# Patient Record
Sex: FEMALE | Race: WHITE | NOT HISPANIC OR LATINO | Employment: OTHER | URBAN - METROPOLITAN AREA
[De-identification: names, ages, dates, MRNs, and addresses within clinical notes are randomized per-mention and may not be internally consistent; named-entity substitution may affect disease eponyms.]

---

## 2017-02-06 ENCOUNTER — OFFICE VISIT (OUTPATIENT)
Dept: OTHER | Facility: OUTPATIENT CENTER | Age: 67
End: 2017-02-06

## 2017-02-06 VITALS
SYSTOLIC BLOOD PRESSURE: 132 MMHG | BODY MASS INDEX: 37.29 KG/M2 | DIASTOLIC BLOOD PRESSURE: 83 MMHG | WEIGHT: 275 LBS | HEART RATE: 62 BPM

## 2017-02-06 DIAGNOSIS — F64.0 GENDER DYSPHORIA IN ADOLESCENT AND ADULT: Primary | ICD-10-CM

## 2017-02-06 RX ORDER — MULTIVITAMIN WITH FOLIC ACID 400 MCG
TABLET ORAL
Refills: 11 | COMMUNITY
Start: 2016-12-22 | End: 2017-03-21

## 2017-02-06 RX ORDER — DIFLUNISAL 500 MG/1
TABLET, FILM COATED ORAL
Refills: 3 | COMMUNITY
Start: 2016-12-22 | End: 2017-04-26

## 2017-02-06 ASSESSMENT — ENCOUNTER SYMPTOMS
POLYDIPSIA: 0
ABDOMINAL PAIN: 0
PALPITATIONS: 0
FREQUENCY: 0
DYSPHORIC MOOD: 0
FEVER: 0
VOMITING: 0
CHEST TIGHTNESS: 0
NUMBNESS: 0
UNEXPECTED WEIGHT CHANGE: 0
LIGHT-HEADEDNESS: 0
NERVOUS/ANXIOUS: 0
CHILLS: 0
SHORTNESS OF BREATH: 0
WEAKNESS: 0
HEADACHES: 0

## 2017-02-06 NOTE — PROGRESS NOTES
BOO Ulloa is a 66 year old individual that uses pronouns She/Her/Hers/Herself that presents today for follow up of:  feminizing hormone therapy. Gender identity: female. Started Hormone  therapy 7/252016  Continues on  Spironlactone  100 mg daily      Finasteride 5 mg daily.   Any special concerns today?  She did not come with a  today as will be moving to a community living arrangement soon, but plans to bring someone with her next visit in April.  She continues to pursue gender confirmation surgery.Reviewed her understanding of surgery: does not know about where surgeons are located, nature of surgery, [facility]. She has reading disability. Reviewed information with her.     On hormones?  YES   Gender related body changes since last visit: She has noticed lighter body hair, breast growth and tenderness. Feeling more sociable.     Activity: busy at work (furniture repair), walking at work (on feet 5 hours/day)  New health concerns since last visit: No new health concerns  ---    Past Surgical History   Procedure Laterality Date     Joint replacemtn, knee rt/lt  2006     left     Colonoscopy  2007       Patient Active Problem List   Diagnosis     large compression fracture     Gouty arthropathy     Mental or behavioral problem     Generalized osteoarthrosis, unspecified site     Disorder of bone and cartilage     Mixed hyperlipidemia     OA (osteoarthritis) of knee     HYPERLIPIDEMIA LDL GOAL <100     Gout, chronic     DDD (degenerative disc disease), lumbar     Alcoholism (H)     Advanced directives, counseling/discussion     Varicose veins of legs     Psoriasis     HTN, goal below 140/90     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     Heart murmur     Family history of diabetes mellitus in first degree relative     Mitral valve disorder     Aortic stenosis     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       Current Outpatient Prescriptions   Medication Sig Dispense Refill      meloxicam (MOBIC) 15 MG tablet Take 1 tablet (15 mg) by mouth daily 30 tablet 5     finasteride (PROSCAR) 5 MG tablet Take 1 tablet (5 mg) by mouth daily 90 tablet 1     spironolactone (ALDACTONE) 100 MG tablet Take 1 tablet (100 mg) by mouth daily 90 tablet 1     multivitamin, therapeutic with minerals (MULTI-VITAMIN) TABS ONE TAB DAILY AT 8  tablet 3     atenolol (TENORMIN) 50 MG tablet Take 1 tablet (50 mg) by mouth daily Profile Rx: patient will contact pharmacy when needed 90 tablet 1     FLUoxetine (PROZAC) 40 MG capsule Take 1 capsule (40 mg) by mouth daily Take One Capsule By Mouth Every Day 90 capsule 1     lovastatin (MEVACOR) 20 MG tablet Take 1 tablet (20 mg) by mouth At Bedtime 90 tablet 1     calcium-vitamin D (CALCIUM 600 + D) 600-400 MG-UNIT per tablet Take 1 tablet by mouth 2 times daily 180 tablet 1     timolol (TIMOPTIC) 0.5 % ophthalmic solution 1 drop 2 times daily       latanoprost (XALATAN) 0.005 % ophthalmic solution 1 drop At Bedtime.       ORDER FOR DME Equipment being ordered:   BACK BRACE    MedSpec Back-n-Shape II Back Brace  Size XXXL with thermoplastic insert        1 each 0     ORDER FOR DME Equipment being ordered: Sigvaris 232 Cotton Thigh High for Men 20-30 mmHg- TWO PAIR TAN COLOR     2 each 5       History   Smoking status     Never Smoker    Smokeless tobacco     Never Used          Allergies   Allergen Reactions     Penicillin [Esters] Itching     he is not sure what kind of reaction he had     Heparin      he lives in a assisted living program and is not sure what type of allergies he really has. He quit drinking     Zosyn [Penicillins]      unsure of reaction, allergy is listed on his med sheet       Health Maintenance Due   Topic Date Due     AORTIC ANEURYSM SCREENING (SYSTEM ASSIGNED)  03/09/2015     FIT Q1 YR (NO INBASKET)  06/11/2016     INFLUENZA VACCINE (SYSTEM ASSIGNED)  09/01/2016         Problem, Medication and Allergy Lists were reviewed and are current..          Review of Systems:   Review of Systems   Constitutional: Negative for fever, chills and unexpected weight change.        Night sweats   Eyes: Negative for visual disturbance.   Respiratory: Negative for chest tightness and shortness of breath.    Cardiovascular: Negative for chest pain, palpitations and leg swelling.   Gastrointestinal: Negative for vomiting and abdominal pain.   Endocrine: Positive for polyuria. Negative for polydipsia.   Genitourinary: Negative for frequency.   Neurological: Negative for weakness, light-headedness, numbness and headaches.   Psychiatric/Behavioral: Negative for suicidal ideas and dysphoric mood. The patient is not nervous/anxious.             Physical Exam:   Blood pressure 132/83, pulse 62, weight 124.739 kg (275 lb).  Body mass index is 37.29 kg/(m^2).    BMI= There is no weight on file to calculate BMI.   Wt Readings from Last 10 Encounters:   02/06/17 124.739 kg (275 lb)   11/17/16 127.914 kg (282 lb)   09/27/16 127.461 kg (281 lb)   08/29/16 127.914 kg (282 lb)   07/25/16 131.543 kg (290 lb)   04/26/16 128.822 kg (284 lb)   04/25/16 130.636 kg (288 lb)   11/20/15 135.172 kg (298 lb)   06/09/15 149.233 kg (329 lb)   02/04/15 152.862 kg (337 lb)     Appearance: Female appearance and dress    GENERAL:: healthy, alert and no distress  RESP: lungs clear to auscultation - no rales, no rhonchi, no wheezes  CV: regular rates and rhythm, normal S1 S2, no S3 or S4 and no murmur, no click or rub -  Affect: Appropriate/mood-congruent           Labs:   Results from last visit:  Abstract on 10/17/2016   Component Date Value Ref Range Status     Sodium 09/27/2016 136   Final     Potassium 09/27/2016 5.2  5.3 mmol/L Final     Chloride 09/27/2016 103   Final     CO2, TOTAL 09/27/2016 30   Final     Anion Gap 09/27/2016 3   Final     Glucose 09/27/2016 93  70 - 99 mg/dL Final     Urea Nitrogen 09/27/2016 24   Final     Creatinine 09/27/2016 1.27* 1.25 mg/dL Final     Calcium 09/27/2016 9.6    Final     WBC 09/27/2016 6.0   Final     RBC Count 09/27/2016 5.06   Final     Hemoglobin 09/27/2016 15.9  13.3 - 17.7 g/dL Final     Hematocrit 09/27/2016 46.0   Final     MCV 09/27/2016 91   Final     MCH 09/27/2016 31.4   Final     MCHC 09/27/2016 34.6   Final     RDW 09/27/2016 13.7   Final     Platelet Count 09/27/2016 134* 150 - 450 10^9/L Final     % Neutrophils 09/27/2016 63.8   Final     % Lymphocytes 09/27/2016 23   Final     % Monocytes 09/27/2016 7.8   Final     % Eosinophils 09/27/2016 5.2   Final     % Basophils 09/27/2016 0.2   Final       Assessment and Plan   1. Gender dysphoria  Pt. Stable on current dose of androgen blocking medications. She continues to have little insight into nature and process of gender confirmation surgery, and limited capacity for written education materials.     Unclear if she fully understands the risks and effects of estrogen therapy, or has the capacity to correctly apply transdermal estrogen without assistance, at least initially. Will await next visit with support person to further assess, and see if estrogen can be started with supports.     Contraception:   not needed  .     Counselled patient about controlled substances: N/A      Follow up:  Follow up in 3 months.  [facility]  Questions were elicited and answered.     Jluis Mane MD

## 2017-02-08 DIAGNOSIS — E78.5 HYPERLIPIDEMIA LDL GOAL <100: Primary | ICD-10-CM

## 2017-02-08 DIAGNOSIS — M10.9 GOUTY ARTHROPATHY: ICD-10-CM

## 2017-02-08 NOTE — PROGRESS NOTES
[facility] -  Case Progress Note    Client Name: Laila Perez  YOB: 1950  MRN:  [id number]  Treating Provider: Estelle Gomez LP  Type of Session: Individual  Present in Session: client  Number of Minutes:  50    Date of Service: 2/06/17    Treatment plan completed: 9/6/16    Current Symptoms/Status:  Distress about gender, secondary sex characteristics, mild depression, distress with current living situation.    Progress Toward Treatment Goals:   Client is making progress with taking medications, feeling more feminine.    Intervention: Modality and Description:  Provided support and feedback. Used CBT to process distress about hormones and gender. Client shared she continues to take her medication as prescribed. She is noticing physical changes that are congruent with her gender identity. She is moving next week to a netter living situation. She will living in an apartment rather than group home, but in the former supportive setting. She shared she is looking forward to doing this. She shared about wanting to get gender confirming genital surgery. Asked her about what she knew about surgery, what surgeries are available, want she wants and surgeons. She shared she does not know any of this information. Discussed how she could get some of this information on-line, but she stated she does not have Internet access. Discussed the goal is for her to become educated about this process and then we can write letters to assist, but we do not want to make the decision for her. Told her I would talk to the team about alternate sources of this information.    Response to Intervention:  Client reported gaining insight and validation.    Assignment:  none    Diagnosis:  Encounter Diagnosis   Name Primary?     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 Yes         Plan / Need for Future Services:  Return for therapy in 4 weeks.      Estelle Gomez PsyD, LP

## 2017-02-08 NOTE — TELEPHONE ENCOUNTER
Sergio Vit D 600-400   Last Written Prescription Date: 09.09.2016  Last Fill Quantity: 180,  # refills: 1   [place] [facility] [place]  [facility] prescribing provider: 09.27.2016                                                Lovastatin 20mg  Last Written Prescription Date: 09.09.2016  Last Fill Quantity: 90, # refills: 1  [place] [facility] or  Health prescribing provider: 09.27.2016       CHOL      133   4/26/2016  HDL       57   4/26/2016  LDL       54   4/26/2016  LDL       52   6/9/2015  TRIG      110   4/26/2016  CHOLHDLRATIO      3.0   4/25/2014    Dolobid 500mg      Last Written Prescription Date: patient reported   Last Fill Quantity: ,  # refills:    Last Office Visit [place] [facility] [place]  [facility] prescribing provider: 09.27.2016                                             Marisol Landa CSS

## 2017-02-09 RX ORDER — LOVASTATIN 20 MG
20 TABLET ORAL AT BEDTIME
Qty: 90 TABLET | Refills: 1 | Status: SHIPPED | OUTPATIENT
Start: 2017-02-09 | End: 2017-05-31

## 2017-02-09 RX ORDER — DIFLUNISAL 500 MG/1
TABLET, FILM COATED ORAL
Qty: 60 TABLET | Refills: 3 | Status: CANCELLED | OUTPATIENT
Start: 2017-02-09

## 2017-02-09 RX ORDER — DIFLUNISAL 500 MG/1
500 TABLET, FILM COATED ORAL 2 TIMES DAILY
Qty: 60 TABLET | Refills: 2 | Status: SHIPPED | OUTPATIENT
Start: 2017-02-09 | End: 2017-03-28

## 2017-02-21 ENCOUNTER — TELEPHONE (OUTPATIENT)
Dept: FAMILY MEDICINE | Facility: CLINIC | Age: 67
End: 2017-02-21

## 2017-02-21 DIAGNOSIS — M19.91 PRIMARY OSTEOARTHRITIS, UNSPECIFIED SITE: Primary | ICD-10-CM

## 2017-02-21 NOTE — TELEPHONE ENCOUNTER
Diflunisal is not covered (high risk for pt over 64).  Preferred are IBU, Naproxen and Meloxicam.  Please fax new rx if OK to change.

## 2017-02-22 NOTE — TELEPHONE ENCOUNTER
Wen Vazquez RN        12/29/16 3:29 PM   Note      Message was left with updated information about medication change.   Encounter closed.   Wen GROVES RN                   12/29/16 2:18 PM   You routed this conversation to  [facility]    Me        12/29/16 2:18 PM   Note      Addended by: MALIA SIMONS on: 12/29/2016 02:18 PM       Modules accepted: Orders, Medications             Me        12/29/16 2:18 PM   Note      RX changed to meloxicam  Thanks Malia Simons [facility]                 Looks like this was already changed in December to meloxicam  Looks like it was changed to meloxicam then.. Looks like this was discontinued by her MD Dr Jluis Mane and Diflusinal was placed back on her Med List.    Please call and discuss with patient and find out what she would like to do. Let her know the diflusinal is not covered by her insurance and see if she would like this switched.   Otherwise she may have to pay cash for this.   All NSAIDS listed below have this potential. This one is not different than the others from a safety standpoint.   They are effective for his joint pain and improve his quality of life. He could try ES tylenol up to 3500 mg daily as a substitute if interested.  Thanks Malia Simons [facility]

## 2017-02-24 DIAGNOSIS — I10 ESSENTIAL HYPERTENSION WITH GOAL BLOOD PRESSURE LESS THAN 140/90: ICD-10-CM

## 2017-02-24 DIAGNOSIS — F64.0 GENDER DYSPHORIA IN ADOLESCENT AND ADULT: ICD-10-CM

## 2017-02-24 NOTE — TELEPHONE ENCOUNTER
Atenolol  Last Written Prescription Date: 9/27/16  Last Fill Quantity: 90, # refills: 1    Last Office Visit with G, P or [facility] prescribing provider:  9/27/16 Delmis GIBSON  [facility]     Future Office Visit:        BP Readings from Last 3 Encounters:   09/27/16 122/62   08/29/16 119/69   08/29/16 119/69     Finasteride       Last Written Prescription Date: 11/17/16  Last Fill Quantity: 90,  # refills: 1     [employer] [facility]

## 2017-02-27 RX ORDER — ATENOLOL 50 MG/1
50 TABLET ORAL DAILY
Qty: 90 TABLET | Refills: 0 | Status: SHIPPED | OUTPATIENT
Start: 2017-02-27 | End: 2017-04-27

## 2017-02-27 RX ORDER — FINASTERIDE 5 MG/1
5 TABLET, FILM COATED ORAL DAILY
Qty: 90 TABLET | Refills: 0 | Status: SHIPPED | OUTPATIENT
Start: 2017-02-27 | End: 2017-04-26

## 2017-03-28 RX ORDER — MELOXICAM 15 MG/1
15 TABLET ORAL DAILY
Qty: 90 TABLET | Refills: 1 | Status: SHIPPED | OUTPATIENT
Start: 2017-03-28 | End: 2017-09-06

## 2017-03-28 NOTE — TELEPHONE ENCOUNTER
[facility] is calling regarding Pt's Diflunisal to see if a Prior Auth was ever done.  Please Advise looks like it was changed to Meloxicam  [facility]

## 2017-03-28 NOTE — TELEPHONE ENCOUNTER
Stick with meloxicam then.   Let pharmacy know to discontinue the diflusinal from her medication list.   Thanks Delmis Simons P-BC

## 2017-03-28 NOTE — TELEPHONE ENCOUNTER
Spoke to [facility], pt has been taking Meloxicam 15 mg daily, diflunisal has not been given to pt, they need clarification as they have been giving her meloxiam, please advise, I'm not quite sure what happened with this.  Virgen Call RN    Looks like this was already changed in December to meloxicam  Looks like it was changed to meloxicam then.. Looks like this was discontinued by her MD Dr Jluis Mane and Diffusional was placed back on her Med List.   Please call and discuss with patient and find out what she would like to do. Let her know the diffusional is not covered by her insurance and see if she would like this switched.   Otherwise she may have to pay cash for this.   All NSAIDS listed below have this potential. This one is not different than the others from a safety standpoint.   They are effective for his joint pain and improve his quality of life. He could try ES tylenol up to 3500 mg daily as a substitute if interested.  Thanks Delmis Simons [facility]

## 2017-04-26 ENCOUNTER — OFFICE VISIT (OUTPATIENT)
Dept: OTHER | Facility: OUTPATIENT CENTER | Age: 67
End: 2017-04-26

## 2017-04-26 VITALS
BODY MASS INDEX: 40.46 KG/M2 | SYSTOLIC BLOOD PRESSURE: 118 MMHG | HEART RATE: 58 BPM | DIASTOLIC BLOOD PRESSURE: 70 MMHG | WEIGHT: 289 LBS | HEIGHT: 71 IN

## 2017-04-26 DIAGNOSIS — F64.0 GENDER DYSPHORIA IN ADOLESCENT AND ADULT: Primary | ICD-10-CM

## 2017-04-26 DIAGNOSIS — I10 ESSENTIAL HYPERTENSION WITH GOAL BLOOD PRESSURE LESS THAN 140/90: ICD-10-CM

## 2017-04-26 RX ORDER — FINASTERIDE 5 MG/1
5 TABLET, FILM COATED ORAL DAILY
Qty: 90 TABLET | Refills: 1 | Status: SHIPPED | OUTPATIENT
Start: 2017-04-26 | End: 2017-08-28

## 2017-04-26 RX ORDER — SPIRONOLACTONE 100 MG/1
100 TABLET, FILM COATED ORAL DAILY
Qty: 90 TABLET | Refills: 1 | Status: SHIPPED | OUTPATIENT
Start: 2017-04-26 | End: 2017-08-28

## 2017-04-26 ASSESSMENT — ENCOUNTER SYMPTOMS
NUMBNESS: 0
LIGHT-HEADEDNESS: 0
UNEXPECTED WEIGHT CHANGE: 0
DYSPHORIC MOOD: 0
VOMITING: 0
CHILLS: 0
POLYDIPSIA: 0
FREQUENCY: 0
ABDOMINAL PAIN: 0
WEAKNESS: 0
FEVER: 0
SHORTNESS OF BREATH: 0
PALPITATIONS: 0
CHEST TIGHTNESS: 0
NERVOUS/ANXIOUS: 0
HEADACHES: 0

## 2017-04-26 NOTE — PROGRESS NOTES
BOO Ulloa is a 67 year old individual that uses pronouns She/Her/Hers/Herself that presents today for follow up of:  feminizing hormone therapy.   Gender identity: female.   Started Hormone  therapy  7/2016  Continues on .  Spironlactone 100. mg daily      Other finasteride 5 mg daily  Any special concerns today?  She is having CPE in May with PCP, and echocardiagram in June for routine follow up per patient.   She is now living by herself and gets 16 hours of staff assist, (today with staff member). She sets up her own medication, staff reviews. Staff assists with transportation. Always able to call on staff. Due to see Dr. Gomez next month.      On hormones?  YES +++ Shot day of the week? Not applicable-taking pills/patch/gel      Due for labs?  No      +++ Refills of meds needed?  Yes  Gender related body changes since last visit: body hair is less, breasts are bigger. Feels better all over.     Activity:Still walking at work, doing furniture repair/wood work  New health concerns since last visit:No new health concerns.  ---    Past Surgical History:   Procedure Laterality Date     COLONOSCOPY  2007     JOINT REPLACEMTN, KNEE RT/LT  2006    left       Patient Active Problem List   Diagnosis     large compression fracture     Gouty arthropathy     Mental or behavioral problem     Generalized osteoarthrosis, unspecified site     Disorder of bone and cartilage     Mixed hyperlipidemia     OA (osteoarthritis) of knee     HYPERLIPIDEMIA LDL GOAL <100     Gout, chronic     DDD (degenerative disc disease), lumbar     Alcoholism (H)     Advanced directives, counseling/discussion     Varicose veins of legs     Psoriasis     HTN, goal below 140/90     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     Heart murmur     Family history of diabetes mellitus in first degree relative     Mitral valve disorder     Aortic stenosis     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       Current Outpatient Prescriptions   Medication Sig  Dispense Refill     meloxicam (MOBIC) 15 MG tablet Take 1 tablet (15 mg) by mouth daily 90 tablet 1     Multiple Vitamin (DAILY-EVANGELISTA) TABS Take 1 tablet by mouth daily 90 tablet 3     atenolol (TENORMIN) 50 MG tablet Take 1 tablet (50 mg) by mouth daily 90 tablet 0     finasteride (PROSCAR) 5 MG tablet Take 1 tablet (5 mg) by mouth daily 90 tablet 0     calcium-vitamin D (CALCIUM 600 + D) 600-400 MG-UNIT per tablet Take 1 tablet by mouth 2 times daily 180 tablet 1     lovastatin (MEVACOR) 20 MG tablet Take 1 tablet (20 mg) by mouth At Bedtime 90 tablet 1     spironolactone (ALDACTONE) 100 MG tablet Take 1 tablet (100 mg) by mouth daily 90 tablet 1     multivitamin, therapeutic with minerals (MULTI-VITAMIN) TABS ONE TAB DAILY AT 8  tablet 3     FLUoxetine (PROZAC) 40 MG capsule Take 1 capsule (40 mg) by mouth daily Take One Capsule By Mouth Every Day 90 capsule 1     timolol (TIMOPTIC) 0.5 % ophthalmic solution 1 drop 2 times daily       latanoprost (XALATAN) 0.005 % ophthalmic solution 1 drop At Bedtime.       ORDER FOR DME Equipment being ordered:   BACK BRACE    MedSpec Back-n-Shape II Back Brace  Size XXXL with thermoplastic insert        1 each 0     ORDER FOR DME Equipment being ordered: Sigvaris 232 Cotton Thigh High for Men 20-30 mmHg- TWO PAIR TAN COLOR     2 each 5       History   Smoking Status     Never Smoker   Smokeless Tobacco     Never Used          Allergies   Allergen Reactions     Penicillin [Esters] Itching     he is not sure what kind of reaction he had     Heparin      he lives in a assisted living program and is not sure what type of allergies he really has. He quit drinking     Zosyn [Penicillins]      unsure of reaction, allergy is listed on his med sheet       Health Maintenance Due   Topic Date Due     AORTIC ANEURYSM SCREENING (SYSTEM ASSIGNED)  03/09/2015     FIT Q1 YR (NO INBASKET)  06/11/2016     PNEUMOCOCCAL (2 of 2 - PPSV23) 04/26/2017         Problem, Medication and Allergy  "Lists were reviewed and are current..         Review of Systems:   Review of Systems   Constitutional: Negative for chills, fever and unexpected weight change.   Eyes: Negative for visual disturbance.   Respiratory: Negative for chest tightness and shortness of breath.    Cardiovascular: Negative for chest pain, palpitations and leg swelling.   Gastrointestinal: Negative for abdominal pain and vomiting.   Endocrine: Negative for polydipsia and polyuria.   Genitourinary: Negative for frequency.   Neurological: Negative for weakness, light-headedness, numbness and headaches.   Psychiatric/Behavioral: Negative for dysphoric mood and suicidal ideas. The patient is not nervous/anxious.             Physical Exam:     Vitals:    04/26/17 1305   BP: 118/70   Pulse: 58   Weight: 131.1 kg (289 lb)   Height: 1.803 m (5' 11\")     BMI= Body mass index is 40.31 kg/(m^2).   Wt Readings from Last 10 Encounters:   04/26/17 131.1 kg (289 lb)   02/06/17 124.7 kg (275 lb)   11/17/16 127.9 kg (282 lb)   09/27/16 127.5 kg (281 lb)   08/29/16 127.9 kg (282 lb)   07/25/16 131.5 kg (290 lb)   04/26/16 128.8 kg (284 lb)   04/25/16 130.6 kg (288 lb)   11/20/15 135.2 kg (298 lb)   06/09/15 (!) 149.2 kg (329 lb)     Appearance: Female appearance and dress    GENERAL:: healthy, alert and no distress  RESP: lungs clear to auscultation - no rales, no rhonchi, no wheezes  CV: regular rates and rhythm,2/4 systolic murmur at  L sternal border  Breast, hips per flowsheet    Affect: Appropriate/mood-congruent           Labs:   Results from last visit:  Abstract on 10/17/2016   Component Date Value Ref Range Status     Sodium 09/27/2016 136  mmol/L Final     Potassium 09/27/2016 5.2  5.3 mmol/L Final     Chloride 09/27/2016 103  mmol/L Final     CO2, TOTAL 09/27/2016 30  mmol/L Final     Anion Gap 09/27/2016 3  mmol/L Final     Glucose 09/27/2016 93  70 - 99 mg/dL Final     Urea Nitrogen 09/27/2016 24  mg/dL Final     Creatinine 09/27/2016 1.27* 1.25 " mg/dL Final     Calcium 09/27/2016 9.6  mg/dL Final     WBC 09/27/2016 6.0  10^9/L Final     RBC Count 09/27/2016 5.06  10^12/L Final     Hemoglobin 09/27/2016 15.9  13.3 - 17.7 g/dL Final     Hematocrit 09/27/2016 46.0  % Final     MCV 09/27/2016 91  fl Final     MCH 09/27/2016 31.4  pg Final     MCHC 09/27/2016 34.6  g/dL Final     RDW 09/27/2016 13.7  % Final     Platelet Count 09/27/2016 134* 150 - 450 10^9/L Final     % Neutrophils 09/27/2016 63.8  % Final     % Lymphocytes 09/27/2016 23  % Final     % Monocytes 09/27/2016 7.8  % Final     % Eosinophils 09/27/2016 5.2  % Final     % Basophils 09/27/2016 0.2  % Final       Assessment and Plan   1. Gender dysphoria  2. Aortic stensois  3. Weight gain    Feminizing on current hormone regimen, and she is experiencing relief from dysphoria.  She is interested in orchiectomy and will continue to discuss with her therapist.  No change in medications, check BMP and testosterone level  Discussed need for health diet and activity to reduce weight, follow with cardiology        F/u 2-4 monhts  There are no diagnoses linked to this encounter.    Follow up:  Follow up in 2- 4 months  Results by tracet  Questions were elicited and answered.     Jluis Mane MD

## 2017-04-26 NOTE — LETTER
2017      To Whom It May Concern:    My patient,  Laila Perez  : 1950 , is currently being treated by me for Gender Identity Disorder. I expect Laila's treatment to be lifelong and result in continuing irreversible feminizing physical changes.    If you have any questions regarding Laila s diagnosis or treatment, please contact me at the [facility].      Sincerely,      Jluis Mane MD, PhD

## 2017-04-26 NOTE — NURSING NOTE
"Chief Complaint   Patient presents with     RECHECK       Vitals:    04/26/17 1305   BP: 118/70   Pulse: 58   Weight: 131.1 kg (289 lb)   Height: 1.803 m (5' 11\")       Body mass index is 40.31 kg/(m^2).      Alexandra Long CMA                        "

## 2017-04-26 NOTE — MR AVS SNAPSHOT
After Visit Summary   4/26/2017    Laila Perez    MRN: [id number]           Patient Information     Date Of Birth          1950        Visit Information        Provider Department      4/26/2017 1:00 PM Jluis Mane MD [facility]        Today's Diagnoses     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    -  1    Essential hypertension with goal blood pressure less than 140/90           Follow-ups after your visit        Follow-up notes from your care team     Return in about 3 months (around 7/26/2017).      Your next 10 appointments already scheduled     May 02, 2017 10:40 AM CDT   PHYSICAL with JERRICA Salcedo [facility] ([facility])    [address]  [facility] [id number] [phone]            May 23, 2017  3:00 PM CDT   INDIVIDUAL THERAPY with Estelle Gomez LP   [facility] ([facility])    [address]  Mail Code 7521  [facility] [address]   [phone]            Jun 27, 2017 10:00 AM CDT   OFFICE VISIT with Jluis Mane MD   [facility] ([facility])    [address]  Mail [address] 75[address]  [facility] [address]   [phone]              Who to contact     Please call your clinic at [phone] to:    Ask questions about your health    Make or cancel appointments    Discuss your medicines    Learn about your test results    Speak to your doctor   If you have compliments or concerns about an experience at your clinic, or if you wish to file a complaint, please contact [facility] Patient Relations at [phone] or email us at [email or web address]         Additional Information About Your Visit        MyChart Information     [employer] is an electronic gateway that provides easy, online access to your medical records. With [employer], you can request a clinic appointment, read your test results, renew a prescription  "or communicate with your care team.     To sign up for [employer] visit the website at [email or web address]   You will be asked to enter the access code listed below, as well as some personal information. Please follow the directions to create your username and password.     Your access code is: [email or web address]  Expires: 2017  5:28 PM     Your access code will  in 90 days. If you need help or a new code, please contact your [facility] or call [phone] for assistance.        Care EveryWhere ID     This is your Care EveryWhere ID. This could be used by other organizations to access your [facility] medical records  [id number]        Your Vitals Were     Pulse Height BMI (Body Mass Index)             58 1.803 m (5' 11\") 40.31 kg/m2          Blood Pressure from Last 3 Encounters:   17 118/70   17 132/83   16 130/82    Weight from Last 3 Encounters:   17 131.1 kg (289 lb)   17 124.7 kg (275 lb)   16 127.9 kg (282 lb)              We Performed the Following     SCANNED MISC. LAB RESULTS          Where to get your medicines      These medications were sent to [facility]  [address]  [address] [facility] [address]    Hours:  test rx sent successfully  05   Phone:  [phone]     finasteride 5 MG tablet    spironolactone 100 MG tablet          Primary Care Provider Office Phone # Fax #    JERRICA Salcedo [facility] [phone]       [facility] [address] [facility] [address]        Thank you!     Thank you for choosing [facility]  for your care. Our goal is always to provide you with excellent care. Hearing back from our patients is one way we can continue to improve our services. Please take a few minutes to complete the written survey that you may receive in the mail after your visit with us. Thank you!             Your Updated " Medication List - Protect others around you: Learn how to safely use, store and throw away your medicines at www.disposemymeds.org.          This list is accurate as of: 4/26/17 11:59 PM.  Always use your most recent med list.                   Brand Name Dispense Instructions for use    calcium-vitamin D 600-400 MG-UNIT per tablet    calcium 600 + D    180 tablet    Take 1 tablet by mouth 2 times daily       DAILY-EVANGELISTA Tabs     90 tablet    Take 1 tablet by mouth daily       finasteride 5 MG tablet    PROSCAR    90 tablet    Take 1 tablet (5 mg) by mouth daily       FLUoxetine 40 MG capsule    PROzac    90 capsule    Take 1 capsule (40 mg) by mouth daily Take One Capsule By Mouth Every Day       latanoprost 0.005 % ophthalmic solution    XALATAN     1 drop At Bedtime.       lovastatin 20 MG tablet    MEVACOR    90 tablet    Take 1 tablet (20 mg) by mouth At Bedtime       meloxicam 15 MG tablet    MOBIC    90 tablet    Take 1 tablet (15 mg) by mouth daily       multivitamin, therapeutic with minerals Tabs tablet     100 tablet    ONE TAB DAILY AT 8 AM       * order for DME     2 each    Equipment being ordered: [employer] 232 Cotton Thigh High for Men 20-30 mmHg- TWO PAIR TAN COLOR       * order for DME     1 each    Equipment being ordered:  BACK BRACE  MedSpec Back-n-Shape II Back Brace  Size XXXL with thermoplastic insert       spironolactone 100 MG tablet    ALDACTONE    90 tablet    Take 1 tablet (100 mg) by mouth daily       timolol 0.5 % ophthalmic solution    TIMOPTIC     1 drop 2 times daily       * Notice:  This list has 2 medication(s) that are the same as other medications prescribed for you. Read the directions carefully, and ask your doctor or other care provider to review them with you.

## 2017-05-01 NOTE — PROGRESS NOTES
[facility] -  Case Progress Note  Client Name: Laila Perez  YOB: 1950  MRN:  [id number]  Treating Provider: Estelle Gomez LP  Type of Session: Individual  Present in Session: client and support person  Number of Minutes:  55    Date of Service: Apr 26, 2017    Current Symptoms/Status:  Distress about gender and secondary sex characteristics, wants genitals removed, bothers her daily of moderate to severe intensity.    Progress Toward Treatment Goals:   Client is making progress with living lower level of assistance.    Intervention: Modality and Description:  Provided support and feedback. Used CBT to process gender dysphoria concerns. Client shared bring her support person because the physician requested she do this because of concern about her understanding medication/medical directions. She shared she lives alone and gets some services from this person each week. She shared wanting to get on estrogen. Client also expressed interest in having genital surgery. When asked about what type of surgery--she stated she wants everything removed. She does not have understanding of surgery choices. Also does not understand that medicare or medical assistance may not pay for it. She has not consulted a surgeon. When explaining surgery choices--she kept stating she wants everything removed. The support person seemed to understand the differences and the need to find out about how it will get covered. Also there will be a need for recovery services. Support person will use the Internet at home to show client about the different surgery options. Client also shared needing a letter from me to change the M to a F on legal documents. Discussed thought she already went to court and has these documents. Client did not understand --support person stated she has the court documents and has sent them to the appropriate agencies.    Response to Intervention:  Client reported  validation.    Assignment:  Look at information about surgery on the internet.    Interactive Complexity:  There are four specific communication difficulties that complicate the work of the primary psychiatric procedure.  Interactive complexity (+10086) may be reported when at least one of these difficulties is present.    Communication difficulties present during current the psychiatric procedure include:  1. Use of play equipment, physical devices,  or  to overcome significant communication barriers.    Diagnosis:  Encounter Diagnosis   Name Primary?     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 Yes           Plan / Need for Future Services:  Return for therapy in 4 weeks.      Estelle Gomez PsyD, LP

## 2017-05-02 ENCOUNTER — OFFICE VISIT (OUTPATIENT)
Dept: FAMILY MEDICINE | Facility: CLINIC | Age: 67
End: 2017-05-02
Payer: MEDICARE

## 2017-05-02 VITALS
WEIGHT: 289 LBS | OXYGEN SATURATION: 99 % | DIASTOLIC BLOOD PRESSURE: 62 MMHG | SYSTOLIC BLOOD PRESSURE: 102 MMHG | HEART RATE: 60 BPM | HEIGHT: 71 IN | TEMPERATURE: 97.3 F | BODY MASS INDEX: 40.46 KG/M2

## 2017-05-02 DIAGNOSIS — I10 HTN, GOAL BELOW 140/90: ICD-10-CM

## 2017-05-02 DIAGNOSIS — I83.93 VARICOSE VEINS OF LEGS: ICD-10-CM

## 2017-05-02 DIAGNOSIS — Z78.9 MALE-TO-FEMALE TRANSGENDER PERSON: ICD-10-CM

## 2017-05-02 DIAGNOSIS — Z00.00 MEDICARE ANNUAL WELLNESS VISIT, SUBSEQUENT: Primary | ICD-10-CM

## 2017-05-02 DIAGNOSIS — M17.0 PRIMARY OSTEOARTHRITIS OF BOTH KNEES: ICD-10-CM

## 2017-05-02 DIAGNOSIS — E78.5 HYPERLIPIDEMIA LDL GOAL <100: ICD-10-CM

## 2017-05-02 DIAGNOSIS — M1A.00X0 IDIOPATHIC CHRONIC GOUT WITHOUT TOPHUS, UNSPECIFIED SITE: ICD-10-CM

## 2017-05-02 DIAGNOSIS — F64.0 GENDER DYSPHORIA IN ADOLESCENT AND ADULT: ICD-10-CM

## 2017-05-02 DIAGNOSIS — I35.0 NONRHEUMATIC AORTIC VALVE STENOSIS: ICD-10-CM

## 2017-05-02 DIAGNOSIS — I05.9 MITRAL VALVE DISORDER: ICD-10-CM

## 2017-05-02 DIAGNOSIS — Z12.11 COLON CANCER SCREENING: ICD-10-CM

## 2017-05-02 LAB
ALBUMIN SERPL-MCNC: 4 G/DL (ref 3.4–5)
ALP SERPL-CCNC: 68 U/L (ref 40–150)
ALT SERPL W P-5'-P-CCNC: 22 U/L (ref 0–70)
ANION GAP SERPL CALCULATED.3IONS-SCNC: 8 MMOL/L (ref 3–14)
AST SERPL W P-5'-P-CCNC: 12 U/L (ref 0–45)
BILIRUB SERPL-MCNC: 0.5 MG/DL (ref 0.2–1.3)
BUN SERPL-MCNC: 28 MG/DL (ref 7–30)
CALCIUM SERPL-MCNC: 9.2 MG/DL (ref 8.5–10.1)
CHLORIDE SERPL-SCNC: 105 MMOL/L (ref 94–109)
CHOLEST SERPL-MCNC: 176 MG/DL
CO2 SERPL-SCNC: 27 MMOL/L (ref 20–32)
CREAT SERPL-MCNC: 1.49 MG/DL (ref 0.66–1.25)
ERYTHROCYTE [DISTWIDTH] IN BLOOD BY AUTOMATED COUNT: 12.9 % (ref 10–15)
GFR SERPL CREATININE-BSD FRML MDRD: 47 ML/MIN/1.7M2
GLUCOSE SERPL-MCNC: 85 MG/DL (ref 70–99)
HCT VFR BLD AUTO: 42.6 % (ref 40–53)
HDLC SERPL-MCNC: 57 MG/DL
HGB BLD-MCNC: 14.6 G/DL (ref 13.3–17.7)
LDLC SERPL CALC-MCNC: 90 MG/DL
MCH RBC QN AUTO: 30.9 PG (ref 26.5–33)
MCHC RBC AUTO-ENTMCNC: 34.3 G/DL (ref 31.5–36.5)
MCV RBC AUTO: 90 FL (ref 78–100)
NONHDLC SERPL-MCNC: 119 MG/DL
PLATELET # BLD AUTO: 133 10E9/L (ref 150–450)
POTASSIUM SERPL-SCNC: 5.1 MMOL/L (ref 3.4–5.3)
PROT SERPL-MCNC: 7.8 G/DL (ref 6.8–8.8)
RBC # BLD AUTO: 4.73 10E12/L (ref 4.4–5.9)
SODIUM SERPL-SCNC: 140 MMOL/L (ref 133–144)
TRIGL SERPL-MCNC: 147 MG/DL
TSH SERPL DL<=0.005 MIU/L-ACNC: 1.44 MU/L (ref 0.4–4)
URATE SERPL-MCNC: 7 MG/DL (ref 3.5–7.2)
WBC # BLD AUTO: 7.1 10E9/L (ref 4–11)

## 2017-05-02 PROCEDURE — 84443 ASSAY THYROID STIM HORMONE: CPT | Performed by: NURSE PRACTITIONER

## 2017-05-02 PROCEDURE — 85027 COMPLETE CBC AUTOMATED: CPT | Performed by: NURSE PRACTITIONER

## 2017-05-02 PROCEDURE — 80053 COMPREHEN METABOLIC PANEL: CPT | Performed by: NURSE PRACTITIONER

## 2017-05-02 PROCEDURE — 84403 ASSAY OF TOTAL TESTOSTERONE: CPT | Performed by: NURSE PRACTITIONER

## 2017-05-02 PROCEDURE — 99397 PER PM REEVAL EST PAT 65+ YR: CPT | Performed by: NURSE PRACTITIONER

## 2017-05-02 PROCEDURE — 36415 COLL VENOUS BLD VENIPUNCTURE: CPT | Performed by: NURSE PRACTITIONER

## 2017-05-02 PROCEDURE — 84550 ASSAY OF BLOOD/URIC ACID: CPT | Performed by: NURSE PRACTITIONER

## 2017-05-02 PROCEDURE — 80061 LIPID PANEL: CPT | Performed by: NURSE PRACTITIONER

## 2017-05-02 PROCEDURE — 99213 OFFICE O/P EST LOW 20 MIN: CPT | Mod: 25 | Performed by: NURSE PRACTITIONER

## 2017-05-02 NOTE — MR AVS SNAPSHOT
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   o   Discuss preventive care.    o   Review your medicines if your doctor has prescribed any.    Talk with your health care provider about whether you should have a test to screen for prostate cancer (PSA).    Every 3 years, have a diabetes test (fasting glucose). If you are at risk for diabetes, you should have this test more often.    Every 5 years, have a cholesterol test. Have this test more often if you are at risk for high cholesterol or heart disease.     Every 10 years, have a colonoscopy. Or, have a yearly FIT test (stool test). These exams will check for colon cancer.    Talk to with your health care provider about screening for Abdominal Aortic Aneurysm if you have a family history of AAA or have a history of smoking.    Shots:     Get a flu shot each year.     Get a tetanus shot every 10 years.     Talk to your doctor about your pneumonia vaccines. There are now two you should receive - Pneumovax (PPSV 23) and Prevnar (PCV 13).     Talk to your doctor about a  shingles vaccine.     Talk to your doctor about the hepatitis B vaccine.  Nutrition:     Eat at least 5 servings of fruits and vegetables each day.     Eat whole-grain bread, whole-wheat pasta and brown rice instead of white grains and rice.     Talk to your provider about Calcium and Vitamin D.   Lifestyle    Exercise for at least 150 minutes a week (30 minutes a day, 5 days a week). This will help you control your weight and prevent disease.     Limit alcohol to one drink per day.     No smoking.     Wear sunscreen to prevent skin cancer.     See your dentist every six months for an exam and cleaning.     See your eye doctor every 1 to 2 years to screen for conditions such as glaucoma, macular degeneration, cataracts, etc         What is Arthritis?  Arthritis is a disease that affects the joints (the parts where bones meet and move). It can affect any joint in your body. There are many types of arthritis, including osteoarthritis and rheumatoid arthrtitis. If your symptoms are mild, medications may be enough to reduce pain and swelling. For more severe arthritis, surgery may be needed to improve the condition of the joint or replace the joint entirely.                  What causes arthritis?  Cartilage is a smooth substance that protects the ends of your bones and provides cushioning. When you have arthritis, this cartilage breaks down and can no longer protect your bones. The bones rub against each other, causing pain and swelling. Over time, bone spurs (small pieces of rough or splintered bone) may develop, and the joint's range of motion can become limited.  Symptoms  Some of the more common symptoms of arthritis include:    Joint pain and stiffness. Pain and stiffness get worse with long periods of rest or using a joint too long or too hard.    Joints that have lost normal shape and motion.    Tender, inflamed joints. They may look red and feel warm.    Grinding or popping noise with joint  movement.     Feeling tired all the time.  Reducing symptoms  Following a healthy lifestyle by losing weight and exercising can help reduce symptoms of osteoarthritis. Medicines can be very helpful for arthritis.       4312-6625 [employer]. [address] [place] [address]. All rights reserved. This information is not intended as a substitute for professional medical care. Always follow your healthcare professional's instructions.        Taking Your Blood Pressure  Blood pressure is the force of blood as it moves from the heart through the blood vessels. You can take your own blood pressure reading using a digital monitor. Take readings as often as your doctor instructs. Take each reading at the same time of day.  Step 1. Relax      Wait at least a half-hour after smoking, eating, or exercising.    Sit comfortably at a table. Place the monitor near you.    Rest for a few minutes before you begin.  Step 2. Wrap the Cuff      Place your arm on the table, palm up. Your arm should be at the level of your heart. Wrap the cuff around your upper arm, just above your elbow. It s best done on bare skin, not over clothing.    Make sure your cuff fits. If it doesn t wrap around your upper arm, order a larger cuff.  Step 3. Inflate the Cuff      Pump the cuff until the scale reads 160. If you have a self-inflating cuff, push the button that starts the pump.    The cuff will tighten, then loosen.    The numbers will change. When they stop changing, your blood pressure reading will appear.    If you get a reading that is too high or too low for you, relax for a few minutes. Then do the test again.  Step 4. Write Down the Results      Write down your blood pressure numbers. Note the date and time. Keep your results in one place, such as a notebook.    Remove the cuff from your arm. Turn off the machine.    4388-7584 [employer]. [address] [place] [address]. All rights reserved.  This information is not intended as a substitute for professional medical care. Always follow your healthcare professional's instructions.        Lifestyle Changes to Control Cholesterol  Diet, exercise, weight management, quitting smoking, stress management, and taking your medications right can help you control your cholesterol.    Diet  Your health care provider will give you information on changes to your diet you may need to make, based on your situation. Your provider may recommend that you see a registered dietitian for help with diet changes. Changes may include:    Reducing the amount of fat and cholesterol in your meals    Reducing the amount of sodium (salt) in your food, especially if you have high blood pressure    Eating more fresh vegetables and fruits    Eating lean proteins, such as fish, poultry, and legumes (beans and peas), and eating less red meat and processed meats    Using low-fat dairy products    Using vegetable and nut oils in limited amounts    Limiting how many sweets and processed foods like chips, cookies, and baked goods that you eat   Exercise  Regular exercise is a good way to help your body control cholesterol. Regular exercise has many benefits. It can:    Raise your good cholesterol.    Help lower your bad cholesterol.    Let blood flow better through your body.    Give more oxygen to your muscles and tissues.    Help you manage your weight.  Your health care provider may recommend that you get more physical activity if you haven't been active. Depending on your situation, your provider may recommend that you get moderate to vigorous physical activity for at least 40 minutes each day and for at least 3 to 4 days each week. A few examples of moderate to vigorous activity include:    Walking at a brisk pace, about 3 to 4 miles per hour    Jogging or running    Swimming or water aerobics    Hiking    Dancing    Martial arts    Tennis    Riding a bicycle or stationary  bike    Dancing  Weight management  If you are overweight or obese, your health care provider will work with you to help you lose weight and lower your BMI (body mass index) to a normal or near-normal level. Making diet changes and getting more physical activity can help.    Quitting smoking  Smoking and other tobacco use can raise cholesterol and make it harder to control. Quitting is tough. But millions of people have given up tobacco for good. You can quit, too! Think about some of the reasons below to quit smoking. Do any of them make you think twice about your smoking habit?  Stop smoking because it:    Keeps your cholesterol high, even if you make all the other changes you re supposed to.    Damages your body, especially your heart, lungs, and blood vessels.    Makes you more likely to have a heart attack (also known as acute myocardial infarction, or AMI), stroke, or cancer.    Stains your teeth and makes your skin, clothes, and breath smell bad.    Costs a lot of money.  Stress   Learn stress-management techniques to help you deal with stress in your home and work life.   Making the most of medications  Healthy eating and exercise are a good start to keeping your cholesterol down. But you may need some extra help from medication. If your doctor prescribes medication, be sure to take it exactly as directed. Remember:    Tell your doctor about all other medications you take, including vitamins and herbs.    Tell your doctor if you have any side effects after starting to take a medication. Examples of side effects to watch for include: muscle aches, weakness, blurred vision, rust-colored urine, yellowing of eyes or skin (jaundice), or headache.    Don t skip a dose or stop taking your medication because you feel better or because your cholesterol numbers go down. Never stop taking your medication unless your doctor has told you it s OK.    7486-3555 [employer]. [address] [facility], [place]  [address]. All rights reserved. This information is not intended as a substitute for professional medical care. Always follow your healthcare professional's instructions.        Colorectal Cancer Screening  Colorectal cancer (cancer in the colon or rectum) is a leading cause of cancer deaths in the [place]. But it doesn t have to be. When this cancer is found and removed early, the chances of a full recovery are very good. Because colorectal cancer rarely causes symptoms in its early stages, screening for the disease is important. It s even more crucial if you have risk factors for the disease. Learn more about colorectal cancer and its risk factors. Then talk to your healthcare provider about being screened. You could be saving your own life.  Risk factors for colorectal cancer  Your risk of having colorectal cancer increases if you:    Are 50 years of age or older    Have a family history or personal history of colorectal cancer or polyps    Have a personal history of type 2 diabetes, Crohn s disease, or ulcerative colitis    Have an inherited genetic syndrome like Gamble syndrome (also known as HNPCC) or familial adenomatous polyposis (FAP)    Are very overweight    Are not physically active    Smoke    Drink a lot of alcohol    Eat a lot of red or processed meat  The colon and rectum        A ABHI can detect a growth in the rectum or anus.      Waste from food you eat enters the colon from the small intestine. As it travels through the colon, the waste (stool) loses water and becomes more solid. Intestinal muscles push it toward the sigmoid--the last section of the colon. Stool then moves into the rectum, where it s stored until it s ready to leave the body during a bowel movement.  How cancer develops  Polyps are growths that form on the inner lining of the colon or rectum. Most are benign, which means they aren t cancerous. But over time, some polyps can become malignant (cancerous). This happens when cells in  these polyps begin growing abnormally. In time, malignant cells invade more and more of the colon and rectum. The cancer may also spread to nearby organs or lymph nodes or to other parts of the body. Finding and removing polyps can help prevent cancer from ever forming.  Your screening  Screening means looking for a medical problem before you have symptoms. During screening for colorectal cancer, your healthcare provider will ask about your medical history, examine you, and do one or more tests.  History and exam  The history and exam involve the following:    Medical history. Your healthcare provider will ask about your medical history. Mention if a family member has had colon cancer or polyps. Also mention any health problems you have had in the past.    Digital rectal exam (ABHI). During a ABHI, the healthcare provider inserts a lubricated gloved finger into the rectum. The test is painless and takes less than a minute. Healthcare providers agree that this test alone is not enough to screen for colorectal cancer.  Screening test choices  Fecal occult blood test (FOBT) or fecal immunochemical test (FIT)  These tests check for occult blood in stool (blood you can t see). Hidden blood may be a sign of colon polyps or cancer. A small sample of stool is tested for blood in a laboratory. Most often, you collect this sample at home using a kit your healthcare provider gives you. Follow the instructions carefully for using this kit. You might need to avoid certain foods and medicines before the test, as directed.  Barium enema with contrast (double-contrast barium enema)  This test uses X-rays to provide images of the entire colon and rectum. The day before this test, you will need to do a bowel prep to clean out the colon and rectum. A bowel prep is a liquid diet plus strong laxatives or enemas. You will be awake for the test, but you may be given medicine to help you relax. At the start of the test, a radiologist (rusty  healthcare provider who specializes in imaging tests) places a soft tube into the rectum. The tube is used to fill the colon with a contrast liquid (barium) and air. This can be uncomfortable for some people. The liquid helps the colon show up clearly on the X-rays. Because the test uses X-rays, it exposes you to a small amount of radiation.  Virtual colonoscopy  This exam is also called a CT colonography. It uses a series of X-ray photographs to create a 3-D view of the colon and rectum. The day before the test, you will need to do a bowel prep to clean out your colon. Your healthcare provider will give you instructions on how to do this. During the procedure, you will lie on a table that is part of a special X-ray machine called a CT scanner. A small tube will be placed into your rectum to fill the colon and rectum with air. This can be uncomfortable for some people. Then, the table will move into the machine and pictures will be taken of your colon and rectum. A computer will combine these photos to create a 3-D picture. Because the test uses X-rays, it exposes you to a small amount of radiation.        Scope exams  Here are two types of scope exams:    Colonoscopy. This test can be used to find and remove polyps anywhere in the colon or rectum. The day before the test, you will do a bowel prep. This is a liquid diet plus a strong laxative solution or an enema. The bowel prep will cleanse your colon. You will be given instructions for this. Just before the test, you are given a medicine to make you sleepy. Then, a long, flexible, lighted tube called a colonoscope is gently inserted into the rectum and guided through the entire colon. Images of the colon are viewed on a video screen. Any polyps that are found are removed and sent to a lab for testing. If a polyp can t be removed, a sample of tissue is taken and the polyp might be removed later during surgery. You will need to bring someone with you to drive you  home after this test.     Sigmoidoscopy. This test is similar to colonoscopy, but focuses only on the sigmoid colon and rectum. As with colonoscopy, bowel prep must be done the day before this test. It might not need to be as complete as the bowel prep for a colonoscopy. You are awake during the procedure, but you may be given medicine to help you relax. During the test, the healthcare provider guides a thin, flexible, lighted tube called a sigmoidoscope through your rectum and lower colon. The images are displayed on a video screen. Polyps are removed, if possible, and sent to a lab for testing.  Colonoscopy is the only screening test that lets your healthcare provider see the entire colon and rectum. This test also lets your healthcare provider remove any pieces of tissue that need to be looked at by a lab. If something suspicious is found using any other tests, you will likely need a colonoscopy.  When to call your healthcare provider after a test  Call your healthcare provider if you have any of the following after any screening test:    Bleeding    Fever over 101 F (38.8 C)    Abdominal pain    Vomiting     7722-3417 [employer]. [address] [place] [address]. All rights reserved. This information is not intended as a substitute for professional medical care. Always follow your healthcare professional's instructions.        Eating to Prevent Gout  Gout is a painful form of arthritis caused by an excess of uric acid. This is a waste product made by the body. It builds up in the body and forms crystals that collect in the joints, bringing on a gout attack. Alcohol and certain foods can trigger a gout attack. Below are some guidelines for changing your diet to help you manage gout. Your healthcare provider can work with you to determine the best eating plan for you. Know that diet is only one part of managing gout. Take your medicines as prescribed and follow the other guidelines your  healthcare provider has given you.  Foods to limit  Eating too many foods containing purines may increase the levels of uric acid in your body and increase your risk for a gout attack. It may be best to limit these high-purine foods:    Alcohol (beer, red wine). You may be told to avoid alcohol completely.    Certain fish (anchovies, sardines, fish roes, herring, tuna, mussels, codfish, scallops, trout, and mary)    Certain meats (red meat, processed meat, reed, turkey, wild game, and goose)    Sauces and gravies made with meat    Organ meats (such as liver, kidneys, sweetbreads, and tripe)    Legumes (such as dried beans, peas)    Mushrooms, spinach, asparagus, and cauliflower    Yeast and yeast extract supplements  Foods to try  Some foods may be helpful for people with gout. You may want to try adding some of the following foods to your diet:    Dark berries: These include blueberries, blackberries, and cherries. These berries contain chemicals that may lower uric acid.    Tofu: Tofu, which is made from soy, is a good source of protein. Studies have shown that it may be a better choice than meat for people with gout.    Omega fatty acids: These acids are found in fatty fish (such as salmon), certain oils (such as flax, olive, or nut oils), or nuts. They may help prevent inflammation due to gout.  The following guidelines are recommended by the American Medical Association for people with gout. Your diet should be:    High in fiber, whole grains, fruits, and vegetables.    Low in protein (15% of calories should come from protein. Choose lean sources such as soy, lean meats, and poultry).    Low in fat (no more than 30% of calories should come from fat, with only 10% coming from animal fat).     2640-9100 [employer]. [address] [place] [address], [place] [address]. All rights reserved. This information is not intended as a substitute for professional medical care. Always follow your healthcare  professional's instructions.        Prevention Guidelines, Men Ages 65 and Older  Screening tests and vaccines are an important part of managing your health. Health counseling is essential, too. Below are guidelines for these, for men ages 65 and older. Talk with your healthcare provider to make sure you re up-to-date on what you need.  Screening Who needs it How often   Abdominal aortic aneurysm Men ages 65 to 75 who have ever smoked 1 ultrasound   Alcohol misuse All men in this age group At routine exams   Blood pressure All men in this age group Every 2 years if your blood pressure is less than 120/80 mm Hg; yearly if your systolic blood pressure is 120 to 139 mm Hg, or your diastolic blood pressure reading is 80 to 89 mm Hg   Colorectal cancer All men in this age group Flexible sigmoidoscopy every 5 years, or colonoscopy every 10 years, or double-contrast barium enema every 5 years; yearly fecal occult blood test or fecal immunochemical test; or a stool DNA test as often as your healthcare provider advises; talk with your healthcare provider about which tests are best for you   Depression All men in this age group At routine exams   Type 2 diabetes or prediabetes All adults beginning at age 45 and adults without symptoms at any age who are overweight or obese and have 1 or more other risk factors for diabetes At least every 3 years   Hepatitis C Men at increased risk for infection - talk with your healthcare provider At routine exams   High cholesterol or triglycerides All men in this age group At least every 5 years   HIV Men at increased risk for infection - talk with your healthcare provider At routine exams   Lung cancer Adults ages 55 to 80 who have smoked Yearly screening in smokers with 30 pack-year history of smoking or who quit within 15 years   Obesity All men in this age group At routine exams   Prostate cancer All men in this age group, talk to healthcare provider about risks and benefits of digital  rectal exam (ABHI) and prostate-specific antigen (PSA) screening1 At routine exams   Syphilis Men at increased risk for infection - talk with your healthcare provider At routine exams   Tuberculosis Men at increased risk for infection - talk with your healthcare provider Ask your healthcare provider   Vision All men in this age group Every 1 to 2 years; if you have a chronic health condition, ask your healthcare provider if you needs exams more often   Vaccine Who needs it How often   Chickenpox (varicella) All men in this age group who have no record of this infection or vaccine 2 doses; second dose should be given at least 4 weeks after the first dose   Hepatitis A Men at increased risk for infection - talk with your healthcare provider 2 doses given at least 6 months apart   Hepatitis B Men at increased risk for infection - talk with your healthcare provider 3 doses over 6 months; second dose should be given 1 month after the first dose; the third dose should be given at least 2 months after the second dose and at least 4 months after the first dose   Haemophilus influenzae Type B (HIB) Men at increased risk for infection - talk with your healthcare provider 1 to 3 doses   Influenza (flu) All men in this age group  Once a year   Meningococcal Men at increased risk for infection - talk with your healthcare provider 1 or more doses   Pneumococcal conjugate vaccine (PCV13) and pneumococcal polysaccharide vaccine (PPSV23) All men in this age group 1 dose of each vaccine   Tetanus/diphtheria/  pertussis (Td/Tdap) booster All men in this age group Td every 10 years, or Tdap if you will have contact with a child younger than 12 months old   Zoster All men in this age group 1 dose   Counseling Who needs it How often   Diet and exercise Men who are overweight or obese When diagnosed, and then at routine exams   Fall prevention (exercise, vitamin D supplements) All men in this age group At routine exams   Sexually  transmitted infection Men at increased risk for infection - talk with your healthcare provider At routine exams   Use of daily aspirin Men ages 45 to 79 at risk for cardiovascular health problems At routine exams   Use of tobacco and the health affects it can cause All men in this age group Every visit   [address] [place] [address] [facility]     9544-6804 [employer]. [address] [place] [address]. All rights reserved. This information is not intended as a substitute for professional medical care. Always follow your healthcare professional's instructions.              Follow-ups after your visit        Your next 10 appointments already scheduled     May 23, 2017  3:00 PM CDT   INDIVIDUAL THERAPY with Estelle Gomez LP   [facility] ([facility])    [address]  Mail Code 7521  [facility] [address]   [phone]            Jun 27, 2017 10:00 AM CDT   OFFICE VISIT with Jluis Mane MD   [facility] ([facility])    [address]  Mail Code 7521  [facility] [address]   [phone]            Jul 11, 2017  3:00 PM CDT   INDIVIDUAL THERAPY with Estelle Gomez LP   [facility] ([facility])    [address]  Mail Code 7521  [facility] [address]   [phone]              Who to contact     If you have questions or need follow up information about today's clinic visit or your schedule please contact [facility] directly at [phone].  Normal or non-critical lab and imaging results will be communicated to you by MyChart, letter or phone within 4 business days after the clinic has received the results. If you do not hear from us within 7 days, please contact the clinic through MyChart or phone. If you have a critical or abnormal lab result, we will notify you by phone as soon as possible.  Submit refill requests through [employer] or call your pharmacy and they will forward  "the refill request to us. Please allow 3 business days for your refill to be completed.          Additional Information About Your Visit        [employer] Messenger Information     [employer] lets you send messages to your doctor, view your test results, renew your prescriptions, schedule appointments and more. To sign up, go to [email or web address] . Click on \"Log in\" on the left side of the screen, which will take you to the Welcome page. Then click on \"Sign up Now\" on the right side of the page.     You will be asked to enter the access code listed below, as well as some personal information. Please follow the directions to create your username and password.     Your access code is: [email or web address]  Expires: 2017  5:28 PM     Your access code will  in 90 days. If you need help or a new code, please call your [facility] clinic or [phone].        Care EveryWhere ID     This is your Care EveryWhere ID. This could be used by other organizations to access your [facility] medical records  [id number]        Your Vitals Were     Pulse Temperature Height Pulse Oximetry BMI (Body Mass Index)       60 97.3  F (36.3  C) (Tympanic) 5' 11\" (1.803 m) 99% 40.31 kg/m2        Blood Pressure from Last 3 Encounters:   17 102/62   17 118/70   17 132/83    Weight from Last 3 Encounters:   17 289 lb (131.1 kg)   17 289 lb (131.1 kg)   17 275 lb (124.7 kg)              We Performed the Following     CBC with platelets     Comprehensive metabolic panel     Lipid Profile with reflex to direct LDL     OFFICE/OUTPT VISIT,EST,LEVL III     Testosterone, total     TSH with free T4 reflex     Uric acid          Where to get your medicines      Some of these will need a paper prescription and others can be bought over the counter.  Ask your nurse if you have questions.     Bring a paper prescription for each of these medications     order for DME          Primary Care Provider Office Phone # Fax #    [facility] " JERRICA Mcelroy [phone]       [facility] [address] [facility] [address]        Thank you!     Thank you for choosing [facility] [place]  for your care. Our goal is always to provide you with excellent care. Hearing back from our patients is one way we can continue to improve our services. Please take a few minutes to complete the written survey that you may receive in the mail after your visit with us. Thank you!             Your Updated Medication List - Protect others around you: Learn how to safely use, store and throw away your medicines at www.disposemymeds.org.          This list is accurate as of: 5/2/17 11:59 PM.  Always use your most recent med list.                   Brand Name Dispense Instructions for use    atenolol 50 MG tablet    TENORMIN    30 tablet    Take 1 tablet (50 mg) by mouth daily       calcium-vitamin D 600-400 MG-UNIT per tablet    calcium 600 + D    180 tablet    Take 1 tablet by mouth 2 times daily       DAILY-EVANGELISTA Tabs     90 tablet    Take 1 tablet by mouth daily       finasteride 5 MG tablet    PROSCAR    90 tablet    Take 1 tablet (5 mg) by mouth daily       FLUoxetine 40 MG capsule    PROzac    90 capsule    Take 1 capsule (40 mg) by mouth daily Take One Capsule By Mouth Every Day       latanoprost 0.005 % ophthalmic solution    XALATAN     1 drop At Bedtime.       lovastatin 20 MG tablet    MEVACOR    90 tablet    Take 1 tablet (20 mg) by mouth At Bedtime       meloxicam 15 MG tablet    MOBIC    90 tablet    Take 1 tablet (15 mg) by mouth daily       multivitamin, therapeutic with minerals Tabs tablet     100 tablet    ONE TAB DAILY AT 8 AM       order for DME     1 each    Equipment being ordered:  BACK BRACE  MedSpec Back-n-Shape II Back Brace  Size XXXL with thermoplastic insert       order for DME     2 each    Equipment being ordered: [employer] 232 Cotton Thigh High for Men 20-30 mmHg- TWO PAIR TAN COLOR       spironolactone 100  MG tablet    ALDACTONE    90 tablet    Take 1 tablet (100 mg) by mouth daily       timolol 0.5 % ophthalmic solution    TIMOPTIC     1 drop 2 times daily

## 2017-05-02 NOTE — PATIENT INSTRUCTIONS
Preventive Health Recommendations:   Male Ages 65 and over    Yearly exam:             See your health care provider every year in order to  o   Review health changes.   o   Discuss preventive care.    o   Review your medicines if your doctor has prescribed any.    Talk with your health care provider about whether you should have a test to screen for prostate cancer (PSA).    Every 3 years, have a diabetes test (fasting glucose). If you are at risk for diabetes, you should have this test more often.    Every 5 years, have a cholesterol test. Have this test more often if you are at risk for high cholesterol or heart disease.     Every 10 years, have a colonoscopy. Or, have a yearly FIT test (stool test). These exams will check for colon cancer.    Talk to with your health care provider about screening for Abdominal Aortic Aneurysm if you have a family history of AAA or have a history of smoking.    Shots:     Get a flu shot each year.     Get a tetanus shot every 10 years.     Talk to your doctor about your pneumonia vaccines. There are now two you should receive - Pneumovax (PPSV 23) and Prevnar (PCV 13).     Talk to your doctor about a shingles vaccine.     Talk to your doctor about the hepatitis B vaccine.  Nutrition:     Eat at least 5 servings of fruits and vegetables each day.     Eat whole-grain bread, whole-wheat pasta and brown rice instead of white grains and rice.     Talk to your provider about Calcium and Vitamin D.   Lifestyle    Exercise for at least 150 minutes a week (30 minutes a day, 5 days a week). This will help you control your weight and prevent disease.     Limit alcohol to one drink per day.     No smoking.     Wear sunscreen to prevent skin cancer.     See your dentist every six months for an exam and cleaning.     See your eye doctor every 1 to 2 years to screen for conditions such as glaucoma, macular degeneration, cataracts, etc         What is Arthritis?  Arthritis is a disease that  affects the joints (the parts where bones meet and move). It can affect any joint in your body. There are many types of arthritis, including osteoarthritis and rheumatoid arthrtitis. If your symptoms are mild, medications may be enough to reduce pain and swelling. For more severe arthritis, surgery may be needed to improve the condition of the joint or replace the joint entirely.                  What causes arthritis?  Cartilage is a smooth substance that protects the ends of your bones and provides cushioning. When you have arthritis, this cartilage breaks down and can no longer protect your bones. The bones rub against each other, causing pain and swelling. Over time, bone spurs (small pieces of rough or splintered bone) may develop, and the joint's range of motion can become limited.  Symptoms  Some of the more common symptoms of arthritis include:    Joint pain and stiffness. Pain and stiffness get worse with long periods of rest or using a joint too long or too hard.    Joints that have lost normal shape and motion.    Tender, inflamed joints. They may look red and feel warm.    Grinding or popping noise with joint movement.     Feeling tired all the time.  Reducing symptoms  Following a healthy lifestyle by losing weight and exercising can help reduce symptoms of osteoarthritis. Medicines can be very helpful for arthritis.       8048-9548 [employer]. [address] [place] [address], [place] [id number]. All rights reserved. This information is not intended as a substitute for professional medical care. Always follow your healthcare professional's instructions.        Taking Your Blood Pressure  Blood pressure is the force of blood as it moves from the heart through the blood vessels. You can take your own blood pressure reading using a digital monitor. Take readings as often as your doctor instructs. Take each reading at the same time of day.  Step 1. Relax      Wait at least a half-hour after smoking,  eating, or exercising.    Sit comfortably at a table. Place the monitor near you.    Rest for a few minutes before you begin.  Step 2. Wrap the Cuff      Place your arm on the table, palm up. Your arm should be at the level of your heart. Wrap the cuff around your upper arm, just above your elbow. It s best done on bare skin, not over clothing.    Make sure your cuff fits. If it doesn t wrap around your upper arm, order a larger cuff.  Step 3. Inflate the Cuff      Pump the cuff until the scale reads 160. If you have a self-inflating cuff, push the button that starts the pump.    The cuff will tighten, then loosen.    The numbers will change. When they stop changing, your blood pressure reading will appear.    If you get a reading that is too high or too low for you, relax for a few minutes. Then do the test again.  Step 4. Write Down the Results      Write down your blood pressure numbers. Note the date and time. Keep your results in one place, such as a notebook.    Remove the cuff from your arm. Turn off the machine.    8220-9030 [employer]. [address] [place], [place] [id number]. All rights reserved. This information is not intended as a substitute for professional medical care. Always follow your healthcare professional's instructions.        Lifestyle Changes to Control Cholesterol  Diet, exercise, weight management, quitting smoking, stress management, and taking your medications right can help you control your cholesterol.    Diet  Your health care provider will give you information on changes to your diet you may need to make, based on your situation. Your provider may recommend that you see a registered dietitian for help with diet changes. Changes may include:    Reducing the amount of fat and cholesterol in your meals    Reducing the amount of sodium (salt) in your food, especially if you have high blood pressure    Eating more fresh vegetables and fruits    Eating lean proteins, such  as fish, poultry, and legumes (beans and peas), and eating less red meat and processed meats    Using low-fat dairy products    Using vegetable and nut oils in limited amounts    Limiting how many sweets and processed foods like chips, cookies, and baked goods that you eat   Exercise  Regular exercise is a good way to help your body control cholesterol. Regular exercise has many benefits. It can:    Raise your good cholesterol.    Help lower your bad cholesterol.    Let blood flow better through your body.    Give more oxygen to your muscles and tissues.    Help you manage your weight.  Your health care provider may recommend that you get more physical activity if you haven't been active. Depending on your situation, your provider may recommend that you get moderate to vigorous physical activity for at least 40 minutes each day and for at least 3 to 4 days each week. A few examples of moderate to vigorous activity include:    Walking at a brisk pace, about 3 to 4 miles per hour    Jogging or running    Swimming or water aerobics    Hiking    Dancing    Martial arts    Tennis    Riding a bicycle or stationary bike    Dancing  Weight management  If you are overweight or obese, your health care provider will work with you to help you lose weight and lower your BMI (body mass index) to a normal or near-normal level. Making diet changes and getting more physical activity can help.    Quitting smoking  Smoking and other tobacco use can raise cholesterol and make it harder to control. Quitting is tough. But millions of people have given up tobacco for good. You can quit, too! Think about some of the reasons below to quit smoking. Do any of them make you think twice about your smoking habit?  Stop smoking because it:    Keeps your cholesterol high, even if you make all the other changes you re supposed to.    Damages your body, especially your heart, lungs, and blood vessels.    Makes you more likely to have a heart attack  (also known as acute myocardial infarction, or AMI), stroke, or cancer.    Stains your teeth and makes your skin, clothes, and breath smell bad.    Costs a lot of money.  Stress   Learn stress-management techniques to help you deal with stress in your home and work life.   Making the most of medications  Healthy eating and exercise are a good start to keeping your cholesterol down. But you may need some extra help from medication. If your doctor prescribes medication, be sure to take it exactly as directed. Remember:    Tell your doctor about all other medications you take, including vitamins and herbs.    Tell your doctor if you have any side effects after starting to take a medication. Examples of side effects to watch for include: muscle aches, weakness, blurred vision, rust-colored urine, yellowing of eyes or skin (jaundice), or headache.    Don t skip a dose or stop taking your medication because you feel better or because your cholesterol numbers go down. Never stop taking your medication unless your doctor has told you it s OK.    5963-0941 [employer]. [address] [place] [address]. All rights reserved. This information is not intended as a substitute for professional medical care. Always follow your healthcare professional's instructions.        Colorectal Cancer Screening  Colorectal cancer (cancer in the colon or rectum) is a leading cause of cancer deaths in the [place]. But it doesn t have to be. When this cancer is found and removed early, the chances of a full recovery are very good. Because colorectal cancer rarely causes symptoms in its early stages, screening for the disease is important. It s even more crucial if you have risk factors for the disease. Learn more about colorectal cancer and its risk factors. Then talk to your healthcare provider about being screened. You could be saving your own life.  Risk factors for colorectal cancer  Your risk of having  colorectal cancer increases if you:    Are 50 years of age or older    Have a family history or personal history of colorectal cancer or polyps    Have a personal history of type 2 diabetes, Crohn s disease, or ulcerative colitis    Have an inherited genetic syndrome like Gamble syndrome (also known as HNPCC) or familial adenomatous polyposis (FAP)    Are very overweight    Are not physically active    Smoke    Drink a lot of alcohol    Eat a lot of red or processed meat  The colon and rectum        A ABHI can detect a growth in the rectum or anus.      Waste from food you eat enters the colon from the small intestine. As it travels through the colon, the waste (stool) loses water and becomes more solid. Intestinal muscles push it toward the sigmoid the last section of the colon. Stool then moves into the rectum, where it s stored until it s ready to leave the body during a bowel movement.  How cancer develops  Polyps are growths that form on the inner lining of the colon or rectum. Most are benign, which means they aren t cancerous. But over time, some polyps can become malignant (cancerous). This happens when cells in these polyps begin growing abnormally. In time, malignant cells invade more and more of the colon and rectum. The cancer may also spread to nearby organs or lymph nodes or to other parts of the body. Finding and removing polyps can help prevent cancer from ever forming.  Your screening  Screening means looking for a medical problem before you have symptoms. During screening for colorectal cancer, your healthcare provider will ask about your medical history, examine you, and do one or more tests.  History and exam  The history and exam involve the following:    Medical history. Your healthcare provider will ask about your medical history. Mention if a family member has had colon cancer or polyps. Also mention any health problems you have had in the past.    Digital rectal exam (ABHI). During a ABHI, the  healthcare provider inserts a lubricated gloved finger into the rectum. The test is painless and takes less than a minute. Healthcare providers agree that this test alone is not enough to screen for colorectal cancer.  Screening test choices  Fecal occult blood test (FOBT) or fecal immunochemical test (FIT)  These tests check for occult blood in stool (blood you can t see). Hidden blood may be a sign of colon polyps or cancer. A small sample of stool is tested for blood in a laboratory. Most often, you collect this sample at home using a kit your healthcare provider gives you. Follow the instructions carefully for using this kit. You might need to avoid certain foods and medicines before the test, as directed.  Barium enema with contrast (double-contrast barium enema)  This test uses X-rays to provide images of the entire colon and rectum. The day before this test, you will need to do a bowel prep to clean out the colon and rectum. A bowel prep is a liquid diet plus strong laxatives or enemas. You will be awake for the test, but you may be given medicine to help you relax. At the start of the test, a radiologist (a healthcare provider who specializes in imaging tests) places a soft tube into the rectum. The tube is used to fill the colon with a contrast liquid (barium) and air. This can be uncomfortable for some people. The liquid helps the colon show up clearly on the X-rays. Because the test uses X-rays, it exposes you to a small amount of radiation.  Virtual colonoscopy  This exam is also called a CT colonography. It uses a series of X-ray photographs to create a 3-D view of the colon and rectum. The day before the test, you will need to do a bowel prep to clean out your colon. Your healthcare provider will give you instructions on how to do this. During the procedure, you will lie on a table that is part of a special X-ray machine called a CT scanner. A small tube will be placed into your rectum to fill the  colon and rectum with air. This can be uncomfortable for some people. Then, the table will move into the machine and pictures will be taken of your colon and rectum. A computer will combine these photos to create a 3-D picture. Because the test uses X-rays, it exposes you to a small amount of radiation.        Scope exams  Here are two types of scope exams:    Colonoscopy. This test can be used to find and remove polyps anywhere in the colon or rectum. The day before the test, you will do a bowel prep. This is a liquid diet plus a strong laxative solution or an enema. The bowel prep will cleanse your colon. You will be given instructions for this. Just before the test, you are given a medicine to make you sleepy. Then, a long, flexible, lighted tube called a colonoscope is gently inserted into the rectum and guided through the entire colon. Images of the colon are viewed on a video screen. Any polyps that are found are removed and sent to a lab for testing. If a polyp can t be removed, a sample of tissue is taken and the polyp might be removed later during surgery. You will need to bring someone with you to drive you home after this test.     Sigmoidoscopy. This test is similar to colonoscopy, but focuses only on the sigmoid colon and rectum. As with colonoscopy, bowel prep must be done the day before this test. It might not need to be as complete as the bowel prep for a colonoscopy. You are awake during the procedure, but you may be given medicine to help you relax. During the test, the healthcare provider guides a thin, flexible, lighted tube called a sigmoidoscope through your rectum and lower colon. The images are displayed on a video screen. Polyps are removed, if possible, and sent to a lab for testing.  Colonoscopy is the only screening test that lets your healthcare provider see the entire colon and rectum. This test also lets your healthcare provider remove any pieces of tissue that need to be looked at by  a lab. If something suspicious is found using any other tests, you will likely need a colonoscopy.  When to call your healthcare provider after a test  Call your healthcare provider if you have any of the following after any screening test:    Bleeding    Fever over 101 F (38.8 C)    Abdominal pain    Vomiting     [id number] [employer]. [address] [place] [id number]. All rights reserved. This information is not intended as a substitute for professional medical care. Always follow your healthcare professional's instructions.        Eating to Prevent Gout  Gout is a painful form of arthritis caused by an excess of uric acid. This is a waste product made by the body. It builds up in the body and forms crystals that collect in the joints, bringing on a gout attack. Alcohol and certain foods can trigger a gout attack. Below are some guidelines for changing your diet to help you manage gout. Your healthcare provider can work with you to determine the best eating plan for you. Know that diet is only one part of managing gout. Take your medicines as prescribed and follow the other guidelines your healthcare provider has given you.  Foods to limit  Eating too many foods containing purines may increase the levels of uric acid in your body and increase your risk for a gout attack. It may be best to limit these high-purine foods:    Alcohol (beer, red wine). You may be told to avoid alcohol completely.    Certain fish (anchovies, sardines, fish roes, herring, tuna, mussels, codfish, scallops, trout, and mary)    Certain meats (red meat, processed meat, reed, turkey, wild game, and goose)    Sauces and gravies made with meat    Organ meats (such as liver, kidneys, sweetbreads, and tripe)    Legumes (such as dried beans, peas)    Mushrooms, spinach, asparagus, and cauliflower    Yeast and yeast extract supplements  Foods to try  Some foods may be helpful for people with gout. You may want to try  adding some of the following foods to your diet:    Dark berries: These include blueberries, blackberries, and cherries. These berries contain chemicals that may lower uric acid.    Tofu: Tofu, which is made from soy, is a good source of protein. Studies have shown that it may be a better choice than meat for people with gout.    Omega fatty acids: These acids are found in fatty fish (such as salmon), certain oils (such as flax, olive, or nut oils), or nuts. They may help prevent inflammation due to gout.  The following guidelines are recommended by the American Medical Association for people with gout. Your diet should be:    High in fiber, whole grains, fruits, and vegetables.    Low in protein (15% of calories should come from protein. Choose lean sources such as soy, lean meats, and poultry).    Low in fat (no more than 30% of calories should come from fat, with only 10% coming from animal fat).     2580-6268 [employer]. [address] [place] [address], [place] [address]. All rights reserved. This information is not intended as a substitute for professional medical care. Always follow your healthcare professional's instructions.        Prevention Guidelines, Men Ages 65 and Older  Screening tests and vaccines are an important part of managing your health. Health counseling is essential, too. Below are guidelines for these, for men ages 65 and older. Talk with your healthcare provider to make sure you re up-to-date on what you need.  Screening Who needs it How often   Abdominal aortic aneurysm Men ages 65 to 75 who have ever smoked 1 ultrasound   Alcohol misuse All men in this age group At routine exams   Blood pressure All men in this age group Every 2 years if your blood pressure is less than 120/80 mm Hg; yearly if your systolic blood pressure is 120 to 139 mm Hg, or your diastolic blood pressure reading is 80 to 89 mm Hg   Colorectal cancer All men in this age group Flexible sigmoidoscopy every 5 years,  or colonoscopy every 10 years, or double-contrast barium enema every 5 years; yearly fecal occult blood test or fecal immunochemical test; or a stool DNA test as often as your healthcare provider advises; talk with your healthcare provider about which tests are best for you   Depression All men in this age group At routine exams   Type 2 diabetes or prediabetes All adults beginning at age 45 and adults without symptoms at any age who are overweight or obese and have 1 or more other risk factors for diabetes At least every 3 years   Hepatitis C Men at increased risk for infection   talk with your healthcare provider At routine exams   High cholesterol or triglycerides All men in this age group At least every 5 years   HIV Men at increased risk for infection   talk with your healthcare provider At routine exams   Lung cancer Adults ages 55 to 80 who have smoked Yearly screening in smokers with 30 pack-year history of smoking or who quit within 15 years   Obesity All men in this age group At routine exams   Prostate cancer All men in this age group, talk to healthcare provider about risks and benefits of digital rectal exam (ABHI) and prostate-specific antigen (PSA) screening1 At routine exams   Syphilis Men at increased risk for infection   talk with your healthcare provider At routine exams   Tuberculosis Men at increased risk for infection   talk with your healthcare provider Ask your healthcare provider   Vision All men in this age group Every 1 to 2 years; if you have a chronic health condition, ask your healthcare provider if you needs exams more often   Vaccine Who needs it How often   Chickenpox (varicella) All men in this age group who have no record of this infection or vaccine 2 doses; second dose should be given at least 4 weeks after the first dose   Hepatitis A Men at increased risk for infection   talk with your healthcare provider 2 doses given at least 6 months apart   Hepatitis B Men at increased risk  for infection   talk with your healthcare provider 3 doses over 6 months; second dose should be given 1 month after the first dose; the third dose should be given at least 2 months after the second dose and at least 4 months after the first dose   Haemophilus influenzae Type B (HIB) Men at increased risk for infection   talk with your healthcare provider 1 to 3 doses   Influenza (flu) All men in this age group  Once a year   Meningococcal Men at increased risk for infection   talk with your healthcare provider 1 or more doses   Pneumococcal conjugate vaccine (PCV13) and pneumococcal polysaccharide vaccine (PPSV23) All men in this age group 1 dose of each vaccine   Tetanus/diphtheria/  pertussis (Td/Tdap) booster All men in this age group Td every 10 years, or Tdap if you will have contact with a child younger than 12 months old   Zoster All men in this age group 1 dose   Counseling Who needs it How often   Diet and exercise Men who are overweight or obese When diagnosed, and then at routine exams   Fall prevention (exercise, vitamin D supplements) All men in this age group At routine exams   Sexually transmitted infection Men at increased risk for infection   talk with your healthcare provider At routine exams   Use of daily aspirin Men ages 45 to 79 at risk for cardiovascular health problems At routine exams   Use of tobacco and the health affects it can cause All men in this age group Every visit   [address] [place] [address] [facility]     5890-2462 [employer]. [address] [place] [address], [place] [address]. All rights reserved. This information is not intended as a substitute for professional medical care. Always follow your healthcare professional's instructions.

## 2017-05-02 NOTE — PROGRESS NOTES
SUBJECTIVE:                                                            Laila Perez is a 67 year old male who presents for Preventive Visit.    Are you in the first 12 months of your Medicare Part B coverage?  No    Healthy Habits:    Do you get at least three servings of calcium containing foods daily (dairy, green leafy vegetables, etc.)? yes    Amount of exercise or daily activities, outside of work: active     Problems taking medications regularly No    Medication side effects: No    Have you had an eye exam in the past two years? yes    Do you see a dentist twice per year? no    Do you have sleep apnea, excessive snoring or daytime drowsiness?no    COGNITIVE SCREEN  1) Repeat 3 items (Banana, Sunrise, Chair)    2) Clock draw: NORMAL  3) 3 item recall: Recalls 3 objects  Results: 3 items recalled: COGNITIVE IMPAIRMENT LESS LIKELY    Mini-CogTM Copyright [place]. Licensed by the author for use in [facility]; reprinted with permission (alexey@[facility]). All rights reserved.        Housing moved back to [address].   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 Take appts and shopping. [employer] provides transportation as well.   No driving. Working at [employer] still there.   Gardening now. Keeps me going  Really happy with her life changes. Bigger breasts. Less face and lack of chest hair.   Very happy about this.   Eye exam done today.       -------------------------------------    Reviewed and updated as needed this visit by clinical staff  Tobacco  Allergies         Reviewed and updated as needed this visit by Provider        Social History   Substance Use Topics     Smoking status: Never Smoker     Smokeless tobacco: Never Used     Alcohol use No      Comment: Quit 3/24/05       The patient does not drink >3 drinks per day nor >7 drinks per week.    Today's PHQ-2 Score:   PHQ-2 ( 1999 [employer]) 5/2/2017 4/26/2016   Q1: Little interest or pleasure in doing things 0 0   Q2: Feeling down,  "depressed or hopeless 0 0   PHQ-2 Score 0 0       Do you feel safe in your environment - No    Do you have a Health Care Directive?: No: Advance care planning was reviewed with patient; patient declined at this time.    Current providers sharing in care for this patient include:   Patient Care Team:  Delmis Simons APRN CNP as PCP - General (Family Practice)      Hearing impairment: No    Ability to successfully perform activities of daily living: Yes, no assistance needed     Fall risk:  Fall Risk Assessment not completed.    Home safety:  none identified  click delete button to remove this line now    The following health maintenance items are reviewed in Epic and correct as of today:  Health Maintenance   Topic Date Due     AORTIC ANEURYSM SCREENING (SYSTEM ASSIGNED)  03/09/2015     FIT Q1 YR (NO INBASKET)  06/11/2016     PNEUMOCOCCAL (2 of 2 - PPSV23) 04/26/2017     ADVANCE DIRECTIVE PLANNING Q5 YRS (NO INBASKET)  07/25/2017     Mental Health Tx Plan Q11 MOS  08/06/2017     INFLUENZA VACCINE (SYSTEM ASSIGNED)  09/01/2017     FALL RISK ASSESSMENT  09/27/2017     TETANUS IMMUNIZATION (SYSTEM ASSIGNED)  10/30/2018     LIPID SCREEN Q5 YR MALE (SYSTEM ASSIGNED)  04/26/2021     HEPATITIS C SCREENING  Completed           ROS:   ROS: 10 point ROS neg other than the symptoms noted above in the HPI.      Problem list, Medication list, Allergies, and Medical/Social/Surgical histories reviewed in [facility] and updated as appropriate.  OBJECTIVE:                                                            /62  Pulse 60  Temp 97.3  F (36.3  C) (Tympanic)  Ht 5' 11\" (1.803 m)  Wt 289 lb (131.1 kg)  SpO2 99%  BMI 40.31 kg/m2 Estimated body mass index is 40.31 kg/(m^2) as calculated from the following:    Height as of this encounter: 5' 11\" (1.803 m).    Weight as of this encounter: 289 lb (131.1 kg).  EXAM:   GENERAL: healthy, alert and no distress  EYES: Eyes grossly normal to inspection, PERRL and conjunctivae " and sclerae normal  HENT: ear canals and TM's normal, nose and mouth without ulcers or lesions  NECK: no adenopathy, no asymmetry, masses, or scars and thyroid normal to palpation  RESP: lungs clear to auscultation - no rales, rhonchi or wheezes  CV: regular rate and rhythm, MALLIKA II/VI murmur, click or rub, no peripheral edema and peripheral pulses strong  ABDOMEN: soft, nontender, no hepatosplenomegaly, no masses and bowel sounds normal  MS: no gross musculoskeletal defects noted, no edema  SKIN: no suspicious lesions or rashes- NO HAIR ON FACE OR CHEST NOW   NEURO: Normal strength and tone, mentation intact and speech normal  PSYCH: mentation appears normal, affect normal/bright  LYMPH: no cervical, supraclavicular, axillary, or inguinal adenopathy    ASSESSMENT / PLAN:                                                            1. Primary osteoarthritis of both knees  Ongoing and chronic  Continue NSAID as tolerated.   - OFFICE/OUTPT VISIT,EST,LEVL III    2. Hyperlipidemia LDL goal <100  LDL Cholesterol Calculated   Date Value Ref Range Status   05/02/2017 90 <100 mg/dL Final     Comment:     Desirable:       <100 mg/dl   meeting goal. Continue current POC   - Lipid Profile with reflex to direct LDL  - CBC with platelets  - TSH with free T4 reflex  - Comprehensive metabolic panel  - OFFICE/OUTPT VISIT,EST,LEVL III    3. Idiopathic chronic gout without tophus, unspecified site  Labs today to monitor.   - Uric acid  - OFFICE/OUTPT VISIT,EST,LEVL III    4. HTN, goal below 140/90  BP Readings from Last 3 Encounters:   05/02/17 102/62   04/26/17 118/70   02/06/17 132/83     Marked improved.   Continue to check back for BP repeat in 2-4 weeks.   - CBC with platelets  - TSH with free T4 reflex  - Comprehensive metabolic panel  - OFFICE/OUTPT VISIT,EST,LEVL III    5. 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 Mane as planned.   Continue psychotherapy.   - OFFICE/OUTPT VISIT,EST,LEVL III    6. Mitral valve  "disorder  - OFFICE/OUTPT VISIT,EST,LEVL III    7. Nonrheumatic aortic valve stenosis  - OFFICE/OUTPT VISIT,EST,LEVL III    8. 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  - Testosterone, total    9. Colon cancer screening  FIT test recommended.       10. Varicose veins of legs  Compression stockings encouraged.   - order for DME; Equipment being ordered: Sigvaris 232 Cotton Thigh High for Men 20-30 mmHg- TWO PAIR TAN COLOR  Dispense: 2 each; Refill: 5    11. Medicare annual wellness visit, subsequent  Overall very supportive       End of Life Planning:  Patient currently has an advanced directive: No.  I have verified the patient's ablity to prepare an advanced directive/make health care decisions.  Literature was provided to assist patient in preparing an advanced directive.    COUNSELING:  Reviewed preventive health counseling, as reflected in patient instructions            Estimated body mass index is 40.31 kg/(m^2) as calculated from the following:    Height as of this encounter: 5' 11\" (1.803 m).    Weight as of this encounter: 289 lb (131.1 kg).  Weight management plan: Discussed healthy diet and exercise guidelines and patient will follow up in 3 months in clinic to re-evaluate.   reports that he has never smoked. He has never used smokeless tobacco.  Tobacco Cessation Action Plan: Medication Therapy Management  (Referral to MTM)    Appropriate preventive services were discussed with this patient, including applicable screening as appropriate for cardiovascular disease, diabetes, osteopenia/osteoporosis, and glaucoma.  As appropriate for age/gender, discussed screening for colorectal cancer, prostate cancer, breast cancer, and cervical cancer. Checklist reviewing preventive services available has been given to the patient.    Reviewed patients plan of care and provided an AVS. The Intermediate Care Plan ( asthma action plan, low back pain action plan, and migraine action plan) for Laila meets the Care Plan " requirement. This Care Plan has been established and reviewed with the Patient.    Counseling Resources:  ATP IV Guidelines  Pooled Cohorts Equation Calculator  Breast Cancer Risk Calculator  FRAX Risk Assessment  ICSI Preventive Guidelines  Dietary Guidelines for Americans, 2010  USDA's MyPlate  ASA Prophylaxis  Lung CA Screening    JERRICA Salcedo CNP  [facility]

## 2017-05-02 NOTE — LETTER
[facility]  [address] [facility] [address]  Phone: [phone]    May 3, 2017    Laila Morris [place]  [address] [place] [address]              Dear Mr. Perez,        The results of your recent lab tests have been reviewed.     Normal uric acid   Normal thyroid blood work   Normal electrolytes. Slight decline in kidney function. We'll continue to monitor   Normal liver function tests   Cholesterol meeting goal   Normal red my blood cell count   Testosterone pending.       Please call with any questions or concerns   Thank you for allowing us to participate healthcare needs         Sincerely      Delmis Simons FNP-BC /ac    Component      Latest Ref Rng & Units 5/2/2017   Uric Acid      3.5 - 7.2 mg/dL 7.0     Component      Latest Ref Rng & Units 5/2/2017   TSH      0.40 - 4.00 mU/L 1.44                  az  Comprehensive Metabolic Panel       Latest Ref Rng & Units 5/2/2017   Sodium      133 - 144 mmol/L 140   Potassium      3.4 - 5.3 mmol/L 5.1   Chloride      94 - 109 mmol/L 105   Carbon Dioxide      20 - 32 mmol/L 27   Anion Gap      3 - 14 mmol/L 8   Glucose      70 - 99 mg/dL 85   Urea Nitrogen      7 - 30 mg/dL 28   Creatinine      0.66 - 1.25 mg/dL 1.49 (H)   GFR Estimate      >60 mL/min/1.7m2 47 (L)   GFR Estimate If Black      >60 mL/min/1.7m2 57 (L)   Calcium      8.5 - 10.1 mg/dL 9.2   Bilirubin Total      0.2 - 1.3 mg/dL 0.5   Albumin      3.4 - 5.0 g/dL 4.0   Protein Total      6.8 - 8.8 g/dL 7.8   Alkaline Phosphatase      40 - 150 U/L 68   ALT      0 - 70 U/L 22   AST      0 - 45 U/L 12       Lipid Panel       Latest Ref Rng & Units 5/2/2017   Cholesterol      <200 mg/dL 176   Triglycerides      <150 mg/dL 147   HDL Cholesterol      >39 mg/dL 57   LDL Cholesterol Calculated      <100 mg/dL 90   Non HDL Cholesterol      <130 mg/dL 119

## 2017-05-02 NOTE — NURSING NOTE
"Chief Complaint   Patient presents with     Physical     Forms       Initial /62  Pulse 60  Temp 97.3  F (36.3  C) (Tympanic)  Ht 5' 11\" (1.803 m)  Wt 289 lb (131.1 kg)  SpO2 99%  BMI 40.31 kg/m2 Estimated body mass index is 40.31 kg/(m^2) as calculated from the following:    Height as of this encounter: 5' 11\" (1.803 m).    Weight as of this encounter: 289 lb (131.1 kg).  Medication Reconciliation: complete    Health Maintenance that is potentially due pending provider review:  NONE    n/a    "

## 2017-05-04 LAB — TESTOST SERPL-MCNC: 489 NG/DL (ref 240–950)

## 2017-05-08 PROCEDURE — 82274 ASSAY TEST FOR BLOOD FECAL: CPT | Performed by: NURSE PRACTITIONER

## 2017-05-09 DIAGNOSIS — Z12.12 SCREENING FOR MALIGNANT NEOPLASM OF THE RECTUM: ICD-10-CM

## 2017-05-09 LAB — HEMOCCULT STL QL IA: NEGATIVE

## 2017-05-23 ENCOUNTER — OFFICE VISIT (OUTPATIENT)
Dept: OTHER | Facility: OUTPATIENT CENTER | Age: 67
End: 2017-05-23

## 2017-05-23 DIAGNOSIS — F64.0 GENDER DYSPHORIA IN ADOLESCENT AND ADULT: Primary | ICD-10-CM

## 2017-05-23 NOTE — MR AVS SNAPSHOT
After Visit Summary   5/23/2017    Laila Perez    MRN: [id number]           Patient Information     Date Of Birth          1950        Visit Information        Provider Department      5/23/2017 3:00 PM Estelle Gomez LP [facility]        Today's Diagnoses     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    -  1       Follow-ups after your visit        Your next 10 appointments already scheduled     Jun 27, 2017 10:00 AM CDT   OFFICE VISIT with Jluis Mane MD   [facility] ([facility])    [address]  Mail [address] 75[address]  [facility] [address]   [phone]            Jul 11, 2017  3:00 PM CDT   INDIVIDUAL THERAPY with Estelle Gomez LP   [facility] ([facility])    [address]  Mail [address]  [facility] [address]   [phone]            Aug 21, 2017 10:45 AM CDT   Ech Complete with [address] [facility] ([facility])    [address]  [facility] [address]   [phone]           1.  Please bring or wear a comfortable two-piece outfit. 2.  You may eat, drink and take your normal medicines. 3.  For any questions that cannot be answered, please contact the ordering physician            Aug 28, 2017  9:00 AM CDT   INDIVIDUAL THERAPY with Estelle Gomez LP   [facility] ([facility])    [address]  [address]  [facility] [address]   [phone]            Aug 28, 2017 10:00 AM CDT   OFFICE VISIT with Jluis Mane MD   [facility] ([facility])    [address]  [address]  [facility] [address]   [phone]              Who to contact     Please call your clinic at [phone] to:    Ask questions about your health    Make or cancel appointments    Discuss your medicines    Learn about your test results    Speak to your doctor   If you have compliments or concerns about an  experience at your clinic, or if you wish to file a complaint, please contact [facility] Patient Relations at [phone] or email us at [email or web address]         Additional Information About Your Visit        Jike Xueyuanhart Information     Runivermag is an electronic gateway that provides easy, online access to your medical records. With Runivermag, you can request a clinic appointment, read your test results, renew a prescription or communicate with your care team.     To sign up for Runivermag visit the website at [email or web address]   You will be asked to enter the access code listed below, as well as some personal information. Please follow the directions to create your username and password.     Your access code is: [email or web address]  Expires: 2017  5:28 PM     Your access code will  in 90 days. If you need help or a new code, please contact your [facility] or call [phone] for assistance.        Care EveryWhere ID     This is your Care EveryWhere ID. This could be used by other organizations to access your [facility] medical records  [id number]         Blood Pressure from Last 3 Encounters:   17 102/62   17 118/70   17 132/83    Weight from Last 3 Encounters:   17 131.1 kg (289 lb)   17 131.1 kg (289 lb)   17 124.7 kg (275 lb)              We Performed the Following     Individual Psychotherapy (38-52 min) [01565]        Primary Care Provider Office Phone # Fax #    JERRICA Salcedo [facility] [phone]       [facility] [address] [facility] [address]        Thank you!     Thank you for choosing [facility]  for your care. Our goal is always to provide you with excellent care. Hearing back from our patients is one way we can continue to improve our services. Please take a few minutes to complete the written survey that you may receive in the mail after  your visit with us. Thank you!             Your Updated Medication List - Protect others around you: Learn how to safely use, store and throw away your medicines at www.disposemymeds.org.          This list is accurate as of: 5/23/17 11:59 PM.  Always use your most recent med list.                   Brand Name Dispense Instructions for use    atenolol 50 MG tablet    TENORMIN    30 tablet    Take 1 tablet (50 mg) by mouth daily       calcium-vitamin D 600-400 MG-UNIT per tablet    calcium 600 + D    180 tablet    Take 1 tablet by mouth 2 times daily       DAILY-EVANGELISTA Tabs     90 tablet    Take 1 tablet by mouth daily       finasteride 5 MG tablet    PROSCAR    90 tablet    Take 1 tablet (5 mg) by mouth daily       FLUoxetine 40 MG capsule    PROzac    90 capsule    Take 1 capsule (40 mg) by mouth daily Take One Capsule By Mouth Every Day       latanoprost 0.005 % ophthalmic solution    XALATAN     1 drop At Bedtime.       meloxicam 15 MG tablet    MOBIC    90 tablet    Take 1 tablet (15 mg) by mouth daily       multivitamin, therapeutic with minerals Tabs tablet     100 tablet    ONE TAB DAILY AT 8 AM       order for DME     1 each    Equipment being ordered:  BACK BRACE  MedSpec Back-n-Shape II Back Brace  Size XXXL with thermoplastic insert       order for DME     2 each    Equipment being ordered: Sigvaris 232 Cotton Thigh High for Men 20-30 mmHg- TWO PAIR TAN COLOR       spironolactone 100 MG tablet    ALDACTONE    90 tablet    Take 1 tablet (100 mg) by mouth daily       timolol 0.5 % ophthalmic solution    TIMOPTIC     1 drop 2 times daily

## 2017-05-24 ENCOUNTER — CARE COORDINATION (OUTPATIENT)
Dept: OTHER | Facility: OUTPATIENT CENTER | Age: 67
End: 2017-05-24

## 2017-05-31 DIAGNOSIS — E78.5 HYPERLIPIDEMIA LDL GOAL <100: ICD-10-CM

## 2017-05-31 RX ORDER — LOVASTATIN 20 MG
TABLET ORAL
Qty: 90 TABLET | Refills: 3 | Status: SHIPPED | OUTPATIENT
Start: 2017-05-31 | End: 2018-05-09

## 2017-05-31 NOTE — TELEPHONE ENCOUNTER
lovastatin (MEVACOR) 20 MG tablet     Last Written Prescription Date: 02/09/2017  Last Fill Quantity: 90 tablet, # refills: 1  Last Office Visit with [facility], [facility] or [facility] prescribing provider: 05/02/2017       Lab Results   Component Value Date    CHOL 176 05/02/2017     Lab Results   Component Value Date    HDL 57 05/02/2017     Lab Results   Component Value Date    LDL 90 05/02/2017     Lab Results   Component Value Date    TRIG 147 05/02/2017     Lab Results   Component Value Date    CHOLHDLRATIO 3.0 04/25/2014

## 2017-06-02 NOTE — PROGRESS NOTES
[facility] -  Case Progress Note  Client Name: Laila Perez  YOB: 1950  MRN:  [id number]  Treating Provider: Estelle Gomez LP  Type of Session: Individual  Present in Session: client and support person  Number of Minutes:  50    Date of Service: Apr 26, 2017    Current Symptoms/Status:  Distress about gender and secondary sex characteristics, wants genitals removed, bothers her daily of moderate to severe intensity.    Progress Toward Treatment Goals:   Client is making progress with looking up information on gender confirming surgery.    Intervention: Modality and Description:  Provided support and feedback. Used CBT to process gender dysphoria concerns. Client shared not getting gender changed on legal documents. The support person shared the letter from the  denied ordering this change until client has done more physical transition. Client shared she found some information regarding surgery with the help of support person. Discussed more about orchiectomy--what it involves. Client shared she understood this would only be removing testicles. Client does not know if her insurance will pay for it or if there are any medical issues that would prohibit the surgery. Discussed next steps is to work on letter of support to send to surgeon--will find out if 2nd letter is needed.    Response to Intervention:  Client reported validation.    Assignment:  Look at information about surgery on the internet.    Interactive Complexity:  There are four specific communication difficulties that complicate the work of the primary psychiatric procedure.  Interactive complexity (+[id number]) may be reported when at least one of these difficulties is present.    Communication difficulties present during current the psychiatric procedure include:  1. Use of play equipment, physical devices,  or  to overcome significant communication barriers.    Diagnosis:  Encounter Diagnosis    Name Primary?     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 Yes           Plan / Need for Future Services:  Return for therapy in 4 weeks.      Estelle Gomez PsyD, LP

## 2017-06-27 ENCOUNTER — OFFICE VISIT (OUTPATIENT)
Dept: OTHER | Facility: OUTPATIENT CENTER | Age: 67
End: 2017-06-27

## 2017-06-27 VITALS
WEIGHT: 292 LBS | DIASTOLIC BLOOD PRESSURE: 72 MMHG | HEIGHT: 71 IN | BODY MASS INDEX: 40.88 KG/M2 | SYSTOLIC BLOOD PRESSURE: 123 MMHG | HEART RATE: 49 BPM

## 2017-06-27 DIAGNOSIS — F64.0 GENDER DYSPHORIA IN ADOLESCENT AND ADULT: Primary | ICD-10-CM

## 2017-06-27 DIAGNOSIS — I35.0 NONRHEUMATIC AORTIC VALVE STENOSIS: ICD-10-CM

## 2017-06-27 ASSESSMENT — ENCOUNTER SYMPTOMS
CHEST TIGHTNESS: 0
RHINORRHEA: 1
UNEXPECTED WEIGHT CHANGE: 0
SHORTNESS OF BREATH: 0
PALPITATIONS: 0
DYSPHORIC MOOD: 0
VOMITING: 0
HEADACHES: 0
FREQUENCY: 0
NUMBNESS: 0
FEVER: 0
LIGHT-HEADEDNESS: 1
POLYDIPSIA: 0
WEAKNESS: 0
NERVOUS/ANXIOUS: 0
CHILLS: 0
ABDOMINAL PAIN: 0

## 2017-06-27 NOTE — MR AVS SNAPSHOT
After Visit Summary   6/27/2017    Laila Perez    MRN: [id number]           Patient Information     Date Of Birth          1950        Visit Information        Provider Department      6/27/2017 10:00 AM Jluis Mane MD [facility]        Today's Diagnoses     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    -  1    Nonrheumatic aortic valve stenosis           Follow-ups after your visit        Follow-up notes from your care team     Return in about 4 months (around 10/27/2017).      Your next 10 appointments already scheduled     Jul 11, 2017  3:00 PM CDT   INDIVIDUAL THERAPY with Estelle Gomez LP   [facility] ([facility])    [address]  Mail Code 7521  [facility] [address]   [phone]            Aug 21, 2017 10:45 AM CDT   Ech Complete with SINGH[address] Echocardiography ([facility])    [address] [facility] [address] [phone]           1.  Please bring or wear a comfortable two-piece outfit. 2.  You may eat, drink and take your normal medicines. 3.  For any questions that cannot be answered, please contact the ordering physician            Aug 28, 2017  9:00 AM CDT   INDIVIDUAL THERAPY with Estelle Gomez LP   [facility] ([facility])    [address]  Mail Code 7521  [facility] [address]   [phone]              Who to contact     Please call your clinic at [phone] to:    Ask questions about your health    Make or cancel appointments    Discuss your medicines    Learn about your test results    Speak to your doctor   If you have compliments or concerns about an experience at your clinic, or if you wish to file a complaint, please contact [facility] Patient Relations at [phone] or email us at [email or web address]         Additional Information About Your Visit        MyChart Information     MyChart  "is an electronic gateway that provides easy, online access to your medical records. With [employer], you can request a clinic appointment, read your test results, renew a prescription or communicate with your care team.     To sign up for [employer] Route visit the website at [email or web address]   You will be asked to enter the access code listed below, as well as some personal information. Please follow the directions to create your username and password.     Your access code is: [email or web address]  Expires: 2017  5:28 PM     Your access code will  in 90 days. If you need help or a new code, please contact your [facility] or call [phone] for assistance.        Care EveryWhere ID     This is your Care EveryWhere ID. This could be used by other organizations to access your [place] medical records  [id number]        Your Vitals Were     Pulse Height BMI (Body Mass Index)             49 1.803 m (5' 11\") 40.73 kg/m2          Blood Pressure from Last 3 Encounters:   17 123/72   17 102/62   17 118/70    Weight from Last 3 Encounters:   17 132.5 kg (292 lb)   17 131.1 kg (289 lb)   17 131.1 kg (289 lb)              Today, you had the following     No orders found for display       Primary Care Provider Office Phone # Fax #    Delmis JERRICA Mcelroy [facility] [phone]       [facility] [address] [place] [address]        Equal Access to Services     SUSU BLACKWELL : Hadii david ku hadasho Soomaali, waaxda luqadaha, qaybta kaalmada adeegyada, zulma simpson. So [facility] [phone].    ATENCIÓN: Si habla español, tiene a hernandez disposición servicios gratuitos de asistencia lingüística. Llame al [phone].    We comply with applicable federal civil rights laws and [place] laws. We do not discriminate on the basis of race, color, national origin, age, disability sex, sexual orientation or gender " identity.            Thank you!     Thank you for choosing [facility]  for your care. Our goal is always to provide you with excellent care. Hearing back from our patients is one way we can continue to improve our services. Please take a few minutes to complete the written survey that you may receive in the mail after your visit with us. Thank you!             Your Updated Medication List - Protect others around you: Learn how to safely use, store and throw away your medicines at www.disposemymeds.org.          This list is accurate as of: 6/27/17 11:59 PM.  Always use your most recent med list.                   Brand Name Dispense Instructions for use Diagnosis    DAILY-EVANGELISTA Tabs     90 tablet    Take 1 tablet by mouth daily    Nutritional deficiency       finasteride 5 MG tablet    PROSCAR    90 tablet    Take 1 tablet (5 mg) by mouth daily    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       FLUoxetine 40 MG capsule    PROzac    90 capsule    Take 1 capsule (40 mg) by mouth daily Take One Capsule By Mouth Every Day    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        lovastatin 20 MG tablet    MEVACOR    90 tablet    Take 1 tablet (20 mg) by mouth At Bedtime    Hyperlipidemia LDL goal <100       meloxicam 15 MG tablet    MOBIC    90 tablet    Take 1 tablet (15 mg) by mouth daily    Primary osteoarthritis, unspecified site       multivitamin, therapeutic with minerals Tabs tablet     100 tablet    ONE TAB DAILY AT 8 AM    Disorder of bone and cartilage       order for DME     1 each    Equipment being ordered:  BACK BRACE  MedSpec Back-n-Shape II Back Brace  Size XXXL with thermoplastic insert    DDD (degenerative disc disease), lumbar, Pathologic fracture of vertebrae       order for DME     2 each    Equipment being ordered: Sigvaris 232 Cotton Thigh High for Men 20-30 mmHg- TWO PAIR TAN COLOR    Varicose veins of legs        spironolactone 100 MG tablet    ALDACTONE    90 tablet    Take 1 tablet (100 mg) by mouth daily    Essential hypertension with goal blood pressure less than 140/90       timolol 0.5 % ophthalmic solution    TIMOPTIC     1 drop 2 times daily

## 2017-06-27 NOTE — PROGRESS NOTES
BOO Ulloa is a 67 year old individual that uses pronouns She/Her/Hers/Herself that presents today for follow up of:  feminizing hormone therapy.   Gender identity: female.   Started Hormone  therapy  7/2016  Continues on       Spironlactone 100* mg daily      Other finasteride 5 mg  Any special concerns today?  Echocardiogram  Set for Aug 21st.   On hormones?  YES +++ Shot day of the week? Not applicable-taking pills/patch/gel      Due for labs?  No      +++ Refills of meds needed?  Yes  Gender related body changes since last visit: Continued breast development,  No hot flashes No further body changes since last visit, continues to feel happiier    Activity: furniture making  New health concerns since last visit: none  ---    Past Surgical History:   Procedure Laterality Date     COLONOSCOPY  2007     JOINT REPLACEMTN, KNEE RT/LT  2006    left       Patient Active Problem List   Diagnosis     large compression fracture     Gouty arthropathy     Mental or behavioral problem     Generalized osteoarthrosis, unspecified site     Disorder of bone and cartilage     Mixed hyperlipidemia     OA (osteoarthritis) of knee     HYPERLIPIDEMIA LDL GOAL <100     Gout, chronic     DDD (degenerative disc disease), lumbar     Alcoholism (H)     Advanced directives, counseling/discussion     Varicose veins of legs     Psoriasis     HTN, goal below 140/90     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     Family history of diabetes mellitus in first degree relative     Mitral valve disorder     Nonrheumatic aortic valve stenosis     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       Current Outpatient Prescriptions   Medication Sig Dispense Refill     lovastatin (MEVACOR) 20 MG tablet Take 1 tablet (20 mg) by mouth At Bedtime 90 tablet 3     order for DME Equipment being ordered: Sigvaris 232 Cotton Thigh High for Men 20-30 mmHg- TWO PAIR TAN COLOR 2 each 5     atenolol (TENORMIN) 50 MG tablet Take 1 tablet (50 mg) by mouth daily 30  tablet 1     finasteride (PROSCAR) 5 MG tablet Take 1 tablet (5 mg) by mouth daily 90 tablet 1     spironolactone (ALDACTONE) 100 MG tablet Take 1 tablet (100 mg) by mouth daily 90 tablet 1     meloxicam (MOBIC) 15 MG tablet Take 1 tablet (15 mg) by mouth daily 90 tablet 1     Multiple Vitamin (DAILY-EVANGELISTA) TABS Take 1 tablet by mouth daily 90 tablet 3     calcium-vitamin D (CALCIUM 600 + D) 600-400 MG-UNIT per tablet Take 1 tablet by mouth 2 times daily 180 tablet 1     multivitamin, therapeutic with minerals (MULTI-VITAMIN) TABS ONE TAB DAILY AT 8  tablet 3     FLUoxetine (PROZAC) 40 MG capsule Take 1 capsule (40 mg) by mouth daily Take One Capsule By Mouth Every Day 90 capsule 1     timolol (TIMOPTIC) 0.5 % ophthalmic solution 1 drop 2 times daily       latanoprost (XALATAN) 0.005 % ophthalmic solution 1 drop At Bedtime.       ORDER FOR DME Equipment being ordered:   BACK BRACE    [employer] Back-n-Shape II Back Brace  Size XXXL with thermoplastic insert        1 each 0       History   Smoking Status     Never Smoker   Smokeless Tobacco     Never Used          Allergies   Allergen Reactions     Penicillin [Esters] Itching     he is not sure what kind of reaction he had     Heparin      he lives in a assisted living program and is not sure what type of allergies he really has. He quit drinking     Zosyn [Penicillins]      unsure of reaction, allergy is listed on his med sheet       Health Maintenance Due   Topic Date Due     AORTIC ANEURYSM SCREENING (SYSTEM ASSIGNED)  03/09/2015     PNEUMOCOCCAL (2 of 2 - PPSV23) 04/26/2017     ADVANCE DIRECTIVE PLANNING Q5 YRS  07/25/2017         Problem, Medication and Allergy Lists were reviewed and are current..         Review of Systems:   Review of Systems   Constitutional: Negative for chills, fever and unexpected weight change.   HENT: Positive for rhinorrhea.    Eyes: Negative for visual disturbance.   Respiratory: Negative for chest tightness and shortness of breath.   "  Cardiovascular: Negative for chest pain, palpitations and leg swelling.   Gastrointestinal: Negative for abdominal pain and vomiting.   Endocrine: Negative for polydipsia and polyuria.   Genitourinary: Negative for frequency.   Neurological: Positive for light-headedness. Negative for weakness, numbness and headaches.        When bend over   Psychiatric/Behavioral: Negative for dysphoric mood and suicidal ideas. The patient is not nervous/anxious.             Physical Exam:     Vitals:    06/27/17 1011   BP: 123/72   Pulse: (!) 49   Weight: 132.5 kg (292 lb)   Height: 1.803 m (5' 11\")     BMI= Body mass index is 40.73 kg/(m^2).   Wt Readings from Last 10 Encounters:   06/27/17 132.5 kg (292 lb)   05/02/17 131.1 kg (289 lb)   04/26/17 131.1 kg (289 lb)   02/06/17 124.7 kg (275 lb)   11/17/16 127.9 kg (282 lb)   09/27/16 127.5 kg (281 lb)   08/29/16 127.9 kg (282 lb)   07/25/16 131.5 kg (290 lb)   04/26/16 128.8 kg (284 lb)   04/25/16 130.6 kg (288 lb)     Appearance: Female appearance and dress    GENERAL:: healthy, alert and no distress  RESP: lungs clear to auscultation - no rales, no rhonchi, no wheezes  CV: regular rates and rhythm, normal S1 S2, no S3 or S4 and 2/4 murmur, no click or rub -  Trace edema      Affect: Appropriate/mood-congruent           Labs:   Results from last visit:  Orders Only on 05/09/2017   Component Date Value Ref Range Status     Occult Blood Scn FIT 05/08/2017 Negative  NEG Final       Assessment and Plan   1. Gender dysphoria  2. Mitral valve disorder  3. Aortic stenosis  Laila is happy with feminization, although testosterone is not suppressed. Cannot increase spironolactone due to borderline high K+. Await results of echocardiogram, and consider low dose estradiol next visit.   Not currently sexually active    Await echocardiogram--can't increase priti  Consider low dose estrogen?    Follow up:  Follow up in 4 months  Results by mychart  Questions were elicited and answered. "     Jluis Mane MD

## 2017-06-27 NOTE — NURSING NOTE
"Chief Complaint   Patient presents with     RECHECK     TG       Vitals:    06/27/17 1011   BP: 123/72   Pulse: (!) 49   Weight: 132.5 kg (292 lb)   Height: 1.803 m (5' 11\")       Body mass index is 40.73 kg/(m^2).      Alexandra Long CMA                        "

## 2017-06-29 DIAGNOSIS — E78.5 HYPERLIPIDEMIA LDL GOAL <100: ICD-10-CM

## 2017-06-29 DIAGNOSIS — I10 ESSENTIAL HYPERTENSION WITH GOAL BLOOD PRESSURE LESS THAN 140/90: ICD-10-CM

## 2017-06-30 RX ORDER — ATENOLOL 50 MG/1
TABLET ORAL
Qty: 60 TABLET | Refills: 3 | Status: SHIPPED | OUTPATIENT
Start: 2017-06-30 | End: 2018-02-13

## 2017-06-30 NOTE — TELEPHONE ENCOUNTER
calcium-vitamin D (CALCIUM 600 + D) 600-400 MG-UNIT per tablet      Last Written Prescription Date: 2/9/2017  Last Fill Quantity: 180,  # refills: 1   Last Office Visit with [facility], [facility] or [facility] prescribing provider: 5/2/2017                                             atenolol      Last Written Prescription Date: 4/27/2017  Last Fill Quantity: 30, # refills: 1    Last Office Visit with [facility], [facility] or [facility] prescribing provider:  5/2/2017   Future Office Visit:        BP Readings from Last 3 Encounters:   05/02/17 102/62   09/27/16 122/62   08/29/16 119/69

## 2017-06-30 NOTE — TELEPHONE ENCOUNTER
BP Readings from Last 5 Encounters:   05/02/17 102/62   09/27/16 122/62   08/29/16 119/69   08/29/16 119/69   04/26/16 155/89     Virgen Call RN

## 2017-07-11 ENCOUNTER — OFFICE VISIT (OUTPATIENT)
Dept: OTHER | Facility: OUTPATIENT CENTER | Age: 67
End: 2017-07-11

## 2017-07-11 DIAGNOSIS — F64.0 GENDER DYSPHORIA IN ADOLESCENT AND ADULT: Primary | ICD-10-CM

## 2017-07-11 NOTE — LETTER
17      RECIPIENT ADDRESS    Re: Laila Perez  : 1950  Primary letter of support for gender confirmation surgery/orchiectomy      Dr. Abhilash Thomas:    Ms. Laila Perez has received services through the [facility] since 16.  Laila is a 67 year   male old who identifies as a transgender female. Ms. Perez initially sought services to explore her gender identity and making decisions about medical interventions to address her gender dysphoria. Her most recent appointment for individual psychotherapy occurred on 17. She has been seen for psychological services at this clinic from 16 to 17.      Ms Perez completed a diagnostic session, as well as 9  individual psychotherapy appointments. Additionally she completed a thorough psychological evaluation consisting of completion of a set of evaluative measures of gender dysphoria, psychosocial adjustment, sexual health, and overall psychological functioning.     NAME has been diagnosed with gender dysphoria. He/she/they does/does not meet criteria for any additional diagnoses at this time. He/she/they has exhibited a persistent and long standing gender and anatomic dysphoria related to his/her/their birth assigned sex. He/she/they has experienced some anxiety and depression for periods of time in adjustment to his/her process of disclosing his identity to his family and friends, but does not meet criteria for any anxiety or mood disorders at this time. He/she/they is well adjusted and thoughtful, and has approached his/her decision making for surgery with intention and care. NAME s mental health concerns are stable and well controlled, and do not pose any barrier to his/her/their ability to consent and undergo gender affirming surgery at this time.    NAME currently meets the World Professional Association for Transgender Health (WPATH) Standards of Care for surgical gender reassignment of  gender dysphoric persons, as well as internal prerequisites for gender reassignment surgery. Surgery is considered a medically necessary treatment by the [facility] Standards of Care for transgender persons. On behalf of our staff, I support his/her decision to pursue medically necessary surgery at this time.     NAME has been on feminizing/ hormones since 7/25/16. He/she/they have undergone significant and permanent changes such as legal name change and socially trqansitioning to live in fulltime in their affirmed gender since 7/2014. Gender affirmation surgery will decrease his/her/their gender dysphoria and psychological distress and improve his/her/their overall psychological functioning.  In some cases, denial of access to this surgery can lead to increased isolation, decreased work performance, and decreased sexual and interpersonal functioning. Surgery is expected to increase comfort with self, and improve interpersonal, sexual, and vocational functioning.    NAME has educated him/herself/themself about the surgery and is aware of its risks and benefits. Given his/her/their concerns and the availability of this type of surgery, I support his/her/their choice of surgeon.    The staff s support is based on the psychological indication for surgery and did not include a physical examination to assess medical contra-indications for the surgical procedure. This letter of support is valid for one year from the date of approval.      If you have any questions, or are in need of additional information, please do not hesitate to contact me.      Sincerely,    NAME  CREDENTIALS    SUPERVISOR NAME  CREDENTIALS

## 2017-07-11 NOTE — MR AVS SNAPSHOT
After Visit Summary   7/11/2017    Laila Perez    MRN: [id number]           Patient Information     Date Of Birth          1950        Visit Information        Provider Department      7/11/2017 3:00 PM Estelle Gomez LP [facility]        Today's Diagnoses     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    -  1       Follow-ups after your visit        Your next 10 appointments already scheduled     Aug 21, 2017 10:45 AM CDT   Ech Complete with THERESA   [facility] ([facility])    [address]  [facility] [id number] [phone]           1.  Please bring or wear a comfortable two-piece outfit. 2.  You may eat, drink and take your normal medicines. 3.  For any questions that cannot be answered, please contact the ordering physician            Aug 28, 2017  9:00 AM CDT   INDIVIDUAL THERAPY with Estelle Gomez LP   [facility] ([facility])    [address]  Mail Code 7521  [facility] [address]   [phone]              Who to contact     Please call your clinic at [phone] to:    Ask questions about your health    Make or cancel appointments    Discuss your medicines    Learn about your test results    Speak to your doctor   If you have compliments or concerns about an experience at your clinic, or if you wish to file a complaint, please contact [facility] Patient Relations at [phone] or email us at [email or web address]         Additional Information About Your Visit        MyChart Information     [employer] is an electronic gateway that provides easy, online access to your medical records. With [employer], you can request a clinic appointment, read your test results, renew a prescription or communicate with your care team.     To sign up for [employer] visit the website at [email or web address]   You will be asked to enter the access code listed  below, as well as some personal information. Please follow the directions to create your username and password.     Your access code is: [email or web address]  Expires: 2017  5:28 PM     Your access code will  in 90 days. If you need help or a new code, please contact your [facility] or call [phone] for assistance.        Care EveryWhere ID     This is your Care EveryWhere ID. This could be used by other organizations to access your [place] medical records  [id number]         Blood Pressure from Last 3 Encounters:   17 123/72   17 102/62   17 118/70    Weight from Last 3 Encounters:   17 132.5 kg (292 lb)   17 131.1 kg (289 lb)   17 131.1 kg (289 lb)              We Performed the Following     Individual Psychotherapy (53+ min) [12121]        Primary Care Provider Office Phone # Fax #    JERRICA Salcedo [facility] [phone]       [facility] [address] [place] [address]        Equal Access to Services     SUSU BLACKWELL : Hadii aad ku hadasho Soomaali, waaxda luqadaha, qaybta kaalmada adeegyada, zulma ugarte haytim castillo . So [facility] [phone].    ATENCIÓN: Si habla español, tiene a hernandez disposición servicios gratuitos de asistencia lingüística. [facility] [phone].    We comply with applicable federal civil rights laws and [place] laws. We do not discriminate on the basis of race, color, national origin, age, disability sex, sexual orientation or gender identity.            Thank you!     Thank you for choosing [place] [facility]  for your care. Our goal is always to provide you with excellent care. Hearing back from our patients is one way we can continue to improve our services. Please take a few minutes to complete the written survey that you may receive in the mail after your visit with us. Thank you!             Your Updated Medication List - Protect others around you: Learn  how to safely use, store and throw away your medicines at www.disposemymeds.org.          This list is accurate as of: 7/11/17 11:59 PM.  Always use your most recent med list.                   Brand Name Dispense Instructions for use Diagnosis    atenolol 50 MG tablet    TENORMIN    60 tablet    TAKE ONE TABLET BY MOUTH EVERY DAY    Essential hypertension with goal blood pressure less than 140/90       DAILY-EVANGELISTA Tabs     90 tablet    Take 1 tablet by mouth daily    Nutritional deficiency       finasteride 5 MG tablet    PROSCAR    90 tablet    Take 1 tablet (5 mg) by mouth daily    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       FLUoxetine 40 MG capsule    PROzac    90 capsule    Take 1 capsule (40 mg) by mouth daily Take One Capsule By Mouth Every Day    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        lovastatin 20 MG tablet    MEVACOR    90 tablet    Take 1 tablet (20 mg) by mouth At Bedtime    Hyperlipidemia LDL goal <100       meloxicam 15 MG tablet    MOBIC    90 tablet    Take 1 tablet (15 mg) by mouth daily    Primary osteoarthritis, unspecified site       multivitamin, therapeutic with minerals Tabs tablet     100 tablet    ONE TAB DAILY AT 8 AM    Disorder of bone and cartilage       order for DME     1 each    Equipment being ordered:  BACK BRACE  MedSpec Back-n-Shape II Back Brace  Size XXXL with thermoplastic insert    DDD (degenerative disc disease), lumbar, Pathologic fracture of vertebrae       order for DME     2 each    Equipment being ordered: Sigvaris 232 Cotton Thigh High for Men 20-30 mmHg- TWO PAIR TAN COLOR    Varicose veins of legs       SM CALCIUM 600/VITAMIN D 600-400 MG-UNIT per tablet   Generic drug:  calcium-vitamin D     60 tablet    Take 1 tablet by mouth 2 times daily    Hyperlipidemia LDL goal <100       spironolactone 100 MG tablet    ALDACTONE    90 tablet    Take 1 tablet (100 mg) by mouth daily    Essential  hypertension with goal blood pressure less than 140/90       timolol 0.5 % ophthalmic solution    TIMOPTIC     1 drop 2 times daily

## 2017-07-17 NOTE — PROGRESS NOTES
[facility] -  Case Progress Note  Client Name: Laila Perez  YOB: 1950  MRN:  [id number]  Treating Provider: Estelle Gomez LP  Type of Session: Individual  Present in Session: client and support person  Number of Minutes:  55    Date of Service: 7/11/17    Current Symptoms/Status:  Distress about gender and secondary sex characteristics, wants genitals removed, bothers her daily of moderate to severe intensity, distress with work situation.    Progress Toward Treatment Goals:   Client is making progress with getting information regarding surgery.    Intervention: Modality and Description:  Provided support and feedback. Used CBT to process gender dysphoria concerns. Client shared having some issues with her work place. She has supported work services and has felt like they are not paying the right amount. She has decided to cut back on hours there and start doing some work independently. During the session we started working on her primary letter for gender affirmation surgery. Discussed next steps.    Response to Intervention:  Client reported validation.    Assignment:  Look at information about surgery on the internet.    Interactive Complexity:  There are four specific communication difficulties that complicate the work of the primary psychiatric procedure.  Interactive complexity (+[id number]) may be reported when at least one of these difficulties is present.    Communication difficulties present during current the psychiatric procedure include:  1. Use of play equipment, physical devices,  or  to overcome significant communication barriers.    Diagnosis:  Encounter Diagnosis   Name Primary?     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 Yes           Plan / Need for Future Services:  Return for therapy in 4 weeks.      Estelle Gomez PsyD, LP

## 2017-08-09 ENCOUNTER — TELEPHONE (OUTPATIENT)
Dept: OTHER | Facility: OUTPATIENT CENTER | Age: 67
End: 2017-08-09

## 2017-08-21 ENCOUNTER — HOSPITAL ENCOUNTER (OUTPATIENT)
Dept: CARDIOLOGY | Facility: CLINIC | Age: 67
Discharge: HOME | End: 2017-08-21
Attending: INTERNAL MEDICINE | Admitting: INTERNAL MEDICINE
Payer: MEDICARE

## 2017-08-21 DIAGNOSIS — I35.0 AORTIC VALVE STENOSIS, UNSPECIFIED ETIOLOGY: ICD-10-CM

## 2017-08-21 PROCEDURE — 93306 TTE W/DOPPLER COMPLETE: CPT | Mod: 26 | Performed by: INTERNAL MEDICINE

## 2017-08-21 PROCEDURE — 93306 TTE W/DOPPLER COMPLETE: CPT

## 2017-08-28 DIAGNOSIS — F64.0 GENDER DYSPHORIA IN ADOLESCENT AND ADULT: ICD-10-CM

## 2017-08-28 DIAGNOSIS — I10 ESSENTIAL HYPERTENSION WITH GOAL BLOOD PRESSURE LESS THAN 140/90: ICD-10-CM

## 2017-08-28 RX ORDER — FINASTERIDE 5 MG/1
5 TABLET, FILM COATED ORAL DAILY
Qty: 90 TABLET | Refills: 1 | Status: SHIPPED | OUTPATIENT
Start: 2017-08-28 | End: 2018-03-19

## 2017-08-28 RX ORDER — SPIRONOLACTONE 100 MG/1
100 TABLET, FILM COATED ORAL DAILY
Qty: 90 TABLET | Refills: 1 | Status: SHIPPED | OUTPATIENT
Start: 2017-08-28 | End: 2018-03-19

## 2017-08-31 ENCOUNTER — TELEPHONE (OUTPATIENT)
Dept: OTHER | Facility: OUTPATIENT CENTER | Age: 67
End: 2017-08-31

## 2017-09-06 DIAGNOSIS — M19.91 PRIMARY OSTEOARTHRITIS, UNSPECIFIED SITE: ICD-10-CM

## 2017-09-06 RX ORDER — MELOXICAM 15 MG/1
TABLET ORAL
Qty: 90 TABLET | Refills: 3 | Status: SHIPPED | OUTPATIENT
Start: 2017-09-06 | End: 2018-05-09

## 2017-09-06 NOTE — TELEPHONE ENCOUNTER
meloxicam (MOBIC) 15 MG tablet      Last Written Prescription Date: 3/28/17  Last Quantity: 90, # refills: 1  Last Office Visit with [facility] or [facility] prescribing provider: 7/11/17  Next 5 appointments (look out 90 days)     Sep 13, 2017 11:00 AM CDT   Return Visit with JERRICA Gallo CNP   [facility] PHYSICIAN HEART AT [facility] ([facility])    [address] [place] [id number] [phone]                   Creatinine   Date Value Ref Range Status   05/02/2017 1.49 (H) 0.66 - 1.25 mg/dL Final     Lab Results   Component Value Date    AST 12 05/02/2017     Lab Results   Component Value Date    ALT 22 05/02/2017     BP Readings from Last 3 Encounters:   05/02/17 102/62   09/27/16 122/62   08/29/16 119/69

## 2017-09-11 ENCOUNTER — OFFICE VISIT (OUTPATIENT)
Dept: OTHER | Facility: OUTPATIENT CENTER | Age: 67
End: 2017-09-11

## 2017-09-11 VITALS
HEIGHT: 71 IN | HEART RATE: 77 BPM | WEIGHT: 293 LBS | BODY MASS INDEX: 41.02 KG/M2 | DIASTOLIC BLOOD PRESSURE: 88 MMHG | SYSTOLIC BLOOD PRESSURE: 133 MMHG

## 2017-09-11 DIAGNOSIS — F64.0 GENDER DYSPHORIA IN ADOLESCENT AND ADULT: Primary | ICD-10-CM

## 2017-09-11 RX ORDER — ESTRADIOL 0.1 MG/D
1 FILM, EXTENDED RELEASE TRANSDERMAL
Qty: 8 PATCH | Refills: 2 | Status: SHIPPED | OUTPATIENT
Start: 2017-09-11 | End: 2017-11-16

## 2017-09-11 ASSESSMENT — ENCOUNTER SYMPTOMS
VOMITING: 0
CHILLS: 0
SHORTNESS OF BREATH: 0
DYSPHORIC MOOD: 0
FEVER: 0
PALPITATIONS: 0
NERVOUS/ANXIOUS: 0
LIGHT-HEADEDNESS: 0
ABDOMINAL PAIN: 0
NUMBNESS: 0
CHEST TIGHTNESS: 0
HEADACHES: 0
POLYDIPSIA: 0
UNEXPECTED WEIGHT CHANGE: 0
FREQUENCY: 0
WEAKNESS: 0

## 2017-09-11 NOTE — PROGRESS NOTES
BOO Ulloa is a 67 year old individual that uses pronouns She/Her/Hers/Herself that presents today for follow up of:  feminizing hormone therapy.   Gender identity: female.   Started Hormone  therapy  7/2016  Continues on.    Spironlactone 100* mg daily      Other finasteride 5 mg  Any special concerns today?  Did echocardiogram, waiting for results--has appointment with cardiology Weds. Echo results appear to show stable aortic root dilation and mildly increased aortic stenosis.   On hormones?  YES +++ Shot day of the week? Not applicable-taking pills/patch/gel      Due for labs?  Yes      +++ Refills of meds needed?  Yes  Gender related body changes since last visit: none    Activity: Always busy   New health concerns since last visit:none; discussed weight gain, 1-3/day energy drinks--recommend, water with García   ---    Past Surgical History:   Procedure Laterality Date     COLONOSCOPY  2007     JOINT REPLACEMTN, KNEE RT/LT  2006    left       Patient Active Problem List   Diagnosis     large compression fracture     Gouty arthropathy     Mental or behavioral problem     Generalized osteoarthrosis, unspecified site     Disorder of bone and cartilage     Mixed hyperlipidemia     OA (osteoarthritis) of knee     HYPERLIPIDEMIA LDL GOAL <100     Gout, chronic     DDD (degenerative disc disease), lumbar     Alcoholism (H)     Advanced directives, counseling/discussion     Varicose veins of legs     Psoriasis     HTN, goal below 140/90     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     Family history of diabetes mellitus in first degree relative     Mitral valve disorder     Nonrheumatic aortic valve stenosis     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       Current Outpatient Prescriptions   Medication Sig Dispense Refill     meloxicam (MOBIC) 15 MG tablet TAKE ONE TABLET BY MOUTH EVERY DAY 90 tablet 3     spironolactone (ALDACTONE) 100 MG tablet Take 1 tablet (100 mg) by mouth daily 90 tablet 1     finasteride  (PROSCAR) 5 MG tablet Take 1 tablet (5 mg) by mouth daily 90 tablet 1     atenolol (TENORMIN) 50 MG tablet TAKE ONE TABLET BY MOUTH EVERY DAY 60 tablet 3     SM CALCIUM 600/VITAMIN D 600-400 MG-UNIT per tablet Take 1 tablet by mouth 2 times daily 60 tablet 3     lovastatin (MEVACOR) 20 MG tablet Take 1 tablet (20 mg) by mouth At Bedtime 90 tablet 3     Multiple Vitamin (DAILY-EVANGELISTA) TABS Take 1 tablet by mouth daily 90 tablet 3     FLUoxetine (PROZAC) 40 MG capsule Take 1 capsule (40 mg) by mouth daily Take One Capsule By Mouth Every Day 90 capsule 1     timolol (TIMOPTIC) 0.5 % ophthalmic solution 1 drop 2 times daily       latanoprost (XALATAN) 0.005 % ophthalmic solution 1 drop At Bedtime.       [DISCONTINUED] SM CALCIUM 600/VITAMIN D 600-400 MG-UNIT per tablet Take 1 tablet by mouth 2 times daily 180 tablet 1     order for DME Equipment being ordered: Sigvaris 232 Cotton Thigh High for Men 20-30 mmHg- TWO PAIR TAN COLOR 2 each 5     ORDER FOR DME Equipment being ordered:   BACK BRACE    MedSpec Back-n-Shape II Back Brace  Size XXXL with thermoplastic insert        1 each 0       History   Smoking Status     Never Smoker   Smokeless Tobacco     Never Used          Allergies   Allergen Reactions     Penicillin [Esters] Itching     he is not sure what kind of reaction he had     Heparin      he lives in a assisted living program and is not sure what type of allergies he really has. He quit drinking     Zosyn [Penicillins]      unsure of reaction, allergy is listed on his med sheet       Health Maintenance Due   Topic Date Due     AORTIC ANEURYSM SCREENING (SYSTEM ASSIGNED)  03/09/2015     PNEUMOCOCCAL (2 of 2 - PPSV23) 04/26/2017     ADVANCE DIRECTIVE PLANNING Q5 YRS  07/25/2017     Mental Health Tx Plan Q11 MOS  08/06/2017     INFLUENZA VACCINE (SYSTEM ASSIGNED)  09/01/2017     FALL RISK ASSESSMENT  09/27/2017         Problem, Medication and Allergy Lists were reviewed and are current..         Review of Systems:  "  Review of Systems   Constitutional: Negative for chills, fever and unexpected weight change.   Eyes: Negative for visual disturbance.   Respiratory: Negative for chest tightness and shortness of breath.    Cardiovascular: Negative for chest pain, palpitations and leg swelling.   Gastrointestinal: Negative for abdominal pain and vomiting.   Endocrine: Negative for polydipsia and polyuria.   Genitourinary: Negative for frequency.   Neurological: Negative for weakness, light-headedness, numbness and headaches.   Psychiatric/Behavioral: Negative for dysphoric mood and suicidal ideas. The patient is not nervous/anxious.             Physical Exam:     Vitals:    09/11/17 0951   BP: 133/88   Pulse: 77   Weight: 134.4 kg (296 lb 6.4 oz)   Height: 1.803 m (5' 11\")     BMI= Body mass index is 41.34 kg/(m^2).   Wt Readings from Last 10 Encounters:   09/11/17 134.4 kg (296 lb 6.4 oz)   06/27/17 132.5 kg (292 lb)   05/02/17 131.1 kg (289 lb)   04/26/17 131.1 kg (289 lb)   02/06/17 124.7 kg (275 lb)   11/17/16 127.9 kg (282 lb)   09/27/16 127.5 kg (281 lb)   08/29/16 127.9 kg (282 lb)   07/25/16 131.5 kg (290 lb)   04/26/16 128.8 kg (284 lb)     Appearance: Female appearance and dress    GENERAL:: healthy, alert and no distress  RESP: lungs clear to auscultation - no rales, no rhonchi, no wheezes  CV: regular rates and rhythm, normal S1 S2, no S3 or S4 and no murmur, no click or rub -      Affect: Appropriate/mood-congruent           Labs:   Results from last visit:  Orders Only on 05/09/2017   Component Date Value Ref Range Status     Occult Blood Scn FIT 05/08/2017 Negative  NEG Final       Assessment and Plan   1. Gender dysphoria   2. Aortic stenosis  Overall doing well with supervised hormone therapy, in supported setting.   Had reviewed patient's cardiac status with cardiologist and no contraindication to transdermal estrogen at this time.   Will need to continue to monitor regularly   Start Vivelle dot 0.1 mg 2x/wk, " instructed in use, signs and symptoms VTE  Check BMP and testosterone in 1 month      Follow up:  Follow up in 3 months.  [facility]  Questions were elicited and answered.     Jluis Mane MD

## 2017-09-11 NOTE — PATIENT INSTRUCTIONS
1. To put on estrogen patch 2x/wk on lower abdomen  2. Continue spironolactone  3. To do labs in 1 month  4. To see Dr. Mane in 3 months

## 2017-09-11 NOTE — NURSING NOTE
"Chief Complaint   Patient presents with     RECHECK     TG       Vitals:    09/11/17 0951   BP: 133/88   Pulse: 77   Weight: 134.4 kg (296 lb 6.4 oz)   Height: 1.803 m (5' 11\")       Body mass index is 41.34 kg/(m^2).      Alexandra Long CMA    "

## 2017-09-11 NOTE — MR AVS SNAPSHOT
After Visit Summary   9/11/2017    Laila Perez    MRN: [id number]           Patient Information     Date Of Birth          1950        Visit Information        Provider Department      9/11/2017 10:00 AM Jluis Mane MD [facility]        Today's Diagnoses     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    -  1      Care Instructions    1. To put on estrogen patch 2x/wk on lower abdomen  2. Continue spironolactone  3. To do labs in 1 month  4. To see Dr. Mane in 3 months          Follow-ups after your visit        Follow-up notes from your care team     Return in about 3 months (around 12/11/2017).      Your next 10 appointments already scheduled     Sep 21, 2017  1:00 PM CDT   Return Visit with Tia Ibrahim MD   [facility] PHYSICIAN HEART AT [facility] ([facility])    [address] [place] [id number]   [phone]            Oct 30, 2017 10:00 AM CDT   INDIVIDUAL THERAPY with Estelle Gomez LP   [facility] ([facility] access [facility])    [address]  [address]  [facility] [address]   [phone]            Nov 15, 2017 12:00 PM CST   INDIVIDUAL THERAPY with Estelle Gomez LP   [facility] (Critical access hospital)    [address]  [address]  [facility] [address]   [phone]            Nov 29, 2017  1:00 PM CST   INDIVIDUAL THERAPY with Estelle Gomez LP   [facility] (Critical access hospital)    [address]  [address]  [facility] [address]   [phone]              Who to contact     Please call your clinic at [phone] to:    Ask questions about your health    Make or cancel appointments    Discuss your medicines    Learn about your test results    Speak to your doctor   If you have compliments or concerns about an experience at your clinic, or if you wish to file a complaint, please contact [facility] Patient  "Relations at [phone] or email us at [email or web address]         Additional Information About Your Visit        Orthocone Information     Orthocone is an electronic gateway that provides easy, online access to your medical records. With Orthocone, you can request a clinic appointment, read your test results, renew a prescription or communicate with your care team.     To sign up for Orthocone visit the website at [email or web address]   You will be asked to enter the access code listed below, as well as some personal information. Please follow the directions to create your username and password.     Your access code is: [email or web address]  Expires: 2017  3:08 PM     Your access code will  in 90 days. If you need help or a new code, please contact your [facility] or call [phone] for assistance.        Care EveryWhere ID     This is your Care EveryWhere ID. This could be used by other organizations to access your [facility] medical records  [id number]        Your Vitals Were     Pulse Height BMI (Body Mass Index)             77 1.803 m (5' 11\") 41.34 kg/m2          Blood Pressure from Last 3 Encounters:   17 133/88   17 123/72   17 102/62    Weight from Last 3 Encounters:   17 134.4 kg (296 lb 6.4 oz)   17 132.5 kg (292 lb)   17 131.1 kg (289 lb)              We Performed the Following     SCANNED MISC. LAB RESULTS          Today's Medication Changes          These changes are accurate as of: 17 11:59 PM.  If you have any questions, ask your nurse or doctor.               Start taking these medicines.        Dose/Directions    estradiol 0.1 MG/24HR BIW patch   Commonly known as:  VIVELLE-DOT   Used for:  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   Started by:  Jluis Mane MD        Dose:  1 patch   Place 1 patch onto the skin twice a week   Quantity:  8 patch   Refills:  2         These medicines have changed or " have updated prescriptions.        Dose/Directions    SM CALCIUM 600/VITAMIN D 600-400 MG-UNIT per tablet   This may have changed:  Another medication with the same name was removed. Continue taking this medication, and follow the directions you see here.   Used for:  Hyperlipidemia LDL goal <100   Generic drug:  calcium-vitamin D   Changed by:  Delmis Simons APRN CNP        Take 1 tablet by mouth 2 times daily   Quantity:  60 tablet   Refills:  3         Stop taking these medicines if you haven't already. Please contact your care team if you have questions.     multivitamin, therapeutic with minerals Tabs tablet   Stopped by:  Jluis Mane MD                Where to get your medicines      These medications were sent to [facility] [address]  [address] [facility] [address]    Hours:  test rx sent successfully  2/8/05  kr Phone:  [phone]     estradiol 0.1 MG/24HR BIW patch                Primary Care Provider Office Phone # Fax #    JERRICA Salcedo [facility] [phone] [address] [place] [address]        Equal Access to Services     [facility]: Hadii aad ku hadasho Soomaali, waaxda luqadaha, qaybta kaalmada adeegyada, waxay shanel castillo . So [facility] [phone].    ATENCIÓN: Si habla español, tiene a hernandez disposición servicios gratuitos de asistencia lingüística. [facility] [phone].    We comply with applicable federal civil rights laws and [place] laws. We do not discriminate on the basis of race, color, national origin, age, disability sex, sexual orientation or gender identity.            Thank you!     Thank you for choosing [facility]  for your care. Our goal is always to provide you with excellent care. Hearing back from our patients is one way we can continue to improve our services. Please take a few minutes to complete the written survey that you may receive  in the mail after your visit with us. Thank you!             Your Updated Medication List - Protect others around you: Learn how to safely use, store and throw away your medicines at www.disposemymeds.org.          This list is accurate as of: 9/11/17 11:59 PM.  Always use your most recent med list.                   Brand Name Dispense Instructions for use Diagnosis    atenolol 50 MG tablet    TENORMIN    60 tablet    TAKE ONE TABLET BY MOUTH EVERY DAY    Essential hypertension with goal blood pressure less than 140/90       DAILY-EVANGELISTA Tabs     90 tablet    Take 1 tablet by mouth daily    Nutritional deficiency       estradiol 0.1 MG/24HR BIW patch    VIVELLE-DOT    8 patch    Place 1 patch onto the skin twice a week    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       finasteride 5 MG tablet    PROSCAR    90 tablet    Take 1 tablet (5 mg) by mouth daily    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       FLUoxetine 40 MG capsule    PROzac    90 capsule    Take 1 capsule (40 mg) by mouth daily Take One Capsule By Mouth Every Day    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        lovastatin 20 MG tablet    MEVACOR    90 tablet    Take 1 tablet (20 mg) by mouth At Bedtime    Hyperlipidemia LDL goal <100       meloxicam 15 MG tablet    MOBIC    90 tablet    TAKE ONE TABLET BY MOUTH EVERY DAY    Primary osteoarthritis, unspecified site       order for DME     1 each    Equipment being ordered:  BACK BRACE  MedSpec Back-n-Shape II Back Brace  Size XXXL with thermoplastic insert    DDD (degenerative disc disease), lumbar, Pathologic fracture of vertebrae       order for DME     2 each    Equipment being ordered: Sigvaris 232 Cotton Thigh High for Men 20-30 mmHg- TWO PAIR TAN COLOR    Varicose veins of legs       SM CALCIUM 600/VITAMIN D 600-400 MG-UNIT per tablet   Generic drug:  calcium-vitamin D     60 tablet    Take 1 tablet by mouth 2 times daily     Hyperlipidemia LDL goal <100       spironolactone 100 MG tablet    ALDACTONE    90 tablet    Take 1 tablet (100 mg) by mouth daily    Essential hypertension with goal blood pressure less than 140/90       timolol 0.5 % ophthalmic solution    TIMOPTIC     1 drop 2 times daily

## 2017-10-02 ENCOUNTER — TRANSFERRED RECORDS (OUTPATIENT)
Dept: HEALTH INFORMATION MANAGEMENT | Facility: CLINIC | Age: 67
End: 2017-10-02

## 2017-10-30 ENCOUNTER — OFFICE VISIT (OUTPATIENT)
Dept: OTHER | Facility: OUTPATIENT CENTER | Age: 67
End: 2017-10-30

## 2017-10-30 DIAGNOSIS — F33.41 RECURRENT MAJOR DEPRESSIVE DISORDER, IN PARTIAL REMISSION (H): ICD-10-CM

## 2017-10-30 DIAGNOSIS — F64.0 GENDER DYSPHORIA IN ADOLESCENT AND ADULT: Primary | ICD-10-CM

## 2017-10-30 ASSESSMENT — ANXIETY QUESTIONNAIRES
3. WORRYING TOO MUCH ABOUT DIFFERENT THINGS: NOT AT ALL
6. BECOMING EASILY ANNOYED OR IRRITABLE: SEVERAL DAYS
1. FEELING NERVOUS, ANXIOUS, OR ON EDGE: SEVERAL DAYS
5. BEING SO RESTLESS THAT IT IS HARD TO SIT STILL: SEVERAL DAYS
7. FEELING AFRAID AS IF SOMETHING AWFUL MIGHT HAPPEN: NOT AT ALL
2. NOT BEING ABLE TO STOP OR CONTROL WORRYING: NOT AT ALL
GAD7 TOTAL SCORE: 4

## 2017-10-30 ASSESSMENT — PATIENT HEALTH QUESTIONNAIRE - PHQ9: 5. POOR APPETITE OR OVEREATING: SEVERAL DAYS

## 2017-10-30 NOTE — PROGRESS NOTES
[facility]  [address] [place] [address] [address]  [place] [address]                                                                                Phone: [phone]                                                                                  Fax: [phone]                                                                    [email or web address]    ANNUAL UPDATE: Diagnostic Assessment Interview     Client Name: Laila Perez  YOB: 1950  67 year old  MRN:  [id number]  Gender/Gender Identity: male/female  Treating Provider: Estelle Gomez PsyD, LP  Program:  TG  Type of Session: Assessment  Present in Session: client and ILS staff  Number of Minutes:  60    Date of Service: 10/30/17     Updated Presenting Problem and Goals:  Client shared she continues to experience distress about gender and secondary sex characteristics. She has started estrogen and is noticing a positive difference. She is still distressed about having male genitalia.     Updated Mental Health History:   Client endorsed minimal symptoms of depression and anxiety. She is prescribed 40 mg of fluoxetine daily which is managed by her primary physician. She has been on medication for a long time. She has not had any significant symptoms of depression with in the past year. She had some suicide attempts in the past--she states this was about her distress with living as a male. These symptoms have reduced as she has been able to dress and live as female. Her self-report questionnaires are consistent with her experiencing minimal symptoms of depression and anxiety.    Updated Substance Use:   Client had a lengthy history of alcoholism. She has been sober for over 7 years. She does not experience any significant preoccupation or urges to drink alcohol. She did not abuse any other drugs. She did complete treatment more than 1 time.    Updated Medical History:   She has numerous ongoing  medical issues.  These include high blood pressure, arthritis  There are not any new medical issues. She has a primary care physician who manages her health. She is compliant with medications. She is not sexual and denies any sexual concerns.  She is prescribed medications to assist with changing secondary sex characteristics. Client is interested in surgery to remove male genitalia. We are in the process of getting her a consult for an orchiectomy.    Updated Family History:   Her family situation is she has 2 adult children. Her older son does not interact with her because of her gender change. She has regular contact by phone and in-person with her younger son. She has interaction with his children as well. He is accepting of her gender identity. She has no other family.    Updated Current Significant Relationship/Partner:  Client has been  for many years. She is not interested in dating or having a sexual relationship. She shared she is content in her life living as a women.     Updated Educational History:  There are no updates to her educational history.    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    Updated Legal Issues:  Client's probation ended during the past year. This was related to a sex offense that occurred many years ago.  ________________________________________________________________  CONCLUSIONS    Updated Strengths and Liabilities:   Her strengths are positive, open attitude. Her limitations are her low intellectual functioning.    Updated Symptoms:    See updated PHQ-9, DESTINY-7, CAGE, and Safety Screening    Updated Mental Status:      Mental Status:   Appearance:  Casually dressed and Well groomed  Behavior/relationship to examiner/demeanor:  Cooperative and Engaged  Orientation: Oriented to person, place, time and situation  Speech Rate:  Normal  Speech Spontaneity:  Normal  Mood:  pleasant and  unremarkable  Affect:  Appropriate/mood-congruent  Thought Process (Associations):  Logical  Thought Content:  no evidence of suicidal or homicidal ideation  Abnormal Perception:  None  Attention/Concentration:  Fair  Language:  Intact  Insight:  Adequate  Judgment:  Fair    Motor activity/EPS:  Normal      Updated DSM-5 Diagnoses:  1. 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    2. Recurrent major depressive disorder, in partial remission (H)        Conclusions/Recommendations/Initial Treatment Goals:   The client is a 67 year old transgender female assigned male at birth who identifies as female. Client has a lengthy history of gender dysphoria that was not treated for many years. She also has a history of alcoholism and depression. These were at least partially linked to her distress about gender. She lived in a structured setting for people with lower intellectual functioning and mental health issues. She lived in an all male home and was not allowed to outwardly express her female gender identity. This changed with in the past couple of years and now she lives in her own unit with support. She has made progress with meeting her goals of being on female hormones. She is working toward the goal of having gender confirming surgery. It is recommended that she continue with individual therapy to address gender dysphoria. She will also continue with working with medical doctor to prescribe and monitor her hormone treatment. It would benefit client to participate in a transgender group, but she is currently not able to travel the distance to get to a group.    Estelle Gomez PsyD, LP

## 2017-10-30 NOTE — MR AVS SNAPSHOT
After Visit Summary   10/30/2017    Laila Perez    MRN: [id number]           Patient Information     Date Of Birth          1950        Visit Information        Provider Department      10/30/2017 10:00 AM Estelle Gomez LP [facility]        Today's Diagnoses     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    -  1    Recurrent major depressive disorder, in partial remission (H)           Follow-ups after your visit        Your next 10 appointments already scheduled     Nov 15, 2017 12:00 PM CST   INDIVIDUAL THERAPY with Estelle Gomez LP   [facility] ([facility])    [address]  Mail [address] 7521  [facility] [address]   [phone]            Nov 29, 2017  1:00 PM CST   INDIVIDUAL THERAPY with Estelle Gomez LP   [facility] ([facility])    [address]  [address] 75[address]  [facility] [address]   [phone]            Dec 11, 2017  3:30 PM CST   INDIVIDUAL THERAPY with Estelle Gomez LP   [facility] ([facility])    [address]  Mail [address] 7521  [facility] [address]   [phone]            Dec 18, 2017 10:20 AM CST   OFFICE VISIT with Jluis Mane MD   [facility] ([facility])    [address]  Mail [address] 7521  [facility] [address]   [phone]              Who to contact     Please call your clinic at [phone] to:    Ask questions about your health    Make or cancel appointments    Discuss your medicines    Learn about your test results    Speak to your doctor   If you have compliments or concerns about an experience at your clinic, or if you wish to file a complaint, please contact [facility] Patient Relations at [phone] or email us at [email or web address]         Additional Information About Your Visit        MyChart Information     Yeapoo is an electronic gateway that  provides easy, online access to your medical records. With Evolita, you can request a clinic appointment, read your test results, renew a prescription or communicate with your care team.     To sign up for Evolita visit the website at [email or web address]   You will be asked to enter the access code listed below, as well as some personal information. Please follow the directions to create your username and password.     Your access code is: [email or web address]  Expires: 2017  3:08 PM     Your access code will  in 90 days. If you need help or a new code, please contact your [facility] or call [phone] for assistance.        Care EveryWhere ID     This is your Care EveryWhere ID. This could be used by other organizations to access your [facility] medical records  [id number]         Blood Pressure from Last 3 Encounters:   17 133/88   17 123/72   17 102/62    Weight from Last 3 Encounters:   17 134.4 kg (296 lb 6.4 oz)   17 132.5 kg (292 lb)   17 131.1 kg (289 lb)              We Performed the Following     Diagnostic Assessment (complete) [10735]     Mental Health Tx Plan Scan (HIM Scan)        Primary Care Provider Office Phone # Fax #    Delmis García JERRICA [facility] [phone]       [address] [place] [address]        Equal Access to Services     GIGI BLACKWELL : Hadii aad ku hadasho Soomaali, waaxda luqadaha, qaybta kaalmada adeegyada, zulma castillo . So [facility] [phone].    ATENCIÓN: Si habla español, tiene a hernandez disposición servicios gratuitos de asistencia lingüística. Llame al [phone].    We comply with applicable federal civil rights laws and [place] laws. We do not discriminate on the basis of race, color, national origin, age, disability, sex, sexual orientation, or gender identity.            Thank you!     Thank you for choosing [facility]  for your care.  Our goal is always to provide you with excellent care. Hearing back from our patients is one way we can continue to improve our services. Please take a few minutes to complete the written survey that you may receive in the mail after your visit with us. Thank you!             Your Updated Medication List - Protect others around you: Learn how to safely use, store and throw away your medicines at www.disposemymeds.org.          This list is accurate as of: 10/30/17 11:45 AM.  Always use your most recent med list.                   Brand Name Dispense Instructions for use Diagnosis    atenolol 50 MG tablet    TENORMIN    60 tablet    TAKE ONE TABLET BY MOUTH EVERY DAY    Essential hypertension with goal blood pressure less than 140/90       DAILY-EVANGELISTA Tabs     90 tablet    Take 1 tablet by mouth daily    Nutritional deficiency       estradiol 0.1 MG/24HR BIW patch    VIVELLE-DOT    8 patch    Place 1 patch onto the skin twice a week    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       finasteride 5 MG tablet    PROSCAR    90 tablet    Take 1 tablet (5 mg) by mouth daily    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       FLUoxetine 40 MG capsule    PROzac    90 capsule    Take 1 capsule (40 mg) by mouth daily Take One Capsule By Mouth Every Day    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        lovastatin 20 MG tablet    MEVACOR    90 tablet    Take 1 tablet (20 mg) by mouth At Bedtime    Hyperlipidemia LDL goal <100       meloxicam 15 MG tablet    MOBIC    90 tablet    TAKE ONE TABLET BY MOUTH EVERY DAY    Primary osteoarthritis, unspecified site       order for DME     1 each    Equipment being ordered:  BACK BRACE  MedSpec Back-n-Shape II Back Brace  Size XXXL with thermoplastic insert    DDD (degenerative disc disease), lumbar, Pathologic fracture of vertebrae       order for DME     2 each    Equipment being ordered: Sigvaris 232 Cotton Thigh  High for Men 20-30 mmHg- TWO PAIR TAN COLOR    Varicose veins of legs       SM CALCIUM 600/VITAMIN D 600-400 MG-UNIT per tablet   Generic drug:  calcium-vitamin D     60 tablet    Take 1 tablet by mouth 2 times daily    Hyperlipidemia LDL goal <100       spironolactone 100 MG tablet    ALDACTONE    90 tablet    Take 1 tablet (100 mg) by mouth daily    Essential hypertension with goal blood pressure less than 140/90       timolol 0.5 % ophthalmic solution    TIMOPTIC     1 drop 2 times daily

## 2017-11-01 LAB
ANION GAP SERPL CALCULATED.3IONS-SCNC: 4 MMOL/L (ref 3–14)
BUN SERPL-MCNC: 18 MG/DL (ref 7–30)
CALCIUM SERPL-MCNC: 9.5 MG/DL (ref 8.5–10.1)
CHLORIDE SERPL-SCNC: 101 MMOL/L (ref 94–109)
CO2 SERPL-SCNC: 28 MMOL/L (ref 20–32)
CREAT SERPL-MCNC: 1.18 MG/DL (ref 0.66–1.25)
GFR SERPL CREATININE-BSD FRML MDRD: 61 ML/MIN/1.7M2
GLUCOSE SERPL-MCNC: 92 MG/DL (ref 70–99)
POTASSIUM SERPL-SCNC: 5.2 MMOL/L (ref 3.4–5.3)
SODIUM SERPL-SCNC: 133 MMOL/L (ref 133–144)

## 2017-11-01 PROCEDURE — 80048 BASIC METABOLIC PNL TOTAL CA: CPT | Performed by: FAMILY MEDICINE

## 2017-11-01 PROCEDURE — 84270 ASSAY OF SEX HORMONE GLOBUL: CPT | Performed by: FAMILY MEDICINE

## 2017-11-01 PROCEDURE — 36415 COLL VENOUS BLD VENIPUNCTURE: CPT | Performed by: FAMILY MEDICINE

## 2017-11-01 PROCEDURE — 84403 ASSAY OF TOTAL TESTOSTERONE: CPT | Performed by: FAMILY MEDICINE

## 2017-11-01 ASSESSMENT — PATIENT HEALTH QUESTIONNAIRE - PHQ9: SUM OF ALL RESPONSES TO PHQ QUESTIONS 1-9: 1

## 2017-11-02 ASSESSMENT — ANXIETY QUESTIONNAIRES: GAD7 TOTAL SCORE: 4

## 2017-11-03 LAB
SHBG SERPL-SCNC: 55 NMOL/L (ref 11–80)
TESTOST FREE SERPL-MCNC: 1.14 NG/DL (ref 4.7–24.4)
TESTOST SERPL-MCNC: 88 NG/DL (ref 240–950)

## 2017-11-15 ENCOUNTER — OFFICE VISIT (OUTPATIENT)
Dept: OTHER | Facility: OUTPATIENT CENTER | Age: 67
End: 2017-11-15

## 2017-11-15 DIAGNOSIS — F64.0 GENDER DYSPHORIA IN ADOLESCENT AND ADULT: Primary | ICD-10-CM

## 2017-11-15 DIAGNOSIS — F33.42 MAJOR DEPRESSIVE DISORDER, RECURRENT EPISODE, IN FULL REMISSION (H): ICD-10-CM

## 2017-11-15 NOTE — MR AVS SNAPSHOT
After Visit Summary   11/15/2017    Laila Perez    MRN: [id number]           Patient Information     Date Of Birth          1950        Visit Information        Provider Department      11/15/2017 12:00 PM [facility]        Today's Diagnoses     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    -  1    Major depressive disorder, recurrent episode, in full remission (H)           Follow-ups after your visit        Your next 10 appointments already scheduled     Nov 29, 2017  1:00 PM CST   INDIVIDUAL THERAPY with Estelle Gomez LP   [facility] ([facility])    [address]  Mail Code 7521  [facility] [address]   [phone]            Dec 04, 2017 10:00 AM CST   INDIVIDUAL THERAPY with Alia Rosa PsyD   [facility] ([facility])    [address]  Mail Code 7521  [facility] [address]   [phone]            Dec 11, 2017  3:30 PM CST   INDIVIDUAL THERAPY with Estelle Gomez LP   [facility] ([facility])    [address]  Mail [address] 7521  [facility] [address]   [phone]            Dec 18, 2017 10:20 AM CST   OFFICE VISIT with Jluis Mane MD   [facility] ([facility])    [address]  [address] 7521  [facility] [address]   [phone]              Who to contact     Please call your clinic at [phone] to:    Ask questions about your health    Make or cancel appointments    Discuss your medicines    Learn about your test results    Speak to your doctor   If you have compliments or concerns about an experience at your clinic, or if you wish to file a complaint, please contact [facility] Patient Relations at [phone] or email us at [email or web address]         Additional Information About Your Visit        MyChart Information     MyChart is an electronic  gateway that provides easy, online access to your medical records. With Examify, you can request a clinic appointment, read your test results, renew a prescription or communicate with your care team.     To sign up for Examify visit the website at [email or web address]   You will be asked to enter the access code listed below, as well as some personal information. Please follow the directions to create your username and password.     Your access code is: [email or web address]  Expires: 2017  2:08 PM     Your access code will  in 90 days. If you need help or a new code, please contact your [facility] or call [phone] for assistance.        Care EveryWhere ID     This is your Care EveryWhere ID. This could be used by other organizations to access your [facility] medical records  [id number]         Blood Pressure from Last 3 Encounters:   17 133/88   17 123/72   17 102/62    Weight from Last 3 Encounters:   17 134.4 kg (296 lb 6.4 oz)   17 132.5 kg (292 lb)   17 131.1 kg (289 lb)              We Performed the Following     Individual Psychotherapy (53+ min) [91419]        Primary Care Provider Office Phone # Fax #    JERRICA Salcedo [facility] [phone]       [address] [place] [address]        Equal Access to Services     GIGI BLACKWELL : Hadii aad ku hadasho Soomaali, waaxda luqadaha, qaybta kaalmada adeegyada, zulma ugarte haytim castillo . So [facility] [phone].    ATENCIÓN: Si habla español, tiene a hernandez disposición servicios gratuitos de asistencia lingüística. Llame al [phone].    We comply with applicable federal civil rights laws and [place] laws. We do not discriminate on the basis of race, color, national origin, age, disability, sex, sexual orientation, or gender identity.            Thank you!     Thank you for choosing [facility]  for your care. Our goal is always to provide  you with excellent care. Hearing back from our patients is one way we can continue to improve our services. Please take a few minutes to complete the written survey that you may receive in the mail after your visit with us. Thank you!             Your Updated Medication List - Protect others around you: Learn how to safely use, store and throw away your medicines at www.disposemymeds.org.          This list is accurate as of: 11/15/17 11:59 PM.  Always use your most recent med list.                   Brand Name Dispense Instructions for use Diagnosis    atenolol 50 MG tablet    TENORMIN    60 tablet    TAKE ONE TABLET BY MOUTH EVERY DAY    Essential hypertension with goal blood pressure less than 140/90       DAILY-EVANGELISTA Tabs     90 tablet    Take 1 tablet by mouth daily    Nutritional deficiency       estradiol 0.1 MG/24HR BIW patch    VIVELLE-DOT    8 patch    Place 1 patch onto the skin twice a week    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       finasteride 5 MG tablet    PROSCAR    90 tablet    Take 1 tablet (5 mg) by mouth daily    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       FLUoxetine 40 MG capsule    PROzac    90 capsule    Take 1 capsule (40 mg) by mouth daily Take One Capsule By Mouth Every Day    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        lovastatin 20 MG tablet    MEVACOR    90 tablet    Take 1 tablet (20 mg) by mouth At Bedtime    Hyperlipidemia LDL goal <100       meloxicam 15 MG tablet    MOBIC    90 tablet    TAKE ONE TABLET BY MOUTH EVERY DAY    Primary osteoarthritis, unspecified site       order for DME     1 each    Equipment being ordered:  BACK BRACE  MedSpec Back-n-Shape II Back Brace  Size XXXL with thermoplastic insert    DDD (degenerative disc disease), lumbar, Pathologic fracture of vertebrae       order for DME     2 each    Equipment being ordered: Sigvaris 232 Cotton Thigh High for Men 20-30 mmHg- TWO PAIR  BRENNER COLOR    Varicose veins of legs       SM CALCIUM 600/VITAMIN D 600-400 MG-UNIT per tablet   Generic drug:  calcium-vitamin D     60 tablet    Take 1 tablet by mouth 2 times daily    Hyperlipidemia LDL goal <100       spironolactone 100 MG tablet    ALDACTONE    90 tablet    Take 1 tablet (100 mg) by mouth daily    Essential hypertension with goal blood pressure less than 140/90       timolol 0.5 % ophthalmic solution    TIMOPTIC     1 drop 2 times daily

## 2017-11-15 NOTE — LETTER
November 10, 2017    Dr. Edgardo Weston  Dept. of Urology  [address], [place] [address]    Phone: [phone]  Fax: [phone]    Re: Laila Perez    : 1950    Primary letter of support for gender affirmation surgery    Dr. Weston    Laila Perez has received services through the [facility] since 16. Laila Perez is a 67 year old transgender person who initially sought services to explore her gender identity and making decisions about medical interventions to address her gender dysphoria. Her most recent appointment for individual psychotherapy occurred on 17.    Laila Perez completed a diagnostic session, as well as about 25  psychotherapy appointments. Additionally, she completed a thorough psychological evaluation consisting of  a set of evaluative measures of gender dysphoria, psychosocial adjustment, sexual health, and overall psychological functioning.     Laila Perez has been diagnosed with gender dysphoria. She does meet criteria for Major depression, in remission and Alcohol dependence in full remission. She consistently takes prescribed medication for depression, Fluoxetine 40 mg daily. Her depression is in remission with the medication.  She has a history of alcohol dependence with maintaining consistent abstinence from alcohol for about 7 years. 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  She is well adjusted and thoughtful, and has approached her decision making for surgery with intention and care. Laila Perez s mental health concerns are stable and well controlled, and do not pose any barrier to her ability to consent and undergo gender-affirming surgery at this time.    Laila Perez currently meets the [employer] Association for Transgender Health (WPATH) Standards of Care  for surgical gender reassignment of gender dysphoric persons, as well as internal prerequisites for gender reassignment surgery. Surgery is considered a medically necessary treatment by the [facility] Standards of Care for transgender persons. On behalf of our staff, I support her decision to pursue medically necessary surgery at this time.     Laila Perez has been on feminizing hormones since 4/25/16. She has undergone significant and permanent changes such as legal name change and socially transitioning to live full-time in her affirmed gender since January 2014. Gender affirmation surgery will decrease her gender dysphoria and psychological distress and improve her overall psychological functioning.  In some cases, denial of access to this surgery can lead to increased isolation, decreased work performance, and decreased sexual and interpersonal functioning. Surgery is expected to increase comfort with self, and improve interpersonal, and sexual functioning.    Laila Perez has educated herself about the surgery and is aware of its risks and benefits. Given her concerns and the availability of this type of surgery, I support her choice of surgeon.    The [facility] staff support is based on the psychological indication for surgery and did not include a physical examination to assess medical contra-indications for the surgical procedure. This letter of support is valid for one year from the date of approval.      If you have any questions, or are in need of additional information, please do not hesitate to contact me.      Sincerely,        Estelle Gomez PsyD  Licensed Psychologist    [facility]   Department of Family Medicine and Community Health  [employer]

## 2017-11-16 DIAGNOSIS — F64.0 GENDER DYSPHORIA IN ADOLESCENT AND ADULT: ICD-10-CM

## 2017-11-16 RX ORDER — ESTRADIOL 0.1 MG/D
1 FILM, EXTENDED RELEASE TRANSDERMAL
Qty: 8 PATCH | Refills: 2 | Status: SHIPPED | OUTPATIENT
Start: 2017-11-16 | End: 2018-02-13

## 2017-11-16 NOTE — PROGRESS NOTES
[facility] -  Case Progress Note  Client Name: Laila Perez  YOB: 1950  MRN:  [id number]  Treating Provider: Estelle Gomez LP  Type of Session: Individual  Present in Session: client and support person  Number of Minutes:  55  Health Maintenance Summary - Mental Health Treatment Plan       Status Date      Mental Health Tx Plan Q11 MOS Next Due 9/30/2018      Done 10/30/2017      Done 9/6/2016             Date of Service: 11/15/17    Current Symptoms/Status:  Distress about gender and secondary sex characteristics, wants genitals removed, bothers her daily of moderate to severe intensity, distress with work situation.    Progress Toward Treatment Goals:   Client is making progress with preparing for surgery consult.    Intervention: Modality and Description:  Provided support and feedback. Used CBT to process gender dysphoria concerns. Worked on letter to surgeon during the session. Reviewed the letter and the meaning and intent of each part. Discussed the process of needing a secondary letter and to call for an appointment. The initial appt. Os a consultation regarding doing the surgery. Then the doctor will need to request authorization from insurance. Do not know how long this process will take. Client will have support person attend appointment with her. Client shared wanting all of her genitals removed reviewed again what an orchiectomy is.     Response to Intervention:  Client reported validation.    Assignment:  Look at information about surgery on the internet.      Diagnosis:  Encounter Diagnoses   Name Primary?     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 Yes     Major depressive disorder, recurrent episode, in full remission (H)            Plan / Need for Future Services:  Return for therapy in 4 weeks.      Estelle Gomez PsyD, LP

## 2017-11-29 ENCOUNTER — OFFICE VISIT (OUTPATIENT)
Dept: OTHER | Facility: OUTPATIENT CENTER | Age: 67
End: 2017-11-29
Payer: MEDICARE

## 2017-11-29 DIAGNOSIS — F32.5 MAJOR DEPRESSION IN COMPLETE REMISSION (H): ICD-10-CM

## 2017-11-29 DIAGNOSIS — F64.0 GENDER DYSPHORIA IN ADOLESCENT AND ADULT: Primary | ICD-10-CM

## 2017-11-29 NOTE — MR AVS SNAPSHOT
After Visit Summary   11/29/2017    Laila Perez    MRN: [id number]           Patient Information     Date Of Birth          1950        Visit Information        Provider Department      11/29/2017 1:00 PM Estelle Gomez LP [facility]        Today's Diagnoses     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    -  1    Major depression in complete remission (H)           Follow-ups after your visit        Your next 10 appointments already scheduled     Dec 04, 2017 10:00 AM CST   INDIVIDUAL THERAPY with Alia Rosa PsyD   [facility] ([facility])    [address]  Mail [address] 75[address]  [facility] [address]   [phone]            Dec 11, 2017  3:30 PM CST   INDIVIDUAL THERAPY with Estelle Gomez LP   [place] [facility] ([facility])    [address]  [address] 7521  [facility] [address]   [phone]            Dec 18, 2017 10:20 AM CST   OFFICE VISIT with Jluis Mane MD   [place] [facility] ([facility])    [address]  Mail [address] 7521  [facility] [address]   [phone]            Jan 24, 2018  9:00 AM CST   INDIVIDUAL THERAPY with Estelle Gomez LP   [place] [facility] ([facility])    [address]  Mail [address] 7521  [facility] [address]   [phone]              Who to contact     Please call your clinic at [phone] to:    Ask questions about your health    Make or cancel appointments    Discuss your medicines    Learn about your test results    Speak to your doctor   If you have compliments or concerns about an experience at your clinic, or if you wish to file a complaint, please contact [facility] Patient Relations at [phone] or email us at [email or web address]         Additional Information About Your Visit        [employer] Information     [employer] is an electronic gateway that provides easy,  online access to your medical records. With [employer], you can request a clinic appointment, read your test results, renew a prescription or communicate with your care team.     To sign up for [employer] visit the website at [email or web address]   You will be asked to enter the access code listed below, as well as some personal information. Please follow the directions to create your username and password.     Your access code is: [email or web address]  Expires: 2017  2:08 PM     Your access code will  in 90 days. If you need help or a new code, please contact your [facility] or call [phone] for assistance.        Care EveryWhere ID     This is your Care EveryWhere ID. This could be used by other organizations to access your [facility] medical records  [id number]         Blood Pressure from Last 3 Encounters:   17 133/88   17 123/72   17 102/62    Weight from Last 3 Encounters:   17 134.4 kg (296 lb 6.4 oz)   17 132.5 kg (292 lb)   17 131.1 kg (289 lb)              We Performed the Following     Individual Psychotherapy (38-52 min) [68544]        Primary Care Provider Office Phone # Fax #    JERRICA Salcedo [facility] [phone]       [address] [place] [address]        Equal Access to Services     [facility] : Hadii aad ku hadasho Soomaali, waaxda luqadaha, qaybta kaalmada adeegyada, zulma castillo . So [facility] [phone].    ATENCIÓN: Si habla español, tiene a hernandez disposición servicios gratuitos de asistencia lingüística. Llame al [phone].    We comply with applicable federal civil rights laws and [place] laws. We do not discriminate on the basis of race, color, national origin, age, disability, sex, sexual orientation, or gender identity.            Thank you!     Thank you for choosing [facility]  for your care. Our goal is always to provide you with excellent care.  Hearing back from our patients is one way we can continue to improve our services. Please take a few minutes to complete the written survey that you may receive in the mail after your visit with us. Thank you!             Your Updated Medication List - Protect others around you: Learn how to safely use, store and throw away your medicines at www.disposemymeds.org.          This list is accurate as of: 11/29/17 11:59 PM.  Always use your most recent med list.                   Brand Name Dispense Instructions for use Diagnosis    atenolol 50 MG tablet    TENORMIN    60 tablet    TAKE ONE TABLET BY MOUTH EVERY DAY    Essential hypertension with goal blood pressure less than 140/90       DAILY-EVANGELISTA Tabs     90 tablet    Take 1 tablet by mouth daily    Nutritional deficiency       estradiol 0.1 MG/24HR BIW patch    VIVELLE-DOT    8 patch    Place 1 patch onto the skin twice a week    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       finasteride 5 MG tablet    PROSCAR    90 tablet    Take 1 tablet (5 mg) by mouth daily    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       FLUoxetine 40 MG capsule    PROzac    90 capsule    Take 1 capsule (40 mg) by mouth daily Take One Capsule By Mouth Every Day    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        lovastatin 20 MG tablet    MEVACOR    90 tablet    Take 1 tablet (20 mg) by mouth At Bedtime    Hyperlipidemia LDL goal <100       meloxicam 15 MG tablet    MOBIC    90 tablet    TAKE ONE TABLET BY MOUTH EVERY DAY    Primary osteoarthritis, unspecified site       order for DME     1 each    Equipment being ordered:  BACK BRACE  MedSpec Back-n-Shape II Back Brace  Size XXXL with thermoplastic insert    DDD (degenerative disc disease), lumbar, Pathologic fracture of vertebrae       order for DME     2 each    Equipment being ordered: Sigvaris 232 Cotton Thigh High for Men 20-30 mmHg- TWO PAIR BRENNER COLOR    Varicose  veins of legs       SM CALCIUM 600/VITAMIN D 600-400 MG-UNIT per tablet   Generic drug:  calcium-vitamin D     60 tablet    Take 1 tablet by mouth 2 times daily    Hyperlipidemia LDL goal <100       spironolactone 100 MG tablet    ALDACTONE    90 tablet    Take 1 tablet (100 mg) by mouth daily    Essential hypertension with goal blood pressure less than 140/90       timolol 0.5 % ophthalmic solution    TIMOPTIC     1 drop 2 times daily

## 2017-12-04 ENCOUNTER — OFFICE VISIT (OUTPATIENT)
Dept: OTHER | Facility: OUTPATIENT CENTER | Age: 67
End: 2017-12-04
Payer: MEDICARE

## 2017-12-04 DIAGNOSIS — F64.0 GENDER DYSPHORIA IN ADOLESCENT AND ADULT: Primary | ICD-10-CM

## 2017-12-04 NOTE — MR AVS SNAPSHOT
After Visit Summary   12/4/2017    Laila Perez    MRN: [id number]           Patient Information     Date Of Birth          1950        Visit Information        Provider Department      12/4/2017 10:00 AM Alia Rosa PsyD [facility]        Today's Diagnoses     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    -  1       Follow-ups after your visit        Your next 10 appointments already scheduled     Dec 11, 2017  3:30 PM CST   INDIVIDUAL THERAPY with Estelle Gomez LP   [place] [facility] ([facility])    [address]  Mail [address] 7521  [facility] [address]   [phone]            Dec 18, 2017 10:20 AM CST   OFFICE VISIT with Jluis Mane MD   [place] [facility] ([facility])    [address]  Mail [address] 7521  [facility] [address]   [phone]            Jan 24, 2018  9:00 AM CST   INDIVIDUAL THERAPY with Estelle Gomez LP   [facility] ([facility])    [address]  Mail Code 7521  [facility] [address]   [phone]              Who to contact     Please call your clinic at [phone] to:    Ask questions about your health    Make or cancel appointments    Discuss your medicines    Learn about your test results    Speak to your doctor   If you have compliments or concerns about an experience at your clinic, or if you wish to file a complaint, please contact [facility] Patient Relations at [phone] or email us at [email or web address]         Additional Information About Your Visit        MyChart Information     [employer] is an electronic gateway that provides easy, online access to your medical records. With [employer] you can request a clinic appointment, read your test results, renew a prescription or communicate with your care team.     To sign up for [employer] visit the website at [email or web address]   You will be asked  to enter the access code listed below, as well as some personal information. Please follow the directions to create your username and password.     Your access code is: [email or web address]  Expires: 2017  2:08 PM     Your access code will  in 90 days. If you need help or a new code, please contact your [facility] or call [phone] for assistance.        Care EveryWhere ID     This is your Care EveryWhere ID. This could be used by other organizations to access your [place] medical records  [id number]         Blood Pressure from Last 3 Encounters:   17 133/88   17 123/72   17 102/62    Weight from Last 3 Encounters:   17 134.4 kg (296 lb 6.4 oz)   17 132.5 kg (292 lb)   17 131.1 kg (289 lb)              We Performed the Following     Individual Psychotherapy (53+ min) [[address]]        Primary Care Provider Office Phone # Fax #    JERRICA Salcedo [facility] [phone]       [address] [place] [address]        Equal Access to Services     [facility]: Hadii aad ku hadasho Solibradoali, waaxda luqadaha, qaybta kaalmada adeegyada, zulma dotyn arielle castillo . So [facility] [phone].    ATENCIÓN: Si habla español, tiene a hernandez disposición servicios gratuitos de asistencia lingüística. Llame al [phone].    We comply with applicable federal civil rights laws and [place] laws. We do not discriminate on the basis of race, color, national origin, age, disability, sex, sexual orientation, or gender identity.            Thank you!     Thank you for choosing [facility]  for your care. Our goal is always to provide you with excellent care. Hearing back from our patients is one way we can continue to improve our services. Please take a few minutes to complete the written survey that you may receive in the mail after your visit with us. Thank you!             Your Updated Medication List - Protect others  around you: Learn how to safely use, store and throw away your medicines at www.disposemymeds.org.          This list is accurate as of: 12/4/17 11:10 AM.  Always use your most recent med list.                   Brand Name Dispense Instructions for use Diagnosis    atenolol 50 MG tablet    TENORMIN    60 tablet    TAKE ONE TABLET BY MOUTH EVERY DAY    Essential hypertension with goal blood pressure less than 140/90       DAILY-EVANGELISTA Tabs     90 tablet    Take 1 tablet by mouth daily    Nutritional deficiency       estradiol 0.1 MG/24HR BIW patch    VIVELLE-DOT    8 patch    Place 1 patch onto the skin twice a week    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       finasteride 5 MG tablet    PROSCAR    90 tablet    Take 1 tablet (5 mg) by mouth daily    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       FLUoxetine 40 MG capsule    PROzac    90 capsule    Take 1 capsule (40 mg) by mouth daily Take One Capsule By Mouth Every Day    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        lovastatin 20 MG tablet    MEVACOR    90 tablet    Take 1 tablet (20 mg) by mouth At Bedtime    Hyperlipidemia LDL goal <100       meloxicam 15 MG tablet    MOBIC    90 tablet    TAKE ONE TABLET BY MOUTH EVERY DAY    Primary osteoarthritis, unspecified site       order for DME     1 each    Equipment being ordered:  BACK BRACE  MedSpec Back-n-Shape II Back Brace  Size XXXL with thermoplastic insert    DDD (degenerative disc disease), lumbar, Pathologic fracture of vertebrae       order for DME     2 each    Equipment being ordered: Sigvaris 232 Cotton Thigh High for Men 20-30 mmHg- TWO PAIR TAN COLOR    Varicose veins of legs       SM CALCIUM 600/VITAMIN D 600-400 MG-UNIT per tablet   Generic drug:  calcium-vitamin D     60 tablet    Take 1 tablet by mouth 2 times daily    Hyperlipidemia LDL goal <100       spironolactone 100 MG tablet    ALDACTONE    90 tablet    Take 1 tablet  (100 mg) by mouth daily    Essential hypertension with goal blood pressure less than 140/90       timolol 0.5 % ophthalmic solution    TIMOPTIC     1 drop 2 times daily

## 2017-12-04 NOTE — PROGRESS NOTES
[facility] -  Case Progress Note  Client Name: Laila Perez  YOB: 1950  MRN:  [id number]  Treating Provider: Estelle Gomez LP  Type of Session: Individual  Present in Session: client and support person  Number of Minutes:  50    Health Maintenance Summary - Mental Health Treatment Plan       Status Date      Mental Health Tx Plan Q11 MOS Next Due 9/30/2018      Done 10/30/2017      Done 9/6/2016             Date of Service: 11/29/17    Current Symptoms/Status:  Distress about gender and secondary sex characteristics, wants genitals removed, bothers her daily of moderate to severe intensity, distress with work situation.    Progress Toward Treatment Goals:   Client is making progress gaining knowledge about surgery.    Intervention: Modality and Description:  Provided support and feedback. Used CBT to process gender dysphoria concerns. She shared she has not made an appointment yet. She is going to wait for the secondary letter. Told her is to was ok to call and schedule. Also assisted client in starting a list of questions for the surgeon. She asked if she would look like other females and reviewed what an orchiectomy is. Client shared her mood has been stable. No depression--she feels good and is engaged with her life.    Response to Intervention:  Client reported validation.    Assignment:  Schedule appointment with surgeon.      Diagnosis:  Encounter Diagnoses   Name Primary?     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 Yes     Major depression in complete remission (H)            Plan / Need for Future Services:  Return for therapy in 4 weeks.      Estelle Gomez PsyD, LP

## 2017-12-04 NOTE — PROGRESS NOTES
[facility] -  Case Progress Note  Client Name: Laila Perez  YOB: 1950  MRN:  [id number]  Treating Provider: Alia Rosa PsyD, LP  Type of Session: Individual  Present in Session: client; support person  Number of Minutes:  55    Health Maintenance Summary - Mental Health Treatment Plan       Status Date      Mental Health Tx Plan Q11 MOS Next Due 9/30/2018      Done 10/30/2017      Done 9/6/2016             Date of Service: 12/04/17    Current Symptoms/Status:  Distress about gender and secondary sex characteristics, wants genitals removed, bothers her daily of moderate to severe intensity, distress with work situation.    Progress Toward Treatment Goals:   Client is working towards goals of [facility].    Intervention: Modality and Description:  Supportive and exploratory approach to assessing client's current mental health stability and degree to which client meets [facility] requirements for gender confirmation surgery. Client reported that her goal for seeing this provider was to obtain a secondary letter of support for gender confirmation surgery. Client reported that as a part of the [facility] Standards of Care she had already obtained a primary letter of support from her primary therapist (Mayte Gomez PsyD, LP). As a part of this assessment, took time to gather information regarding the client's gender identity (i.e., history of gender dysphoria, coming out process, social and medical transition, and services received around gender and transition), social functioning and support network, mental health history and current symptoms, medical history, and financial stability. Client appeared open and honest about her history as noted in her willingness to share struggles as well as triumphs in her life. She reported an active social life; noting participation in various social groups, close and supportive friendships, and affirming and supportive family relationships. Client shared  that she has received a primarily positive response from friends and some family around her transition. She shared that she is financially stable; noting working 2 days per week and getting SSI benefits. Client shared history of social isolation and depressed feelings, and alocholism prior to socially transitioning. Client is  taking psychiatric medication to treat depression (Prozac) . Client reported that's he has been receiving medical care related to HRT through Jluis Mane.    Took time to collaboratively write secondary letter of support for gender confirmation surgery (see scanned and attached letter). Client was actively involved in the process.      Response to Intervention:  Client reported validation.    Assignment:  Schedule appointment with surgeon.      Diagnosis:  No diagnosis found.        Plan / Need for Future Services:  Return for therapy in 4 weeks with primary therapist.      Alia Rosa PsyD, LP

## 2017-12-11 ENCOUNTER — OFFICE VISIT (OUTPATIENT)
Dept: OTHER | Facility: OUTPATIENT CENTER | Age: 67
End: 2017-12-11
Payer: MEDICARE

## 2017-12-11 DIAGNOSIS — F32.5 MAJOR DEPRESSION IN COMPLETE REMISSION (H): ICD-10-CM

## 2017-12-11 DIAGNOSIS — F64.0 GENDER DYSPHORIA IN ADOLESCENT AND ADULT: Primary | ICD-10-CM

## 2017-12-11 NOTE — PROGRESS NOTES
[facility] -  Case Progress Note  Client Name: Laila Perez  YOB: 1950  MRN:  [id number]  Treating Provider: Estelle Gomez LP  Type of Session: Individual  Present in Session: client and support person  Number of Minutes:  50    Health Maintenance Summary - Mental Health Treatment Plan       Status Date      Mental Health Tx Plan Q11 MOS Next Due 9/30/2018      Done 10/30/2017      Done 9/6/2016             Date of Service: 12/11/17    Current Symptoms/Status:  Distress about gender and secondary sex characteristics, wants genitals removed, bothers her daily of moderate to severe intensity, distress with work situation.    Progress Toward Treatment Goals:   Client is making progress with getting surgery appt., preparing questions for the surgeon    Intervention: Modality and Description:  Provided support and feedback. Used CBT to process gender dysphoria concerns. She shared she met with other therapist so she has her secondary letter. She made the appointment with the surgeon for January 10. We discussed the surgery in general and review her list of questions. Client shared how good she is feeling with living as a female and getting hormones. She will see me again after her appointment with the surgeon.    Response to Intervention:  Client reported validation.    Assignment:  Schedule appointment with surgeon.      Diagnosis:  Encounter Diagnoses   Name Primary?     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 Yes     Major depression in complete remission (H)            Plan / Need for Future Services:  Return for therapy in 4 weeks.      Estelle Gomez PsyD, LP

## 2017-12-11 NOTE — MR AVS SNAPSHOT
After Visit Summary   12/11/2017    Laila Perez    MRN: [id number]           Patient Information     Date Of Birth          1950        Visit Information        Provider Department      12/11/2017 3:30 PM Est[facility] LP [facility]        Today's Diagnoses     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    -  1    Major depression in complete remission (H)           Follow-ups after your visit        Your next 10 appointments already scheduled     Dec 18, 2017 10:20 AM CST   OFFICE VISIT with Jluis Mane MD   [facility] ([facility])    [address]  Mail Code 7521  [facility] [address]   [phone]            Enmanuel 10, 2018 11:30 AM CST   New Visit with Abhilash Weston MD   [facility] ([facility]    [address] [place] [address] [id number] [phone]            Jan 24, 2018  9:00 AM CST   INDIVIDUAL THERAPY with Estelle Gomez LP   [facility] ([facility])    [address]  Mail Code 7521  [facility] [address]   [phone]              Who to contact     Please call your clinic at [phone] to:    Ask questions about your health    Make or cancel appointments    Discuss your medicines    Learn about your test results    Speak to your doctor   If you have compliments or concerns about an experience at your clinic, or if you wish to file a complaint, please contact [facility] Patient Relations at [phone] or email us at [email or web address]         Additional Information About Your Visit        MyChart Information     [employer] is an electronic gateway that provides easy, online access to your medical records. With [employer], you can request a clinic appointment, read your test results, renew a prescription or communicate with your care team.     To sign up for [employer] visit the website at  [email or web address]   You will be asked to enter the access code listed below, as well as some personal information. Please follow the directions to create your username and password.     Your access code is: [email or web address]  Expires: 2017  2:08 PM     Your access code will  in 90 days. If you need help or a new code, please contact your [facility] or call [phone] for assistance.        Care EveryWhere ID     This is your Care EveryWhere ID. This could be used by other organizations to access your [place] medical records  [id number]         Blood Pressure from Last 3 Encounters:   17 133/88   17 123/72   17 102/62    Weight from Last 3 Encounters:   17 134.4 kg (296 lb 6.4 oz)   17 132.5 kg (292 lb)   17 131.1 kg (289 lb)              We Performed the Following     Individual Psychotherapy (38-52 min) [[id number]]        Primary Care Provider Office Phone # Fax #    Delmis RaquelJERRICA Gray [facility] [phone]       [address] [place] [address]        Equal Access to Services     SUSU BLACKWELL : Hadii david ku hadasho Soomaali, waaxda luqadaha, qaybta kaalmada adeegyada, zulma castillo . So [facility] [phone].    ATENCIÓN: Si habla español, tiene a hernandez disposición servicios gratuitos de asistencia lingüística. Llame al [phone].    We comply with applicable federal civil rights laws and [place] laws. We do not discriminate on the basis of race, color, national origin, age, disability, sex, sexual orientation, or gender identity.            Thank you!     Thank you for choosing [facility]  for your care. Our goal is always to provide you with excellent care. Hearing back from our patients is one way we can continue to improve our services. Please take a few minutes to complete the written survey that you may receive in the mail after your visit with us. Thank you!              Your Updated Medication List - Protect others around you: Learn how to safely use, store and throw away your medicines at www.disposemymeds.org.          This list is accurate as of: 12/11/17  4:19 PM.  Always use your most recent med list.                   Brand Name Dispense Instructions for use Diagnosis    atenolol 50 MG tablet    TENORMIN    60 tablet    TAKE ONE TABLET BY MOUTH EVERY DAY    Essential hypertension with goal blood pressure less than 140/90       DAILY-EVANGELISTA Tabs     90 tablet    Take 1 tablet by mouth daily    Nutritional deficiency       estradiol 0.1 MG/24HR BIW patch    VIVELLE-DOT    8 patch    Place 1 patch onto the skin twice a week    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       finasteride 5 MG tablet    PROSCAR    90 tablet    Take 1 tablet (5 mg) by mouth daily    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       FLUoxetine 40 MG capsule    PROzac    90 capsule    Take 1 capsule (40 mg) by mouth daily Take One Capsule By Mouth Every Day    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        lovastatin 20 MG tablet    MEVACOR    90 tablet    Take 1 tablet (20 mg) by mouth At Bedtime    Hyperlipidemia LDL goal <100       meloxicam 15 MG tablet    MOBIC    90 tablet    TAKE ONE TABLET BY MOUTH EVERY DAY    Primary osteoarthritis, unspecified site       * order for DME     1 each    Equipment being ordered:  BACK BRACE  MedSpec Back-n-Shape II Back Brace  Size XXXL with thermoplastic insert    DDD (degenerative disc disease), lumbar, Pathologic fracture of vertebrae       * order for DME     2 each    Equipment being ordered: Sigvaris 232 Cotton Thigh High for Men 20-30 mmHg- TWO PAIR TAN COLOR    Varicose veins of legs       SM CALCIUM 600/VITAMIN D 600-400 MG-UNIT per tablet   Generic drug:  calcium-vitamin D     60 tablet    Take 1 tablet by mouth 2 times daily    Hyperlipidemia LDL goal <100       spironolactone 100  MG tablet    ALDACTONE    90 tablet    Take 1 tablet (100 mg) by mouth daily    Essential hypertension with goal blood pressure less than 140/90       timolol 0.5 % ophthalmic solution    TIMOPTIC     1 drop 2 times daily        * Notice:  This list has 2 medication(s) that are the same as other medications prescribed for you. Read the directions carefully, and ask your doctor or other care provider to review them with you.

## 2017-12-13 DIAGNOSIS — F64.0 GENDER DYSPHORIA IN ADULT: Primary | ICD-10-CM

## 2017-12-18 ENCOUNTER — OFFICE VISIT (OUTPATIENT)
Dept: OTHER | Facility: OUTPATIENT CENTER | Age: 67
End: 2017-12-18
Payer: MEDICARE

## 2017-12-18 VITALS
SYSTOLIC BLOOD PRESSURE: 141 MMHG | WEIGHT: 293 LBS | HEIGHT: 71 IN | DIASTOLIC BLOOD PRESSURE: 85 MMHG | HEART RATE: 69 BPM | BODY MASS INDEX: 41.02 KG/M2

## 2017-12-18 DIAGNOSIS — F64.0 GENDER DYSPHORIA IN ADOLESCENT AND ADULT: Primary | ICD-10-CM

## 2017-12-18 DIAGNOSIS — R63.5 WEIGHT GAIN: ICD-10-CM

## 2017-12-18 ASSESSMENT — ENCOUNTER SYMPTOMS
CHILLS: 0
ABDOMINAL PAIN: 0
FEVER: 0
NAUSEA: 0
POLYDIPSIA: 0
PALPITATIONS: 0
NUMBNESS: 0
SHORTNESS OF BREATH: 0
HEADACHES: 0
UNEXPECTED WEIGHT CHANGE: 1
DYSPHORIC MOOD: 0
CHEST TIGHTNESS: 0
VOMITING: 0
NERVOUS/ANXIOUS: 0
LIGHT-HEADEDNESS: 1
WEAKNESS: 0
FREQUENCY: 0

## 2017-12-18 NOTE — PROGRESS NOTES
BOO Ulloa is a 67 year old individual that uses pronouns She/Her/Hers/Herself that presents today for follow up of:  feminizing hormone therapy.   Gender identity: female.   Started Hormone  therapy  *7/2016  Continues on . Vivelle dot 0.1 mg patch 2x/wk    Spironlactone 100* mg daily      Other finasteride 5 mg  Any special concerns today?  She has appointment for orchiectomy consultation with Dr. Weston 1/10/2018. No problems using the patch estrogen.   On hormones?  YES +++ Shot day of the week? Not applicable-taking pills/patch/gel      Due for labs?  Yes      +++ Refills of meds needed?  Yes  Gender related body changes since last visit: . Breasts are tender    Activity:unchanged from last visit  New health concerns since last visit:  none  ---    Past Surgical History:   Procedure Laterality Date     COLONOSCOPY  2007     JOINT REPLACEMTN, KNEE RT/LT  2006    left       Patient Active Problem List   Diagnosis     large compression fracture     Gouty arthropathy     Mental or behavioral problem     Generalized osteoarthrosis, unspecified site     Disorder of bone and cartilage     Mixed hyperlipidemia     OA (osteoarthritis) of knee     HYPERLIPIDEMIA LDL GOAL <100     Gout, chronic     DDD (degenerative disc disease), lumbar     Alcoholism (H)     Advanced directives, counseling/discussion     Varicose veins of legs     Psoriasis     HTN, goal below 140/90     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     Family history of diabetes mellitus in first degree relative     Mitral valve disorder     Nonrheumatic aortic valve stenosis     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       Current Outpatient Prescriptions   Medication Sig Dispense Refill     estradiol (VIVELLE-DOT) 0.1 MG/24HR BIW patch Place 1 patch onto the skin twice a week 8 patch 2     meloxicam (MOBIC) 15 MG tablet TAKE ONE TABLET BY MOUTH EVERY DAY 90 tablet 3     spironolactone (ALDACTONE) 100 MG tablet Take 1 tablet (100 mg) by mouth daily  90 tablet 1     finasteride (PROSCAR) 5 MG tablet Take 1 tablet (5 mg) by mouth daily 90 tablet 1     atenolol (TENORMIN) 50 MG tablet TAKE ONE TABLET BY MOUTH EVERY DAY 60 tablet 3     SM CALCIUM 600/VITAMIN D 600-400 MG-UNIT per tablet Take 1 tablet by mouth 2 times daily 60 tablet 3     lovastatin (MEVACOR) 20 MG tablet Take 1 tablet (20 mg) by mouth At Bedtime 90 tablet 3     order for DME Equipment being ordered: Sigvaris 232 Cotton Thigh High for Men 20-30 mmHg- TWO PAIR TAN COLOR 2 each 5     Multiple Vitamin (DAILY-EVANGELISTA) TABS Take 1 tablet by mouth daily 90 tablet 3     FLUoxetine (PROZAC) 40 MG capsule Take 1 capsule (40 mg) by mouth daily Take One Capsule By Mouth Every Day 90 capsule 1     timolol (TIMOPTIC) 0.5 % ophthalmic solution 1 drop 2 times daily       latanoprost (XALATAN) 0.005 % ophthalmic solution 1 drop At Bedtime.       ORDER FOR DME Equipment being ordered:   BACK BRACE    MedSpec Back-n-Shape II Back Brace  Size XXXL with thermoplastic insert        1 each 0       History   Smoking Status     Never Smoker   Smokeless Tobacco     Never Used          Allergies   Allergen Reactions     Penicillin [Esters] Itching     he is not sure what kind of reaction he had     Heparin      he lives in a assisted living program and is not sure what type of allergies he really has. He quit drinking     Zosyn [Penicillins]      unsure of reaction, allergy is listed on his med sheet       Health Maintenance Due   Topic Date Due     DEPRESSION ACTION PLAN Q1 YR  03/09/1968     AORTIC ANEURYSM SCREENING (SYSTEM ASSIGNED)  03/09/2015     PNEUMOCOCCAL (2 of 2 - PPSV23) 04/26/2017     ADVANCE DIRECTIVE PLANNING Q5 YRS  07/25/2017     INFLUENZA VACCINE (SYSTEM ASSIGNED)  09/01/2017     FALL RISK ASSESSMENT  09/27/2017         Problem, Medication and Allergy Lists were reviewed and are current..         Review of Systems:   Review of Systems   Constitutional: Positive for unexpected weight change. Negative for  "chills and fever.   Eyes: Negative for visual disturbance.   Respiratory: Negative for chest tightness and shortness of breath.    Cardiovascular: Negative for chest pain, palpitations and leg swelling.   Gastrointestinal: Negative for abdominal pain, nausea and vomiting.   Endocrine: Negative for polydipsia and polyuria.   Genitourinary: Negative for frequency.   Neurological: Positive for light-headedness. Negative for weakness, numbness and headaches.        Felt lightheaded around lots of people shopping at [employer], holidays   Psychiatric/Behavioral: Negative for dysphoric mood and suicidal ideas. The patient is not nervous/anxious.             Physical Exam:     Vitals:    12/18/17 1032   BP: 141/85   Pulse: 69   Weight: (!) 142.9 kg (315 lb)   Height: 1.803 m (5' 11\")     BMI= Body mass index is 43.93 kg/(m^2).   Wt Readings from Last 10 Encounters:   12/18/17 (!) 142.9 kg (315 lb)   09/11/17 134.4 kg (296 lb 6.4 oz)   06/27/17 132.5 kg (292 lb)   05/02/17 131.1 kg (289 lb)   04/26/17 131.1 kg (289 lb)   02/06/17 124.7 kg (275 lb)   11/17/16 127.9 kg (282 lb)   09/27/16 127.5 kg (281 lb)   08/29/16 127.9 kg (282 lb)   07/25/16 131.5 kg (290 lb)     Appearance: Female appearance and dress    GENERAL:: healthy, alert and no distress  RESP: lungs clear to auscultation - no rales, no rhonchi, no wheezes  CV: regular rates and rhythm, normal S1 S2, no S3 or S4 and 2/6 systolic murmur, no click or rub -  Ext 1+ pitting edema bilaterally    Affect: Appropriate/mood-congruent           Labs:   Results from last visit:  Orders Only on 11/01/2017   Component Date Value Ref Range Status     Sodium 11/01/2017 133  133 - 144 mmol/L Final     Potassium 11/01/2017 5.2  3.4 - 5.3 mmol/L Final     Chloride 11/01/2017 101  94 - 109 mmol/L Final     Carbon Dioxide 11/01/2017 28  20 - 32 mmol/L Final     Anion Gap 11/01/2017 4  3 - 14 mmol/L Final     Glucose 11/01/2017 92  70 - 99 mg/dL Final     Urea Nitrogen 11/01/2017 18  7 " - 30 mg/dL Final     Creatinine 11/01/2017 1.18  0.66 - 1.25 mg/dL Final     GFR Estimate 11/01/2017 61  >60 mL/min/1.7m2 Final    Non  GFR Calc     GFR Estimate If Black 11/01/2017 74  >60 mL/min/1.7m2 Final    African American GFR Calc     Calcium 11/01/2017 9.5  8.5 - 10.1 mg/dL Final     Testosterone Total 11/01/2017 88* 240 - 950 ng/dL Final    Comment: This test was developed and its performance characteristics determined by the   [facility],  Special Chemistry Laboratory. It has   not been cleared or approved by the FDA. The laboratory is regulated under   CLIA as qualified to perform high-complexity testing. This test is used for   clinical purposes. It should not be regarded as investigational or for   research.       Sex Hormone Binding Globulin 11/01/2017 55  11 - 80 nmol/L Final     Free Testosterone Calculated 11/01/2017 1.14* 4.7 - 24.4 ng/dL Final       Assessment and Plan   1. Gender dysphoria  Doing well on current dose of hormone therapy except for weight gain with reduction in testosterone level. Discussed with patient that orchiectomy will affect her hormone therapy medications, including potentially dose of estrogen.     2. Weight gain  Has cut down energy drinks, but more candy recently. Has been baking recently--eating more. Counseled patient on the risks of further weight gain, and options for reducing calories  Plan is for:  --no energy drinks  --Decrease candy, and snacks, elizabeth. After holidays, cut down on baking.       Follow up:  Follow up in 3-4 months or 2 weeks  prior to orchiectomy  Results by mycparkt  Questions were elicited and answered.     Jluis Mane MD

## 2017-12-18 NOTE — NURSING NOTE
"Chief Complaint   Patient presents with     RECHECK     TG       Vitals:    12/18/17 1032   BP: 141/85   Pulse: 69   Weight: (!) 142.9 kg (315 lb)   Height: 1.803 m (5' 11\")       Body mass index is 43.93 kg/(m^2).      Alexandra Long CMA    "

## 2017-12-18 NOTE — MR AVS SNAPSHOT
After Visit Summary   12/18/2017    Laila Perez    MRN: [id number]           Patient Information     Date Of Birth          1950        Visit Information        Provider Department      12/18/2017 10:20 AM Jluis Mane MD [facility]        Today's Diagnoses     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    -  1    Weight gain          Care Instructions    See me 2 weeks or so before surgery with Dr. Weston  No energy drinks  Less candy, less breads, less cookies/pies, etc           Follow-ups after your visit        Follow-up notes from your care team     Return in about 3 months (around 3/18/2018).      Your next 10 appointments already scheduled     Enmanuel 10, 2018 11:30 AM CST   New Visit with Abhilash Weston MD   [facility]    [address] [place] [id number] [phone]            Jan 24, 2018  9:00 AM CST   INDIVIDUAL THERAPY with Estelle Gomez LP   [facility] ([facility])    [address] [facility] [address]  Mail Code 7521  [facility] [address]   [phone]              Who to contact     Please call your clinic at [phone] to:    Ask questions about your health    Make or cancel appointments    Discuss your medicines    Learn about your test results    Speak to your doctor   If you have compliments or concerns about an experience at your clinic, or if you wish to file a complaint, please contact [facility] Patient Relations at [phone] or email us at [email or web address]         Additional Information About Your Visit        MyChart Information     "[employer] is an electronic gateway that provides easy, online access to your medical records. With "[employer] you can request a clinic appointment, read your test results, renew a prescription or communicate with your care team.     To sign up for "[employer] visit the website at  "[email or web address]   You will be asked to enter the access code listed below, as well as some personal information. Please follow the directions to create your username and password.     Your access code is: [email or web address]  Expires: 2018  4:01 PM     Your access code will  in 90 days. If you need help or a new code, please contact your [facility] or call [phone] for assistance.        Care EveryWhere ID     This is your Care EveryWhere ID. This could be used by other organizations to access your [place] medical records  [id number]        Your Vitals Were     Pulse Height BMI (Body Mass Index)             69 1.803 m (5' 11\") 43.93 kg/m2          Blood Pressure from Last 3 Encounters:   17 141/85   17 133/88   17 123/72    Weight from Last 3 Encounters:   17 (!) 142.9 kg (315 lb)   17 134.4 kg (296 lb 6.4 oz)   17 132.5 kg (292 lb)              Today, you had the following     No orders found for display       Primary Care Provider Office Phone # Fax #    JERRICA [facility] [phone]       [address] [place] [address]        Equal Access to Services     SUSU BLACKWELL : Hadii aad ku hadasho Soomaali, waaxda luqadaha, qaybta kaalmada adeegyada, zulma simpson. So [facility] [phone].    ATENCIÓN: Si habla español, tiene a hernandez disposición servicios gratuitos de asistencia lingüística. Llame al [phone].    We comply with applicable federal civil rights laws and [place] laws. We do not discriminate on the basis of race, color, national origin, age, disability, sex, sexual orientation, or gender identity.            Thank you!     Thank you for choosing [facility]  for your care. Our goal is always to provide you with excellent care. Hearing back from our patients is one way we can continue to improve our services. Please take a few minutes to complete the " written survey that you may receive in the mail after your visit with us. Thank you!             Your Updated Medication List - Protect others around you: Learn how to safely use, store and throw away your medicines at www.disposemymeds.org.          This list is accurate as of: 12/18/17 11:59 PM.  Always use your most recent med list.                   Brand Name Dispense Instructions for use Diagnosis    atenolol 50 MG tablet    TENORMIN    60 tablet    TAKE ONE TABLET BY MOUTH EVERY DAY    Essential hypertension with goal blood pressure less than 140/90       DAILY-EVANGELISTA Tabs     90 tablet    Take 1 tablet by mouth daily    Nutritional deficiency       estradiol 0.1 MG/24HR BIW patch    VIVELLE-DOT    8 patch    Place 1 patch onto the skin twice a week    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       finasteride 5 MG tablet    PROSCAR    90 tablet    Take 1 tablet (5 mg) by mouth daily    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       FLUoxetine 40 MG capsule    PROzac    90 capsule    Take 1 capsule (40 mg) by mouth daily Take One Capsule By Mouth Every Day    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        lovastatin 20 MG tablet    MEVACOR    90 tablet    Take 1 tablet (20 mg) by mouth At Bedtime    Hyperlipidemia LDL goal <100       meloxicam 15 MG tablet    MOBIC    90 tablet    TAKE ONE TABLET BY MOUTH EVERY DAY    Primary osteoarthritis, unspecified site       * order for DME     1 each    Equipment being ordered:  BACK BRACE  MedSpec Back-n-Shape II Back Brace  Size XXXL with thermoplastic insert    DDD (degenerative disc disease), lumbar, Pathologic fracture of vertebrae       * order for DME     2 each    Equipment being ordered: Sigvaris 232 Cotton Thigh High for Men 20-30 mmHg- TWO PAIR TAN COLOR    Varicose veins of legs       SM CALCIUM 600/VITAMIN D 600-400 MG-UNIT per tablet   Generic drug:  calcium-vitamin D     60 tablet     Take 1 tablet by mouth 2 times daily    Hyperlipidemia LDL goal <100       spironolactone 100 MG tablet    ALDACTONE    90 tablet    Take 1 tablet (100 mg) by mouth daily    Essential hypertension with goal blood pressure less than 140/90       timolol 0.5 % ophthalmic solution    TIMOPTIC     1 drop 2 times daily        * Notice:  This list has 2 medication(s) that are the same as other medications prescribed for you. Read the directions carefully, and ask your doctor or other care provider to review them with you.

## 2017-12-18 NOTE — PATIENT INSTRUCTIONS
See me 2 weeks or so before surgery with Dr. Weston  No energy drinks  Less candy, less breads, less cookies/pies, etc

## 2017-12-22 ENCOUNTER — PRE VISIT (OUTPATIENT)
Dept: UROLOGY | Facility: CLINIC | Age: 67
End: 2017-12-22

## 2018-01-24 ENCOUNTER — OFFICE VISIT (OUTPATIENT)
Dept: OTHER | Facility: OUTPATIENT CENTER | Age: 68
End: 2018-01-24
Payer: MEDICARE

## 2018-01-24 DIAGNOSIS — F33.0 MAJOR DEPRESSIVE DISORDER, RECURRENT EPISODE, MILD (H): ICD-10-CM

## 2018-01-24 DIAGNOSIS — F64.0 GENDER DYSPHORIA IN ADOLESCENT AND ADULT: Primary | ICD-10-CM

## 2018-01-24 NOTE — MR AVS SNAPSHOT
After Visit Summary   1/24/2018    Laila Perez    MRN: [id number]           Patient Information     Date Of Birth          1950        Visit Information        Provider Department      1/24/2018 9:00 AM [facility]        Today's Diagnoses     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    -  1    Major depressive disorder, recurrent episode, mild (H)           Follow-ups after your visit        Your next 10 appointments already scheduled     Feb 07, 2018 11:00 AM CST   New Visit with Abhilash Weston MD   [facility]    [address] [place] [id number] [phone]            Feb 07, 2018 11:50 AM CST   New Visit with Yousif Huntley MD PhD   [facility])    [address] [place] [id number] [phone]              Who to contact     Please call your clinic at [phone] to:    Ask questions about your health    Make or cancel appointments    Discuss your medicines    Learn about your test results    Speak to your doctor   If you have compliments or concerns about an experience at your clinic, or if you wish to file a complaint, please contact [facility] Patient Relations at [phone] or email us at [email or web address]         Additional Information About Your Visit        MyChart Information     hiogit is an electronic gateway that provides easy, online access to your medical records. With [employer], you can request a clinic appointment, read your test results, renew a prescription or communicate with your care team.     To sign up for hiogit visit the website at [email or web address]   You will be asked to enter the access code listed below, as well as some personal information. Please follow the directions to create your username and password.     Your access code is:  [email or web address]  Expires: 2018  4:01 PM     Your access code will  in 90 days. If you need help or a new code, please contact your [facility] or call [phone] for assistance.        Care EveryWhere ID     This is your Care EveryWhere ID. This could be used by other organizations to access your [facility] medical records  [id number]         Blood Pressure from Last 3 Encounters:   17 141/85   17 133/88   17 123/72    Weight from Last 3 Encounters:   17 (!) 142.9 kg (315 lb)   17 134.4 kg (296 lb 6.4 oz)   17 132.5 kg (292 lb)              We Performed the Following     Individual Psychotherapy (38-52 min) [23718]        Primary Care Provider Office Phone # Fax #    Delmis García Ronak, [facility] [phone]       [address] [place] [id number]        Equal Access to Services     GIGI BLACKWELL : Hadii aad ku hadasho Soomaali, waaxda luqadaha, qaybta kaalmada adeegyada, waxay idiin haymansoorn arielle kharadejuan castillo . So [facility] [phone].    ATENCIÓN: Si habla español, tiene a hernandez disposición servicios gratuitos de asistencia lingüística. Llame al [phone].    We comply with applicable federal civil rights laws and [place] laws. We do not discriminate on the basis of race, color, national origin, age, disability, sex, sexual orientation, or gender identity.            Thank you!     Thank you for choosing [facility]  for your care. Our goal is always to provide you with excellent care. Hearing back from our patients is one way we can continue to improve our services. Please take a few minutes to complete the written survey that you may receive in the mail after your visit with us. Thank you!             Your Updated Medication List - Protect others around you: Learn how to safely use, store and throw away your medicines at www.disposemymeds.org.          This list is accurate as of 18 11:59 PM.  Always use  your most recent med list.                   Brand Name Dispense Instructions for use Diagnosis    atenolol 50 MG tablet    TENORMIN    60 tablet    TAKE ONE TABLET BY MOUTH EVERY DAY    Essential hypertension with goal blood pressure less than 140/90       DAILY-EVANGELISTA Tabs     90 tablet    Take 1 tablet by mouth daily    Nutritional deficiency       estradiol 0.1 MG/24HR BIW patch    VIVELLE-DOT    8 patch    Place 1 patch onto the skin twice a week    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       finasteride 5 MG tablet    PROSCAR    90 tablet    Take 1 tablet (5 mg) by mouth daily    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       FLUoxetine 40 MG capsule    PROzac    90 capsule    Take 1 capsule (40 mg) by mouth daily Take One Capsule By Mouth Every Day    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        lovastatin 20 MG tablet    MEVACOR    90 tablet    Take 1 tablet (20 mg) by mouth At Bedtime    Hyperlipidemia LDL goal <100       meloxicam 15 MG tablet    MOBIC    90 tablet    TAKE ONE TABLET BY MOUTH EVERY DAY    Primary osteoarthritis, unspecified site       * order for DME     1 each    Equipment being ordered:  BACK BRACE  [employer] Back-n-Shape II Back Brace  Size XXXL with thermoplastic insert    DDD (degenerative disc disease), lumbar, Pathologic fracture of vertebrae       * order for DME     2 each    Equipment being ordered: Sigvaris 232 Cotton Thigh High for Men 20-30 mmHg- TWO PAIR TAN COLOR    Varicose veins of legs       SM CALCIUM 600/VITAMIN D 600-400 MG-UNIT per tablet   Generic drug:  calcium-vitamin D     60 tablet    Take 1 tablet by mouth 2 times daily    Hyperlipidemia LDL goal <100       spironolactone 100 MG tablet    ALDACTONE    90 tablet    Take 1 tablet (100 mg) by mouth daily    Essential hypertension with goal blood pressure less than 140/90       timolol 0.5 % ophthalmic solution    TIMOPTIC     1 drop 2 times  daily        * Notice:  This list has 2 medication(s) that are the same as other medications prescribed for you. Read the directions carefully, and ask your doctor or other care provider to review them with you.
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
## 2018-02-07 ENCOUNTER — OFFICE VISIT (OUTPATIENT)
Dept: UROLOGY | Facility: CLINIC | Age: 68
End: 2018-02-07
Payer: MEDICARE

## 2018-02-07 ENCOUNTER — OFFICE VISIT (OUTPATIENT)
Dept: PEDIATRICS | Facility: CLINIC | Age: 68
End: 2018-02-07
Payer: MEDICARE

## 2018-02-07 VITALS
OXYGEN SATURATION: 98 % | DIASTOLIC BLOOD PRESSURE: 88 MMHG | BODY MASS INDEX: 41.02 KG/M2 | SYSTOLIC BLOOD PRESSURE: 137 MMHG | HEIGHT: 71 IN | WEIGHT: 293 LBS | HEART RATE: 64 BPM

## 2018-02-07 VITALS
OXYGEN SATURATION: 98 % | HEIGHT: 71 IN | WEIGHT: 293 LBS | DIASTOLIC BLOOD PRESSURE: 88 MMHG | TEMPERATURE: 97.1 F | HEART RATE: 64 BPM | BODY MASS INDEX: 41.02 KG/M2 | SYSTOLIC BLOOD PRESSURE: 137 MMHG

## 2018-02-07 DIAGNOSIS — I77.810 AORTIC ROOT DILATATION (H): ICD-10-CM

## 2018-02-07 DIAGNOSIS — E78.5 HYPERLIPIDEMIA LDL GOAL <100: ICD-10-CM

## 2018-02-07 DIAGNOSIS — Z78.9 MALE-TO-FEMALE TRANSGENDER PERSON: Primary | ICD-10-CM

## 2018-02-07 DIAGNOSIS — I35.0 NONRHEUMATIC AORTIC VALVE STENOSIS: ICD-10-CM

## 2018-02-07 DIAGNOSIS — F64.0 GENDER DYSPHORIA IN ADULT: Primary | ICD-10-CM

## 2018-02-07 PROCEDURE — 99214 OFFICE O/P EST MOD 30 MIN: CPT | Performed by: INTERNAL MEDICINE

## 2018-02-07 PROCEDURE — 99203 OFFICE O/P NEW LOW 30 MIN: CPT | Performed by: UROLOGY

## 2018-02-07 ASSESSMENT — PAIN SCALES - GENERAL: PAINLEVEL: NO PAIN (0)

## 2018-02-07 NOTE — NURSING NOTE
"Chief Complaint   Patient presents with     Establish Care       Initial /88  Pulse 64  Temp 97.1  F (36.2  C) (Temporal)  Ht 5' 11\" (1.803 m)  Wt (!) 307 lb (139.3 kg)  SpO2 98%  BMI 42.82 kg/m2 Estimated body mass index is 42.82 kg/(m^2) as calculated from the following:    Height as of this encounter: 5' 11\" (1.803 m).    Weight as of this encounter: 307 lb (139.3 kg).  Medication Reconciliation: complete    "

## 2018-02-07 NOTE — LETTER
2/7/2018      RE: Laila Perez  [id number] [address] [place] [phone]       I am seeing Laila Perez in consultation from Dr. Mane for evaluation of transgender orchiectomy.    HPI:  Laila Perez is a 67 year old transfemale here for transgender orchiectomy consult.  She has been compliant with medications and dress for about 8 years but has felt female essentially her whole life.  No history of genital surgery.  No history of inguinal hernia repair.  Has not yet see plastic surgery to discuss gender confirmation surgery.  Has 2 letters of medical necessity in the chart already from Dr. Mane and Mayte Gomez.    REVIEW OF SYSTEMS:  General: negative  Skin: negative  Eyes: negative  Ears/Nose/Throat: negative  Respiratory: negative  Cardiovascular: negative  Gastrointestinal: negative  Genitourinary: see HPI  Musculoskeletal: negative  Neurologic: negative  Psychiatric: negative  Hematologic/Lymphatic/Immunologic: negative  Endocrine: negative    PAST MEDICAL HX:  Past Medical History:   Diagnosis Date     Unspecified internal derangement of knee     CHRONIC KNEE PAIN,LEG,NECK AND HEAD INJURIES   no heart or lungs issues.  No bleeding disorders    PAST SURG HX:  Past Surgical History:   Procedure Laterality Date     COLONOSCOPY  2007     JOINT REPLACEMTN, KNEE RT/LT  2006    left    no history of hernia repair.    FAMILY HX:  Family History   Problem Relation Age of Onset     CANCER Father      lung cancer heavy smoker       SOCIAL HX:  Social History   Substance Use Topics     Smoking status: Never Smoker     Smokeless tobacco: Never Used     Alcohol use No      Comment: Quit 3/24/05       MEDICATIONS:  Current Outpatient Prescriptions   Medication Sig     estradiol (VIVELLE-DOT) 0.1 MG/24HR BIW patch Place 1 patch onto the skin twice a week     meloxicam (MOBIC) 15 MG tablet TAKE ONE TABLET BY MOUTH EVERY DAY     spironolactone (ALDACTONE) 100 MG tablet Take 1 tablet (100 mg) by mouth daily  "    finasteride (PROSCAR) 5 MG tablet Take 1 tablet (5 mg) by mouth daily     atenolol (TENORMIN) 50 MG tablet TAKE ONE TABLET BY MOUTH EVERY DAY     SM CALCIUM 600/VITAMIN D 600-400 MG-UNIT per tablet Take 1 tablet by mouth 2 times daily     lovastatin (MEVACOR) 20 MG tablet Take 1 tablet (20 mg) by mouth At Bedtime     order for DME Equipment being ordered: Sigvaris 232 Cotton Thigh High for Men 20-30 mmHg- TWO PAIR TAN COLOR     Multiple Vitamin (DAILY-EVANGELISTA) TABS Take 1 tablet by mouth daily     FLUoxetine (PROZAC) 40 MG capsule Take 1 capsule (40 mg) by mouth daily Take One Capsule By Mouth Every Day     timolol (TIMOPTIC) 0.5 % ophthalmic solution 1 drop 2 times daily     latanoprost (XALATAN) 0.005 % ophthalmic solution 1 drop At Bedtime.     ORDER FOR DME Equipment being ordered:   BACK BRACE    [employer] Back-n-Shape II Back Brace  Size XXXL with thermoplastic insert            No current facility-administered medications for this visit.        ALLERGIES:  Penicillin [esters]; Heparin; and Zosyn [penicillins]      GENERAL PHYSICAL EXAM:     /88 (BP Location: Left arm, Patient Position: Sitting, Cuff Size: Adult Large)  Pulse 64  Ht 1.803 m (5' 11\")  Wt (!) 139.3 kg (307 lb 3.2 oz)  SpO2 98%  BMI 42.85 kg/m2   Constitutional: No acute distress. Well nourished.   PSYCH: normal mood and affect.  NEURO: normal gait, no focal deficits.   EYES: anicteric, EOMI, PERR.  CARDIOPULMONARY: breathing non-labored, pulse regular rate/rhythm, no peripheral edema.  GI: Abdomen obese.  MUSCULOSKELETAL: normal limb proportions, no muscle wasting, no contractures.  SKIN: Normal virilized hair distribution, no lesions, warts or rashes over genitalia, abdomen extremities or face.  HEME/LYMPH: no ecchymosis, no lymphadenopathy in groin or neck, no lymphedema.     EXAM:  Phallus  circumcised, meatus adequate, no plaques palpated.   Left testis descended , size is 12 , consistency is soft. No intra-testicular masses.   " "Right testis descended , size is 12 , consistency is soft. No intra-testicular masses.   Cord structures not remarkable.     Prostate exam: deferred     Imaging/labs:  Lab Results   Component Value Date    CR 1.18 11/01/2017    CR 1.49 05/02/2017    CR 1.27 09/27/2016     ASSESSMENT:     Consult for gender confirmation surgery.      Discussed that I do just the orchiectomy, and discussed what this would entail.      PLAN:    Consult to Dr. Holm to arrange \"bottom\" surgery.  I would be willing to do the orchiectomy at the same setting, if needed.    Copied cc to Consulting provider Antonietta Holm       Thank-you for the kind consultation.  Abhilash Weston MD     Urological Surgeon    Abhilash Weston MD      "

## 2018-02-07 NOTE — PROGRESS NOTES
SUBJECTIVE:   Laila Perez is a 67 year old male who presents to clinic today for the following health issues:      New Patient/Transfer of Care, [facility].   Patient lives in [place], normally goes to [facility] clinic.  She gets specialty care at the [facility] for sex change.  She has been dressed and on hormone replacement therapy for the last few years as a female.  She is planning to get bilateral orchiectomy procedure done to complete her gender transformation.  She met with the urologist this morning.  She has been referred to see plastic surgery to consult on reconstructive part of the surgery the plan would be for both of them to operate at the same time.  Procedure to be done at the same time.  Patient has been referred to me to get a preop evaluation done.    Patient does not plan to relocate to the [place] area.  She sees a cardiologist for moderate aortic stenosis and moderate dilated aortic root.  Her last visit with the cardiologist was in August 2017.  Had echocardiogram done at that time aortic valve area is 1.5 cm.  Aortic root is 4.5 cm.  The plan is for her to follow-up in 1 year.  Patient currently is on a statin, but no other cardioprotective medications.  Patient denies any cardiac symptoms in terms of chest pain palpitations shortness of breath on exertion.        ROS:  Constitutional, HEENT, cardiovascular, pulmonary, gi and gu systems are negative, except as otherwise noted.     Current Outpatient Prescriptions   Medication Sig     estradiol (VIVELLE-DOT) 0.1 MG/24HR BIW patch Place 1 patch onto the skin twice a week     meloxicam (MOBIC) 15 MG tablet TAKE ONE TABLET BY MOUTH EVERY DAY     spironolactone (ALDACTONE) 100 MG tablet Take 1 tablet (100 mg) by mouth daily     finasteride (PROSCAR) 5 MG tablet Take 1 tablet (5 mg) by mouth daily     atenolol (TENORMIN) 50 MG tablet TAKE ONE TABLET BY MOUTH EVERY DAY     SM CALCIUM 600/VITAMIN D 600-400  MG-UNIT per tablet Take 1 tablet by mouth 2 times daily     lovastatin (MEVACOR) 20 MG tablet Take 1 tablet (20 mg) by mouth At Bedtime     Multiple Vitamin (DAILY-EVANGELISTA) TABS Take 1 tablet by mouth daily     timolol (TIMOPTIC) 0.5 % ophthalmic solution 1 drop 2 times daily     latanoprost (XALATAN) 0.005 % ophthalmic solution 1 drop At Bedtime.     order for DME Equipment being ordered: Sig[employer] 232 Cotton Thigh High for Men 20-30 mmHg- TWO PAIR TAN COLOR     FLUoxetine (PROZAC) 40 MG capsule Take 1 capsule (40 mg) by mouth daily Take One Capsule By Mouth Every Day (Patient not taking: Reported on 2/7/2018)     ORDER FOR DME Equipment being ordered:   BACK BRACE    [email or web address] Back-n-Shape II Back Brace  Size XXXL with thermoplastic insert            No current facility-administered medications for this visit.          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     Family history of diabetes mellitus in first degree relative     Mitral valve disorder     Nonrheumatic aortic valve stenosis     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     Past Surgical History:   Procedure Laterality Date     COLONOSCOPY  2007     JOINT REPLACEMTN, KNEE RT/LT  2006    left       Social History   Substance Use Topics     Smoking status: Never Smoker     Smokeless tobacco: Never Used     Alcohol use No      Comment: Quit 3/24/05     Family History   Problem Relation Age of Onset     CANCER Father      lung cancer heavy smoker             Problem list, Medication list,  "Allergies, and Medical/Social/Surgical histories reviewed in [facility] and updated as appropriate.    OBJECTIVE:                                                    /88  Pulse 64  Temp 97.1  F (36.2  C) (Temporal)  Ht 5' 11\" (1.803 m)  Wt (!) 307 lb (139.3 kg)  SpO2 98%  BMI 42.82 kg/m2    GENERAL: healthy, alert and no distress  Recent Results (from the past 8760 hour(s))   Echocardiogram    Narrative    [id number]  [id number]  [id number]  [id number]^CHUCK^JUAN^MARGARITA                                                                          Version 2        [facility]  Echocardiography Laboratory  [address] [place] [address]        Name: DANYELLE AMEZQUITA  MRN: [id number]  : 1950  Study Date: 2017 10:37 AM  Age: 67 yrs  Gender: Male  Patient Location: [facility]  Reason For Study: Nonrheumatic aortic (valve) stenosis  Ordering Physician: JUAN WOODS  Referring Physician: Delmis Simons  Performed By: Natalia Ray RDCS     BSA: 2.5 m2  Height: 71 in  Weight: 292 lb  HR: 56  BP: 108/72 mmHg  _____________________________________________________________________________  __        Procedure  Complete Echo Adult.  _____________________________________________________________________________  __        Interpretation Summary     The aortic Sinus(es) of Valsalva are moderately dilated at 4.4 cm and the  ascending aorta is moderately dilated at 4.5 cm (The upper limit of normal is  3.7cm for aortic root diameter.)  The left ventricle is normal in size wi mild concentric left ventricular  hypertrophy.  The visual ejection fraction is estimated at 60-65% with grade I or early  diastolic dysfunction.  With the above, there is normal left atrial size by volume at 17 ml/m2.  Right atrial size is normal and the right ventricle is normal in structure,  function and size. Right ventricular systolic pressure could not be  approximated due to inadequate tricuspid regurgitation but the inferior " "vena  cava is not dilated.  Mild to moderate valvular aortic stenosis is again noted with trace to mild  aortic regurgitation. The current AoV gradients noted just below are slightly  higher. I would anticipate this valve progressively becoming more stenotic  over time.  Compared to the echo dated 5-, the degree of AS is slightly greater but  the moderately dilated Ao root is \"stable\".     The mean AoV pressure gradient is 26.4 mmHg. The peak AoV pressure gradient is  44.0 mmHg. The calculated aortic valve are is 1.5 cm^2.  _____________________________________________________________________________  __        Left Ventricle  The left ventricle is normal in size. There is mild concentric left  ventricular hypertrophy. The left ventricular ejection fraction is normal. The  visual ejection fraction is estimated at 60-65%. Grade I or early diastolic  dysfunction. E by E prime ratio is between 8 and 15, which is indeterminate  for assessment of left ventricular filling pressures. No regional wall motion  abnormalities noted. There is no thrombus seen in the left ventricle.     Right Ventricle  The right ventricle is normal in structure, function and size. The right  ventricular systolic function is normal.     Atria  Normal left atrial size. by volume at 17 ml/m2. Right atrial size is normal.  There is no atrial shunt seen.     Mitral Valve  There is moderate mitral annular calcification. There is no evidence of mitral  valve prolapse. There is no mitral valve stenosis.        Tricuspid Valve  The tricuspid valve is normal in structure and function. There is physiologic  tricuspid regurgitation. Right ventricular systolic pressure could not be  approximated due to inadequate tricuspid regurgitation.     Aortic Valve  There is severe trileaflet aortic sclerosis. There is trace to mild aortic  regurgitation. The mean AoV pressure gradient is 26.4 mmHg. The peak AoV  pressure gradient is 44.0 mmHg. The calculated " aortic valve are is 1.5 cm^2.  Mild to moderate valvular aortic stenosis.     Pulmonic Valve  The pulmonic valve is not well seen, but is grossly normal. There is trace  pulmonic valvular regurgitation.     Vessels  The aortic Sinus(es) of Valsalva are moderately dilated. at 4.4 cm. (The upper  limit of normal is 3.7cm for aortic root diameter.). The ascending aorta is  Moderately dilated. at 4.5 cm. Inferior vena cava not well visualized for  estimation of right atrial pressure. The inferior vena cava is not dilated.     Pericardium  There is no pericardial effusion. There is no pleural effusion.     _____________________________________________________________________________  __  MMode/2D Measurements & Calculations  IVSd: 1.3 cm  LVIDd: 5.1 cm  LVIDs: 3.1 cm  LVPWd: 1.1 cm  FS: 39.6 %  EDV(Teich): 124.3 ml  ESV(Teich): 37.5 ml  LV mass(C)d: 249.3 grams  LV mass(C)dI: 100.7 grams/m2     Ao root diam: 4.4 cm  LA dimension: 4.9 cm  asc Aorta Diam: 4.5 cm  LA/Ao: 1.1  LVOT diam: 2.6 cm  LVOT area: 5.2 cm2  LA Volume (BP): 43.0 ml  LA Volume Index (BP): 17.3 ml/m2        Doppler Measurements & Calculations  MV E max guillermina: 49.4 cm/sec  MV A max guillermina: 86.2 cm/sec  MV E/A: 0.57  MV dec time: 0.31 sec  Ao V2 max: 329.5 cm/sec  Ao max P.0 mmHg  Ao V2 mean: 243.9 cm/sec  Ao mean P.4 mmHg  Ao V2 VTI: 71.3 cm  ARISTEO(I,D): 1.5 cm2  ARISTEO(V,D): 1.5 cm2     LV V1 max PG: 3.8 mmHg  LV V1 max: 97.2 cm/sec  LV V1 VTI: 21.1 cm  SV(LVOT): 110.2 ml  SI(LVOT): 44.5 ml/m2  ARISTEO Index (cm2/m2): 0.62  Lateral E/e': 14.5  Medial E/e': 12.9           _____________________________________________________________________________  __           Report approved by: Abiel Mazariegos 2017 12:59 PM                  ASSESSMENT/PLAN:                                                      67 year old male with the following diagnoses and treatment plan:      ICD-10-CM    1. 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 Hyperlipidemia LDL  goal <100 E78.5    3. Nonrheumatic aortic valve stenosis I35.0    4. Aortic root dilatation (H) I77.810        --Patient with history of aortic valve stenosis and aortic root dilatation.  She is scheduled for sex change procedure and reconstructive surgery.  She will get a consultation with Dr. Holm for plastic surgery.  Obtain a surgery date.  After that we will set up preop exam.  I will contact her cardiologist the regards to perioperative management of her aortic valve stenosis and aortic root dilatation.    Will call or return to clinic if worsening or symptoms not improving as discussed.  See Patient Instructions.    A total of 25 minutes was spent face to face with this patient. More than 50% of the time was spent on education for the above problems and management plans.    Yousif Huntley MD-PhD  [facility]    (Note: Chart documentation was done in part with Dragon Voice Recognition software. Although reviewed after completion, some word and grammatical errors may remain.)

## 2018-02-07 NOTE — MR AVS SNAPSHOT
After Visit Summary   2018    Laila Perez    MRN: [id number]           Patient Information     Date Of Birth          1950        Visit Information        Provider Department      2018 11:50 AM Yousif Huntley MD PhD [facility]        Care Instructions    Make appointment(s) for:   -- preop appointment when you have a surgery date.             Follow-ups after your visit        Who to contact     If you have questions or need follow up information about today's clinic visit or your schedule please contact [facility] directly at [phone].  Normal or non-critical lab and imaging results will be communicated to you by ubitushart, letter or phone within 4 business days after the clinic has received the results. If you do not hear from us within 7 days, please contact the clinic through ubitushart or phone. If you have a critical or abnormal lab result, we will notify you by phone as soon as possible.  Submit refill requests through [employer] or call your pharmacy and they will forward the refill request to us. Please allow 3 business days for your refill to be completed.          Additional Information About Your Visit        MyChart Information     [employer] is an electronic gateway that provides easy, online access to your medical records. With C[employer]na, you can request a clinic appointment, read your test results, renew a prescription or communicate with your care team.     To sign up for [employer] visit the website at [email or web address]   You will be asked to enter the access code listed below, as well as some personal information. Please follow the directions to create your username and password.     Your access code is: [email or web address]  Expires: 2018  4:01 PM     Your access code will  in 90 days. If you need help or a new code, please contact your [facility] or call [phone] for assistance.        Care  "EveryWhere ID     This is your Care EveryWhere ID. This could be used by other organizations to access your [facility] medical records  [id number]        Your Vitals Were     Pulse Temperature Height Pulse Oximetry BMI (Body Mass Index)       64 97.1  F (36.2  C) (Temporal) 5' 11\" (1.803 m) 98% 42.82 kg/m2        Blood Pressure from Last 3 Encounters:   02/07/18 137/88   02/07/18 137/88   05/02/17 102/62    Weight from Last 3 Encounters:   02/07/18 (!) 307 lb (139.3 kg)   02/07/18 (!) 307 lb 3.2 oz (139.3 kg)   05/02/17 289 lb (131.1 kg)              Today, you had the following     No orders found for display       Primary Care Provider Office Phone # Fax #    Delmis García JERRICA Simons [facility] [phone]       [address] [facility] [address]        Equal Access to Services     SUSU BLACKWELL : Hadii aad ku hadasho Soomaali, waaxda luqadaha, qaybta kaalmada adeegyada, waxay murphyin haymansoorn arielle castillo . So [facility] [phone].    ATENCIÓN: Si habla español, tiene a hernandez disposición servicios gratuitos de asistencia lingüística. Llame al [phone].    We comply with applicable federal civil rights laws and [place] laws. We do not discriminate on the basis of race, color, national origin, age, disability, sex, sexual orientation, or gender identity.            Thank you!     Thank you for choosing [facility]  for your care. Our goal is always to provide you with excellent care. Hearing back from our patients is one way we can continue to improve our services. Please take a few minutes to complete the written survey that you may receive in the mail after your visit with us. Thank you!             Your Updated Medication List - Protect others around you: Learn how to safely use, store and throw away your medicines at www.disposemymeds.org.          This list is accurate as of 2/7/18 12:12 PM.  Always use your most recent med list.                   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        lovastatin 20 MG tablet    MEVACOR    90 tablet    Take 1 tablet (20 mg) by mouth At Bedtime    Hyperlipidemia LDL goal <100       meloxicam 15 MG tablet    MOBIC    90 tablet    TAKE ONE TABLET BY MOUTH EVERY DAY    Primary osteoarthritis, unspecified site       * order for DME     1 each    Equipment being ordered:  BACK BRACE  [employer] Back-n-Shape II Back Brace  Size XXXL with thermoplastic insert    DDD (degenerative disc disease), lumbar, Pathologic fracture of vertebrae       * order for DME     2 each    Equipment being ordered: Sigvaris 232 Cotton Thigh High for Men 20-30 mmHg- TWO PAIR TAN COLOR    Varicose veins of legs       SM CALCIUM 600/VITAMIN D 600-400 MG-UNIT per tablet   Generic drug:  calcium-vitamin D     60 tablet    Take 1 tablet by mouth 2 times daily    Hyperlipidemia LDL goal <100       spironolactone 100 MG tablet    ALDACTONE    90 tablet    Take 1 tablet (100 mg) by mouth daily    Essential hypertension with goal blood pressure less than 140/90       timolol 0.5 % ophthalmic solution    TIMOPTIC     1 drop 2 times daily        * Notice:  This list has 2 medication(s) that are the same as  other medications prescribed for you. Read the directions carefully, and ask your doctor or other care provider to review them with you.

## 2018-02-07 NOTE — PROGRESS NOTES
I am seeing Laila Perez in consultation from Dr. Mane for evaluation of transgender orchiectomy.    HPI:  Laila Perez is a 67 year old transfemale here for transgender orchiectomy consult.  She has been compliant with medications and dress for about 8 years but has felt female essentially her whole life.  No history of genital surgery.  No history of inguinal hernia repair.  Has not yet see plastic surgery to discuss gender confirmation surgery.  Has 2 letters of medical necessity in the chart already from Dr. Mane and Mayte Gomez.    REVIEW OF SYSTEMS:  General: negative  Skin: negative  Eyes: negative  Ears/Nose/Throat: negative  Respiratory: negative  Cardiovascular: negative  Gastrointestinal: negative  Genitourinary: see HPI  Musculoskeletal: negative  Neurologic: negative  Psychiatric: negative  Hematologic/Lymphatic/Immunologic: negative  Endocrine: negative    PAST MEDICAL HX:  Past Medical History:   Diagnosis Date     Unspecified internal derangement of knee     CHRONIC KNEE PAIN,LEG,NECK AND HEAD INJURIES   no heart or lungs issues.  No bleeding disorders    PAST SURG HX:  Past Surgical History:   Procedure Laterality Date     COLONOSCOPY  2007     JOINT REPLACEMTN, KNEE RT/LT  2006    left    no history of hernia repair.    FAMILY HX:  Family History   Problem Relation Age of Onset     CANCER Father      lung cancer heavy smoker       SOCIAL HX:  Social History   Substance Use Topics     Smoking status: Never Smoker     Smokeless tobacco: Never Used     Alcohol use No      Comment: Quit 3/24/05       MEDICATIONS:  Current Outpatient Prescriptions   Medication Sig     estradiol (VIVELLE-DOT) 0.1 MG/24HR BIW patch Place 1 patch onto the skin twice a week     meloxicam (MOBIC) 15 MG tablet TAKE ONE TABLET BY MOUTH EVERY DAY     spironolactone (ALDACTONE) 100 MG tablet Take 1 tablet (100 mg) by mouth daily     finasteride (PROSCAR) 5 MG tablet Take 1 tablet (5 mg) by mouth daily      "atenolol (TENORMIN) 50 MG tablet TAKE ONE TABLET BY MOUTH EVERY DAY     SM CALCIUM 600/VITAMIN D 600-400 MG-UNIT per tablet Take 1 tablet by mouth 2 times daily     lovastatin (MEVACOR) 20 MG tablet Take 1 tablet (20 mg) by mouth At Bedtime     order for DME Equipment being ordered: Sigvaris 232 Cotton Thigh High for Men 20-30 mmHg- TWO PAIR TAN COLOR     Multiple Vitamin (DAILY-EVANGELISTA) TABS Take 1 tablet by mouth daily     FLUoxetine (PROZAC) 40 MG capsule Take 1 capsule (40 mg) by mouth daily Take One Capsule By Mouth Every Day     timolol (TIMOPTIC) 0.5 % ophthalmic solution 1 drop 2 times daily     latanoprost (XALATAN) 0.005 % ophthalmic solution 1 drop At Bedtime.     ORDER FOR DME Equipment being ordered:   BACK BRACE    [employer] Back-n-Shape II Back Brace  Size XXXL with thermoplastic insert            No current facility-administered medications for this visit.        ALLERGIES:  Penicillin [esters]; Heparin; and Zosyn [penicillins]      GENERAL PHYSICAL EXAM:     /88 (BP Location: Left arm, Patient Position: Sitting, Cuff Size: Adult Large)  Pulse 64  Ht 1.803 m (5' 11\")  Wt (!) 139.3 kg (307 lb 3.2 oz)  SpO2 98%  BMI 42.85 kg/m2   Constitutional: No acute distress. Well nourished.   PSYCH: normal mood and affect.  NEURO: normal gait, no focal deficits.   EYES: anicteric, EOMI, PERR.  CARDIOPULMONARY: breathing non-labored, pulse regular rate/rhythm, no peripheral edema.  GI: Abdomen obese.  MUSCULOSKELETAL: normal limb proportions, no muscle wasting, no contractures.  SKIN: Normal virilized hair distribution, no lesions, warts or rashes over genitalia, abdomen extremities or face.  HEME/LYMPH: no ecchymosis, no lymphadenopathy in groin or neck, no lymphedema.     EXAM:  Phallus  circumcised, meatus adequate, no plaques palpated.   Left testis descended , size is 12 , consistency is soft. No intra-testicular masses.   Right testis descended , size is 12 , consistency is soft. No intra-testicular " "masses.   Cord structures not remarkable.     Prostate exam: deferred     Imaging/labs:  Lab Results   Component Value Date    CR 1.18 11/01/2017    CR 1.49 05/02/2017    CR 1.27 09/27/2016     ASSESSMENT:     Consult for gender confirmation surgery.      Discussed that I do just the orchiectomy, and discussed what this would entail.      PLAN:    Consult to Dr. Holm to arrange \"bottom\" surgery.  I would be willing to do the orchiectomy at the same setting, if needed.    Copied cc to Consulting provider Antonietta Holm       Thank-you for the kind consultation.  Abhilash Weston MD     Urological Surgeon  "

## 2018-02-07 NOTE — NURSING NOTE
"Laila Perez's goals for this visit include:   Chief Complaint   Patient presents with     Consult     Orchiectomy consult       He requests these members of his care team be copied on today's visit information: Yes    PCP: Delmis Simons    Referring Provider:  Referred Self, MD  No address on file    Chief Complaint   Patient presents with     Consult     Orchiectomy consult       Initial /88 (BP Location: Left arm, Patient Position: Sitting, Cuff Size: Adult Large)  Pulse 64  Ht 1.803 m (5' 11\")  Wt (!) 139.3 kg (307 lb 3.2 oz)  SpO2 98%  BMI 42.85 kg/m2 Estimated body mass index is 42.85 kg/(m^2) as calculated from the following:    Height as of this encounter: 1.803 m (5' 11\").    Weight as of this encounter: 139.3 kg (307 lb 3.2 oz).  Medication Reconciliation: complete    Do you need any medication refills at today's visit? No    "

## 2018-02-07 NOTE — MR AVS SNAPSHOT
After Visit Summary   2/7/2018    Laila Perez    MRN: [id number]           Patient Information     Date Of Birth          1950        Visit Information        Provider Department      2/7/2018 11:00 AM Abhilash Weston MD [facility]        Today's Diagnoses     Gender dysphoria in adult    -  1       Follow-ups after your visit        Additional Services     PLASTIC SURGERY REFERRAL       Your provider has referred you to: [facility]: Plastic and Reconstructive Surgery [facility] [phone]   [email or web address]  Specifically Dr. Holm for transgender genital surgery.    Please be aware that coverage of these services is subject to the terms and limitations of your health insurance plan.  Call member services at your health plan with any benefit or coverage questions.      Please bring the following with you to your appointment:    (1) Any X-Rays, CTs or MRIs which have been performed.  Contact the facility where they were done to arrange for  prior to your scheduled appointment.    (2) List of current medications  (3) This referral request   (4) Any documents/labs given to you for this referral                  Your next 10 appointments already scheduled     Feb 09, 2018 10:00 AM CST   New Visit with Uvaldo Holm MD   [facility] ([facility])    [address] [place] [id number] [phone]              Who to contact     If you have questions or need follow up information about today's clinic visit or your schedule please contact [facility] directly at [phone].  Normal or non-critical lab and imaging results will be communicated to you by MyChart, letter or phone within 4 business days after the clinic has received the results. If you do not hear from us within 7 days, please contact the clinic through [email or web address] or  "phone. If you have a critical or abnormal lab result, we will notify you by phone as soon as possible.  Submit refill requests through ITema or call your pharmacy and they will forward the refill request to us. Please allow 3 business days for your refill to be completed.          Additional Information About Your Visit        mEgoharGamar Information     ITema is an electronic gateway that provides easy, online access to your medical records. With ITema, you can request a clinic appointment, read your test results, renew a prescription or communicate with your care team.     To sign up for ITema visit the website at [email or web address]   You will be asked to enter the access code listed below, as well as some personal information. Please follow the directions to create your username and password.     Your access code is: [email or web address]  Expires: 2018  4:01 PM     Your access code will  in 90 days. If you need help or a new code, please contact your [facility] or call [phone] for assistance.        Care EveryWhere ID     This is your Care EveryWhere ID. This could be used by other organizations to access your [facility] medical records  [id number]        Your Vitals Were     Pulse Height Pulse Oximetry BMI (Body Mass Index)          64 1.803 m (5' 11\") 98% 42.85 kg/m2         Blood Pressure from Last 3 Encounters:   18 137/88   18 137/88   17 141/85    Weight from Last 3 Encounters:   18 (!) 139.3 kg (307 lb)   18 (!) 139.3 kg (307 lb 3.2 oz)   17 (!) 142.9 kg (315 lb)              We Performed the Following     PLASTIC SURGERY REFERRAL        Primary Care Provider Office Phone # Fax #    JERRICA Salcedo [phone] [address] [place] [id number]        Equal Access to Services     SUSU BLACKWELL AH: Hadii david booker hadasho Soomaali, waaxda luqadaha, qaybta kaalmada adeegyada, zulma ortiz " arilele thompsonmegan la'aamariia ah. So [facility] [phone].    ATENCIÓN: Si meghann mendoza, tiene a hernandez disposición servicios gratuitos de asistencia lingüística. Soco butts [phone].    We comply with applicable federal civil rights laws and [place] laws. We do not discriminate on the basis of race, color, national origin, age, disability, sex, sexual orientation, or gender identity.            Thank you!     Thank you for choosing [facility]  for your care. Our goal is always to provide you with excellent care. Hearing back from our patients is one way we can continue to improve our services. Please take a few minutes to complete the written survey that you may receive in the mail after your visit with us. Thank you!             Your Updated Medication List - Protect others around you: Learn how to safely use, store and throw away your medicines at www.disposemymeds.org.          This list is accurate as of 2/7/18  1:08 PM.  Always use your most recent med list.                   Brand Name Dispense Instructions for use Diagnosis    atenolol 50 MG tablet    TENORMIN    60 tablet    TAKE ONE TABLET BY MOUTH EVERY DAY    Essential hypertension with goal blood pressure less than 140/90       DAILY-EVANGELISTA Tabs     90 tablet    Take 1 tablet by mouth daily    Nutritional deficiency       estradiol 0.1 MG/24HR BIW patch    VIVELLE-DOT    8 patch    Place 1 patch onto the skin twice a week    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       finasteride 5 MG tablet    PROSCAR    90 tablet    Take 1 tablet (5 mg) by mouth daily    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       FLUoxetine 40 MG capsule    PROzac    90 capsule    Take 1 capsule (40 mg) by mouth daily Take One Capsule By Mouth Every Day    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        lovastatin 20 MG tablet    MEVACOR    90 tablet    Take 1 tablet (20 mg) by mouth At Bedtime     Hyperlipidemia LDL goal <100       meloxicam 15 MG tablet    MOBIC    90 tablet    TAKE ONE TABLET BY MOUTH EVERY DAY    Primary osteoarthritis, unspecified site       * order for DME     1 each    Equipment being ordered:  BACK BRACE  MedSpec Back-n-Shape II Back Brace  Size XXXL with thermoplastic insert    DDD (degenerative disc disease), lumbar, Pathologic fracture of vertebrae       * order for DME     2 each    Equipment being ordered: Sigvaris 232 Cotton Thigh High for Men 20-30 mmHg- TWO PAIR TAN COLOR    Varicose veins of legs       SM CALCIUM 600/VITAMIN D 600-400 MG-UNIT per tablet   Generic drug:  calcium-vitamin D     60 tablet    Take 1 tablet by mouth 2 times daily    Hyperlipidemia LDL goal <100       spironolactone 100 MG tablet    ALDACTONE    90 tablet    Take 1 tablet (100 mg) by mouth daily    Essential hypertension with goal blood pressure less than 140/90       timolol 0.5 % ophthalmic solution    TIMOPTIC     1 drop 2 times daily        * Notice:  This list has 2 medication(s) that are the same as other medications prescribed for you. Read the directions carefully, and ask your doctor or other care provider to review them with you.

## 2018-02-08 ENCOUNTER — TELEPHONE (OUTPATIENT)
Dept: SURGERY | Facility: CLINIC | Age: 68
End: 2018-02-08

## 2018-02-08 NOTE — TELEPHONE ENCOUNTER
PREVISIT INFORMATION                                                    Laila Perez scheduled for future visit at [facility] specialty clinics.    Patient is scheduled to see Dr. Holm  on 2/9/18 at 10am  Reason for visit: pt states that he would like to a consult regarding Bottom surgery   Referring provider Dr. Weston  Has patient seen previous specialist? No  Medical Records:  Available in chart.  Patient was previously seen at a [place] or [facility] facility.    REVIEW                                                      New patient packet mailed to patient: No  Medication reconciliation complete: No      Current Outpatient Prescriptions   Medication Sig Dispense Refill     estradiol (VIVELLE-DOT) 0.1 MG/24HR BIW patch Place 1 patch onto the skin twice a week 8 patch 2     meloxicam (MOBIC) 15 MG tablet TAKE ONE TABLET BY MOUTH EVERY DAY 90 tablet 3     spironolactone (ALDACTONE) 100 MG tablet Take 1 tablet (100 mg) by mouth daily 90 tablet 1     finasteride (PROSCAR) 5 MG tablet Take 1 tablet (5 mg) by mouth daily 90 tablet 1     atenolol (TENORMIN) 50 MG tablet TAKE ONE TABLET BY MOUTH EVERY DAY 60 tablet 3     SM CALCIUM 600/VITAMIN D 600-400 MG-UNIT per tablet Take 1 tablet by mouth 2 times daily 60 tablet 3     lovastatin (MEVACOR) 20 MG tablet Take 1 tablet (20 mg) by mouth At Bedtime 90 tablet 3     order for DME Equipment being ordered: Sigvaris 232 Cotton Thigh High for Men 20-30 mmHg- TWO PAIR TAN COLOR 2 each 5     Multiple Vitamin (DAILY-EVANGELISTA) TABS Take 1 tablet by mouth daily 90 tablet 3     FLUoxetine (PROZAC) 40 MG capsule Take 1 capsule (40 mg) by mouth daily Take One Capsule By Mouth Every Day (Patient not taking: Reported on 2/7/2018) 90 capsule 1     timolol (TIMOPTIC) 0.5 % ophthalmic solution 1 drop 2 times daily       latanoprost (XALATAN) 0.005 % ophthalmic solution 1 drop At Bedtime.       ORDER FOR DME Equipment being ordered:   BACK  BRACE    ascentify Back-n-Shape II Back Brace  Size XXXL with thermoplastic insert        1 each 0       Allergies: Penicillin [esters]; Heparin; and Zosyn [penicillins]        PLAN/FOLLOW-UP NEEDED                                                      Previsit review complete.  Patient will see provider at future scheduled appointment.     Patient Reminders Given:  Please, make sure you bring an updated list of your medications.   If you are having a procedure, please, present 15 minutes early.  If you need to cancel or reschedule,please call [phone].    Makenzie Pierre RN

## 2018-02-09 ENCOUNTER — OFFICE VISIT (OUTPATIENT)
Dept: SURGERY | Facility: CLINIC | Age: 68
End: 2018-02-09
Attending: UROLOGY
Payer: MEDICARE

## 2018-02-09 ENCOUNTER — TELEPHONE (OUTPATIENT)
Dept: SURGERY | Facility: CLINIC | Age: 68
End: 2018-02-09

## 2018-02-09 DIAGNOSIS — F64.0 GENDER DYSPHORIA IN ADULT: Primary | ICD-10-CM

## 2018-02-09 PROCEDURE — 99204 OFFICE O/P NEW MOD 45 MIN: CPT | Performed by: PLASTIC SURGERY

## 2018-02-09 NOTE — LETTER
2/9/2018         RE: Laila Perez  [address] [address] [place] [id number]        Dear Colleague,    Thank you for referring your patient, Laila Perez, to the [facility]. Please see a copy of my visit note below.    REFERRING PROVIDER: Abhilash Weston    REASON FOR CONSULTATION: Gender dysphoria, requesting or surgery.    HPI: Patient is a 67-year-old trans-woman who was referred to us by Dr. Abhilash Weston for possible vaginoplasty. Patient has been considering undergoing vaginoplasty for the past 30 years. She reports me that surgery will help her become a better person. In addition, she will not have to worry about issues relating to swimming and being out in public. She will also be more comfortable with her own physical body and this will relieve her gender dysphoria. Of note, she has tried to try to freeze her genitalia off in the past. She was admitted to the hospital and had a resulting circumcision. Patient publicly transitioned to her chosen gender role about 5 years ago. Her therapist is Estelle Gomez. She has been undergoing hormone therapy for the past 3 years with Dr. Mane. With hormones, her breasts grew to a size to a point where she does not wish to undergo augmentation.    She denies any medical history with her testicles. Denies any history of urinary issues. Denies history of urinary tract infections and sexually transmitted infections. She is able to achieve orgasm with a vibrator to the tip of her penis. She does not achieve erections and is not able to ejaculate. She is not interested in the ability to have penetrative sexual intercourse. She reports to me that she will not have sexual partners in the future. On a side note, patient is actively losing weight right now with diet and exercise.    MEDS:   Prior to Admission medications    Medication Sig Start Date End Date Taking? Authorizing Provider   estradiol (VIVELLE-DOT) 0.1 MG/24HR BIW patch Place 1  patch onto the skin twice a week 11/16/17  Yes Jluis Mane MD   meloxicam (MOBIC) 15 MG tablet TAKE ONE TABLET BY MOUTH EVERY DAY 9/6/17  Yes Delmis Simons APRN CNP   spironolactone (ALDACTONE) 100 MG tablet Take 1 tablet (100 mg) by mouth daily 8/28/17  Yes Jluis Mane MD   finasteride (PROSCAR) 5 MG tablet Take 1 tablet (5 mg) by mouth daily 8/28/17  Yes Jluis Mane MD   atenolol (TENORMIN) 50 MG tablet TAKE ONE TABLET BY MOUTH EVERY DAY 6/30/17  Yes Delmis Simons APRN CNP   SM CALCIUM 600/VITAMIN D 600-400 MG-UNIT per tablet Take 1 tablet by mouth 2 times daily 6/30/17  Yes Delmis Simons APRN CNP   lovastatin (MEVACOR) 20 MG tablet Take 1 tablet (20 mg) by mouth At Bedtime 5/31/17  Yes Delmis Simons APRN CNP   order for DME Equipment being ordered: Sigvaris 232 Cotton Thigh High for Men 20-30 mmHg- TWO PAIR BRENNER COLOR 5/2/17  Yes Delmis Simons APRN CNP   Multiple Vitamin (DAILY-EVANGELISTA) TABS Take 1 tablet by mouth daily 3/21/17  Yes Delmis Simons APRN CNP   latanoprost (XALATAN) 0.005 % ophthalmic solution 1 drop At Bedtime.   Yes Reported, Patient   ORDER FOR DME Equipment being ordered:   BACK BRACE    MedSpec Back-n-Shape II Back Brace  Size XXXL with thermoplastic insert        10/10/12  Yes Delmis Simons APRN CNP       ALLERGIES:   Allergies   Allergen Reactions     Penicillin [Esters] Itching     he is not sure what kind of reaction he had     Heparin      he lives in a assisted living program and is not sure what type of allergies he really has. He quit drinking     Zosyn [Penicillins]      unsure of reaction, allergy is listed on his med sheet       PMH:   Past Medical History:   Diagnosis Date     Unspecified internal derangement of knee     CHRONIC KNEE PAIN,LEG,NECK AND HEAD INJURIES   History of alcoholism.    PSH:   Past Surgical History:   Procedure Laterality Date     COLONOSCOPY  2007     JOINT REPLACEMTN, KNEE  RT/LT  2006    left   Hydrocele repair on R.    SH:   Social History   Substance Use Topics     Smoking status: Never Smoker     Smokeless tobacco: Never Used     Alcohol use No      Comment: Quit 3/24/05   Works at a furniture store, wood working. She is an [employer] .    FH:   Family History   Problem Relation Age of Onset     CANCER Father      lung cancer heavy smoker       ROS: Denies chest pain, shortness of breath, MI, CVA, DVT, PE, and bleeding disorders.    PHYSICAL EXAMINATION:   Gen.: In no acute distress. Patient appears very feminine.  On exam of the abdomen and mons, patient has an abdominal pannus and [place] about 7 cm over the full. Mons skin pinches about 3 cm.  On exam the genitalia, penile shaft length was 5 cm from the base to the junction of the mucosal foreskin. Glans is pink, well perfused, and sensate. There is no lesion or irregularity. Bilateral testes are palpable with no irregularity. No palpable inguinal hernia. No scars found.    ASSESSMENT: Gender dysphoria, requesting vaginoplasty.    PLAN: I request the patient obtain two letter of support from mental health professionals, supporting vaginoplasty. I will review these letters and if found to be adequate, she is a candidate for vaginoplasty, or zero depth vaginoplasty. We discussed the risks involved with vaginoplasty, including bleeding, infection, rectal injury, rectovaginal fistula, tissue necrosis, vaginal stenosis, vaginal shrinkage, vaginal prolapse, urinary stream abnormalities, asymmetry, wound healing difficulties, and need for revision surgery. I explained to the patient that given that she is not interested in having penetrative sexual intercourse in the future, we can forego the risks of rectal injury and rectovaginal fistula with a zero depth vaginoplasty. Along with her vaginoplasty, I may require skin graft from her abdomen which will be taken with an infraumbilical transverse incision. Dr. Weston will perform the  orchiectomy simultaneously prior to me starting the vaginoplasty. Patient would like to consider her options and will give me an answer at her next visit. In the meantime, she is to continue losing weight. We will set an operative date when her weight loss has stabilized. All questions were answered today.    Total time spent with patient was 45 min of which greater than 50% was in counseling.    Again, thank you for allowing me to participate in the care of your patient.        Sincerely,        Uvaldo Holm MD

## 2018-02-09 NOTE — PROGRESS NOTES
REFERRING PROVIDER: Abhilash Weston    REASON FOR CONSULTATION: Gender dysphoria, requesting or surgery.    HPI: Patient is a 67-year-old trans-woman who was referred to us by Dr. Abhilash Weston for possible vaginoplasty. Patient has been considering undergoing vaginoplasty for the past 30 years. She reports me that surgery will help her become a better person. In addition, she will not have to worry about issues relating to swimming and being out in public. She will also be more comfortable with her own physical body and this will relieve her gender dysphoria. Of note, she has tried to try to freeze her genitalia off in the past. She was admitted to the hospital and had a resulting circumcision. Patient publicly transitioned to her chosen gender role about 5 years ago. Her therapist is Estelle Gomez. She has been undergoing hormone therapy for the past 3 years with Dr. Mane. With hormones, her breasts grew to a size to a point where she does not wish to undergo augmentation.    She denies any medical history with her testicles. Denies any history of urinary issues. Denies history of urinary tract infections and sexually transmitted infections. She is able to achieve orgasm with a vibrator to the tip of her penis. She does not achieve erections and is not able to ejaculate. She is not interested in the ability to have penetrative sexual intercourse. She reports to me that she will not have sexual partners in the future. On a side note, patient is actively losing weight right now with diet and exercise.    MEDS:   Prior to Admission medications    Medication Sig Start Date End Date Taking? Authorizing Provider   estradiol (VIVELLE-DOT) 0.1 MG/24HR BIW patch Place 1 patch onto the skin twice a week 11/16/17  Yes Jluis Mane MD   meloxicam (MOBIC) 15 MG tablet TAKE ONE TABLET BY MOUTH EVERY DAY 9/6/17  Yes Delmis Simons APRN CNP   spironolactone (ALDACTONE) 100 MG tablet Take 1 tablet (100 mg) by  mouth daily 8/28/17  Yes Jluis Mane MD   finasteride (PROSCAR) 5 MG tablet Take 1 tablet (5 mg) by mouth daily 8/28/17  Yes Jluis Mane MD   atenolol (TENORMIN) 50 MG tablet TAKE ONE TABLET BY MOUTH EVERY DAY 6/30/17  Yes Delmis Simons APRN CNP   SM CALCIUM 600/VITAMIN D 600-400 MG-UNIT per tablet Take 1 tablet by mouth 2 times daily 6/30/17  Yes Delmis Simons APRN CNP   lovastatin (MEVACOR) 20 MG tablet Take 1 tablet (20 mg) by mouth At Bedtime 5/31/17  Yes Delmis Simons APRN CNP   order for DME Equipment being ordered: Sigvaris 232 Cotton Thigh High for Men 20-30 mmHg- TWO PAIR BRENNER COLOR 5/2/17  Yes Delmis Simons APRN CNP   Multiple Vitamin (DAILY-EVANGELISTA) TABS Take 1 tablet by mouth daily 3/21/17  Yes Delmis Simons APRN CNP   latanoprost (XALATAN) 0.005 % ophthalmic solution 1 drop At Bedtime.   Yes Reported, Patient   ORDER FOR DME Equipment being ordered:   BACK BRACE    MedSpec Back-n-Shape II Back Brace  Size XXXL with thermoplastic insert        10/10/12  Yes Delmis Simons APRN CNP       ALLERGIES:   Allergies   Allergen Reactions     Penicillin [Esters] Itching     he is not sure what kind of reaction he had     Heparin      he lives in a assisted living program and is not sure what type of allergies he really has. He quit drinking     Zosyn [Penicillins]      unsure of reaction, allergy is listed on his med sheet       PMH:   Past Medical History:   Diagnosis Date     Unspecified internal derangement of knee     CHRONIC KNEE PAIN,LEG,NECK AND HEAD INJURIES   History of alcoholism.    PSH:   Past Surgical History:   Procedure Laterality Date     COLONOSCOPY  2007     JOINT REPLACEMTN, KNEE RT/LT  2006    left   Hydrocele repair on R.    SH:   Social History   Substance Use Topics     Smoking status: Never Smoker     Smokeless tobacco: Never Used     Alcohol use No      Comment: Quit 3/24/05   Works at a furniture store, wood working. She  is an [employer] .    FH:   Family History   Problem Relation Age of Onset     CANCER Father      lung cancer heavy smoker       ROS: Denies chest pain, shortness of breath, MI, CVA, DVT, PE, and bleeding disorders.    PHYSICAL EXAMINATION:   Gen.: In no acute distress. Patient appears very feminine.  On exam of the abdomen and mons, patient has an abdominal pannus and [place] about 7 cm over the full. Mons skin pinches about 3 cm.  On exam the genitalia, penile shaft length was 5 cm from the base to the junction of the mucosal foreskin. Glans is pink, well perfused, and sensate. There is no lesion or irregularity. Bilateral testes are palpable with no irregularity. No palpable inguinal hernia. No scars found.    ASSESSMENT: Gender dysphoria, requesting vaginoplasty.    PLAN: I request the patient obtain two letter of support from mental health professionals, supporting vaginoplasty. I will review these letters and if found to be adequate, she is a candidate for vaginoplasty, or zero depth vaginoplasty. We discussed the risks involved with vaginoplasty, including bleeding, infection, rectal injury, rectovaginal fistula, tissue necrosis, vaginal stenosis, vaginal shrinkage, vaginal prolapse, urinary stream abnormalities, asymmetry, wound healing difficulties, and need for revision surgery. I explained to the patient that given that she is not interested in having penetrative sexual intercourse in the future, we can forego the risks of rectal injury and rectovaginal fistula with a zero depth vaginoplasty. Along with her vaginoplasty, I may require skin graft from her abdomen which will be taken with an infraumbilical transverse incision. Dr. Weston will perform the orchiectomy simultaneously prior to me starting the vaginoplasty. Patient would like to consider her options and will give me an answer at her next visit. In the meantime, she is to continue losing weight. We will set an operative date when her weight  loss has stabilized. All questions were answered today.    Total time spent with patient was 45 min of which greater than 50% was in counseling.

## 2018-02-09 NOTE — MR AVS SNAPSHOT
After Visit Summary   2/9/2018    Laila Perez    MRN: [id number]           Patient Information     Date Of Birth          1950        Visit Information        Provider Department      2/9/2018 10:00 AM Uvaldo Holm MD [facility]        Today's Diagnoses     Gender dysphoria in adult    -  1       Follow-ups after your visit        Your next 10 appointments already scheduled     Apr 12, 2018 10:30 AM CDT   Return Visit with Tia Ibrahim MD   [facility] ([facility])    [address] [place] [address] [phone]              Who to contact     If you have questions or need follow up information about today's clinic visit or your schedule please contact [facility] directly at [phone].  Normal or non-critical lab and imaging results will be communicated to you by MyChart, letter or phone within 4 business days after the clinic has received the results. If you do not hear from us within 7 days, please contact the clinic through MyChart or phone. If you have a critical or abnormal lab result, we will notify you by phone as soon as possible.  Submit refill requests through The Shared Web or call your pharmacy and they will forward the refill request to us. Please allow 3 business days for your refill to be completed.          Additional Information About Your Visit        [employer] Information     The Shared Web is an electronic gateway that provides easy, online access to your medical records. With The Shared Web, you can request a clinic appointment, read your test results, renew a prescription or communicate with your care team.     To sign up for The Shared Web visit the website at [email or web address]   You will be asked to enter the access code listed below, as well as some personal information. Please follow the directions to create your username and password.     Your access code is:  [email or web address]  Expires: 2018  4:01 PM     Your access code will  in 90 days. If you need help or a new code, please contact your [facility] or call [phone] for assistance.        Care EveryWhere ID     This is your Care EveryWhere ID. This could be used by other organizations to access your [place] medical records  [id number]         Blood Pressure from Last 3 Encounters:   18 137/88   18 137/88   17 102/62    Weight from Last 3 Encounters:   18 (!) 307 lb   18 (!) 307 lb 3.2 oz   17 289 lb              Today, you had the following     No orders found for display       Primary Care Provider Office Phone # Fax #    Delmis García JERRICA Simons [facility] [phone]       [address] [facility] [address]        Equal Access to Services     SUSU BLACKWELL : Hadii aad ku hadasho Sogin, waaxda luqadaha, qaybta kaalmada adeegyada, waxraissa ugarte haytim castillo . So [facility] [phone].    ATENCIÓN: Si habla español, tiene a hernandez disposición servicios gratuitos de asistencia lingüística. Llame al [phone].    We comply with applicable federal civil rights laws and [place] laws. We do not discriminate on the basis of race, color, national origin, age, disability, sex, sexual orientation, or gender identity.            Thank you!     Thank you for choosing [facility]  for your care. Our goal is always to provide you with excellent care. Hearing back from our patients is one way we can continue to improve our services. Please take a few minutes to complete the written survey that you may receive in the mail after your visit with us. Thank you!             Your Updated Medication List - Protect others around you: Learn how to safely use, store and throw away your medicines at www.disposemymeds.org.          This list is accurate as of 18 11:00 AM.  Always use your most recent med list.                    Brand Name Dispense Instructions for use Diagnosis    atenolol 50 MG tablet    TENORMIN    60 tablet    TAKE ONE TABLET BY MOUTH EVERY DAY    Essential hypertension with goal blood pressure less than 140/90       DAILY-EVANGELISTA Tabs     90 tablet    Take 1 tablet by mouth daily    Nutritional deficiency       estradiol 0.1 MG/24HR BIW patch    VIVELLE-DOT    8 patch    Place 1 patch onto the skin twice a week    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       finasteride 5 MG tablet    PROSCAR    90 tablet    Take 1 tablet (5 mg) by mouth daily    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       latanoprost 0.005 % ophthalmic solution    XALATAN     1 drop At Bedtime.        lovastatin 20 MG tablet    MEVACOR    90 tablet    Take 1 tablet (20 mg) by mouth At Bedtime    Hyperlipidemia LDL goal <100       meloxicam 15 MG tablet    MOBIC    90 tablet    TAKE ONE TABLET BY MOUTH EVERY DAY    Primary osteoarthritis, unspecified site       * order for DME     1 each    Equipment being ordered:  BACK BRACE  Truviso Back-n-Shape II Back Brace  Size XXXL with thermoplastic insert    DDD (degenerative disc disease), lumbar, Pathologic fracture of vertebrae       * order for DME     2 each    Equipment being ordered: Sigvaris 232 Cotton Thigh High for Men 20-30 mmHg- TWO PAIR TAN COLOR    Varicose veins of legs       SM CALCIUM 600/VITAMIN D 600-400 MG-UNIT per tablet   Generic drug:  calcium-vitamin D     60 tablet    Take 1 tablet by mouth 2 times daily    Hyperlipidemia LDL goal <100       spironolactone 100 MG tablet    ALDACTONE    90 tablet    Take 1 tablet (100 mg) by mouth daily    Essential hypertension with goal blood pressure less than 140/90       * Notice:  This list has 2 medication(s) that are the same as other medications prescribed for you. Read the directions carefully, and ask your doctor or other care provider to review them with you.

## 2018-02-09 NOTE — TELEPHONE ENCOUNTER
Patient in clinic today and brought 2 letters of Support for General Surgery. Copies made and sent to [facility] for scanning.    Fartun Smith LPN

## 2018-02-09 NOTE — NURSING NOTE
"Laila Perez's goals for this visit include: transgender m to f  He requests these members of his care team be copied on today's visit information: no    PCP: Delmis Simons    Referring Provider:  Abhilash Weston MD  [address] [facility] [address]  [place] [address]    Chief Complaint   Patient presents with     Consult     transgenger m to f       Initial There were no vitals taken for this visit. Estimated body mass index is 42.82 kg/(m^2) as calculated from the following:    Height as of 2/7/18: 1.803 m (5' 11\").    Weight as of 2/7/18: 139.3 kg (307 lb).  Medication Reconciliation: complete    Do you need any medication refills at today's visit? No    Fartun Smith LPN      "

## 2018-02-13 DIAGNOSIS — F64.0 GENDER DYSPHORIA IN ADOLESCENT AND ADULT: ICD-10-CM

## 2018-02-13 DIAGNOSIS — I10 ESSENTIAL HYPERTENSION WITH GOAL BLOOD PRESSURE LESS THAN 140/90: ICD-10-CM

## 2018-02-13 RX ORDER — ESTRADIOL 0.1 MG/D
1 FILM, EXTENDED RELEASE TRANSDERMAL
Qty: 8 PATCH | Refills: 2 | Status: SHIPPED | OUTPATIENT
Start: 2018-02-15 | End: 2018-03-19

## 2018-02-13 RX ORDER — ATENOLOL 50 MG/1
TABLET ORAL
Qty: 90 TABLET | Refills: 1 | Status: SHIPPED | OUTPATIENT
Start: 2018-02-13 | End: 2018-03-19

## 2018-02-13 NOTE — TELEPHONE ENCOUNTER
"Requested Prescriptions   Pending Prescriptions Disp Refills     atenolol (TENORMIN) 50 MG tablet [Pharmacy Med Name: ATENOLOL 50 MG TABLET] 30 tablet      Sig: TAKE ONE TABLET BY MOUTH EVERY DAY    Beta-Blockers Protocol Passed    2/13/2018 11:02 AM       Passed - Blood pressure under 140/90    BP Readings from Last 3 Encounters:   02/07/18 137/88   02/07/18 137/88   05/02/17 102/62                Passed - Patient is age 6 or older       Passed - Recent or future visit with authorizing provider's specialty    Patient had office visit in the last year or has a visit in the next 30 days with authorizing provider.  See \"Patient Info\" tab in inbasket, or \"Choose Columns\" in Meds & Orders section of the refill encounter.             Last Written Prescription Date:  6/30/2017  Last Fill Quantity: 60,  # refills: 3   Last office visit: 5/2/2017 with prescribing provider:  5/2/2017   Future Office Visit:   Next 5 appointments (look out 90 days)     Apr 12, 2018 10:30 AM CDT   Return Visit with Tia Ibrahim MD   [facility] ([facility])    [address] [place] [id number] [phone]                   "

## 2018-02-13 NOTE — TELEPHONE ENCOUNTER
Routine Refill Request    Estradiol 0.1 patch  Place 1 patch twice a week    Last seen 12/18/2017 - 3/4 mo follow up  No current appt made      Alexandra Long CMA

## 2018-03-19 ENCOUNTER — OFFICE VISIT (OUTPATIENT)
Dept: OTHER | Facility: OUTPATIENT CENTER | Age: 68
End: 2018-03-19
Payer: MEDICARE

## 2018-03-19 VITALS
WEIGHT: 293 LBS | DIASTOLIC BLOOD PRESSURE: 88 MMHG | HEIGHT: 71 IN | SYSTOLIC BLOOD PRESSURE: 144 MMHG | HEART RATE: 73 BPM | BODY MASS INDEX: 41.02 KG/M2

## 2018-03-19 DIAGNOSIS — F64.0 GENDER DYSPHORIA IN ADOLESCENT AND ADULT: Primary | ICD-10-CM

## 2018-03-19 DIAGNOSIS — I10 ESSENTIAL HYPERTENSION WITH GOAL BLOOD PRESSURE LESS THAN 140/90: ICD-10-CM

## 2018-03-19 RX ORDER — SPIRONOLACTONE 100 MG/1
100 TABLET, FILM COATED ORAL DAILY
Qty: 90 TABLET | Refills: 1 | Status: SHIPPED | OUTPATIENT
Start: 2018-03-19 | End: 2018-05-16

## 2018-03-19 RX ORDER — FLUOXETINE 40 MG/1
40 CAPSULE ORAL DAILY
Refills: 3 | COMMUNITY
Start: 2017-11-16 | End: 2018-03-19

## 2018-03-19 RX ORDER — ESTRADIOL 0.1 MG/D
1 FILM, EXTENDED RELEASE TRANSDERMAL
Qty: 8 PATCH | Refills: 3 | Status: SHIPPED | OUTPATIENT
Start: 2018-03-19 | End: 2018-07-09

## 2018-03-19 RX ORDER — DORZOLAMIDE HYDROCHLORIDE AND TIMOLOL MALEATE 20; 5 MG/ML; MG/ML
SOLUTION/ DROPS OPHTHALMIC
Refills: 4 | COMMUNITY
Start: 2018-02-13 | End: 2019-06-13

## 2018-03-19 RX ORDER — FINASTERIDE 5 MG/1
5 TABLET, FILM COATED ORAL DAILY
Qty: 90 TABLET | Refills: 1 | Status: SHIPPED | OUTPATIENT
Start: 2018-03-19 | End: 2018-09-11

## 2018-03-19 ASSESSMENT — ENCOUNTER SYMPTOMS
CHILLS: 0
UNEXPECTED WEIGHT CHANGE: 0
FREQUENCY: 0
FEVER: 0
PALPITATIONS: 0
NERVOUS/ANXIOUS: 0
VOMITING: 0
WEAKNESS: 0
CHEST TIGHTNESS: 0
NUMBNESS: 0
LIGHT-HEADEDNESS: 0
HEADACHES: 0
ABDOMINAL PAIN: 0
POLYDIPSIA: 0
SHORTNESS OF BREATH: 0
DYSPHORIC MOOD: 0

## 2018-03-19 NOTE — NURSING NOTE
"Chief Complaint   Patient presents with     RECHECK     TG       Vitals:    03/19/18 1051   BP: 144/88   Pulse: 73   Weight: (!) 139.3 kg (307 lb)   Height: 1.803 m (5' 11\")       Body mass index is 42.82 kg/(m^2).      Alexandra Long CMA    "

## 2018-03-19 NOTE — MR AVS SNAPSHOT
After Visit Summary   3/19/2018    Laila Perez    MRN: [id number]           Patient Information     Date Of Birth          1950        Visit Information        Provider Department      3/19/2018 10:40 AM Jluis Mane MD [facility]        Today's Diagnoses     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    -  1    Essential hypertension with goal blood pressure less than 140/90           Follow-ups after your visit        Follow-up notes from your care team     Return in about 4 months (around 7/19/2018).      Your next 10 appointments already scheduled     Apr 04, 2018  1:00 PM CDT   Individual Therapy 53+ minutes with Estelle Gomez LP   [facility] ([facility])    [address]  [address]  [facility] [address]   [phone]            Apr 30, 2018 11:00 AM CDT   Individual Therapy 53+ minutes with Estelle Gomez LP   [facility] ([facility])    [address]  [address]  [facility] [address]   [phone]            Apr 30, 2018  2:30 PM CDT   Return Visit with Tai Ibrahim MD   [facility] ([facility])    [address] [place] [address]   [phone]            May 14, 2018 10:00 AM CDT   Individual Therapy 53+ minutes with Estelle Gomez LP   [facility] ([facility])    [address]  [address]  [facility] [address]   [phone]            May 30, 2018  1:00 PM CDT   Individual Therapy 53+ minutes with Estelle Gomez LP   [facility] ([facility])    [address]  [address]  [facility] [address]   [phone]              Who to contact     Please call your clinic at [phone] to:    Ask questions about your health    Make or cancel appointments    Discuss your medicines    Learn about your test results    Speak to your doctor             "Additional Information About Your Visit        [employer] Information     [employer] is an electronic gateway that provides easy, online access to your medical records. With [employer], you can request a clinic appointment, read your test results, renew a prescription or communicate with your care team.     To sign up for [employer] visit the website at [email or web address] (Innovative [employer] Technology)   You will be asked to enter the access code listed below, as well as some personal information. Please follow the directions to create your username and password.     Your access code is: [email or web address]  Expires: 2018  5:01 PM     Your access code will  in 90 days. If you need help or a new code, please contact your [facility] or call [phone] for assistance.        Care EveryWhere ID     This is your Care EveryWhere ID. This could be used by other organizations to access your [place] medical records  [id number]        Your Vitals Were     Pulse Height BMI (Body Mass Index)             73 1.803 m (5' 11\") 42.82 kg/m2          Blood Pressure from Last 3 Encounters:   18 144/88   18 137/88   18 137/88    Weight from Last 3 Encounters:   18 (!) 139.3 kg (307 lb)   18 (!) 139.3 kg (307 lb)   18 (!) 139.3 kg (307 lb 3.2 oz)              We Performed the Following     SCANNED MISC. LAB RESULTS          Where to get your medicines      These medications were sent to [place], [place] - [address]  [address] [facility] [address]    Hours:  test rx sent successfully  05  kr Phone:  [phone]     estradiol 0.1 MG/24HR BIW patch    finasteride 5 MG tablet    spironolactone 100 MG tablet          Primary Care Provider Office Phone # Fax #    JERRICA Salcedo [phone] [address] [place] [address]        Equal Access to Services     SUSU BLACKWELL AH: padma Chin " yelitza ruben christinezulma cintron ah. So [facility] [phone].    ATENCIÓN: Si meghann mendoza, tiene a hernandez disposición servicios gratuitos de asistencia lingüística. Soco al [phone].    We comply with applicable federal civil rights laws and [place] laws. We do not discriminate on the basis of race, color, national origin, age, disability, sex, sexual orientation, or gender identity.            Thank you!     Thank you for choosing [facility]  for your care. Our goal is always to provide you with excellent care. Hearing back from our patients is one way we can continue to improve our services. Please take a few minutes to complete the written survey that you may receive in the mail after your visit with us. Thank you!             Your Updated Medication List - Protect others around you: Learn how to safely use, store and throw away your medicines at www.disposemymeds.org.          This list is accurate as of 3/19/18 11:59 PM.  Always use your most recent med list.                   Brand Name Dispense Instructions for use Diagnosis    DAILY-EVANGELISTA Tabs     90 tablet    Take 1 tablet by mouth daily    Nutritional deficiency       dorzolamide-timolol 2-0.5 % ophthalmic solution    COSOPT     INSTILL 1 DROP INTO BOTH EYES 2 TIMES DAILY.        estradiol 0.1 MG/24HR BIW patch    VIVELLE-DOT    8 patch    Place 1 patch onto the skin twice a week    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       finasteride 5 MG tablet    PROSCAR    90 tablet    Take 1 tablet (5 mg) by mouth daily    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       latanoprost 0.005 % ophthalmic solution    XALATAN     1 drop At Bedtime.        lovastatin 20 MG tablet    MEVACOR    90 tablet    Take 1 tablet (20 mg) by mouth At Bedtime    Hyperlipidemia LDL goal <100       meloxicam 15 MG tablet    MOBIC    90 tablet    TAKE ONE TABLET BY MOUTH EVERY DAY    Primary osteoarthritis, unspecified site        * order for DME     1 each    Equipment being ordered:  BACK BRACE  MedSpec Back-n-Shape II Back Brace  Size XXXL with thermoplastic insert    DDD (degenerative disc disease), lumbar, Pathologic fracture of vertebrae       * order for DME     2 each    Equipment being ordered: Sigvaris 232 Cotton Thigh High for Men 20-30 mmHg- TWO PAIR TAN COLOR    Varicose veins of legs       SM CALCIUM 600/VITAMIN D 600-400 MG-UNIT per tablet   Generic drug:  calcium-vitamin D     60 tablet    Take 1 tablet by mouth 2 times daily    Hyperlipidemia LDL goal <100       spironolactone 100 MG tablet    ALDACTONE    90 tablet    Take 1 tablet (100 mg) by mouth daily    Essential hypertension with goal blood pressure less than 140/90       * Notice:  This list has 2 medication(s) that are the same as other medications prescribed for you. Read the directions carefully, and ask your doctor or other care provider to review them with you.

## 2018-03-19 NOTE — PROGRESS NOTES
BOO Ulloa is a 68 year old individual that uses pronouns She/Her/Hers/Herself that presents today for follow up of:  feminizing hormone therapy.   Gender identity: female.    Started Hormone  therapy  7/2016  Continues on . Vivelle dot 0.1  mg patch 2x/wk    Spironlactone 100* mg daily      Other *finasteride 5 mg  Any special concerns today?    She has seen Dr. Weston and Dr. Homl to plan for genital confirmation surgery, including orchiectomy and zero depth vaginoplasty. Dr. Holm had recommend that she lose weight before surgery.    On hormones?  YES +++ Shot day of the week? Not applicable-taking pills/patch/gel      Due for labs?  Yes      +++ Refills of meds needed?  Yes  Gender related body changes since last visit:   Buttocks bigger, breasts doing well.     Breakthrough bleeding? Does Not Apply  Activity: Working, walking around the mall  New health concerns since last visit:  Pt. Has NOT been on atenolol for several years, bp has been rising over last 1 year about. Will talk to PCP at upcoming annual exam about this  ---    Past Surgical History:   Procedure Laterality Date     COLONOSCOPY  2007     JOINT REPLACEMTN, KNEE RT/LT  2006    left       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     Family history of diabetes mellitus in first degree relative     Mitral valve disorder     Nonrheumatic aortic valve stenosis     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       Current Outpatient Prescriptions   Medication Sig Dispense Refill      dorzolamide-timolol (COSOPT) 2-0.5 % ophthalmic solution INSTILL 1 DROP INTO BOTH EYES 2 TIMES DAILY.  4     estradiol (VIVELLE-DOT) 0.1 MG/24HR BIW patch Place 1 patch onto the skin twice a week 8 patch 2     meloxicam (MOBIC) 15 MG tablet TAKE ONE TABLET BY MOUTH EVERY DAY 90 tablet 3     spironolactone (ALDACTONE) 100 MG tablet Take 1 tablet (100 mg) by mouth daily 90 tablet 1     SM CALCIUM 600/VITAMIN D 600-400 MG-UNIT per tablet Take 1 tablet by mouth 2 times daily 60 tablet 3     lovastatin (MEVACOR) 20 MG tablet Take 1 tablet (20 mg) by mouth At Bedtime 90 tablet 3     order for DME Equipment being ordered: Sigvaris 232 Cotton Thigh High for Men 20-30 mmHg- TWO PAIR TAN COLOR 2 each 5     Multiple Vitamin (DAILY-EVANGELISTA) TABS Take 1 tablet by mouth daily 90 tablet 3     latanoprost (XALATAN) 0.005 % ophthalmic solution 1 drop At Bedtime.       ORDER FOR DME Equipment being ordered:   BACK BRACE    [employer] Back-n-Shape II Back Brace  Size XXXL with thermoplastic insert        1 each 0     atenolol (TENORMIN) 50 MG tablet TAKE ONE TABLET BY MOUTH EVERY DAY (Patient not taking: Reported on 3/19/2018) 90 tablet 1     finasteride (PROSCAR) 5 MG tablet Take 1 tablet (5 mg) by mouth daily (Patient not taking: Reported on 3/19/2018) 90 tablet 1       History   Smoking Status     Never Smoker   Smokeless Tobacco     Never Used          Allergies   Allergen Reactions     Penicillin [Esters] Itching     he is not sure what kind of reaction he had     Heparin      he lives in a assisted living program and is not sure what type of allergies he really has. He quit drinking     Zosyn [Penicillins]      unsure of reaction, allergy is listed on his med sheet       Health Maintenance Due   Topic Date Due     DEPRESSION ACTION PLAN Q1 YR  03/09/1968     AORTIC ANEURYSM SCREENING (SYSTEM ASSIGNED)  03/09/2015     PNEUMOCOCCAL (2 of 2 - PPSV23) 04/26/2017     ADVANCE DIRECTIVE PLANNING Q5 YRS  07/25/2017         Problem,  "Medication and Allergy Lists were reviewed and are current..         Review of Systems:   Review of Systems   Constitutional: Negative for chills, fever and unexpected weight change.   Eyes: Negative for visual disturbance.   Respiratory: Negative for chest tightness and shortness of breath.    Cardiovascular: Positive for leg swelling. Negative for chest pain and palpitations.        Chronic mild ankle swelling   Gastrointestinal: Negative for abdominal pain and vomiting.   Endocrine: Negative for polydipsia and polyuria.   Genitourinary: Negative for frequency.   Neurological: Negative for weakness, light-headedness, numbness and headaches.   Psychiatric/Behavioral: Negative for dysphoric mood and suicidal ideas. The patient is not nervous/anxious.             Physical Exam:     Vitals:    03/19/18 1051   BP: 144/88   Pulse: 73   Weight: (!) 139.3 kg (307 lb)   Height: 1.803 m (5' 11\")     BMI= Body mass index is 42.82 kg/(m^2).   Wt Readings from Last 10 Encounters:   03/19/18 (!) 139.3 kg (307 lb)   02/07/18 (!) 139.3 kg (307 lb)   02/07/18 (!) 139.3 kg (307 lb 3.2 oz)   12/18/17 (!) 142.9 kg (315 lb)   09/11/17 134.4 kg (296 lb 6.4 oz)   06/27/17 132.5 kg (292 lb)   05/02/17 131.1 kg (289 lb)   04/26/17 131.1 kg (289 lb)   02/06/17 124.7 kg (275 lb)   11/17/16 127.9 kg (282 lb)     Appearance: Female appearance and dress    GENERAL:: healthy, alert and no distress  RESP: lungs clear to auscultation - no rales, no rhonchi, no wheezes  CV: regular rates and rhythm, normal S1 S2, no S3 or S4 and no murmur, no click or rub -  EXT 1+ edema on L ankle, none R      Affect: Appropriate/mood-congruent           Labs:   Results from last visit:  Orders Only on 11/01/2017   Component Date Value Ref Range Status     Sodium 11/01/2017 133  133 - 144 mmol/L Final     Potassium 11/01/2017 5.2  3.4 - 5.3 mmol/L Final     Chloride 11/01/2017 101  94 - 109 mmol/L Final     Carbon Dioxide 11/01/2017 28  20 - 32 mmol/L Final     " Anion Gap 11/01/2017 4  3 - 14 mmol/L Final     Glucose 11/01/2017 92  70 - 99 mg/dL Final     Urea Nitrogen 11/01/2017 18  7 - 30 mg/dL Final     Creatinine 11/01/2017 1.18  0.66 - 1.25 mg/dL Final     GFR Estimate 11/01/2017 61  >60 mL/min/1.7m2 Final    Non  GFR Calc     GFR Estimate If Black 11/01/2017 74  >60 mL/min/1.7m2 Final    African American GFR Calc     Calcium 11/01/2017 9.5  8.5 - 10.1 mg/dL Final     Testosterone Total 11/01/2017 88* 240 - 950 ng/dL Final    Comment: This test was developed and its performance characteristics determined by the   [facility],  Special Chemistry Laboratory. It has   not been cleared or approved by the FDA. The laboratory is regulated under   CLIA as qualified to perform high-complexity testing. This test is used for   clinical purposes. It should not be regarded as investigational or for   research.       Sex Hormone Binding Globulin 11/01/2017 55  11 - 80 nmol/L Final     Free Testosterone Calculated 11/01/2017 1.14* 4.7 - 24.4 ng/dL Final       Assessment and Plan   1. Gender dysphoria  2. Obesity  3. HTN  Continue on current hormone regimen, and counseled patient on weight loss, recommend to work with primary care on BP as well as weight loss.  Due for fasting lipids and LFT      Follow up:  Follow up in 4 months  Results by mychart  Questions were elicited and answered.     Jluis Mane MD

## 2018-04-04 ENCOUNTER — OFFICE VISIT (OUTPATIENT)
Dept: OTHER | Facility: OUTPATIENT CENTER | Age: 68
End: 2018-04-04
Payer: MEDICARE

## 2018-04-04 DIAGNOSIS — F64.0 GENDER DYSPHORIA IN ADOLESCENT AND ADULT: Primary | ICD-10-CM

## 2018-04-04 NOTE — MR AVS SNAPSHOT
After Visit Summary   4/4/2018    Laila Perez    MRN: [id number]           Patient Information     Date Of Birth          1950        Visit Information        Provider Department      4/4/2018 1:00 PM Estelle Gomez LP [facility]        Today's Diagnoses     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    -  1       Follow-ups after your visit        Your next 10 appointments already scheduled     Apr 30, 2018 11:00 AM CDT   Individual Therapy 53+ minutes with Estelle Gomez LP   [facility] ([facility])    [address]  Mail Code 7521  [facility] [address]   [phone]            Apr 30, 2018  2:30 PM CDT   Return Visit with Tia Ibrahim MD   [facility] ([facility])    [address] [place] [address]   [phone]            May 14, 2018 10:00 AM CDT   Individual Therapy 53+ minutes with Estelle Gomez LP   [facility] ([facility])    [address]  Mail Code 7521  [facility] [address]   [phone]            May 30, 2018  1:00 PM CDT   Individual Therapy 53+ minutes with Estelle Gomez LP   [facility] ([facility])    [address]  Mail Code 7521  [facility] [address]   [phone]            Jun 11, 2018 12:00 PM CDT   Individual Therapy 53+ minutes with Estelle Gomez LP   [facility] ([facility])    [address]  Mail Code 7521  [facility] [address]   [phone]            Jun 25, 2018 12:00 PM CDT   Individual Therapy 53+ minutes with Estelle Gomez LP   [facility] ([facility])    [address]  Mail Code 7521  [facility] [address]   [phone]              Who to contact     Please call your clinic at [phone] to:    Ask questions about your health    Make or cancel appointments    Discuss your  medicines    Learn about your test results    Speak to your doctor            Additional Information About Your Visit        MyChart Information     [employer] is an electronic gateway that provides easy, online access to your medical records. With [employer], you can request a clinic appointment, read your test results, renew a prescription or communicate with your care team.     To sign up for [employer] visit the website at [email or web address]   You will be asked to enter the access code listed below, as well as some personal information. Please follow the directions to create your username and password.     Your access code is: [email or web address]  Expires: 2018 11:38 AM     Your access code will  in 90 days. If you need help or a new code, please contact your [facility] or call [phone] for assistance.        Care EveryWhere ID     This is your Care EveryWhere ID. This could be used by other organizations to access your [facility] medical records  [id number]         Blood Pressure from Last 3 Encounters:   18 144/88   18 137/88   18 137/88    Weight from Last 3 Encounters:   18 (!) 139.3 kg (307 lb)   18 (!) 139.3 kg (307 lb)   18 (!) 139.3 kg (307 lb 3.2 oz)              We Performed the Following     Individual Psychotherapy (53+ min) [14363]        Primary Care Provider Office Phone # Fax #    JERRICA Salcedo [employer] [phone]       [address] [place] [address]        Equal Access to Services     SUSU BLACKWELL : Hadii aad ku hadasho Soomaali, waaxda luqadaha, qaybta kaalmada adeegyada, zulma simpson. So [facility] [phone].    ATENCIÓN: Si habla español, tiene a hernandez disposición servicios gratuitos de asistencia lingüística. Llame al [phone].    We comply with applicable federal civil rights laws and [place] laws. We do not discriminate on the basis of race, color, national  origin, age, disability, sex, sexual orientation, or gender identity.            Thank you!     Thank you for choosing [facility]  for your care. Our goal is always to provide you with excellent care. Hearing back from our patients is one way we can continue to improve our services. Please take a few minutes to complete the written survey that you may receive in the mail after your visit with us. Thank you!             Your Updated Medication List - Protect others around you: Learn how to safely use, store and throw away your medicines at www.disposemymeds.org.          This list is accurate as of 4/4/18 11:59 PM.  Always use your most recent med list.                   Brand Name Dispense Instructions for use Diagnosis    DAILY-EVANGELISTA Tabs     90 tablet    Take 1 tablet by mouth daily    Nutritional deficiency       dorzolamide-timolol 2-0.5 % ophthalmic solution    COSOPT     INSTILL 1 DROP INTO BOTH EYES 2 TIMES DAILY.        estradiol 0.1 MG/24HR BIW patch    VIVELLE-DOT    8 patch    Place 1 patch onto the skin twice a week    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       finasteride 5 MG tablet    PROSCAR    90 tablet    Take 1 tablet (5 mg) by mouth daily    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       latanoprost 0.005 % ophthalmic solution    XALATAN     1 drop At Bedtime.        lovastatin 20 MG tablet    MEVACOR    90 tablet    Take 1 tablet (20 mg) by mouth At Bedtime    Hyperlipidemia LDL goal <100       meloxicam 15 MG tablet    MOBIC    90 tablet    TAKE ONE TABLET BY MOUTH EVERY DAY    Primary osteoarthritis, unspecified site       * order for DME     1 each    Equipment being ordered:  BACK BRACE  MedSpec Back-n-Shape II Back Brace  Size XXXL with thermoplastic insert    DDD (degenerative disc disease), lumbar, Pathologic fracture of vertebrae       * order for DME     2 each    Equipment being ordered: Sigvaris 232 Cotton Thigh High for Men 20-30 mmHg- TWO PAIR BRENNER COLOR    Varicose  veins of legs       SM CALCIUM 600/VITAMIN D 600-400 MG-UNIT per tablet   Generic drug:  calcium-vitamin D     60 tablet    Take 1 tablet by mouth 2 times daily    Hyperlipidemia LDL goal <100       spironolactone 100 MG tablet    ALDACTONE    90 tablet    Take 1 tablet (100 mg) by mouth daily    Essential hypertension with goal blood pressure less than 140/90       * Notice:  This list has 2 medication(s) that are the same as other medications prescribed for you. Read the directions carefully, and ask your doctor or other care provider to review them with you.

## 2018-04-05 NOTE — PROGRESS NOTES
[facility] -  Case Progress Note  Client Name: Laila Perez  YOB: 1950  MRN:  [id number]  Treating Provider: Estelle Gomez LP  Type of Session: Individual  Present in Session: client and support person  Number of Minutes:  55    Health Maintenance Summary - Mental Health Treatment Plan       Status Date      Mental Health Tx Plan Q11 MOS Next Due 9/30/2018      Done 10/30/2017      Done 9/6/2016             Date of Service: 4/04/18    Current Symptoms/Status:  Distress about gender and secondary sex characteristics, wants genitals removed, bothers her daily of moderate to severe intensity, distress with work situation.    Progress Toward Treatment Goals:   Client reported progress with meeting with 2 surgeons.    Intervention: Modality and Description:  Provided support and feedback. Used CBT to process gender dysphoria concerns. She shared meeting with urologist regarding orchiectomy. There was a conversation that she wanted to have her penis removed as well. Surgeon then referred her to Dr. Holm for a consult regarding vaginoplasty. Client shares she now wants a letter from me to support having this surgery. Explored what she understood about the surgery and the needed aftercare. She seemed to have some general understanding, but not specific to the care she will need. Discussed that in order for me to write a letter I need to know she understands and has a reasonable plan. Also discussed what she has found out from insurance. She shared they said they would send the information to her insurance company for authorization. She does not know any of the specifics. Discussed she needs to know this information. Her belief is they will pay for all of it. Her support person stated she had no more information than the client. Told client I would need to consult with the team and we can talk more about it at next session.    Response to Intervention:  Client reported  validation.    Assignment:  Schedule appointment with surgeon.      Diagnosis:  Encounter Diagnosis   Name Primary?     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 Yes           Plan / Need for Future Services:  Return for therapy in 4 weeks.      Estelle Gomez PsyD, LP

## 2018-04-16 DIAGNOSIS — E63.9 NUTRITIONAL DEFICIENCY: ICD-10-CM

## 2018-04-16 RX ORDER — MULTIVITAMIN WITH FOLIC ACID 400 MCG
TABLET ORAL
Qty: 100 TABLET | Refills: 0 | Status: SHIPPED | OUTPATIENT
Start: 2018-04-16 | End: 2018-05-09

## 2018-04-16 NOTE — TELEPHONE ENCOUNTER
"Requested Prescriptions   Pending Prescriptions Disp Refills     Multiple Vitamin (DAILY-EVANGELISTA) TABS [Pharmacy Med Name: DAILY EVANGELISTA  TABLET] 30 tablet      Sig: TAKE ONE TABLET BY MOUTH EVERY DAY    Vitamin Supplements (Adult) Protocol Passed    4/16/2018 11:29 AM       Passed - High dose Vitamin D not ordered       Passed - Recent (12 mo) or future (30 days) visit within the authorizing provider's specialty    Patient had office visit in the last 12 months or has a visit in the next 30 days with authorizing provider or within the authorizing provider's specialty.  See \"Patient Info\" tab in inbasket, or \"Choose Columns\" in Meds & Orders section of the refill encounter.            Multiple Vitamin (DAILY-EVANGELISTA) TABS  Last Written Prescription Date:  03/21/2017  Last Fill Quantity: 90 tablet,  # refills: 3   Last office visit: 5/2/2017 with prescribing provider:  DG Simons   Future Office Visit:   Next 5 appointments (look out 90 days)     Apr 30, 2018  2:30 PM CDT   Return Visit with Tia Ibrahim MD   [facility] ([facility])    [address] [place] [id number] [phone]                 Daina MORGAN (R) (M)    "

## 2018-04-30 ENCOUNTER — OFFICE VISIT (OUTPATIENT)
Dept: OTHER | Facility: OUTPATIENT CENTER | Age: 68
End: 2018-04-30
Payer: MEDICARE

## 2018-04-30 ENCOUNTER — OFFICE VISIT (OUTPATIENT)
Dept: CARDIOLOGY | Facility: CLINIC | Age: 68
End: 2018-04-30
Attending: INTERNAL MEDICINE
Payer: MEDICARE

## 2018-04-30 VITALS
DIASTOLIC BLOOD PRESSURE: 77 MMHG | BODY MASS INDEX: 42.54 KG/M2 | WEIGHT: 293 LBS | SYSTOLIC BLOOD PRESSURE: 118 MMHG | HEART RATE: 71 BPM | OXYGEN SATURATION: 95 %

## 2018-04-30 DIAGNOSIS — F64.0 GENDER DYSPHORIA IN ADOLESCENT AND ADULT: Primary | ICD-10-CM

## 2018-04-30 DIAGNOSIS — I35.0 AORTIC VALVE STENOSIS, UNSPECIFIED ETIOLOGY: ICD-10-CM

## 2018-04-30 PROCEDURE — 99214 OFFICE O/P EST MOD 30 MIN: CPT | Performed by: INTERNAL MEDICINE

## 2018-04-30 NOTE — MR AVS SNAPSHOT
After Visit Summary   4/30/2018    Laila Perez    MRN: [id number]           Patient Information     Date Of Birth          1950        Visit Information        Provider Department      4/30/2018 11:00 AM [facility] LP [facility]        Today's Diagnoses     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    -  1       Follow-ups after your visit        Your next 10 appointments already scheduled     May 09, 2018 10:20 AM CDT   Office Visit with JERRICA Salcedo CNP   [facility] ([facility])    [address]  [facility] [id number] [phone]           Bring a current list of meds and any records pertaining to this visit. For Physicals, please bring immunization records and any forms needing to be filled out. Please arrive 10 minutes early to complete paperwork.            May 14, 2018 10:00 AM CDT   Individual Therapy 53+ minutes with Estelle Gomez LP   [facility] ([facility])    [address] [facility]  Mail Code 7521  [facility] [address]   [phone]            May 29, 2018  9:45 AM CDT   Ech Stress Test with MALLIKA MEYERS ECHO STRESS 2   [facility] ([facility])    [address] [facility] [id number] [phone]           1. Please bring or wear a comfortable two-piece outfit and walking shoes. 2. Stop eating 3 hours before the test. You may drink water or juice. 3. Stop all caffeine 12 hours before the test. This includes coffee, tea, soda pop, chocolate and certain medicines (such as Anacin and Excederin). Also avoid decaf coffee and tea, as these contain small amounts of caffeine. 4. No alcohol, smoking or use of other tobacco products for 12 hours before the test. 5. Refer to your provider instructions to see if you need to stop any medications (such as beta-blockers or nitrates) for this test. 6. For patients with diabetes: - If you take  insulin, call your diabetes care team. Ask if you should take a   dose the morning of your test. - If you take diabetes medicine by mouth, dont take it on the morning of your test. Bring it with you to take after the test. (If you have questions, call your diabetes care team) 7. When you arrive, please tell us if: - You have diabetes. - You have taken Viagra, Cialis or Levitra in the past 48 hours. 8. For any questions that cannot be answered, please contact the ordering physician            May 30, 2018  1:00 PM CDT   Individual Therapy 53+ minutes with Estelle Gomez LP   [facility] ([facility])    [address]  Mail Code 7521  [facility] [address]   [phone]            Jun 11, 2018 12:00 PM CDT   Individual Therapy 53+ minutes with Estelle Gomez LP   [facility] ([facility])    [address]  Mail Code 7521  [facility] [address]   [phone]            Jun 25, 2018 12:00 PM CDT   Individual Therapy 53+ minutes with Estelle Gomez LP   [facility] ([facility])    [address]  Mail [address] 7521  [facility] [address]   [phone]            Jul 09, 2018 10:40 AM CDT   Return Visit with Jluis Mane MD   [facility] ([facility])    [address]  Mail [address] 7521  [facility] [address]   [phone]            Jul 16, 2018 11:00 AM CDT   Individual Therapy 53+ minutes with Estelle Gomez LP   [facility] ([facility])    [address]  Mail Code 7521  [facility] [address]   [phone]              Who to contact     Please call your clinic at [phone] to:    Ask questions about your health    Make or cancel appointments    Discuss your medicines    Learn about your test results    Speak to your doctor            Additional Information About Your Visit        MyChart Information     [employer] is an electronic gateway that  provides easy, online access to your medical records. With [employer], you can request a clinic appointment, read your test results, renew a prescription or communicate with your care team.     To sign up for [employer] visit the website at [email or web address]   You will be asked to enter the access code listed below, as well as some personal information. Please follow the directions to create your username and password.     Your access code is: [email or web address]  Expires: 2018 11:38 AM     Your access code will  in 90 days. If you need help or a new code, please contact your [facility] or call [phone] for assistance.        Care EveryWhere ID     This is your Care EveryWhere ID. This could be used by other organizations to access your [facility] medical records  [id number]         Blood Pressure from Last 3 Encounters:   18 118/77   18 144/88   18 137/88    Weight from Last 3 Encounters:   18 138.3 kg (305 lb)   18 (!) 139.3 kg (307 lb)   18 (!) 139.3 kg (307 lb)              We Performed the Following     Individual Psychotherapy (53+ min) [18378]        Primary Care Provider Office Phone # Fax #    JERRICA Salcedo [facility] [phone]       [address] [place] [address]        Equal Access to Services     SUSU BLACKWELL : Hadii aad ku hadasho Soomaali, waaxda luqadaha, qaybta kaalmada adeegyada, zulma castillo . So [facility] [phone].    ATENCIÓN: Si habla español, tiene a hernandez disposición servicios gratuitos de asistencia lingüística. Llame al [phone].    We comply with applicable federal civil rights laws and [place] laws. We do not discriminate on the basis of race, color, national origin, age, disability, sex, sexual orientation, or gender identity.            Thank you!     Thank you for choosing [facility]  for your care. Our goal is always to provide you with  excellent care. Hearing back from our patients is one way we can continue to improve our services. Please take a few minutes to complete the written survey that you may receive in the mail after your visit with us. Thank you!             Your Updated Medication List - Protect others around you: Learn how to safely use, store and throw away your medicines at www.disposemymeds.org.          This list is accurate as of 4/30/18 11:59 PM.  Always use your most recent med list.                   Brand Name Dispense Instructions for use Diagnosis    DAILY-EVANGELISTA Tabs     100 tablet    TAKE ONE TABLET BY MOUTH EVERY DAY    Nutritional deficiency       dorzolamide-timolol 2-0.5 % ophthalmic solution    COSOPT     INSTILL 1 DROP INTO BOTH EYES 2 TIMES DAILY.        estradiol 0.1 MG/24HR BIW patch    VIVELLE-DOT    8 patch    Place 1 patch onto the skin twice a week    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       finasteride 5 MG tablet    PROSCAR    90 tablet    Take 1 tablet (5 mg) by mouth daily    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       latanoprost 0.005 % ophthalmic solution    XALATAN     1 drop At Bedtime.        lovastatin 20 MG tablet    MEVACOR    90 tablet    Take 1 tablet (20 mg) by mouth At Bedtime    Hyperlipidemia LDL goal <100       meloxicam 15 MG tablet    MOBIC    90 tablet    TAKE ONE TABLET BY MOUTH EVERY DAY    Primary osteoarthritis, unspecified site       * order for DME     1 each    Equipment being ordered:  BACK BRACE  MedSpec Back-n-Shape II Back Brace  Size XXXL with thermoplastic insert    DDD (degenerative disc disease), lumbar, Pathologic fracture of vertebrae       * order for DME     2 each    Equipment being ordered: Sigvaris 232 Cotton Thigh High for Men 20-30 mmHg- TWO PAIR TAN COLOR    Varicose veins of legs       SM CALCIUM 600/VITAMIN D 600-400 MG-UNIT per tablet   Generic drug:  calcium-vitamin D     60 tablet    Take 1 tablet by mouth 2 times daily    Hyperlipidemia LDL goal  <100       spironolactone 100 MG tablet    ALDACTONE    90 tablet    Take 1 tablet (100 mg) by mouth daily    Essential hypertension with goal blood pressure less than 140/90       * Notice:  This list has 2 medication(s) that are the same as other medications prescribed for you. Read the directions carefully, and ask your doctor or other care provider to review them with you.

## 2018-04-30 NOTE — LETTER
"4/30/2018    Delmis Simons, APRN CNP  [address] [facility] [address]    RE: Laila Perez       Dear Colleague,    I had the pleasure of seeing Laila Perez in the [facility].    HPI and Plan:     Ms. Laila Perez is 66 years old and is seen in follow-up for aortic stenosis.     She is planning gender reassignment surgery in the next months.     Previously, a murmur was identified.  An echocardiogram was performed which indicated the presence of aortic stenosis with a mean gradient of 20 mmHg and a calculated aortic valve area of approximately 1.5-1.7 cm2.  I reviewed the echocardiogram and the valve is most likely trileaflet, though there is also dilation of the aorta above the sinotubular ridge to 4.2 cm.  There is only trace to mild aortic insufficiency.  Left ventricular systolic performance is normal.      Ms. Perez denies any chest discomfort, exertional intolerance or shortness of breath.  She notes that she likes woodworking.  She had a garden last year which was predominantly vegetable based on her description.  She has remained active without any exertional limitation, chest discomfort or dyspnea.  She notes that she feels \"good.\"      She denies any family history of heart disease or CAD.  She notes that there is no family history of bypass surgery or coronary artery disease.  She previously described a history of alcohol dependence until 6 years ago.  She has been sober since that time and considers her participation in sobriety support (AA?) to be a primary purpose in her life.      Her lipids indicate an LDL of 90, HDL 57 and triglycerides 147 mg/dL on low-dose lovastatin (20 mg).  Her blood pressure is well-controlled.  She is on spironolactone at 100 mg p.o. Daily for her gender reassignment though it may be beneficial with regard to her blood pressure.      PHYSICAL EXAMINATION:   GENERAL:  This is an individual in no apparent distress.  She was " alert and oriented to person, place and time.   VITAL SIGNS: Blood pressure 118/77, pulse 71, weight 138.3 kg (305 lb), SpO2 95 %.  CHEST:  Clear to auscultation.   CARDIAC:  On cardiac auscultation, there was an S1 and S2 without extra sounds.  There was a grade 1-2/6 systolic ejection murmur noted at the upper left sternal border which radiated to the left carotid artery.  It did not extend to S2, but was much more audible at the left carotid artery.  Presumably, this is a transmitted murmur.  There was no bruit over the left subclavian artery.      ASSESSMENT/PLAN:    Ms. Perez has aortic stenosis with a relatively high gradient for the calculated valve area.  That would suggest a relatively increased cardiac output and indeed, Ms. Perez is tall, as well as having a weight of 305 pounds.      I noted that she was completely asymptomatic and discussed the physiology and the eventual treatment for aortic stenosis.  We discussed the past need for surgical treatment and hopefully the potential for transcatheter aortic valve replacement in the future if the low risk trial proceeds, demonstrating outcomes similar to surgical aortic valve replacement.  I explained that there is no medical therapy for aortic stenosis.  Previously, I have recommended a yearly visit and echocardiogram until the valve area is less than 1 cm2, at which time I would recommend followup every 6 months.     With regard to her gender reassignment surgery, I recommended a stress echo prior to the surgery.    I recommended a return at 1 year with a followup echocardiogram unless the stress study is abnormal.     Orders Placed This Encounter   Procedures     Follow-Up with Cardiologist     Exercise Stress Echocardiogram       No orders of the defined types were placed in this encounter.      There are no discontinued medications.      Encounter Diagnosis   Name Primary?     Aortic valve stenosis, unspecified etiology        CURRENT  MEDICATIONS:  Current Outpatient Prescriptions   Medication Sig Dispense Refill     dorzolamide-timolol (COSOPT) 2-0.5 % ophthalmic solution INSTILL 1 DROP INTO BOTH EYES 2 TIMES DAILY.  4     estradiol (VIVELLE-DOT) 0.1 MG/24HR BIW patch Place 1 patch onto the skin twice a week 8 patch 3     finasteride (PROSCAR) 5 MG tablet Take 1 tablet (5 mg) by mouth daily 90 tablet 1     latanoprost (XALATAN) 0.005 % ophthalmic solution 1 drop At Bedtime.       lovastatin (MEVACOR) 20 MG tablet Take 1 tablet (20 mg) by mouth At Bedtime 90 tablet 3     meloxicam (MOBIC) 15 MG tablet TAKE ONE TABLET BY MOUTH EVERY DAY 90 tablet 3     Multiple Vitamin (DAILY-EVANGELISTA) TABS TAKE ONE TABLET BY MOUTH EVERY  tablet 0     ORDER FOR DME Equipment being ordered:   BACK BRACE    [employer] Back-n-Shape II Back Brace  Size XXXL with thermoplastic insert        1 each 0     order for DME Equipment being ordered: Sigvaris 232 Cotton Thigh High for Men 20-30 mmHg- TWO PAIR TAN COLOR 2 each 5     SM CALCIUM 600/VITAMIN D 600-400 MG-UNIT per tablet Take 1 tablet by mouth 2 times daily 60 tablet 3     spironolactone (ALDACTONE) 100 MG tablet Take 1 tablet (100 mg) by mouth daily 90 tablet 1       ALLERGIES     Allergies   Allergen Reactions     Penicillin [Esters] Itching     he is not sure what kind of reaction he had     Heparin      he lives in a assisted living program and is not sure what type of allergies he really has. He quit drinking     Zosyn [Penicillins]      unsure of reaction, allergy is listed on his med sheet       PAST MEDICAL HISTORY:  Past Medical History:   Diagnosis Date     Unspecified internal derangement of knee     CHRONIC KNEE PAIN,LEG,NECK AND HEAD INJURIES       PAST SURGICAL HISTORY:  Past Surgical History:   Procedure Laterality Date     COLONOSCOPY  2007     JOINT REPLACEMTN, KNEE RT/LT  2006    left       FAMILY HISTORY:  Family History   Problem Relation Age of Onset     CANCER Father      lung cancer heavy  smoker       SOCIAL HISTORY:  Social History     Social History     Marital status:      Spouse name: N/A     Number of children: N/A     Years of education: N/A     Social History Main Topics     Smoking status: Never Smoker     Smokeless tobacco: Never Used     Alcohol use No      Comment: Quit 3/24/05     Drug use: No     Sexual activity: Not Currently     Other Topics Concern     None     Social History Narrative       Review of Systems:  Skin:  Negative       Eyes:  Positive for glasses    ENT:  Negative      Respiratory:  Negative for shortness of breath;cough     Cardiovascular:  Negative for;chest pain;palpitations;syncope or near-syncope;lightheadedness;dizziness;fatigue Positive for;edema    Gastroenterology: Negative      Genitourinary:  Negative      Musculoskeletal:  not assessed      Neurologic:  Negative      Psychiatric:  Negative      Heme/Lymph/Imm:  Negative      Endocrine:  Negative        Physical Exam:  Vitals: /77  Pulse 71  Wt 138.3 kg (305 lb)  SpO2 95%  BMI 42.54 kg/m2    Constitutional:  cooperative, alert and oriented, well developed, well nourished, in no acute distress        Skin:  warm and dry to the touch          Head:  normocephalic        Eyes:  sclera white        Lymph:      ENT:           Neck:           Respiratory:  normal breath sounds, clear to auscultation, normal A-P diameter, normal symmetry, normal respiratory excursion, no use of accessory muscles         Cardiac: regular rhythm;normal S1 and S2;no S3 or S4         systolic ejection murmur;grade 1;grade 2                                               GI:           Extremities and Muscular Skeletal:  no deformities, clubbing, cyanosis, erythema observed              Neurological:  no gross motor deficits;affect appropriate        Psych:  Alert and Oriented x 3;affect appropriate, oriented to time, person and place        Thank you for allowing me to participate in the care of your  patient.    Sincerely,     Tia Ibrahim MD     [facility]

## 2018-04-30 NOTE — MR AVS SNAPSHOT
After Visit Summary   4/30/2018    Laila Perez    MRN: [id number]           Patient Information     Date Of Birth          1950        Visit Information        Provider Department      4/30/2018 2:30 PM Tia Ibrahim MD [facility]        Today's Diagnoses     Aortic valve stenosis, unspecified etiology           Follow-ups after your visit        Additional Services     Follow-Up with Cardiologist                 Your next 10 appointments already scheduled     May 09, 2018 10:20 AM CDT   Office Visit with JERRICA Salcedo [facility] ([facility])    [address]  [facility] [address]   [phone]           Bring a current list of meds and any records pertaining to this visit. For Physicals, please bring immunization records and any forms needing to be filled out. Please arrive 10 minutes early to complete paperwork.            May 14, 2018 10:00 AM CDT   Individual Therapy 53+ minutes with Estelle Gomez LP   [facility] ([facility])    [address]  [address]  [facility] [address]   [phone]            May 30, 2018  1:00 PM CDT   Individual Therapy 53+ minutes with Estelle Gomez LP   [facility] ([facility])    [address]  [address]  [facility] [address]   [phone]            Jun 11, 2018 12:00 PM CDT   Individual Therapy 53+ minutes with Estelle Gomez LP   [facility] ([facility])    [address]  [address]  [facility] [address]   [phone]            Jun 25, 2018 12:00 PM CDT   Individual Therapy 53+ minutes with Estelle Gomez LP   [facility] ([facility])    [address]  [address]  [facility] [address]   [phone]              Future tests that were ordered for you today     Open Future Orders         "Priority Expected Expires Ordered    Follow-Up with Cardiologist Routine 2019    Exercise Stress Echocardiogram Routine 2018            Who to contact     If you have questions or need follow up information about today's clinic visit or your schedule please contact [facility] directly at [phone].  Normal or non-critical lab and imaging results will be communicated to you by [employer], letter or phone within 4 business days after the clinic has received the results. If you do not hear from us within 7 days, please contact the clinic through [employer] or phone. If you have a critical or abnormal lab result, we will notify you by phone as soon as possible.  Submit refill requests through [employer] or call your pharmacy and they will forward the refill request to us. Please allow 3 business days for your refill to be completed.          Additional Information About Your Visit        [employer] Information     [employer] lets you send messages to your doctor, view your test results, renew your prescriptions, schedule appointments and more. To sign up, go to [email or web address] . Click on \"Log in\" on the left side of the screen, which will take you to the Welcome page. Then click on \"Sign up Now\" on the right side of the page.     You will be asked to enter the access code listed below, as well as some personal information. Please follow the directions to create your username and password.     Your access code is: [email or web address]  Expires: 2018 11:38 AM     Your access code will  in 90 days. If you need help or a new code, please call your [place] clinic or [phone].        Care EveryWhere ID     This is your Care EveryWhere ID. This could be used by other organizations to access your [place] medical records  [id number]        Your Vitals Were     Pulse Pulse Oximetry BMI (Body Mass Index)             71 95% 42.54 kg/m2  "         Blood Pressure from Last 3 Encounters:   04/30/18 118/77   02/07/18 137/88   02/07/18 137/88    Weight from Last 3 Encounters:   04/30/18 138.3 kg (305 lb)   02/07/18 (!) 139.3 kg (307 lb)   02/07/18 (!) 139.3 kg (307 lb 3.2 oz)              We Performed the Following     Follow-Up with Cardiologist        Primary Care Provider Office Phone # Fax #    Delmis García Ronak, APRN [facility] [phone]       [address] [place] [address]        Equal Access to Services     [facility] : Hadii aad ku hadasho Sogin, waaxda luqadaha, qaybta kaalmada adesamuelyada, zulma castillo . So [facility] [phone].    ATENCIÓN: Si habla español, tiene a hernandez disposición servicios gratuitos de asistencia lingüística. [facility] [phone].    We comply with applicable federal civil rights laws and [place] laws. We do not discriminate on the basis of race, color, national origin, age, disability, sex, sexual orientation, or gender identity.            Thank you!     Thank you for choosing [facility]  for your care. Our goal is always to provide you with excellent care. Hearing back from our patients is one way we can continue to improve our services. Please take a few minutes to complete the written survey that you may receive in the mail after your visit with us. Thank you!             Your Updated Medication List - Protect others around you: Learn how to safely use, store and throw away your medicines at www.disposemymeds.org.          This list is accurate as of 4/30/18  3:30 PM.  Always use your most recent med list.                   Brand Name Dispense Instructions for use Diagnosis    DAILY-EVANGELISTA Tabs     100 tablet    TAKE ONE TABLET BY MOUTH EVERY DAY    Nutritional deficiency       dorzolamide-timolol 2-0.5 % ophthalmic solution    COSOPT     INSTILL 1 DROP INTO BOTH EYES 2 TIMES DAILY.        estradiol 0.1 MG/24HR BIW patch    VIVELLE-DOT     8 patch    Place 1 patch onto the skin twice a week    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       finasteride 5 MG tablet    PROSCAR    90 tablet    Take 1 tablet (5 mg) by mouth daily    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       latanoprost 0.005 % ophthalmic solution    XALATAN     1 drop At Bedtime.        lovastatin 20 MG tablet    MEVACOR    90 tablet    Take 1 tablet (20 mg) by mouth At Bedtime    Hyperlipidemia LDL goal <100       meloxicam 15 MG tablet    MOBIC    90 tablet    TAKE ONE TABLET BY MOUTH EVERY DAY    Primary osteoarthritis, unspecified site       * order for DME     1 each    Equipment being ordered:  BACK BRACE  [employer]ix Back-n-Shape II Back Brace  Size XXXL with thermoplastic insert    DDD (degenerative disc disease), lumbar, Pathologic fracture of vertebrae       * order for DME     2 each    Equipment being ordered: Sigvaris 232 Cotton Thigh High for Men 20-30 mmHg- TWO PAIR TAN COLOR    Varicose veins of legs       SM CALCIUM 600/VITAMIN D 600-400 MG-UNIT per tablet   Generic drug:  calcium-vitamin D     60 tablet    Take 1 tablet by mouth 2 times daily    Hyperlipidemia LDL goal <100       spironolactone 100 MG tablet    ALDACTONE    90 tablet    Take 1 tablet (100 mg) by mouth daily    Essential hypertension with goal blood pressure less than 140/90       * Notice:  This list has 2 medication(s) that are the same as other medications prescribed for you. Read the directions carefully, and ask your doctor or other care provider to review them with you.

## 2018-05-01 NOTE — PROGRESS NOTES
"HPI and Plan:     Ms. Laila Perez is 66 years old and is seen in follow-up for aortic stenosis.     She is planning gender reassignment surgery in the next months.     Previously, a murmur was identified.  An echocardiogram was performed which indicated the presence of aortic stenosis with a mean gradient of 20 mmHg and a calculated aortic valve area of approximately 1.5-1.7 cm2.  I reviewed the echocardiogram and the valve is most likely trileaflet, though there is also dilation of the aorta above the sinotubular ridge to 4.2 cm.  There is only trace to mild aortic insufficiency.  Left ventricular systolic performance is normal.      Ms. Perez denies any chest discomfort, exertional intolerance or shortness of breath.  She notes that she likes woodworking.  She had a garden last year which was predominantly vegetable based on her description.  She has remained active without any exertional limitation, chest discomfort or dyspnea.  She notes that she feels \"good.\"      She denies any family history of heart disease or CAD.  She notes that there is no family history of bypass surgery or coronary artery disease.  She previously described a history of alcohol dependence until 6 years ago.  She has been sober since that time and considers her participation in sobriety support (AA?) to be a primary purpose in her life.      Her lipids indicate an LDL of 90, HDL 57 and triglycerides 147 mg/dL on low-dose lovastatin (20 mg).  Her blood pressure is well-controlled.  She is on spironolactone at 100 mg p.o. Daily for her gender reassignment though it may be beneficial with regard to her blood pressure.      PHYSICAL EXAMINATION:   GENERAL:  This is an individual in no apparent distress.  She was alert and oriented to person, place and time.   VITAL SIGNS: Blood pressure 118/77, pulse 71, weight 138.3 kg (305 lb), SpO2 95 %.  CHEST:  Clear to auscultation.   CARDIAC:  On cardiac auscultation, there was an S1 and S2 without " extra sounds.  There was a grade 1-2/6 systolic ejection murmur noted at the upper left sternal border which radiated to the left carotid artery.  It did not extend to S2, but was much more audible at the left carotid artery.  Presumably, this is a transmitted murmur.  There was no bruit over the left subclavian artery.      ASSESSMENT/PLAN:    Ms. Perez has aortic stenosis with a relatively high gradient for the calculated valve area.  That would suggest a relatively increased cardiac output and indeed, Ms. Perez is tall, as well as having a weight of 305 pounds.      I noted that she was completely asymptomatic and discussed the physiology and the eventual treatment for aortic stenosis.  We discussed the past need for surgical treatment and hopefully the potential for transcatheter aortic valve replacement in the future if the low risk trial proceeds, demonstrating outcomes similar to surgical aortic valve replacement.  I explained that there is no medical therapy for aortic stenosis.  Previously, I have recommended a yearly visit and echocardiogram until the valve area is less than 1 cm2, at which time I would recommend followup every 6 months.     With regard to her gender reassignment surgery, I recommended a stress echo prior to the surgery.    I recommended a return at 1 year with a followup echocardiogram unless the stress study is abnormal.     Orders Placed This Encounter   Procedures     Follow-Up with Cardiologist     Exercise Stress Echocardiogram       No orders of the defined types were placed in this encounter.      There are no discontinued medications.      Encounter Diagnosis   Name Primary?     Aortic valve stenosis, unspecified etiology        CURRENT MEDICATIONS:  Current Outpatient Prescriptions   Medication Sig Dispense Refill     dorzolamide-timolol (COSOPT) 2-0.5 % ophthalmic solution INSTILL 1 DROP INTO BOTH EYES 2 TIMES DAILY.  4     estradiol (VIVELLE-DOT) 0.1 MG/24HR BIW patch Place 1  patch onto the skin twice a week 8 patch 3     finasteride (PROSCAR) 5 MG tablet Take 1 tablet (5 mg) by mouth daily 90 tablet 1     latanoprost (XALATAN) 0.005 % ophthalmic solution 1 drop At Bedtime.       lovastatin (MEVACOR) 20 MG tablet Take 1 tablet (20 mg) by mouth At Bedtime 90 tablet 3     meloxicam (MOBIC) 15 MG tablet TAKE ONE TABLET BY MOUTH EVERY DAY 90 tablet 3     Multiple Vitamin (DAILY-EVANGELISTA) TABS TAKE ONE TABLET BY MOUTH EVERY  tablet 0     ORDER FOR DME Equipment being ordered:   BACK BRACE    [employer] Back-n-Shape II Back Brace  Size XXXL with thermoplastic insert        1 each 0     order for DME Equipment being ordered: Sigvaris 232 Cotton Thigh High for Men 20-30 mmHg- TWO PAIR TAN COLOR 2 each 5     SM CALCIUM 600/VITAMIN D 600-400 MG-UNIT per tablet Take 1 tablet by mouth 2 times daily 60 tablet 3     spironolactone (ALDACTONE) 100 MG tablet Take 1 tablet (100 mg) by mouth daily 90 tablet 1       ALLERGIES     Allergies   Allergen Reactions     Penicillin [Esters] Itching     he is not sure what kind of reaction he had     Heparin      he lives in a assisted living program and is not sure what type of allergies he really has. He quit drinking     Zosyn [Penicillins]      unsure of reaction, allergy is listed on his med sheet       PAST MEDICAL HISTORY:  Past Medical History:   Diagnosis Date     Unspecified internal derangement of knee     CHRONIC KNEE PAIN,LEG,NECK AND HEAD INJURIES       PAST SURGICAL HISTORY:  Past Surgical History:   Procedure Laterality Date     COLONOSCOPY  2007     JOINT REPLACEMTN, KNEE RT/LT  2006    left       FAMILY HISTORY:  Family History   Problem Relation Age of Onset     CANCER Father      lung cancer heavy smoker       SOCIAL HISTORY:  Social History     Social History     Marital status:      Spouse name: N/A     Number of children: N/A     Years of education: N/A     Social History Main Topics     Smoking status: Never Smoker     Smokeless  tobacco: Never Used     Alcohol use No      Comment: Quit 3/24/05     Drug use: No     Sexual activity: Not Currently     Other Topics Concern     None     Social History Narrative       Review of Systems:  Skin:  Negative       Eyes:  Positive for glasses    ENT:  Negative      Respiratory:  Negative for shortness of breath;cough     Cardiovascular:  Negative for;chest pain;palpitations;syncope or near-syncope;lightheadedness;dizziness;fatigue Positive for;edema    Gastroenterology: Negative      Genitourinary:  Negative      Musculoskeletal:  not assessed      Neurologic:  Negative      Psychiatric:  Negative      Heme/Lymph/Imm:  Negative      Endocrine:  Negative        Physical Exam:  Vitals: /77  Pulse 71  Wt 138.3 kg (305 lb)  SpO2 95%  BMI 42.54 kg/m2    Constitutional:  cooperative, alert and oriented, well developed, well nourished, in no acute distress        Skin:  warm and dry to the touch          Head:  normocephalic        Eyes:  sclera white        Lymph:      ENT:           Neck:           Respiratory:  normal breath sounds, clear to auscultation, normal A-P diameter, normal symmetry, normal respiratory excursion, no use of accessory muscles         Cardiac: regular rhythm;normal S1 and S2;no S3 or S4         systolic ejection murmur;grade 1;grade 2                                               GI:           Extremities and Muscular Skeletal:  no deformities, clubbing, cyanosis, erythema observed              Neurological:  no gross motor deficits;affect appropriate        Psych:  Alert and Oriented x 3;affect appropriate, oriented to time, person and place          CC  Tia Ibrahim MD  [address]  [place] [address]

## 2018-05-02 ENCOUNTER — TELEPHONE (OUTPATIENT)
Dept: UROLOGY | Facility: CLINIC | Age: 68
End: 2018-05-02

## 2018-05-02 ENCOUNTER — MEDICAL CORRESPONDENCE (OUTPATIENT)
Dept: HEALTH INFORMATION MANAGEMENT | Facility: CLINIC | Age: 68
End: 2018-05-02

## 2018-05-02 NOTE — TELEPHONE ENCOUNTER
"Patient returned call to clinic. Patient stated, \"I know what to expect for Dr. Weston's surgery but I don't know about what to expect after Dr. Holm's part of the surgery.\" Informed patient that Dr. Holm's nurse is out of the office today, but that a message will be sent with request for her to follow up with patient tomorrow. Patient verbalized understanding and would like a return call to [phone] after 4:00pm tomorrow.    Chanel Alexis RN, BSN    "

## 2018-05-02 NOTE — TELEPHONE ENCOUNTER
[facility] Call Center    Phone Message: Laila  Phone: [phone]  Home: [phone]    May a detailed message be left on voicemail: yes    Reason for Call: Laila has after care questions for a procedure he is going to schedule. Please advise.  Thank you.     Action Taken: Message routed to:  Adult Clinics: Urology p [id number]

## 2018-05-02 NOTE — TELEPHONE ENCOUNTER
Attempted to reach patient by phone, but no answer. Left generic message with request for patient to return call to clinic.    Chanel Alexis RN, BSN

## 2018-05-03 ENCOUNTER — TRANSFERRED RECORDS (OUTPATIENT)
Dept: HEALTH INFORMATION MANAGEMENT | Facility: CLINIC | Age: 68
End: 2018-05-03

## 2018-05-03 NOTE — PROGRESS NOTES
[facility] -  Case Progress Note  Client Name: Laila Perez  YOB: 1950  MRN:  [id number]  Treating Provider: Estelle Gomez LP  Type of Session: Individual  Present in Session: client and support person  Number of Minutes:  55    Health Maintenance Summary - Mental Health Treatment Plan       Status Date      Mental Health Tx Plan Q11 MOS Next Due 9/30/2018      Done 10/30/2017      Done 9/6/2016             Date of Service: 4/30/18    Current Symptoms/Status:  Distress about gender and secondary sex characteristics, wants genitals removed, bothers her daily of moderate to severe intensity, distress with work situation.    Progress Toward Treatment Goals:   Client reported progress with having an aftercare plan for surgery.    Intervention: Modality and Description:  Provided support and feedback. Used CBT to process gender dysphoria concerns. She shared having an aftercare plan for surgery as requested. When she started talking about this plan she just said the place where she lives and her  will take care of things. She said there is a nursing agency that can be hired, but she thinks she needs the surgery date in order to make this plan. Reviewed with her that she needs to understand the type of care she will need when she returns home and needs to know what this before I can support the letter. Client became angry and frustrated. Validated her frustration and told her I support her need for this surgery to address gender dysphoria. It is just I dont think she really understands her needs, thus has not communicated clearly to the house supervisor or her  in order to know if they can/will create a plan for her aftercare. She tried to call her  during the session, left a message. She called her  and discussed this with her. The director stated they could provide extra staffing for the client. I told her it is possible client  will have a cathter, need bandage changes. The director then said they could not provide that level of care, did not have an understanding of the potential needs. With this it seemed maybe the client and her support person had a better understanding of what I was asking for. Client needs to understand more about what her care needs will be so she can work with  to see if this can be provided.    Response to Intervention:  Client reported validation.    Assignment:  Schedule appointment with surgeon.      Diagnosis:  Encounter Diagnosis   Name Primary?     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 Yes           Plan / Need for Future Services:  Return for therapy in 4 weeks.      Estelle Gomez PsyD, LP

## 2018-05-03 NOTE — TELEPHONE ENCOUNTER
Pt called and states that she is wondering what the aftercare will be like after having a vaginoplasty. RN reviewed the information below with pt. Pt is requesting a copy of this to submit to her Estelle Kim. Copy of Vaginoplasty post care instructions sent to pt via fax. Pt advised to call back with any further questions or concerns....Makenzie Pierre RN      Pre-op Instructions for bowel prep  FIVE DAYS BEFORE SURGERY      Stop taking any of the following over-the-counter medications: Ibuprofen, Asprin, Iron supplements.     THREE DAYS BEFORE SURGERY    Begin restricted low-fiber diet.  Allowed foods include: white bread or rolls, plain crackers, white rice, skinless potatoes, low fiber cereals, chicken, turkey, fish or seafood, and eggs.  You may also have applesauce, pear sauce, soft honeydew, cantaloupe, ripe banana, canned fruit without seeds or skins.     Do not eat: Corn (any form), raw vegetables, foods with seeds, whole wheat or grain breads and cereals, brown or wild rice, granola, raisins and dried fruit, berries, popcorn, all varieties of nuts, peanuts, and seeds.     Please go to your local pharmacy or store and purchase:     -8.3 oz bottle of Miralax (powder)   -64 oz of Gatorade   -bottle of Dulcolax tablets (laxative)    THE DAY BEFORE SURGERY      - Begin clear liquid diet at breakfast time.   - Mix entire Miralax bottle with 64 oz. of Gatorade in a pitcher.  Stir to dissolve and place in the fridge.       ALLOWED CLEAR LIQUIDS   -Water   -Regular or decaf coffee (no cream)   -Jell-O or popsicles (no red)   -Plain hard candy (no red)   -Clear juices or Gatorade   -Chicken, beff, broth (No vegetables or noodles)    DO NOT DRINK   -Milk or mild products such as ice cream, malts, or shakes   -Red juices of any kind   -Carlos-aid, cranberry, or grape juice   -Juice with pulp (orange, grapefruit, pineapple,, or tomato)   -Cream soups of any kind   -Alcoholic beverages             THE DAY BEFORE SURGERY  (continued)    At 4:00 PM - Take 3  Dulcolax tablets with a glass of water.    Between 5:00-7:00 PM -  Drink one eight (8) ounce glass of this mixture every 30 minutes until the mixture is gone.  If you start to feel nauseous, you may space out your drinking intervals to every 45 minutes.  You will need to stay close to bathroom until your bowel is cleaned out.  Use Vaseline to ease rectal discomfort as you will be wiping frequently,    Once your stool is light yellow or clear, your bowel has been cleaned out sufficiently.   There should not be any brown in your stool.      DAY OF SURGERY    Report to the hospital at designated time in your scheduling instructions.                                                                                                                                                               Activity Avoid strenuous activity for 6 weeks. Avoid swimming or bike riding for 3 months.   Sitting For the first month post-op, sitting may be uncomfortable, but not unsafe. Recommendation to use donut ring to relieve pressure at surgical site.   Bathing Resume showering following first postoperative visit, patting incisional areas dry. Do not take baths or submerge in water for 8 weeks post-op.   Swelling Labial swelling is normal and will gradually resolve 6-8 weeks postoperatively. Swelling may be aggravated with long-term sitting or standing. For the first week post-op, applying ice on the perineum for 20 minutes every hour can assist in relieving some swelling.   Sexual intercourse You may resume sexual intercourse 3 months after surgery, unless you have been instructed otherwise.   Hygiene Wash hands before and after any contact with the genital area. Shower or wash daily. When washing, wipe from front to back to avoid contamination by bacteria from the anal region. Avoid tight clothing; friction may facilitate bacteria transfer.   Vaginal discharge Vaginal discharge that is brownish yellow  should be expected in the first 4-6 weeks postoperatively. This should be expected in the first 8 weeks postoperatively. Vaginal rinses should help reduce this.    Nicotine/smoking Avoid nicotine use or smoking 1 month postoperatively, as this can interfere with the healing process by constricting blood flow to the surgical site.   Diet/nausea/constipation Increase your usual diet as tolerated. Anti-nausea medication may be prescribed. Narcotic pain medication may cause constipation.  If you experience constipation, you may try Miralax (over the counter) once to twice a daily   Pain medication Postoperative pain is normal, and pain medication will be prescribed. Pain medication is to be taken as prescribed, and can be switched for Extra Strength Tylenol at any time.   Dilation   Dilation is an important part of recovery.      You should use the next size for three months.    Dilation frequency:     0-3 months after surgery 3 times/day for 10 minutes each time    3-6 months after surgery 1/day for 10 minutes each time    More than 6 months after surgery 2-3/week for 10 minutes each time,    More than 9 months 1-2x/week.    If the vagina begins to feel tight, increase the frequency of the dilation schedule.   Drains and Dressings   Will be removed while you are in the hospital.         VAGINAL DILATION AND VAGINAL RINSES WILL START AFTER YOU SEE DR. LAMBERT AT YOUR FIRST POST-OP APPOINTMENT.  YOU WILL BE INSTRUCTED AT THAT TIME.  VAGINAL DILATION  At rest, the vaginal walls of all women both cisgender and transgender, lie exposed to one another.  Dilation is a necessary process for transwomen who lack the constant sloughing and lubrication from a  vagina and keeps it flexible.  There is also a tendency for the new vaginal graft to contract.  Dilation allows the depth and diameter of the new vagina to remain adequate.  Vaginal dilation is a life-long process for transwomen.      Prior to insertion into the vagina,  ensure the dilator is clean.    Clean the dilator with warm water and antibacterial soap. Rinse well and dry with a clean paper towel or cloth.    Apply lubrication to the dilator prior to insertion. Avoid silicone-based lubricants.    Have patient lie on back.  Head may be elevated no more than 15 degrees.  This allows the rectum to fall away from the new vagina.    Gently insert dilator into the vagina at an angle of 45 degrees until under the pubic bone, and then continue inserting straight inward.    Expect to feel a small amount of resistance and tenderness. Stop immediately if there is too much resistance or severe pain.    Insert the dilator into the full depth of the vagina (until you feel moderate pressure or resistance) and leave in place for 10 minutes. You should be inserting until only one or two white dots remain outside of the vagina.      VAGINAL RINSES  During your post-operative period (until the sutures in your new vagina have healed), the vaginal irrigations will need to be performed with a water and vinegar solution.    Mix equal parts of vinegar and water and place in applicator.  Sit on toilet and gently insert into the vagina at a 45 degree angle.  Let mixture run out into the toilet.             When to call:      If you have chills or a fever over 101.     If you are constipated despite using Miralax up to twice per day for two days.    If you have an increase in bloody draining or drainage that looks like pus.    If you are taking your pain medication as directed and you continue to have severe pain.    If you have a question or concern.  During office hours from 8:00-5:00pm, please call the PALMER Banegas, or KATERYNA Willoughby at [phone]  You may leave a message and your call will be returned on the same day.    If your need is urgent, please contact  [phone].  If you are calling after hours, on a weekend, or holiday, call [phone] and ask to speak to the on call Plastic Surgery  fellow.

## 2018-05-09 ENCOUNTER — OFFICE VISIT (OUTPATIENT)
Dept: FAMILY MEDICINE | Facility: CLINIC | Age: 68
End: 2018-05-09
Payer: MEDICARE

## 2018-05-09 ENCOUNTER — MEDICAL CORRESPONDENCE (OUTPATIENT)
Dept: HEALTH INFORMATION MANAGEMENT | Facility: CLINIC | Age: 68
End: 2018-05-09

## 2018-05-09 VITALS
HEART RATE: 57 BPM | BODY MASS INDEX: 39.68 KG/M2 | SYSTOLIC BLOOD PRESSURE: 123 MMHG | TEMPERATURE: 97.3 F | RESPIRATION RATE: 14 BRPM | OXYGEN SATURATION: 99 % | HEIGHT: 72 IN | DIASTOLIC BLOOD PRESSURE: 71 MMHG | WEIGHT: 293 LBS

## 2018-05-09 DIAGNOSIS — Z13.6 ENCOUNTER FOR ABDOMINAL AORTIC ANEURYSM (AAA) SCREENING: ICD-10-CM

## 2018-05-09 DIAGNOSIS — Z12.11 SPECIAL SCREENING FOR MALIGNANT NEOPLASMS, COLON: ICD-10-CM

## 2018-05-09 DIAGNOSIS — Z00.01 ENCOUNTER FOR ROUTINE ADULT PHYSICAL EXAM WITH ABNORMAL FINDINGS: Primary | ICD-10-CM

## 2018-05-09 DIAGNOSIS — L40.9 PSORIASIS: ICD-10-CM

## 2018-05-09 DIAGNOSIS — M19.91 PRIMARY OSTEOARTHRITIS, UNSPECIFIED SITE: ICD-10-CM

## 2018-05-09 DIAGNOSIS — E78.5 HYPERLIPIDEMIA LDL GOAL <100: ICD-10-CM

## 2018-05-09 DIAGNOSIS — F64.0 GENDER DYSPHORIA IN ADOLESCENT AND ADULT: ICD-10-CM

## 2018-05-09 DIAGNOSIS — I10 HTN, GOAL BELOW 140/90: ICD-10-CM

## 2018-05-09 DIAGNOSIS — E78.5 HYPERLIPIDEMIA LDL GOAL <100: Primary | ICD-10-CM

## 2018-05-09 DIAGNOSIS — I86.8 VARICOSE VEINS OF OTHER SPECIFIED SITES (CODE): ICD-10-CM

## 2018-05-09 LAB
ALBUMIN SERPL-MCNC: 3.9 G/DL (ref 3.4–5)
ALP SERPL-CCNC: 73 U/L (ref 40–150)
ALT SERPL W P-5'-P-CCNC: 25 U/L (ref 0–70)
AST SERPL W P-5'-P-CCNC: 18 U/L (ref 0–45)
BASOPHILS # BLD AUTO: 0 10E9/L (ref 0–0.2)
BASOPHILS NFR BLD AUTO: 0.1 %
BILIRUB DIRECT SERPL-MCNC: 0.2 MG/DL (ref 0–0.2)
BILIRUB SERPL-MCNC: 0.8 MG/DL (ref 0.2–1.3)
CHOLEST SERPL-MCNC: 115 MG/DL
DIFFERENTIAL METHOD BLD: ABNORMAL
EOSINOPHIL # BLD AUTO: 0.3 10E9/L (ref 0–0.7)
EOSINOPHIL NFR BLD AUTO: 4.8 %
ERYTHROCYTE [DISTWIDTH] IN BLOOD BY AUTOMATED COUNT: 13.3 % (ref 10–15)
HCT VFR BLD AUTO: 44.8 % (ref 40–53)
HDLC SERPL-MCNC: 54 MG/DL
HGB BLD-MCNC: 15 G/DL (ref 13.3–17.7)
LDLC SERPL CALC-MCNC: 40 MG/DL
LYMPHOCYTES # BLD AUTO: 1.3 10E9/L (ref 0.8–5.3)
LYMPHOCYTES NFR BLD AUTO: 18.4 %
MCH RBC QN AUTO: 30.8 PG (ref 26.5–33)
MCHC RBC AUTO-ENTMCNC: 33.5 G/DL (ref 31.5–36.5)
MCV RBC AUTO: 92 FL (ref 78–100)
MONOCYTES # BLD AUTO: 0.6 10E9/L (ref 0–1.3)
MONOCYTES NFR BLD AUTO: 8.1 %
NEUTROPHILS # BLD AUTO: 4.7 10E9/L (ref 1.6–8.3)
NEUTROPHILS NFR BLD AUTO: 68.6 %
NONHDLC SERPL-MCNC: 61 MG/DL
PLATELET # BLD AUTO: 136 10E9/L (ref 150–450)
PROT SERPL-MCNC: 7.6 G/DL (ref 6.8–8.8)
RBC # BLD AUTO: 4.87 10E12/L (ref 4.4–5.9)
TRIGL SERPL-MCNC: 103 MG/DL
WBC # BLD AUTO: 6.9 10E9/L (ref 4–11)

## 2018-05-09 PROCEDURE — 85025 COMPLETE CBC W/AUTO DIFF WBC: CPT | Performed by: NURSE PRACTITIONER

## 2018-05-09 PROCEDURE — 99214 OFFICE O/P EST MOD 30 MIN: CPT | Mod: 25 | Performed by: NURSE PRACTITIONER

## 2018-05-09 PROCEDURE — G0439 PPPS, SUBSEQ VISIT: HCPCS | Performed by: NURSE PRACTITIONER

## 2018-05-09 PROCEDURE — 36415 COLL VENOUS BLD VENIPUNCTURE: CPT | Performed by: NURSE PRACTITIONER

## 2018-05-09 PROCEDURE — 80061 LIPID PANEL: CPT | Performed by: FAMILY MEDICINE

## 2018-05-09 PROCEDURE — 80076 HEPATIC FUNCTION PANEL: CPT | Performed by: FAMILY MEDICINE

## 2018-05-09 RX ORDER — MELOXICAM 15 MG/1
15 TABLET ORAL DAILY
Qty: 90 TABLET | Refills: 3 | Status: SHIPPED | OUTPATIENT
Start: 2018-05-09 | End: 2019-04-15

## 2018-05-09 RX ORDER — LOVASTATIN 20 MG
TABLET ORAL
Qty: 90 TABLET | Refills: 3 | Status: SHIPPED | OUTPATIENT
Start: 2018-05-09 | End: 2019-04-15

## 2018-05-09 ASSESSMENT — PATIENT HEALTH QUESTIONNAIRE - PHQ9: 5. POOR APPETITE OR OVEREATING: NOT AT ALL

## 2018-05-09 ASSESSMENT — ANXIETY QUESTIONNAIRES
IF YOU CHECKED OFF ANY PROBLEMS ON THIS QUESTIONNAIRE, HOW DIFFICULT HAVE THESE PROBLEMS MADE IT FOR YOU TO DO YOUR WORK, TAKE CARE OF THINGS AT HOME, OR GET ALONG WITH OTHER PEOPLE: NOT DIFFICULT AT ALL
3. WORRYING TOO MUCH ABOUT DIFFERENT THINGS: SEVERAL DAYS
7. FEELING AFRAID AS IF SOMETHING AWFUL MIGHT HAPPEN: NOT AT ALL
GAD7 TOTAL SCORE: 2
6. BECOMING EASILY ANNOYED OR IRRITABLE: NOT AT ALL
2. NOT BEING ABLE TO STOP OR CONTROL WORRYING: NOT AT ALL
5. BEING SO RESTLESS THAT IT IS HARD TO SIT STILL: NOT AT ALL
1. FEELING NERVOUS, ANXIOUS, OR ON EDGE: SEVERAL DAYS

## 2018-05-09 ASSESSMENT — PAIN SCALES - GENERAL: PAINLEVEL: NO PAIN (0)

## 2018-05-09 NOTE — PROGRESS NOTES
The 10-year ASCVD risk score (Red Oakjoanne RUBI Jr, et al., 2013) is: 12.9%    Values used to calculate the score:      Age: 68 years      Sex: Male      Is Non- : No      Diabetic: No      Tobacco smoker: No      Systolic Blood Pressure: 123 mmHg      Is BP treated: No      HDL Cholesterol: 57 mg/dL      Total Cholesterol: 176 mg/dL  Patient is eligible for use of low-dose aspirin for primary prevention of heart attack and stroke.  Provider has discussed aspirin with patient and our decision was:     Intentionally Not Prescribe:  Daily low-dose aspirin recommended for primary prevention, patient declines plan.      SUBJECTIVE:   Laila Perez is a 68 year old male who presents for Preventive Visit.  Are you in the first 12 months of your Medicare Part B coverage?  No    Healthy Habits:    Do you get at least three servings of calcium containing foods daily (dairy, green leafy vegetables, etc.)? yes    Amount of exercise or daily activities, outside of work: walking     Problems taking medications regularly No    Medication side effects: No    Have you had an eye exam in the past two years? yes    Do you see a dentist twice per year? no    Do you have sleep apnea, excessive snoring or daytime drowsiness?no      Ability to successfully perform activities of daily living: Yes, no assistance needed    Home safety:  none identified     Hearing impairment: No    Fall risk:       COGNITIVE SCREEN  1) Repeat 3 items (Banana, Sunrise, Chair)    2) Clock draw: NORMAL  3) 3 item recall: Recalls 3 objects  Results: NORMAL clock, 1-2 items recalled: COGNITIVE IMPAIRMENT LESS LIKELY    Mini-CogTM Copyright PRATEEK Barraza. Licensed by the author for use in [facility]; reprinted with permission (alexey@.[facility]). All rights reserved.      No skin changes  htn blood pressure good control  BP Readings from Last 3 Encounters:   05/09/18 123/71   04/30/18 118/77   03/19/18 144/88     Back and knees have been  good.   Gout has been good. Stopped the allopurinol.  Aortic valve stenosis  Upcoming stress and ECHO in August.   Dr Ibrahim.  LDL Cholesterol Calculated   Date Value Ref Range Status   05/02/2017 90 <100 mg/dL Final     Comment:     Desirable:       <100 mg/dl     Lab Results   Component Value Date    A1C 4.8 04/26/2016    A1C 5.2 04/26/2013    A1C 5.2 04/07/2011     Physical activity working 2 days a week at [employer].  Gardening this year plants ready to go.   Planning for gender reassignment surgery in the next several months.  She is working part-time woodworking she is excited about having a garden again this year.  She denies any chest pain or shortness of breath.  She is concerned about having bigger breast development    Patient has not taken any recent atenolol.  He has been working harder at physical activity and low-sodium diet in order to avoid this  Wt Readings from Last 5 Encounters:   05/09/18 299 lb (135.6 kg)   04/30/18 305 lb (138.3 kg)   03/19/18 (!) 307 lb (139.3 kg)   02/07/18 (!) 307 lb (139.3 kg)   02/07/18 (!) 307 lb 3.2 oz (139.3 kg)       Current Outpatient Prescriptions   Medication Sig Dispense Refill     dorzolamide-timolol (COSOPT) 2-0.5 % ophthalmic solution INSTILL 1 DROP INTO BOTH EYES 2 TIMES DAILY.  4     estradiol (VIVELLE-DOT) 0.1 MG/24HR BIW patch Place 1 patch onto the skin twice a week 8 patch 3     finasteride (PROSCAR) 5 MG tablet Take 1 tablet (5 mg) by mouth daily 90 tablet 1     latanoprost (XALATAN) 0.005 % ophthalmic solution 1 drop At Bedtime.       lovastatin (MEVACOR) 20 MG tablet Take 1 tablet (20 mg) by mouth At Bedtime 90 tablet 3     meloxicam (MOBIC) 15 MG tablet TAKE ONE TABLET BY MOUTH EVERY DAY 90 tablet 3     ORDER FOR DME Equipment being ordered:   BACK BRACE    MedSpec Back-n-Shape II Back Brace  Size XXXL with thermoplastic insert        1 each 0     order for DME Equipment being ordered: Sigvaris 232 Cotton Thigh High for Men 20-30 mmHg- TWO PAIR TAN COLOR 2  each 5     SM CALCIUM 600/VITAMIN D 600-400 MG-UNIT per tablet Take 1 tablet by mouth 2 times daily 60 tablet 3     spironolactone (ALDACTONE) 100 MG tablet Take 1 tablet (100 mg) by mouth daily 90 tablet 1       Reviewed and updated as needed this visit by clinical staff  Tobacco  Allergies         Reviewed and updated as needed this visit by Provider        Social History   Substance Use Topics     Smoking status: Never Smoker     Smokeless tobacco: Never Used     Alcohol use No      Comment: Quit 3/24/05       If you drink alcohol do you typically have >3 drinks per day or >7 drinks per week? No                        Today's PHQ-2 Score:   PHQ-2 ( 1999 [employer]) 5/2/2017 4/26/2016   Q1: Little interest or pleasure in doing things 0 0   Q2: Feeling down, depressed or hopeless 0 0   PHQ-2 Score 0 0       Do you feel safe in your environment - Yes    Do you have a Health Care Directive?: No: Advance care planning reviewed with patient; information given to patient to review.    Current providers sharing in care for this patient include:   Patient Care Team:  Delmis Simons APRN CNP as PCP - General (Family Practice)  Delmis Simons APRN CNP (Family Practice)    The following health maintenance items are reviewed in Epic and correct as of today:  Health Maintenance   Topic Date Due     DEPRESSION ACTION PLAN Q1 YR  03/09/1968     AORTIC ANEURYSM SCREENING (SYSTEM ASSIGNED)  03/09/2015     PNEUMOCOCCAL (2 of 2 - PPSV23) 04/26/2017     ADVANCE DIRECTIVE PLANNING Q5 YRS  07/25/2017     PHQ-9 Q6 MONTHS  04/30/2018     FIT Q1 YR  05/08/2018     INFLUENZA VACCINE (Season Ended) 09/01/2018     Mental Health Tx Plan Q11 MOS  09/30/2018     TETANUS IMMUNIZATION (SYSTEM ASSIGNED)  10/30/2018     DESTINY QUESTIONNAIRE 1 YEAR  10/30/2018     FALL RISK ASSESSMENT  02/09/2019     LIPID SCREEN Q5 YR MALE (SYSTEM ASSIGNED)  05/02/2022     HEPATITIS C SCREENING  Completed     BP Readings from Last 3 Encounters:    05/09/18 123/71   04/30/18 118/77   02/07/18 137/88    Wt Readings from Last 3 Encounters:   05/09/18 299 lb (135.6 kg)   04/30/18 305 lb (138.3 kg)   02/07/18 (!) 307 lb (139.3 kg)                    Pneumonia Vaccine:Adults age 65+ who received their first dose of Pneumovax (PPSV23) prior to age 65 years: Should be given PCV 13 > 1 year after their most recent PPSV23 AND should be given a another dose of PPSV23 > 5 years after their most recent dose of PPSV23    ROS:   ROS: 10 point ROS neg other than the symptoms noted above in the HPI.      OBJECTIVE:   /71  Pulse 57  Temp 97.3  F (36.3  C) (Tympanic)  Resp 14  Ht 6' (1.829 m)  Wt 299 lb (135.6 kg)  SpO2 99%  BMI 40.55 kg/m2 Estimated body mass index is 40.55 kg/(m^2) as calculated from the following:    Height as of this encounter: 6' (1.829 m).    Weight as of this encounter: 299 lb (135.6 kg).  EXAM:   GENERAL: healthy, alert and no distress  EYES: Eyes grossly normal to inspection, PERRL and conjunctivae and sclerae normal  HENT: ear canals and TM's normal, nose and mouth without ulcers or lesions  NECK: no adenopathy, no asymmetry, masses, or scars and thyroid normal to palpation  RESP: lungs clear to auscultation - no rales, rhonchi or wheezes  BREAST: normal without masses, tenderness or nipple discharge and no palpable axillary masses or adenopathy  CV: regular rate and rhythm, normal S1 S2, no S3 or S4, no murmur, click or rub, no peripheral edema and peripheral pulses strong  ABDOMEN: soft, nontender, no hepatosplenomegaly, no masses and bowel sounds normal  RECTAL: normal sphincter tone, no rectal masses  MS: no gross musculoskeletal defects noted, no edema varicose veins lower extremities bilaterally  SKIN: no suspicious lesions or rashes  NEURO: Normal strength and tone, mentation intact and speech normal  PSYCH: mentation appears normal, affect normal/bright  LYMPH: no cervical, supraclavicular, axillary, or inguinal  adenopathy    ASSESSMENT / PLAN:   Laila was seen today for physical.    Diagnoses and all orders for this visit:    Encounter for routine adult physical exam with abnormal findings    Special screening for malignant neoplasms, colon  -     Fecal colorectal cancer screen FIT; Future    HTN, goal below 140/90  -     CBC with platelets differential    Psoriasis    Encounter for abdominal aortic aneurysm (AAA) screening  -     Cancel: US ABDOMEN COMPLETE; Future  -     US abdominal aorta limited; Future    Hyperlipidemia LDL goal <100  -     lovastatin (MEVACOR) 20 MG tablet; Take 1 tablet (20 mg) by mouth At Bedtime    Primary osteoarthritis, unspecified site  -     meloxicam (MOBIC) 15 MG tablet; Take 1 tablet (15 mg) by mouth daily    Varicose veins of other specified sites (CODE)   -     US abdominal aorta limited; Future    Other orders  -     DEPRESSION ACTION PLAN (DAP)      Colonoscopy declined.  Patient is to return FIT cards  Continue physical activity low-sodium diet.  Continue to monitor blood pressure.  Meeting goal today.  No flare of recent psoriasis lesions.  Continue hormone replacement therapy  Abdominal ultrasound AAA screening recommended  Labs today for liver function and lipids.  Continue lovastatin.  Osteoarthritis is stable using current meloxicam.  Continue daily physical activity    End of Life Planning:  Patient currently has an advanced directive: No.  I have verified the patient's ablity to prepare an advanced directive/make health care decisions.  Literature was provided to assist patient in preparing an advanced directive.    COUNSELING:  Reviewed preventive health counseling, as reflected in patient instructions        Estimated body mass index is 40.55 kg/(m^2) as calculated from the following:    Height as of this encounter: 6' (1.829 m).    Weight as of this encounter: 299 lb (135.6 kg).  Weight management plan: Discussed healthy diet and exercise guidelines and patient will follow  up in 12 months in clinic to re-evaluate.     reports that he has never smoked. He has never used smokeless tobacco.      Appropriate preventive services were discussed with this patient, including applicable screening as appropriate for cardiovascular disease, diabetes, osteopenia/osteoporosis, and glaucoma.  As appropriate for age/gender, discussed screening for colorectal cancer, prostate cancer, breast cancer, and cervical cancer. Checklist reviewing preventive services available has been given to the patient.    Reviewed patients plan of care and provided an AVS. The Intermediate Care Plan ( asthma action plan, low back pain action plan, and migraine action plan) for Laila meets the Care Plan requirement. This Care Plan has been established and reviewed with the Patient.    Counseling Resources:  ATP IV Guidelines  Pooled Cohorts Equation Calculator  Breast Cancer Risk Calculator  FRAX Risk Assessment  ICSI Preventive Guidelines  Dietary Guidelines for Americans, 2010  USDA's MyPlate  ASA Prophylaxis  Lung CA Screening    JERRICA Salcedo CNP  [facility]

## 2018-05-09 NOTE — MR AVS SNAPSHOT
After Visit Summary   5/9/2018    Laila Perez    MRN: [id number]           Patient Information     Date Of Birth          1950        Visit Information        Provider Department      5/9/2018 10:20 AM Delmis Simons APRN [facility]        Today's Diagnoses     Special screening for malignant neoplasms, colon    -  1    HTN, goal below 140/90        Psoriasis        Encounter for abdominal aortic aneurysm (AAA) screening        Hyperlipidemia LDL goal <100        Primary osteoarthritis, unspecified site        Varicose veins of other specified sites (CODE)           Care Instructions      Preventive Health Recommendations:    Ages 65 and over    Yearly exam:             See your health care provider every year in order to  o   Review health changes.   o   Discuss preventive care.    o   Review your medicines if your doctor has prescribed any.    Talk with your health care provider about whether you should have a test to screen for prostate cancer (PSA).    Every 3 years, have a diabetes test (fasting glucose). If you are at risk for diabetes, you should have this test more often.    Every 5 years, have a cholesterol test. Have this test more often if you are at risk for high cholesterol or heart disease.     Every 10 years, have a colonoscopy. Or, have a yearly FIT test (stool test). These exams will check for colon cancer.    Talk to with your health care provider about screening for Abdominal Aortic Aneurysm if you have a family history of AAA or have a history of smoking.    Shots:     Get a flu shot each year.     Get a tetanus shot every 10 years.     Talk to your doctor about your pneumonia vaccines. There are now two you should receive - Pneumovax (PPSV 23) and Prevnar (PCV 13).     Talk to your doctor about a shingles vaccine.     Talk to your doctor about the hepatitis B vaccine.  Nutrition:     Eat at least 5 servings of fruits and vegetables each day.      Eat whole-grain bread, whole-wheat pasta and brown rice instead of white grains and rice.     Talk to your provider about Calcium and Vitamin D.   Lifestyle    Exercise for at least 150 minutes a week (30 minutes a day, 5 days a week). This will help you control your weight and prevent disease.     Limit alcohol to one drink per day.     No smoking.     Wear sunscreen to prevent skin cancer.     See your dentist every six months for an exam and cleaning.     See your eye doctor every 1 to 2 years to screen for conditions such as glaucoma, macular degeneration, cataracts, etc           Follow-ups after your visit        Your next 10 appointments already scheduled     May 14, 2018 10:00 AM CDT   Individual Therapy 53+ minutes with Estelle Gomez LP   [facility] ([facility])    [address]  Mail [address] 7521  [facility] [address]   [phone]            May 29, 2018  9:45 AM CDT   Ech Stress Test with [place], [place] ECHO STRESS 2   [facility] ([facility])    [address] [facility] [place] [address] [phone]           1. Please bring or wear a comfortable two-piece outfit and walking shoes. 2. Stop eating 3 hours before the test. You may drink water or juice. 3. Stop all caffeine 12 hours before the test. This includes coffee, tea, soda pop, chocolate and certain medicines (such as Anacin and Excederin). Also avoid decaf coffee and tea, as these contain small amounts of caffeine. 4. No alcohol, smoking or use of other tobacco products for 12 hours before the test. 5. Refer to your provider instructions to see if you need to stop any medications (such as beta-blockers or nitrates) for this test. 6. For patients with diabetes: - If you take insulin, call your diabetes care team. Ask if you should take a   dose the morning of your test. - If you take diabetes medicine by mouth, dont take it on the morning of your test. Bring it with you  to take after the test. (If you have questions, call your diabetes care team) 7. When you arrive, please tell us if: - You have diabetes. - You have taken Viagra, Cialis or Levitra in the past 48 hours. 8. For any questions that cannot be answered, please contact the ordering physician            May 30, 2018  1:00 PM CDT   Individual Therapy 53+ minutes with Estelle Gomez LP   [facility] ([facility])    [address]  Mail [address] 7521  [facility] [address]   [phone]            Jun 11, 2018 12:00 PM CDT   Individual Therapy 53+ minutes with Estelle Gomez LP   [facility] ([facility])    [address]  Mail [address] 75[address]  [facility] [address]   [phone]            Jun 25, 2018 12:00 PM CDT   Individual Therapy 53+ minutes with Estelle Gomez LP   [facility] ([facility])    [address]  [address] 75[address]  [facility] [address]   [phone]            Jul 09, 2018 10:40 AM CDT   Return Visit with Jluis Mane MD   [facility] ([facility])    [address]  [address]  [facility] [address]   [phone]            Jul 16, 2018 11:00 AM CDT   Individual Therapy 53+ minutes with Estelle Gomez LP   [facility] ([facility])    [address]  [address]  [facility] [address]   [phone]              Future tests that were ordered for you today     Open Future Orders        Priority Expected Expires Ordered    US abdominal aorta limited Routine  5/9/2019 5/9/2018    Fecal colorectal cancer screen FIT Routine 5/30/2018 8/1/2018 5/9/2018            Who to contact     If you have questions or need follow up information about today's clinic visit or your schedule please contact [facility] directly at [phone].  Normal or non-critical lab and imaging results will be communicated to you by MyChart, letter or  phone within 4 business days after the clinic has received the results. If you do not hear from us within 7 days, please contact the clinic through PulseOnt or phone. If you have a critical or abnormal lab result, we will notify you by phone as soon as possible.  Submit refill requests through [email or web address] or call your pharmacy and they will forward the refill request to us. Please allow 3 business days for your refill to be completed.          Additional Information About Your Visit        Care EveryWhere ID     This is your Care EveryWhere ID. This could be used by other organizations to access your [facility] medical records  [id number]        Your Vitals Were     Pulse Temperature Respirations Height Pulse Oximetry BMI (Body Mass Index)    57 97.3  F (36.3  C) (Tympanic) 14 6' (1.829 m) 99% 40.55 kg/m2       Blood Pressure from Last 3 Encounters:   05/09/18 123/71   04/30/18 118/77   03/19/18 144/88    Weight from Last 3 Encounters:   05/09/18 299 lb (135.6 kg)   04/30/18 305 lb (138.3 kg)   03/19/18 (!) 307 lb (139.3 kg)              We Performed the Following     CBC with platelets differential     DEPRESSION ACTION PLAN (DAP)          Today's Medication Changes          These changes are accurate as of 5/9/18 11:04 AM.  If you have any questions, ask your nurse or doctor.               These medicines have changed or have updated prescriptions.        Dose/Directions    lovastatin 20 MG tablet   Commonly known as:  MEVACOR   This may have changed:  See the new instructions.   Used for:  Hyperlipidemia LDL goal <100   Changed by:  Delmis Simons APRN CNP        Take 1 tablet (20 mg) by mouth At Bedtime   Quantity:  90 tablet   Refills:  3       meloxicam 15 MG tablet   Commonly known as:  MOBIC   This may have changed:  See the new instructions.   Used for:  Primary osteoarthritis, unspecified site   Changed by:  Delmis Simons APRN CNP        Dose:  15 mg   Take 1 tablet (15 mg) by mouth daily    Quantity:  90 tablet   Refills:  3         Stop taking these medicines if you haven't already. Please contact your care team if you have questions.     DAILY-EVANGELISTA Tabs   Stopped by:  Delmis Simons APRN CNP                Where to get your medicines      These medications were sent to [facility] - [place], [place] - [address]  [address] [facility] [address]    Hours:  test rx sent successfully  2/8/05  kr Phone:  [phone]     lovastatin 20 MG tablet    meloxicam 15 MG tablet                Primary Care Provider Office Phone # Fax #    JERRICA Salcedo [phone]       [address] [place] [address]        Equal Access to Services     SUSU BLACKWELL : Hadii aad ku hadasho Soomaali, waaxda luqadaha, qaybta kaalmada adeegyada, waxay murphyin diana castillo . So [facility] [phone].    ATENCIÓN: Si habla español, tiene a hernandez disposición servicios gratuitos de asistencia lingüística. [facility] [phone].    We comply with applicable federal civil rights laws and [place] laws. We do not discriminate on the basis of race, color, national origin, age, disability, sex, sexual orientation, or gender identity.            Thank you!     Thank you for choosing [facility]  for your care. Our goal is always to provide you with excellent care. Hearing back from our patients is one way we can continue to improve our services. Please take a few minutes to complete the written survey that you may receive in the mail after your visit with us. Thank you!             Your Updated Medication List - Protect others around you: Learn how to safely use, store and throw away your medicines at www.disposemymeds.org.          This list is accurate as of 5/9/18 11:04 AM.  Always use your most recent med list.                   Brand Name Dispense Instructions for use Diagnosis    dorzolamide-timolol 2-0.5 % ophthalmic solution    COSOPT      INSTILL 1 DROP INTO BOTH EYES 2 TIMES DAILY.        estradiol 0.1 MG/24HR BIW patch    VIVELLE-DOT    8 patch    Place 1 patch onto the skin twice a week    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       finasteride 5 MG tablet    PROSCAR    90 tablet    Take 1 tablet (5 mg) by mouth daily    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       latanoprost 0.005 % ophthalmic solution    XALATAN     1 drop At Bedtime.        lovastatin 20 MG tablet    MEVACOR    90 tablet    Take 1 tablet (20 mg) by mouth At Bedtime    Hyperlipidemia LDL goal <100       meloxicam 15 MG tablet    MOBIC    90 tablet    Take 1 tablet (15 mg) by mouth daily    Primary osteoarthritis, unspecified site       * order for DME     1 each    Equipment being ordered:  BACK BRACE  [employer] Back-n-Shape II Back Brace  Size XXXL with thermoplastic insert    DDD (degenerative disc disease), lumbar, Pathologic fracture of vertebrae       * order for DME     2 each    Equipment being ordered: [employer] 232 Cotton Thigh High for Men 20-30 mmHg- TWO PAIR TAN COLOR    Varicose veins of legs       SM CALCIUM 600/VITAMIN D 600-400 MG-UNIT per tablet   Generic drug:  calcium-vitamin D     60 tablet    Take 1 tablet by mouth 2 times daily    Hyperlipidemia LDL goal <100       spironolactone 100 MG tablet    ALDACTONE    90 tablet    Take 1 tablet (100 mg) by mouth daily    Essential hypertension with goal blood pressure less than 140/90       * Notice:  This list has 2 medication(s) that are the same as other medications prescribed for you. Read the directions carefully, and ask your doctor or other care provider to review them with you.

## 2018-05-09 NOTE — PATIENT INSTRUCTIONS
Preventive Health Recommendations:    Ages 65 and over    Yearly exam:             See your health care provider every year in order to  o   Review health changes.   o   Discuss preventive care.    o   Review your medicines if your doctor has prescribed any.    Talk with your health care provider about whether you should have a test to screen for prostate cancer (PSA).    Every 3 years, have a diabetes test (fasting glucose). If you are at risk for diabetes, you should have this test more often.    Every 5 years, have a cholesterol test. Have this test more often if you are at risk for high cholesterol or heart disease.     Every 10 years, have a colonoscopy. Or, have a yearly FIT test (stool test). These exams will check for colon cancer.    Talk to with your health care provider about screening for Abdominal Aortic Aneurysm if you have a family history of AAA or have a history of smoking.    Shots:     Get a flu shot each year.     Get a tetanus shot every 10 years.     Talk to your doctor about your pneumonia vaccines. There are now two you should receive - Pneumovax (PPSV 23) and Prevnar (PCV 13).     Talk to your doctor about a shingles vaccine.     Talk to your doctor about the hepatitis B vaccine.  Nutrition:     Eat at least 5 servings of fruits and vegetables each day.     Eat whole-grain bread, whole-wheat pasta and brown rice instead of white grains and rice.     Talk to your provider about Calcium and Vitamin D.   Lifestyle    Exercise for at least 150 minutes a week (30 minutes a day, 5 days a week). This will help you control your weight and prevent disease.     Limit alcohol to one drink per day.     No smoking.     Wear sunscreen to prevent skin cancer.     See your dentist every six months for an exam and cleaning.     See your eye doctor every 1 to 2 years to screen for conditions such as glaucoma, macular degeneration, cataracts, etc

## 2018-05-10 ASSESSMENT — PATIENT HEALTH QUESTIONNAIRE - PHQ9: SUM OF ALL RESPONSES TO PHQ QUESTIONS 1-9: 1

## 2018-05-10 ASSESSMENT — ANXIETY QUESTIONNAIRES: GAD7 TOTAL SCORE: 2

## 2018-05-14 ENCOUNTER — OFFICE VISIT (OUTPATIENT)
Dept: OTHER | Facility: OUTPATIENT CENTER | Age: 68
End: 2018-05-14
Payer: MEDICARE

## 2018-05-14 ENCOUNTER — TELEPHONE (OUTPATIENT)
Dept: FAMILY MEDICINE | Facility: CLINIC | Age: 68
End: 2018-05-14

## 2018-05-14 DIAGNOSIS — F64.0 GENDER DYSPHORIA IN ADOLESCENT AND ADULT: Primary | ICD-10-CM

## 2018-05-14 NOTE — Clinical Note
Can you please proofread, add address. This needs to be sent to Dr. Holm, forwarded to Alia, and a copy printed for the client.    Thanks, deborah

## 2018-05-14 NOTE — LETTER
May 14, 2018     Dr. Uvaldo Holm      Re: Laila Perez    : 1950    Primary letter of support for gender affirmation surgery (vaginoplasty)    Dr. Holm,    Laila Perez has received services through the [facility] since 16. Laila Perez is a 67 year old transgender person who initially sought services to explore her gender identity and making decisions about medical interventions to address her gender dysphoria. Her most recent appointment for individual psychotherapy occurred on 18.    Laila Perez completed a diagnostic session, as well as about 30  psychotherapy appointments. Additionally, she completed a thorough psychological evaluation consisting of  a set of evaluative measures of gender dysphoria, psychosocial adjustment, sexual health, and overall psychological functioning.     Laila Perez has been diagnosed with gender dysphoria. She does meet criteria for Major depression, in remission and Alcohol dependence in full remission. She consistently takes prescribed medication for depression, Fluoxetine 40 mg daily. Her depression is in remission.  She has a history of alcohol dependence with maintaining consistent abstinence from alcohol for about 7 years. 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  She is well adjusted and thoughtful, and has approached her decision making for surgery with intention and care. Laila Perez s mental health concerns are stable and well controlled, and do not pose any barrier to her ability to consent and undergo gender-affirming surgery at this time. She has the ability to process general information and understanding of surgery and the need for aftercare.     Laila Perez lives in her own apartment in a supported living environment. Her  has confirmed skilled nursing visits will be provided as  needed for post-operative care when she returns home. This will be provided by Raquel Kerr RN, [facility].    Ms. Perez's detailed plan will need to be discussed and approved by her team at [facility] and her , Kalee Franco, , [facility]. There are letters in her file with the contact information. Ms. Perez is accompanied to her appointments by a staff member from [facility] to assist with communication and planning.    Laila Perez currently meets the [employer] for Transgender Health (WPATH) Standards of Care for surgical gender reassignment of gender dysphoric persons, as well as internal prerequisites for gender reassignment surgery. Surgery is considered a medically necessary treatment by the WPATH Standards of Care for transgender persons. On behalf of our staff, I support her decision to pursue medically necessary surgery at this time.     Laila Perez has been on feminizing hormones since 4/25/16. She has undergone significant and permanent changes such as legal name change and socially transitioning to live full-time in her affirmed gender since January 2014. Gender affirmation surgery will decrease her gender dysphoria and psychological distress and improve her overall psychological functioning.  In some cases, denial of access to this surgery can lead to increased isolation, decreased work performance, and decreased sexual and interpersonal functioning. Surgery is expected to increase comfort with self, and improve interpersonal, and sexual functioning.    Laila Perez has educated herself about the surgery and is aware of its risks and benefits. Given her concerns and the availability of this type of surgery, I support her choice of surgeon.    The [facility] staff support is based on the psychological indication for surgery and did not include a physical  examination to assess medical contra-indications for the surgical procedure. This letter of support is valid for one year from the date of approval.      If you have any questions, or are in need of additional information, please do not hesitate to contact me.      Sincerely,        Estelle Gomez PsyD  Licensed Psychologist    [facility]   Department of Family Medicine and Community Health  [employer]

## 2018-05-14 NOTE — MR AVS SNAPSHOT
After Visit Summary   5/14/2018    Laila Perez    MRN: [id number]           Patient Information     Date Of Birth          1950        Visit Information        Provider Department      5/14/2018 10:00 AM [facility] Gomez LP [facility]        Today's Diagnoses     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    -  1       Follow-ups after your visit        Your next 10 appointments already scheduled     May 29, 2018  8:45 AM CDT   US ABDOMINAL AORTIC IMAGING with WYUS1   [facility] ([facility])    [address] [facility] [address]   [phone]           Please bring a list of your medicines (including vitamins, minerals and over-the-counter drugs). Also, tell your doctor about any allergies you may have. Wear comfortable clothes and leave your valuables at home.  Adults: No eating or drinking for 8 hours before the exam. You may take medicine with a small sip of water.  Children: - Children 6+ years: No food or drink for 6 hours before exam. - Children 1-5 years: No food or drink for 4 hours before exam. - Infants, breast-fed: may have breast milk up to 2 hours before exam. - Infants, formula: may have bottle until 4 hours before exam.  Please call the Imaging Department at your exam site with any questions.            May 29, 2018  9:45 AM CDT   Ech Stress Test with MIRA, [place] ECHO STRESS 2   [facility] ([facility])    [address] [facility] [facility] [address]   [phone]           1. Please bring or wear a comfortable two-piece outfit and walking shoes. 2. Stop eating 3 hours before the test. You may drink water or juice. 3. Stop all caffeine 12 hours before the test. This includes coffee, tea, soda pop, chocolate and certain medicines (such as Anacin and Excederin). Also avoid decaf coffee and tea, as these contain small amounts of caffeine. 4. No alcohol, smoking or use of other tobacco  products for 12 hours before the test. 5. Refer to your provider instructions to see if you need to stop any medications (such as beta-blockers or nitrates) for this test. 6. For patients with diabetes: - If you take insulin, call your diabetes care team. Ask if you should take a   dose the morning of your test. - If you take diabetes medicine by mouth, dont take it on the morning of your test. Bring it with you to take after the test. (If you have questions, call your diabetes care team) 7. When you arrive, please tell us if: - You have diabetes. - You have taken Viagra, Cialis or Levitra in the past 48 hours. 8. For any questions that cannot be answered, please contact the ordering physician            May 30, 2018  1:00 PM CDT   Individual Therapy 53+ minutes with Estelle Gomez LP   [facility] ([facility])    [address]  [address] 75[address]  [facility] [address]   [phone]            Jun 11, 2018 12:00 PM CDT   Individual Therapy 53+ minutes with Estelle Gomez LP   [facility] ([facility])    [address]  [address] 75[address]  [facility] [address]   [phone]            Jun 25, 2018 12:00 PM CDT   Individual Therapy 53+ minutes with Estelle Gomez LP   [facility] ([facility])    [address]  [address]  [facility] [address]   [phone]            Jul 09, 2018 10:40 AM CDT   Return Visit with Jluis Mane MD   [facility] ([facility])    [address]  Mail [address] 75[address]  [facility] [address]   [phone]            Jul 16, 2018 11:00 AM CDT   Individual Therapy 53+ minutes with Estelle Gomez LP   [facility] ([facility])    [address]  [address]  [facility] [address]   [phone]              Who to contact     Please call your clinic at [phone] to:    Ask questions about your health    Make or cancel  appointments    Discuss your medicines    Learn about your test results    Speak to your doctor            Additional Information About Your Visit        Care EveryWhere ID     This is your Care EveryWhere ID. This could be used by other organizations to access your [place] medical records  [id number]         Blood Pressure from Last 3 Encounters:   05/09/18 123/71   04/30/18 118/77   03/19/18 144/88    Weight from Last 3 Encounters:   05/09/18 135.6 kg (299 lb)   04/30/18 138.3 kg (305 lb)   03/19/18 (!) 139.3 kg (307 lb)              We Performed the Following     Individual Psychotherapy (53+ min) [79326]     Psychotherapy Interactive Complexity [00045]        Primary Care Provider Office Phone # Fax #    Delmis García JERRICA Simons [facility] [phone] [address] [place] [address]        Equal Access to Services     SUSU BLACKWELL : Hadii aad jhony hadasho Soomaali, waaxda luqadaha, qaybta kaalmada adeegyada, zulma castillo . So [facility] [phone].    ATENCIÓN: Si habla español, tiene a hernandez disposición servicios gratuitos de asistencia lingüística. Soco al [phone].    We comply with applicable federal civil rights laws and [place] laws. We do not discriminate on the basis of race, color, national origin, age, disability, sex, sexual orientation, or gender identity.            Thank you!     Thank you for choosing [facility]  for your care. Our goal is always to provide you with excellent care. Hearing back from our patients is one way we can continue to improve our services. Please take a few minutes to complete the written survey that you may receive in the mail after your visit with us. Thank you!             Your Updated Medication List - Protect others around you: Learn how to safely use, store and throw away your medicines at www.disposemymeds.org.          This list is accurate as of 5/14/18 11:59 PM.  Always use your most recent med list.                    Brand Name Dispense Instructions for use Diagnosis    dorzolamide-timolol 2-0.5 % ophthalmic solution    COSOPT     INSTILL 1 DROP INTO BOTH EYES 2 TIMES DAILY.        estradiol 0.1 MG/24HR BIW patch    VIVELLE-DOT    8 patch    Place 1 patch onto the skin twice a week    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       finasteride 5 MG tablet    PROSCAR    90 tablet    Take 1 tablet (5 mg) by mouth daily    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       latanoprost 0.005 % ophthalmic solution    XALATAN     1 drop At Bedtime.        lovastatin 20 MG tablet    MEVACOR    90 tablet    Take 1 tablet (20 mg) by mouth At Bedtime    Hyperlipidemia LDL goal <100       meloxicam 15 MG tablet    MOBIC    90 tablet    Take 1 tablet (15 mg) by mouth daily    Primary osteoarthritis, unspecified site       * order for DME     1 each    Equipment being ordered:  BACK BRACE  MedSpec Back-n-Shape II Back Brace  Size XXXL with thermoplastic insert    DDD (degenerative disc disease), lumbar, Pathologic fracture of vertebrae       * order for DME     2 each    Equipment being ordered: Sigvaris 232 Cotton Thigh High for Men 20-30 mmHg- TWO PAIR TAN COLOR    Varicose veins of legs       SM CALCIUM 600/VITAMIN D 600-400 MG-UNIT per tablet   Generic drug:  calcium-vitamin D     60 tablet    Take 1 tablet by mouth 2 times daily    Hyperlipidemia LDL goal <100       * Notice:  This list has 2 medication(s) that are the same as other medications prescribed for you. Read the directions carefully, and ask your doctor or other care provider to review them with you.

## 2018-05-14 NOTE — TELEPHONE ENCOUNTER
Pharmacy called back. Advised I could not find any records of her receiving any injections at this clinic. No regular injections on med list either. Shakila Levi RN

## 2018-05-14 NOTE — TELEPHONE ENCOUNTER
Denzel, a pharmacist from [facility] in [place], [place] is calling to see what injection patient was receiving at the [facility] because patient came in to the pharmacy stating she couldn't get the injection there because her insurance wouldn't cover it any more. Denzel is trying to figure out what the patient was getting to see if there is anything he can help with. Please call him with further information.    Deisy Alford-Station

## 2018-05-16 ENCOUNTER — HOSPITAL ENCOUNTER (OUTPATIENT)
Facility: CLINIC | Age: 68
Setting detail: SPECIMEN
Discharge: HOME | End: 2018-05-16
Admitting: NURSE PRACTITIONER
Payer: MEDICARE

## 2018-05-16 DIAGNOSIS — I10 ESSENTIAL HYPERTENSION WITH GOAL BLOOD PRESSURE LESS THAN 140/90: ICD-10-CM

## 2018-05-16 PROCEDURE — 82274 ASSAY TEST FOR BLOOD FECAL: CPT | Performed by: NURSE PRACTITIONER

## 2018-05-16 RX ORDER — SPIRONOLACTONE 100 MG/1
100 TABLET, FILM COATED ORAL DAILY
Qty: 90 TABLET | Refills: 1 | Status: SHIPPED | OUTPATIENT
Start: 2018-05-16 | End: 2018-10-15

## 2018-05-16 NOTE — TELEPHONE ENCOUNTER
Requested Medication:  Spironolactone  Dose:   100  Quantity:  90  Refills:  0    Take 1 tablet daily    Last seen at [facility]:  3/19  Next Appointment with Provider:  7/9  Labs complete      Alexandra Long CMA

## 2018-05-20 LAB — HEMOCCULT STL QL IA: NEGATIVE

## 2018-05-22 ENCOUNTER — CARE COORDINATION (OUTPATIENT)
Dept: OTHER | Facility: OUTPATIENT CENTER | Age: 68
End: 2018-05-22

## 2018-05-22 NOTE — PROGRESS NOTES
[facility] -  Case Progress Note  Client Name: Laila Perez  YOB: 1950  MRN:  [id number]  Treating Provider: Estelle Gomez LP  Type of Session: Individual  Present in Session: client and support person  Number of Minutes:  55    Health Maintenance Summary - Mental Health Treatment Plan       Status Date      Mental Health Tx Plan Q11 MOS Next Due 9/30/2018      Done 10/30/2017      Done 9/6/2016             Date of Service: 5/14/18    Current Symptoms/Status:  Distress about gender and secondary sex characteristics, wants genitals removed, bothers her daily of moderate to severe intensity, distress with work situation.    Progress Toward Treatment Goals:   Client reported progress with getting nursing care and aftercare plan completed.    Intervention: Modality and Description:  Provided support and feedback. Used CBT to process gender dysphoria concerns. She shared talking with the supervisor at the house and her  about aftercare for surgery. She had a letter from both her  stating they will provide the funding/neeeds for her aftercare plan. Also she has a letter from the nurse stating she will be able to provide the visits for medical aftercare. Client was able to state what she thinks she will need for the surgery. We worked on her letter for surgery during the session. Discussed with client the letter will be sent, I will talk with other provider regarding secondary letter. This information will be sent to the surgeons office and then they will need to get insurance.  Reviewed with client several times about the process of sending the letter to surgeon and then their office will send to insurance for payment authorization.    Interactive Complexity: Client needs information reviewed and explained several times in order to understand.    Response to Intervention:  Client reported validation.    Assignment:  Schedule appointment with  surgeon.      Diagnosis:  Encounter Diagnosis   Name Primary?     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 Yes           Plan / Need for Future Services:  Return for therapy in 4 weeks.      Estelle Gomez PsyD, LP

## 2018-05-29 ENCOUNTER — TELEPHONE (OUTPATIENT)
Dept: CARDIOLOGY | Facility: CLINIC | Age: 68
End: 2018-05-29

## 2018-05-29 ENCOUNTER — HOSPITAL ENCOUNTER (OUTPATIENT)
Dept: ULTRASOUND IMAGING | Facility: CLINIC | Age: 68
End: 2018-05-29
Attending: NURSE PRACTITIONER
Payer: MEDICARE

## 2018-05-29 ENCOUNTER — HOSPITAL ENCOUNTER (OUTPATIENT)
Dept: CARDIOLOGY | Facility: CLINIC | Age: 68
Discharge: HOME | End: 2018-05-29
Attending: INTERNAL MEDICINE | Admitting: INTERNAL MEDICINE
Payer: MEDICARE

## 2018-05-29 DIAGNOSIS — Z13.6 ENCOUNTER FOR ABDOMINAL AORTIC ANEURYSM (AAA) SCREENING: ICD-10-CM

## 2018-05-29 DIAGNOSIS — I86.8 VARICOSE VEINS OF OTHER SPECIFIED SITES (CODE): ICD-10-CM

## 2018-05-29 DIAGNOSIS — I35.0 AORTIC VALVE STENOSIS, UNSPECIFIED ETIOLOGY: ICD-10-CM

## 2018-05-29 DIAGNOSIS — I35.0 NONRHEUMATIC AORTIC VALVE STENOSIS: Primary | ICD-10-CM

## 2018-05-29 PROCEDURE — 93325 DOPPLER ECHO COLOR FLOW MAPG: CPT | Mod: 26 | Performed by: INTERNAL MEDICINE

## 2018-05-29 PROCEDURE — 76775 US EXAM ABDO BACK WALL LIM: CPT

## 2018-05-29 PROCEDURE — 93018 CV STRESS TEST I&R ONLY: CPT | Performed by: INTERNAL MEDICINE

## 2018-05-29 PROCEDURE — 93017 CV STRESS TEST TRACING ONLY: CPT

## 2018-05-29 PROCEDURE — 93321 DOPPLER ECHO F-UP/LMTD STD: CPT | Mod: 26 | Performed by: INTERNAL MEDICINE

## 2018-05-29 PROCEDURE — 93350 STRESS TTE ONLY: CPT | Mod: 26 | Performed by: INTERNAL MEDICINE

## 2018-05-29 PROCEDURE — 93016 CV STRESS TEST SUPVJ ONLY: CPT | Performed by: INTERNAL MEDICINE

## 2018-05-29 PROCEDURE — 25500064 ZZH RX 255 OP 636: Performed by: INTERNAL MEDICINE

## 2018-05-29 RX ADMIN — HUMAN ALBUMIN MICROSPHERES AND PERFLUTREN 2 ML: 10; .22 INJECTION, SOLUTION INTRAVENOUS at 10:00

## 2018-05-29 NOTE — TELEPHONE ENCOUNTER
"Pt had a stress echo today the results of which are as follows: normal with no evidence of stress induced ischemia; moderate AS with mean gradient of 38 mmHg (26mmHg on 8/2017 TTE). Per Dr Ibrahim's note of 4/30/18: \"With regard to her gender reassignment surgery, I recommended a stress echo prior to the surgery\". Will discuss with Dr Ibrahim re: official cardiology clearance for surgery.     "

## 2018-05-30 ENCOUNTER — OFFICE VISIT (OUTPATIENT)
Dept: OTHER | Facility: OUTPATIENT CENTER | Age: 68
End: 2018-05-30
Payer: MEDICARE

## 2018-05-30 DIAGNOSIS — F64.0 GENDER DYSPHORIA IN ADOLESCENT AND ADULT: Primary | ICD-10-CM

## 2018-05-30 NOTE — MR AVS SNAPSHOT
After Visit Summary   5/30/2018    Laila Perez    MRN: [id number]           Patient Information     Date Of Birth          1950        Visit Information        Provider Department      5/30/2018 1:00 PM Estelle Gomez LP [facility]        Today's Diagnoses     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    -  1       Follow-ups after your visit        Your next 10 appointments already scheduled     Jun 11, 2018 12:00 PM CDT   Individual Therapy 53+ minutes with Estelle Gomez LP   [facility] ([facility])    [address]  Mail [address] 7521  [facility] [address]   [phone]            Jun 25, 2018 12:00 PM CDT   Individual Therapy 53+ minutes with Estelle Gomez LP   [facility] ([facility])    [address]  [address] 75[address]  [facility] [address]   [phone]            Jul 09, 2018 10:40 AM CDT   Return Visit with Jluis Mane MD   [facility] ([facility])    [address]  [address]  [facility] [address]   [phone]            Jul 16, 2018 11:00 AM CDT   Individual Therapy 53+ minutes with Estelle Gomez LP   [facility] ([facility])    [address]  [address]  [facility] [address]   [phone]              Who to contact     Please call your clinic at [phone] to:    Ask questions about your health    Make or cancel appointments    Discuss your medicines    Learn about your test results    Speak to your doctor            Additional Information About Your Visit        Care EveryWhere ID     This is your Care EveryWhere ID. This could be used by other organizations to access your [place] medical records  [id number]         Blood Pressure from Last 3 Encounters:   05/09/18 123/71   04/30/18 118/77   03/19/18 144/88    Weight from Last 3 Encounters:   05/09/18 135.6 kg (299 lb)   04/30/18 138.3 kg (305 lb)    03/19/18 (!) 139.3 kg (307 lb)              We Performed the Following     Individual Psychotherapy (53+ min) [15311]     Psychotherapy Interactive Complexity [59380]        Primary Care Provider Office Phone # Fax #    JERRICA Salcedo LEONA [phone] [address] [facility] [address]        Equal Access to Services     [facility] : Hadii aad ku hadasho Soomaali, waaxda luqadaha, qaybta kaalmada adeegyada, waxay idiin hayaan adeeg kharash la'aan . So [facility] [phone].    ATENCIÓN: Si habla español, tiene a hernandez disposición servicios gratuitos de asistencia lingüística. Soco al [phone].    We comply with applicable federal civil rights laws and [place] laws. We do not discriminate on the basis of race, color, national origin, age, disability, sex, sexual orientation, or gender identity.            Thank you!     Thank you for choosing [facility]  for your care. Our goal is always to provide you with excellent care. Hearing back from our patients is one way we can continue to improve our services. Please take a few minutes to complete the written survey that you may receive in the mail after your visit with us. Thank you!             Your Updated Medication List - Protect others around you: Learn how to safely use, store and throw away your medicines at www.disposemymeds.org.          This list is accurate as of 5/30/18 11:59 PM.  Always use your most recent med list.                   Brand Name Dispense Instructions for use Diagnosis    dorzolamide-timolol 2-0.5 % ophthalmic solution    COSOPT     INSTILL 1 DROP INTO BOTH EYES 2 TIMES DAILY.        estradiol 0.1 MG/24HR BIW patch    VIVELLE-DOT    8 patch    Place 1 patch onto the skin twice a week    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       finasteride 5 MG tablet    PROSCAR    90 tablet    Take 1 tablet (5 mg) by mouth daily    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       latanoprost 0.005 %  ophthalmic solution    XALATAN     1 drop At Bedtime.        lovastatin 20 MG tablet    MEVACOR    90 tablet    Take 1 tablet (20 mg) by mouth At Bedtime    Hyperlipidemia LDL goal <100       meloxicam 15 MG tablet    MOBIC    90 tablet    Take 1 tablet (15 mg) by mouth daily    Primary osteoarthritis, unspecified site       * order for DME     1 each    Equipment being ordered:  BACK BRACE  MedSpec Back-n-Shape II Back Brace  Size XXXL with thermoplastic insert    DDD (degenerative disc disease), lumbar, Pathologic fracture of vertebrae       * order for DME     2 each    Equipment being ordered: Sigvaris 232 Cotton Thigh High for Men 20-30 mmHg- TWO PAIR TAN COLOR    Varicose veins of legs       SM CALCIUM 600/VITAMIN D 600-400 MG-UNIT per tablet   Generic drug:  calcium-vitamin D     60 tablet    Take 1 tablet by mouth 2 times daily    Hyperlipidemia LDL goal <100       spironolactone 100 MG tablet    ALDACTONE    90 tablet    Take 1 tablet (100 mg) by mouth daily    Essential hypertension with goal blood pressure less than 140/90       * Notice:  This list has 2 medication(s) that are the same as other medications prescribed for you. Read the directions carefully, and ask your doctor or other care provider to review them with you.

## 2018-05-31 NOTE — TELEPHONE ENCOUNTER
Recommend that she proceed with surgery.  Also recommend limited echo to assess aortic valve area (can do after surgery but would schedule for within next 3 months.   Thank you. CD    ADDENDUM: LM for patient to call back to discuss results, surgical clearance and schedule echo for 3 months. Order placed for echo. Karlene Carpenter RN Cardiology June 1, 2018, 7:59 AM    ADDENDUM: Talked with patient regarding this result and repeat limited echo. She states she has to lose weight prior to her surgery so is working on that now. She would like to have her repeat limited echocardiogram done in August. Yolanda Arora RN Cardiology at [facility] June 5, 2018, 4:19 PM

## 2018-06-01 ENCOUNTER — TELEPHONE (OUTPATIENT)
Dept: PLASTIC SURGERY | Facility: CLINIC | Age: 68
End: 2018-06-01

## 2018-06-01 NOTE — TELEPHONE ENCOUNTER
Discussed pt weight loss as next goal for surgery. After talking with pt today regarding weight loss, writer reached out to pts , Kalee Franco, to help. After discussing BMI and weight loss with  and calculating BMI to be at or under 35 for surgery, it was determined a weight of about 250 would be ideal. It was discussed that pt needs to maintain this weight for 2-3 months at which point we will see patient back and prepare for surgery. Kalee Franco stated she would communicate this plan and ideal weight and work with the patient. Referrals to weight management could be made by Dr. Holm if necessary.  Writer indicated he would communicate this plan to Pts psychologist, Mayte Gomez at [facility].

## 2018-06-01 NOTE — TELEPHONE ENCOUNTER
Discussed Dr. Holm's plan to move forward with surgical goals. Pt was informed we have her letters of support. The next goal is continued weight loss. Pt was informed she needed to reach a BMI of 35; however, pt had a hard time understanding what this means. Writer asked pt if it would be ok to contact her , Kalee (YASIR on file), to help her understand this information and work towards this goal. Pt agreed. Pt was encouraged to work with her primary care physician to help her loose weight.     Writer then called Kalee, pts , and left VM asking for her to call writer back. A brief description of situation was left.

## 2018-06-04 NOTE — PROGRESS NOTES
[facility] -  Case Progress Note  Client Name: Laila Perez  YOB: 1950  MRN:  [id number]  Treating Provider: Estelle Gomez LP  Type of Session: Individual  Present in Session: client and support person  Number of Minutes:  55    Health Maintenance Summary - Mental Health Treatment Plan       Status Date      Mental Health Tx Plan Q11 MOS Next Due 9/30/2018      Done 10/30/2017      Done 9/6/2016             Date of Service: 5/30/18    Current Symptoms/Status:  Distress about gender and secondary sex characteristics, wants genitals removed, bothers her daily of moderate to severe intensity, distress with work situation.    Progress Toward Treatment Goals:   Client reported progress with continuing to lose weight.    Intervention: Modality and Description:  Provided support and feedback. Used CBT to process gender dysphoria concerns. Informed client the letter has been written and sent to the surgeon. The secondary letter is being revised. Discussed they will pursue authorization from the insurance company for surgery. Stated I do not know how long it will take. Contact information for  and nurse is in the file for coordination of aftercare. Client shared she is looking forward to having the surgery. She is tired of having male body parts. She shared about not having income limits because she is off [employer] discussed she is still on medicare and medical assistance, so the income limits still applies to her situation. Encouraged her to get more information. Reviewed who to contact regarding getting the surgery set up and knowing what she needs to do.      Interactive Complexity: Client needs information reviewed and explained several times in order to understand.    Response to Intervention:  Client reported validation.    Assignment:  Schedule appointment with surgeon.      Diagnosis:  Encounter Diagnosis   Name Primary?     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  Yes           Plan / Need for Future Services:  Return for therapy in 4 weeks.      sEtelle Gomez PsyD, LP

## 2018-06-11 ENCOUNTER — OFFICE VISIT (OUTPATIENT)
Dept: OTHER | Facility: OUTPATIENT CENTER | Age: 68
End: 2018-06-11
Payer: MEDICARE

## 2018-06-11 DIAGNOSIS — F64.0 GENDER DYSPHORIA IN ADOLESCENT AND ADULT: Primary | ICD-10-CM

## 2018-06-11 NOTE — MR AVS SNAPSHOT
After Visit Summary   6/11/2018    Laila Perez    MRN: [id number]           Patient Information     Date Of Birth          1950        Visit Information        Provider Department      6/11/2018 12:00 PM Estelle Gomez LP [facility]        Today's Diagnoses     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    -  1       Follow-ups after your visit        Your next 10 appointments already scheduled     Jun 25, 2018 12:00 PM CDT   Individual Therapy 53+ minutes with Estelle Gomez LP   [facility] ([facility])    [address]  Mail Code 7521  [facility] [address]   [phone]            Jul 09, 2018 10:40 AM CDT   Return Visit with Jluis Mane MD   [facility] ([facility])    [address]  Mail Code 7521  [facility] [address]   [phone]            Jul 16, 2018 11:00 AM CDT   Individual Therapy 53+ minutes with Estelle Gomez LP   [facility] ([facility])    [address]  Mail Code 7521  [facility] [address]   [phone]            Aug 13, 2018 10:00 AM CDT   Individual Therapy 53+ minutes with Estelle Gomez LP   [facility] ([facility])    [address]  Mail Code 7521  [facility] [address]   [phone]              Who to contact     Please call your clinic at [phone] to:    Ask questions about your health    Make or cancel appointments    Discuss your medicines    Learn about your test results    Speak to your doctor            Additional Information About Your Visit        Care EveryWhere ID     This is your Care EveryWhere ID. This could be used by other organizations to access your [facility] medical records  [id number]         Blood Pressure from Last 3 Encounters:   05/09/18 123/71   04/30/18 118/77   03/19/18 144/88    Weight from Last 3 Encounters:   05/09/18 135.6 kg (299 lb)   04/30/18 138.3 kg (305 lb)    03/19/18 (!) 139.3 kg (307 lb)              We Performed the Following     Individual Psychotherapy (53+ min) [64789]     Psychotherapy Interactive Complexity [69153]        Primary Care Provider Office Phone # Fax #    JERRICA Salcedo LEONA [phone] [address] [facility] [address]        Equal Access to Services     [facility] : Hadii aad ku hadasho Soomaali, waaxda luqadaha, qaybta kaalmada adeegyada, waxay idiin hayaan adeeg kharash la'aan . So [facility] [phone].    ATENCIÓN: Si habla español, tiene a hernandez disposición servicios gratuitos de asistencia lingüística. Soco al [phone].    We comply with applicable federal civil rights laws and [place] laws. We do not discriminate on the basis of race, color, national origin, age, disability, sex, sexual orientation, or gender identity.            Thank you!     Thank you for choosing [facility]  for your care. Our goal is always to provide you with excellent care. Hearing back from our patients is one way we can continue to improve our services. Please take a few minutes to complete the written survey that you may receive in the mail after your visit with us. Thank you!             Your Updated Medication List - Protect others around you: Learn how to safely use, store and throw away your medicines at www.disposemymeds.org.          This list is accurate as of 6/11/18 11:59 PM.  Always use your most recent med list.                   Brand Name Dispense Instructions for use Diagnosis    dorzolamide-timolol 2-0.5 % ophthalmic solution    COSOPT     INSTILL 1 DROP INTO BOTH EYES 2 TIMES DAILY.        estradiol 0.1 MG/24HR BIW patch    VIVELLE-DOT    8 patch    Place 1 patch onto the skin twice a week    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       finasteride 5 MG tablet    PROSCAR    90 tablet    Take 1 tablet (5 mg) by mouth daily    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       latanoprost 0.005 %  ophthalmic solution    XALATAN     1 drop At Bedtime.        lovastatin 20 MG tablet    MEVACOR    90 tablet    Take 1 tablet (20 mg) by mouth At Bedtime    Hyperlipidemia LDL goal <100       meloxicam 15 MG tablet    MOBIC    90 tablet    Take 1 tablet (15 mg) by mouth daily    Primary osteoarthritis, unspecified site       * order for DME     1 each    Equipment being ordered:  BACK BRACE  MedSpec Back-n-Shape II Back Brace  Size XXXL with thermoplastic insert    DDD (degenerative disc disease), lumbar, Pathologic fracture of vertebrae       * order for DME     2 each    Equipment being ordered: Sigvaris 232 Cotton Thigh High for Men 20-30 mmHg- TWO PAIR TAN COLOR    Varicose veins of legs       SM CALCIUM 600/VITAMIN D 600-400 MG-UNIT per tablet   Generic drug:  calcium-vitamin D     60 tablet    Take 1 tablet by mouth 2 times daily    Hyperlipidemia LDL goal <100       spironolactone 100 MG tablet    ALDACTONE    90 tablet    Take 1 tablet (100 mg) by mouth daily    Essential hypertension with goal blood pressure less than 140/90       * Notice:  This list has 2 medication(s) that are the same as other medications prescribed for you. Read the directions carefully, and ask your doctor or other care provider to review them with you.

## 2018-06-15 NOTE — PROGRESS NOTES
[facility] -  Case Progress Note  Client Name: Laila Perez  YOB: 1950  MRN:  [id number]  Treating Provider: Estelle Gomez LP  Type of Session: Individual  Present in Session: client and support person  Number of Minutes:  55    Health Maintenance Summary - Mental Health Treatment Plan       Status Date      Mental Health Tx Plan Q11 MOS Next Due 9/30/2018      Done 10/30/2017      Done 9/6/2016             Date of Service: 6/11/18    Current Symptoms/Status:  Distress about gender and secondary sex characteristics, wants genitals removed, bothers her daily of moderate to severe intensity, distress with work situation.    Progress Toward Treatment Goals:   Client reported progress with continuing to lose weight.    Intervention: Modality and Description:  Provided support and feedback. Used CBT to process gender dysphoria concerns. Client shared she is very angry and upset with the surgeon and their office. She shared that she received a phone call telling her she needs to lose a large amount of weight before she can get the surgery. She shared she told them she could not talk any more. She expressed her anger and stated they did not tell her she had to lose that much weight. They just told her she needed to lose some weight. I reviewed the note regarding the original consult and it does not indicate a specified amount. She shared on the phone they told her she needs to get below a certain BMI--which means she needs to lose over 50 lbs. And keep it of for 2 months. She talked about cutting off her genitals herself. Validated client expressing her anger, but doing this would be quite harmful and would help her achieve her goal. Did shared with her this is a common requirement for surgery. Client calmed down after awhile and we discussed her plan for weight loss. She did buy a bike and is starting to ride regularly. We discussed starting slow and building up. She also put  herself on a diet of mainly just eating hard boil eggs. Encouraged her to explore this with her doctor to make sure it is the right plan for her.      Interactive Complexity:  There are four specific communication difficulties that complicate the work of the primary psychiatric procedure.  Interactive complexity (+42115) may be reported when at least one of these difficulties is present.    Communication difficulties present during current the psychiatric procedure include:  1. The need to manage maladaptive communication (related to e.g. high anxiety, high reactivity, repeated questions, or disagreement) among participants that complicates delivery of care.        Response to Intervention:  Client reported validation.    Assignment:  Pace herself with diet and exercise, consult physician, ask to meet with a dietician.      Diagnosis:  Encounter Diagnosis   Name Primary?     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 Yes           Plan / Need for Future Services:  Return for therapy in 4 weeks.      Estelle Gomez PsyD, LP

## 2018-07-09 ENCOUNTER — OFFICE VISIT (OUTPATIENT)
Dept: OTHER | Facility: OUTPATIENT CENTER | Age: 68
End: 2018-07-09
Payer: MEDICARE

## 2018-07-09 VITALS
BODY MASS INDEX: 38.74 KG/M2 | WEIGHT: 286 LBS | HEIGHT: 72 IN | DIASTOLIC BLOOD PRESSURE: 88 MMHG | SYSTOLIC BLOOD PRESSURE: 153 MMHG | HEART RATE: 62 BPM

## 2018-07-09 DIAGNOSIS — F64.0 GENDER DYSPHORIA IN ADOLESCENT AND ADULT: ICD-10-CM

## 2018-07-09 RX ORDER — MULTIVITAMIN WITH FOLIC ACID 400 MCG
1 TABLET ORAL DAILY
Refills: 11 | COMMUNITY
Start: 2018-02-19

## 2018-07-09 RX ORDER — ESTRADIOL 0.1 MG/D
2 FILM, EXTENDED RELEASE TRANSDERMAL
Qty: 16 PATCH | Refills: 3 | Status: SHIPPED | OUTPATIENT
Start: 2018-07-09 | End: 2018-10-15

## 2018-07-09 RX ORDER — ATENOLOL 50 MG/1
50 TABLET ORAL DAILY
Refills: 5 | COMMUNITY
Start: 2018-05-16 | End: 2018-07-09

## 2018-07-09 ASSESSMENT — ENCOUNTER SYMPTOMS
FEVER: 0
DYSPHORIC MOOD: 0
ABDOMINAL PAIN: 0
LIGHT-HEADEDNESS: 0
UNEXPECTED WEIGHT CHANGE: 0
HEADACHES: 0
CHEST TIGHTNESS: 0
SHORTNESS OF BREATH: 0
WEAKNESS: 0
VOMITING: 0
NUMBNESS: 0
CHILLS: 0
POLYDIPSIA: 0
PALPITATIONS: 0
NERVOUS/ANXIOUS: 0
FREQUENCY: 0

## 2018-07-09 NOTE — MR AVS SNAPSHOT
After Visit Summary   7/9/2018    Laila Perez    MRN: [id number]           Patient Information     Date Of Birth          1950        Visit Information        Provider Department      7/9/2018 10:40 AM Jluis Mane MD [facility]        Today's Diagnoses     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          Care Instructions      4. See Dr. Mane in 3-4 months          Follow-ups after your visit        Follow-up notes from your care team     Return in about 4 months (around 11/9/2018).      Your next 10 appointments already scheduled     Aug 06, 2018  1:20 PM CDT   SHORT with JERRICA Salcedo CNP   [facility] ([facility])    [address] [place] [id number]   [phone]            Aug 13, 2018 10:00 AM CDT   Individual Therapy 53+ minutes with Estelle Gomez LP   [facility] ([facility])    [address]  Mail Code 7521  [facility] [address]   [phone]            Sep 17, 2018 11:00 AM CDT   Individual Therapy 53+ minutes with Estelle Gomez LP   [facility] ([facility])    [address]  Mail Code 7521  [facility] [address]   [phone]              Who to contact     Please call your clinic at [phone] to:    Ask questions about your health    Make or cancel appointments    Discuss your medicines    Learn about your test results    Speak to your doctor            Additional Information About Your Visit        Care EveryWhere ID     This is your Care EveryWhere ID. This could be used by other organizations to access your [place] medical records  [id number]        Your Vitals Were     Pulse Height BMI (Body Mass Index)             62 1.829 m (6') 38.79 kg/m2          Blood Pressure from Last 3 Encounters:   07/09/18 153/88   05/09/18 123/71   04/30/18 118/77    Weight from Last 3 Encounters:   07/09/18 129.7 kg (286 lb)    05/09/18 135.6 kg (299 lb)   04/30/18 138.3 kg (305 lb)              We Performed the Following     SCANNED MISC. LAB RESULTS          Today's Medication Changes          These changes are accurate as of 7/9/18 11:59 PM.  If you have any questions, ask your nurse or doctor.               These medicines have changed or have updated prescriptions.        Dose/Directions    estradiol 0.1 MG/24HR BIW patch   Commonly known as:  VIVELLE-DOT   This may have changed:  how much to take   Used for:  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        Dose:  2 patch   Place 2 patches onto the skin twice a week   Quantity:  16 patch   Refills:  3            Where to get your medicines      These medications were sent to [facility] [address]  [address] [facility] [address]    Hours:  test rx sent successfully  2/8/05   Phone:  [phone]     estradiol 0.1 MG/24HR BIW patch                Primary Care Provider Office Phone # Fax #    Delmischristel Mccluredeanna Simons, JERRICA [facility] [phone]       [address] [place] [address]        Equal Access to Services     [facility] AH: Hadii david booker hadashlucie Sogin, waaxda luqadaha, qaybta kaalmada dave, zulma castillo . So [facility] [phone].    ATENCIÓN: Si habla español, tiene a hernandez disposición servicios gratuitos de asistencia lingüística. [facility] [phone].    We comply with applicable federal civil rights laws and [place] laws. We do not discriminate on the basis of race, color, national origin, age, disability, sex, sexual orientation, or gender identity.            Thank you!     Thank you for choosing [facility]  for your care. Our goal is always to provide you with excellent care. Hearing back from our patients is one way we can continue to improve our services. Please take a few minutes to complete the written survey that you may receive in the mail after your  visit with us. Thank you!             Your Updated Medication List - Protect others around you: Learn how to safely use, store and throw away your medicines at www.disposemymeds.org.          This list is accurate as of 7/9/18 11:59 PM.  Always use your most recent med list.                   Brand Name Dispense Instructions for use Diagnosis    DAILY-EVANGELISTA Tabs      Take 1 tablet by mouth daily        dorzolamide-timolol 2-0.5 % ophthalmic solution    COSOPT     INSTILL 1 DROP INTO BOTH EYES 2 TIMES DAILY.        estradiol 0.1 MG/24HR BIW patch    VIVELLE-DOT    16 patch    Place 2 patches onto the skin twice a week    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       finasteride 5 MG tablet    PROSCAR    90 tablet    Take 1 tablet (5 mg) by mouth daily    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       latanoprost 0.005 % ophthalmic solution    XALATAN     1 drop At Bedtime.        lovastatin 20 MG tablet    MEVACOR    90 tablet    Take 1 tablet (20 mg) by mouth At Bedtime    Hyperlipidemia LDL goal <100       meloxicam 15 MG tablet    MOBIC    90 tablet    Take 1 tablet (15 mg) by mouth daily    Primary osteoarthritis, unspecified site       * order for DME     1 each    Equipment being ordered:  BACK BRACE  MedSpec Back-n-Shape II Back Brace  Size XXXL with thermoplastic insert    DDD (degenerative disc disease), lumbar, Pathologic fracture of vertebrae       * order for DME     2 each    Equipment being ordered: Sigvaris 232 Cotton Thigh High for Men 20-30 mmHg- TWO PAIR TAN COLOR    Varicose veins of legs       SM CALCIUM 600/VITAMIN D 600-400 MG-UNIT per tablet   Generic drug:  calcium-vitamin D     60 tablet    Take 1 tablet by mouth 2 times daily    Hyperlipidemia LDL goal <100       spironolactone 100 MG tablet    ALDACTONE    90 tablet    Take 1 tablet (100 mg) by mouth daily    Essential hypertension with goal blood pressure less than 140/90       * Notice:  This list has 2 medication(s) that are the same as  other medications prescribed for you. Read the directions carefully, and ask your doctor or other care provider to review them with you.

## 2018-07-09 NOTE — PROGRESS NOTES
HPI     Laila is a 68 year old individual that uses pronouns She/Her/Hers/Herself that presents today for follow up of:  feminizing hormone therapy.   Gender identity: fmeale.   Started Hormone  therapy  7/2016  Continues on .  Vivelle dot 0.1 mg patch 2x/wk    Spironlactone 100* mg daily      Other finasteride  Any special concerns today?    She continues to lose weight, down to 285#, trying to get down to goal of 250#  She would like medication to enlarge breasts and nipples, as noticed that breasts are shrinking as weight goes down.  On hormones?  YES +++ Shot day of the week? Not applicable-taking pills/patch/gel      Due for labs?  Yes      +++ Refills of meds needed?  Yes  Gender related body changes since last visit:   No further gender-related  changes    Breakthrough bleeding? Does Not Apply  Activity: biking 12 miles most days  New health concerns since last visit:  none  ---    Past Surgical History:   Procedure Laterality Date     COLONOSCOPY  2007     JOINT REPLACEMTN, KNEE RT/LT  2006    left       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     Family history of diabetes mellitus in first degree relative     Mitral valve disorder     Nonrheumatic aortic valve stenosis     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       Current Outpatient Prescriptions   Medication Sig Dispense Refill     atenolol (TENORMIN) 50 MG tablet Take 50 mg by mouth daily  5     dorzolamide-timolol (COSOPT) 2-0.5 % ophthalmic solution INSTILL 1 DROP INTO  BOTH EYES 2 TIMES DAILY.  4     estradiol (VIVELLE-DOT) 0.1 MG/24HR BIW patch Place 1 patch onto the skin twice a week 8 patch 3     finasteride (PROSCAR) 5 MG tablet Take 1 tablet (5 mg) by mouth daily 90 tablet 1     latanoprost (XALATAN) 0.005 % ophthalmic solution 1 drop At Bedtime.       lovastatin (MEVACOR) 20 MG tablet Take 1 tablet (20 mg) by mouth At Bedtime 90 tablet 3     meloxicam (MOBIC) 15 MG tablet Take 1 tablet (15 mg) by mouth daily 90 tablet 3     Multiple Vitamin (DAILY-EVANGELISTA) TABS Take 1 tablet by mouth daily  11     order for DME Equipment being ordered: Sigvaris 232 Cotton Thigh High for Men 20-30 mmHg- TWO PAIR TAN COLOR 2 each 5     ORDER FOR DME Equipment being ordered:   BACK BRACE    MedSpec Back-n-Shape II Back Brace  Size XXXL with thermoplastic insert        1 each 0     SM CALCIUM 600/VITAMIN D 600-400 MG-UNIT per tablet Take 1 tablet by mouth 2 times daily 60 tablet 3     spironolactone (ALDACTONE) 100 MG tablet Take 1 tablet (100 mg) by mouth daily 90 tablet 1       History   Smoking Status     Never Smoker   Smokeless Tobacco     Never Used          Allergies   Allergen Reactions     Penicillin [Esters] Itching     he is not sure what kind of reaction he had     Heparin      he lives in a assisted living program and is not sure what type of allergies he really has. He quit drinking     Zosyn [Penicillins]      unsure of reaction, allergy is listed on his med sheet       Health Maintenance Due   Topic Date Due     PNEUMOCOCCAL (2 of 2 - PPSV23) 04/26/2017     ADVANCE DIRECTIVE PLANNING Q5 YRS  07/25/2017         Problem, Medication and Allergy Lists were   reviewed and are current.     Patient Active Problem List    Diagnosis Date Noted     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 12/08/2016     Priority: Medium     Nonrheumatic aortic valve stenosis 11/17/2016     Priority: Medium     Mitral valve disorder 05/26/2016     Priority: Medium     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  04/26/2016     Priority: Medium     Family history of diabetes mellitus in first degree relative 04/26/2016     Priority: Medium     HTN, goal below 140/90 02/07/2015     Priority: Medium     Psoriasis 01/30/2013     Priority: Medium     Left calf       Varicose veins of legs 09/05/2012     Priority: Medium     Advanced directives, counseling/discussion 07/25/2012     Priority: Medium     Discussed advance care planning with patient; information given to patient to review. 7/25/2012          Alcoholism (H) 07/26/2011     Priority: Medium     Sober since 2010       DDD (degenerative disc disease), lumbar 04/08/2011     Priority: Medium     FINDINGS: Since April 13, 2006, severe compression fractures again  identified at the L1 vertebral body. Interval increase surrounding  osteophyte formation and decreased disc space height surrounding the  L1 vertebrae, though compression fracture was identified on previous  exam. Mild diffuse osteophyte formation throughout the visualized  thoracic and lumbar spine. Multilevel degenerative disc disease also  noted, most notable at L5-S1. No acute osseous abnormality.       HYPERLIPIDEMIA LDL GOAL <100 10/31/2010     Priority: Medium     OA (osteoarthritis) of knee 11/13/2008     Priority: Medium     Mixed hyperlipidemia 10/09/2006     Priority: Medium     Gouty arthropathy 10/02/2006     Priority: Medium     Problem list name updated by automated process. Provider to review and confirm  [employer] Update utility       Mental or behavioral problem 10/02/2006     Priority: Medium     Followed by Nacho Prather, psychiatry in past- he has retired. Pt stable on fluoxetine, seroquel. Also sees counselor regular. Has had some issues in past OCD.  Problem list name updated by automated process. Provider to review       Generalized osteoarthrosis, unspecified site 10/02/2006     Priority: Medium     Disorder of bone and cartilage 10/02/2006     Priority: Medium     See DEXA in EPIC  Problem list  name updated by automated process. Provider to review       large compression fracture 03/13/2006     Priority: Medium     Worked up for neoplasm, none found.       .         Review of Systems:   Review of Systems   Constitutional: Negative for chills, fever and unexpected weight change.   Eyes: Negative for visual disturbance.   Respiratory: Negative for chest tightness and shortness of breath.    Cardiovascular: Negative for chest pain, palpitations and leg swelling.   Gastrointestinal: Negative for abdominal pain and vomiting.   Endocrine: Negative for polydipsia and polyuria.   Genitourinary: Negative for frequency.   Neurological: Negative for weakness, light-headedness, numbness and headaches.   Psychiatric/Behavioral: Negative for dysphoric mood and suicidal ideas. The patient is not nervous/anxious.             Physical Exam:     Vitals:    07/09/18 1052   BP: 153/88   Pulse: 62   Weight: 129.7 kg (286 lb)   Height: 1.829 m (6')     BMI= Body mass index is 38.79 kg/(m^2).   Wt Readings from Last 10 Encounters:   07/09/18 129.7 kg (286 lb)   05/09/18 135.6 kg (299 lb)   04/30/18 138.3 kg (305 lb)   03/19/18 (!) 139.3 kg (307 lb)   02/07/18 (!) 139.3 kg (307 lb)   02/07/18 (!) 139.3 kg (307 lb 3.2 oz)   12/18/17 (!) 142.9 kg (315 lb)   09/11/17 134.4 kg (296 lb 6.4 oz)   06/27/17 132.5 kg (292 lb)   05/02/17 131.1 kg (289 lb)     Appearance: Female appearance and dress    GENERAL:: healthy, alert and no distress  RESP: lungs clear to auscultation - no rales, no rhonchi, no wheezes  CV: regular rates and rhythm, normal S1 S2, 2/6 systolic murmur, no click or rub -    Affect: Appropriate/mood-congruent           Labs:   Results from last visit:  Abstract on 05/22/2018   Component Date Value Ref Range Status     Protein Total 05/09/2018 7.6  g/dL Final     Albumin 05/09/2018 3.9  g/dL Final     Bilirubin Total 05/09/2018 0.8  mg/dL Final     Alkaline Phosphatase 05/09/2018 73  U/L Final     AST 05/09/2018 18  U/L  Final     ALT 05/09/2018 25  U/L Final     Bilirubin Direct 05/09/2018 0.2  mg/dL Final     Cholesterol 05/09/2018 115  mg/dL Final     Triglycerides 05/09/2018 103  mg/dL Final     HDL Cholesterol 05/09/2018 54  mg/dL Final     LDL Cholesterol Calculated 05/09/2018 40  mg/dL Final     Non HDL Cholesterol 05/09/2018 61  mg/dl Final     Hemoglobin 05/09/2018 15.0  13.3 - 17.7 g/dL Final     Hematocrit 05/09/2018 44.8  % Final     Platelet Count 05/09/2018 136* 150 - 450 10^9/L Final       Assessment and Plan   1. Gender dysphoria  2. Elevated bp  Pt. Is less satisfied with breast development since weight loss, testosterone above normal female range.  Will recommend following:  --. Use 2 estrogen patches at once, change them at the same time  -- Do blood tests in 1 month (BP, testosteorne, estradiol)    Pt. To check  blood pressure 3-4 times and write the results down.      Follow up:  Follow up in 4 months  Results by mychart  Questions were elicited and answered.     Jluis Mane MD

## 2018-07-09 NOTE — NURSING NOTE
Chief Complaint   Patient presents with     RECHECK     TG       Vitals:    07/09/18 1052   BP: 153/88   Pulse: 62   Weight: 129.7 kg (286 lb)   Height: 1.829 m (6')       Body mass index is 38.79 kg/(m^2).      Alexandra Long, CMA

## 2018-07-12 ENCOUNTER — TELEPHONE (OUTPATIENT)
Dept: OTHER | Facility: OUTPATIENT CENTER | Age: 68
End: 2018-07-12

## 2018-07-12 NOTE — TELEPHONE ENCOUNTER
Prior Authorization Retail Medication Request    Medication/Dose: Estradiol 0.1 MG Patch  ICD code (if different than what is on RX):  F64.0  Previously Tried and Failed:  NA  Rationale:  Has been using requested medication since 11/2017 without complication    Insurance Name:  [employer]  Insurance ID:  [id number]      Pharmacy Information (if different than what is on RX)  Name: [facility]  Phone:  [phone]    Alexandra Long CMA

## 2018-07-12 NOTE — LETTER
2018    To Whom It May Concern:  I am writing in support of appeal for insurance coverage of  intramuscular estradiol valerate or estradiol cypionate (if estradiol valerate is not available, as there has been a manufacturing shortage),  for my patient Laila Perez ( 1950). Ms. Perez has been under my care for Gender Dysphoria (formerly known as Gender Identity Disorder) since 2016, and has completed medical evaluation and been supported for feminizing hormone therapy as part of treatment for Gender Dysphoria. Ms. Perez has been on transdermal estradiol 0.1 mg 2x/wk patches since  for this condition since 2016.  Unfortunately, she has unable to achieve appropriate reduction in testosterone need for for clinical feminization on this dose of estradiol, and her dose was increased to 2 patches at once on 2018.  Gender Dysphoria (GD), more commonly known as transsexualism, is a condition recognized in the Diagnostic and Statistical Manual of Mental Disorders, (DSM-V), published by the American Psychiatric Association. Transsexualism is also recognized in the ICD Classification of Mental and Behavioural Disorders, tenth revision, as endorsed by the Forty-third World Health Assembly in May 1990, and came into use in WHO Member States as of . GD is often characterized by medically significant distress impairment related to gender dysphoria. Estradiol is indicated for treatment of gender dysphoria by recognized pharmacological indices and professional societies, including Results Unitedex.    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  a) Overview  FDA Approval: Adult, no; Pediatric, no  Efficacy: Adult, Evidence favors efficacy  Recommendation: Adult, Class IIb  Strength of Evidence: Adult, Category B  See Drug Consult reference: RECOMMENDATION AND EVIDENCE RATINGS        The  "Endocrine Society s Clinical Practice Guideline,  Endocrine Treatment of Gender-Dsyphoris/Gender-Incronguent Persons  (ARCHIE Rodriguez, et al, The Journal of Clinical Endocrinology & Metabolism 102.11 (2017): 4479-8807), also provides evidence based medically necessary indications for transdermal estradiol use in the treatment gender dysphoria, including the dose used for Ms. Perez.    A common definition of medically of medical necessity is:    \"[H]ealth care services that a Physician, exercising prudent clinical judgment, would provide to a patient for the purpose of preventing, evaluating, diagnosing or treating an illness, injury, disease or its symptoms, and that are: (a) in accordance with generally accepted standards of medical practice; (b) clinically appropriate, in terms of type, frequency, extent, site and duration, and considered effective for the patient's illness, injury, or disease; and (c) not primarily for the convenience of the patient, physician, or other health care provider, and not more costly than an alternative service or sequence of services at least as likely to produce equivalent therapeutic or diagnostic results as to the diagnosis or treatment of that patient's illness, injury or disease. [2]  \"Generally accepted standards of medical practice means standards that are based on credible scientific evidence published in peer-reviewed medical literature generally recognized by the relevant medical community, Physician Specialty Society recommendations and the views of Physicians practicing in relevant clinical areas and any other relevant factors.\"  The World Professional Association for Transgender Health (WPATH) is the oldest and largest professional asociation devoted to the understanding and treatment of individuals with gender identity disorders. WPATH has concluded, through years of clinical experience and research, that sex (gender) reassignment, properly indicated and performed as provided " "by the Standards of Care, has proven to be beneficial and effective in the treatment of individuals with transsexualism, gender identity disorder, and/or gender dysphoria. Sex reassignment plays an undisputed role in contributing toward favorable outcomes, and often includes medically necessary hormone treatment, counseling, psychotherapy, and other medical procedures.   The hormonal interventions attendant to sex reassignment are not \"cosmetic\" or \"elective\" or for the mere convenience of the patient. These are not optional in any meaningful sense, but are understood to be medically necessary for the treatment of the diagnosed condition. [4] These medical procedures and treatment protocols are not experimental: decades of both clinical experience and medical research show they are essential to achieving well-being for the transsexual patient. For example, a recent study of female-to-male transsexuals found significantly improved quality of life following cross-gender hormonal therapy.\" In over 80 qualitatively different case studies and reviews from 12 countries, it has been demonstrated during the last 30 years that the treatment that includes the whole process of gender reassignment is effective.\" [5]  Available routinely in [place] and in many other countries, these treatments are cost effective rather than cost prohibitive. In [place], numerous state health service providers have negotiated contracts with their insurance carriers to enable medically necessary treatment for transsexualism and/or GD to be provided to covered individuals. The  Court has also upheld gender reassignment as a valid health treatment to be provided by  [facility] (L v [facility] [2007] [facility] (case no. 64108/03)). All states in [place] now provide treatment routeways for transsexual people. Increasingly, insurers are being obliged to realize the validity and effectiveness of treatment, and coverage is being offered, increasingly " "at no additional premium cost.    \"Professionals who provide services to patients with gender conditions understand the necessity of SRS, and concur that it is reconstructive, and as such should be reimbursed, as would any other medically necessary treatment.\"  [6]  Feminizing hormone therapy for Ms. Perez is thus a medically necessary intervention for the diagnosed condition.   If you have any questions, please contact me at the [facility].    Sincerely,    Jluis Mane MD, PhD       [1] American Academy of Pediatrics, 1999  [2] Definition taken from 2007 Blue Cross Blue Shield Settlement available at www.[employer].Eat Local  [3] Jaime Paiz, Hector HATFIELD,.(2007) . Surgery: General Principles. In Dietertner R et al (eds) Principles of Transgender Medicine and Surgery. [place]:[employer]:2007.p.90.  [4] Annika Breen vvi[employer]ood Life & Casualty Insurance Co. 101 Misc.2d 1, 420 [place]2d 450 (Sup. Ct., 1979). Judges found that \"...the treatment and surgery...is of a medical nature and is feasible and required for the health and well-being of the patient.\"  [5] Jonah Haji (1998). Sex Reassignment. Thirty Years of International Follow-up Studies After Sex Reassignment Surgery: A Comprehensive Review, 8846-3785 (quotation from the Final Remarks section). (Translated from Sudanese into American English by Jaye Reynoso and Herrera Rapp).  [6] Jaime Paiz, Hector HATFIELD,.(2007) . Surgery: General Principles. In Hector HATFIELD et al (eds) Principles of Transgender Medicine and Surgery. [place]: [employer]:2007.p.94.        "

## 2018-07-16 ENCOUNTER — OFFICE VISIT (OUTPATIENT)
Dept: OTHER | Facility: OUTPATIENT CENTER | Age: 68
End: 2018-07-16
Payer: MEDICARE

## 2018-07-16 DIAGNOSIS — F64.0 GENDER DYSPHORIA IN ADOLESCENT AND ADULT: Primary | ICD-10-CM

## 2018-07-16 NOTE — MR AVS SNAPSHOT
After Visit Summary   7/16/2018    Laila Perez    MRN: [id number]           Patient Information     Date Of Birth          1950        Visit Information        Provider Department      7/16/2018 11:00 AM [facility] LP [facility]        Today's Diagnoses     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    -  1       Follow-ups after your visit        Your next 10 appointments already scheduled     Aug 06, 2018  1:20 PM CDT   [facility] with JERRICA [facility]cedo C[facility]   [facility]    [address] [place] [id number]   [phone]            Aug 13, 2018 10:00 AM CDT   Individual Therapy 53+ minutes with Estelle Gomez LP   [facility] ([facility])    [address]  Mail [address] 7521  [facility] [address]   [phone]            Sep 17, 2018 11:00 AM CDT   Individual Therapy 53+ minutes with Estelle Gomez LP   [facility] ([facility])    [address]  Mail [address] 7521  [facility] [address]   [phone]              Who to contact     Please call your clinic at [phone] to:    Ask questions about your health    Make or cancel appointments    Discuss your medicines    Learn about your test results    Speak to your doctor            Additional Information About Your Visit        Care EveryWhere ID     This is your Care EveryWhere ID. This could be used by other organizations to access your [place] medical records  [id number]         Blood Pressure from Last 3 Encounters:   07/09/18 153/88   05/09/18 123/71   04/30/18 118/77    Weight from Last 3 Encounters:   07/09/18 129.7 kg (286 lb)   05/09/18 135.6 kg (299 lb)   04/30/18 138.3 kg (305 lb)              We Performed the Following     Individual Psychotherapy (53+ min) [41004]     Psychotherapy Interactive Complexity [27097]        Primary Care Provider Office Phone # Fax #     Delmis Raqueldeanna Simons, APRN [phone]       [address] [place] [address]        Equal Access to Services     SUSU BLACKWELL : Hadii aad ku hadjerica Orozco, wajuana augustusherman, ruben kalylada dave, zulma mann laParkertim simpson. So [facility] [phone].    ATENCIÓN: Si habla español, tiene a hernandez disposición servicios gratuitos de asistencia lingüística. Llame al [phone].    We comply with applicable federal civil rights laws and [place] laws. We do not discriminate on the basis of race, color, national origin, age, disability, sex, sexual orientation, or gender identity.            Thank you!     Thank you for choosing [facility]  for your care. Our goal is always to provide you with excellent care. Hearing back from our patients is one way we can continue to improve our services. Please take a few minutes to complete the written survey that you may receive in the mail after your visit with us. Thank you!             Your Updated Medication List - Protect others around you: Learn how to safely use, store and throw away your medicines at www.disposemymeds.org.          This list is accurate as of 7/16/18 11:59 PM.  Always use your most recent med list.                   Brand Name Dispense Instructions for use Diagnosis    DAILY-EVANGELISTA Tabs      Take 1 tablet by mouth daily        dorzolamide-timolol 2-0.5 % ophthalmic solution    COSOPT     INSTILL 1 DROP INTO BOTH EYES 2 TIMES DAILY.        estradiol 0.1 MG/24HR BIW patch    VIVELLE-DOT    16 patch    Place 2 patches onto the skin twice a week    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       finasteride 5 MG tablet    PROSCAR    90 tablet    Take 1 tablet (5 mg) by mouth daily    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       latanoprost 0.005 % ophthalmic solution    XALATAN     1 drop At Bedtime.        lovastatin 20 MG tablet    MEVACOR    90 tablet    Take 1 tablet (20 mg) by mouth At Bedtime     Hyperlipidemia LDL goal <100       meloxicam 15 MG tablet    MOBIC    90 tablet    Take 1 tablet (15 mg) by mouth daily    Primary osteoarthritis, unspecified site       * order for DME     1 each    Equipment being ordered:  BACK BRACE  MedSpec Back-n-Shape II Back Brace  Size XXXL with thermoplastic insert    DDD (degenerative disc disease), lumbar, Pathologic fracture of vertebrae       * order for DME     2 each    Equipment being ordered: Sigvaris 232 Cotton Thigh High for Men 20-30 mmHg- TWO PAIR TAN COLOR    Varicose veins of legs       SM CALCIUM 600/VITAMIN D 600-400 MG-UNIT per tablet   Generic drug:  calcium-vitamin D     60 tablet    Take 1 tablet by mouth 2 times daily    Hyperlipidemia LDL goal <100       spironolactone 100 MG tablet    ALDACTONE    90 tablet    Take 1 tablet (100 mg) by mouth daily    Essential hypertension with goal blood pressure less than 140/90       * Notice:  This list has 2 medication(s) that are the same as other medications prescribed for you. Read the directions carefully, and ask your doctor or other care provider to review them with you.

## 2018-07-18 NOTE — TELEPHONE ENCOUNTER
Central Prior Authorization Team   Phone: [phone]    PA Initiation    Medication: Estradiol 0.1 MG Patch  Insurance Company: [employer] ([facility]) - Phone [phone] Fax [phone]  Pharmacy Filling the Rx: [facility] [place] [facility] [address]  Filling Pharmacy Phone: [phone]  Filling Pharmacy Fax:    Start Date: 7/18/2018

## 2018-07-20 NOTE — TELEPHONE ENCOUNTER
PRIOR AUTHORIZATION DENIED    Medication: Estradiol 0.1 MG Patch - Quantity Limit DENIED    Denial Date: 7/18/2018    Denial Rational: Dosing of 2 patches twice weekly is not recognized by the [employer] and [employer]        Appeal Information:

## 2018-07-22 NOTE — PROGRESS NOTES
[facility] -  Case Progress Note  Client Name: Laila Perez  YOB: 1950  MRN:  [id number]  Treating Provider: Estelle Gomez LP  Type of Session: Individual  Present in Session: client   Number of Minutes:  55    Health Maintenance Summary - Mental Health Treatment Plan       Status Date      Mental Health Tx Plan Q11 MOS Next Due 9/30/2018      Done 10/30/2017      Done 9/6/2016             Date of Service: 7/16/18    Current Symptoms/Status:  Distress about gender and secondary sex characteristics, wants genitals removed, bothers her daily of moderate to severe intensity, distress with work situation.    Progress Toward Treatment Goals:   Client reported progress with continuing to lose weight.    Intervention: Modality and Description:  Provided support and feedback. Used CBT to process gender dysphoria concerns. Client shared she is angry with the personal care attendant. She stated she missed her last sessions because this person decided to take an alternate route and they got lost. Client shared swearing at her because she was so angry. Client does not want this care person anymore and is working on getting someone different. She shared there has been a problem for some time. Client never shared there being any difficulty in the past. Client also shared she is down to 285 lbs. She plans to call surgeon when she is down to 270. Asked her what the goal would be. She shared she wants to show them she can do it. Explored if she thought they would do the surgery sooner and she said no. Also reviewed her eating--wanted to make sure she is not restricting too much.    Interactive Complexity:  There are four specific communication difficulties that complicate the work of the primary psychiatric procedure.  Interactive complexity (+[id number]) may be reported when at least one of these difficulties is present.    Communication difficulties present during current the psychiatric  procedure include:  1. The need to manage maladaptive communication (related to e.g. high anxiety, high reactivity, repeated questions, or disagreement) among participants that complicates delivery of care.        Response to Intervention:  Client reported validation.    Assignment:  Pace herself with diet and exercise, consult physician, ask to meet with a dietician.      Diagnosis:  Encounter Diagnosis   Name Primary?     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 Yes           Plan / Need for Future Services:  Return for therapy in 4 weeks.      Estelle Gomez PsyD, LP

## 2018-07-31 NOTE — TELEPHONE ENCOUNTER
Received a voicemail from Lisbeth Morgan with [facility] Appeals dept, requesting the coming fax (5 questions)to be completed. The form stated the P/A - Appeal is for Estradiol 0.05mg twice weekly patches. I called and left her a voicemail that it should be 0.1mg biweekly. I did complete the form and put a line through the incorrect strength and wrote in the correct strength. Case# [id number]. Phone: [phone]. Fax: [phone].

## 2018-08-01 NOTE — TELEPHONE ENCOUNTER
Received new form with the correct strength 0.1mg Biweekly patches. I have completed again and faxed back .

## 2018-08-06 ENCOUNTER — OFFICE VISIT (OUTPATIENT)
Dept: FAMILY MEDICINE | Facility: CLINIC | Age: 68
End: 2018-08-06
Payer: MEDICARE

## 2018-08-06 VITALS
TEMPERATURE: 98.4 F | RESPIRATION RATE: 20 BRPM | SYSTOLIC BLOOD PRESSURE: 136 MMHG | WEIGHT: 290.4 LBS | BODY MASS INDEX: 39.39 KG/M2 | HEART RATE: 56 BPM | DIASTOLIC BLOOD PRESSURE: 78 MMHG

## 2018-08-06 DIAGNOSIS — I10 HTN, GOAL BELOW 140/90: ICD-10-CM

## 2018-08-06 DIAGNOSIS — E66.01 MORBID OBESITY (H): ICD-10-CM

## 2018-08-06 DIAGNOSIS — Z78.9 MALE-TO-FEMALE TRANSGENDER PERSON: ICD-10-CM

## 2018-08-06 DIAGNOSIS — F64.0 GENDER DYSPHORIA IN ADOLESCENT AND ADULT: ICD-10-CM

## 2018-08-06 DIAGNOSIS — E78.5 HYPERLIPIDEMIA LDL GOAL <100: Primary | ICD-10-CM

## 2018-08-06 LAB
ANION GAP SERPL CALCULATED.3IONS-SCNC: 5 MMOL/L (ref 3–14)
BUN SERPL-MCNC: 18 MG/DL (ref 7–30)
CALCIUM SERPL-MCNC: 9 MG/DL (ref 8.5–10.1)
CHLORIDE SERPL-SCNC: 106 MMOL/L (ref 94–109)
CO2 SERPL-SCNC: 27 MMOL/L (ref 20–32)
CREAT SERPL-MCNC: 1.07 MG/DL (ref 0.66–1.25)
ESTRADIOL SERPL-MCNC: 158 PG/ML (ref 6–50)
GFR SERPL CREATININE-BSD FRML MDRD: 69 ML/MIN/1.7M2
GLUCOSE SERPL-MCNC: 85 MG/DL (ref 70–99)
POTASSIUM SERPL-SCNC: 5 MMOL/L (ref 3.4–5.3)
SODIUM SERPL-SCNC: 138 MMOL/L (ref 133–144)

## 2018-08-06 PROCEDURE — 84270 ASSAY OF SEX HORMONE GLOBUL: CPT | Performed by: NURSE PRACTITIONER

## 2018-08-06 PROCEDURE — 36415 COLL VENOUS BLD VENIPUNCTURE: CPT | Performed by: NURSE PRACTITIONER

## 2018-08-06 PROCEDURE — 80048 BASIC METABOLIC PNL TOTAL CA: CPT | Performed by: NURSE PRACTITIONER

## 2018-08-06 PROCEDURE — 82670 ASSAY OF TOTAL ESTRADIOL: CPT | Performed by: NURSE PRACTITIONER

## 2018-08-06 PROCEDURE — 99214 OFFICE O/P EST MOD 30 MIN: CPT | Performed by: NURSE PRACTITIONER

## 2018-08-06 PROCEDURE — 84403 ASSAY OF TOTAL TESTOSTERONE: CPT | Performed by: NURSE PRACTITIONER

## 2018-08-06 NOTE — NURSING NOTE
Chief Complaint   Patient presents with     Weight Problem       Initial /78 (BP Location: Right arm, Cuff Size: Adult Large)  Pulse 56  Temp 98.4  F (36.9  C) (Tympanic)  Resp 20  Wt 290 lb 6.4 oz (131.7 kg)  BMI 39.39 kg/m2 Estimated body mass index is 39.39 kg/(m^2) as calculated from the following:    Height as of 7/9/18: 6' (1.829 m).    Weight as of this encounter: 290 lb 6.4 oz (131.7 kg).      Health Maintenance that is potentially due pending provider review:  NONE    n/a    Is there anyone who you would like to be able to receive your results? Not Applicable  If yes have patient fill out YASIR Finney M.A.

## 2018-08-06 NOTE — PROGRESS NOTES
SUBJECTIVE:   Laila Perez is a 68 year old male who presents to clinic today for the following health issues:    Concern - Weight problem   Onset: Ongoing     Description:   Patient is trying to have a complete change before the end of the year.  Patient was instructed that needs to be down to 250 lbs before surgery can happen.  Has been going up and down in weight and needs help on what to eat and what else to do in order to get the weight to goal.    Intensity: mild    Progression of Symptoms:  same    Accompanying Signs & Symptoms:  Depression comes and goes when notices weight gained    Previous history of similar problem:   Na     Precipitating factors:   Worsened by: na     Alleviating factors:  Improved by: riding bike outside when weather permits.     Needs follow up labs  Needs BP recheck  BP Readings from Last 3 Encounters:   08/06/18 136/78   07/09/18 153/88   05/09/18 123/71     Recent increase in estradiol to 2 patches 2 times a week  Expensive copay. Needs appeal letter done. Forms completed with her today.   She will finish form and fax   She will call Dr Knight office to see if they can do a PA for the increased dose,    Therapies Tried and outcome: riding bike outside.     -------------------------------------    Problem list and histories reviewed & adjusted, as indicated.  Additional history: as documented    BP Readings from Last 3 Encounters:   08/06/18 136/78   07/09/18 153/88   05/09/18 123/71    Wt Readings from Last 3 Encounters:   08/06/18 290 lb 6.4 oz (131.7 kg)   07/09/18 286 lb (129.7 kg)   05/09/18 299 lb (135.6 kg)                  Labs reviewed in EPIC    Reviewed and updated as needed this visit by clinical staff  Tobacco  Meds  Med Hx  Surg Hx  Fam Hx  Soc Hx      Reviewed and updated as needed this visit by Provider         ROS:  Review Of Systems   ROS: 10 point ROS neg other than the symptoms noted above in the HPI.      OBJECTIVE:                                                     /78 (BP Location: Right arm, Cuff Size: Adult Large)  Pulse 56  Temp 98.4  F (36.9  C) (Tympanic)  Resp 20  Wt 290 lb 6.4 oz (131.7 kg)  BMI 39.39 kg/m2  Body mass index is 39.39 kg/(m^2).   GENERAL: healthy, alert, well nourished, well hydrated, no distress  HENT: ear canals- normal; TMs- normal; Nose- normal; Mouth- no ulcers, no lesions  NECK: no tenderness, no adenopathy, no asymmetry, no masses, no stiffness; thyroid- normal to palpation  RESP: lungs clear to auscultation - no rales, no rhonchi, no wheezes  CV: regular rates and rhythm, normal S1 S2, no S3 or S4 and no murmur, no click or rub -  ABDOMEN: soft, no tenderness, no  hepatosplenomegaly, no masses, normal bowel sounds    Diagnostic test results:  Results for orders placed or performed in visit on 08/06/18   **Testosterone Free and Total FUTURE anytime   Result Value Ref Range    Testosterone Total 42 (L) 240 - 950 ng/dL    Sex Hormone Binding Globulin 48 11 - 80 nmol/L    Free Testosterone Calculated 0.59 (L) 4.7 - 24.4 ng/dL   Estradiol   Result Value Ref Range    Estradiol 158 (H) 6 - 50 pg/mL   Basic metabolic panel  (Ca, Cl, CO2, Creat, Gluc, K, Na, BUN)   Result Value Ref Range    Sodium 138 133 - 144 mmol/L    Potassium 5.0 3.4 - 5.3 mmol/L    Chloride 106 94 - 109 mmol/L    Carbon Dioxide 27 20 - 32 mmol/L    Anion Gap 5 3 - 14 mmol/L    Glucose 85 70 - 99 mg/dL    Urea Nitrogen 18 7 - 30 mg/dL    Creatinine 1.07 0.66 - 1.25 mg/dL    GFR Estimate 69 >60 mL/min/1.7m2    GFR Estimate If Black 83 >60 mL/min/1.7m2    Calcium 9.0 8.5 - 10.1 mg/dL        ASSESSMENT/PLAN:                                                    1. Hyperlipidemia LDL goal <100  LDL Cholesterol Calculated   Date Value Ref Range Status   05/09/2018 40 <100 mg/dL Final     Comment:     Desirable:       <100 mg/dl   Continue lovastatin    - Basic metabolic panel  (Ca, Cl, CO2, Creat, Gluc, K, Na, BUN)    2. Morbid obesity (H)  Increase physical  activity 30-60 minutes daily to break a sweat.  1800-calorie ADA diet recommended.  Consider joining weight watchers.  Consultation with bariatric team recommended  - BARIATRIC ADULT REFERRAL    3. 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  Labs today.  Medication monitoring.  Follow-up [facility] as planned  - **Testosterone Free and Total FUTURE anytime  - Estradiol  - Basic metabolic panel  (Ca, Cl, CO2, Creat, Gluc, K, Na, BUN)    4. 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 HTN, goal below 140/90  Mildly elevated.  Continue Spironolactone  Labs today to monitor  - Basic metabolic panel  (Ca, Cl, CO2, Creat, Gluc, K, Na, BUN)      Follow up with Provider - Call or return to the clinic with any worsening of symptoms or no resolution. Patient/Parent verbalized understanding and is in agreement. Medication side effects reviewed.   Current Outpatient Prescriptions   Medication Sig Dispense Refill     dorzolamide-timolol (COSOPT) 2-0.5 % ophthalmic solution INSTILL 1 DROP INTO BOTH EYES 2 TIMES DAILY.  4     estradiol (VIVELLE-DOT) 0.1 MG/24HR BIW patch Place 2 patches onto the skin twice a week 16 patch 3     finasteride (PROSCAR) 5 MG tablet Take 1 tablet (5 mg) by mouth daily 90 tablet 1     latanoprost (XALATAN) 0.005 % ophthalmic solution 1 drop At Bedtime.       lovastatin (MEVACOR) 20 MG tablet Take 1 tablet (20 mg) by mouth At Bedtime 90 tablet 3     meloxicam (MOBIC) 15 MG tablet Take 1 tablet (15 mg) by mouth daily 90 tablet 3     order for DME Equipment being ordered: Sigvaris 232 Cotton Thigh High for Men 20-30 mmHg- TWO PAIR TAN COLOR 2 each 5     ORDER FOR DME Equipment being ordered:   BACK BRACE    A[employer]ogicSpec Back-n-Shape II Back Brace  Size XXXL with thermoplastic insert        1 each 0     spironolactone (ALDACTONE) 100 MG tablet Take 1 tablet (100 mg) by mouth daily 90 tablet 1      Multiple Vitamin (DAILY-EVANGELISTA) TABS Take 1 tablet by mouth daily  11     SM CALCIUM 600/VITAMIN D 600-400 MG-UNIT per tablet Take 1 tablet by mouth 2 times daily (Patient not taking: Reported on 8/6/2018) 60 tablet 3        See Patient Instructions    JERRICA Salcedo [facility]

## 2018-08-06 NOTE — PATIENT INSTRUCTIONS
[facility] Weight Watcher Dietary plans are best for weight loss.   Go to the library and gets some books for self help with this is not able to afford weight watches.   Activity for 30-60 minutes of activity daily to break a sweat every day.  Write it down to make yourself accountable.   Keep a journal of food intake. 1800 calories a day Max recommended.   Can make an appointment with the nutritionist for help with this.           Exercise for a Healthier Heart  You may wonder how you can improve the health of your heart. If you re thinking about exercise, you re on the right track. You don t need to become an athlete, but you do need a certain amount of brisk exercise to help strengthen your heart. If you have been diagnosed with a heart condition, your doctor may recommend exercise to help stabilize your condition. To help make exercise a habit, choose safe, fun activities.     Exercise with a friend. When activity is fun, you're more likely to stick with it.   Be sure to check with your healthcare provider before starting an exercise program.   Why exercise?  Exercising regularly offers many healthy rewards. It can help you do all of the following:    Improve your blood cholesterol level to help prevent further heart trouble    Lower your blood pressure to help prevent a stroke or heart attack    Control diabetes, or reduce your risk of getting this disease    Improve your heart and lung function    Reach and maintain a healthy weight    Make your muscles stronger and more limber so you can stay active    Prevent falls and fractures by slowing the loss of bone mass (osteoporosis)    Manage stress better    Reduce your blood pressure    Improve your sense of self and your body image  Exercise tips  Ease into your routine. Set small goals. Then build on them.  Exercise on most days. Aim for a total of 150 or more minutes of moderate to  vigorous intensity activity each week. Consider 40 minutes, 3 to 4  times a week. For best results, activity should last for 40 minutes on average. It is OK to work up to the 40 minute period over time. Examples of moderate-intensity activity is walking 1 mile in 15 minutes or 30 to 45 minutes of yard work.  Step up your daily activity level. Along with your exercise program, try being more active throughout the day. Walk instead of drive. Do more household tasks or yard work.  Choose one or more activities you enjoy. Walking is one of the easiest things you can do. You can also try swimming, riding a bike, dancing, or taking an exercise class.  Stop exercising and call your doctor if you:    Have chest pain or feel dizzy or lightheaded    Feel burning, tightness, pressure, or heaviness in your chest, neck, shoulders, back, or arms    Have unusual shortness of breath    Have increased joint or muscle pain    Have palpitations or an irregular heartbeat   Date Last Reviewed: 5/1/2016 2000-2017 [employer]. [address] [place] [address]. All rights reserved. This information is not intended as a substitute for professional medical care. Always follow your healthcare professional's instructions.        Weight Management: Exercise and Activity    Studies show that people who exercise are the most likely to lose weight and keep it off. Exercise burns calories. It helps build muscle to make your body stronger. Make exercise an important part of your weight-management plan.  Make activity part of your day  You may not think you have the time to exercise. But you can work activity into your daily life--you just need to be committed. Take 10 minutes out of your lunch hour to take a walk. Walk to the [employer] to get your paper instead of having it delivered. Make it a habit to take the stairs instead of the elevator. Park in a far away parking spot instead of the closest. You ll be surprised at how fast these little changes can make a difference.  Some people  really cannot walk very far, and tire out quickly with exercise. Instead of becoming discouraged, resolve to do what you can do, and work to make that a regular frequent habit.   The benefits of exercise  Exercise offers many benefits including:     Exercise increases your metabolism (the speed at which your body burns calories).    Regular exercise can increase the amount of muscle in your body. Muscle burns calories faster than fat. The more muscle you have, the more calories you burn.    Exercise gives you energy and curbs your appetite.    Exercise decreases stress and helps you sleep better.  Make exercise fun  Exercise can be fun. Choose an activity you enjoy. You may even get a friend to do it with you:    Take a resistance-training or aerobics class    Join a team sport    Take a dance class    Walk the dog    Ride a bike  If you have health problems, be sure to ask your healthcare provider before you start an exercise program. Have a  help you develop a plan that s safe for you.   Date Last Reviewed: 2/4/2016 2000-2017 [employer]. [address] [place] [address]. All rights reserved. This information is not intended as a substitute for professional medical care. Always follow your healthcare professional's instructions.        Weight Management: Fact and Fiction    Knowing the truth about losing weight can help you separate what works from what doesn t. Don t be taken in by expensive weight-loss fads like pills, herbs, and special foods that promise unbelievable results. There s no magic way to lose weight. If you have questions about weight loss, ask your healthcare provider.  Fiction:  The faster I lose weight, the better.   Fact: Rapid weight loss is usually due to loss of water or muscle mass. What you re trying to get rid of is extra fat. Aim to lose a 1/2 pound to 2 pounds a week. Then you re more likely to lose fat rather than water or muscle.  Fiction:   Skipping meals will help me lose weight.   Fact: When you skip meals, you don t give your body the energy it needs to work. Hunger makes you more likely to overeat later on. It s best to spread your meals throughout the day. Eat at least three meals a day.  Fiction:  I can t start exercising until I lose weight.   Fact: The sooner you start exercising the better. Exercise helps burn more calories, tone your muscles, and keep your appetite in check. People who continue to exercise after they lose weight are more likely to keep the weight off.  Fiction:  The fewer calories I eat, the better.   Fact: This seems like it should be true, but it s not. When you eat too few calories, your body acts as if it s on a desert island. It thinks food is scarce, so it slows down your metabolism (how fast you burn calories) to save energy. By eating too few calories, you make it harder to lose weight.  Fiction:  Once I lose weight, I can go back to living the way I did before.   Fact: Going back to your old eating habits and giving up exercise is a sure way to regain any weight you ve lost. The lifestyle changes that help you lose extra weight can also help keep it off. This is why you need to make realistic changes you can stick with.  Fiction:  Low-fat and fat-free mean low-calorie.   Fact: All foods, even fat-free ones, have calories. Eat too many calories and you ll gain weight. It s OK to treat yourself to a fat-free cookie or two. Just don t eat the whole box! A dietitian will help you figure this out, and will likely recommend that you eat three meals a day, with protein with each meal.   Date Last Reviewed: 2/4/2016 2000-2017 [employer]. [address] [place] [address], [place] [address]. All rights reserved. This information is not intended as a substitute for professional medical care. Always follow your healthcare professional's instructions.        Weight Management: Healthy Eating  Food is your body s fuel.  You can t live without it. The key is to give your body enough nutrients and energy without eating too much. Reading food labels can help you make healthy choices. Also, learn new eating habits to manage your weight.     All the values on the label are based on one serving. The serving size is the average portion. Remember to multiply the values on the label by the number of servings you eat.   Eat less fat  A gram of fat has almost 2.5 times the calories of a gram of protein or carbohydrates. Try to balance your food choices so that only 20% to 35% of your calories comes from total fat. This means an average of 2  to 3  grams of fat for each 100 calories you eat.  Eat more fiber  High-fiber foods are digested more slowly than low-fiber foods, so you feel full longer. Try to get at least 25 grams of fiber each day for a 2000 calorie diet. Foods high in fiber include:    Vegetables and fruits    Whole-grain or bran breads, pastas, and cereals    Legumes (beans) and peas  As you begin to eat more fiber, be sure to drink plenty of water to keep your digestive system working smoothly.  Tips  Do's and don'ts include:     Don t skip meals. This often leads to overeating later on. It s best to spread your eating throughout the day.    Eat a variety of foods, not just a few favorites.    If you find yourself eating when you re not hungry, ask yourself why. Many of us eat when we re bored, stressed, or just to be polite. Listen to your body. If you re not hungry, get busy doing something else instead of eating.    Eat slower, shooting for 20 to 30 minutes for each meal. It takes 20 minutes for your stomach to tell your brain that it s full. Slow eaters tend to eat less and are still satisfied, while fast eaters may tend to be overeaters.     Pay attention to what you eat. Don t read or watch TV during your meal.  Date Last Reviewed: 1/31/2016 2000-2017 [employer]. [address] [place] [address], [place] [address].  All rights reserved. This information is not intended as a substitute for professional medical care. Always follow your healthcare professional's instructions.        Weight Management: Overcoming Your Barriers    You may have many reasons why you re not ready to lose weight. You may not feel you have the time or the skills. You may be afraid of losing weight and gaining it back again. Well, you can lose weight. And you can keep the weight off, if you make changes slowly and stick with them. Remember that you may never find the perfect time to lose weight. Decide that the right time to be healthier is now.  Common barriers  Barrier 1: I don t want to deny myself.  Barrier Buster: You don t have to! Moderation is the key:    Watch portion sizes and know when you're eating more than one serving.    Plan to ask for a doggy bag when you eat out.    Have just one.    Choose lower-fat and lower-calorie versions of your favorites.    Use a small plate instead of a normal-sized plate.   Barrier 2: I lost weight before but I gained it right back.  Barrier Buster: Make this time different:    List what worked and didn t work last time and what you can try this time.    Choose changes that you are willing to stick with.    Work exercise into your weight-loss plan.    Be realistic about what is possible. Your plan has to fit into your life in a balanced way that works for you.   Barrier 3: I don t have the time to be active.  Barrier Buster: It takes just a few minutes a day!    Be active with a pet or the kids.    Block off activity time in your schedule.    Borrow some time that you usually spend watching TV.    You are too important not to take time to exercise--it is your life!   Feel good about yourself  Do you eat more because you feel bad about yourself, then feel even worse as you gain weight? This is a  vicious cycle.  Breaking this cycle is not easy. You may need group support or counseling. Always remember that you  are a valuable person, no matter what size or shape you are.  Do you have a health problem? If so, don t use it as an excuse for not losing weight. Ask your healthcare provider or dietitian about methods to lose weight that are safe for you. For example, even if you have severe arthritis, it may be easier for you to exercise in a pool. Get advice from a .    Date Last Reviewed: 2/2/2016 2000-2017 [employer]. [address] [place] [address], [place] [address]. All rights reserved. This information is not intended as a substitute for professional medical care. Always follow your healthcare professional's instructions.        Weight Management: Take It Off and Keep It Off    It s easy to be motivated when you first start. The key is to stay motivated all along the way and to have realistic and achievable goals. There are things you can do to keep yourself on the path to success.  Don t focus on daily weight gains and losses. Instead, weigh yourself no more than once a week at the same time of day. Weighing yourself each day will probably only frustrate you.    Stay motivated  Here are suggestions to keep you motivated:     Remind yourself of your goals. Post them near the refrigerator or desk where you work.    Make daily entries in your diary or journal about your activity and eating. A visual reminder of success, like a gold star, can help keep you going.    Every week, take time to look back on how much you ve accomplished, and the changes you may have made.    Try taking a nutrition class. It can help you learn new shopping, cooking, and eating skills, and also meet new people. You might try a low-fat cooking or yoga class.    Don t be hard on yourself or give up if you slip. Be patient. Learn from your mistakes and adjust your plan if you need to. Then get right back to it.    Be realistic about your goals. Talk with a dietitian or your healthcare provider about what goals are  reasonable for you.   Believe that you can do it  How you think about yourself is just as important as what you do. If you don t think you can succeed, chances are you won t. Believe that you can stick to your plan and meet your goals:    If you don t meet a goal, don t use it as an excuse to give up on your whole plan. Adjust your goal and try again.    Try to understand your own attitude about food.  Are you subject to emotional eating?    Learn how to accept compliments. Even if you get embarrassed, just say  thank you.     Make a list of the things that others like about you and that you like about yourself. Add something new from time to time. Keep this list to look at when you need a lift.  Resources    The [employer] [place] on Fitness, Sports & Nutritionwww.fitness.gov    Academy of Nutrition and Dieteticswww.eatright.org    Healthfinderwww.healthfinder.gov  Date Last Reviewed: 2/4/2016 2000-2017 [employer]. [address] [place] [address]. All rights reserved. This information is not intended as a substitute for professional medical care. Always follow your healthcare professional's instructions.        Weight Management: Getting Started  Healthy bodies come in all shapes and sizes. Not all bodies are made to be thin. For some people, a healthy weight is higher than the average weight listed on weight charts. Your healthcare provider can help you decide on a healthy weight for you.    Reasons to lose weight  Losing weight can help with some health problems, such as high blood pressure, heart disease, diabetes, sleep apnea, and arthritis. You may also feel more energy.  Set your long-term goal  Your goal doesn't even have to be a specific weight. You may decide on a fitness goal (such as being able to walk 10 miles a week), or a health goal (such as lowering your blood pressure). Choose a goal that is measurable and reasonable, so you know when you've reached it. A goal of  reaching a BMI of less than 25 is not always reasonable (or possible).   Make an action plan  Habits don t change overnight. Setting your goals too high can leave you feeling discouraged if you can t reach them. Be realistic. Choose one or two small changes you can make now. Set an action plan for how you are going to make these changes. When you can stick to this plan, keep making a few more small changes. Taking small steps will help you stay on the path to success.  Track your progress  Write down your goals. Then, keep a daily record of your progress. Write down what you eat and how active you are. This record lets you look back on how much you ve done. It may also help when you re feeling frustrated. Reward yourself for success. Even if you don t reach every goal, give yourself credit for what you do get done.  Get support  Encouragement from others can help make losing weight easier. Ask your family members and friends for support. They may even want to join you. Also look to your healthcare provider, registered dietitian, and  for help. Your local hospital can give you more information about nutrition, exercise, and weight loss.  Date Last Reviewed: 1/31/2016 2000-2017 [employer]. [address] [place] [address], [place] [address]. All rights reserved. This information is not intended as a substitute for professional medical care. Always follow your healthcare professional's instructions.

## 2018-08-06 NOTE — LETTER
August 15, 2018      Laila Perez  [address] [address] [place] [id number]        Dear ,    Will forward lab results to Dr Mane to review and adjust HORMONAL REPLACEMENT THERAPY as needed.   Normal blood sugar, electrolytes and kidney function   Take Care,   Delmis Simons MSN,FNP-BC       Resulted Orders   **Testosterone Free and Total FUTURE anytime   Result Value Ref Range    Testosterone Total 42 (L) 240 - 950 ng/dL      Comment:      This test was developed and its performance characteristics determined by the   [facility],  Special [facility] Laboratory. It has   not been cleared or approved by the FDA. The laboratory is regulated under   CLIA as qualified to perform high-complexity testing. This test is used for   clinical purposes. It should not be regarded as investigational or for   research.      Sex Hormone Binding Globulin 48 11 - 80 nmol/L    Free Testosterone Calculated 0.59 (L) 4.7 - 24.4 ng/dL   Estradiol   Result Value Ref Range    Estradiol 158 (H) 6 - 50 pg/mL   Basic metabolic panel  (Ca, Cl, CO2, Creat, Gluc, K, Na, BUN)   Result Value Ref Range    Sodium 138 133 - 144 mmol/L    Potassium 5.0 3.4 - 5.3 mmol/L    Chloride 106 94 - 109 mmol/L    Carbon Dioxide 27 20 - 32 mmol/L    Anion Gap 5 3 - 14 mmol/L    Glucose 85 70 - 99 mg/dL    Urea Nitrogen 18 7 - 30 mg/dL    Creatinine 1.07 0.66 - 1.25 mg/dL    GFR Estimate 69 >60 mL/min/1.7m2      Comment:      Non  GFR Calc    GFR Estimate If Black 83 >60 mL/min/1.7m2      Comment:       GFR Calc    Calcium 9.0 8.5 - 10.1 mg/dL

## 2018-08-06 NOTE — MR AVS SNAPSHOT
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   Go to the library and gets some books for self help with this is not able to afford weight watches.   Activity for 30-60 minutes of activity daily to break a sweat every day.  Write it down to make yourself accountable.   Keep a journal of food intake. 1800 calories a day Max recommended.   Can make an appointment with the nutritionist for help with this.           Exercise for a Healthier Heart  You may wonder how you can improve the health of your heart. If you re thinking about exercise, you re on the right track. You don t need to become an athlete, but you do need a certain amount of brisk exercise to help strengthen your heart. If you have been diagnosed with a heart condition, your doctor may recommend exercise to help stabilize your condition. To help make exercise a habit, choose safe, fun activities.     Exercise with a friend. When activity is fun, you're more likely to stick with it.   Be sure to check with your healthcare provider before starting an exercise program.   Why exercise?  Exercising regularly offers many healthy rewards. It can help you do all of the following:    Improve your blood cholesterol level to help prevent further heart trouble    Lower your blood pressure to help prevent a stroke or heart attack    Control diabetes, or reduce your risk of getting this disease    Improve your heart and  lung function    Reach and maintain a healthy weight    Make your muscles stronger and more limber so you can stay active    Prevent falls and fractures by slowing the loss of bone mass (osteoporosis)    Manage stress better    Reduce your blood pressure    Improve your sense of self and your body image  Exercise tips  Ease into your routine. Set small goals. Then build on them.  Exercise on most days. Aim for a total of 150 or more minutes of moderate to  vigorous intensity activity each week. Consider 40 minutes, 3 to 4 times a week. For best results, activity should last for 40 minutes on average. It is OK to work up to the 40 minute period over time. Examples of moderate-intensity activity is walking 1 mile in 15 minutes or 30 to 45 minutes of yard work.  Step up your daily activity level. Along with your exercise program, try being more active throughout the day. Walk instead of drive. Do more household tasks or yard work.  Choose one or more activities you enjoy. Walking is one of the easiest things you can do. You can also try swimming, riding a bike, dancing, or taking an exercise class.  Stop exercising and call your doctor if you:    Have chest pain or feel dizzy or lightheaded    Feel burning, tightness, pressure, or heaviness in your chest, neck, shoulders, back, or arms    Have unusual shortness of breath    Have increased joint or muscle pain    Have palpitations or an irregular heartbeat   Date Last Reviewed: 5/1/2016 2000-2017 [employer]. [address] [place] [address]. All rights reserved. This information is not intended as a substitute for professional medical care. Always follow your healthcare professional's instructions.        Weight Management: Exercise and Activity    Studies show that people who exercise are the most likely to lose weight and keep it off. Exercise burns calories. It helps build muscle to make your body stronger. Make exercise an important part  of your weight-management plan.  Make activity part of your day  You may not think you have the time to exercise. But you can work activity into your daily life--you just need to be committed. Take 10 minutes out of your lunch hour to take a walk. Walk to the [employer] to get your paper instead of having it delivered. Make it a habit to take the stairs instead of the elevator. Park in a far away parking spot instead of the closest. You ll be surprised at how fast these little changes can make a difference.  Some people really cannot walk very far, and tire out quickly with exercise. Instead of becoming discouraged, resolve to do what you can do, and work to make that a regular frequent habit.   The benefits of exercise  Exercise offers many benefits including:     Exercise increases your metabolism (the speed at which your body burns calories).    Regular exercise can increase the amount of muscle in your body. Muscle burns calories faster than fat. The more muscle you have, the more calories you burn.    Exercise gives you energy and curbs your appetite.    Exercise decreases stress and helps you sleep better.  Make exercise fun  Exercise can be fun. Choose an activity you enjoy. You may even get a friend to do it with you:    Take a resistance-training or aerobics class    Join a team sport    Take a dance class    Walk the dog    Ride a bike  If you have health problems, be sure to ask your healthcare provider before you start an exercise program. Have a  help you develop a plan that s safe for you.   Date Last Reviewed: 2/4/2016 2000-2017 The [employer]. [address] [place] [address], [place] [address]. All rights reserved. This information is not intended as a substitute for professional medical care. Always follow your healthcare professional's instructions.        Weight Management: Fact and Fiction    Knowing the truth about losing weight can help you separate what works from what  doesn t. Don t be taken in by expensive weight-loss fads like pills, herbs, and special foods that promise unbelievable results. There s no magic way to lose weight. If you have questions about weight loss, ask your healthcare provider.  Fiction:  The faster I lose weight, the better.   Fact: Rapid weight loss is usually due to loss of water or muscle mass. What you re trying to get rid of is extra fat. Aim to lose a 1/2 pound to 2 pounds a week. Then you re more likely to lose fat rather than water or muscle.  Fiction:  Skipping meals will help me lose weight.   Fact: When you skip meals, you don t give your body the energy it needs to work. Hunger makes you more likely to overeat later on. It s best to spread your meals throughout the day. Eat at least three meals a day.  Fiction:  I can t start exercising until I lose weight.   Fact: The sooner you start exercising the better. Exercise helps burn more calories, tone your muscles, and keep your appetite in check. People who continue to exercise after they lose weight are more likely to keep the weight off.  Fiction:  The fewer calories I eat, the better.   Fact: This seems like it should be true, but it s not. When you eat too few calories, your body acts as if it s on a desert island. It thinks food is scarce, so it slows down your metabolism (how fast you burn calories) to save energy. By eating too few calories, you make it harder to lose weight.  Fiction:  Once I lose weight, I can go back to living the way I did before.   Fact: Going back to your old eating habits and giving up exercise is a sure way to regain any weight you ve lost. The lifestyle changes that help you lose extra weight can also help keep it off. This is why you need to make realistic changes you can stick with.  Fiction:  Low-fat and fat-free mean low-calorie.   Fact: All foods, even fat-free ones, have calories. Eat too many calories and you ll gain weight. It s OK to treat yourself to a  fat-free cookie or two. Just don t eat the whole box! A dietitian will help you figure this out, and will likely recommend that you eat three meals a day, with protein with each meal.   Date Last Reviewed: 2/4/2016 2000-2017 The [employer]. [address] [place] [address]. All rights reserved. This information is not intended as a substitute for professional medical care. Always follow your healthcare professional's instructions.        Weight Management: Healthy Eating  Food is your body s fuel. You can t live without it. The key is to give your body enough nutrients and energy without eating too much. Reading food labels can help you make healthy choices. Also, learn new eating habits to manage your weight.     All the values on the label are based on one serving. The serving size is the average portion. Remember to multiply the values on the label by the number of servings you eat.   Eat less fat  A gram of fat has almost 2.5 times the calories of a gram of protein or carbohydrates. Try to balance your food choices so that only 20% to 35% of your calories comes from total fat. This means an average of 2  to 3  grams of fat for each 100 calories you eat.  Eat more fiber  High-fiber foods are digested more slowly than low-fiber foods, so you feel full longer. Try to get at least 25 grams of fiber each day for a 2000 calorie diet. Foods high in fiber include:    Vegetables and fruits    Whole-grain or bran breads, pastas, and cereals    Legumes (beans) and peas  As you begin to eat more fiber, be sure to drink plenty of water to keep your digestive system working smoothly.  Tips  Do's and don'ts include:     Don t skip meals. This often leads to overeating later on. It s best to spread your eating throughout the day.    Eat a variety of foods, not just a few favorites.    If you find yourself eating when you re not hungry, ask yourself why. Many of us eat when we re bored, stressed, or just  to be polite. Listen to your body. If you re not hungry, get busy doing something else instead of eating.    Eat slower, shooting for 20 to 30 minutes for each meal. It takes 20 minutes for your stomach to tell your brain that it s full. Slow eaters tend to eat less and are still satisfied, while fast eaters may tend to be overeaters.     Pay attention to what you eat. Don t read or watch TV during your meal.  Date Last Reviewed: 1/31/2016 2000-2017 [employer]. [address] [place] [address]. All rights reserved. This information is not intended as a substitute for professional medical care. Always follow your healthcare professional's instructions.        Weight Management: Overcoming Your Barriers    You may have many reasons why you re not ready to lose weight. You may not feel you have the time or the skills. You may be afraid of losing weight and gaining it back again. Well, you can lose weight. And you can keep the weight off, if you make changes slowly and stick with them. Remember that you may never find the perfect time to lose weight. Decide that the right time to be healthier is now.  Common barriers  Barrier 1: I don t want to deny myself.  Barrier Buster: You don t have to! Moderation is the key:    Watch portion sizes and know when you're eating more than one serving.    Plan to ask for a doggy bag when you eat out.    Have just one.    Choose lower-fat and lower-calorie versions of your favorites.    Use a small plate instead of a normal-sized plate.   Barrier 2: I lost weight before but I gained it right back.  Barrier Buster: Make this time different:    List what worked and didn t work last time and what you can try this time.    Choose changes that you are willing to stick with.    Work exercise into your weight-loss plan.    Be realistic about what is possible. Your plan has to fit into your life in a balanced way that works for you.   Barrier 3: I don t have the time  to be active.  Barrier Buster: It takes just a few minutes a day!    Be active with a pet or the kids.    Block off activity time in your schedule.    Borrow some time that you usually spend watching TV.    You are too important not to take time to exercise--it is your life!   Feel good about yourself  Do you eat more because you feel bad about yourself, then feel even worse as you gain weight? This is a  vicious cycle.  Breaking this cycle is not easy. You may need group support or counseling. Always remember that you are a valuable person, no matter what size or shape you are.  Do you have a health problem? If so, don t use it as an excuse for not losing weight. Ask your healthcare provider or dietitian about methods to lose weight that are safe for you. For example, even if you have severe arthritis, it may be easier for you to exercise in a pool. Get advice from a .    Date Last Reviewed: 2/2/2016 2000-2017 The [employer]. [address] [place] [address]. All rights reserved. This information is not intended as a substitute for professional medical care. Always follow your healthcare professional's instructions.        Weight Management: Take It Off and Keep It Off    It s easy to be motivated when you first start. The key is to stay motivated all along the way and to have realistic and achievable goals. There are things you can do to keep yourself on the path to success.  Don t focus on daily weight gains and losses. Instead, weigh yourself no more than once a week at the same time of day. Weighing yourself each day will probably only frustrate you.    Stay motivated  Here are suggestions to keep you motivated:     Remind yourself of your goals. Post them near the refrigerator or desk where you work.    Make daily entries in your diary or journal about your activity and eating. A visual reminder of success, like a gold star, can help keep you going.    Every week,  take time to look back on how much you ve accomplished, and the changes you may have made.    Try taking a nutrition class. It can help you learn new shopping, cooking, and eating skills, and also meet new people. You might try a low-fat cooking or yoga class.    Don t be hard on yourself or give up if you slip. Be patient. Learn from your mistakes and adjust your plan if you need to. Then get right back to it.    Be realistic about your goals. Talk with a dietitian or your healthcare provider about what goals are reasonable for you.   Believe that you can do it  How you think about yourself is just as important as what you do. If you don t think you can succeed, chances are you won t. Believe that you can stick to your plan and meet your goals:    If you don t meet a goal, don t use it as an excuse to give up on your whole plan. Adjust your goal and try again.    Try to understand your own attitude about food.  Are you subject to emotional eating?    Learn how to accept compliments. Even if you get embarrassed, just say  thank you.     Make a list of the things that others like about you and that you like about yourself. Add something new from time to time. Keep this list to look at when you need a lift.  Resources    The President s [place] on Fitness, Sports & Nutritionwww.fitness.gov    Academy of Nutrition and Dieteticswww.eatright.org    Healthfinderwww.healthfinder.gov  Date Last Reviewed: 2/4/2016 2000-2017 The [employer]. [address] [place] [address], [place] [address]. All rights reserved. This information is not intended as a substitute for professional medical care. Always follow your healthcare professional's instructions.        Weight Management: Getting Started  Healthy bodies come in all shapes and sizes. Not all bodies are made to be thin. For some people, a healthy weight is higher than the average weight listed on weight charts. Your healthcare provider can help you decide on a healthy  weight for you.    Reasons to lose weight  Losing weight can help with some health problems, such as high blood pressure, heart disease, diabetes, sleep apnea, and arthritis. You may also feel more energy.  Set your long-term goal  Your goal doesn't even have to be a specific weight. You may decide on a fitness goal (such as being able to walk 10 miles a week), or a health goal (such as lowering your blood pressure). Choose a goal that is measurable and reasonable, so you know when you've reached it. A goal of reaching a BMI of less than 25 is not always reasonable (or possible).   Make an action plan  Habits don t change overnight. Setting your goals too high can leave you feeling discouraged if you can t reach them. Be realistic. Choose one or two small changes you can make now. Set an action plan for how you are going to make these changes. When you can stick to this plan, keep making a few more small changes. Taking small steps will help you stay on the path to success.  Track your progress  Write down your goals. Then, keep a daily record of your progress. Write down what you eat and how active you are. This record lets you look back on how much you ve done. It may also help when you re feeling frustrated. Reward yourself for success. Even if you don t reach every goal, give yourself credit for what you do get done.  Get support  Encouragement from others can help make losing weight easier. Ask your family members and friends for support. They may even want to join you. Also look to your healthcare provider, registered dietitian, and  for help. Your local hospital can give you more information about nutrition, exercise, and weight loss.  Date Last Reviewed: 1/31/2016 2000-2017 [employer]. [address] [place] [address], [place] [address]. All rights reserved. This information is not intended as a substitute for professional medical care. Always follow your healthcare professional's  instructions.                Follow-ups after your visit        Additional Services     BARIATRIC ADULT REFERRAL       Your provider has referred you to: [facility]: [facility] [phone]. [email or web address]    Please be aware that coverage of these services is subject to the terms and limitations of your health insurance plan.  Call member services at your health plan with any benefit or coverage questions.      Please bring the following with you to your appointment:      (1) List of current medications   (2) This referral request   (3) Any documents/labs given to you for this referral                  Your next 10 appointments already scheduled     Aug 06, 2018  1:20 PM CDT   SHORT with JERRICA Salcedo [facility] ([facility])    [address] [place] [id number]   [phone]            Aug 13, 2018 10:00 AM CDT   Individual Therapy 53+ minutes with Estelle Gomez LP   [place] [place] [facility] ([facility])    [address]  Mail Code 7521  [facility] [address]   [phone]            Aug 13, 2018  1:00 PM CDT   Ech Limited with [email or web address]   [facility] ([facility])    [address] [facility] [id number] [phone]           1.  Please bring or wear a comfortable two-piece outfit. 2.  You may eat, drink and take your normal medicines. 3.  For any questions that cannot be answered, please contact the ordering physician 4.  Please do not wear perfumes or scented lotions on the day of your exam.            Sep 17, 2018 11:00 AM CDT   Individual Therapy 53+ minutes with Estelle Gomez LP   [facility] ([facility])    [address]  Mail Code 7521  [facility] [address]   [phone]              Who to contact     If you have questions or  need follow up information about today's clinic visit or your schedule please contact [facility] directly at [phone].  Normal or non-critical lab and imaging results will be communicated to you by MyChart, letter or phone within 4 business days after the clinic has received the results. If you do not hear from us within 7 days, please contact the clinic through MyChart or phone. If you have a critical or abnormal lab result, we will notify you by phone as soon as possible.  Submit refill requests through [employer] or call your pharmacy and they will forward the refill request to us. Please allow 3 business days for your refill to be completed.          Additional Information About Your Visit        Care EveryWhere ID     This is your Care EveryWhere ID. This could be used by other organizations to access your [place] medical records  [id number]        Your Vitals Were     Pulse Temperature Respirations BMI (Body Mass Index)          56 98.4  F (36.9  C) (Tympanic) 20 39.39 kg/m2         Blood Pressure from Last 3 Encounters:   08/06/18 136/78   07/09/18 153/88   05/09/18 123/71    Weight from Last 3 Encounters:   08/06/18 290 lb 6.4 oz (131.7 kg)   07/09/18 286 lb (129.7 kg)   05/09/18 299 lb (135.6 kg)              We Performed the Following     **Testosterone Free and Total FUTURE anytime     BARIATRIC ADULT REFERRAL     Basic metabolic panel  (Ca, Cl, CO2, Creat, Gluc, K, Na, BUN)     Estradiol        Primary Care Provider Office Phone # Fax #    JERRICA Salcedo [facility] [phone]       [address] [place] [address]        Equal Access to Services     [facility] ROSALVA : Hadii aad ku hadasho Solibradoali, waaxda luqadaha, qaybta kaalmada dave, zulma simpson. So [facility] [phone].    ATENCIÓN: Si habla español, tiene a hernandez disposición servicios gratuitos de asistencia lingüística. Llame al [phone].    We comply with applicable federal  civil rights laws and [place] laws. We do not discriminate on the basis of race, color, national origin, age, disability, sex, sexual orientation, or gender identity.            Thank you!     Thank you for choosing [facility]  for your care. Our goal is always to provide you with excellent care. Hearing back from our patients is one way we can continue to improve our services. Please take a few minutes to complete the written survey that you may receive in the mail after your visit with us. Thank you!             Your Updated Medication List - Protect others around you: Learn how to safely use, store and throw away your medicines at www.disposemymeds.org.          This list is accurate as of 8/6/18  1:19 PM.  Always use your most recent med list.                   Brand Name Dispense Instructions for use Diagnosis    DAILY-EVANGELISTA Tabs      Take 1 tablet by mouth daily        dorzolamide-timolol 2-0.5 % ophthalmic solution    COSOPT     INSTILL 1 DROP INTO BOTH EYES 2 TIMES DAILY.        estradiol 0.1 MG/24HR BIW patch    VIVELLE-DOT    16 patch    Place 2 patches onto the skin twice a week    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       finasteride 5 MG tablet    PROSCAR    90 tablet    Take 1 tablet (5 mg) by mouth daily    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       latanoprost 0.005 % ophthalmic solution    XALATAN     1 drop At Bedtime.        lovastatin 20 MG tablet    MEVACOR    90 tablet    Take 1 tablet (20 mg) by mouth At Bedtime    Hyperlipidemia LDL goal <100       meloxicam 15 MG tablet    MOBIC    90 tablet    Take 1 tablet (15 mg) by mouth daily    Primary osteoarthritis, unspecified site       * order for DME     1 each    Equipment being ordered:  BACK BRACE  [employer] Back-n-Shape II Back Brace  Size XXXL with thermoplastic insert    DDD (degenerative disc disease), lumbar, Pathologic fracture of vertebrae       * order for DME     2 each    Equipment being ordered: [employer]  232 Cotton Thigh High for Men 20-30 mmHg- TWO PAIR TAN COLOR    Varicose veins of legs       SM CALCIUM 600/VITAMIN D 600-400 MG-UNIT per tablet   Generic drug:  calcium-vitamin D     60 tablet    Take 1 tablet by mouth 2 times daily    Hyperlipidemia LDL goal <100       spironolactone 100 MG tablet    ALDACTONE    90 tablet    Take 1 tablet (100 mg) by mouth daily    Essential hypertension with goal blood pressure less than 140/90       * Notice:  This list has 2 medication(s) that are the same as other medications prescribed for you. Read the directions carefully, and ask your doctor or other care provider to review them with you.

## 2018-08-09 LAB
SHBG SERPL-SCNC: 48 NMOL/L (ref 11–80)
TESTOST FREE SERPL-MCNC: 0.59 NG/DL (ref 4.7–24.4)
TESTOST SERPL-MCNC: 42 NG/DL (ref 240–950)

## 2018-08-13 ENCOUNTER — OFFICE VISIT (OUTPATIENT)
Dept: OTHER | Facility: OUTPATIENT CENTER | Age: 68
End: 2018-08-13
Payer: MEDICARE

## 2018-08-13 ENCOUNTER — HOSPITAL ENCOUNTER (OUTPATIENT)
Dept: CARDIOLOGY | Facility: CLINIC | Age: 68
Discharge: HOME | End: 2018-08-13
Attending: INTERNAL MEDICINE | Admitting: INTERNAL MEDICINE
Payer: MEDICARE

## 2018-08-13 DIAGNOSIS — F64.0 GENDER DYSPHORIA IN ADOLESCENT AND ADULT: Primary | ICD-10-CM

## 2018-08-13 DIAGNOSIS — I35.0 NONRHEUMATIC AORTIC VALVE STENOSIS: ICD-10-CM

## 2018-08-13 PROCEDURE — 93325 DOPPLER ECHO COLOR FLOW MAPG: CPT

## 2018-08-13 PROCEDURE — 93308 TTE F-UP OR LMTD: CPT | Mod: 26 | Performed by: INTERNAL MEDICINE

## 2018-08-13 PROCEDURE — 93321 DOPPLER ECHO F-UP/LMTD STD: CPT | Mod: 26 | Performed by: INTERNAL MEDICINE

## 2018-08-13 PROCEDURE — 93325 DOPPLER ECHO COLOR FLOW MAPG: CPT | Mod: 26 | Performed by: INTERNAL MEDICINE

## 2018-08-13 NOTE — MR AVS SNAPSHOT
After Visit Summary   8/13/2018    Laila Perez    MRN: [id number]           Patient Information     Date Of Birth          1950        Visit Information        Provider Department      8/13/2018 10:00 AM Estelle Gomez LP [facility]        Today's Diagnoses     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    -  1       Follow-ups after your visit        Your next 10 appointments already scheduled     Sep 17, 2018 11:00 AM CDT   Individual Therapy 53+ minutes with Estelle Gomez LP   [facility] ([facility])    [address]  Mail Code 7521  [facility] [address]   [phone]            Oct 15, 2018  9:20 AM CDT   Return Visit with Jluis Mane MD   [facility] ([facility])    [address]  Mail Code 7521  [facility] [address]   [phone]            Oct 15, 2018 10:00 AM CDT   Individual Therapy 53+ minutes with Estelle Gomez LP   [facility] ([facility])    [address]  Mail Code 7521  [facility] [address]   [phone]              Who to contact     Please call your clinic at [phone] to:    Ask questions about your health    Make or cancel appointments    Discuss your medicines    Learn about your test results    Speak to your doctor            Additional Information About Your Visit        MyChart Information     SandForce is an electronic gateway that provides easy, online access to your medical records. With SandForce, you can request a clinic appointment, read your test results, renew a prescription or communicate with your care team.     To sign up for streamOncet visit the website at [email or web address]   You will be asked to enter the access code listed below, as well as some personal information. Please follow the directions to create your username and password.     Your access code is: [email or web address]  Expires: 11/13/2018  7:15 PM     Your access  code will  in 90 days. If you need help or a new code, please contact your [facility] or call [phone] for assistance.        Care EveryWhere ID     This is your Care EveryWhere ID. This could be used by other organizations to access your [place] medical records  [id number]         Blood Pressure from Last 3 Encounters:   18 136/78   18 153/88   18 123/71    Weight from Last 3 Encounters:   18 131.7 kg (290 lb 6.4 oz)   18 129.7 kg (286 lb)   18 135.6 kg (299 lb)              We Performed the Following     Individual Psychotherapy (53+ min) [63753]     Psychotherapy Interactive Complexity [08820]        Primary Care Provider Office Phone # Fax #    Delmis García Ronak JERRICA [facility] [phone]       [address] [place] [address]        Equal Access to Services     SUSU BLACKWELL : Hadii aad ku hadasho Soomaali, waaxda luqadaha, qaybta kaalmada adeegyada, waxay murphyin haymansoorn arielle kharadejuan castillo . So [facility] [phone].    ATENCIÓN: Si habla español, tiene a hernandez disposición servicios gratuitos de asistencia lingüística. Llame al [phone].    We comply with applicable federal civil rights laws and [place] laws. We do not discriminate on the basis of race, color, national origin, age, disability, sex, sexual orientation, or gender identity.            Thank you!     Thank you for choosing [facility]  for your care. Our goal is always to provide you with excellent care. Hearing back from our patients is one way we can continue to improve our services. Please take a few minutes to complete the written survey that you may receive in the mail after your visit with us. Thank you!             Your Updated Medication List - Protect others around you: Learn how to safely use, store and throw away your medicines at www.disposemymeds.org.          This list is accurate as of 18 11:59 PM.  Always use your most  recent med list.                   Brand Name Dispense Instructions for use Diagnosis    DAILY-EVANGELISTA Tabs      Take 1 tablet by mouth daily        dorzolamide-timolol 2-0.5 % ophthalmic solution    COSOPT     INSTILL 1 DROP INTO BOTH EYES 2 TIMES DAILY.        estradiol 0.1 MG/24HR BIW patch    VIVELLE-DOT    16 patch    Place 2 patches onto the skin twice a week    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       finasteride 5 MG tablet    PROSCAR    90 tablet    Take 1 tablet (5 mg) by mouth daily    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       latanoprost 0.005 % ophthalmic solution    XALATAN     1 drop At Bedtime.        lovastatin 20 MG tablet    MEVACOR    90 tablet    Take 1 tablet (20 mg) by mouth At Bedtime    Hyperlipidemia LDL goal <100       meloxicam 15 MG tablet    MOBIC    90 tablet    Take 1 tablet (15 mg) by mouth daily    Primary osteoarthritis, unspecified site       * order for DME     1 each    Equipment being ordered:  BACK BRACE  MedSpec Back-n-Shape II Back Brace  Size XXXL with thermoplastic insert    DDD (degenerative disc disease), lumbar, Pathologic fracture of vertebrae       * order for DME     2 each    Equipment being ordered: Sigvaris 232 Cotton Thigh High for Men 20-30 mmHg- TWO PAIR TAN COLOR    Varicose veins of legs       SM CALCIUM 600/VITAMIN D 600-400 MG-UNIT per tablet   Generic drug:  calcium-vitamin D     60 tablet    Take 1 tablet by mouth 2 times daily    Hyperlipidemia LDL goal <100       spironolactone 100 MG tablet    ALDACTONE    90 tablet    Take 1 tablet (100 mg) by mouth daily    Essential hypertension with goal blood pressure less than 140/90       * Notice:  This list has 2 medication(s) that are the same as other medications prescribed for you. Read the directions carefully, and ask your doctor or other care provider to review them with you.

## 2018-08-16 NOTE — PROGRESS NOTES
[facility] -  Case Progress Note  Client Name: Laila Perez  YOB: 1950  MRN:  [id number]  Treating Provider: Estelle Gomez LP  Type of Session: Individual  Present in Session: client   Number of Minutes:  55    Health Maintenance Summary - Mental Health Treatment Plan       Status Date      Mental Health Tx Plan Q11 MOS Next Due 9/30/2018      Done 10/30/2017      Done 9/6/2016             Date of Service: 8/13/18    Current Symptoms/Status:  Distress about gender and secondary sex characteristics, wants genitals removed, bothers her daily of moderate to severe intensity, distress with work situation.    Progress Toward Treatment Goals:   Client reported gaining weight, feeling hurt and frustrated with .    Intervention: Modality and Description:  Provided support and feedback. Used CBT to process gender dysphoria concerns. Client shared having a meeting with her  and . She told the  at the meeting she no longer wanted to work with the person assigned to her. She shared her negative views about the person. The  told client that people did not want to work with her because she is so controlling. She shared she is not controlling and this other person had the problem. Discussed the definition of controlling and how it may apply to her. She rejected this information. She shared she was weighed and realized she gained weight. She was disappointed, but is back on her diet plan. She had only eaten one egg today and expressed concern that this was not enough food. Client is going to be working with someone on a weight loss plan.    Interactive Complexity:  There are four specific communication difficulties that complicate the work of the primary psychiatric procedure.  Interactive complexity (+79093) may be reported when at least one of these difficulties is present.    Communication difficulties present during current  the psychiatric procedure include:  1. The need to manage maladaptive communication (related to e.g. high anxiety, high reactivity, repeated questions, or disagreement) among participants that complicates delivery of care.        Response to Intervention:  Client reported validation.    Assignment:  Pace herself with diet and exercise, consult physician, ask to meet with a dietician.      Diagnosis:  Encounter Diagnosis   Name Primary?     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 Yes           Plan / Need for Future Services:  Return for therapy in 4 weeks.      Estelle Gomez PsyD, LP

## 2018-08-17 NOTE — TELEPHONE ENCOUNTER
Called [phone] to check status of appeal. I was forwarded to [employer]. I left a message with Case# [id number], asking for status update. Will receive a call back.

## 2018-08-30 NOTE — TELEPHONE ENCOUNTER
Called [phone] to check status of appeal. I was forwarded to [employer]. I left a message with Case# [id number], asking for status update.

## 2018-09-11 DIAGNOSIS — F64.0 GENDER DYSPHORIA IN ADOLESCENT AND ADULT: ICD-10-CM

## 2018-09-11 NOTE — TELEPHONE ENCOUNTER
Requested Medication:  Finasteride  Dose:   5 mg  Quantity:  90  Refills:  0    Last seen at [facility]:  7/9 - 4 mo follow up  Next Appointment with Provider:  Visit date not found    Alexandra Long CMA

## 2018-09-12 RX ORDER — FINASTERIDE 5 MG/1
5 TABLET, FILM COATED ORAL DAILY
Qty: 90 TABLET | Refills: 1 | Status: SHIPPED | OUTPATIENT
Start: 2018-09-12 | End: 2019-01-14

## 2018-09-17 ENCOUNTER — OFFICE VISIT (OUTPATIENT)
Dept: OTHER | Facility: OUTPATIENT CENTER | Age: 68
End: 2018-09-17
Payer: MEDICARE

## 2018-09-17 DIAGNOSIS — F64.0 GENDER DYSPHORIA IN ADOLESCENT AND ADULT: Primary | ICD-10-CM

## 2018-09-17 NOTE — MR AVS SNAPSHOT
After Visit Summary   9/17/2018    Laila Perez    MRN: [id number]           Patient Information     Date Of Birth          1950        Visit Information        Provider Department      9/17/2018 11:00 AM Estelle Gomez LP [facility]        Today's Diagnoses     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    -  1       Follow-ups after your visit        Your next 10 appointments already scheduled     Oct 15, 2018  9:20 AM CDT   Return Visit with Jluis Mane MD   [facility] ([facility])    [address]  Mail Code 7521  [facility] [address]   [phone]            Oct 15, 2018 10:00 AM CDT   Individual Therapy 53+ minutes with Estelle Gomez LP   [facility] ([facility])    [address]  Mail Code 7521  [facility] [address]   [phone]            Nov 19, 2018 11:00 AM CST   Individual Therapy 53+ minutes with Estlele Gomez LP   [facility] ([facility])    [address]  Mail [address] 7521  [facility] [address]   [phone]              Who to contact     Please call your clinic at [phone] to:    Ask questions about your health    Make or cancel appointments    Discuss your medicines    Learn about your test results    Speak to your doctor            Additional Information About Your Visit        MyChart Information     [employer] is an electronic gateway that provides easy, online access to your medical records. With [employer], you can request a clinic appointment, read your test results, renew a prescription or communicate with your care team.     To sign up for ezTaxit visit the website at [email or web address]   You will be asked to enter the access code listed below, as well as some personal information. Please follow the directions to create your username and password.     Your access code is: [email or web address]  Expires: 11/13/2018  7:15 PM     Your access  code will  in 90 days. If you need help or a new code, please contact your [facility] or call [phone] for assistance.        Care EveryWhere ID     This is your Care EveryWhere ID. This could be used by other organizations to access your [place] medical records  [id number]         Blood Pressure from Last 3 Encounters:   18 136/78   18 153/88   18 123/71    Weight from Last 3 Encounters:   18 131.7 kg (290 lb 6.4 oz)   18 129.7 kg (286 lb)   18 135.6 kg (299 lb)              We Performed the Following     Individual Psychotherapy (53+ min) [45542]     Psychotherapy Interactive Complexity [96116]        Primary Care Provider Office Phone # Fax #    Delmis Garcaí Ronak JERRICA [facility] [phone]       [address] [place] [address]        Equal Access to Services     SUSU BLACKWELL : Hadii aad ku hadasho Soomaali, waaxda luqadaha, qaybta kaalmada adeegyada, waxay murphyin haymansoorn arielle kharadejuan castillo . So [facility] [phone].    ATENCIÓN: Si habla español, tiene a hernandez disposición servicios gratuitos de asistencia lingüística. Llame al [phone].    We comply with applicable federal civil rights laws and [place] laws. We do not discriminate on the basis of race, color, national origin, age, disability, sex, sexual orientation, or gender identity.            Thank you!     Thank you for choosing [facility]  for your care. Our goal is always to provide you with excellent care. Hearing back from our patients is one way we can continue to improve our services. Please take a few minutes to complete the written survey that you may receive in the mail after your visit with us. Thank you!             Your Updated Medication List - Protect others around you: Learn how to safely use, store and throw away your medicines at www.disposemymeds.org.          This list is accurate as of 18 11:59 PM.  Always use your most  recent med list.                   Brand Name Dispense Instructions for use Diagnosis    DAILY-EVANGELISTA Tabs      Take 1 tablet by mouth daily        dorzolamide-timolol 2-0.5 % ophthalmic solution    COSOPT     INSTILL 1 DROP INTO BOTH EYES 2 TIMES DAILY.        estradiol 0.1 MG/24HR BIW patch    VIVELLE-DOT    16 patch    Place 2 patches onto the skin twice a week    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       finasteride 5 MG tablet    PROSCAR    90 tablet    Take 1 tablet (5 mg) by mouth daily    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       latanoprost 0.005 % ophthalmic solution    XALATAN     1 drop At Bedtime.        lovastatin 20 MG tablet    MEVACOR    90 tablet    Take 1 tablet (20 mg) by mouth At Bedtime    Hyperlipidemia LDL goal <100       meloxicam 15 MG tablet    MOBIC    90 tablet    Take 1 tablet (15 mg) by mouth daily    Primary osteoarthritis, unspecified site       * order for DME     1 each    Equipment being ordered:  BACK BRACE  MedSpec Back-n-Shape II Back Brace  Size XXXL with thermoplastic insert    DDD (degenerative disc disease), lumbar, Pathologic fracture of vertebrae       * order for DME     2 each    Equipment being ordered: Sigvaris 232 Cotton Thigh High for Men 20-30 mmHg- TWO PAIR TAN COLOR    Varicose veins of legs       SM CALCIUM 600/VITAMIN D 600-400 MG-UNIT per tablet   Generic drug:  calcium carbonate 600 mg-vitamin D 400 units     60 tablet    Take 1 tablet by mouth 2 times daily    Hyperlipidemia LDL goal <100       spironolactone 100 MG tablet    ALDACTONE    90 tablet    Take 1 tablet (100 mg) by mouth daily    Essential hypertension with goal blood pressure less than 140/90       * Notice:  This list has 2 medication(s) that are the same as other medications prescribed for you. Read the directions carefully, and ask your doctor or other care provider to review them with you.

## 2018-09-24 NOTE — PROGRESS NOTES
[facility] -  Case Progress Note  Client Name: Laila Perez  YOB: 1950  MRN:  [id number]  Treating Provider: Estelle Gomez LP  Type of Session: Individual  Present in Session: client   Number of Minutes:  55    Health Maintenance Summary - Mental Health Treatment Plan       Status Date      Mental Health Tx Plan Q11 MOS Next Due 9/30/2018      Done 10/30/2017      Done 9/6/2016             Date of Service: 9/17/18    Current Symptoms/Status:  Distress about gender and secondary sex characteristics, wants genitals removed, bothers her daily of moderate to severe intensity, distress with work situation.    Progress Toward Treatment Goals:   Client reported progress with relationships with staff, progress with exercise and weight loss.    Intervention: Modality and Description:  Provided support and feedback. Used CBT to process gender dysphoria concerns. Client shared she has been steady with riding her bike 1-2 times per week and doing other physical activities. She believes she is losing weight and has been told by others it looks like she is losing weight. She is not weighing herself until she has doctor's appointment because she does not want to be disappointed. Weighing herself more often she just sees the fluctuations. She shared she has been following her doctors guidelines regarding weight loss. She shared having a new person to drive her to appointments and it went well today. She shared her work is going well. She said some of the men are asking her to date and she tells them when they can buy her roses and candy--knowing they will never be able to afford it. 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    Interactive Complexity:  There are four specific communication difficulties that complicate the work of the primary psychiatric procedure.  Interactive complexity (+34276) may be reported when at least one of these difficulties is present.    Communication  difficulties present during current the psychiatric procedure include:  1. The need to manage maladaptive communication (related to e.g. high anxiety, high reactivity, repeated questions, or disagreement) among participants that complicates delivery of care.        Response to Intervention:  Client reported validation.    Assignment:  Pace herself with diet and exercise, consult physician, ask to meet with a dietician.      Diagnosis:  Encounter Diagnosis   Name Primary?     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 Yes           Plan / Need for Future Services:  Return for therapy in 4 weeks.      Estelle Gomez PsyD, LP

## 2018-10-01 NOTE — TELEPHONE ENCOUNTER
Cannot get through to appeals department, so finally called the pharmacy and confirmed patient did  rx Estradiol 0.1mg patch 2 patches twice weekly on 09/10/18, and there was no copay.

## 2018-10-15 ENCOUNTER — OFFICE VISIT (OUTPATIENT)
Dept: OTHER | Facility: OUTPATIENT CENTER | Age: 68
End: 2018-10-15
Payer: MEDICARE

## 2018-10-15 VITALS
HEART RATE: 69 BPM | SYSTOLIC BLOOD PRESSURE: 148 MMHG | DIASTOLIC BLOOD PRESSURE: 85 MMHG | HEIGHT: 72 IN | BODY MASS INDEX: 38.47 KG/M2 | WEIGHT: 284 LBS

## 2018-10-15 DIAGNOSIS — F64.0 GENDER DYSPHORIA IN ADOLESCENT AND ADULT: Primary | ICD-10-CM

## 2018-10-15 DIAGNOSIS — F64.0 GENDER DYSPHORIA IN ADOLESCENT AND ADULT: ICD-10-CM

## 2018-10-15 DIAGNOSIS — I10 ESSENTIAL HYPERTENSION WITH GOAL BLOOD PRESSURE LESS THAN 140/90: ICD-10-CM

## 2018-10-15 RX ORDER — ESTRADIOL 0.1 MG/D
2 FILM, EXTENDED RELEASE TRANSDERMAL
Qty: 16 PATCH | Refills: 3 | Status: SHIPPED | OUTPATIENT
Start: 2018-10-15 | End: 2019-01-14

## 2018-10-15 RX ORDER — SPIRONOLACTONE 100 MG/1
100 TABLET, FILM COATED ORAL DAILY
Qty: 90 TABLET | Refills: 1 | Status: SHIPPED | OUTPATIENT
Start: 2018-10-15 | End: 2019-01-14

## 2018-10-15 ASSESSMENT — ENCOUNTER SYMPTOMS
FEVER: 0
DYSPHORIC MOOD: 0
WEAKNESS: 0
UNEXPECTED WEIGHT CHANGE: 0
CHEST TIGHTNESS: 0
NUMBNESS: 0
ABDOMINAL PAIN: 0
CHILLS: 0
LIGHT-HEADEDNESS: 0
VOMITING: 0
POLYDIPSIA: 0
FREQUENCY: 0
NERVOUS/ANXIOUS: 0
SHORTNESS OF BREATH: 0
PALPITATIONS: 0
HEADACHES: 0

## 2018-10-15 NOTE — MR AVS SNAPSHOT
After Visit Summary   10/15/2018    Laila Perez    MRN: [id number]           Patient Information     Date Of Birth          1950        Visit Information        Provider Department      10/15/2018 10:00 AM Estelle Gomez LP [facility]        Today's Diagnoses     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    -  1       Follow-ups after your visit        Your next 10 appointments already scheduled     2018 11:00 AM CST   Individual Therapy 53+ minutes with Estelle Gomez LP   [facility] ([facility])    [address]  Mail Code 7521  [facility] [address]   [phone]            Dec 17, 2018 10:00 AM CST   Individual Therapy 53+ minutes with Estelle Gomez LP   [facility] ([facility])    [address]  Mail Code 7521  [facility] [address]   [phone]              Who to contact     Please call your clinic at [phone] to:    Ask questions about your health    Make or cancel appointments    Discuss your medicines    Learn about your test results    Speak to your doctor            Additional Information About Your Visit        App AnnieharLessno Information     IDverge is an electronic gateway that provides easy, online access to your medical records. With IDverge, you can request a clinic appointment, read your test results, renew a prescription or communicate with your care team.     To sign up for IDverge visit the website at [email or web address]   You will be asked to enter the access code listed below, as well as some personal information. Please follow the directions to create your username and password.     Your access code is: [email or web address]  Expires: 2018  7:15 PM     Your access code will  in 90 days. If you need help or a new code, please contact your [facility] or call [phone] for assistance.        Care EveryWhere ID     This is your  Care EveryWhere ID. This could be used by other organizations to access your [place] medical records  [id number]         Blood Pressure from Last 3 Encounters:   10/15/18 148/85   08/06/18 136/78   07/09/18 153/88    Weight from Last 3 Encounters:   10/15/18 128.8 kg (284 lb)   08/06/18 131.7 kg (290 lb 6.4 oz)   07/09/18 129.7 kg (286 lb)              We Performed the Following     Individual Psychotherapy (53+ min) [16387]     Psychotherapy Interactive Complexity [45598]          Where to get your medicines      These medications were sent to [facility] [place], [place] - [address]  [address] [facility] [address]    Hours:  test rx sent successfully  2/8/05  kr Phone:  [phone]     estradiol 0.1 MG/24HR BIW patch    spironolactone 100 MG tablet          Primary Care Provider Office Phone # Fax #    Delmis Raquel Simons, APRN [facility] [phone] [address] [place] [address]        Equal Access to Services     [facility]: Hadii aad ku hadasho Soomaali, waaxda luqadaha, qaybta kaalmada aderonda, zulma simpson. So [facility] [phone].    ATENCIÓN: Si habla español, tiene a hernandez disposición servicios gratuitos de asistencia lingüística. Soco al [phone].    We comply with applicable federal civil rights laws and [place] laws. We do not discriminate on the basis of race, color, national origin, age, disability, sex, sexual orientation, or gender identity.            Thank you!     Thank you for choosing [facility]  for your care. Our goal is always to provide you with excellent care. Hearing back from our patients is one way we can continue to improve our services. Please take a few minutes to complete the written survey that you may receive in the mail after your visit with us. Thank you!             Your Updated Medication List - Protect others around you: Learn how to safely use, store and throw away  your medicines at www.disposemymeds.org.          This list is accurate as of 10/15/18  1:42 PM.  Always use your most recent med list.                   Brand Name Dispense Instructions for use Diagnosis    DAILY-EVANGELISTA Tabs      Take 1 tablet by mouth daily        dorzolamide-timolol 2-0.5 % ophthalmic solution    COSOPT     INSTILL 1 DROP INTO BOTH EYES 2 TIMES DAILY.        estradiol 0.1 MG/24HR BIW patch    VIVELLE-DOT    16 patch    Place 2 patches onto the skin twice a week    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       finasteride 5 MG tablet    PROSCAR    90 tablet    Take 1 tablet (5 mg) by mouth daily    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       latanoprost 0.005 % ophthalmic solution    XALATAN     1 drop At Bedtime.        lovastatin 20 MG tablet    MEVACOR    90 tablet    Take 1 tablet (20 mg) by mouth At Bedtime    Hyperlipidemia LDL goal <100       meloxicam 15 MG tablet    MOBIC    90 tablet    Take 1 tablet (15 mg) by mouth daily    Primary osteoarthritis, unspecified site       * order for DME     1 each    Equipment being ordered:  BACK BRACE  MedSpec Back-n-Shape II Back Brace  Size XXXL with thermoplastic insert    DDD (degenerative disc disease), lumbar, Pathologic fracture of vertebrae       * order for DME     2 each    Equipment being ordered: Sigvaris 232 Cotton Thigh High for Men 20-30 mmHg- TWO PAIR TAN COLOR    Varicose veins of legs       SM CALCIUM 600/VITAMIN D 600-400 MG-UNIT per tablet   Generic drug:  calcium carbonate 600 mg-vitamin D 400 units     60 tablet    Take 1 tablet by mouth 2 times daily    Hyperlipidemia LDL goal <100       spironolactone 100 MG tablet    ALDACTONE    90 tablet    Take 1 tablet (100 mg) by mouth daily    Essential hypertension with goal blood pressure less than 140/90       * Notice:  This list has 2 medication(s) that are the same as other medications prescribed for you. Read the directions carefully, and ask your doctor or other care provider  to review them with you.

## 2018-10-15 NOTE — NURSING NOTE
Chief Complaint   Patient presents with     RECHECK     TG       Vitals:    10/15/18 0903 10/15/18 0905   BP:  148/85   Pulse:  69   Weight: 128.8 kg (284 lb)    Height: 1.829 m (6')        Body mass index is 38.52 kg/(m^2).      Alexandra Long, CMA

## 2018-10-15 NOTE — MR AVS SNAPSHOT
After Visit Summary   10/15/2018    Laila Perez    MRN: [id number]           Patient Information     Date Of Birth          1950        Visit Information        Provider Department      10/15/2018 9:20 AM Jluis Mane MD [facility]        Today's Diagnoses     Essential hypertension with goal blood pressure less than 140/90        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           Follow-ups after your visit        Follow-up notes from your care team     Return in about 4 months (around 2/15/2019).      Your next 10 appointments already scheduled     2018 11:00 AM CST   Individual Therapy 53+ minutes with Estelle Gomez LP   [facility] ([facility])    [address]  Mail Code 7521  [facility] [address]   [phone]            Dec 17, 2018 10:00 AM CST   Individual Therapy 53+ minutes with Estelle Gomez LP   [facility] ([facility])    [address]  Mail Code 7521  [facility] [address]   [phone]              Who to contact     Please call your clinic at [phone] to:    Ask questions about your health    Make or cancel appointments    Discuss your medicines    Learn about your test results    Speak to your doctor            Additional Information About Your Visit        MyChart Information     Zoomhart is an electronic gateway that provides easy, online access to your medical records. With [employer], you can request a clinic appointment, read your test results, renew a prescription or communicate with your care team.     To sign up for Hansoftt visit the website at [email or web address]   You will be asked to enter the access code listed below, as well as some personal information. Please follow the directions to create your username and password.     Your access code is: [email or web address]  Expires: 2018  7:15 PM     Your access code will  in 90 days. If you need help or  a new code, please contact your [facility] or call [phone] for assistance.        Care EveryWhere ID     This is your Care EveryWhere ID. This could be used by other organizations to access your [place] medical records  [id number]        Your Vitals Were     Pulse Height BMI (Body Mass Index)             69 1.829 m (6') 38.52 kg/m2          Blood Pressure from Last 3 Encounters:   10/15/18 148/85   08/06/18 136/78   07/09/18 153/88    Weight from Last 3 Encounters:   10/15/18 128.8 kg (284 lb)   08/06/18 131.7 kg (290 lb 6.4 oz)   07/09/18 129.7 kg (286 lb)              Today, you had the following     No orders found for display         Where to get your medicines      These medications were sent to [facility] [address]  [address] [facility] [address]    Hours:  test rx sent successfully  2/8/05  kr Phone:  [phone]     estradiol 0.1 MG/24HR BIW patch    spironolactone 100 MG tablet          Primary Care Provider Office Phone # Fax #    Delmis Mccluredeanna Simons, APRN [facility] [phone]       [address] [place] [address]        Equal Access to Services     SUSU BLACKWELL AH: Hadii david ku hadasho Soomaali, waaxda luqadaha, qaybta kaalmada adeegyada, waxay idiin haytim simpson. So [facility] [phone].    ATENCIÓN: Si habla español, tiene a hernandez disposición servicios gratuitos de asistencia lingüística. ame al [phone].    We comply with applicable federal civil rights laws and [place] laws. We do not discriminate on the basis of race, color, national origin, age, disability, sex, sexual orientation, or gender identity.            Thank you!     Thank you for choosing [facility]  for your care. Our goal is always to provide you with excellent care. Hearing back from our patients is one way we can continue to improve our services. Please take a few minutes to complete the  written survey that you may receive in the mail after your visit with us. Thank you!             Your Updated Medication List - Protect others around you: Learn how to safely use, store and throw away your medicines at www.disposemymeds.org.          This list is accurate as of 10/15/18 11:59 PM.  Always use your most recent med list.                   Brand Name Dispense Instructions for use Diagnosis    DAILY-EVANGELISTA Tabs      Take 1 tablet by mouth daily        dorzolamide-timolol 2-0.5 % ophthalmic solution    COSOPT     INSTILL 1 DROP INTO BOTH EYES 2 TIMES DAILY.        estradiol 0.1 MG/24HR BIW patch    VIVELLE-DOT    16 patch    Place 2 patches onto the skin twice a week    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       finasteride 5 MG tablet    PROSCAR    90 tablet    Take 1 tablet (5 mg) by mouth daily    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       latanoprost 0.005 % ophthalmic solution    XALATAN     1 drop At Bedtime.        lovastatin 20 MG tablet    MEVACOR    90 tablet    Take 1 tablet (20 mg) by mouth At Bedtime    Hyperlipidemia LDL goal <100       meloxicam 15 MG tablet    MOBIC    90 tablet    Take 1 tablet (15 mg) by mouth daily    Primary osteoarthritis, unspecified site       * order for DME     1 each    Equipment being ordered:  BACK BRACE  MedSpec Back-n-Shape II Back Brace  Size XXXL with thermoplastic insert    DDD (degenerative disc disease), lumbar, Pathologic fracture of vertebrae       * order for DME     2 each    Equipment being ordered: Sigvaris 232 Cotton Thigh High for Men 20-30 mmHg- TWO PAIR TAN COLOR    Varicose veins of legs       SM CALCIUM 600/VITAMIN D 600-400 MG-UNIT per tablet   Generic drug:  calcium carbonate 600 mg-vitamin D 400 units     60 tablet    Take 1 tablet by mouth 2 times daily    Hyperlipidemia LDL goal <100       spironolactone 100 MG tablet    ALDACTONE    90 tablet    Take 1 tablet (100 mg) by mouth daily    Essential hypertension with goal blood  pressure less than 140/90       * Notice:  This list has 2 medication(s) that are the same as other medications prescribed for you. Read the directions carefully, and ask your doctor or other care provider to review them with you.

## 2018-10-15 NOTE — PROGRESS NOTES
BOO Ulloa is a 68 year old individual that uses pronouns She/Her/Hers/Herself that presents today for follow up of:  feminizing hormone therapy.   Gender identity: female.   Started Hormone  therapy  7/2016  Continues on.           Vivelle dot 0.1 mg x 2 patch 2x/wk                                              Spironlactone 100* mg daily                                                 Other finasteride  Any special concerns today?    Pt. Very frustrated over weight loss requirements for surgery.   Diet: fruit, eggs, hot dogs.     On hormones?  YES +++ Shot day of the week? Not applicable-taking pills/patch/gel      Due for labs?  Yes      +++ Refills of meds needed?  Yes  Gender related body changes since last visit: none    Breakthrough bleeding? Does Not Apply  Activity: Walk daily, 2 miles  New health concerns since last visit:  OA acting up  ---    Past Surgical History:   Procedure Laterality Date     COLONOSCOPY  2007     JOINT REPLACEMTN, KNEE RT/LT  2006    left       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     Family history of diabetes mellitus in first degree relative     Mitral valve disorder     Nonrheumatic aortic valve stenosis     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     Morbid obesity (H)       Current Outpatient Prescriptions   Medication Sig Dispense Refill     dorzolamide-timolol (COSOPT) 2-0.5 % ophthalmic solution INSTILL 1 DROP INTO BOTH EYES 2 TIMES DAILY.  4     estradiol (VIVELLE-DOT) 0.1 MG/24HR BIW patch  Place 2 patches onto the skin twice a week 16 patch 3     finasteride (PROSCAR) 5 MG tablet Take 1 tablet (5 mg) by mouth daily 90 tablet 1     latanoprost (XALATAN) 0.005 % ophthalmic solution 1 drop At Bedtime.       lovastatin (MEVACOR) 20 MG tablet Take 1 tablet (20 mg) by mouth At Bedtime 90 tablet 3     meloxicam (MOBIC) 15 MG tablet Take 1 tablet (15 mg) by mouth daily 90 tablet 3     Multiple Vitamin (DAILY-EVANGELISTA) TABS Take 1 tablet by mouth daily  11     order for DME Equipment being ordered: Sigvaris 232 Cotton Thigh High for Men 20-30 mmHg- TWO PAIR TAN COLOR 2 each 5     ORDER FOR DME Equipment being ordered:   BACK BRACE    SpaceCurve Back-n-Shape II Back Brace  Size XXXL with thermoplastic insert        1 each 0     SM CALCIUM 600/VITAMIN D 600-400 MG-UNIT per tablet Take 1 tablet by mouth 2 times daily 60 tablet 3     spironolactone (ALDACTONE) 100 MG tablet Take 1 tablet (100 mg) by mouth daily 90 tablet 1       History   Smoking Status     Never Smoker   Smokeless Tobacco     Never Used          Allergies   Allergen Reactions     Penicillin [Esters] Itching     he is not sure what kind of reaction he had     Heparin      he lives in a assisted living program and is not sure what type of allergies he really has. He quit drinking     Zosyn [Penicillins]      unsure of reaction, allergy is listed on his med sheet       Health Maintenance Due   Topic Date Due     Mental Health Tx Plan Q11 MOS  09/30/2018         Problem, Medication and Allergy Lists were reviewed and are current..         Review of Systems:   Review of Systems   Constitutional: Negative for chills, fever and unexpected weight change.   Eyes: Negative for visual disturbance.   Respiratory: Negative for chest tightness and shortness of breath.    Cardiovascular: Negative for chest pain, palpitations and leg swelling.   Gastrointestinal: Negative for abdominal pain and vomiting.   Endocrine: Negative for polydipsia and polyuria.    Genitourinary: Negative for frequency.   Neurological: Negative for weakness, light-headedness, numbness and headaches.   Psychiatric/Behavioral: Negative for dysphoric mood and suicidal ideas. The patient is not nervous/anxious.             Physical Exam:     Vitals:    10/15/18 0903 10/15/18 0905   BP:  148/85   Pulse:  69   Weight: 128.8 kg (284 lb)    Height: 1.829 m (6')      BMI= Body mass index is 38.52 kg/(m^2).   Wt Readings from Last 10 Encounters:   10/15/18 128.8 kg (284 lb)   08/06/18 131.7 kg (290 lb 6.4 oz)   07/09/18 129.7 kg (286 lb)   05/09/18 135.6 kg (299 lb)   04/30/18 138.3 kg (305 lb)   03/19/18 (!) 139.3 kg (307 lb)   02/07/18 (!) 139.3 kg (307 lb)   02/07/18 (!) 139.3 kg (307 lb 3.2 oz)   12/18/17 (!) 142.9 kg (315 lb)   09/11/17 134.4 kg (296 lb 6.4 oz)     Appearance: Female appearance and dress    GENERAL:: healthy, alert and no distress  RESP: lungs clear to auscultation - no rales, no rhonchi, no wheezes  CV: regular rates and rhythm, normal S1 S2, 2/6 murmuir., no click or rub -  Voice, skin/ Breast, hips per flowsheet  Ext 1+ pitting edema L>R  Affect: Appropriate/mood-congruent    Had 3 cups coffee       Labs:   Results from last visit:  Office Visit on 08/06/2018   Component Date Value Ref Range Status     Testosterone Total 08/06/2018 42* 240 - 950 ng/dL Final    Comment: This test was developed and its performance characteristics determined by the   [facility],  Special Chemistry Laboratory. It has   not been cleared or approved by the FDA. The laboratory is regulated under   CLIA as qualified to perform high-complexity testing. This test is used for   clinical purposes. It should not be regarded as investigational or for   research.       Sex Hormone Binding Globulin 08/06/2018 48  11 - 80 nmol/L Final     Free Testosterone Calculated 08/06/2018 0.59* 4.7 - 24.4 ng/dL Final     Estradiol 08/06/2018 158* 6 - 50 pg/mL Final     Sodium 08/06/2018 138   133 - 144 mmol/L Final     Potassium 08/06/2018 5.0  3.4 - 5.3 mmol/L Final     Chloride 08/06/2018 106  94 - 109 mmol/L Final     Carbon Dioxide 08/06/2018 27  20 - 32 mmol/L Final     Anion Gap 08/06/2018 5  3 - 14 mmol/L Final     Glucose 08/06/2018 85  70 - 99 mg/dL Final     Urea Nitrogen 08/06/2018 18  7 - 30 mg/dL Final     Creatinine 08/06/2018 1.07  0.66 - 1.25 mg/dL Final     GFR Estimate 08/06/2018 69  >60 mL/min/1.7m2 Final    Non  GFR Calc     GFR Estimate If Black 08/06/2018 83  >60 mL/min/1.7m2 Final    African American GFR Calc     Calcium 08/06/2018 9.0  8.5 - 10.1 mg/dL Final       Assessment and Plan   1. Gender dysphoria  2. Obesity  Stable feminization, no change to hormone regimen  Counseled patient on reasons for need for body weight requirements for gender surgery--(for surgeons to get a reasonable outcome, to reduce high risk complications such as clot and pneumonia.     Counseled patient that safe weight loss usually 3-4 lb/month at most, and she started at 307, now 284. Goal around 250. Will need to take 1-2 years to do safely in total. Has already lost 23#, and took 14 years to put on 50#. Reinforced that she is making good progress to goal.  To continue diet and exercise    Follow up:  Follow up in 4 months  Results by [facility]  Questions were elicited and answered.     Jluis Mane MD

## 2018-10-15 NOTE — PROGRESS NOTES
[facility] -  Case Progress Note  Client Name: Laila Perez  YOB: 1950  MRN:  [id number]  Treating Provider: Estelle Gomze LP  Type of Session: Individual  Present in Session: client   Number of Minutes:  55    Health Maintenance Summary - Mental Health Treatment Plan       Status Date      Mental Health Tx Plan Q11 MOS Overdue 9/30/2018      Done 10/30/2017      Done 9/6/2016             Date of Service: 10/15/18    Current Symptoms/Status:  Distress about gender and secondary sex characteristics, wants genitals removed, bothers her daily of moderate to severe intensity, distress with work situation.    Progress Toward Treatment Goals:   Client reported progress with weight loss to get surgery.  Intervention: Modality and Description:  Provided support and feedback. Used CBT to process gender dysphoria concerns. Client shared being disappointed when she got here today for her appointment and was weighed. She thought she had lost more weight. She said the physician was really supportive and it helped her. The physician acknowledged her progress and stated it is a process. Validated this as well. Client can see she is making progress. Validated the anatomical distress is really high and it feels urgent to her to have surgery. Discussed tocontinue working on it and get some help as needed. Client shared frustration with transportation getting to her appointment. When she gets angry she just wants change immediately. Worked with client to help her understand it is ok to express her concerns, but balance with being respectful.    Interactive Complexity:  There are four specific communication difficulties that complicate the work of the primary psychiatric procedure.  Interactive complexity (+72289) may be reported when at least one of these difficulties is present.    Communication difficulties present during current the psychiatric procedure include:  1. The need to manage  maladaptive communication (related to e.g. high anxiety, high reactivity, repeated questions, or disagreement) among participants that complicates delivery of care.        Response to Intervention:  Client reported validation.    Assignment:  Pace herself with diet and exercise, consult physician, ask to meet with a dietician.      Diagnosis:  Encounter Diagnosis   Name Primary?     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 Yes           Plan / Need for Future Services:  Return for therapy in 4 weeks.      Estelle Gomez PsyD, LP

## 2018-11-19 ENCOUNTER — OFFICE VISIT (OUTPATIENT)
Dept: OTHER | Facility: OUTPATIENT CENTER | Age: 68
End: 2018-11-19
Payer: MEDICARE

## 2018-11-19 DIAGNOSIS — F64.0 GENDER DYSPHORIA IN ADOLESCENT AND ADULT: Primary | ICD-10-CM

## 2018-11-19 NOTE — MR AVS SNAPSHOT
After Visit Summary   11/19/2018    Laila Perez    MRN: [id number]           Patient Information     Date Of Birth          1950        Visit Information        Provider Department      11/19/2018 11:00 AM Estelle Gomez LP [facility]        Today's Diagnoses     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    -  1       Follow-ups after your visit        Your next 10 appointments already scheduled     Dec 17, 2018 10:00 AM CST   Individual Therapy 53+ minutes with Estelle Gomez LP   [facility] ([facility])    [address]  Mail Code 7521  [facility] [address]   [phone]            Jan 14, 2019 12:00 PM CST   Individual Therapy 53+ minutes with Estelle Gomez LP   [facility] ([facility])    [address]  Mail Code 7521  [facility] [address]   [phone]            Jan 14, 2019  1:20 PM CST   Return Visit with Jluis Mane MD   [facility] ([facility])    [address]  Mail Code 7521  [facility] [address]   [phone]              Who to contact     Please call your clinic at [phone] to:    Ask questions about your health    Make or cancel appointments    Discuss your medicines    Learn about your test results    Speak to your doctor            Additional Information About Your Visit        MyChart Information     [employer] is an electronic gateway that provides easy, online access to your medical records. With [employer], you can request a clinic appointment, read your test results, renew a prescription or communicate with your care team.     To sign up for [employer] visit the website at [email or web address]   You will be asked to enter the access code listed below, as well as some personal information. Please follow the directions to create your username and password.     Your access code is: [email or web address]  Expires: 2/17/2019 12:01 PM     Your  access code will  in 90 days. If you need help or a new code, please contact your [facility] or call [phone] for assistance.        Care EveryWhere ID     This is your Care EveryWhere ID. This could be used by other organizations to access your [place] medical records  [id number]         Blood Pressure from Last 3 Encounters:   10/15/18 148/85   18 136/78   18 153/88    Weight from Last 3 Encounters:   10/15/18 128.8 kg (284 lb)   18 131.7 kg (290 lb 6.4 oz)   18 129.7 kg (286 lb)              We Performed the Following     Individual Psychotherapy (53+ min) [62538]     Psychotherapy Interactive Complexity [74794]        Primary Care Provider Office Phone # Fax #    Delmis García Ronak JERRICA [facility] [phone]       [address] [place] [address]        Equal Access to Services     SUSU BLACKWELL : Hadii aad ku hadasho Soomaali, waaxda luqadaha, qaybta kaalmada adeegyada, waxay idiin haymansoorn arielle kharadejuan castillo . So [facility] [phone].    ATENCIÓN: Si habla español, tiene a hernandez disposición servicios gratuitos de asistencia lingüística. Llame al [phone].    We comply with applicable federal civil rights laws and [place] laws. We do not discriminate on the basis of race, color, national origin, age, disability, sex, sexual orientation, or gender identity.            Thank you!     Thank you for choosing [facility]  for your care. Our goal is always to provide you with excellent care. Hearing back from our patients is one way we can continue to improve our services. Please take a few minutes to complete the written survey that you may receive in the mail after your visit with us. Thank you!             Your Updated Medication List - Protect others around you: Learn how to safely use, store and throw away your medicines at www.disposemymeds.org.          This list is accurate as of 18 12:01 PM.  Always use your  most recent med list.                   Brand Name Dispense Instructions for use Diagnosis    DAILY-EVANGELISTA Tabs      Take 1 tablet by mouth daily        dorzolamide-timolol 2-0.5 % ophthalmic solution    COSOPT     INSTILL 1 DROP INTO BOTH EYES 2 TIMES DAILY.        estradiol 0.1 MG/24HR BIW patch    VIVELLE-DOT    16 patch    Place 2 patches onto the skin twice a week    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       finasteride 5 MG tablet    PROSCAR    90 tablet    Take 1 tablet (5 mg) by mouth daily    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       latanoprost 0.005 % ophthalmic solution    XALATAN     1 drop At Bedtime.        lovastatin 20 MG tablet    MEVACOR    90 tablet    Take 1 tablet (20 mg) by mouth At Bedtime    Hyperlipidemia LDL goal <100       meloxicam 15 MG tablet    MOBIC    90 tablet    Take 1 tablet (15 mg) by mouth daily    Primary osteoarthritis, unspecified site       * order for DME     1 each    Equipment being ordered:  BACK BRACE  MedSpec Back-n-Shape II Back Brace  Size XXXL with thermoplastic insert    DDD (degenerative disc disease), lumbar, Pathologic fracture of vertebrae       * order for DME     2 each    Equipment being ordered: Sigvaris 232 Cotton Thigh High for Men 20-30 mmHg- TWO PAIR TAN COLOR    Varicose veins of legs       SM CALCIUM 600/VITAMIN D 600-400 MG-UNIT per tablet   Generic drug:  calcium carbonate 600 mg-vitamin D 400 units     60 tablet    Take 1 tablet by mouth 2 times daily    Hyperlipidemia LDL goal <100       spironolactone 100 MG tablet    ALDACTONE    90 tablet    Take 1 tablet (100 mg) by mouth daily    Essential hypertension with goal blood pressure less than 140/90       * Notice:  This list has 2 medication(s) that are the same as other medications prescribed for you. Read the directions carefully, and ask your doctor or other care provider to review them with you.

## 2018-11-19 NOTE — PROGRESS NOTES
[facility] -  Case Progress Note  Client Name: Laila Perez  YOB: 1950  MRN:  [id number]  Treating Provider: Estelle Gomez LP  Type of Session: Individual  Present in Session: client   Number of Minutes:  55    Health Maintenance Summary - Mental Health Treatment Plan       Status Date      Mental Health Tx Plan Q11 MOS Overdue 9/30/2018      Done 10/30/2017      Done 9/6/2016             Date of Service: 11/19/18    Current Symptoms/Status:  Distress about gender and secondary sex characteristics, wants genitals removed, bothers her daily of moderate to severe intensity, distress with work situation.    Progress Toward Treatment Goals:   Client reported progress with weight loss for surgery, managing anger expression.    Intervention: Modality and Description:  Provided support and feedback. Used CBT to process gender dysphoria concerns. Client shared feeling good about losing 4 pounds. It helps her to be hopeful about her goal so she can have surgery. She shared she is trying to be patient with the process, but does get frustrated. She shared spending time with a friend. She shared about getting angry because she is not always sure they are going to have a  to get her here. She shared managing her anger better with the . Before she would get inpatient and tell the  what to do in an angry tone. She shared about past difficulties with managing her anger.    Interactive Complexity:  There are four specific communication difficulties that complicate the work of the primary psychiatric procedure.  Interactive complexity (+08708) may be reported when at least one of these difficulties is present.    Communication difficulties present during current the psychiatric procedure include:  1. The need to manage maladaptive communication (related to e.g. high anxiety, high reactivity, repeated questions, or disagreement) among participants that complicates delivery of  care.        Response to Intervention:  Client reported validation.    Assignment:  Pace herself with diet and exercise, consult physician, ask to meet with a dietician.      Diagnosis:  Encounter Diagnosis   Name Primary?     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 Yes           Plan / Need for Future Services:  Return for therapy in 4 weeks.      Estelle Gomez PsyD, LP

## 2018-12-17 ENCOUNTER — OFFICE VISIT (OUTPATIENT)
Dept: OTHER | Facility: OUTPATIENT CENTER | Age: 68
End: 2018-12-17
Payer: MEDICARE

## 2018-12-17 DIAGNOSIS — F64.0 GENDER DYSPHORIA IN ADOLESCENT AND ADULT: Primary | ICD-10-CM

## 2018-12-17 NOTE — PROGRESS NOTES
[facility] -  Case Progress Note  Client Name: Laila Perez  YOB: 1950  MRN:  [id number]  Treating Provider: Estelle Gomez LP  Type of Session: Individual  Present in Session: client   Number of Minutes:  55    Treatment plan: 10/30/17    Health Maintenance Summary - Mental Health Treatment Plan       Status Date      Mental Health Tx Plan Q11 MOS Overdue 9/30/2018      Done 10/30/2017      Done 9/6/2016             Date of Service: 12/17/18    Current Symptoms/Status:  Distress about gender and secondary sex characteristics, wants genitals removed, bothers her daily of moderate to severe intensity, distress with work situation.    Progress Toward Treatment Goals:   Client reported progress with weight loss for surgery, managing anger expression.    Intervention: Modality and Description:  Provided support and feedback. Used CBT to process gender dysphoria concerns. Client shared some of her history with medical student who attended the session. Client was tearful when relaying her childhood history. Also her long struggle with gender dysphoria. She shared about alcoholism getting in the way of her getting therapy. She shared she now has her name changed and is on hormones. Client brought in court order regarding her name change. Court order was dated 3/2016 and ordered a name change, but did not vijay gender marker change. Client now wants gender marker changed. There was no reasoning provided on the legal document and client did not remember the reason either. Discussed with client she can get someone to help her write to the court and ask what documentation is needed for the gender marker change. Client shared she continues to lose weight so she feels like she is making progress toward getting surgery approved.    Interactive Complexity:  There are four specific communication difficulties that complicate the work of the primary psychiatric procedure.  Interactive  complexity (+34543) may be reported when at least one of these difficulties is present.    Communication difficulties present during current the psychiatric procedure include:  1. The need to manage maladaptive communication (related to e.g. high anxiety, high reactivity, repeated questions, or disagreement) among participants that complicates delivery of care.        Response to Intervention:  Client reported validation.    Assignment:  Pace herself with diet and exercise, consult physician, ask to meet with a dietician.      Diagnosis:  Encounter Diagnosis   Name Primary?     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 Yes           Plan / Need for Future Services:  Return for therapy in 4 weeks.      Estelle Gomez PsyD, LP

## 2019-01-14 ENCOUNTER — OFFICE VISIT (OUTPATIENT)
Dept: OTHER | Facility: OUTPATIENT CENTER | Age: 69
End: 2019-01-14
Payer: MEDICARE

## 2019-01-14 VITALS
HEIGHT: 72 IN | SYSTOLIC BLOOD PRESSURE: 140 MMHG | HEART RATE: 84 BPM | BODY MASS INDEX: 38.06 KG/M2 | DIASTOLIC BLOOD PRESSURE: 87 MMHG | WEIGHT: 281 LBS

## 2019-01-14 DIAGNOSIS — F64.0 GENDER DYSPHORIA IN ADOLESCENT AND ADULT: ICD-10-CM

## 2019-01-14 DIAGNOSIS — I10 ESSENTIAL HYPERTENSION WITH GOAL BLOOD PRESSURE LESS THAN 140/90: ICD-10-CM

## 2019-01-14 DIAGNOSIS — F64.0 GENDER DYSPHORIA IN ADOLESCENT AND ADULT: Primary | ICD-10-CM

## 2019-01-14 RX ORDER — ESTRADIOL 0.1 MG/D
2 FILM, EXTENDED RELEASE TRANSDERMAL
Qty: 16 PATCH | Refills: 3 | Status: SHIPPED | OUTPATIENT
Start: 2019-01-14 | End: 2019-06-12

## 2019-01-14 RX ORDER — SPIRONOLACTONE 100 MG/1
100 TABLET, FILM COATED ORAL DAILY
Qty: 90 TABLET | Refills: 1 | Status: SHIPPED | OUTPATIENT
Start: 2019-01-14 | End: 2019-04-22

## 2019-01-14 RX ORDER — FINASTERIDE 5 MG/1
5 TABLET, FILM COATED ORAL DAILY
Qty: 90 TABLET | Refills: 1 | Status: SHIPPED | OUTPATIENT
Start: 2019-01-14 | End: 2019-04-22

## 2019-01-14 ASSESSMENT — MIFFLIN-ST. JEOR: SCORE: 2082.61

## 2019-01-14 ASSESSMENT — ENCOUNTER SYMPTOMS
FEVER: 0
UNEXPECTED WEIGHT CHANGE: 0
WEAKNESS: 0
ABDOMINAL PAIN: 0
SHORTNESS OF BREATH: 0
CHEST TIGHTNESS: 0
FREQUENCY: 0
HEADACHES: 0
NUMBNESS: 0
PALPITATIONS: 0
CHILLS: 0
DYSPHORIC MOOD: 0
LIGHT-HEADEDNESS: 0
VOMITING: 0
NERVOUS/ANXIOUS: 0
POLYDIPSIA: 0

## 2019-01-14 NOTE — PROGRESS NOTES
BOO Ulloa is a 68 year old individual that uses pronouns She/Her/Hers/Herself that presents today for follow up of:  feminizing hormone therapy.   Alone or accompanied by: accompanied today by self  Gender identity: female  Started Hormone  therapy  7/2016  Continues on .Vivelle dot 0.1 mg x 2 patch 2x/wk                                              Spironlactone 100* mg daily                                                 Other finasteride  Any special concerns today?    Wants to lose more weight faster    On hormones?  YES +++ Shot day of the week? Not applicable-taking pills/patch/gel      Due for labs?  Yes      +++ Refills of meds needed?  Yes  Gender related body changes since last visit:   Breasts a little bigger    Breakthrough bleeding? Does Not Apply  Activity: walking, not when slippery  New health concerns since last visit:  none  ---    Past Surgical History:   Procedure Laterality Date     COLONOSCOPY  2007     JOINT REPLACEMTN, KNEE RT/LT  2006    left       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     Family history of diabetes mellitus in first degree relative     Mitral valve disorder     Nonrheumatic aortic valve stenosis     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     Morbid obesity (H)       Current Outpatient Medications   Medication Sig Dispense Refill     dorzolamide-timolol (COSOPT) 2-0.5 % ophthalmic solution INSTILL 1 DROP INTO BOTH EYES 2 TIMES DAILY.  4     estradiol (VIVELLE-DOT) 0.1 MG/24HR BIW patch  Place 2 patches onto the skin twice a week 16 patch 3     finasteride (PROSCAR) 5 MG tablet Take 1 tablet (5 mg) by mouth daily 90 tablet 1     latanoprost (XALATAN) 0.005 % ophthalmic solution 1 drop At Bedtime.       lovastatin (MEVACOR) 20 MG tablet Take 1 tablet (20 mg) by mouth At Bedtime 90 tablet 3     meloxicam (MOBIC) 15 MG tablet Take 1 tablet (15 mg) by mouth daily 90 tablet 3     order for DME Equipment being ordered: Sigvaris 232 Cotton Thigh High for Men 20-30 mmHg- TWO PAIR TAN COLOR 2 each 5     ORDER FOR DME Equipment being ordered:   BACK BRACE    MedSpec Back-n-Shape II Back Brace  Size XXXL with thermoplastic insert        1 each 0     SM CALCIUM 600/VITAMIN D 600-400 MG-UNIT per tablet Take 1 tablet by mouth 2 times daily 60 tablet 3     spironolactone (ALDACTONE) 100 MG tablet Take 1 tablet (100 mg) by mouth daily 90 tablet 1     Multiple Vitamin (DAILY-EVANGELISTA) TABS Take 1 tablet by mouth daily  11       History   Smoking Status     Never Smoker   Smokeless Tobacco     Never Used          Allergies   Allergen Reactions     Penicillin [Esters] Itching     he is not sure what kind of reaction he had     Heparin      he lives in a assisted living program and is not sure what type of allergies he really has. He quit drinking     Zosyn [Penicillins]      unsure of reaction, allergy is listed on his med sheet       Health Maintenance Due   Topic Date Due     Mental Health Tx Plan Q11 MOS  09/30/2018         Problem, Medication and Allergy Lists were reviewed and are current..         Review of Systems:   Review of Systems   Constitutional: Negative for chills, fever and unexpected weight change.   Eyes: Negative for visual disturbance.   Respiratory: Negative for chest tightness and shortness of breath.    Cardiovascular: Negative for chest pain, palpitations and leg swelling.   Gastrointestinal: Negative for abdominal pain and vomiting.   Endocrine: Negative for polydipsia and polyuria.    Genitourinary: Negative for frequency.   Neurological: Negative for weakness, light-headedness, numbness and headaches.   Psychiatric/Behavioral: Negative for dysphoric mood and suicidal ideas. The patient is not nervous/anxious.             Physical Exam:     Vitals:    01/14/19 1308   BP: 140/87   Pulse: 84   Weight: 127.5 kg (281 lb)   Height: 1.829 m (6')     BMI= Body mass index is 38.11 kg/m .   Wt Readings from Last 10 Encounters:   01/14/19 127.5 kg (281 lb)   10/15/18 128.8 kg (284 lb)   08/06/18 131.7 kg (290 lb 6.4 oz)   07/09/18 129.7 kg (286 lb)   05/09/18 135.6 kg (299 lb)   04/30/18 138.3 kg (305 lb)   03/19/18 (!) 139.3 kg (307 lb)   02/07/18 (!) 139.3 kg (307 lb)   02/07/18 (!) 139.3 kg (307 lb 3.2 oz)   12/18/17 (!) 142.9 kg (315 lb)     Appearance: Female appearance and dress    GENERAL:: healthy, alert and no distress  RESP: lungs clear to auscultation - no rales, no rhonchi, no wheezes  CV: regular rates and rhythm, normal S1 S2, no S3 or S4 and no murmur, no click or rub -  Ext--nontender, +1 edema L ankle, trace R ankle    Affect: Appropriate/mood-congruent           Labs:   Results from last visit:  Office Visit on 08/06/2018   Component Date Value Ref Range Status     Testosterone Total 08/06/2018 42* 240 - 950 ng/dL Final    Comment: This test was developed and its performance characteristics determined by the   [facility],  Special [facility]. It has   not been cleared or approved by the FDA. The laboratory is regulated under   CLIA as qualified to perform high-complexity testing. This test is used for   clinical purposes. It should not be regarded as investigational or for   research.       Sex Hormone Binding Globulin 08/06/2018 48  11 - 80 nmol/L Final     Free Testosterone Calculated 08/06/2018 0.59* 4.7 - 24.4 ng/dL Final     Estradiol 08/06/2018 158* 6 - 50 pg/mL Final     Sodium 08/06/2018 138  133 - 144 mmol/L Final     Potassium 08/06/2018  5.0  3.4 - 5.3 mmol/L Final     Chloride 08/06/2018 106  94 - 109 mmol/L Final     Carbon Dioxide 08/06/2018 27  20 - 32 mmol/L Final     Anion Gap 08/06/2018 5  3 - 14 mmol/L Final     Glucose 08/06/2018 85  70 - 99 mg/dL Final     Urea Nitrogen 08/06/2018 18  7 - 30 mg/dL Final     Creatinine 08/06/2018 1.07  0.66 - 1.25 mg/dL Final     GFR Estimate 08/06/2018 69  >60 mL/min/1.7m2 Final    Non  GFR Calc     GFR Estimate If Black 08/06/2018 83  >60 mL/min/1.7m2 Final    African American GFR Calc     Calcium 08/06/2018 9.0  8.5 - 10.1 mg/dL Final       Assessment and Plan   1. Gender dysphoria  2. Obesity  Clinically stable feminization with testosterone and estradiol in normal female range on current hormones  Doing well with weight loss on diet and activity--slow but steady. Pt experiencing significant dysphoria and anxiety in delay to gender surgery.   Pt notes will be meeting with  to go to other specialist to help with weight loss, relief from anxiety over losing weight.  3. HTN--Will continue to monitor bp as losses weight      Follow up:  Follow up in 3-4 months  Results by tracet  Questions were elicited and answered.     Jluis Mane MD

## 2019-01-14 NOTE — NURSING NOTE
Chief Complaint   Patient presents with     RECHECK     TG       Vitals:    01/14/19 1308   BP: 140/87   Pulse: 84   Weight: 127.5 kg (281 lb)   Height: 1.829 m (6')       Body mass index is 38.11 kg/m .      Alexandra Long CMA

## 2019-01-15 NOTE — PROGRESS NOTES
[facility]  [address] [place] [address] [address]  [place] [address]                                                                                Phone: [phone]                                                                                  Fax: [phone]                                                                    [email or web address]    ANNUAL UPDATE: Diagnostic Assessment Interview     Client Name: Laila Perez  YOB: 1950  67 year old  MRN:  [id number]  Gender/Gender Identity: male/female  Treating Provider: Estelle Gomez PsyD, LP  Program:  TG  Type of Session: Assessment  Present in Session: client and ILS staff  Number of Minutes:  60    Date of Service: 1/14/19     Updated Presenting Problem and Goals:  Client shared she continues to experience distress about gender and secondary sex characteristics. She has started estrogen and is noticing a positive difference. She is still distressed about having male genitalia. She is seeking surgery to address continued anatomical dysphoria. She has letters supporting surgery from a therapeutic standpoint. The surgeon has requested she reduce her weight to have surgery. Client is working toward this goal and is frustrated with the slow process of losing weight and then gets angry about it being a requirement.     Updated Mental Health History:   Client endorsed minimal symptoms of depression and anxiety. She reported her fluoxetine was discontinued.  She has not had any significant symptoms of depression with in the past year.  Her self-report questionnaires are consistent with her experiencing minimal symptoms of depression and anxiety.    Updated Substance Use:   Client had a lengthy history of alcoholism. She has been sober for over 8 years. She does not experience any significant preoccupation or urges to drink alcohol. She did not abuse any other drugs. She did complete treatment more  than 1 time.    Updated Medical History:   She has  ongoing medical issues.  These include high blood pressure and arthritis.  There are not any new medical issues. She has a primary care physician who manages her health. She is compliant with medications. She is not sexual and denies any sexual concerns.    Updated Family History:   No updates.    Updated Current Significant Relationship/Partner:  Client has been  for many years. She is not interested in dating or having a sexual relationship. She shared she is content in her life living as a women.     Updated Educational History:  There are no updates to her educational history.    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    Updated Legal Issues:  No current legal issues.      Interactive Complexity:  There are four specific communication difficulties that complicate the work of the primary psychiatric procedure.  Interactive complexity (+37683) may be reported when at least one of these difficulties is present.    Communication difficulties present during current the psychiatric procedure include:  1. The need to manage maladaptive communication (related to e.g. high anxiety, high reactivity, repeated questions, or disagreement) among participants that complicates delivery of care.        Updated Strengths and Liabilities:   Her strengths are positive, open attitude. Her limitations are managing frustration and anger.  Updated Symptoms:    See updated PHQ-9, DESTINY-7, CAGE, and Safety Screening    Updated Mental Status:      Mental Status:   Appearance:  Casually dressed and Well groomed  Behavior/relationship to examiner/demeanor:  Cooperative and Engaged  Orientation: Oriented to person, place, time and situation  Speech Rate:  Normal  Speech Spontaneity:  Normal  Mood:  pleasant and unremarkable  Affect:  Appropriate/mood-congruent  Thought Process (Associations):   Logical  Thought Content:  no evidence of suicidal or homicidal ideation  Abnormal Perception:  None  Attention/Concentration:  Fair  Language:  Intact  Insight:  Adequate  Judgment:  Fair    Motor activity/EPS:  Normal      Updated DSM-5 Diagnoses:  1. 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        Conclusions/Recommendations/Initial Treatment Goals:   The client is a 67 year old transgender female assigned male at birth who identifies as female. Client has a lengthy history of gender dysphoria that was not treated for many years. She also has a history of alcoholism and depression. These were at least partially linked to her distress about gender. She lived in a structured setting for people with lower intellectual functioning and mental health issues. She lived in an all male home and was not allowed to outwardly express her female gender identity. This changed with in the past couple of years and now she lives in her own unit with support. She has made progress with meeting her goals of being on female hormones. She is working toward the goal of having gender confirming surgery. The surgeon is requiring weight loss, client is aware of the goal needed to have surgery. It is recommended that she continue with individual therapy to address gender dysphoria. She will also continue with working with medical doctor to prescribe and monitor her hormone treatment. It would benefit client to participate in a transgender group, but she is currently not able to travel the distance to get to a group.        Estelle Gomez PsyD, LP

## 2019-01-28 ASSESSMENT — ANXIETY QUESTIONNAIRES
GAD7 TOTAL SCORE: 2
1. FEELING NERVOUS, ANXIOUS, OR ON EDGE: MORE THAN HALF THE DAYS
2. NOT BEING ABLE TO STOP OR CONTROL WORRYING: NOT AT ALL
7. FEELING AFRAID AS IF SOMETHING AWFUL MIGHT HAPPEN: NOT AT ALL
6. BECOMING EASILY ANNOYED OR IRRITABLE: NOT AT ALL
3. WORRYING TOO MUCH ABOUT DIFFERENT THINGS: NOT AT ALL
5. BEING SO RESTLESS THAT IT IS HARD TO SIT STILL: NOT AT ALL

## 2019-01-28 ASSESSMENT — PATIENT HEALTH QUESTIONNAIRE - PHQ9
SUM OF ALL RESPONSES TO PHQ QUESTIONS 1-9: 3
5. POOR APPETITE OR OVEREATING: NOT AT ALL

## 2019-01-29 ASSESSMENT — ANXIETY QUESTIONNAIRES: GAD7 TOTAL SCORE: 2

## 2019-03-29 DIAGNOSIS — F64.9 GENDER DYSPHORIA: Primary | ICD-10-CM

## 2019-03-29 RX ORDER — CLINDAMYCIN PHOSPHATE 900 MG/50ML
900 INJECTION, SOLUTION INTRAVENOUS
Status: CANCELLED | OUTPATIENT
Start: 2019-03-29

## 2019-03-29 RX ORDER — CLINDAMYCIN PHOSPHATE 900 MG/50ML
900 INJECTION, SOLUTION INTRAVENOUS SEE ADMIN INSTRUCTIONS
Status: CANCELLED | OUTPATIENT
Start: 2019-03-29

## 2019-04-02 ENCOUNTER — TELEPHONE (OUTPATIENT)
Dept: UROLOGY | Facility: CLINIC | Age: 69
End: 2019-04-02

## 2019-04-02 NOTE — TELEPHONE ENCOUNTER
Patient is scheduled for surgery with Dr. Weston      Spoke or left message with: Laila    Date of Surgery: 5/14/19    Location[facility]    Informed patient they will need an adult  yes    Pre-op with surgeon (if applicable): n/a    H&P: Scheduled with pcp    Additional imaging/appointments: n/a    Surgery packet: mailed 4/3/19     Additional comments: n/a

## 2019-04-15 DIAGNOSIS — E78.5 HYPERLIPIDEMIA LDL GOAL <100: ICD-10-CM

## 2019-04-15 DIAGNOSIS — M19.91 PRIMARY OSTEOARTHRITIS, UNSPECIFIED SITE: ICD-10-CM

## 2019-04-15 RX ORDER — LOVASTATIN 20 MG
TABLET ORAL
Qty: 30 TABLET | Refills: 0 | Status: SHIPPED | OUTPATIENT
Start: 2019-04-15 | End: 2019-06-11

## 2019-04-15 RX ORDER — MELOXICAM 15 MG/1
15 TABLET ORAL DAILY
Qty: 30 TABLET | Refills: 0 | Status: SHIPPED | OUTPATIENT
Start: 2019-04-15 | End: 2019-06-11

## 2019-04-15 NOTE — TELEPHONE ENCOUNTER
"Requested Prescriptions   Pending Prescriptions Disp Refills     meloxicam (MOBIC) 15 MG tablet [Pharmacy Med Name: MELOXICAM 15 MG TABLET] 30 tablet      Sig: Take 1 tablet (15 mg) by mouth daily       NSAID Medications Failed - 4/15/2019 10:04 AM        Failed - Blood pressure under 140/90 in past 12 months     BP Readings from Last 3 Encounters:   08/06/18 136/78   05/09/18 123/71   04/30/18 118/77                 Failed - Patient is age 6-64 years        Failed - Normal CBC on file in past 12 months     Recent Labs   Lab Test 05/09/18  0942   WBC 6.9   RBC 4.87   HGB 15.0   HCT 44.8   *                 Passed - Normal ALT on file in past 12 months     Recent Labs   Lab Test 05/09/18  0942   ALT 25             Passed - Normal AST on file in past 12 months     Recent Labs   Lab Test 05/09/18  0942   AST 18             Passed - Recent (12 mo) or future (30 days) visit within the authorizing provider's specialty     Patient had office visit in the last 12 months or has a visit in the next 30 days with authorizing provider or within the authorizing provider's specialty.  See \"Patient Info\" tab in inbasket, or \"Choose Columns\" in Meds & Orders section of the refill encounter.              Passed - Medication is active on med list        Passed - Normal serum creatinine on file in past 12 months     Recent Labs   Lab Test 08/06/18  1330   CR 1.07             lovastatin (MEVACOR) 20 MG tablet [Pharmacy Med Name: LOVASTATIN 20 MG TABLET] 30 tablet      Sig: Take 1 tablet (20 mg) by mouth At Bedtime       Statins Protocol Passed - 4/15/2019 10:04 AM        Passed - LDL on file in past 12 months     Recent Labs   Lab Test 05/09/18  0942   LDL 40             Passed - No abnormal creatine kinase in past 12 months     No lab results found.             Passed - Recent (12 mo) or future (30 days) visit within the authorizing provider's specialty     Patient had office visit in the last 12 months or has a visit in the " "next 30 days with authorizing provider or within the authorizing provider's specialty.  See \"Patient Info\" tab in inbasket, or \"Choose Columns\" in Meds & Orders section of the refill encounter.              Passed - Medication is active on med list        Passed - Patient is age 18 or older      meloxicam (MOBIC) 15 MG tablet  Last Written Prescription Date:  05/09/2018  Last Fill Quantity: 90 tablet,  # refills: 3   Last office visit: 8/6/2018 with prescribing provider:  DG Simons   Future Office Visit:   Next 5 appointments (look out 90 days)    Apr 17, 2019 10:20 AM CDT  Pre-Op physical with JERRICA Salcedo [facility] ([facility]) [address] [place] [id number]  [phone]   May 03, 2019  3:00 PM CDT  Return Visit with Tia Ibrahim MD  [facility] ([facility]) [address] [place] [id number]  [phone]         lovastatin (MEVACOR) 20 MG tablet  Last Written Prescription Date:  05/09/2018  Last Fill Quantity: 90 tablet,  # refills: 3   Last office visit: 8/6/2018 with prescribing provider:  DG Simons   Future Office Visit:   Next 5 appointments (look out 90 days)    Apr 17, 2019 10:20 AM CDT  Pre-Op physical with JERRICA Salcedo CNP  [facility] [address] [place] [id number]  [phone]   May 03, 2019  3:00 PM CDT  Return Visit with Tia Ibrahim MD  [facility] ([facility]) [address] [facility] [address]  [phone]         Daina MORGAN (R) (M)      "

## 2019-04-17 ENCOUNTER — TELEPHONE (OUTPATIENT)
Dept: OTHER | Facility: OUTPATIENT CENTER | Age: 69
End: 2019-04-17

## 2019-04-17 ENCOUNTER — OFFICE VISIT (OUTPATIENT)
Dept: FAMILY MEDICINE | Facility: CLINIC | Age: 69
End: 2019-04-17
Payer: COMMERCIAL

## 2019-04-17 VITALS
BODY MASS INDEX: 38.92 KG/M2 | HEART RATE: 73 BPM | WEIGHT: 287 LBS | SYSTOLIC BLOOD PRESSURE: 137 MMHG | DIASTOLIC BLOOD PRESSURE: 81 MMHG | RESPIRATION RATE: 14 BRPM | TEMPERATURE: 97.3 F | OXYGEN SATURATION: 99 %

## 2019-04-17 DIAGNOSIS — I10 ESSENTIAL HYPERTENSION: ICD-10-CM

## 2019-04-17 DIAGNOSIS — Z01.818 PREOP GENERAL PHYSICAL EXAM: Primary | ICD-10-CM

## 2019-04-17 DIAGNOSIS — E66.01 MORBID OBESITY (H): ICD-10-CM

## 2019-04-17 DIAGNOSIS — N39.41 URGE INCONTINENCE: ICD-10-CM

## 2019-04-17 DIAGNOSIS — F10.21 ALCOHOL DEPENDENCE IN REMISSION (H): ICD-10-CM

## 2019-04-17 LAB
ALBUMIN SERPL-MCNC: 4 G/DL (ref 3.4–5)
ALP SERPL-CCNC: 67 U/L (ref 40–150)
ALT SERPL W P-5'-P-CCNC: 19 U/L (ref 0–70)
ANION GAP SERPL CALCULATED.3IONS-SCNC: 2 MMOL/L (ref 3–14)
AST SERPL W P-5'-P-CCNC: 15 U/L (ref 0–45)
BASOPHILS # BLD AUTO: 0 10E9/L (ref 0–0.2)
BASOPHILS NFR BLD AUTO: 0.3 %
BILIRUB SERPL-MCNC: 0.6 MG/DL (ref 0.2–1.3)
BUN SERPL-MCNC: 29 MG/DL (ref 7–30)
CALCIUM SERPL-MCNC: 9.4 MG/DL (ref 8.5–10.1)
CHLORIDE SERPL-SCNC: 105 MMOL/L (ref 94–109)
CO2 SERPL-SCNC: 28 MMOL/L (ref 20–32)
CREAT SERPL-MCNC: 1.17 MG/DL (ref 0.66–1.25)
DIFFERENTIAL METHOD BLD: ABNORMAL
EOSINOPHIL # BLD AUTO: 0.2 10E9/L (ref 0–0.7)
EOSINOPHIL NFR BLD AUTO: 3.1 %
ERYTHROCYTE [DISTWIDTH] IN BLOOD BY AUTOMATED COUNT: 12.5 % (ref 10–15)
GFR SERPL CREATININE-BSD FRML MDRD: 63 ML/MIN/{1.73_M2}
GLUCOSE SERPL-MCNC: 94 MG/DL (ref 70–99)
HCT VFR BLD AUTO: 44.5 % (ref 40–53)
HGB BLD-MCNC: 15.1 G/DL (ref 13.3–17.7)
LYMPHOCYTES # BLD AUTO: 1.4 10E9/L (ref 0.8–5.3)
LYMPHOCYTES NFR BLD AUTO: 17.4 %
MCH RBC QN AUTO: 31.4 PG (ref 26.5–33)
MCHC RBC AUTO-ENTMCNC: 33.9 G/DL (ref 31.5–36.5)
MCV RBC AUTO: 93 FL (ref 78–100)
MONOCYTES # BLD AUTO: 0.5 10E9/L (ref 0–1.3)
MONOCYTES NFR BLD AUTO: 6.4 %
NEUTROPHILS # BLD AUTO: 5.7 10E9/L (ref 1.6–8.3)
NEUTROPHILS NFR BLD AUTO: 72.8 %
PLATELET # BLD AUTO: 137 10E9/L (ref 150–450)
POTASSIUM SERPL-SCNC: 5.5 MMOL/L (ref 3.4–5.3)
PROT SERPL-MCNC: 7.7 G/DL (ref 6.8–8.8)
RBC # BLD AUTO: 4.81 10E12/L (ref 4.4–5.9)
SODIUM SERPL-SCNC: 135 MMOL/L (ref 133–144)
WBC # BLD AUTO: 7.9 10E9/L (ref 4–11)

## 2019-04-17 PROCEDURE — 80053 COMPREHEN METABOLIC PANEL: CPT | Performed by: NURSE PRACTITIONER

## 2019-04-17 PROCEDURE — 87086 URINE CULTURE/COLONY COUNT: CPT | Performed by: NURSE PRACTITIONER

## 2019-04-17 PROCEDURE — 99214 OFFICE O/P EST MOD 30 MIN: CPT | Performed by: NURSE PRACTITIONER

## 2019-04-17 PROCEDURE — 36415 COLL VENOUS BLD VENIPUNCTURE: CPT | Performed by: NURSE PRACTITIONER

## 2019-04-17 PROCEDURE — 85025 COMPLETE CBC W/AUTO DIFF WBC: CPT | Performed by: NURSE PRACTITIONER

## 2019-04-17 ASSESSMENT — PAIN SCALES - GENERAL: PAINLEVEL: NO PAIN (0)

## 2019-04-17 NOTE — TELEPHONE ENCOUNTER
Prior Authorization: Estradiol 0.1 mg 24hr Patch    Approved    3/17/2019 - 4/15/2022    Case ID# [id number]    Faxed to Pharmacy      Alexandra Long CMA

## 2019-04-17 NOTE — PATIENT INSTRUCTIONS
Before Your Surgery      Call your surgeon if there is any change in your health. This includes signs of a cold or flu (such as a sore throat, runny nose, cough, rash or fever).    Do not smoke, drink alcohol or take over the counter medicine (unless your surgeon or primary care doctor tells you to) for the 24 hours before and after surgery.    If you take prescribed drugs: Follow your doctor s orders about which medicines to take and which to stop until after surgery.    Eating and drinking prior to surgery: follow the instructions from your surgeon    Take a shower or bath the night before surgery. Use the soap your surgeon gave you to gently clean your skin. If you do not have soap from your surgeon, use your regular soap. Do not shave or scrub the surgery site.  Wear clean pajamas and have clean sheets on your bed.     [facility] ADDRESS  [address] [place] [address]

## 2019-04-17 NOTE — PROGRESS NOTES
[facility]  [address] [place] [id number]  [phone]  Dept: [phone]    PRE-OP EVALUATION:  Today's date: 2019    Laila Perez (: 1950) presents for pre-operative evaluation assessment as requested by Dr. rush.  He requires evaluation and anesthesia risk assessment prior to undergoing surgery/procedure for treatment of testicle removal bilaterally     Fax number for surgical facility: [facility]   Primary Physician: Delmis Simons  Type of Anesthesia Anticipated: General    Patient has a Health Care Directive or Living Will:  NO    Preop Questions 2019   Who is doing your surgery? evie   What are you having done? Bilatera Scrotal Orchiectomy   Date of Surgery/Procedure:    Facility or Hospital where procedure/surgery will be performed:    1.  Do you have a history of Heart attack, stroke, stent, coronary bypass surgery, or other heart surgery? No   2.  Do you ever have any pain or discomfort in your chest? No   3.  Do you have a history of  Heart Failure? No   4.   Are you troubled by shortness of breath when:  walking on a level surface, or up a slight hill, or at night? No   5.  Do you currently have a cold, bronchitis or other respiratory infection? No   6.  Do you have a cough, shortness of breath, or wheezing? No   7.  Do you sometimes get pains in the calves of your legs when you walk? No   8. Do you or anyone in your family have previous history of blood clots? YES - MGM  Blood clots due to diabetes   9.  Do you or does anyone in your family have a serious bleeding problem such as prolonged bleeding following surgeries or cuts? No   10. Have you ever had problems with anemia or been told to take iron pills? No   11. Have you had any abnormal blood loss such as black, tarry or bloody stools? No   12. Have you ever had a blood transfusion? No   13. Have you or any of your relatives ever had problems with anesthesia? No   14. Do you  have sleep apnea, excessive snoring or daytime drowsiness? No   15. Do you have any prosthetic heart valves? No   16. Do you have prosthetic joints? No         HPI:     HPI related to upcoming procedure:   69 year old transfemale having surgical procedure for transgender orchiectomy .  She has been compliant with medications and dress for about 8 years but has felt female essentially her whole life.  No history of genital surgery.  No history of inguinal hernia repair.  Let us of support from Dr. Mane and Mayte Gomez       See problem list for active medical problems.  Problems all longstanding and stable, except as noted/documented.  See ROS for pertinent symptoms related to these conditions.                                                                                                                                                          .    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 7/25/2012          Alcoholism (H) 07/26/2011     Priority: Medium     Sober since 2010       DDD (degenerative disc disease), lumbar 04/08/2011     Priority: Medium     FINDINGS: Since April 13, 2006, severe compression fractures  again  identified at the L1 vertebral body. Interval increase surrounding  osteophyte formation and decreased disc space height surrounding the  L1 vertebrae, though compression fracture was identified on previous  exam. Mild diffuse osteophyte formation throughout the visualized  thoracic and lumbar spine. Multilevel degenerative disc disease also  noted, most notable at L5-S1. No acute osseous abnormality.       HYPERLIPIDEMIA LDL GOAL <100 10/31/2010     Priority: Medium     OA (osteoarthritis) of knee 11/13/2008     Priority: Medium     Mixed hyperlipidemia 10/09/2006     Priority: Medium     Gouty arthropathy 10/02/2006     Priority: Medium     Problem list name updated by automated process. Provider to review and confirm  [employer] Update utility       Mental or behavioral problem 10/02/2006     Priority: Medium     Followed by [facility] in past- he has retired. Pt stable on fluoxetine, seroquel. Also sees counselor regular. Has had some issues in past OCD.  Problem list name updated by automated process. Provider to review       Generalized osteoarthrosis, unspecified site 10/02/2006     Priority: Medium     Disorder of bone and cartilage 10/02/2006     Priority: Medium     See DEXA in EPIC  Problem list name updated by automated process. Provider to review       large compression fracture 03/13/2006     Priority: Medium     Worked up for neoplasm, none found.          Past Medical History:   Diagnosis Date     Unspecified internal derangement of knee     CHRONIC KNEE PAIN,LEG,NECK AND HEAD INJURIES     Past Surgical History:   Procedure Laterality Date     COLONOSCOPY  2007     JOINT REPLACEMTN, KNEE RT/LT  2006    left     Current Outpatient Medications   Medication Sig Dispense Refill     dorzolamide-timolol (COSOPT) 2-0.5 % ophthalmic solution INSTILL 1 DROP INTO BOTH EYES 2 TIMES DAILY.  4     estradiol (VIVELLE-DOT) 0.1 MG/24HR bi-weekly patch Place 2 patches onto the skin twice a week 16  patch 3     finasteride (PROSCAR) 5 MG tablet Take 1 tablet (5 mg) by mouth daily 90 tablet 1     latanoprost (XALATAN) 0.005 % ophthalmic solution 1 drop At Bedtime.       lovastatin (MEVACOR) 20 MG tablet Take 1 tablet (20 mg) by mouth At Bedtime 30 tablet 0     meloxicam (MOBIC) 15 MG tablet Take 1 tablet (15 mg) by mouth daily 30 tablet 0     Multiple Vitamin (DAILY-EVANGELISTA) TABS Take 1 tablet by mouth daily  11     order for DME Equipment being ordered: Sigvaris 232 Cotton Thigh High for Men 20-30 mmHg- TWO PAIR TAN COLOR 2 each 5     ORDER FOR DME Equipment being ordered:   BACK BRACE    [employer] Back-n-Shape II Back Brace  Size XXXL with thermoplastic insert        1 each 0     SM CALCIUM 600/VITAMIN D 600-400 MG-UNIT per tablet Take 1 tablet by mouth 2 times daily 60 tablet 3     spironolactone (ALDACTONE) 100 MG tablet Take 1 tablet (100 mg) by mouth daily 90 tablet 1     OTC products: None, except as noted above    Allergies   Allergen Reactions     Penicillin [Esters] Itching     he is not sure what kind of reaction he had     Heparin      he lives in a assisted living program and is not sure what type of allergies he really has. He quit drinking     Zosyn [Penicillins]      unsure of reaction, allergy is listed on his med sheet      Latex Allergy: NO    Social History     Tobacco Use     Smoking status: Never Smoker     Smokeless tobacco: Never Used   Substance Use Topics     Alcohol use: No     Comment: Quit 3/24/05     History   Drug Use No       REVIEW OF SYSTEMS:   Constitutional, neuro, ENT, endocrine, pulmonary, cardiac, gastrointestinal, genitourinary, musculoskeletal, integument and psychiatric systems are negative, except as otherwise noted.    EXAM:   /81   Pulse 73   Temp 97.3  F (36.3  C) (Tympanic)   Resp 14   Wt 130.2 kg (287 lb)   SpO2 99%   BMI 38.92 kg/m      GENERAL APPEARANCE: healthy, alert and no distress     EYES: EOMI, PERRL     HENT: ear canals and TM's normal and nose and  mouth without ulcers or lesions     NECK: no adenopathy, no asymmetry, masses, or scars and thyroid normal to palpation     RESP: lungs clear to auscultation - no rales, rhonchi or wheezes     CV: regular rates and rhythm, II/VI MALLIKA noted      ABDOMEN:  soft, nontender, no HSM or masses and bowel sounds normal     MS: extremities normal- no gross deformities noted, no evidence of inflammation in joints, FROM in all extremities.     SKIN: no suspicious lesions or rashes     NEURO: Normal strength and tone, sensory exam grossly normal, mentation intact and speech normal     PSYCH: mentation appears normal. and affect normal/bright     LYMPHATICS: No cervical adenopathy    DIAGNOSTICS:     Labs Drawn and in Process:   Unresulted Labs Ordered in the Past 30 Days of this Admission     Date and Time Order Name Status Description    4/17/2019 1011 COMPREHENSIVE METABOLIC PANEL In process           Recent Labs   Lab Test 08/06/18  1330 05/09/18  0942 05/09/18 11/01/17  0950  04/26/16  1146  04/26/13  1408   HGB  --  15.0 15.0  --    < >  --    < >  --    PLT  --  136* 136*  --    < >  --    < >  --      --   --  133   < > 141   < > 144   POTASSIUM 5.0  --   --  5.2   < > 4.1   < > 4.7   CR 1.07  --   --  1.18   < > 0.98   < > 1.37*   A1C  --   --   --   --   --  4.8  --  5.2    < > = values in this interval not displayed.        IMPRESSION:   Reason for surgery/procedure: Transgender orchiectomy  Diagnosis/reason for consult: Preop evaluation        ICD-10-CM    1. Preop general physical exam Z01.818 Urine Culture Aerobic Bacterial   2. Essential hypertension I10 CBC with platelets and differential     Comprehensive metabolic panel     CANCELED: EKG 12-lead complete w/read - Clinics   3. Urge incontinence  N39.41 Urine Culture Aerobic Bacterial   4. Alcohol dependence in remission (H) F10.21    5. Morbid obesity (H) E66.01        RECOMMENDATIONS:         --Patient is to take all scheduled medications on the day of  surgery EXCEPT for modifications listed below.    Anticoagulant or Antiplatelet Medication Use  NSAIDS: Meloxicam (Mobic):      Stop 10 days prior to surgery        APPROVAL GIVEN to proceed with proposed procedure, without further diagnostic evaluation       Signed Electronically by: JERRICA Salcedo CNP    Copy of this evaluation report is provided to requesting physician.    [facility] Preop Guidelines    Revised Cardiac Risk Index

## 2019-04-18 LAB
BACTERIA SPEC CULT: NO GROWTH
Lab: NORMAL
SPECIMEN SOURCE: NORMAL

## 2019-04-22 ENCOUNTER — OFFICE VISIT (OUTPATIENT)
Dept: OTHER | Facility: OUTPATIENT CENTER | Age: 69
End: 2019-04-22
Payer: COMMERCIAL

## 2019-04-22 VITALS
HEART RATE: 66 BPM | DIASTOLIC BLOOD PRESSURE: 78 MMHG | HEIGHT: 72 IN | SYSTOLIC BLOOD PRESSURE: 140 MMHG | WEIGHT: 291 LBS | BODY MASS INDEX: 39.42 KG/M2

## 2019-04-22 DIAGNOSIS — F64.0 GENDER DYSPHORIA IN ADOLESCENT AND ADULT: Primary | ICD-10-CM

## 2019-04-22 ASSESSMENT — ENCOUNTER SYMPTOMS
FEVER: 0
DYSPHORIC MOOD: 0
FREQUENCY: 0
HEADACHES: 0
WEAKNESS: 0
ABDOMINAL PAIN: 0
PALPITATIONS: 0
LIGHT-HEADEDNESS: 0
NUMBNESS: 0
POLYDIPSIA: 0
CHILLS: 0
CHEST TIGHTNESS: 0
VOMITING: 0
NERVOUS/ANXIOUS: 0
UNEXPECTED WEIGHT CHANGE: 0
SHORTNESS OF BREATH: 0

## 2019-04-22 ASSESSMENT — MIFFLIN-ST. JEOR: SCORE: 2122.97

## 2019-04-22 NOTE — NURSING NOTE
Chief Complaint   Patient presents with     RECHECK     TG       Vitals:    04/22/19 1108   BP: 140/78   Pulse: 66   Weight: 132 kg (291 lb)   Height: 1.829 m (6')       Body mass index is 39.47 kg/m .      Alexandra Long CMA

## 2019-04-22 NOTE — PROGRESS NOTES
BOO Ulloa is a 69 year old individual that uses pronouns She/Her/Hers/Herself that presents today for follow up of:  feminizing hormone therapy.   Alone or accompanied by: accompanied today by care worker  Gender identity: female.   Started Hormone  therapy  2016  Continues on.  Vivelle dot 0.1 mg x 2 patch 2x/wk                                              Spironlactone 100* mg daily                                                 Other finasteride    Any special concerns today?    Pt. Has orchiectomy scheduled with Dr. Weston on 5/14/2019, and will go home same day. Goal is to do vaginoplasty in future.   Cancelled spironolactone and prometrium     On hormones?  YES +++ Shot day of the week? Not applicable-taking pills/patch/gel      Due for labs?  No      +++ Refills of meds needed?  No  Gender related body changes since last visit:   No new changes    Breakthrough bleeding? Does Not Apply  Activity: walking, will be going to exercise place 3x/wk after surgery, since [facility] will help  New health concerns since last visit:  ---no new concerns, will have echocardiogram? shortly    Past Surgical History:   Procedure Laterality Date     COLONOSCOPY  2007     JOINT REPLACEMTN, KNEE RT/LT  2006    left       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     Family history of diabetes mellitus in first degree relative     Mitral valve disorder     Nonrheumatic aortic valve stenosis     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     Morbid obesity  (H)       Current Outpatient Medications   Medication Sig Dispense Refill     dorzolamide-timolol (COSOPT) 2-0.5 % ophthalmic solution INSTILL 1 DROP INTO BOTH EYES 2 TIMES DAILY.  4     estradiol (VIVELLE-DOT) 0.1 MG/24HR bi-weekly patch Place 2 patches onto the skin twice a week 16 patch 3     finasteride (PROSCAR) 5 MG tablet Take 1 tablet (5 mg) by mouth daily 90 tablet 1     latanoprost (XALATAN) 0.005 % ophthalmic solution 1 drop At Bedtime.       lovastatin (MEVACOR) 20 MG tablet Take 1 tablet (20 mg) by mouth At Bedtime 30 tablet 0     meloxicam (MOBIC) 15 MG tablet Take 1 tablet (15 mg) by mouth daily 30 tablet 0     Multiple Vitamin (DAILY-EVANGELISTA) TABS Take 1 tablet by mouth daily  11     order for DME Equipment being ordered: Sigvaris 232 Cotton Thigh High for Men 20-30 mmHg- TWO PAIR TAN COLOR 2 each 5     ORDER FOR DME Equipment being ordered:   BACK BRACE    [employer] Back-n-Shape II Back Brace  Size XXXL with thermoplastic insert        1 each 0     SM CALCIUM 600/VITAMIN D 600-400 MG-UNIT per tablet Take 1 tablet by mouth 2 times daily 60 tablet 3     spironolactone (ALDACTONE) 100 MG tablet Take 1 tablet (100 mg) by mouth daily 90 tablet 1       History   Smoking Status     Never Smoker   Smokeless Tobacco     Never Used          Allergies   Allergen Reactions     Penicillin [Esters] Itching     he is not sure what kind of reaction he had     Heparin      he lives in a assisted living program and is not sure what type of allergies he really has. He quit drinking     Zosyn [Penicillins]      unsure of reaction, allergy is listed on his med sheet       Health Maintenance Due   Topic Date Due     Mental Health Tx Plan Q2 MOS  01/15/2019         Problem, Medication and Allergy Lists were reviewed and are current..         Review of Systems:   Review of Systems   Constitutional: Negative for chills, fever and unexpected weight change.   Eyes: Negative for visual disturbance.   Respiratory: Negative for chest  tightness and shortness of breath.    Cardiovascular: Negative for chest pain, palpitations and leg swelling.   Gastrointestinal: Negative for abdominal pain and vomiting.   Endocrine: Negative for polydipsia and polyuria.   Genitourinary: Negative for frequency.   Neurological: Negative for weakness, light-headedness, numbness and headaches.   Psychiatric/Behavioral: Negative for dysphoric mood and suicidal ideas. The patient is not nervous/anxious.             Physical Exam:     Vitals:    04/22/19 1108   BP: 140/78   Pulse: 66   Weight: 132 kg (291 lb)   Height: 1.829 m (6')     BMI= Body mass index is 39.47 kg/m .   Wt Readings from Last 10 Encounters:   04/22/19 132 kg (291 lb)   04/17/19 130.2 kg (287 lb)   01/14/19 127.5 kg (281 lb)   10/15/18 128.8 kg (284 lb)   08/06/18 131.7 kg (290 lb 6.4 oz)   07/09/18 129.7 kg (286 lb)   05/09/18 135.6 kg (299 lb)   04/30/18 138.3 kg (305 lb)   03/19/18 (!) 139.3 kg (307 lb)   02/07/18 (!) 139.3 kg (307 lb)     Appearance: Female appearance and dress    GENERAL:: healthy, alert and no distress  RESP: lungs clear to auscultation - no rales, no rhonchi, no wheezes  CV: regular rates and rhythm, normal S1 S2, no S3 or S4 and 2/6 murmur, no click or rub -  Ext 1-2+ pitting edema on L ankle      Affect: Appropriate/mood-congruent           Labs:   Results from last visit:  Office Visit on 04/17/2019   Component Date Value Ref Range Status     WBC 04/17/2019 7.9  4.0 - 11.0 10e9/L Final     RBC Count 04/17/2019 4.81  4.4 - 5.9 10e12/L Final     Hemoglobin 04/17/2019 15.1  13.3 - 17.7 g/dL Final     Hematocrit 04/17/2019 44.5  40.0 - 53.0 % Final     MCV 04/17/2019 93  78 - 100 fl Final     MCH 04/17/2019 31.4  26.5 - 33.0 pg Final     MCHC 04/17/2019 33.9  31.5 - 36.5 g/dL Final     RDW 04/17/2019 12.5  10.0 - 15.0 % Final     Platelet Count 04/17/2019 137* 150 - 450 10e9/L Final     % Neutrophils 04/17/2019 72.8  % Final     % Lymphocytes 04/17/2019 17.4  % Final     %  Monocytes 04/17/2019 6.4  % Final     % Eosinophils 04/17/2019 3.1  % Final     % Basophils 04/17/2019 0.3  % Final     Absolute Neutrophil 04/17/2019 5.7  1.6 - 8.3 10e9/L Final     Absolute Lymphocytes 04/17/2019 1.4  0.8 - 5.3 10e9/L Final     Absolute Monocytes 04/17/2019 0.5  0.0 - 1.3 10e9/L Final     Absolute Eosinophils 04/17/2019 0.2  0.0 - 0.7 10e9/L Final     Absolute Basophils 04/17/2019 0.0  0.0 - 0.2 10e9/L Final     Diff Method 04/17/2019 Automated Method   Final     Sodium 04/17/2019 135  133 - 144 mmol/L Final     Potassium 04/17/2019 5.5* 3.4 - 5.3 mmol/L Final     Chloride 04/17/2019 105  94 - 109 mmol/L Final     Carbon Dioxide 04/17/2019 28  20 - 32 mmol/L Final     Anion Gap 04/17/2019 2* 3 - 14 mmol/L Final     Glucose 04/17/2019 94  70 - 99 mg/dL Final    Non Fasting     Urea Nitrogen 04/17/2019 29  7 - 30 mg/dL Final     Creatinine 04/17/2019 1.17  0.66 - 1.25 mg/dL Final     GFR Estimate 04/17/2019 63  >60 mL/min/[1.73_m2] Final    Comment: Non  GFR Calc  Starting 12/18/2018, serum creatinine based estimated GFR (eGFR) will be   calculated using the Chronic Kidney Disease Epidemiology Collaboration   (CKD-EPI) equation.       GFR Estimate If Black 04/17/2019 73  >60 mL/min/[1.73_m2] Final    Comment:  GFR Calc  Starting 12/18/2018, serum creatinine based estimated GFR (eGFR) will be   calculated using the Chronic Kidney Disease Epidemiology Collaboration   (CKD-EPI) equation.       Calcium 04/17/2019 9.4  8.5 - 10.1 mg/dL Final     Bilirubin Total 04/17/2019 0.6  0.2 - 1.3 mg/dL Final     Albumin 04/17/2019 4.0  3.4 - 5.0 g/dL Final     Protein Total 04/17/2019 7.7  6.8 - 8.8 g/dL Final     Alkaline Phosphatase 04/17/2019 67  40 - 150 U/L Final     ALT 04/17/2019 19  0 - 70 U/L Final     AST 04/17/2019 15  0 - 45 U/L Final     Specimen Description 04/17/2019 Midstream Urine   Final     Special Requests 04/17/2019 Specimen received in preservative   Final      Culture Micro 04/17/2019 No growth   Final       Assessment and Plan   1. Gender dysphoria  Discontinue finastride and sprionolactone after orchiectomy  In July,will have been on 1 year of high dose estradiol (2 patches), at that time, recommend back down to 1 patch 2xwk    Follow up:  Follow up in 4 months  Results by mychart  Questions were elicited and answered.     Jluis Mane MD

## 2019-04-24 ENCOUNTER — TELEPHONE (OUTPATIENT)
Dept: FAMILY MEDICINE | Facility: CLINIC | Age: 69
End: 2019-04-24

## 2019-04-24 ENCOUNTER — HOSPITAL ENCOUNTER (OUTPATIENT)
Dept: CARDIOLOGY | Facility: CLINIC | Age: 69
Discharge: HOME | End: 2019-04-24
Attending: NURSE PRACTITIONER | Admitting: NURSE PRACTITIONER
Payer: COMMERCIAL

## 2019-04-24 DIAGNOSIS — I35.0 AORTIC VALVE STENOSIS, UNSPECIFIED ETIOLOGY: ICD-10-CM

## 2019-04-24 DIAGNOSIS — I10 ESSENTIAL HYPERTENSION: Primary | ICD-10-CM

## 2019-04-24 DIAGNOSIS — I10 ESSENTIAL HYPERTENSION: ICD-10-CM

## 2019-04-24 LAB — POTASSIUM SERPL-SCNC: 4.6 MMOL/L (ref 3.4–5.3)

## 2019-04-24 PROCEDURE — 84132 ASSAY OF SERUM POTASSIUM: CPT | Performed by: NURSE PRACTITIONER

## 2019-04-24 PROCEDURE — 93306 TTE W/DOPPLER COMPLETE: CPT | Mod: 26 | Performed by: INTERNAL MEDICINE

## 2019-04-24 PROCEDURE — 36415 COLL VENOUS BLD VENIPUNCTURE: CPT | Performed by: NURSE PRACTITIONER

## 2019-04-24 PROCEDURE — 93306 TTE W/DOPPLER COMPLETE: CPT

## 2019-04-24 NOTE — TELEPHONE ENCOUNTER
Pt went to [facility] today for blood draw but there are no orders.    The person in lab says she saw a note in the chart that she was to return for a Potassium in a week.    Please put in the orders.  Thanks      Notes recorded by Delmis Simons APRN CNP on 4/18/2019 at 5:58 PM CDT  Urine culture negative.  Normal kidney function.  Normal liver function.  Potassium slightly elevated.  Avoid extra potassium supplementation.  Repeat potassium again in 1 week to monitor  Normal red and white blood cell count  Call with any questions or concerns.  Okay for surgery.  Take care,  Delmis Simons MSN,[facility]-[address] [address] [phone]

## 2019-04-24 NOTE — RESULT ENCOUNTER NOTE
Results noted: EF 55-60%; no WMAs; mild TR; moderate aortic stenosis with mean gradient of 36 mmHg and ARISTEO 1.3 cm2; mildly dilated aortic root at 4.2 cm and mildly dilated ascending aorta at 4.2 cm. No significant change from previous study. To be discussed at OV with Dr Ibrahim on 5/3/19

## 2019-05-03 ENCOUNTER — OFFICE VISIT (OUTPATIENT)
Dept: CARDIOLOGY | Facility: CLINIC | Age: 69
End: 2019-05-03
Payer: COMMERCIAL

## 2019-05-03 ENCOUNTER — TELEPHONE (OUTPATIENT)
Dept: OTHER | Facility: OUTPATIENT CENTER | Age: 69
End: 2019-05-03

## 2019-05-03 VITALS
HEART RATE: 85 BPM | DIASTOLIC BLOOD PRESSURE: 92 MMHG | BODY MASS INDEX: 38.3 KG/M2 | SYSTOLIC BLOOD PRESSURE: 124 MMHG | OXYGEN SATURATION: 96 % | WEIGHT: 282.4 LBS

## 2019-05-03 DIAGNOSIS — I35.0 AORTIC VALVE STENOSIS, UNSPECIFIED ETIOLOGY: ICD-10-CM

## 2019-05-03 PROCEDURE — 99214 OFFICE O/P EST MOD 30 MIN: CPT | Performed by: INTERNAL MEDICINE

## 2019-05-03 NOTE — PROGRESS NOTES
HPI and Plan:   See dictation    No orders of the defined types were placed in this encounter.      No orders of the defined types were placed in this encounter.      There are no discontinued medications.      Encounter Diagnosis   Name Primary?     Aortic valve stenosis, unspecified etiology        CURRENT MEDICATIONS:  Current Outpatient Medications   Medication Sig Dispense Refill     dorzolamide-timolol (COSOPT) 2-0.5 % ophthalmic solution INSTILL 1 DROP INTO BOTH EYES 2 TIMES DAILY.  4     estradiol (VIVELLE-DOT) 0.1 MG/24HR bi-weekly patch Place 2 patches onto the skin twice a week 16 patch 3     latanoprost (XALATAN) 0.005 % ophthalmic solution 1 drop At Bedtime.       lovastatin (MEVACOR) 20 MG tablet Take 1 tablet (20 mg) by mouth At Bedtime 30 tablet 0     meloxicam (MOBIC) 15 MG tablet Take 1 tablet (15 mg) by mouth daily 30 tablet 0     Multiple Vitamin (DAILY-EVANGELISTA) TABS Take 1 tablet by mouth daily  11     SM CALCIUM 600/VITAMIN D 600-400 MG-UNIT per tablet Take 1 tablet by mouth 2 times daily 60 tablet 3     order for DME Equipment being ordered: Sig[employer] 232 Cotton Thigh High for Men 20-30 mmHg- TWO PAIR BRENNER COLOR (Patient not taking: Reported on 5/3/2019) 2 each 5     ORDER FOR DME Equipment being ordered:   BACK BRACE    MedSpec Back-n-Shape II Back Brace  Size XXXL with thermoplastic insert        (Patient not taking: Reported on 5/3/2019) 1 each 0       ALLERGIES     Allergies   Allergen Reactions     Penicillin [Esters] Itching     he is not sure what kind of reaction he had     Heparin      he lives in a assisted living program and is not sure what type of allergies he really has. He quit drinking     Zosyn [Penicillins]      unsure of reaction, allergy is listed on his med sheet       PAST MEDICAL HISTORY:  Past Medical History:   Diagnosis Date     Unspecified internal derangement of knee     CHRONIC KNEE PAIN,LEG,NECK AND HEAD INJURIES       PAST SURGICAL HISTORY:  Past Surgical History:    Procedure Laterality Date     COLONOSCOPY  2007     JOINT REPLACEMTN, KNEE RT/LT  2006    left       FAMILY HISTORY:  Family History   Problem Relation Age of Onset     Cancer Father         lung cancer heavy smoker       SOCIAL HISTORY:  Social History     Socioeconomic History     Marital status:      Spouse name: None     Number of children: None     Years of education: None     Highest education level: None   Occupational History     None   Social Needs     Financial resource strain: None     Food insecurity:     Worry: None     Inability: None     Transportation needs:     Medical: None     Non-medical: None   Tobacco Use     Smoking status: Never Smoker     Smokeless tobacco: Never Used   Substance and Sexual Activity     Alcohol use: No     Comment: Quit 3/24/05     Drug use: No     Sexual activity: Not Currently   Lifestyle     Physical activity:     Days per week: None     Minutes per session: None     Stress: None   Relationships     Social connections:     Talks on phone: None     Gets together: None     Attends [employer] service: None     Active member of club or organization: None     Attends meetings of clubs or organizations: None     Relationship status: None     Intimate partner violence:     Fear of current or ex partner: None     Emotionally abused: None     Physically abused: None     Forced sexual activity: None   Other Topics Concern     Parent/sibling w/ CABG, MI or angioplasty before 65F 55M? Not Asked   Social History Narrative     None       Review of Systems:  Skin:  Negative       Eyes:  Positive for glasses    ENT:  Negative      Respiratory:  Negative       Cardiovascular:  Negative      Gastroenterology: Negative      Genitourinary:  Negative      Musculoskeletal:  Negative      Neurologic:  Negative      Psychiatric:  Negative      Heme/Lymph/Imm:  Negative      Endocrine:  Negative        Physical Exam:  Vitals: BP (!) 136/96 (BP Location: Right arm, Patient Position:  Sitting, Cuff Size: Adult Regular)   Pulse 85   Wt 128.1 kg (282 lb 6.4 oz)   SpO2 96%   BMI 38.30 kg/m      Constitutional:  cooperative, alert and oriented, well developed, well nourished, in no acute distress        Skin:  warm and dry to the touch          Head:  normocephalic        Eyes:  sclera white        Lymph:      ENT:           Neck:           Respiratory:  normal breath sounds, clear to auscultation, normal A-P diameter, normal symmetry, normal respiratory excursion, no use of accessory muscles         Cardiac: regular rhythm;normal S1 and S2;no S3 or S4         systolic ejection murmur;grade 1;grade 2                                               GI:           Extremities and Muscular Skeletal:  no deformities, clubbing, cyanosis, erythema observed;no edema              Neurological:  no gross motor deficits;affect appropriate        Psych:  Alert and Oriented x 3;affect appropriate, oriented to time, person and place          CC  Tia Ibrahim MD  [address]  [place] [id number]

## 2019-05-03 NOTE — LETTER
5/3/2019    Delmis Simons, JERRICA CNP  [address] [facility] [address]    RE: Laila Alexandra Perez       Dear Colleague,    I had the pleasure of seeing Laila Alexandra Perez in the [facility].    HPI and Plan:   See dictation    No orders of the defined types were placed in this encounter.      No orders of the defined types were placed in this encounter.      There are no discontinued medications.      Encounter Diagnosis   Name Primary?     Aortic valve stenosis, unspecified etiology        CURRENT MEDICATIONS:  Current Outpatient Medications   Medication Sig Dispense Refill     dorzolamide-timolol (COSOPT) 2-0.5 % ophthalmic solution INSTILL 1 DROP INTO BOTH EYES 2 TIMES DAILY.  4     estradiol (VIVELLE-DOT) 0.1 MG/24HR bi-weekly patch Place 2 patches onto the skin twice a week 16 patch 3     latanoprost (XALATAN) 0.005 % ophthalmic solution 1 drop At Bedtime.       lovastatin (MEVACOR) 20 MG tablet Take 1 tablet (20 mg) by mouth At Bedtime 30 tablet 0     meloxicam (MOBIC) 15 MG tablet Take 1 tablet (15 mg) by mouth daily 30 tablet 0     Multiple Vitamin (DAILY-EVANGELISTA) TABS Take 1 tablet by mouth daily  11     SM CALCIUM 600/VITAMIN D 600-400 MG-UNIT per tablet Take 1 tablet by mouth 2 times daily 60 tablet 3     order for DME Equipment being ordered: Sigvar[employer] 232 Cotton Thigh High for Men 20-30 mmHg- TWO PAIR BRENNER COLOR (Patient not taking: Reported on 5/3/2019) 2 each 5     ORDER FOR DME Equipment being ordered:   BACK BRACE    MedSpec Back-n-Shape II Back Brace  Size XXXL with thermoplastic insert        (Patient not taking: Reported on 5/3/2019) 1 each 0       ALLERGIES     Allergies   Allergen Reactions     Penicillin [Esters] Itching     he is not sure what kind of reaction he had     Heparin      he lives in a assisted living program and is not sure what type of allergies he really has. He quit drinking     Zosyn [Penicillins]      unsure of reaction, allergy is listed on  his med sheet       PAST MEDICAL HISTORY:  Past Medical History:   Diagnosis Date     Unspecified internal derangement of knee     CHRONIC KNEE PAIN,LEG,NECK AND HEAD INJURIES       PAST SURGICAL HISTORY:  Past Surgical History:   Procedure Laterality Date     COLONOSCOPY  2007     JOINT REPLACEMTN, KNEE RT/LT  2006    left       FAMILY HISTORY:  Family History   Problem Relation Age of Onset     Cancer Father         lung cancer heavy smoker       SOCIAL HISTORY:  Social History     Socioeconomic History     Marital status:      Spouse name: None     Number of children: None     Years of education: None     Highest education level: None   Occupational History     None   Social Needs     Financial resource strain: None     Food insecurity:     Worry: None     Inability: None     Transportation needs:     Medical: None     Non-medical: None   Tobacco Use     Smoking status: Never Smoker     Smokeless tobacco: Never Used   Substance and Sexual Activity     Alcohol use: No     Comment: Quit 3/24/05     Drug use: No     Sexual activity: Not Currently   Lifestyle     Physical activity:     Days per week: None     Minutes per session: None     Stress: None   Relationships     Social connections:     Talks on phone: None     Gets together: None     Attends Mandaen service: None     Active member of club or organization: None     Attends meetings of clubs or organizations: None     Relationship status: None     Intimate partner violence:     Fear of current or ex partner: None     Emotionally abused: None     Physically abused: None     Forced sexual activity: None   Other Topics Concern     Parent/sibling w/ CABG, MI or angioplasty before 65F 55M? Not Asked   Social History Narrative     None       Review of Systems:  Skin:  Negative       Eyes:  Positive for glasses    ENT:  Negative      Respiratory:  Negative       Cardiovascular:  Negative      Gastroenterology: Negative      Genitourinary:  Negative       Musculoskeletal:  Negative      Neurologic:  Negative      Psychiatric:  Negative      Heme/Lymph/Imm:  Negative      Endocrine:  Negative        Physical Exam:  Vitals: BP (!) 136/96 (BP Location: Right arm, Patient Position: Sitting, Cuff Size: Adult Regular)   Pulse 85   Wt 128.1 kg (282 lb 6.4 oz)   SpO2 96%   BMI 38.30 kg/m       Constitutional:  cooperative, alert and oriented, well developed, well nourished, in no acute distress        Skin:  warm and dry to the touch          Head:  normocephalic        Eyes:  sclera white        Lymph:      ENT:           Neck:           Respiratory:  normal breath sounds, clear to auscultation, normal A-P diameter, normal symmetry, normal respiratory excursion, no use of accessory muscles         Cardiac: regular rhythm;normal S1 and S2;no S3 or S4         systolic ejection murmur;grade 1;grade 2                                               GI:           Extremities and Muscular Skeletal:  no deformities, clubbing, cyanosis, erythema observed;no edema              Neurological:  no gross motor deficits;affect appropriate        Psych:  Alert and Oriented x 3;affect appropriate, oriented to time, person and place          CC  Tia Ibrahim MD  [address] [facility]  [place] [address]                    Thank you for allowing me to participate in the care of your patient.      Sincerely,     Tia Ibrahim MD     [facility]    cc:   Tia Ibrahim MD  [address] [facility]  [place] [address]

## 2019-05-03 NOTE — LETTER
5/3/2019      Delmis Simons, JERRICA CNP  [address] [facility] [address]      RE: Laila Perez       Dear Colleague,    I had the pleasure of seeing Laila Perez in the [facility].    Service Date: 05/03/2019      CARDIOLOGY CONSULTATION      PATIENT HISTORY:  Ms. Laila Perez was seen in followup at [facility] in [place], [place].  She is now 69 years old and follows for a history of aortic stenosis.      She has been planning gender reassignment surgery for some time.  A murmur has previously been identified and aortic stenosis diagnosed by echocardiography.  She underwent repeat echocardiography in preparation for this visit and the echocardiogram suggested no recent change.  The mean gradient has increased to 36 mmHg and the calculated aortic valve area estimated at 1.3-1.4 cm2.  She has normal left ventricular systolic performance.  She has a trileaflet aortic valve, but a mildly dilated aortic root with the sinuses of Valsalva and the ascending aorta being approximately 4.2 cm.  Last year, the mean gradient was 30 mmHg and interestingly the ascending aorta was measured at 4.5 cm, larger than with this echocardiogram.      She denies any chest, neck, arm or back discomfort.  In preparation for planned surgery last year, a stress echocardiogram was done.  There was no evidence of ischemia and she was able to exercise 6 minutes.  The blood pressure response to exercise was normal.      She notes that she has been told she needs to lose weight and so she has been doing that.  She is exercising on a regular basis, trying to climb stairs and use a treadmill.  She is exercising 20-30 minutes.  Her weight 1 year ago was 305 pounds and today was 282 pounds.  She is undergoing partial gender reassignment surgery in 2 weeks and notes that she will have this done as an outpatient procedure.      She is on lipid-lowering therapy and her last LDL  cholesterol a little less than 1 year ago was 40 mg/dL with an HDL of 54 mg/dL.  Her initial blood pressure today was mildly elevated, though her blood pressures have previously been normal.  On reassessment, there was residual mild diastolic hypertension.      PHYSICAL EXAMINATION:   GENERAL:  This is a woman in no apparent distress.   VITAL SIGNS:  The blood pressure was 124/92 mmHg, heart rate 85 beats per minute and regular, and respiratory rate was 14-18 per minute.   CHEST:  Clear to auscultation.   CARDIAC:  On cardiac auscultation, there was an S1 and an S2 with a very soft and grade 1-2/6 systolic murmur which was auscultated before S2.  The rhythm was regular.      ASSESSMENT/RECOMMENDATIONS:  At this point, Ms. Perez is doing very well.  She notes that her gender reassignment surgery will include removal of testes and that is considered to be a relatively low cardiac risk surgery.  She will then be able to reduce her hormone supplementation which from a cardiac standpoint should be beneficial.  She is asymptomatic with regard to her aortic stenosis and has moderate aortic stenosis.  She should be very appropriate to proceed with the planned surgery.      With regard to the aortic stenosis, I have recommended that she return in 1 year with a repeat echocardiogram.      Given that she has very mild aortic root dilation and now development of possible mild diastolic hypertension despite weight loss, she is likely to benefit from a return appointment in about 3 months with the advanced practice provider.  At that point, I would recommend institution of beta blockade, but I would not do so immediately prior to her planned surgery.         JUAN WOODS MD, [facility]             D: 2019   T: 2019   MT: DILLAN      Name:     DANYELLE PEREZ   MRN:      [id number]        Account:      [id number]   :      1950           Service Date: 2019      Document: [id number]         Outpatient Encounter  Medications as of 5/3/2019   Medication Sig Dispense Refill     dorzolamide-timolol (COSOPT) 2-0.5 % ophthalmic solution INSTILL 1 DROP INTO BOTH EYES 2 TIMES DAILY.  4     estradiol (VIVELLE-DOT) 0.1 MG/24HR bi-weekly patch Place 2 patches onto the skin twice a week 16 patch 3     latanoprost (XALATAN) 0.005 % ophthalmic solution 1 drop At Bedtime.       lovastatin (MEVACOR) 20 MG tablet Take 1 tablet (20 mg) by mouth At Bedtime 30 tablet 0     meloxicam (MOBIC) 15 MG tablet Take 1 tablet (15 mg) by mouth daily 30 tablet 0     Multiple Vitamin (DAILY-EVANGELISTA) TABS Take 1 tablet by mouth daily  11     SM CALCIUM 600/VITAMIN D 600-400 MG-UNIT per tablet Take 1 tablet by mouth 2 times daily 60 tablet 3     order for DME Equipment being ordered: Sigvar[employer] 232 Cotton Thigh High for Men 20-30 mmHg- TWO PAIR BRENNER COLOR (Patient not taking: Reported on 5/3/2019) 2 each 5     ORDER FOR DME Equipment being ordered:   BACK BRACE    MedSpec Back-n-Shape II Back Brace  Size XXXL with thermoplastic insert        (Patient not taking: Reported on 5/3/2019) 1 each 0     No facility-administered encounter medications on file as of 5/3/2019.        Again, thank you for allowing me to participate in the care of your patient.      Sincerely,    Tia Ibrahim MD     [facility]

## 2019-05-03 NOTE — TELEPHONE ENCOUNTER
[facility] called and requested pts letters of support be updated for pts upcoming surgery on 5/14/19. Insurance will not accept letters dated over 1 year old.

## 2019-05-06 ENCOUNTER — PATIENT OUTREACH (OUTPATIENT)
Dept: UROLOGY | Facility: CLINIC | Age: 69
End: 2019-05-06

## 2019-05-06 ENCOUNTER — OFFICE VISIT (OUTPATIENT)
Dept: OTHER | Facility: OUTPATIENT CENTER | Age: 69
End: 2019-05-06
Payer: COMMERCIAL

## 2019-05-06 DIAGNOSIS — F64.0 GENDER DYSPHORIA IN ADOLESCENT AND ADULT: Primary | ICD-10-CM

## 2019-05-06 NOTE — PROGRESS NOTES
Service Date: 05/03/2019      CARDIOLOGY CONSULTATION      PATIENT HISTORY:  Ms. Laila Perez was seen in followup at [facility] in [place], [place].  She is now 69 years old and follows for a history of aortic stenosis.      She has been planning gender reassignment surgery for some time.  A murmur has previously been identified and aortic stenosis diagnosed by echocardiography.  She underwent repeat echocardiography in preparation for this visit and the echocardiogram suggested no recent change.  The mean gradient has increased to 36 mmHg and the calculated aortic valve area estimated at 1.3-1.4 cm2.  She has normal left ventricular systolic performance.  She has a trileaflet aortic valve, but a mildly dilated aortic root with the sinuses of Valsalva and the ascending aorta being approximately 4.2 cm.  Last year, the mean gradient was 30 mmHg and interestingly the ascending aorta was measured at 4.5 cm, larger than with this echocardiogram.      She denies any chest, neck, arm or back discomfort.  In preparation for planned surgery last year, a stress echocardiogram was done.  There was no evidence of ischemia and she was able to exercise 6 minutes.  The blood pressure response to exercise was normal.      She notes that she has been told she needs to lose weight and so she has been doing that.  She is exercising on a regular basis, trying to climb stairs and use a treadmill.  She is exercising 20-30 minutes.  Her weight 1 year ago was 305 pounds and today was 282 pounds.  She is undergoing partial gender reassignment surgery in 2 weeks and notes that she will have this done as an outpatient procedure.      She is on lipid-lowering therapy and her last LDL cholesterol a little less than 1 year ago was 40 mg/dL with an HDL of 54 mg/dL.  Her initial blood pressure today was mildly elevated, though her blood pressures have previously been normal.  On reassessment, there was residual mild diastolic  hypertension.      PHYSICAL EXAMINATION:   GENERAL:  This is a woman in no apparent distress.   VITAL SIGNS:  The blood pressure was 124/92 mmHg, heart rate 85 beats per minute and regular, and respiratory rate was 14-18 per minute.   CHEST:  Clear to auscultation.   CARDIAC:  On cardiac auscultation, there was an S1 and an S2 with a very soft and grade 1-2/6 systolic murmur which was auscultated before S2.  The rhythm was regular.      ASSESSMENT/RECOMMENDATIONS:  At this point, Ms. Perez is doing very well.  She notes that her gender reassignment surgery will include removal of testes and that is considered to be a relatively low cardiac risk surgery.  She will then be able to reduce her hormone supplementation which from a cardiac standpoint should be beneficial.  She is asymptomatic with regard to her aortic stenosis and has moderate aortic stenosis.  She should be very appropriate to proceed with the planned surgery.      With regard to the aortic stenosis, I have recommended that she return in 1 year with a repeat echocardiogram.      Given that she has very mild aortic root dilation and now development of possible mild diastolic hypertension despite weight loss, she is likely to benefit from a return appointment in about 3 months with the advanced practice provider.  At that point, I would recommend institution of beta blockade, but I would not do so immediately prior to her planned surgery.         JUAN WOODS MD          D: 2019   T: 2019   MT: DILLAN      Name:     DANYELLE PEREZ   MRN:      [id number]        Account:      [id number]   :      1950           Service Date: 2019      Document: [id number]

## 2019-05-06 NOTE — LETTER
19    RECIPIENT ADDRESS    Re: Laila Perez  : 1950  Primary letter of support for gender confirmation surgery/orchiectomy      Dr. Abhilash Thomas:    Ms. Laila Perez has received services through the [facility] since 16.  Laila is a 69 year old  male  who identifies as a transgender female. Ms. Perez initially sought services to explore her gender identity and make decisions about medical interventions to address her gender dysphoria. Her most recent appointment for individual psychotherapy occurred on 19. She has been seen for psychological services at this clinic from 16 to 19.      Ms Perez completed a diagnostic session, as well as twice monthly individual psychotherapy appointments. Additionally she completed a thorough psychological evaluation consisting of completion of a set of evaluative measures of gender dysphoria, psychosocial adjustment, sexual health, and overall psychological functioning.     Laila has been diagnosed with gender dysphoria. She does not meet criteria for any additional diagnoses at this time. 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 She has experienced some anxiety and depression for periods of time in adjustment to her process of disclosing her identity to her family and friends, but does not meet criteria for any anxiety or mood disorders at this time. She is well adjusted and thoughtful, and has approached her decision making for surgery with intention and care. Laila Perez's mental health concerns are stable and well controlled, and do not pose any barrier to his/her/their ability to consent and undergo gender affirming surgery at this time.    Laila Perez currently meets the [employer] for Transgender Health (WPATH) Standards of Care for surgical gender reassignment of gender dysphoric persons, as  well as internal prerequisites for gender reassignment surgery. Surgery is considered a medically necessary treatment by the [facility] Standards of Care for transgender persons. On behalf of our staff, I support her decision to pursue medically necessary surgery at this time.     Laila Perez has been on feminizing hormones since 7/25/16. She has undergone significant and permanent changes such as legal name change and socially transitioning to live full time in her affirmed gender since 7/2014. Gender affirmation surgery will decrease her gender dysphoria and psychological distress and improve her overall psychological functioning.  In some cases, denial of access to this surgery can lead to increased isolation, decreased work performance, and decreased sexual and interpersonal functioning. Surgery is expected to increase comfort with self, and improve interpersonal, sexual, and vocational functioning.    Laila has educated herself about the surgery and is aware of its risks and benefits. Given her concerns and the availability of this type of surgery, I support her choice of surgeon.    The staff s support is based on the psychological indication for surgery and did not include a physical examination to assess medical contra-indications for the surgical procedure. This letter of support is valid for one year from the date of approval.      If you have any questions, or are in need of additional information, please do not hesitate to contact me.      Sincerely,    Estelle Gomez PsyD, LP    [facility]

## 2019-05-06 NOTE — PROGRESS NOTES
Left message for patient to call to discuss coming in to discuss up coming surgery for bilateral orchiectomy on 5/14/19 with Dr Weston. I would like to know if patient had pre-op done.    Vandana Bragg, PAMLER   Care Coordinator Urology

## 2019-05-06 NOTE — PROGRESS NOTES
[facility] -  Case Progress Note  Client Name: Laila Perez  YOB: 1950  MRN:  [id number]  Treating Provider: Estelle Gomez LP  Type of Session: Individual  Present in Session: client   Number of Minutes:  55    Treatment plan: 1/14/19    Health Maintenance Summary - Mental Health Treatment Plan       Status Date      Mental Health Tx Plan Q2 MOS Overdue 1/15/2019           Date of Service: 5/06/19    Current Symptoms/Status:  Distress about gender and secondary sex characteristics, wants genitals removed, bothers her daily of moderate to severe intensity, distress with work situation.    Progress Toward Treatment Goals:   Client reported progress with making decision to have orchiectomy.    Intervention: Modality and Description:  Provided support and feedback. Used CBT to process gender dysphoria concerns. Client shared she decided to have the orchiectomy within the past couple of months. She shared she has been slow progress with weight loss and decided to take this initial step so she can lower her hormone dosage. The surgery is scheduled for next week and client just found out she needs updated letters. Discussed her decision-making process and validated this being a good decision for her. Completed updating the letter during the appointment. Client shared her mood has been fairly good. She has been active with social interaction.  Discussed plan for surgery and aftercare. She has some anxiety about having the surgery and validated this as understandable.    Interactive Complexity:  There are four specific communication difficulties that complicate the work of the primary psychiatric procedure.  Interactive complexity (+[id number]) may be reported when at least one of these difficulties is present.    Communication difficulties present during current the psychiatric procedure include:  1. The need to manage maladaptive communication (related to e.g. high anxiety, high  reactivity, repeated questions, or disagreement) among participants that complicates delivery of care.        Response to Intervention:  Client reported validation.    Assignment:  Follow surgeon guidelines, maintain balanced approach to weight loss.      Diagnosis:  Encounter Diagnosis   Name Primary?     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 Yes           Plan / Need for Future Services:  Return for therapy in 4 weeks.      Estelle Gomez PsyD, LP

## 2019-05-07 ENCOUNTER — OFFICE VISIT (OUTPATIENT)
Dept: OTHER | Facility: OUTPATIENT CENTER | Age: 69
End: 2019-05-07
Payer: COMMERCIAL

## 2019-05-07 DIAGNOSIS — F64.0 GENDER DYSPHORIA IN ADOLESCENT AND ADULT: Primary | ICD-10-CM

## 2019-05-07 NOTE — PROGRESS NOTES
[facility] -  Case Progress Note  Client Name: Laila Perez  YOB: 1950  MRN:  [id number]  Treating Provider: Alia Rosa PsyD, LP  Type of Session: Individual  Present in Session: client;   Number of Minutes:  55      Date of Service: 19    Current Symptoms/Status:  Distress about gender and secondary sex characteristics, wants genitals removed, bothers her daily of moderate to severe intensity, distress with work situation.    Progress Toward Treatment Goals:   Client is working towards goals of [facility].    Intervention: Modality and Description:  Supportive and exploratory approach to assessing client's current mental health stability and degree to which client meets WPATH requirements for gender confirmation surgery. Client reported that her goal for seeing this provider was to update  secondary letter of support for gender confirmation surgery. Took time to assess any changes in client's life since last seen by this provider. Themes emerged of increased independence in daily life and continuous decrease of depression symptoms to the point of support in discontinuing anti-depressant medication by PCD.    Took time to collaboratively update and write secondary letter of support for gender confirmation surgery (see scanned and attached letter). Client was actively involved in the process.      Response to Intervention:  Client reported validation.    Assignment:  Schedule appointment with surgeon.      Diagnosis:  No diagnosis found.        Plan / Need for Future Services:  Return for therapy in 4 weeks with primary therapist.      Alia Rosa PsyD, LP

## 2019-05-09 RX ORDER — SPIRONOLACTONE 100 MG/1
100 TABLET, FILM COATED ORAL DAILY
Refills: 1 | COMMUNITY
Start: 2019-03-13 | End: 2019-08-06

## 2019-05-09 RX ORDER — FINASTERIDE 5 MG/1
5 TABLET, FILM COATED ORAL DAILY
Refills: 1 | COMMUNITY
Start: 2019-03-13 | End: 2019-08-06

## 2019-05-10 ENCOUNTER — PATIENT OUTREACH (OUTPATIENT)
Dept: GERIATRIC MEDICINE | Facility: CLINIC | Age: 69
End: 2019-05-10

## 2019-05-10 NOTE — PROGRESS NOTES
[facility] Care Coordination Contact    Member became effective with  [facility] on 5/1/19 with [facility].  Previous Health Plan: [facility]  Previous Care System: [facility]  Previous care coordinators name and number: Haritha Rosado  [phone]  Waiver Type: CADI  Last MMIS Entry: Date 4/29/19 and Type Tel screen  MMIS visit date if different from above: 12/3/18  Services Listed in MMIS:   Member currently receiving the following home care services:     Member currently receiving the following community resources:  Waiver trans, MH Services  UTF received: No: Requested on 5/1/19   Margaret Montoya  Case Management Specialist  [facility]  [phone]        5/16/19  cc attempted to contact pervious CM as UTF has not been received.  VM message left.    Alexandra Helton RN-[facility]   [phone]    5/23/2019  cc received transfer forms.  cc will review and contact member.    Alexandra Helton RN-[facility]   [phone]

## 2019-05-10 NOTE — LETTER
May 10, 2019    LAILA AMEZQUITA  [address] [address] [place] [phone]      Dear Laila:    As a member of [facility] ([facility] ([facility]), you are provided a care coordinator. I will be your new care coordinator as of 5/1/19. I will be calling you soon to see how you are doing and determine your needs.    If you have any questions, please feel free to call me at [phone]. If you reach my voice mail, please leave a message and your phone number. If you are hearing impaired, please call the [place] [employer] at 016 or [phone] (oybhyc-gy-[id number] relay service).    I look forward to speaking with you soon.    Sincerely,    Alexandra Helton RN-BC    E-mail: [email or web address]  Phone: [phone]      [facility] is a health plan that contracts with both Medicare and the [place] Medical Assistance (Medicaid) program to provide benefits of both programs to enrollees. Enrollment in [facility] depends on contract renewal.      [facility]  [id number] DHS Approved ([id number])  [id number] (11/18)

## 2019-05-13 ENCOUNTER — ANESTHESIA EVENT (OUTPATIENT)
Dept: SURGERY | Facility: AMBULATORY SURGERY CENTER | Age: 69
End: 2019-05-13

## 2019-05-14 ENCOUNTER — HOSPITAL ENCOUNTER (OUTPATIENT)
Facility: AMBULATORY SURGERY CENTER | Age: 69
End: 2019-05-14
Attending: UROLOGY
Payer: COMMERCIAL

## 2019-05-14 ENCOUNTER — ANESTHESIA (OUTPATIENT)
Dept: SURGERY | Facility: AMBULATORY SURGERY CENTER | Age: 69
End: 2019-05-14

## 2019-05-14 VITALS
BODY MASS INDEX: 39.62 KG/M2 | SYSTOLIC BLOOD PRESSURE: 140 MMHG | RESPIRATION RATE: 16 BRPM | DIASTOLIC BLOOD PRESSURE: 77 MMHG | WEIGHT: 283 LBS | OXYGEN SATURATION: 96 % | HEART RATE: 83 BPM | HEIGHT: 71 IN | TEMPERATURE: 97.8 F

## 2019-05-14 DIAGNOSIS — G89.18 POST-OP PAIN: Primary | ICD-10-CM

## 2019-05-14 RX ORDER — ACETAMINOPHEN 325 MG/1
975 TABLET ORAL ONCE
Status: COMPLETED | OUTPATIENT
Start: 2019-05-14 | End: 2019-05-14

## 2019-05-14 RX ORDER — ONDANSETRON 2 MG/ML
4 INJECTION INTRAMUSCULAR; INTRAVENOUS EVERY 30 MIN PRN
Status: DISCONTINUED | OUTPATIENT
Start: 2019-05-14 | End: 2019-05-15 | Stop reason: HOSPADM

## 2019-05-14 RX ORDER — MEPERIDINE HYDROCHLORIDE 25 MG/ML
12.5 INJECTION INTRAMUSCULAR; INTRAVENOUS; SUBCUTANEOUS
Status: DISCONTINUED | OUTPATIENT
Start: 2019-05-14 | End: 2019-05-15 | Stop reason: HOSPADM

## 2019-05-14 RX ORDER — OXYCODONE HYDROCHLORIDE 5 MG/1
5-10 TABLET ORAL EVERY 4 HOURS PRN
Qty: 18 TABLET | Refills: 0 | Status: SHIPPED | OUTPATIENT
Start: 2019-05-14 | End: 2019-08-06

## 2019-05-14 RX ORDER — LIDOCAINE HYDROCHLORIDE 20 MG/ML
INJECTION, SOLUTION INFILTRATION; PERINEURAL PRN
Status: DISCONTINUED | OUTPATIENT
Start: 2019-05-14 | End: 2019-05-14

## 2019-05-14 RX ORDER — KETOROLAC TROMETHAMINE 30 MG/ML
INJECTION, SOLUTION INTRAMUSCULAR; INTRAVENOUS PRN
Status: DISCONTINUED | OUTPATIENT
Start: 2019-05-14 | End: 2019-05-14

## 2019-05-14 RX ORDER — CLINDAMYCIN PHOSPHATE 900 MG/50ML
900 INJECTION, SOLUTION INTRAVENOUS SEE ADMIN INSTRUCTIONS
Status: DISCONTINUED | OUTPATIENT
Start: 2019-05-14 | End: 2019-05-14 | Stop reason: HOSPADM

## 2019-05-14 RX ORDER — PROPOFOL 10 MG/ML
INJECTION, EMULSION INTRAVENOUS PRN
Status: DISCONTINUED | OUTPATIENT
Start: 2019-05-14 | End: 2019-05-14

## 2019-05-14 RX ORDER — CLINDAMYCIN PHOSPHATE 900 MG/50ML
900 INJECTION, SOLUTION INTRAVENOUS
Status: COMPLETED | OUTPATIENT
Start: 2019-05-14 | End: 2019-05-14

## 2019-05-14 RX ORDER — FENTANYL CITRATE 50 UG/ML
INJECTION, SOLUTION INTRAMUSCULAR; INTRAVENOUS PRN
Status: DISCONTINUED | OUTPATIENT
Start: 2019-05-14 | End: 2019-05-14

## 2019-05-14 RX ORDER — NALOXONE HYDROCHLORIDE 0.4 MG/ML
.1-.4 INJECTION, SOLUTION INTRAMUSCULAR; INTRAVENOUS; SUBCUTANEOUS
Status: DISCONTINUED | OUTPATIENT
Start: 2019-05-14 | End: 2019-05-15 | Stop reason: HOSPADM

## 2019-05-14 RX ORDER — PROPOFOL 10 MG/ML
INJECTION, EMULSION INTRAVENOUS CONTINUOUS PRN
Status: DISCONTINUED | OUTPATIENT
Start: 2019-05-14 | End: 2019-05-14

## 2019-05-14 RX ORDER — ONDANSETRON 4 MG/1
4 TABLET, ORALLY DISINTEGRATING ORAL EVERY 30 MIN PRN
Status: DISCONTINUED | OUTPATIENT
Start: 2019-05-14 | End: 2019-05-15 | Stop reason: HOSPADM

## 2019-05-14 RX ORDER — FENTANYL CITRATE 50 UG/ML
25-50 INJECTION, SOLUTION INTRAMUSCULAR; INTRAVENOUS
Status: DISCONTINUED | OUTPATIENT
Start: 2019-05-14 | End: 2019-05-14 | Stop reason: HOSPADM

## 2019-05-14 RX ORDER — DEXAMETHASONE SODIUM PHOSPHATE 4 MG/ML
INJECTION, SOLUTION INTRA-ARTICULAR; INTRALESIONAL; INTRAMUSCULAR; INTRAVENOUS; SOFT TISSUE PRN
Status: DISCONTINUED | OUTPATIENT
Start: 2019-05-14 | End: 2019-05-14

## 2019-05-14 RX ORDER — EPHEDRINE SULFATE 50 MG/ML
INJECTION, SOLUTION INTRAMUSCULAR; INTRAVENOUS; SUBCUTANEOUS PRN
Status: DISCONTINUED | OUTPATIENT
Start: 2019-05-14 | End: 2019-05-14

## 2019-05-14 RX ORDER — SODIUM CHLORIDE, SODIUM LACTATE, POTASSIUM CHLORIDE, CALCIUM CHLORIDE 600; 310; 30; 20 MG/100ML; MG/100ML; MG/100ML; MG/100ML
INJECTION, SOLUTION INTRAVENOUS CONTINUOUS
Status: DISCONTINUED | OUTPATIENT
Start: 2019-05-14 | End: 2019-05-14 | Stop reason: HOSPADM

## 2019-05-14 RX ORDER — OXYCODONE HYDROCHLORIDE 5 MG/1
5 TABLET ORAL EVERY 4 HOURS PRN
Status: DISCONTINUED | OUTPATIENT
Start: 2019-05-14 | End: 2019-05-15 | Stop reason: HOSPADM

## 2019-05-14 RX ORDER — BUPIVACAINE HYDROCHLORIDE 5 MG/ML
INJECTION, SOLUTION PERINEURAL PRN
Status: DISCONTINUED | OUTPATIENT
Start: 2019-05-14 | End: 2019-05-14 | Stop reason: HOSPADM

## 2019-05-14 RX ORDER — LIDOCAINE 40 MG/G
CREAM TOPICAL
Status: DISCONTINUED | OUTPATIENT
Start: 2019-05-14 | End: 2019-05-14 | Stop reason: HOSPADM

## 2019-05-14 RX ORDER — SODIUM CHLORIDE, SODIUM LACTATE, POTASSIUM CHLORIDE, CALCIUM CHLORIDE 600; 310; 30; 20 MG/100ML; MG/100ML; MG/100ML; MG/100ML
INJECTION, SOLUTION INTRAVENOUS CONTINUOUS
Status: DISCONTINUED | OUTPATIENT
Start: 2019-05-14 | End: 2019-05-15 | Stop reason: HOSPADM

## 2019-05-14 RX ADMIN — CLINDAMYCIN PHOSPHATE 900 MG: 900 INJECTION, SOLUTION INTRAVENOUS at 10:27

## 2019-05-14 RX ADMIN — FENTANYL CITRATE 50 MCG: 50 INJECTION, SOLUTION INTRAMUSCULAR; INTRAVENOUS at 10:27

## 2019-05-14 RX ADMIN — LIDOCAINE HYDROCHLORIDE 100 MG: 20 INJECTION, SOLUTION INFILTRATION; PERINEURAL at 10:21

## 2019-05-14 RX ADMIN — KETOROLAC TROMETHAMINE 30 MG: 30 INJECTION, SOLUTION INTRAMUSCULAR; INTRAVENOUS at 11:22

## 2019-05-14 RX ADMIN — EPHEDRINE SULFATE 5 MG: 50 INJECTION, SOLUTION INTRAMUSCULAR; INTRAVENOUS; SUBCUTANEOUS at 11:15

## 2019-05-14 RX ADMIN — PROPOFOL 100 MCG/KG/MIN: 10 INJECTION, EMULSION INTRAVENOUS at 10:21

## 2019-05-14 RX ADMIN — FENTANYL CITRATE 50 MCG: 50 INJECTION, SOLUTION INTRAMUSCULAR; INTRAVENOUS at 10:37

## 2019-05-14 RX ADMIN — EPHEDRINE SULFATE 5 MG: 50 INJECTION, SOLUTION INTRAMUSCULAR; INTRAVENOUS; SUBCUTANEOUS at 10:51

## 2019-05-14 RX ADMIN — SODIUM CHLORIDE, SODIUM LACTATE, POTASSIUM CHLORIDE, CALCIUM CHLORIDE: 600; 310; 30; 20 INJECTION, SOLUTION INTRAVENOUS at 10:14

## 2019-05-14 RX ADMIN — PROPOFOL 350 MG: 10 INJECTION, EMULSION INTRAVENOUS at 10:21

## 2019-05-14 RX ADMIN — DEXAMETHASONE SODIUM PHOSPHATE 4 MG: 4 INJECTION, SOLUTION INTRA-ARTICULAR; INTRALESIONAL; INTRAMUSCULAR; INTRAVENOUS; SOFT TISSUE at 10:21

## 2019-05-14 RX ADMIN — SODIUM CHLORIDE, SODIUM LACTATE, POTASSIUM CHLORIDE, CALCIUM CHLORIDE: 600; 310; 30; 20 INJECTION, SOLUTION INTRAVENOUS at 09:53

## 2019-05-14 RX ADMIN — ACETAMINOPHEN 975 MG: 325 TABLET ORAL at 09:51

## 2019-05-14 ASSESSMENT — MIFFLIN-ST. JEOR: SCORE: 2070.81

## 2019-05-14 NOTE — LETTER
"    May 22, 2019       TO: Laila Perez  [address] [address] [place] [phone]       Dear ,    We are writing to inform you of your test results.    Your test results fall within the expected range(s) or remain unchanged from previous results.  Please continue with current treatment plan.    Resulted Orders   Surgical pathology exam   Result Value Ref Range    Copath Report       Patient Name: LAILA PEREZ  MR#: [id number]  Specimen #: [id number]  Collected: 5/14/2019  Received: 5/14/2019  Reported: 5/16/2019 16:43  Ordering Phy(s): VIVEK WOODARD    For improved result formatting, select 'View Enhanced Report Format' under   Linked Documents section.    SPECIMEN(S):  A: Right testicle  B: Left testicle    FINAL DIAGNOSIS:  A. Right testicle:  - Benign atrophic testicular parenchyma    B. Left testicle:  - Benign atrophic testicular parenchyma    I have personally reviewed all specimens and/or slides, including the   listed special stains, and used them  with my medical judgement to determine or confirm the final diagnosis.    Electronically signed out by:    Ava Padilla M.D., [facility]    GROSS:  A:  The specimen is received in formalin with proper patient   identification, labeled \"right testicle\".  The  specimen consists of a 39.6 g radical orchiectomy with the testicle   measuring 4.0 x 3.0 x 2.7 cm and the  spermatic cord measuring 6.5 cm in  length and 2.5 cm in diameter.  The   tunica vaginalis is grossly  unremarkable and inked black.  The testicle is bisected to reveal tan,   unremarkable testicular parenchyma.  Sections through the spermatic cord and epididymis are unremarkable.    Representative sections are submitted in  A1- A2.    B:  The specimen is received in formalin with proper patient   identification, labeled \"left testicle\".  The  specimen consists of 32.7 g radical orchiectomy with the testicle   measuring 3.9 x 2.3 x 2.3 cm and the  spermatic cord measuring 7.0 cm " in length and 2.8 cm in diameter.  The   tunica vaginalis is peeled away from  the testicle but is otherwise grossly unremarkable and is inked black.    The testicle is bisected to reveal  tan, unremarkable testicular parenchyma.  Sections through the spermatic   cord and epididymis are unremarkable.   Representative sections are submitted in B1- B2. (Dictated by: Amanda Gallo 5/14/2019 02:26 PM)    MICROSCOPIC:  Microscopic examination was pe rformed.    The technical component of this testing was completed at the [facility], with the professional component performed   at the [facility], [address] [place] [phone] ([phone])    CPT Codes:  A: 34854-EP2  B: 53210-TH1    COLLECTION SITE:  Client: [facility]  [facility]           Abhilash Weston MD

## 2019-05-14 NOTE — ANESTHESIA CARE TRANSFER NOTE
Patient: Laila Perez    Procedure(s):  Bilatera Scrotal Orchiectomy    Diagnosis: Gender Confirmation  Diagnosis Additional Information: No value filed.    Anesthesia Type:   No value filed.     Note:  Airway :Room Air  Patient transferred to:PACU  Handoff Report: Identifed the Patient, Identified the Reponsible Provider, Reviewed the pertinent medical history, Discussed the surgical course, Reviewed Intra-OP anesthesia mangement and issues during anesthesia, Set expectations for post-procedure period and Allowed opportunity for questions and acknowledgement of understanding      Vitals: (Last set prior to Anesthesia Care Transfer)    CRNA VITALS  5/14/2019 1057 - 5/14/2019 1134      5/14/2019             Pulse:  79    SpO2:  97 %    Resp Rate (set):  10                Electronically Signed By: JERRICA Park CRNA  May 14, 2019  11:34 AM

## 2019-05-14 NOTE — ANESTHESIA POSTPROCEDURE EVALUATION
Anesthesia POST Procedure Evaluation    Patient: Laila Perez   MRN:     [id number] Gender:   male   Age:    69 year old :      1950        Preoperative Diagnosis: Gender Confirmation   Procedure(s):  Bilatera Scrotal Orchiectomy   Postop Comments: No value filed.       Anesthesia Type:  General  No value filed.    Reportable Event: NO     PAIN: Uncomplicated   Sign Out status: Comfortable, Well controlled pain     PONV: No PONV   Sign Out status:  No Nausea or Vomiting     Neuro/Psych: Uneventful perioperative course   Sign Out Status: Preoperative baseline; Age appropriate mentation     Airway/Resp.: Uneventful perioperative course   Sign Out Status: Non labored breathing, age appropriate RR; Resp. Status within EXPECTED Parameters     CV: Uneventful perioperative course   Sign Out status: Appropriate BP and perfusion indices; Appropriate HR/Rhythm     Disposition:   Sign Out in:  PACU  Disposition:  Phase II; Home  Recovery Course: Uneventful  Follow-Up: Not required           Last Anesthesia Record Vitals:  CRNA VITALS  2019 1057 - 2019 1157      2019             Pulse:  79    SpO2:  97 %    Resp Rate (set):  10          Last PACU Vitals:  Vitals Value Taken Time   /76 2019 11:45 AM   Temp     Pulse     Resp 16 2019 11:45 AM   SpO2 93 % 2019 11:45 AM   Temp src     NIBP     Pulse     SpO2     Resp     Temp     Ht Rate     Temp 2           Electronically Signed By: Michael Pandya MD, May 14, 2019, 12:49 PM

## 2019-05-14 NOTE — DISCHARGE INSTRUCTIONS
"[facility]  Home Care Following Anesthesia  For 24 hours after surgery:  1. Get plenty of rest.  A responsible adult must stay with you for at least 24 hours after you leave the surgery center.  2. Do not drive or use heavy equipment.  If you have weakness or tingling, don't drive or use heavy equipment until this feeling goes away.   3. Do not drink alcohol.   4. Avoid strenuous or risky activities.  Ask for help when climbing stairs.  5. You may feel lightheaded.  IF so, sit for a few minutes before standing.  Have someone help you get up.   6. If you have nausea (feel sick to your stomach): Drink only clear liquids such as apple juice, ginger ale, broth or 7-Up.  Rest may also help.  Be sure to drink enough fluids.  Move to a regular diet as you feel able.   7. You may have a slight fever.  Call the doctor if your fever is over 100 F (37.7 C) (taken under the tongue) or lasts longer than 24 hours.  8. You may have a dry mouth, a sore throat, muscle aches or trouble sleeping. These should go away after 24 hours.  9. Do not make important or legal decisions.        Today you received a Marcaine or bupivacaine block to numb the nerves near your surgery site.  This is a block using local anesthetic or \"numbing\" medication injected around the nerves to anesthetize or \"numb\" the area supplied by those nerves.  This block is injected into the muscle layer near your surgical site.  The medication may numb the location where you had surgery for 6-18 hours, but may last up to 24 hours.  If your surgical site is an arm or leg you should be careful with your affected limb, since it is possible to injure your limb without being aware of it due to the numbing.  Until full feeling returns, you should guard against bumping or hitting your limb, and avoid extreme hot or cold temperatures on the skin.  As the block wears off, the feeling will return as a tingling or prickly sensation near your " surgical site.  You will experience more discomfort from your incision as the feeling returns.  You may want to take a pain pill (a narcotic or Tylenol if this was prescribed by your surgeon) when you start to experience mild pain before the pain beccomes more severe.  If your pain medications do not control your pain you should notifiy your surgeon.    Tips for taking pain medications  To get the best pain relief possible, remember these points:    Take pain medications as directed, before pain becomes severe.    Pain medication can upset your stomach: taking it with food may help.    Constipation is a common side effect of pain medication. Drink plenty of  fluids.    Eat foods high in fiber. Take a stool softener if recommended by your doctor or pharmacist.    Do not drink alcohol, drive or operate machinery while taking pain medications.    Ask about other ways to control pain, such as with heat, ice or relaxation.    Tylenol/Acetaminophen Consumption  To help encourage the safe use of acetaminophen, the makers of TYLENOL  have lowered the maximum daily dose for single-ingredient Extra Strength TYLENOL  (acetaminophen) products sold in the [place] from 8 pills per day (4,000 mg) to 6 pills per day (3,000 mg). The dosing interval has also changed from 2 pills every 4-6 hours to 2 pills every 6 hours.    If you feel your pain relief is insufficient, you may take Tylenol/Acetaminophen in addition to your narcotic pain medication.     Be careful not to exceed 3,000 mg of Tylenol/Acetaminophen in a 24 hour period from all sources.    If you are taking extra strength Tylenol/acetaminophen (500 mg), the maximum dose is 6 tablets in 24 hours.    If you are taking regular strength acetaminophen (325 mg), the maximum dose is 9 tablets in 24 hours.    Last dose: 975mg at 10:00am. Next available at 4:00pm    Call a doctor for any of the followin. Signs of infection (fever, growing tenderness at the surgery site, a large  amount of drainage or bleeding, severe pain, foul-smelling drainage, redness, swelling).  2. It has been over 8 to 10 hours since surgery and you are still not able to urinate (pass water).  3. Headache for over 24 hours.  4. Numbness, tingling or weakness the day after surgery (if you had spinal anesthesia).  Your doctor is:  Dr. Abhilash Weston, Prostate and Urology: [phone]                    Or dial [phone] and ask for the resident on call for:  Prostate Urology  For emergency care, call the:  [facility]:  [phone] (TTY for hearing impaired: [phone])

## 2019-05-14 NOTE — ANESTHESIA PREPROCEDURE EVALUATION
Anesthesia Pre-Procedure Evaluation    Patient: Laila Perez   MRN:     [id number] Gender:   male   Age:    69 year old :      1950        Preoperative Diagnosis: Gender Confirmation   Procedure(s):  Bilatera Scrotal Orchiectomy     Past Medical History:   Diagnosis Date     Alcoholism (H)      DDD (degenerative disc disease), cervical      HLD (hyperlipidemia)      HTN (hypertension)      OA (osteoarthritis of spine)      Unspecified internal derangement of knee     CHRONIC KNEE PAIN,LEG,NECK AND HEAD INJURIES      Past Surgical History:   Procedure Laterality Date     COLONOSCOPY  2007     JOINT REPLACEMTN, KNEE RT/LT  2006    left          Anesthesia Evaluation     . Pt has had prior anesthetic. Type: General    No history of anesthetic complications          ROS/MED HX    ENT/Pulmonary:     (+)ANGELLA risk factors hypertension, obese, , . .    Neurologic:       Cardiovascular:     (+) Dyslipidemia, hypertension----. : . . . :. .       METS/Exercise Tolerance:  >4 METS   Hematologic:         Musculoskeletal:   (+) arthritis,  -       GI/Hepatic:         Renal/Genitourinary:         Endo:     (+) Obesity, .      Psychiatric:         Infectious Disease:         Malignancy:         Other:                         PHYSICAL EXAM:   Mental Status/Neuro: A/A/O   Airway: Facies: Thick Neck  Mallampati: III  Mouth/Opening: Full  TM distance: > 6 cm  Neck ROM: Full   Respiratory: Auscultation: CTAB     Resp. Rate: Normal     Resp. Effort: Normal      CV: Rhythm: Regular  Rate: Age appropriate  Heart: Normal Sounds   Comments:      Dental:  Dental Comments: edentulous                 Lab Results   Component Value Date    WBC 7.9 2019    HGB 15.1 2019    HCT 44.5 2019     (L) 2019    CRP 18.1 (H) 2014    SED 9 2014     2019    POTASSIUM 4.6 2019    CHLORIDE 105 2019    CO2 28 2019    BUN 29 2019    CR 1.17 2019    GLC 94 2019  "   GOPAL 9.4 04/17/2019    ALBUMIN 4.0 04/17/2019    PROTTOTAL 7.7 04/17/2019    ALT 19 04/17/2019    AST 15 04/17/2019    ALKPHOS 67 04/17/2019    BILITOTAL 0.6 04/17/2019    INR 0.98 10/25/2005    TSH 1.44 05/02/2017       Preop Vitals  BP Readings from Last 3 Encounters:   05/14/19 (!) 152/94   05/03/19 (!) 124/92   04/17/19 137/81    Pulse Readings from Last 3 Encounters:   05/14/19 83   05/03/19 85   04/17/19 73      Resp Readings from Last 3 Encounters:   05/14/19 16   04/17/19 14   08/06/18 20    SpO2 Readings from Last 3 Encounters:   05/14/19 97%   05/03/19 96%   04/17/19 99%      Temp Readings from Last 1 Encounters:   05/14/19 36.8  C (98.2  F) (Oral)    Ht Readings from Last 1 Encounters:   05/14/19 1.803 m (5' 11\")      Wt Readings from Last 1 Encounters:   05/14/19 128.4 kg (283 lb)    Estimated body mass index is 39.47 kg/m  as calculated from the following:    Height as of this encounter: 1.803 m (5' 11\").    Weight as of this encounter: 128.4 kg (283 lb).     LDA:  Airway - Adult/Peds laryngeal mask airway (Active)   Number of days: 0            Assessment:   ASA SCORE: 3    NPO Status: > 2 hours since completed Clear Liquids; > 6 hours since completed Solid Foods   Documentation: H&P complete; Preop Testing complete; Consents complete   Proceeding: Proceed without further delay  Tobacco Use:  NO Active use of Tobacco/UNKNOWN Tobacco use status     Plan:   Anes. Type:  General   Pre-Induction: Acetaminophen PO   Induction:  IV (Standard)   Airway: LMA   Access/Monitoring: PIV   Maintenance: IV; Propofol   Emergence: Procedure Site   Logistics: Same Day Surgery     Postop Pain/Sedation Strategy:  Standard-Options: Opioids PRN     PONV Management:  Adult Risk Factors:, Non-Smoker, Postop Opioids  Prevention: Propofol Infusion; Ondansetron; Dexamethasone     CONSENT: Direct conversation   Plan and risks discussed with: Patient   Blood Products: Consent Deferred (Minimal Blood Loss)                     "       Cruz Sam MD

## 2019-05-14 NOTE — OP NOTE
May 14, 2019    PREOPERATIVE DIAGNOSIS:  Gender dysphoria  POSTOPERATIVE DIAGNOSIS:  Same    PROCEDURES PERFORMED:   1. Bilateral scrotal orchiectomy    STAFF SURGEON:  Abhilash Weston MD  RESIDENT(S):  Karen Ratliff MD  ANESTHESIA:  G[place]eral    ESTIMATED BLOOD LOSS: 5 mL.   IV FLUIDS: see dictated anesthesia record  COMPLICATIONS: None.   SPECIMEN:    bilateral testicle and spermatic cord up to the level of the exteral ring    SIGNIFICANT FINDINGS:   Ligation of the bilateral spermatic cord at the level approximately of the external ring; testicle excised en block.    BRIEF OPERATIVE INDICATIONS:69 year old transgender woman seeks bilateral orchiectomy for gender confirmation. Risks, benefits and alternatives were discussed and she decided to undergo above named procedure.     DESCRIPTION OF PROCEDURE: After full informed voluntary consent was obtained, the patient was transported to the operating room, placed supine on the table. After adequate anesthesia was induced, they were prepped and draped in the usual sterile fashion. A timeout was taken to confirm correct patient, procedure and laterality      We began the procedure by marking a 4 cm scrotal incision beginning at the penoscrotal junction at the midline raphe. Injection of approximately 5cc 0.5% Marcaine was used for local anesthesia along this renetta. Incision was made using a scalpel and electrocautery was used to carry dissection down through the dartos muscle . The right testicle was delivered into the wound. Tunica vaginalis was intact. Blunt and electrocautery dissection was continued proximally to mobilize the cord. The cord was dissected superiorly up to the level of the external ring. It was divided into two segments and separately clamped and divided. Each was then stick tied with 0 silk suture, then a 0 vicryl hand tie was placed below the stick tie. The cord was then divided sharply. The testicle and spermatic cord were removed.    The procedure as  stated above was then repeated on the left side.     Irrigation was performed and a cord block was done with 0.5% Marcaine on remnant of spermatic cord bilaterally. Hemostasis was excellent.      The dartos was closed with a running 3-0 chromic suture followed by the skin with running horizontal mattress also with 3-0 chromic suture. Patient tolerated the procedure well. No apparent complications. She was transported to the postanesthesia care unit in stable condition.         Karen Ratliff MD  PGY 2 Urology    I was present and scrubbed for the entire procedure.  Abhilash Weston MD  Urology Staff

## 2019-05-16 LAB — COPATH REPORT: NORMAL

## 2019-05-23 ENCOUNTER — PRE VISIT (OUTPATIENT)
Dept: UROLOGY | Facility: CLINIC | Age: 69
End: 2019-05-23

## 2019-05-23 NOTE — TELEPHONE ENCOUNTER
Reason for Visit: post operative check up S/P bilateral scrotal orchiectomy    Diagnosis: gender dysphoria    Orders/Procedures/Records: n/a    Contact Patient: n/a    Rooming Requirements: normal      Anamika Berrios LPN  05/23/19  2:38 PM

## 2019-05-24 ENCOUNTER — PATIENT OUTREACH (OUTPATIENT)
Dept: GERIATRIC MEDICINE | Facility: CLINIC | Age: 69
End: 2019-05-24

## 2019-05-28 NOTE — PROGRESS NOTES
[facility] Care Coordination Contact    cc is attempting to find out who members CADI CM is. Upon review of previous CM UTF - No CADI CM was assigned following her county transfer.  Member is unable to give cc the information. Member stated Haritha is her main contact. (Haritha was the previous [facility] cc [place] to [place] on 5/1/2019)      Call placed to [facility] to obtain additional information. Awaiting response.    No MMIS entry has been completed at this time.    [facility]   [phone]    5/17/2019 5/29/2019  cc received a VM message from Meg Estela  [facility] [place]  TY. ([phone])    Meg stated she is now members CADI CM and to call her with questions.      VM left requesting a return call and that she fax all yearly PW to this writer.    MMIS entry completed.    REMY Johnston  [facility]   [phone]      6/14/2019    cc left another VM requesting CADI CM forward paperwork as requested and contact this writer. Awaiting return call.    REMY Johnston  [facility]   [phone]    6/27/2019   2 additional messages left for CADI CM to forward PW. No response. No PW received.    REMY Johnston  [facility]   [phone]

## 2019-05-30 ENCOUNTER — OFFICE VISIT (OUTPATIENT)
Dept: UROLOGY | Facility: CLINIC | Age: 69
End: 2019-05-30
Payer: COMMERCIAL

## 2019-05-30 VITALS
BODY MASS INDEX: 39.62 KG/M2 | HEIGHT: 71 IN | HEART RATE: 73 BPM | SYSTOLIC BLOOD PRESSURE: 175 MMHG | WEIGHT: 283 LBS | DIASTOLIC BLOOD PRESSURE: 81 MMHG

## 2019-05-30 DIAGNOSIS — Z09 POSTOP CHECK: Primary | ICD-10-CM

## 2019-05-30 ASSESSMENT — MIFFLIN-ST. JEOR: SCORE: 2070.81

## 2019-05-30 ASSESSMENT — PAIN SCALES - GENERAL: PAINLEVEL: NO PAIN (0)

## 2019-05-30 NOTE — PROGRESS NOTES
"CC: Laila Perez is post-op from transgender orchiectomy  done 5/14/19.    HPI: Patient is 2  wks post-op.  he has been doing well. No fevers or chills. Pain decreasing.   Bilateral testis pathology was benign.    Exam:     /81   Pulse 73   Ht 1.803 m (5' 11\")   Wt 128.4 kg (283 lb)   BMI 39.47 kg/m      Alert, NAD.     Respirations normal, non-labored.    Incision  healing well. No discharge, erythema or fluctuence suggestive of infection.     PLAN:     F/U PRN.   "

## 2019-05-30 NOTE — NURSING NOTE
Patient left un-open bottle of oxycodone with Dr. Weston. Dr. Weston handed bottle to writer. Writer and Shantel Rogers, KITTY, brought un-open bottle back to patient and advised her to give them to the pharmacy for destruction. Patient stated understanding.    Anamika Berrios LPN  5/30/2019  11:40 AM

## 2019-05-30 NOTE — NURSING NOTE
"Chief Complaint   Patient presents with     Surgical Followup     post operative check up S/P bilateral scrotal orchiectomy       Blood pressure 175/81, pulse 73, height 1.803 m (5' 11\"), weight 128.4 kg (283 lb). Body mass index is 39.47 kg/m .    Patient Active Problem List   Diagnosis     Gouty arthropathy     Mental or behavioral problem     Generalized osteoarthrosis, unspecified site     Disorder of bone and cartilage     Mixed hyperlipidemia     OA (osteoarthritis) of knee     HYPERLIPIDEMIA LDL GOAL <100     DDD (degenerative disc disease), lumbar     Alcoholism (H)     Advanced directives, counseling/discussion     Varicose veins of legs     Psoriasis     HTN, goal below 140/90     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     Family history of diabetes mellitus in first degree relative     Mitral valve disorder     Nonrheumatic aortic valve stenosis     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     Morbid obesity (H)     Health Care Home       Allergies   Allergen Reactions     Penicillin [Esters] Itching     he is not sure what kind of reaction he had     Heparin      he lives in a assisted living program and is not sure what type of allergies he really has. He quit drinking     Zosyn [Penicillins]      unsure of reaction, allergy is listed on his med sheet       Current Outpatient Medications   Medication Sig Dispense Refill     dorzolamide-timolol (COSOPT) 2-0.5 % ophthalmic solution INSTILL 1 DROP INTO BOTH EYES 2 TIMES DAILY.  4     estradiol (VIVELLE-DOT) 0.1 MG/24HR bi-weekly patch Place 2 patches onto the skin twice a week 16 patch 3     finasteride (PROSCAR) 5 MG tablet Take 5 mg by mouth daily  1     latanoprost (XALATAN) 0.005 % ophthalmic solution 1 drop At Bedtime.       lovastatin (MEVACOR) 20 MG tablet Take 1 tablet (20 mg) by mouth At Bedtime 30 tablet 0     meloxicam (MOBIC) 15 MG tablet Take 1 tablet (15 mg) by mouth daily 30 tablet 0     Multiple Vitamin (DAILY-EVANGELISTA) TABS Take 1 tablet by mouth " daily  11     order for DME Equipment being ordered: Sigvaris 232 Cotton Thigh High for Men 20-30 mmHg- TWO PAIR TAN COLOR 2 each 5     ORDER FOR DME Equipment being ordered:   BACK BRACE    MedSpec Back-n-Shape II Back Brace  Size XXXL with thermoplastic insert        1 each 0     oxyCODONE (ROXICODONE) 5 MG tablet Take 1-2 tablets (5-10 mg) by mouth every 4 hours as needed for moderate to severe pain 18 tablet 0     SM CALCIUM 600/VITAMIN D 600-400 MG-UNIT per tablet Take 1 tablet by mouth 2 times daily 60 tablet 3     spironolactone (ALDACTONE) 100 MG tablet Take 100 mg by mouth daily  1       Social History     Tobacco Use     Smoking status: Never Smoker     Smokeless tobacco: Never Used   Substance Use Topics     Alcohol use: No     Comment: Quit 3/24/05     Drug use: No       Anamika Berrios LPN  5/30/2019  10:34 AM

## 2019-05-30 NOTE — LETTER
"5/30/2019       RE: Laila Perez  [id number] [facility] [id number]     Dear Colleague,    Thank you for referring your patient, Laila Perez, to the [facility] UROLOG[facility] PROSTATE [facility] Please see a copy of my visit note below.    Pt left un-opened bottle of oxycodone with Dr. Weston who handed the medication to Anamika Berrios LPN.     I went with Anamika to locate patient. Medication returned to patient with seal unbroken and patient advised to return medication to the pharmacy for disposal.    JACKIE Rausch    CC: Laila Perez is post-op from transgender orchiectomy  done 5/14/19.    HPI: Patient is 2  wks post-op.  he has been doing well. No fevers or chills. Pain decreasing.   Bilateral testis pathology was benign.    Exam:   /81   Pulse 73   Ht 1.803 m (5' 11\")   Wt 128.4 kg (283 lb)   BMI 39.47 kg/m     Alert, NAD.   Respirations normal, non-labored.  Incision  healing well. No discharge, erythema or fluctuence suggestive of infection.     PLAN:   F/U PRN.     Again, thank you for allowing me to participate in the care of your patient.      Sincerely,    Abhilash Weston MD      "

## 2019-05-30 NOTE — PROGRESS NOTES
Pt left un-opened bottle of oxycodone with Dr. Weston who handed the medication to Anamika Berrios LPN.     I went with Anamika to locate patient. Medication returned to patient with seal unbroken and patient advised to return medication to the pharmacy for disposal.    JACKIE Rausch

## 2019-06-10 ENCOUNTER — OFFICE VISIT (OUTPATIENT)
Dept: OTHER | Facility: OUTPATIENT CENTER | Age: 69
End: 2019-06-10
Payer: COMMERCIAL

## 2019-06-10 ENCOUNTER — TELEPHONE (OUTPATIENT)
Dept: UROLOGY | Facility: CLINIC | Age: 69
End: 2019-06-10

## 2019-06-10 DIAGNOSIS — F64.0 GENDER DYSPHORIA IN ADOLESCENT AND ADULT: Primary | ICD-10-CM

## 2019-06-10 NOTE — PROGRESS NOTES
[facility] -  Case Progress Note  Client Name: Laila Perez  YOB: 1950  MRN:  [id number]  Treating Provider: Estelle Gomez LP  Type of Session: Individual  Present in Session: client   Number of Minutes:  55    Treatment plan: 1/14/19    Health Maintenance Summary - Mental Health Treatment Plan       Status Date      MENTAL HEALTH TX PLAN Overdue 12/30/2017      Done 10/30/2017      Done 9/6/2016           Date of Service: 6/10/19    Current Symptoms/Status:  Distress about gender and secondary sex characteristics, wants genitals removed, bothers her daily of moderate to severe intensity, distress with work situation.    Progress Toward Treatment Goals:   Client reported progress with improved mood with getting her surgery done.    Intervention: Modality and Description:  Provided support and feedback. Used CBT to process gender dysphoria concerns. Client shared being able to go off of the hormone medications because of having the surgery completed.  She shared overall the surgery went well and her recovery went well until last Friday.  She was at work and started feeling pain and swelling and by Saturday noticed she had a bad infection in the surgery site.  She allowed it to burst on its own and stated she feels much better.  She did not try to contact the surgeon or any other medical provider.  She states she called the surgeon's office this morning but did not find anyone who knew what to do for her.  She is going to call again this afternoon.  Discussed this is an extremely important because she may need antibiotics to treat the infection.  Also processed her recent interactions with friends and just generally feeling better.      Interactive Complexity:  There are four specific communication difficulties that complicate the work of the primary psychiatric procedure.  Interactive complexity (+01860) may be reported when at least one of these difficulties is  present.    Communication difficulties present during current the psychiatric procedure include:  1. The need to manage maladaptive communication (related to e.g. high anxiety, high reactivity, repeated questions, or disagreement) among participants that complicates delivery of care.        Response to Intervention:  Client reported validation.    Assignment:  Follow surgeon guidelines, maintain balanced approach to weight loss.      Diagnosis:  Encounter Diagnosis   Name Primary?     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 Yes           Plan / Need for Future Services:  Return for therapy in 4 weeks.      Estelle Gomez PsyD, LP

## 2019-06-10 NOTE — TELEPHONE ENCOUNTER
----- Message from Abhilash Weston MD sent at 6/10/2019  4:08 PM CDT -----  Regarding: FW: client reported infection at surgical site.  Pt should see me back or one of the NPs back ASAP this week.  Thank You    ----- Message -----  From: Estelle Gomez LP  Sent: 6/10/2019   2:39 PM  To: Abhilash Weston MD  Subject: client reported infection at surgical site.      Dr. Weston,    I met with this client today. She informed me that last Friday June 7th she started experiencing a lot of pain and swelling at the surgical site. She shared it continued to worse and it finally burst on its own on Saturday. She shared there was a great deal of pus that drained out. I told her she should contact your office and report this incident as she may need to be seen or be prescribed antibiotics.    Mayte Gomez

## 2019-06-10 NOTE — TELEPHONE ENCOUNTER
Called patient and left message to come in on Thursday June 13th at 11:40 am or call [facility] for Wednesday to be seen up there. Carlota Anne LPN Staff Nurse

## 2019-06-11 ENCOUNTER — PRE VISIT (OUTPATIENT)
Dept: UROLOGY | Facility: CLINIC | Age: 69
End: 2019-06-11

## 2019-06-11 DIAGNOSIS — M19.91 PRIMARY OSTEOARTHRITIS, UNSPECIFIED SITE: ICD-10-CM

## 2019-06-11 DIAGNOSIS — E78.5 HYPERLIPIDEMIA LDL GOAL <100: ICD-10-CM

## 2019-06-11 RX ORDER — MELOXICAM 15 MG/1
15 TABLET ORAL DAILY
Qty: 30 TABLET | Refills: 0 | Status: SHIPPED | OUTPATIENT
Start: 2019-06-11 | End: 2019-07-11

## 2019-06-11 RX ORDER — LOVASTATIN 20 MG
TABLET ORAL
Qty: 30 TABLET | Refills: 0 | Status: SHIPPED | OUTPATIENT
Start: 2019-06-11 | End: 2019-07-11

## 2019-06-11 NOTE — TELEPHONE ENCOUNTER
Reason for Visit: Follow up wound check, possible infection?    Diagnosis: gender dyphoria    Orders/Procedures/Records: n/a    Contact Patient: n/a    Rooming Requirements: ely Berrios LPN  06/11/19  6:17 AM

## 2019-06-11 NOTE — TELEPHONE ENCOUNTER
"Requested Prescriptions   Pending Prescriptions Disp Refills     lovastatin (MEVACOR) 20 MG tablet [Pharmacy Med Name: LOVASTATIN 20 MG TABLET] 30 tablet 0     Sig: TAKE ONE TABLET (20mg) BY MOUTH AT BEDTIME       Statins Protocol Failed - 6/11/2019  9:59 AM        Failed - LDL on file in past 12 months     Recent Labs   Lab Test 05/09/18  0942   LDL 40             Passed - No abnormal creatine kinase in past 12 months     No lab results found.             Passed - Recent (12 mo) or future (30 days) visit within the authorizing provider's specialty     Patient had office visit in the last 12 months or has a visit in the next 30 days with authorizing provider or within the authorizing provider's specialty.  See \"Patient Info\" tab in inbasket, or \"Choose Columns\" in Meds & Orders section of the refill encounter.      Last Written Prescription Date:  4/15/19  Last Fill Quantity: 30,  # refills: 0   Last office visit: 4/17/2019 with prescribing provider:     Future Office Visit:                Passed - Medication is active on med list        Passed - Patient is age 18 or older        meloxicam (MOBIC) 15 MG tablet [Pharmacy Med Name: MELOXICAM 15 MG TABLET] 30 tablet 0     Sig: Take 1 tablet (15 mg) by mouth daily       NSAID Medications Failed - 6/11/2019  9:59 AM        Failed - Blood pressure under 140/90 in past 12 months     BP Readings from Last 3 Encounters:   05/30/19 175/81   05/14/19 140/77   05/03/19 (!) 124/92                 Failed - Patient is age 6-64 years        Failed - Normal CBC on file in past 12 months     Recent Labs   Lab Test 04/17/19  1026   WBC 7.9   RBC 4.81   HGB 15.1   HCT 44.5   *                 Passed - Normal ALT on file in past 12 months     Recent Labs   Lab Test 04/17/19  1026   ALT 19             Passed - Normal AST on file in past 12 months     Recent Labs   Lab Test 04/17/19  1026   AST 15             Passed - Recent (12 mo) or future (30 days) visit within the authorizing " "provider's specialty     Patient had office visit in the last 12 months or has a visit in the next 30 days with authorizing provider or within the authorizing provider's specialty.  See \"Patient Info\" tab in inbasket, or \"Choose Columns\" in Meds & Orders section of the refill encounter.      Last Written Prescription Date:  4/15/19  Last Fill Quantity: 30,  # refills: 0   Last office visit: 4/17/2019 with prescribing provider:     Future Office Visit:                Passed - Medication is active on med list        Passed - Normal serum creatinine on file in past 12 months     Recent Labs   Lab Test 04/17/19  1026   CR 1.17               "

## 2019-06-12 DIAGNOSIS — F64.0 GENDER DYSPHORIA IN ADOLESCENT AND ADULT: ICD-10-CM

## 2019-06-12 RX ORDER — ESTRADIOL 0.1 MG/D
2 FILM, EXTENDED RELEASE TRANSDERMAL
Qty: 16 PATCH | Refills: 3 | Status: SHIPPED | OUTPATIENT
Start: 2019-06-13 | End: 2019-08-06

## 2019-06-12 NOTE — TELEPHONE ENCOUNTER
Requested Medication: Estradiol 0.1 mg  Dose: 0.2 mg  Quantity: 48  Refills:     Apply 2 patches twice weekly    Last seen at [facility]: 4/22 -4 mo follow up  Next Appointment with Provider:  Visit date not found    Pharmacy requesting refills early to help maintain synchronize medications. Will be placed on file    Alexandra Long CMA

## 2019-06-13 ENCOUNTER — OFFICE VISIT (OUTPATIENT)
Dept: FAMILY MEDICINE | Facility: CLINIC | Age: 69
End: 2019-06-13
Payer: COMMERCIAL

## 2019-06-13 VITALS
SYSTOLIC BLOOD PRESSURE: 120 MMHG | HEART RATE: 64 BPM | RESPIRATION RATE: 16 BRPM | BODY MASS INDEX: 40.45 KG/M2 | WEIGHT: 290 LBS | TEMPERATURE: 96.8 F | DIASTOLIC BLOOD PRESSURE: 58 MMHG

## 2019-06-13 DIAGNOSIS — N30.00 ACUTE CYSTITIS WITHOUT HEMATURIA: ICD-10-CM

## 2019-06-13 DIAGNOSIS — R82.90 NONSPECIFIC FINDING ON EXAMINATION OF URINE: ICD-10-CM

## 2019-06-13 DIAGNOSIS — Z12.11 SPECIAL SCREENING FOR MALIGNANT NEOPLASMS, COLON: ICD-10-CM

## 2019-06-13 DIAGNOSIS — T81.49XA SURGICAL SITE INFECTION: Primary | ICD-10-CM

## 2019-06-13 LAB
ALBUMIN UR-MCNC: NEGATIVE MG/DL
APPEARANCE UR: CLEAR
BACTERIA #/AREA URNS HPF: ABNORMAL /HPF
BILIRUB UR QL STRIP: NEGATIVE
COLOR UR AUTO: YELLOW
GLUCOSE UR STRIP-MCNC: NEGATIVE MG/DL
HGB UR QL STRIP: ABNORMAL
KETONES UR STRIP-MCNC: NEGATIVE MG/DL
LEUKOCYTE ESTERASE UR QL STRIP: ABNORMAL
NITRATE UR QL: POSITIVE
NON-SQ EPI CELLS #/AREA URNS LPF: ABNORMAL /LPF
PH UR STRIP: 6 PH (ref 5–7)
RBC #/AREA URNS AUTO: ABNORMAL /HPF
SOURCE: ABNORMAL
SP GR UR STRIP: 1.02 (ref 1–1.03)
UROBILINOGEN UR STRIP-ACNC: 0.2 EU/DL (ref 0.2–1)
WBC #/AREA URNS AUTO: ABNORMAL /HPF
WBC CLUMPS #/AREA URNS HPF: PRESENT /HPF

## 2019-06-13 PROCEDURE — 87186 SC STD MICRODIL/AGAR DIL: CPT | Performed by: PHYSICIAN ASSISTANT

## 2019-06-13 PROCEDURE — 87086 URINE CULTURE/COLONY COUNT: CPT | Performed by: PHYSICIAN ASSISTANT

## 2019-06-13 PROCEDURE — 87088 URINE BACTERIA CULTURE: CPT | Performed by: PHYSICIAN ASSISTANT

## 2019-06-13 PROCEDURE — 99214 OFFICE O/P EST MOD 30 MIN: CPT | Performed by: PHYSICIAN ASSISTANT

## 2019-06-13 PROCEDURE — 81001 URINALYSIS AUTO W/SCOPE: CPT | Performed by: PHYSICIAN ASSISTANT

## 2019-06-13 RX ORDER — SULFAMETHOXAZOLE/TRIMETHOPRIM 800-160 MG
1 TABLET ORAL 2 TIMES DAILY
Qty: 20 TABLET | Refills: 0 | Status: SHIPPED | OUTPATIENT
Start: 2019-06-13 | End: 2019-06-13

## 2019-06-13 RX ORDER — SULFAMETHOXAZOLE/TRIMETHOPRIM 800-160 MG
1 TABLET ORAL 2 TIMES DAILY
Qty: 20 TABLET | Refills: 0 | Status: SHIPPED | OUTPATIENT
Start: 2019-06-13 | End: 2019-06-23

## 2019-06-13 ASSESSMENT — ENCOUNTER SYMPTOMS
ABDOMINAL PAIN: 0
EYE DISCHARGE: 0
MYALGIAS: 0
SHORTNESS OF BREATH: 0
SORE THROAT: 0
DIARRHEA: 0
VOMITING: 0
WHEEZING: 0
COUGH: 0
HEADACHES: 0
BLURRED VISION: 0
EYE REDNESS: 0
CHILLS: 0
NAUSEA: 0
FEVER: 0
PALPITATIONS: 0

## 2019-06-13 NOTE — PROGRESS NOTES
Subjective     Laila Perez is a 69 year old male who presents to clinic today for the following health issues:    HPI   Concern - Site infection   Onset: Friday night     Description:   Friday night had a large amount of swelling.  Saturday incision burst and drained all day.  Patient thinks there may be an incision infection.  Surgery was a month ago.  [place]     Intensity: mild    Progression of Symptoms:  same    Accompanying Signs & Symptoms:  Friday night had a fever with sweats.     Previous history of similar problem:   Na    Precipitating factors:   Worsened by: na    Alleviating factors:  Improved by: na     Therapies Tried and outcome: Cleaned surgical site       Patient Active Problem List   Diagnosis     Gouty arthropathy     Mental or behavioral problem     Generalized osteoarthrosis, unspecified site     Disorder of bone and cartilage     Mixed hyperlipidemia     OA (osteoarthritis) of knee     HYPERLIPIDEMIA LDL GOAL <100     DDD (degenerative disc disease), lumbar     Alcoholism (H)     Advanced directives, counseling/discussion     Varicose veins of legs     Psoriasis     HTN, goal below 140/90     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     Family history of diabetes mellitus in first degree relative     Mitral valve disorder     Nonrheumatic aortic valve stenosis     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     Morbid obesity (H)     Health Care Home     Past Surgical History:   Procedure Laterality Date     COLONOSCOPY  2007     JOINT REPLACEMTN, KNEE RT/LT  2006    left     ORCHIECTOMY SCROTAL Bilateral 5/14/2019    Procedure: Bilatera Scrotal Orchiectomy;  Surgeon: Abhilash Weston MD;  Location:  OR       Social History     Tobacco Use     Smoking status: Never Smoker     Smokeless tobacco: Never Used   Substance Use Topics     Alcohol use: No     Comment: Quit 3/24/05     Family History   Problem Relation Age of Onset     Cancer Father         lung  cancer heavy smoker         Current Outpatient Medications   Medication Sig Dispense Refill     estradiol (VIVELLE-DOT) 0.1 MG/24HR bi-weekly patch Place 2 patches onto the skin twice a week 16 patch 3     latanoprost (XALATAN) 0.005 % ophthalmic solution 1 drop At Bedtime.       lovastatin (MEVACOR) 20 MG tablet TAKE ONE TABLET (20mg) BY MOUTH AT BEDTIME 30 tablet 0     meloxicam (MOBIC) 15 MG tablet Take 1 tablet (15 mg) by mouth daily 30 tablet 0     Multiple Vitamin (DAILY-EVANGELISTA) TABS Take 1 tablet by mouth daily  11     order for DME Equipment being ordered: Sigvaris 232 Cotton Thigh High for Men 20-30 mmHg- TWO PAIR TAN COLOR 2 each 5     ORDER FOR DME Equipment being ordered:   BACK BRACE    MedSpec Back-n-Shape II Back Brace  Size XXXL with thermoplastic insert        1 each 0     oxyCODONE (ROXICODONE) 5 MG tablet Take 1-2 tablets (5-10 mg) by mouth every 4 hours as needed for moderate to severe pain 18 tablet 0     SM CALCIUM 600/VITAMIN D 600-400 MG-UNIT per tablet Take 1 tablet by mouth 2 times daily 60 tablet 3     spironolactone (ALDACTONE) 100 MG tablet Take 100 mg by mouth daily  1     sulfamethoxazole-trimethoprim (BACTRIM DS/SEPTRA DS) 800-160 MG tablet Take 1 tablet by mouth 2 times daily for 10 days 20 tablet 0     finasteride (PROSCAR) 5 MG tablet Take 5 mg by mouth daily  1     Allergies   Allergen Reactions     Penicillin [Esters] Itching     he is not sure what kind of reaction he had     Heparin      he lives in a assisted living program and is not sure what type of allergies he really has. He quit drinking     Zosyn [Penicillins]      unsure of reaction, allergy is listed on his med sheet         Reviewed and updated as needed this visit by Provider  Tobacco  Allergies  Meds  Problems  Med Hx  Surg Hx  Fam Hx       Review of Systems   Constitutional: Negative for chills, fever and malaise/fatigue.   HENT: Negative for congestion, ear pain and sore throat.    Eyes: Negative for blurred  vision, discharge and redness.   Respiratory: Negative for cough, shortness of breath and wheezing.    Cardiovascular: Negative for chest pain and palpitations.   Gastrointestinal: Negative for abdominal pain, diarrhea, nausea and vomiting.   Musculoskeletal: Negative for joint pain and myalgias.   Skin: Negative for rash.        Skin infection   Neurological: Negative for headaches.           Objective    /58 (BP Location: Right arm, Patient Position: Chair, Cuff Size: Adult Large)   Pulse 64   Temp 96.8  F (36  C) (Tympanic)   Resp 16   Wt 131.5 kg (290 lb)   BMI 40.45 kg/m    Body mass index is 40.45 kg/m .    Physical Exam   Constitutional: He appears well-developed and well-nourished. No distress.   HENT:   Head: Normocephalic and atraumatic.   Right Ear: Tympanic membrane and ear canal normal.   Left Ear: Tympanic membrane and ear canal normal.   Mouth/Throat: Oropharynx is clear and moist.   Eyes: Pupils are equal, round, and reactive to light. Conjunctivae are normal.   Cardiovascular: Normal rate and regular rhythm.   Pulmonary/Chest: Effort normal and breath sounds normal.   Genitourinary:   Genitourinary Comments: Incision appears to be well healed. Scrotum skin in red, firm, and tender to the the touch. No visible drainage/discharge.    Skin: Skin is warm and dry. No rash noted.   Psychiatric: He has a normal mood and affect. His speech is normal and behavior is normal.         Diagnostic Test Results:  Labs reviewed in Epic  Urinalysis - positive for UTI        Assessment & Plan       1. Surgical site infection, scrotum  Was able to speak with surgical team. Will treat with Bactrim two times daily x 10 days. Keep area clean and dry. Follow up with Dr. Weston if needed. Discussed in detail symptoms that would warrant emergent evaluation in the ED. Patient agrees with plan and will follow up as needed.      - UA reflex to Microscopic and Culture  - sulfamethoxazole-trimethoprim (BACTRIM  DS/SEPTRA DS) 800-160 MG tablet; Take 1 tablet by mouth 2 times daily for 10 days  Dispense: 20 tablet; Refill: 0  - Urine Microscopic    2. Acute cystitis without hematuria  Culture pending. Patient started on Bactrim two times daily x 10 days.   - sulfamethoxazole-trimethoprim (BACTRIM DS/SEPTRA DS) 800-160 MG tablet; Take 1 tablet by mouth 2 times daily for 10 days  Dispense: 20 tablet; Refill: 0    3. Special screening for malignant neoplasms, colon    - Fecal colorectal cancer screen (FIT); Future    4. Nonspecific finding on examination of urine    - Urine Culture Aerobic Bacterial       Maki Stoll PA-C  [facility]

## 2019-06-13 NOTE — NURSING NOTE
"Chief Complaint   Patient presents with     Wound Check       Initial /58 (BP Location: Right arm, Patient Position: Chair, Cuff Size: Adult Large)   Pulse 64   Temp 96.8  F (36  C) (Tympanic)   Resp 16   Wt 131.5 kg (290 lb)   BMI 40.45 kg/m   Estimated body mass index is 40.45 kg/m  as calculated from the following:    Height as of 5/30/19: 1.803 m (5' 11\").    Weight as of this encounter: 131.5 kg (290 lb).    Patient presents to the clinic using No DME    Health Maintenance that is potentially due pending provider review:  FIT    Will give patient FIT.     Is there anyone who you would like to be able to receive your results? No  If yes have patient fill out YASIR    Arun Finney M.A.      "

## 2019-06-14 ASSESSMENT — ENCOUNTER SYMPTOMS: ROS SKIN COMMENTS: SKIN INFECTION

## 2019-06-15 LAB
BACTERIA SPEC CULT: ABNORMAL
Lab: ABNORMAL
SPECIMEN SOURCE: ABNORMAL

## 2019-06-28 ENCOUNTER — TELEPHONE (OUTPATIENT)
Dept: OTHER | Facility: OUTPATIENT CENTER | Age: 69
End: 2019-06-28

## 2019-06-28 NOTE — TELEPHONE ENCOUNTER
Dr. Mane will be out of the office on the morning of 7/23. Need to cancel pts current appt. LVM asking to call back to reschedule.

## 2019-07-09 PROCEDURE — 82274 ASSAY TEST FOR BLOOD FECAL: CPT | Performed by: NURSE PRACTITIONER

## 2019-07-11 DIAGNOSIS — E78.5 HYPERLIPIDEMIA LDL GOAL <100: ICD-10-CM

## 2019-07-11 DIAGNOSIS — M19.91 PRIMARY OSTEOARTHRITIS, UNSPECIFIED SITE: ICD-10-CM

## 2019-07-11 DIAGNOSIS — Z12.11 SPECIAL SCREENING FOR MALIGNANT NEOPLASMS, COLON: ICD-10-CM

## 2019-07-11 LAB — HEMOCCULT STL QL IA: POSITIVE

## 2019-07-11 RX ORDER — LOVASTATIN 20 MG
TABLET ORAL
Qty: 30 TABLET | Refills: 0 | Status: SHIPPED | OUTPATIENT
Start: 2019-07-11 | End: 2019-08-05

## 2019-07-11 RX ORDER — MELOXICAM 15 MG/1
15 TABLET ORAL DAILY
Qty: 30 TABLET | Refills: 0 | Status: SHIPPED | OUTPATIENT
Start: 2019-07-11 | End: 2019-08-05

## 2019-07-17 ENCOUNTER — OFFICE VISIT (OUTPATIENT)
Dept: OTHER | Facility: OUTPATIENT CENTER | Age: 69
End: 2019-07-17
Payer: COMMERCIAL

## 2019-07-17 DIAGNOSIS — F64.0 GENDER DYSPHORIA IN ADOLESCENT AND ADULT: Primary | ICD-10-CM

## 2019-07-18 NOTE — PROGRESS NOTES
[facility] -  Case Progress Note  Client Name: Laila Perez  YOB: 1950  MRN:  [id number]  Treating Provider: Estelle Gomez LP  Type of Session: Individual  Present in Session: client   Number of Minutes:  55    Treatment plan: 1/14/19    Health Maintenance Summary - Mental Health Treatment Plan       Status Date      MENTAL HEALTH TX PLAN Next Due 8/10/2019      Done 6/10/2019      Done 10/30/2017      Done 9/6/2016           Date of Service: 7/17/19    Current Symptoms/Status:  Distress about gender and secondary sex characteristics, wants genitals removed, bothers her daily of moderate to severe intensity, distress with work situation.    Progress Toward Treatment Goals:   Client reported progress with improved mood with getting her surgery done.    Intervention: Modality and Description:  Provided support and feedback. Used CBT to process gender dysphoria concerns. Client shared following up with the surgeon to address the infection and getting it drained and healed.  She shared that she feels much better since she has had surgery and is helping her feel more like a woman.  She still wants the full vaginoplasty in the future.  She shared she continues to work on her weight loss so that she can have the surgery.  She is going to [employer] twice a week and is also doing some bike riding.  She shared getting to see her grandchildren the other day.  Her son was at work lately she got to see them.  She has not seen them for several years and it was a nice visit.  She shared continuing to engage in social activities with her friend and then enjoy each other's company.     Interactive Complexity:  There are four specific communication difficulties that complicate the work of the primary psychiatric procedure.  Interactive complexity (+09476) may be reported when at least one of these difficulties is present.    Communication difficulties present during current the  psychiatric procedure include:  1. The need to manage maladaptive communication (related to e.g. high anxiety, high reactivity, repeated questions, or disagreement) among participants that complicates delivery of care.        Response to Intervention:  Client reported validation.    Assignment:  Follow surgeon guidelines, maintain balanced approach to weight loss.      Diagnosis:  Encounter Diagnosis   Name Primary?     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 Yes           Plan / Need for Future Services:  Return for therapy in 4 weeks.      Estelle Gomez PsyD, LP

## 2019-08-05 DIAGNOSIS — E78.5 HYPERLIPIDEMIA LDL GOAL <100: ICD-10-CM

## 2019-08-05 DIAGNOSIS — M19.91 PRIMARY OSTEOARTHRITIS, UNSPECIFIED SITE: ICD-10-CM

## 2019-08-06 ENCOUNTER — OFFICE VISIT (OUTPATIENT)
Dept: OTHER | Facility: OUTPATIENT CENTER | Age: 69
End: 2019-08-06
Payer: COMMERCIAL

## 2019-08-06 VITALS
WEIGHT: 293 LBS | BODY MASS INDEX: 41.02 KG/M2 | DIASTOLIC BLOOD PRESSURE: 81 MMHG | HEIGHT: 71 IN | SYSTOLIC BLOOD PRESSURE: 145 MMHG | HEART RATE: 60 BPM

## 2019-08-06 DIAGNOSIS — F64.0 GENDER DYSPHORIA IN ADOLESCENT AND ADULT: ICD-10-CM

## 2019-08-06 RX ORDER — ESTRADIOL 0.1 MG/D
1 FILM, EXTENDED RELEASE TRANSDERMAL
Qty: 24 PATCH | Refills: 1 | Status: SHIPPED | OUTPATIENT
Start: 2019-08-08 | End: 2020-01-09

## 2019-08-06 RX ORDER — MELOXICAM 15 MG/1
TABLET ORAL
Qty: 30 TABLET | Refills: 0 | Status: SHIPPED | OUTPATIENT
Start: 2019-08-06 | End: 2019-09-09

## 2019-08-06 RX ORDER — LOVASTATIN 20 MG
TABLET ORAL
Qty: 30 TABLET | Refills: 0 | Status: SHIPPED | OUTPATIENT
Start: 2019-08-06 | End: 2019-09-09

## 2019-08-06 RX ORDER — GEL DRESSING
2 GEL (GRAM) TOPICAL
Qty: 50 EACH | Refills: 3 | Status: SHIPPED | OUTPATIENT
Start: 2019-08-08 | End: 2021-01-12

## 2019-08-06 ASSESSMENT — ENCOUNTER SYMPTOMS
SHORTNESS OF BREATH: 0
NERVOUS/ANXIOUS: 0
HEADACHES: 0
POLYDIPSIA: 0
PALPITATIONS: 0
ABDOMINAL PAIN: 0
FREQUENCY: 0
CHEST TIGHTNESS: 0
LIGHT-HEADEDNESS: 0
FEVER: 0
CHILLS: 0
VOMITING: 0
DYSPHORIC MOOD: 0
NUMBNESS: 0
UNEXPECTED WEIGHT CHANGE: 0
WEAKNESS: 0

## 2019-08-06 ASSESSMENT — MIFFLIN-ST. JEOR: SCORE: 2134.31

## 2019-08-06 NOTE — PROGRESS NOTES
"       HPI     Laila is a 69 year old individual that uses pronouns She/Her/Hers/Herself that presents today for follow up of:  feminizing hormone therapy.   Alone or accompanied by: accompanied today by self  Gender identity: female.   Started Hormone  therapy  *2016  Continues on. Vivelle dot 0.1 mg x 2 2x/wk   Any special concerns today?    Had orchiectomy  With Dr. Weston, with post/op scrotal infection, treated with antibiotics  Has continued on 2 patches of estrogen, but discontinued spironolactone and finasteride as instructed prior to surgery  Was interested in seeing Dr. Holm to do \"genital shaping\" as having trouble losing weight for  Vaginoplasty, eating ice cream to deal with heat; A/C in living space not great    Patches leave residue on skin, and can get sores if try to scrub off, works better if use UNI solve wipes to take off adhesive  On hormones?  YES +++ Shot day of the week? Not applicable-taking pills/patch/gel      Due for labs?  Yes      +++ Refills of meds needed?  Yes  Gender related body changes since last visit:   none    Breakthrough bleeding? Does Not Apply  Activity:   New health concerns since last visit:  None--  Had HTN in past, had been on spironolactone 100 mg, has mild aortic stenosis  Has had 2-3 cups coffee today    ---    Past Surgical History:   Procedure Laterality Date     COLONOSCOPY  2007     JOINT REPLACEMTN, KNEE RT/LT  2006    left     ORCHIECTOMY SCROTAL Bilateral 5/14/2019    Procedure: Bilatera Scrotal Orchiectomy;  Surgeon: Abhilash Weston MD;  Location: [facility] OR       Patient Active Problem List   Diagnosis     Gouty arthropathy     Mental or behavioral problem     Generalized osteoarthrosis, unspecified site     Disorder of bone and cartilage     Mixed hyperlipidemia     OA (osteoarthritis) of knee     HYPERLIPIDEMIA LDL GOAL <100     DDD (degenerative disc disease), lumbar     Alcoholism (H)     Advanced directives, counseling/discussion     Varicose veins of " legs     Psoriasis     HTN, goal below 140/90     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     Family history of diabetes mellitus in first degree relative     Mitral valve disorder     Nonrheumatic aortic valve stenosis     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     Morbid obesity (H)     Health Care Home       Current Outpatient Medications   Medication Sig Dispense Refill     estradiol (VIVELLE-DOT) 0.1 MG/24HR bi-weekly patch Place 2 patches onto the skin twice a week 16 patch 3     finasteride (PROSCAR) 5 MG tablet Take 5 mg by mouth daily  1     latanoprost (XALATAN) 0.005 % ophthalmic solution 1 drop At Bedtime.       lovastatin (MEVACOR) 20 MG tablet TAKE ONE TABLET (20mg) BY MOUTH AT BEDTIME 30 tablet 0     meloxicam (MOBIC) 15 MG tablet TAKE ONE TABLET BY MOUTH EVERY DAY 30 tablet 0     Multiple Vitamin (DAILY-EVANGELISTA) TABS Take 1 tablet by mouth daily  11     order for DME Equipment being ordered: Sigvaris 232 Cotton Thigh High for Men 20-30 mmHg- TWO PAIR TAN COLOR 2 each 5     ORDER FOR DME Equipment being ordered:   BACK BRACE    [employer] Back-n-Shape II Back Brace  Size XXXL with thermoplastic insert        1 each 0     SM CALCIUM 600/VITAMIN D 600-400 MG-UNIT per tablet Take 1 tablet by mouth 2 times daily 60 tablet 3     spironolactone (ALDACTONE) 100 MG tablet Take 100 mg by mouth daily  1       History   Smoking Status     Never Smoker   Smokeless Tobacco     Never Used          Allergies   Allergen Reactions     Penicillin [Esters] Itching     he is not sure what kind of reaction he had     Heparin      he lives in a assisted living program and is not sure what type of allergies he really has. He quit drinking     Zosyn [Penicillins]      unsure of reaction, allergy is listed on his med sheet       Health Maintenance Due   Topic Date Due     MENTAL HEALTH TX PLAN  08/10/2019         Problem, Medication and Allergy Lists were reviewed and are current..         Review of Systems:   Review of Systems  "  Constitutional: Negative for chills, fever and unexpected weight change.   Eyes: Negative for visual disturbance.   Respiratory: Negative for chest tightness and shortness of breath.    Cardiovascular: Negative for chest pain, palpitations and leg swelling.   Gastrointestinal: Negative for abdominal pain and vomiting.   Endocrine: Negative for polydipsia and polyuria.   Genitourinary: Negative for frequency.   Neurological: Negative for weakness, light-headedness, numbness and headaches.   Psychiatric/Behavioral: Negative for dysphoric mood and suicidal ideas. The patient is not nervous/anxious.             Physical Exam:     Vitals:    08/06/19 1035   BP: (!) 161/86   Pulse: 61   Weight: 134.7 kg (297 lb)   Height: 1.803 m (5' 11\")     BMI= Body mass index is 41.42 kg/m .   Wt Readings from Last 10 Encounters:   08/06/19 134.7 kg (297 lb)   06/13/19 131.5 kg (290 lb)   05/30/19 128.4 kg (283 lb)   05/14/19 128.4 kg (283 lb)   05/03/19 128.1 kg (282 lb 6.4 oz)   04/22/19 132 kg (291 lb)   04/17/19 130.2 kg (287 lb)   01/14/19 127.5 kg (281 lb)   10/15/18 128.8 kg (284 lb)   08/06/18 131.7 kg (290 lb 6.4 oz)     Appearance: Female appearance and dress    GENERAL:: healthy, alert and no distress  RESP: lungs clear to auscultation - no rales, no rhonchi, no wheezes  CV: regular rates and rhythm, normal S1 S2, no S3 or S4 and 3/6 murmur, no click or rub -  2+ pitting edema L ankle           Labs:   Results from last visit:  Orders Only on 07/11/2019   Component Date Value Ref Range Status     Occult Blood Scn FIT 07/09/2019 Positive* NEG^Negative Final       Assessment and Plan   1. Gender dysphoria  2. HTN  Counseled patient on maintenance dose for estradiol given co-existing conditions, age post orchiectomy. Decrease to 1 patch 2x/wk.   BP elevated now off spironolactone. Recommend follow-up with  Delmis Simons regarding blood pressure, may benefit from low dose ACE I  Or other antihypertensive. Will send note to " PCP        Follow up:  Follow up in 4 months  Results by mychart  Questions were elicited and answered.     Jluis Mane MD

## 2019-08-06 NOTE — NURSING NOTE
"Chief Complaint   Patient presents with     RECHECK     TG       Vitals:    08/06/19 1035   BP: (!) 161/86   Pulse: 61   Weight: 134.7 kg (297 lb)   Height: 1.803 m (5' 11\")       Body mass index is 41.42 kg/m .      Alexandra Long CMA    "

## 2019-08-06 NOTE — TELEPHONE ENCOUNTER
"Requested Prescriptions   Pending Prescriptions Disp Refills     meloxicam (MOBIC) 15 MG tablet [Pharmacy Med Name: MELOXICAM 15 MG TABLET]  Last Written Prescription Date:  07/11/19  Last Fill Quantity: 30,  # refills: 0   Last office visit: 6/13/2019 with prescribing provider:  BRIANNE gallego   Future Office Visit:     30 tablet 0     Sig: TAKE ONE TABLET BY MOUTH EVERY DAY       NSAID Medications Failed - 8/5/2019  5:53 PM        Failed - Patient is age 6-64 years        Failed - Normal CBC on file in past 12 months     Recent Labs   Lab Test 04/17/19  1026   WBC 7.9   RBC 4.81   HGB 15.1   HCT 44.5   *                 Passed - Blood pressure under 140/90 in past 12 months     BP Readings from Last 3 Encounters:   06/13/19 120/58   05/30/19 175/81   05/14/19 140/77                 Passed - Normal ALT on file in past 12 months     Recent Labs   Lab Test 04/17/19  1026   ALT 19             Passed - Normal AST on file in past 12 months     Recent Labs   Lab Test 04/17/19  1026   AST 15             Passed - Recent (12 mo) or future (30 days) visit within the authorizing provider's specialty     Patient had office visit in the last 12 months or has a visit in the next 30 days with authorizing provider or within the authorizing provider's specialty.  See \"Patient Info\" tab in inbasket, or \"Choose Columns\" in Meds & Orders section of the refill encounter.              Passed - Medication is active on med list        Passed - Normal serum creatinine on file in past 12 months     Recent Labs   Lab Test 04/17/19  1026   CR 1.17             lovastatin (MEVACOR) 20 MG tablet [Pharmacy Med Name: LOVASTATIN 20 MG TABLET]  Last Written Prescription Date:  07/11/19  Last Fill Quantity: 30,  # refills: 0   Last office visit: 6/13/2019 with prescribing provider:  BRIANNE Gallego   Future Office Visit:     30 tablet 0     Sig: TAKE ONE TABLET (20mg) BY MOUTH AT BEDTIME       Statins Protocol Failed - 8/5/2019  5:53 PM        Failed - " "LDL on file in past 12 months     Recent Labs   Lab Test 05/09/18  0942   LDL 40             Passed - No abnormal creatine kinase in past 12 months     No lab results found.             Passed - Recent (12 mo) or future (30 days) visit within the authorizing provider's specialty     Patient had office visit in the last 12 months or has a visit in the next 30 days with authorizing provider or within the authorizing provider's specialty.  See \"Patient Info\" tab in inbasket, or \"Choose Columns\" in Meds & Orders section of the refill encounter.              Passed - Medication is active on med list        Passed - Patient is age 18 or older          "

## 2019-08-12 ENCOUNTER — OFFICE VISIT (OUTPATIENT)
Dept: OTHER | Facility: OUTPATIENT CENTER | Age: 69
End: 2019-08-12
Payer: COMMERCIAL

## 2019-08-12 DIAGNOSIS — F33.0 MAJOR DEPRESSIVE DISORDER, RECURRENT EPISODE, MILD (H): ICD-10-CM

## 2019-08-12 DIAGNOSIS — F64.0 GENDER DYSPHORIA IN ADOLESCENT AND ADULT: Primary | ICD-10-CM

## 2019-08-12 NOTE — PROGRESS NOTES
[facility] -  Case Progress Note  Client Name: Laila Perez  YOB: 1950  MRN:  [id number]  Treating Provider: Estelle Gomez LP  Type of Session: Individual  Present in Session: client   Number of Minutes:  55    Treatment plan: 8/12/19    Health Maintenance Summary - Mental Health Treatment Plan       Status Date      MENTAL HEALTH TX PLAN Overdue 8/10/2019      Done 6/10/2019      Done 10/30/2017      Done 9/6/2016           Date of Service: 8/12/19    Current Symptoms/Status:  Distress about gender and secondary sex characteristics, wants genitals removed, bothers her daily of moderate to severe intensity, distress with work situation, distress with changing at the gym.    Progress Toward Treatment Goals:   Client reported progress with improved mood and gender.    Intervention: Modality and Description:  Provided support and feedback. Used CBT to process gender dysphoria concerns. Client shared she has not fully healed from her surgery.  She shared having appointment with Dr. Holm in November for further surgery.  It was not clear what surgery the client was discussing and so this need to be explored.  Client shared she believes there is another surgery which not creating of vagina but removing penis.  She shared understanding that with this surgery she does not need to lose the same amount of weight.  She thinks this surgery will benefit her as far as not have to see her penis and not having to deal with this issue when she changes at the gym.  She also processed some recent contact with family members discussed some of family history.  Client shared she continues to socialize with her friend.    Interactive Complexity:  There are four specific communication difficulties that complicate the work of the primary psychiatric procedure.  Interactive complexity (+[id number]) may be reported when at least one of these difficulties is present.    Communication difficulties present  during current the psychiatric procedure include:  1. The need to manage maladaptive communication (related to e.g. high anxiety, high reactivity, repeated questions, or disagreement) among participants that complicates delivery of care.        Response to Intervention:  Client reported validation.    Assignment:  Follow surgeon guidelines, maintain balanced approach to weight loss.      Diagnosis:  Encounter Diagnoses   Name Primary?     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 Yes     Major depressive disorder, recurrent episode, mild (H)            Plan / Need for Future Services:  Return for therapy in 4 weeks.      Estelle Gomez PsyD, LP

## 2019-08-16 ENCOUNTER — OFFICE VISIT (OUTPATIENT)
Dept: FAMILY MEDICINE | Facility: CLINIC | Age: 69
End: 2019-08-16
Payer: COMMERCIAL

## 2019-08-16 VITALS
OXYGEN SATURATION: 99 % | WEIGHT: 293 LBS | HEART RATE: 52 BPM | DIASTOLIC BLOOD PRESSURE: 83 MMHG | TEMPERATURE: 96.3 F | RESPIRATION RATE: 14 BRPM | HEIGHT: 71 IN | BODY MASS INDEX: 41.02 KG/M2 | SYSTOLIC BLOOD PRESSURE: 150 MMHG

## 2019-08-16 DIAGNOSIS — R19.5 POSITIVE FIT (FECAL IMMUNOCHEMICAL TEST): Primary | ICD-10-CM

## 2019-08-16 DIAGNOSIS — I10 BENIGN ESSENTIAL HYPERTENSION: ICD-10-CM

## 2019-08-16 DIAGNOSIS — E87.5 HYPERKALEMIA: ICD-10-CM

## 2019-08-16 LAB
ANION GAP SERPL CALCULATED.3IONS-SCNC: 3 MMOL/L (ref 3–14)
BUN SERPL-MCNC: 28 MG/DL (ref 7–30)
CALCIUM SERPL-MCNC: 8.6 MG/DL (ref 8.5–10.1)
CHLORIDE SERPL-SCNC: 108 MMOL/L (ref 94–109)
CO2 SERPL-SCNC: 29 MMOL/L (ref 20–32)
CREAT SERPL-MCNC: 1.08 MG/DL (ref 0.66–1.25)
GFR SERPL CREATININE-BSD FRML MDRD: 69 ML/MIN/{1.73_M2}
GLUCOSE SERPL-MCNC: 89 MG/DL (ref 70–99)
LDLC SERPL DIRECT ASSAY-MCNC: 47 MG/DL
POTASSIUM SERPL-SCNC: 4.7 MMOL/L (ref 3.4–5.3)
SODIUM SERPL-SCNC: 140 MMOL/L (ref 133–144)

## 2019-08-16 PROCEDURE — 99214 OFFICE O/P EST MOD 30 MIN: CPT | Performed by: NURSE PRACTITIONER

## 2019-08-16 PROCEDURE — 80048 BASIC METABOLIC PNL TOTAL CA: CPT | Performed by: NURSE PRACTITIONER

## 2019-08-16 PROCEDURE — 83721 ASSAY OF BLOOD LIPOPROTEIN: CPT | Performed by: NURSE PRACTITIONER

## 2019-08-16 PROCEDURE — 36415 COLL VENOUS BLD VENIPUNCTURE: CPT | Performed by: NURSE PRACTITIONER

## 2019-08-16 RX ORDER — LISINOPRIL 5 MG/1
5 TABLET ORAL DAILY
Qty: 90 TABLET | Refills: 1 | Status: SHIPPED | OUTPATIENT
Start: 2019-08-16 | End: 2020-01-07

## 2019-08-16 ASSESSMENT — PAIN SCALES - GENERAL: PAINLEVEL: NO PAIN (0)

## 2019-08-16 ASSESSMENT — PATIENT HEALTH QUESTIONNAIRE - PHQ9
SUM OF ALL RESPONSES TO PHQ QUESTIONS 1-9: 0
SUM OF ALL RESPONSES TO PHQ QUESTIONS 1-9: 0
10. IF YOU CHECKED OFF ANY PROBLEMS, HOW DIFFICULT HAVE THESE PROBLEMS MADE IT FOR YOU TO DO YOUR WORK, TAKE CARE OF THINGS AT HOME, OR GET ALONG WITH OTHER PEOPLE: NOT DIFFICULT AT ALL

## 2019-08-16 ASSESSMENT — MIFFLIN-ST. JEOR: SCORE: 2149.74

## 2019-08-16 NOTE — LETTER
August 19, 2019      Laila Perez  [address] [address] [place] [id number]        Dear ,    We are writing to inform you of your test results.    Normal LDL cholesterol.   Normal blood sugar.   Normal kidney function.   Normal electrolytes.     Resulted Orders   Basic metabolic panel  (Ca, Cl, CO2, Creat, Gluc, K, Na, BUN)   Result Value Ref Range    Sodium 140 133 - 144 mmol/L    Potassium 4.7 3.4 - 5.3 mmol/L    Chloride 108 94 - 109 mmol/L    Carbon Dioxide 29 20 - 32 mmol/L    Anion Gap 3 3 - 14 mmol/L    Glucose 89 70 - 99 mg/dL    Urea Nitrogen 28 7 - 30 mg/dL    Creatinine 1.08 0.66 - 1.25 mg/dL    GFR Estimate 69 >60 mL/min/[1.73_m2]      Comment:      Non  GFR Calc  Starting 12/18/2018, serum creatinine based estimated GFR (eGFR) will be   calculated using the Chronic Kidney Disease Epidemiology Collaboration   (CKD-EPI) equation.      GFR Estimate If Black 80 >60 mL/min/[1.73_m2]      Comment:       GFR Calc  Starting 12/18/2018, serum creatinine based estimated GFR (eGFR) will be   calculated using the Chronic Kidney Disease Epidemiology Collaboration   (CKD-EPI) equation.      Calcium 8.6 8.5 - 10.1 mg/dL   LDL cholesterol direct   Result Value Ref Range    LDL Cholesterol Direct 47 <100 mg/dL      Comment:      Desirable:       <100 mg/dl       If you have any questions or concerns, please call the clinic at the number listed above.       Sincerely,        Delmis Simons, APRN CNP/kaf

## 2019-08-16 NOTE — PROGRESS NOTES
"Subjective     Laila Perez is a 69 year old male who presents to clinic today for the following health issues:    HPI   Hypertension Follow-up      Do you check your blood pressure regularly outside of the clinic? Yes     Are you following a low salt diet? No    Are your blood pressures ever more than 140 on the top number (systolic) OR more   than 90 on the bottom number (diastolic), for example 140/90? No      How many servings of fruits and vegetables do you eat daily?  2-3    On average, how many sweetened beverages do you drink each day (soda, juice, sweet tea, etc)?   1    How many days per week do you miss taking your medication? 0    Started taking atenolol 50 mg daily after visit with Jluis    BP Readings from Last 3 Encounters:   08/16/19 (!) 150/83   08/06/19 (!) 145/81   06/13/19 120/58                 Reviewed and updated as needed this visit by Provider         Review of Systems         Objective    BP (!) 150/83   Pulse 52   Temp 96.3  F (35.7  C) (Tympanic)   Resp 14   Ht 1.803 m (5' 11\")   Wt 136.3 kg (300 lb 6.4 oz)   SpO2 99%   BMI 41.90 kg/m    Body mass index is 41.9 kg/m .  Physical Exam   GENERAL: healthy, alert and no distress  EYES: Eyes grossly normal to inspection, PERRL and conjunctivae and sclerae normal  HENT: ear canals and TM's normal, nose and mouth without ulcers or lesions  NECK: no adenopathy, no asymmetry, masses, or scars and thyroid normal to palpation  RESP: lungs clear to auscultation - no rales, rhonchi or wheezes  CV: regular rates and rhythm, normal S1 S2, grade 3/6 MALLIKA murmur, peripheral pulses strong and no peripheral edema  ABDOMEN: soft, nontender, no hepatosplenomegaly, no masses and bowel sounds normal  MS: no gross musculoskeletal defects noted, mild LE edema  SKIN: no suspicious lesions or rashes  NEURO: Normal strength and tone, mentation intact and speech normal  PSYCH: mentation appears normal, affect normal/bright    Diagnostic Test Results:  Labs " "reviewed in Epic  Results for orders placed or performed in visit on 07/11/19   Fecal colorectal cancer screen (FIT)   Result Value Ref Range    Occult Blood Scn FIT Positive (A) NEG^Negative           Assessment & Plan     Laila was seen today for hypertension.    Diagnoses and all orders for this visit:    Positive FIT (fecal immunochemical test)  -     Fecal colorectal cancer screen FIT; Future    Hyperkalemia  -     Basic metabolic panel  (Ca, Cl, CO2, Creat, Gluc, K, Na, BUN)    Benign essential hypertension  -     Basic metabolic panel  (Ca, Cl, CO2, Creat, Gluc, K, Na, BUN)  -     LDL cholesterol direct  -     lisinopril (PRINIVIL/ZESTRIL) 5 MG tablet; Take 1 tablet (5 mg) by mouth daily       Atenolol 50 mg daily  Lisinopril 5 mg daily  Decrease the caffeine and sodium as able.   Keep working out  Recheck BP and heart rate in clinic in 2 week.   Complete the FIT cards and return to clinic    BMI:   Estimated body mass index is 41.9 kg/m  as calculated from the following:    Height as of this encounter: 1.803 m (5' 11\").    Weight as of this encounter: 136.3 kg (300 lb 6.4 oz).   Weight management plan: Discussed healthy diet and exercise guidelines        Work on weight loss  Regular exercise  Call or return to the clinic with any worsening of symptoms or no resolution. Patient/Parent verbalized understanding and is in agreement. Medication side effects reviewed.   Current Outpatient Medications   Medication Sig Dispense Refill     Antiseptic Products, Misc. (UNI-SOLVE) PADS Externally apply 2 pads topically twice a week To remove adhesive from patches 50 each 3     estradiol (VIVELLE-DOT) 0.1 MG/24HR bi-weekly patch Place 1 patch onto the skin twice a week 24 patch 1     latanoprost (XALATAN) 0.005 % ophthalmic solution 1 drop At Bedtime.       lisinopril (PRINIVIL/ZESTRIL) 5 MG tablet Take 1 tablet (5 mg) by mouth daily 90 tablet 1     lovastatin (MEVACOR) 20 MG tablet TAKE ONE TABLET (20mg) BY MOUTH AT " BEDTIME 30 tablet 0     meloxicam (MOBIC) 15 MG tablet TAKE ONE TABLET BY MOUTH EVERY DAY 30 tablet 0     Multiple Vitamin (DAILY-EVANGELISTA) TABS Take 1 tablet by mouth daily  11     order for DME Equipment being ordered: Sigvaris 232 Cotton Thigh High for Men 20-30 mmHg- TWO PAIR TAN COLOR 2 each 5     ORDER FOR DME Equipment being ordered:   BACK BRACE    [employer] Back-n-Shape II Back Brace  Size XXXL with thermoplastic insert        1 each 0     SM CALCIUM 600/VITAMIN D 600-400 MG-UNIT per tablet Take 1 tablet by mouth 2 times daily 60 tablet 3       See Patient Instructions    Return in about 2 weeks (around 8/30/2019) for BP/PULSE Recheck.    JERRICA Salcedo [facility]      Answers for HPI/ROS submitted by the patient on 8/16/2019   If you checked off any problems, how difficult have these problems made it for you to do your work, take care of things at home, or get along with other people?: Not difficult at all  PHQ9 TOTAL SCORE: 0

## 2019-08-16 NOTE — PATIENT INSTRUCTIONS
Patient Education     Eating Heart-Healthy Foods  Eating has a big impact on your heart health. In fact, eating healthier can improve several of your heart risks at once. For instance, it helps you manage weight, cholesterol, and blood pressure. Here are ideas to help you make heart-healthy changes without giving up all the foods and flavors you love.  Getting started    Talk with your healthcare provider about eating plans, such as the DASH or Mediterranean diet. You may also be referred to a dietitian.    Change a few things at a time. Give yourself time to get used to a few eating changes before adding more.    Work to create a tasty, healthy eating plan that you can stick to for the rest of your life.    Goals for healthy eating  Below are some tips to improve your eating habits:    Limit saturated fats and trans fats. Saturated fats raise your levels of cholesterol, so keep these fats to a minimum. They are found in foods such as fatty meats, whole milk, cheese, and palm and coconut oils. Avoid trans fats because they lower good cholesterol as well as raise bad cholesterol. Trans fats are most often found in processed foods.    Reduce sodium (salt) intake. Eating too much salt may increase your blood pressure. Limit your sodium intake to 2,300 milligrams (mg) per day (the amount in 1 teaspoon of salt), or less if your healthcare provider recommends it. Dining out less often and eating fewer processed foods are two great ways to decrease the amount of salt you consume.    Managing calories. A calorie is a unit of energy. Your body burns calories for fuel, but if you eat more calories than your body burns, the extras are stored as fat. Your healthcare provider can help you create a diet plan to manage your calories. This will likely include eating healthier foods as well as exercising regularly. To help you track your progress, keep a diary to record what you eat and how often you exercise.  Choose the right  foods  Aim to make these foods staples of your diet. If you have diabetes, you may have different recommendations than what is listed here:    Fruits and vegetables provide plenty of nutrients without a lot of calories. At meals, fill half your plate with these foods. Split the other half of your plate between whole grains and lean protein.    Whole grains are high in fiber and rich in vitamins and nutrients. Good choices include whole-wheat bread, pasta, and brown rice.    Lean proteins give you nutrition with less fat. Good choices include fish, skinless chicken, and beans.    Low-fat or nonfat dairy provides nutrients without a lot of fat. Try low-fat or nonfat milk, cheese, or yogurt.    Healthy fats can be good for you in small amounts. These are unsaturated fats, such as olive oil, nuts, and fish. Try to have at least 2 servings per week of fatty fish, such as salmon, sardines, mackerel, rainbow trout, and albacore tuna. These contain omega-3 fatty acids, which are good for your heart. Flaxseed is another source of a heart-healthy fat.  More on heart-healthy eating  Read food labels  Healthy eating starts at the grocery store. Be sure to pay attention to food labels on packaged foods. Look for products that are high in fiber and protein, and low in saturated fat, cholesterol, and sodium. Avoid products that contain trans fat. And pay close attention to serving size. For instance, if you plan to eat two servings, double all the numbers on the label.  Prepare food right  A key part of healthy cooking is cutting down on added fat and salt. Look on the internet for lower-fat, lower-sodium recipes. Also, try these tips:    Remove fat from meat and skin from poultry before cooking.    Skim fat from the surface of soups and sauces.    Broil, boil, bake, steam, grill, and microwave food without added fats.    Choose ingredients that spice up your food without adding calories, fat, or sodium. Try these items:  horseradish, hot sauce, lemon, mustard, nonfat salad dressings, and vinegar. For salt-free herbs and spices, try basil, cilantro, cinnamon, pepper, and rosemary.  Date Last Reviewed: 10/1/2017    9654-8000 [employer]. [address] [place], [place] [address]. All rights reserved. This information is not intended as a substitute for professional medical care. Always follow your healthcare professional's instructions.           Patient Education     Discharge Instructions for High Blood Pressure (Hypertension)  You have been diagnosed with high blood pressure (also called hypertension). This means the force of blood against your artery walls is too strong. It also means your heart is working hard to move blood. High blood pressure usually has no symptoms, but over time, it can cause serious health problems. High blood pressure raises your risk for heart attack, stroke, heart failure, kidney disease, and blindness. With help from your doctor, you can manage your blood pressure and protect your health.  Blood pressure measurements are given as 2 numbers. Systolic blood pressure is the upper number. This is the pressure when the heart contracts. Diastolic blood pressure is the lower number. This is the pressure when the heart relaxes between beats.  Blood pressure is categorized as normal, elevated, or stage 1 or stage 2 high blood pressure:    Normal blood pressure is systolic of less than 120 and diastolic of less than 80 (120/80)    Elevated blood pressure is systolic of 120 to 129 and diastolic less than 80    Stage 1 high blood pressure is systolic is 130 to 139 or diastolic between 80 to 89    Stage 2 high blood pressure is when systolic is 140 or higher or the diastolic is 90 or higher  Taking medicine    Learn to take your own blood pressure. Keep a record of your results. Ask your doctor which readings mean that you need medical attention.    Take your blood pressure medicine exactly as directed.  "Don t skip doses. Missing doses can cause your blood pressure to get out of control.    If you do miss a dose (or doses) check with your healthcare provider about what to do.    Don't take medicines that contain heart stimulants, including over-the-counter medicines. Check for warnings about high blood pressure on the label. Ask the pharmacist before purchasing something you haven't used before    Check with your doctor or pharmacist before taking a decongestant. Some decongestants can worsen high blood pressure.  Lifestyle changes    Maintain a healthy weight. Get help to lose any extra pounds.    Cut back on salt.  ? Limit canned, dried, packaged, and fast foods.  ? Don t add salt to your food at the table.  ? Season foods with herbs instead of salt when you cook.  ? Request no added salt when you go to a restaurant.  ? The American Heart Association (AHA) says the \"ideal\" amount of sodium is no more than 1,500 mg a day.  But because Americans eat so much salt, you can make a positive change by cutting back to even 2,400 mg of sodium a day.     Follow the DASH (Dietary Approaches to Stop Hypertension) eating plan. This plan recommends vegetables, fruits, whole gains, and other heart healthy foods.    Begin an exercise program. Ask your healthcare provider how to get started. The AHA recommends aerobic exercise 3 to 4 times a week for an average of 40 minutes at a time, with your provider's approval. Simple activities like walking or gardening can help.    Break the smoking habit. Enroll in a stop-smoking program to improve your chances of success. Ask your healthcare provider about programs and medicines to help you stop smoking.    Limit drinks that contain caffeine such as coffee, black or green tea, and cola to 2 per day.    Never take stimulants such as amphetamines or cocaine. These drugs can be deadly for someone with high blood pressure.    Control your stress. Learn ways to manage stress.    Limit alcohol " to no more than 1 drink a day for women and 2 drinks a day for men.  Follow-up care  Make a follow-up appointment as directed.  When to call your healthcare provider  Call your healthcare provider right away if you have any of the following:    Chest pain or shortness of breath (call 911)    Moderate to severe headache    Weakness in the muscles of your face, arms, or legs    Trouble speaking    Extreme drowsiness    Confusion    Fainting or dizziness    Pulsating or rushing sound in your ears    Unexplained nosebleed    Weakness, tingling, or numbness of your face, arms, or legs    Change in vision    Blood pressure measured at home that is greater than 180/110   Date Last Reviewed: 4/27/2016 2000-2018 [employer]. [address] [place] [address], [place] [id number]. All rights reserved. This information is not intended as a substitute for professional medical care. Always follow your healthcare professional's instructions.         Atenolol 50 mg daily  Lisinopril 5 mg daily  Decrease the caffeine and sodium as able.   Keep working out  Recheck BP and heart rate in clinic in 2 week.   Complete the FIT cards and return to clinic

## 2019-08-17 ASSESSMENT — PATIENT HEALTH QUESTIONNAIRE - PHQ9: SUM OF ALL RESPONSES TO PHQ QUESTIONS 1-9: 0

## 2019-09-09 ENCOUNTER — OFFICE VISIT (OUTPATIENT)
Dept: OTHER | Facility: OUTPATIENT CENTER | Age: 69
End: 2019-09-09
Payer: COMMERCIAL

## 2019-09-09 DIAGNOSIS — E78.5 HYPERLIPIDEMIA LDL GOAL <100: ICD-10-CM

## 2019-09-09 DIAGNOSIS — M19.91 PRIMARY OSTEOARTHRITIS, UNSPECIFIED SITE: ICD-10-CM

## 2019-09-09 DIAGNOSIS — F33.41 RECURRENT MAJOR DEPRESSIVE DISORDER, IN PARTIAL REMISSION (H): ICD-10-CM

## 2019-09-09 DIAGNOSIS — F64.0 GENDER DYSPHORIA IN ADOLESCENT AND ADULT: Primary | ICD-10-CM

## 2019-09-09 RX ORDER — MELOXICAM 15 MG/1
TABLET ORAL
Qty: 30 TABLET | Refills: 0 | Status: SHIPPED | OUTPATIENT
Start: 2019-09-09 | End: 2019-10-07

## 2019-09-09 RX ORDER — LOVASTATIN 20 MG
TABLET ORAL
Qty: 30 TABLET | Refills: 0 | Status: SHIPPED | OUTPATIENT
Start: 2019-09-09 | End: 2019-10-07

## 2019-09-09 NOTE — TELEPHONE ENCOUNTER
"Requested Prescriptions   Pending Prescriptions Disp Refills     meloxicam (MOBIC) 15 MG tablet [Pharmacy Med Name: MELOXICAM 15 MG TABLET] 30 tablet 0     Sig: TAKE ONE TABLET BY MOUTH EVERY DAY       NSAID Medications Failed - 9/9/2019  9:54 AM        Failed - Blood pressure under 140/90 in past 12 months     BP Readings from Last 3 Encounters:   08/16/19 (!) 150/83   06/13/19 120/58   05/30/19 175/81                 Failed - Patient is age 6-64 years        Failed - Normal CBC on file in past 12 months     Recent Labs   Lab Test 04/17/19  1026   WBC 7.9   RBC 4.81   HGB 15.1   HCT 44.5   *                 Passed - Normal ALT on file in past 12 months     Recent Labs   Lab Test 04/17/19  1026   ALT 19             Passed - Normal AST on file in past 12 months     Recent Labs   Lab Test 04/17/19  1026   AST 15             Passed - Recent (12 mo) or future (30 days) visit within the authorizing provider's specialty     Patient had office visit in the last 12 months or has a visit in the next 30 days with authorizing provider or within the authorizing provider's specialty.  See \"Patient Info\" tab in inbasket, or \"Choose Columns\" in Meds & Orders section of the refill encounter.      Last Written Prescription Date:  8/6/19  Last Fill Quantity: 30,  # refills: 0   Last office visit: 8/16/2019 with prescribing provider:     Future Office Visit:              Passed - Medication is active on med list        Passed - Normal serum creatinine on file in past 12 months     Recent Labs   Lab Test 08/16/19  0928   CR 1.08             lovastatin (MEVACOR) 20 MG tablet [Pharmacy Med Name: LOVASTATIN 20 MG TABLET] 30 tablet 0     Sig: TAKE ONE TABLET BY MOUTH AT BEDTIME       Statins Protocol Passed - 9/9/2019  9:54 AM        Passed - LDL on file in past 12 months     Recent Labs   Lab Test 08/16/19  0928   LDL 47             Passed - No abnormal creatine kinase in past 12 months     No lab results found.             Passed " "- Recent (12 mo) or future (30 days) visit within the authorizing provider's specialty     Patient had office visit in the last 12 months or has a visit in the next 30 days with authorizing provider or within the authorizing provider's specialty.  See \"Patient Info\" tab in inbasket, or \"Choose Columns\" in Meds & Orders section of the refill encounter.     Last Written Prescription Date:  8/6/19  Last Fill Quantity: 30,  # refills: 0   Last office visit: 8/16/2019 with prescribing provider:     Future Office Visit:                 Passed - Medication is active on med list        Passed - Patient is age 18 or older          "

## 2019-09-09 NOTE — PROGRESS NOTES
[facility] -  Case Progress Note  Client Name: Laila Perez  YOB: 1950  MRN:  [id number]  Treating Provider: Estelle Gomez LP  Type of Session: Individual  Present in Session: client   Number of Minutes:  55    Treatment plan: 8/12/19    Health Maintenance Summary - Mental Health Treatment Plan       Status Date      MENTAL HEALTH TX PLAN Overdue 8/10/2019      Done 6/10/2019      Done 10/30/2017      Done 9/6/2016           Date of Service: 9/09/19    Current Symptoms/Status:  Distress about gender and secondary sex characteristics, wants genitals removed, bothers her daily of moderate to severe intensity, distress with work situation, distress with changing at the gym.    Progress Toward Treatment Goals:   Client reported progress with with feeling good about her gender presentation, other than on dysphoria about genitals.    Intervention: Modality and Description:  Provided support and feedback. Used CBT to process gender dysphoria concerns. Client shared she is fully healed from her previous surgery.  She has a meeting with Dr. Holm in November to review surgical removal of penis without vaginal inversion.  She shared she has been having difficulty maintaining her weight.  She has struggled with weight fluctuations and weight gain.  She stated these are due to the changes in her bodies hormones.  She does not know if weight loss will be a requirement for this surgery.  She does not see purpose in having a vagina for herself right now, but does not like having a penis.  She shared it also creates distress when she goes to the gym.  She shared the rest of her life is going fairly well.  She has some disappointment with the frequency of contact from her children.  She shared her eldest son does not appear to have any interest in maintaining a relationship.  She talked about wanting to share with them to call her once a week.  We discussed the history of her relationship with  them and her sadness about limited frequency of contact.  She shared she continues to engage in some other social activity and feels good about her life in general.    Interactive Complexity:  There are four specific communication difficulties that complicate the work of the primary psychiatric procedure.  Interactive complexity (+64354) may be reported when at least one of these difficulties is present.    Communication difficulties present during current the psychiatric procedure include:  1. The need to manage maladaptive communication (related to e.g. high anxiety, high reactivity, repeated questions, or disagreement) among participants that complicates delivery of care.        Response to Intervention:  Client reported validation.    Assignment:  Follow surgeon guidelines, maintain balanced approach to weight loss.      Diagnosis:  Encounter Diagnoses   Name Primary?     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 Yes     Recurrent major depressive disorder, in partial remission (H)            Plan / Need for Future Services:  Return for therapy in 4 weeks.      Estelle Gomez PsyD, LP

## 2019-09-20 ENCOUNTER — TELEPHONE (OUTPATIENT)
Dept: FAMILY MEDICINE | Facility: CLINIC | Age: 69
End: 2019-09-20

## 2019-09-20 DIAGNOSIS — I10 HTN, GOAL BELOW 140/90: Primary | ICD-10-CM

## 2019-09-20 RX ORDER — ATENOLOL 50 MG/1
50 TABLET ORAL DAILY
Qty: 90 TABLET | Refills: 1 | Status: SHIPPED | OUTPATIENT
Start: 2019-09-20 | End: 2020-02-06

## 2019-09-20 NOTE — TELEPHONE ENCOUNTER
Could we have her come in and get her HR and BP rechecked. Or have her do it at home and call us with results.  Ok to refill the atenolol RX sent to pharmacy.  Thanks Delmis Simons [facility]-BC

## 2019-09-20 NOTE — LETTER
[facility]  [address] [place] [id number]  [phone]       September 26, 2019    Laila Perez  [address] [place] [id number]    Dear Laila,    We care about your health and have reviewed your health plan and are making recommendations based on this review, to optimize your health.    You are in particular need of attention regarding:  -Blood Pressure (your goal is <140/90) BP Readings from Last 2 Encounters:   08/16/19 (!) 150/83   06/13/19 120/58        We are recommending that you:  -schedule a free NURSE-ONLY BLOOD PRESSURE and heart rate check APPOINTMENT within the next 1-2 weeks OR call us with readings taken at home, if you monitor at home with you own blood pressure cuff.    In addition, here is a list of due or overdue Health Maintenance reminders.    Health Maintenance Due   Topic Date Due     Zoster (Shingles) Vaccine (1 of 2) 03/09/2000     Discuss Advance Care Planning  07/25/2017     Annual Wellness Visit  05/09/2019     LOW DOSE ASA FOR PRIMARY PREVENTION  05/09/2019     MENTAL HEALTH TX PLAN  08/10/2019     Flu Vaccine (1) 09/01/2019     Pneumococcal Vaccine (2 of 2 - PPSV23) 05/21/2019     To address the above recommendations, we encourage you to contact us at [phone], via [employer] or by contacting Central Scheduling toll free at [phone] 24 hours a day. They will assist you with finding the most convenient time and location.    Thank you for trusting [facility] and we appreciate the opportunity to serve you.  We look forward to supporting your healthcare needs in the future.    Healthy Regards, Your [facility] Team

## 2019-09-20 NOTE — TELEPHONE ENCOUNTER
Routing refill request to provider for review/approval because:  Drug not active on patient's medication list.  Cued order for PCP to review.  8/16/2019 OV note indicates: Started taking atenolol 50 mg daily after visit with Jluis, Atenolol 50 mg daily.    Pt has not returned to have BP and pulse rechecked.     BP Readings from Last 6 Encounters:   08/16/19 (!) 150/83   06/13/19 120/58   05/30/19 175/81   05/14/19 140/77   05/03/19 (!) 124/92   04/17/19 137/81     Marija RAMIREZ RN

## 2019-09-20 NOTE — TELEPHONE ENCOUNTER
I left a message for the patient to return my call.  CSS please deliver message below.    Marija RAMIREZ RN

## 2019-09-20 NOTE — TELEPHONE ENCOUNTER
Ashlee from [facility] said Pt called in looking for a script-  Atenolol was to be called in.   Last time Pharmacy had one was Atenolol 50 mg one daily.  Please Advise  Maria Teresa [facility] Sec

## 2019-10-07 ENCOUNTER — TELEPHONE (OUTPATIENT)
Dept: FAMILY MEDICINE | Facility: CLINIC | Age: 69
End: 2019-10-07

## 2019-10-07 ENCOUNTER — OFFICE VISIT (OUTPATIENT)
Dept: OTHER | Facility: OUTPATIENT CENTER | Age: 69
End: 2019-10-07
Payer: COMMERCIAL

## 2019-10-07 DIAGNOSIS — E78.5 HYPERLIPIDEMIA LDL GOAL <100: ICD-10-CM

## 2019-10-07 DIAGNOSIS — F64.0 GENDER DYSPHORIA IN ADOLESCENT AND ADULT: Primary | ICD-10-CM

## 2019-10-07 DIAGNOSIS — M19.91 PRIMARY OSTEOARTHRITIS, UNSPECIFIED SITE: ICD-10-CM

## 2019-10-07 NOTE — TELEPHONE ENCOUNTER
"MELOXICAM 15 MG TABLET-----Lovastatin  Last Written Prescription Date:  9/9/19  Last Fill Quantity: 30,  # refills: 0   Last office visit: 8/16/2019 with prescribing provider:  MN   Future Office Visit:          Requested Prescriptions   Pending Prescriptions Disp Refills     lovastatin (MEVACOR) 20 MG tablet [Pharmacy Med Name: LOVASTATIN 20 MG TABLET] 30 tablet 0     Sig: TAKE ONE TABLET BY MOUTH AT BEDTIME       Statins Protocol Passed - 10/7/2019  3:18 PM        Passed - LDL on file in past 12 months     Recent Labs   Lab Test 08/16/19  0928   LDL 47             Passed - No abnormal creatine kinase in past 12 months     No lab results found.             Passed - Recent (12 mo) or future (30 days) visit within the authorizing provider's specialty     Patient has had an office visit with the authorizing provider or a provider within the authorizing providers department within the previous 12 mos or has a future within next 30 days. See \"Patient Info\" tab in inbasket, or \"Choose Columns\" in Meds & Orders section of the refill encounter.              Passed - Medication is active on med list        Passed - Patient is age 18 or older        meloxicam (MOBIC) 15 MG tablet [Pharmacy Med Name: MELOXICAM 15 MG TABLET] 30 tablet 0     Sig: TAKE ONE TABLET BY MOUTH EVERY DAY       NSAID Medications Failed - 10/7/2019  3:18 PM        Failed - Blood pressure under 140/90 in past 12 months     BP Readings from Last 3 Encounters:   08/16/19 (!) 150/83   06/13/19 120/58   05/30/19 175/81                 Failed - Patient is age 6-64 years        Failed - Normal CBC on file in past 12 months     Recent Labs   Lab Test 04/17/19  1026   WBC 7.9   RBC 4.81   HGB 15.1   HCT 44.5   *                 Passed - Normal ALT on file in past 12 months     Recent Labs   Lab Test 04/17/19  1026   ALT 19             Passed - Normal AST on file in past 12 months     Recent Labs   Lab Test 04/17/19  1026   AST 15             Passed - " "Recent (12 mo) or future (30 days) visit within the authorizing provider's specialty     Patient has had an office visit with the authorizing provider or a provider within the authorizing providers department within the previous 12 mos or has a future within next 30 days. See \"Patient Info\" tab in inbasket, or \"Choose Columns\" in Meds & Orders section of the refill encounter.              Passed - Medication is active on med list        Passed - Normal serum creatinine on file in past 12 months     Recent Labs   Lab Test 08/16/19  0928   CR 1.08             Controlled Substance Refill Request for -  Problem List Complete:  Yes   checked in past 3 months?  No, route to RN        "

## 2019-10-07 NOTE — PROGRESS NOTES
[facility] -  Case Progress Note  Client Name: Laila Perez  YOB: 1950  MRN:  [id number]  Treating Provider: Estelle Gomez LP  Type of Session: Individual  Present in Session: client   Number of Minutes:  55    Treatment plan: 8/12/19    Health Maintenance Summary - Mental Health Treatment Plan       Status Date      MENTAL HEALTH TX PLAN Overdue 8/10/2019      Done 6/10/2019      Done 10/30/2017      Done 9/6/2016           Date of Service: 10/07/19    Current Symptoms/Status:  Distress about gender and secondary sex characteristics, wants genitals removed, bothers her daily of moderate to severe intensity, distress with work situation, distress with changing at the gym.    Progress Toward Treatment Goals:   Client reported progress with her son coming to visit her and feeling accepted in her gender.    Intervention: Modality and Description:  Provided support and feedback. Used CBT to process gender dysphoria concerns. Client shared her son actually did come with his wife to visit her.  She shared it was a positive visit and he can now see that she is doing well living in her own apartment.  She is hoping this improves the relationship and may result in more frequent visits from him and his family.  She shared reducing her frequency of going to the gym.  She shared liking to go do the workouts but does have some concern about being in the dressing room given she still has a penis.  She is looking forward to her appointment with Dr. Holm and hopes they can get surgery scheduled yet this year so she can get it removed.  She shared there is not a weight loss issue that she needs to complete in order to get this surgery.  She also processed having struggles with her blood pressure being high.  They are sending a nurse out to meet with her at home to assist with trying to understand what is causing the high blood pressure and what they can do about it.  She shared also there is a  person living in a group home that identifies as female.  She shared not understanding why this person is not taking steps with wearing female clothing.  We talked about the fact that can have a different process than she has had.    Interactive Complexity:  There are four specific communication difficulties that complicate the work of the primary psychiatric procedure.  Interactive complexity (+58799) may be reported when at least one of these difficulties is present.    Communication difficulties present during current the psychiatric procedure include:  1. The need to manage maladaptive communication (related to e.g. high anxiety, high reactivity, repeated questions, or disagreement) among participants that complicates delivery of care.        Response to Intervention:  Client reported validation.    Assignment:  Follow surgeon guidelines, maintain balanced approach to weight loss.      Diagnosis:  Encounter Diagnosis   Name Primary?     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 Yes           Plan / Need for Future Services:  Return for therapy in 4 weeks.      Estelle Gomez PsyD, LP

## 2019-10-08 RX ORDER — LOVASTATIN 20 MG
TABLET ORAL
Qty: 30 TABLET | Refills: 11 | Status: SHIPPED | OUTPATIENT
Start: 2019-10-08 | End: 2020-10-23

## 2019-10-08 RX ORDER — MELOXICAM 15 MG/1
TABLET ORAL
Qty: 30 TABLET | Refills: 11 | Status: SHIPPED | OUTPATIENT
Start: 2019-10-08 | End: 2020-01-20

## 2019-10-09 NOTE — TELEPHONE ENCOUNTER
Ok if they could update us with a BP and Pulse rate that would be great.  BP Readings from Last 3 Encounters:   08/16/19 (!) 150/83   08/06/19 (!) 145/81   06/13/19 120/58   Last ones in the chart were elevated and her atenolol was just refilled.   See TV from 9/20/2019  Thanks Delmis LUCAS-BC

## 2019-10-11 ENCOUNTER — TRANSFERRED RECORDS (OUTPATIENT)
Dept: HEALTH INFORMATION MANAGEMENT | Facility: CLINIC | Age: 69
End: 2019-10-11

## 2019-10-14 ENCOUNTER — ALLIED HEALTH/NURSE VISIT (OUTPATIENT)
Dept: FAMILY MEDICINE | Facility: CLINIC | Age: 69
End: 2019-10-14
Payer: COMMERCIAL

## 2019-10-14 VITALS
RESPIRATION RATE: 16 BRPM | SYSTOLIC BLOOD PRESSURE: 130 MMHG | OXYGEN SATURATION: 98 % | DIASTOLIC BLOOD PRESSURE: 72 MMHG | HEART RATE: 60 BPM

## 2019-10-14 DIAGNOSIS — I10 HTN, GOAL BELOW 140/90: Primary | ICD-10-CM

## 2019-10-14 PROCEDURE — 99207 ZZC NO CHARGE NURSE ONLY: CPT

## 2019-10-27 DIAGNOSIS — R19.5 POSITIVE FIT (FECAL IMMUNOCHEMICAL TEST): ICD-10-CM

## 2019-10-27 LAB — HEMOCCULT STL QL IA: NEGATIVE

## 2019-10-27 PROCEDURE — 82274 ASSAY TEST FOR BLOOD FECAL: CPT | Performed by: NURSE PRACTITIONER

## 2019-10-31 ENCOUNTER — PATIENT OUTREACH (OUTPATIENT)
Dept: GERIATRIC MEDICINE | Facility: CLINIC | Age: 69
End: 2019-10-31

## 2019-10-31 NOTE — PROGRESS NOTES
[facility] Care Coordination Contact    cc received notification that members MA will term on 12/31/2019 d/t not receiving yearly renewal paperwork.    VM message left requesting member submit paperwork and to call cc with any questions.    Alexandra Helton RN-[facility]   [phone]

## 2019-11-04 ENCOUNTER — PATIENT OUTREACH (OUTPATIENT)
Dept: GERIATRIC MEDICINE | Facility: CLINIC | Age: 69
End: 2019-11-04

## 2019-11-04 ENCOUNTER — OFFICE VISIT (OUTPATIENT)
Dept: OTHER | Facility: OUTPATIENT CENTER | Age: 69
End: 2019-11-04
Payer: COMMERCIAL

## 2019-11-04 DIAGNOSIS — F64.0 GENDER DYSPHORIA IN ADOLESCENT AND ADULT: Primary | ICD-10-CM

## 2019-11-05 ENCOUNTER — OFFICE VISIT (OUTPATIENT)
Dept: PLASTIC SURGERY | Facility: CLINIC | Age: 69
End: 2019-11-05
Payer: COMMERCIAL

## 2019-11-05 VITALS
WEIGHT: 293 LBS | DIASTOLIC BLOOD PRESSURE: 78 MMHG | SYSTOLIC BLOOD PRESSURE: 136 MMHG | OXYGEN SATURATION: 97 % | HEIGHT: 71 IN | BODY MASS INDEX: 41.02 KG/M2 | HEART RATE: 56 BPM

## 2019-11-05 DIAGNOSIS — F64.0 GENDER DYSPHORIA IN ADULT: ICD-10-CM

## 2019-11-05 DIAGNOSIS — E66.01 MORBID OBESITY (H): Primary | ICD-10-CM

## 2019-11-05 ASSESSMENT — MIFFLIN-ST. JEOR: SCORE: 2196.91

## 2019-11-05 ASSESSMENT — PAIN SCALES - GENERAL: PAINLEVEL: NO PAIN (0)

## 2019-11-05 NOTE — LETTER
"11/5/2019       RE: Laila Perez  [id number] [address] [address]     Dear Colleague,    Thank you for referring your patient, Laila Perez, to the [facility] at [facility]. Please see a copy of my visit note below.    Patient returns for a follow-up visit regarding vaginoplasty for gender dysphoria.    INTERVAL HISTORY: Patient underwent orchiectomy.  This was complicated by postoperative infection.  She is still healing from that.  Patient also notes that she had a postoperative infection with her knee surgery as well.  She is concerned about her body habitus and potential intraoperative complications that can occur during vaginoplasty.  She is requesting information about minimal depth vaginoplasty.    PHYSICAL EXAMINATION:  /78 (BP Location: Left arm, Patient Position: Chair, Cuff Size: Adult Large)   Pulse 56   Ht 1.803 m (5' 11\")   Wt 141 kg (310 lb 12.8 oz)   SpO2 97%   BMI 43.35 kg/m     Genital examination was performed the presence of a chaperone.  This revealed a double bubble deformity.  The abdominal pannus forms 1 bubble and a very protuberant mons form of the second.  Penile shaft length measures less than 5 cm.  Glans is exposed, viable, and sensate to light touch.  There is at least 4 cm of skin pinch at the level of the inguinal region with a long distance between the pubic structures to the skin edge.    ASSESSMENT: Gender dysphoria, requesting vaginoplasty.    PLAN: We discussed penile inversion vaginoplasty versus minimal depth vaginoplasty.  With minimal depth vaginoplasty, penile tissue would still be inverted into the vaginal cavity, but this cavity would be much shallower.  Dissection will be performed through the central tendon to the junction of the prostate and rectum.  I explained to the patient that with her current body habitus, even minimal depth vaginoplasty would be technically very " difficult with very high risk of postoperative complications such as wound healing difficulties, infection, asymmetry, contour deformity, vaginal prolapse, prolonged dressing changes, and need for revision surgery.  Patient accepts these risks and would like to work towards obtaining minimal depth vaginoplasty.  She will not require pre-vaginoplasty hair removal.  Patient is to return to see me once her weight has reached below 250 pounds for reexamination and evaluation for possible technical feasibility of minimal depth vaginoplasty.  Questions were answered.  She understood the plan.    Total time spent with patient was 15 min of which greater than 50% was in counseling.    Again, thank you for allowing me to participate in the care of your patient.      Sincerely,    Uvaldo Holm MD

## 2019-11-05 NOTE — NURSING NOTE
"Chief Complaint   Patient presents with     RECHECK     new pt here for vaginoplasty consult; has seen urology and s/p orchiectomy 5/14/19       Vitals:    11/05/19 1646   BP: 136/78   BP Location: Left arm   Patient Position: Chair   Cuff Size: Adult Large   Pulse: 56   SpO2: 97%   Weight: 141 kg (310 lb 12.8 oz)   Height: 1.803 m (5' 11\")       Body mass index is 43.35 kg/m .    Ty Lowe, EMT    "

## 2019-11-05 NOTE — PROGRESS NOTES
[facility] -  Case Progress Note  Client Name: Laila Perez  YOB: 1950  MRN:  [id number]  Treating Provider: Estelle Gomez LP  Type of Session: Individual  Present in Session: client   Number of Minutes:  55    Treatment plan: 8/12/19    Health Maintenance Summary - Mental Health Treatment Plan       Status Date      MENTAL HEALTH TX PLAN Overdue 8/10/2019      Done 6/10/2019      Done 10/30/2017      Done 9/6/2016           Date of Service: 11/04/19    Current Symptoms/Status:  Distress about gender and secondary sex characteristics, wants genitals removed, bothers her daily of moderate to severe intensity, distress with work situation, distress with changing at the gym.    Progress Toward Treatment Goals:   Client reported progress with continuing her exercise and preparing for a visit with the surgeon tomorrow.    Intervention: Modality and Description:  Provided support and feedback. Used CBT to process gender dysphoria concerns. Client shared continuing to feel good about her gender presentation.  She shared that she has been able to buy some close and she feels like she presents very consistent with being a female.  She shared that she continues to have concerns at the gym with changing that if her towel falls people may see her penis.  She shared this is 1 of the reasons she is really hopeful about getting the surgery to remove it.  She shared having some anxiety about meeting with the surgeon tomorrow.  We reviewed her questions that she wants to ask of him.  She stated that she does not think her weight should be a concern and I stated I did not know what the requirement would be for this surgery.  Most of the guidelines are statistics related to complications and not just the surgeons personal concern.  She shared also talking with her son about trying to make plans for Thanksgiving.    Interactive Complexity:  There are four specific communication difficulties that  complicate the work of the primary psychiatric procedure.  Interactive complexity (+[id number]) may be reported when at least one of these difficulties is present.    Communication difficulties present during current the psychiatric procedure include:  1. The need to manage maladaptive communication (related to e.g. high anxiety, high reactivity, repeated questions, or disagreement) among participants that complicates delivery of care.        Response to Intervention:  Client reported validation.    Assignment:  Follow surgeon guidelines, maintain balanced approach to weight loss.      Diagnosis:  Encounter Diagnosis   Name Primary?     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 Yes           Plan / Need for Future Services:  Return for therapy in 4 weeks.      Estelle Gomez PsyD, LP

## 2019-11-06 ENCOUNTER — TELEPHONE (OUTPATIENT)
Dept: PLASTIC SURGERY | Facility: CLINIC | Age: 69
End: 2019-11-06

## 2019-11-06 NOTE — PROGRESS NOTES
"Patient returns for a follow-up visit regarding vaginoplasty for gender dysphoria.    INTERVAL HISTORY: Patient underwent orchiectomy.  This was complicated by postoperative infection.  She is still healing from that.  Patient also notes that she had a postoperative infection with her knee surgery as well.  She is concerned about her body habitus and potential intraoperative complications that can occur during vaginoplasty.  She is requesting information about minimal depth vaginoplasty.    PHYSICAL EXAMINATION:  /78 (BP Location: Left arm, Patient Position: Chair, Cuff Size: Adult Large)   Pulse 56   Ht 1.803 m (5' 11\")   Wt 141 kg (310 lb 12.8 oz)   SpO2 97%   BMI 43.35 kg/m    Genital examination was performed the presence of a chaperone.  This revealed a double bubble deformity.  The abdominal pannus forms 1 bubble and a very protuberant mons form of the second.  Penile shaft length measures less than 5 cm.  Glans is exposed, viable, and sensate to light touch.  There is at least 4 cm of skin pinch at the level of the inguinal region with a long distance between the pubic structures to the skin edge.    ASSESSMENT: Gender dysphoria, requesting vaginoplasty.    PLAN: We discussed penile inversion vaginoplasty versus minimal depth vaginoplasty.  With minimal depth vaginoplasty, penile tissue would still be inverted into the vaginal cavity, but this cavity would be much shallower.  Dissection will be performed through the central tendon to the junction of the prostate and rectum.  I explained to the patient that with her current body habitus, even minimal depth vaginoplasty would be technically very difficult with very high risk of postoperative complications such as wound healing difficulties, infection, asymmetry, contour deformity, vaginal prolapse, prolonged dressing changes, and need for revision surgery.  Patient accepts these risks and would like to work towards obtaining minimal depth " vaginoplasty.  She will not require pre-vaginoplasty hair removal.  Patient is to return to see me once her weight has reached below 250 pounds for reexamination and evaluation for possible technical feasibility of minimal depth vaginoplasty.  Questions were answered.  She understood the plan.    Total time spent with patient was 15 min of which greater than 50% was in counseling.

## 2019-11-06 NOTE — TELEPHONE ENCOUNTER
----- Message from Saleem Paredes LPN sent at 11/5/2019  6:44 PM CST -----  Regarding: Please schedule  Hi,    Please call patient to schedule a visit with the dietician. Referral from Dr. Holm. Dx: Obesity. Thanks    -KATERYNA Castellon

## 2019-12-16 ENCOUNTER — ALLIED HEALTH/NURSE VISIT (OUTPATIENT)
Dept: SURGERY | Facility: CLINIC | Age: 69
End: 2019-12-16
Payer: COMMERCIAL

## 2019-12-16 ENCOUNTER — TELEPHONE (OUTPATIENT)
Dept: ENDOCRINOLOGY | Facility: CLINIC | Age: 69
End: 2019-12-16

## 2019-12-16 ENCOUNTER — OFFICE VISIT (OUTPATIENT)
Dept: ENDOCRINOLOGY | Facility: CLINIC | Age: 69
End: 2019-12-16
Payer: COMMERCIAL

## 2019-12-16 VITALS
WEIGHT: 293 LBS | OXYGEN SATURATION: 98 % | DIASTOLIC BLOOD PRESSURE: 77 MMHG | HEIGHT: 71 IN | SYSTOLIC BLOOD PRESSURE: 138 MMHG | HEART RATE: 58 BPM | BODY MASS INDEX: 41.02 KG/M2 | TEMPERATURE: 97.4 F

## 2019-12-16 DIAGNOSIS — E66.01 OBESITY, CLASS III, BMI 40-49.9 (MORBID OBESITY) (H): Primary | ICD-10-CM

## 2019-12-16 RX ORDER — TOPIRAMATE 25 MG/1
TABLET, FILM COATED ORAL
Qty: 90 TABLET | Refills: 1 | Status: SHIPPED | OUTPATIENT
Start: 2019-12-16 | End: 2020-03-02

## 2019-12-16 ASSESSMENT — MIFFLIN-ST. JEOR: SCORE: 2239.55

## 2019-12-16 ASSESSMENT — PAIN SCALES - GENERAL: PAINLEVEL: SEVERE PAIN (6)

## 2019-12-16 NOTE — NURSING NOTE
"(   Chief Complaint   Patient presents with     Consult     New weight management visit.    )    ( Weight: 145.2 kg (320 lb 3.2 oz) )  ( Height: 180.3 cm (5' 11\") )  ( BMI (Calculated): 44.66 )  ( Initial Weight: 145.3 kg (320 lb 3.6 oz) )  ( Cumulative weight loss (lbs): 0.02 )  (   )  (   )  ( Waist Circumference (cm): 136 cm )  (   )    ( BP: 138/77 )  (   )  ( Temp: 97.4  F (36.3  C) )  ( Temp src: Oral )  ( Pulse: 58 )  (   )  ( SpO2: 98 % )    (   Patient Active Problem List   Diagnosis     Gouty arthropathy     Mental or behavioral problem     Generalized osteoarthrosis, unspecified site     Disorder of bone and cartilage     Mixed hyperlipidemia     OA (osteoarthritis) of knee     HYPERLIPIDEMIA LDL GOAL <100     DDD (degenerative disc disease), lumbar     Alcoholism (H)     Advanced directives, counseling/discussion     Varicose veins of legs     Psoriasis     HTN, goal below 140/90     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     Family history of diabetes mellitus in first degree relative     Mitral valve disorder     Nonrheumatic aortic valve stenosis     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     Morbid obesity (H)     Health Care Home    )  (   Current Outpatient Medications   Medication Sig Dispense Refill     Antiseptic Products, Misc. (UNI-SOLVE) PADS Externally apply 2 pads topically twice a week To remove adhesive from patches 50 each 3     atenolol (TENORMIN) 50 MG tablet Take 1 tablet (50 mg) by mouth daily 90 tablet 1     estradiol (VIVELLE-DOT) 0.1 MG/24HR bi-weekly patch Place 1 patch onto the skin twice a week 24 patch 1     latanoprost (XALATAN) 0.005 % ophthalmic solution 1 drop At Bedtime.       lisinopril (PRINIVIL/ZESTRIL) 5 MG tablet Take 1 tablet (5 mg) by mouth daily 90 tablet 1     lovastatin (MEVACOR) 20 MG tablet TAKE ONE TABLET BY MOUTH AT BEDTIME 30 tablet 11     meloxicam (MOBIC) 15 MG tablet TAKE ONE TABLET BY MOUTH EVERY DAY 30 tablet 11     Multiple Vitamin (DAILY-EVANGELISTA) TABS Take " 1 tablet by mouth daily  11     order for DME Equipment being ordered: Sigvaris 232 Cotton Thigh High for Men 20-30 mmHg- TWO PAIR TAN COLOR 2 each 5     ORDER FOR DME Equipment being ordered:   BACK BRACE    MedSpec Back-n-Shape II Back Brace  Size XXXL with thermoplastic insert        1 each 0     SM CALCIUM 600/VITAMIN D 600-400 MG-UNIT per tablet Take 1 tablet by mouth 2 times daily 60 tablet 3    )  ( Diabetes Eval:    )    ( Pain Eval:  Severe Pain (6) )    ( Wound Eval:       )    (   History   Smoking Status     Never Smoker   Smokeless Tobacco     Never Used    )    ( Signed By:  Rosario Benjamin CMA; December 16, 2019; 12:06 PM )
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
## 2019-12-16 NOTE — PATIENT INSTRUCTIONS
"Nutrition Goals  1) Exercise 2 times per week for 30 mins each time.  2) Eat 3 meals per day. Use the 9\" Plate method (1/2 plate non-starchy vegetables/fruit, 1/4 plate lean protein, 1/4 plate whole grain starch - no more than 1/2 cup carb/meal)  - Avoid snacking  3) Eliminate calorie-containing beverages. Limit milk to less than 8 oz per day. Switch to 1% milk.   4) Use a distraction (puzzles, games on phone, calling a friend) from cravings. Practice mindful eating.     Follow-up with RD in one month     Kathy Esteves RD, LD  If you would like to schedule or reschedule an appointment with the RD, please call [phone]  "

## 2019-12-16 NOTE — TELEPHONE ENCOUNTER
Central Prior Authorization Team   Phone: [phone]      PA Initiation    Medication: Topiramate-PA initiated  Insurance Company: [facility] - Phone [phone] Fax [phone]  Pharmacy Filling the Rx: [facility] - [place] - [address]  Filling Pharmacy Phone: [phone]  Filling Pharmacy Fax:    Start Date: 12/16/2019

## 2019-12-16 NOTE — PATIENT INSTRUCTIONS
"Welcome to our weight management program!   We are excited to join you on your weight loss journey    Thank you for allowing us the privilege of caring for you. We hope we provided you with the excellent service you deserve.   Please let us know if there is anything else we can do for you so that we can be sure you are leaving completely satisfied with your care experience.    To ensure the quality of our services you may be receiving a patient satisfaction survey from an independent patient satisfaction monitoring company.    The greatest compliment you can give is a \"Likely to Recommend\"    You saw JERRICA Koehler CNP today.    Instructions per today's visit:   -start topiramate   -labs at follow up   -Try to drink a glass of water and find an activity if you are hungry after a meals   -Follow up in 2 months     Interested in working with a health ?  Health coaches work with you to improve your overall health and wellbeing.  They look at the whole person, and may involve discussion of different areas of life, including, but not limited to the four pillars of health (sleep, exercise, nutrition, and stress management). Discuss with your care team if you would like to start working a health .  Health Coaching-3 Pack:    $99 for three health coaching visits    Visits may be done in person or via phone    Coaching is a partnership between the  and the client; Coaches do not prescribe or diagnose    Coaching helps inspire the client to reach his/her personal goals   10 Tips for Healthy Holidays:   1. Continue to eat 3 meals daily and avoid snacks. Do not skip meals to \"save\" calories for holiday meal, you will likely overeat.   2. Eat slowly and stop as soon as you are satisfied.   3. Stay away from the buffet table or appetizers prior to the meal. This leads to snacking between meals or filling up on snacks prior to the meal, which can lead to overeating.   4. During the meal eat your protein first, " then the vegetables and last the starchy dishes. Again remember to stop as soon as you feel satisfied; it is okay to leave food on the plate.   5. Try water, non-alcoholic wine (does have calories), crystal light, norma and other low caloric beverage, put them in a fancy glass with a slice of lemon or lime to make them look nice.    6. Drink plenty of water.  7. Bring a dish to share, therefore you know that there will be a healthier option to eat.   8. Continue your current exercise routine, it is easier to fall out of your exercise routine than to get back into the habit of exercising after the holidays. Try to do a family activity outside or ask if anyone wants to take a walk pre or post meal.   9. Be kind to yourself, if you over indulged that's okay, all is not lost. Re-commit to healthy eating habits, forgive yourself and move on.   10. Try new traditions like adding in another vegetable dish or trying a healthier version of family favorites.      For any questions/concerns contact Minal Escalante LPN at [phone] or Sachi Wilkerson RN at [phone]    To schedule appointments with our team, please call [phone]     Please call during clinic hours Monday through Friday 8:00a - 4:00p if you have questions or you can contact us via [employer] at anytime. ?    Lab results will be communicated through My Chart or letter (if My Chart not used). Please call the clinic if you have not received communication after 1 week or if you have any questions.?      Fax: [phone]    Thank you,  Medical Weight Management Team       MEDICATION STARTED AT THIS APPOINTMENT  We are starting topiramate at bedtime.  Start one tab, 25 mg, for a week. Go up to 50 mg (2 tabs) for the next week. At the third week, take   3 tabs (75 mg).  Stay at 3 tabs until you are seen again. Call the nurse at [phone] if you have any questions or concerns. (Do not stop taking it if you don't think it's working. For some people it works even  though they do not feel much different.)    Topiramate (Topamax) is a medication that is used most often to treat migraine headaches or for seizures. It has also been found to help with weight loss. Although it's not currently FDA approved for weight loss, it has been used safely for a number of years to help people who are carrying extra weight.     Just how topiramate helps with weight loss has not been exactly determined. However it seems to work on areas of the brain to quiet down signals related to eating.      Topiramate may make you:    >feel less interest in eating in between meals   >think less about food and eating   >find it easier to push the plate away   >find giving up pop easier    >have an easier time eating less    For some of our patients, the pills work right away. They feel and think quite differently about food. Other patients don't feel much of a change but find in fact they have lost weight! Like all weight loss medications, topiramate works best when you help it work.  This means:    1) Have less tempting high calorie (fattening) food around the house or office    2) Have lower calorie food (fruits, vegetables,low fat meats and dairy) for snacks    3) Eat out only one time or less each week.   4) Eat your meals at a table with the TV or computer off.    Side-effects. Topiramate is generally well tolerated. The main side-effects we see are:   Tingling in hands,feet, or face (usually not very troublesome)   Mental confusion and word finding trouble (about 10% of patients have this.)     Feeling sleepy or a bit dopey- this goes away very soon after starting.    One of the dangers of topiramate is the possibility of birth defects--if you get pregnant when you are on it, there is the risk that your baby will be born with a cleft lip or palate.  If you are on topiramate and of child bearing age, you need to be on a reliable form of birth control or refrain from sexual intercourse.     Please refer  to the pharmacy insert for more information on side-effects. Since many pharmacists are not familiar with the use of topiramate in weight loss, calling the clinic will get you the most accurate information on the use of this medication for weight loss.     In order to get refills of this or any medication we prescribe you must be seen in the medical weight mgmt clinic every 2-3 months. Please have your pharmacy fax a refill request to [phone].
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
## 2019-12-16 NOTE — PROGRESS NOTES
"New Weight Management Nutrition Consultation    Laila Perez is a 69 year old adult presents today for new weight management nutrition consultation.  Patient referred by Maki Lund NP on December 16, 2019.    Patient with Co-morbidities of obesity including:  Type II DM no  Renal Failure no  Sleep apnea no  Hypertension yes   Dyslipidemia no  Joint pain no  Back pain no  GERD no     Anthropometrics:  Estimated body mass index is 44.66 kg/m  as calculated from the following:    Height as of an earlier encounter on 12/16/19: 1.803 m (5' 11\").    Weight as of an earlier encounter on 12/16/19: 145.2 kg (320 lb 3.2 oz).    Medications for Weight Loss:  Topamax    NUTRITION HISTORY  See MD note for details.  - Likes to cook. Buys her own groceries.  - Works 3 days out of 2 weeks  - Told to clean plate when young, now working on putting extra food away right away.  - Eats softer foods, missing teeth and doesn't wear dentures.   - Likes peas, carrots, sour kraut. Uses a lot of canned fruit.   - Pt accompanied by residential staff member, Darby, today.     Recent food recall:  Breakfast: 2 slices toast (80 natalya) with butter; cereal (frosted flakes) with whole milk  Lunch: Skip sometimes; sandwich and chips while working   Dinner: Steak; hamburger; pork; chicken rotating meats   Snacks: ice cream, apple sauce, canned fruit in fruit juice    Beverages: Whole milk with cereal, sometimes with meds, water, soda one per week, energy drink once per week, tomato juice   Alcohol: None  Dining out: 2-3 per month ([employer])     Physical Activity:  [employer] pool exercise for 30 mins one time per week. Relies on residential staff to take her to gym. Would like to go 2x/week, and thinks that can be arranged.     MALNUTRITION  % Intake: No decreased intake noted  % Weight Loss: None noted  Subcutaneous Fat Loss: None observed  Muscle Loss: None observed  Fluid Accumulation/Edema: None noted  Malnutrition Diagnosis: Patient does not meet " "two of the established criteria necessary for diagnosing malnutrition    Nutrition Prescription  Recommended energy/nutrient modification.  Volumetrics    Nutrition Diagnosis  Obesity r/t long history of self-monitoring deficit and excessive energy intake aeb BMI >30.    Nutrition Intervention  Materials/education provided on Volumetric eating to help satiety level on fewer calories; portion control and healthy food choices (Plate Method and Volumetrics handouts), meal planning and mindful eating. Discussed importance of meal consistency to avoid over eating in the evening. Discussed exampels of lean protein, starches, and fruits and vegetables with appropriate serving sizes, that pt could use to build her meal. Discussed switching to non-fat/reduced-fat dairy.  Encouraged pt to use mindfulness while snacking, evaluating for hunger vs boredom eating. Pt would like to start tracking her intake, by creating a daily list of foods she has eaten. Plan to discuss/evaluate log at follow-up. Encouraged increased activity as able Provided pt with list of goals and RD contact info.     Patient Understanding: fair  Expected Compliance: fair  Follow-Up Plans: Food log     Nutrition Goals  1) Exercise 2 times per week for 30 mins each time.  2) Eat 3 meals per day. Use the 9\" Plate method (1/2 plate non-starchy vegetables/fruit, 1/4 plate lean protein, 1/4 plate whole grain starch - no more than 1/2 cup carb/meal)  - Avoid snacking  3) Eliminate calorie-containing beverages. Limit milk to less than 8 oz per day. Switch to 1% milk.   4) Use a distraction (puzzles, games on phone, calling a friend) from cravings. Practice mindful eating.     Follow-Up:  PRN    Time spent with patient: 30 minutes.  Kathy Esteves, ZAK, LD        "

## 2019-12-28 ENCOUNTER — ANCILLARY PROCEDURE (OUTPATIENT)
Dept: GENERAL RADIOLOGY | Facility: CLINIC | Age: 69
End: 2019-12-28
Attending: NURSE PRACTITIONER
Payer: COMMERCIAL

## 2019-12-28 ENCOUNTER — OFFICE VISIT (OUTPATIENT)
Dept: URGENT CARE | Facility: URGENT CARE | Age: 69
End: 2019-12-28
Payer: COMMERCIAL

## 2019-12-28 VITALS
WEIGHT: 293 LBS | HEIGHT: 71 IN | DIASTOLIC BLOOD PRESSURE: 70 MMHG | HEART RATE: 52 BPM | TEMPERATURE: 97.1 F | RESPIRATION RATE: 18 BRPM | BODY MASS INDEX: 41.02 KG/M2 | SYSTOLIC BLOOD PRESSURE: 130 MMHG | OXYGEN SATURATION: 99 %

## 2019-12-28 DIAGNOSIS — M25.551 HIP PAIN, RIGHT: Primary | ICD-10-CM

## 2019-12-28 DIAGNOSIS — M25.551 HIP PAIN, RIGHT: ICD-10-CM

## 2019-12-28 PROCEDURE — 73502 X-RAY EXAM HIP UNI 2-3 VIEWS: CPT

## 2019-12-28 PROCEDURE — 99213 OFFICE O/P EST LOW 20 MIN: CPT | Performed by: NURSE PRACTITIONER

## 2019-12-28 ASSESSMENT — MIFFLIN-ST. JEOR: SCORE: 2231.38

## 2019-12-28 NOTE — PATIENT INSTRUCTIONS
Ortho referral placed    Physical therapy for symptoms    Follow up if symptoms do not improve or worsen.    Patient Education     Arthralgia    Arthralgia is the term for pain in or around the joint. It is a symptom, not a disease. This pain may involve one or more joints. In some cases, the pain moves from joint to joint.  There are many causes for joint pain. These include:    Injury    Osteoarthritis (wearing out of the joint surface)    Gout (inflammation of the joint due to crystals in the joint fluid)    Infection inside the joint      Bursitis (inflammation of the fluid-filled sacs around the joint)    Autoimmune disorders such as rheumatoid arthritis or lupus    Tendonitis (inflammation of chords that attach muscle to bone)  Home care    Rest the involved joint(s) until your symptoms improve.     You may be prescribed pain medicine. If none is prescribed, you may use acetaminophen or ibuprofen to control pain and inflammation.  Follow-up care  Follow up with your healthcare provider or as advised.  When to seek medical advice  Contact your healthcare provider right away if any of the following occurs:    Pain, swelling, or redness of joint increases    Pain worsens or recurs after a period of improvement    Pain moves to other joints    You cannot bear weight on the affected joint     You cannot move the affected joint    Joint appears deformed    New rash appears    Fever of 100.4 F (38 C) or higher, or as directed by your healthcare provider  Date Last Reviewed: 3/1/2017    1917-4070 [employer]. [address] [place] [address], [place] [address]. All rights reserved. This information is not intended as a substitute for professional medical care. Always follow your healthcare professional's instructions.

## 2019-12-28 NOTE — PROGRESS NOTES
Subjective     Laila Perez is a 69 year old adult who presents to clinic today for the following health issues:    HPI   Musculoskeletal problem/pain      Duration: few weeks    Description  Location: right hip and knee    Intensity:  moderate    Accompanying signs and symptoms: tingling    History  Previous similar problem: no   Previous evaluation:  none    Precipitating or alleviating factors:  Trauma or overuse: no   Aggravating factors include: walking, exercising, and laying on right side    Therapies tried and outcome: acetaminophen, ice freeze    Started when doing water exercises at the [facility]    Patient Active Problem List   Diagnosis     Gouty arthropathy     Mental or behavioral problem     Generalized osteoarthrosis, unspecified site     Disorder of bone and cartilage     Mixed hyperlipidemia     OA (osteoarthritis) of knee     HYPERLIPIDEMIA LDL GOAL <100     DDD (degenerative disc disease), lumbar     Alcoholism (H)     Advanced directives, counseling/discussion     Varicose veins of legs     Psoriasis     HTN, goal below 140/90     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     Family history of diabetes mellitus in first degree relative     Mitral valve disorder     Nonrheumatic aortic valve stenosis     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     Morbid obesity (H)     Health Care Home     Past Surgical History:   Procedure Laterality Date     COLONOSCOPY  2007     JOINT REPLACEMTN, KNEE RT/LT  2006    left     ORCHIECTOMY SCROTAL Bilateral 5/14/2019    Procedure: Bilatera Scrotal Orchiectomy;  Surgeon: Abhilash Weston MD;  Location:  OR       Social History     Tobacco Use     Smoking status: Never Smoker     Smokeless tobacco: Never Used   Substance Use Topics     Alcohol use: No     Comment: Quit 3/24/05     Family History   Problem Relation Age of Onset     Cancer Father         lung cancer heavy smoker         Current Outpatient Medications   Medication Sig Dispense Refill     Antiseptic  Products, Misc. (UNI-SOLVE) PADS Externally apply 2 pads topically twice a week To remove adhesive from patches 50 each 3     atenolol (TENORMIN) 50 MG tablet Take 1 tablet (50 mg) by mouth daily 90 tablet 1     estradiol (VIVELLE-DOT) 0.1 MG/24HR bi-weekly patch Place 1 patch onto the skin twice a week 24 patch 1     latanoprost (XALATAN) 0.005 % ophthalmic solution 1 drop At Bedtime.       lisinopril (PRINIVIL/ZESTRIL) 5 MG tablet Take 1 tablet (5 mg) by mouth daily 90 tablet 1     lovastatin (MEVACOR) 20 MG tablet TAKE ONE TABLET BY MOUTH AT BEDTIME 30 tablet 11     meloxicam (MOBIC) 15 MG tablet TAKE ONE TABLET BY MOUTH EVERY DAY 30 tablet 11     Multiple Vitamin (DAILY-EVANGELISTA) TABS Take 1 tablet by mouth daily  11     order for DME Equipment being ordered: Sigvaris 232 Cotton Thigh High for Men 20-30 mmHg- TWO PAIR TAN COLOR 2 each 5     SM CALCIUM 600/VITAMIN D 600-400 MG-UNIT per tablet Take 1 tablet by mouth 2 times daily 60 tablet 3     topiramate (TOPAMAX) 25 MG tablet 25mg at bedtime for week 1, 50mg at bedtime for 1 week, and 75mg at bedtime thereafter 90 tablet 1     ORDER FOR DME Equipment being ordered:   BACK BRACE    MedSpec Back-n-Shape II Back Brace  Size XXXL with thermoplastic insert        (Patient not taking: Reported on 12/28/2019) 1 each 0     Allergies   Allergen Reactions     Penicillin [Esters] Itching     he is not sure what kind of reaction he had     Heparin      he lives in a assisted living program and is not sure what type of allergies he really has. He quit drinking     Zosyn [Penicillins]      unsure of reaction, allergy is listed on his med sheet     Reviewed and updated as needed this visit by Provider  Tobacco  Allergies  Meds  Problems  Med Hx  Surg Hx  Fam Hx         Review of Systems   ROS COMP: Constitutional, HEENT, cardiovascular, pulmonary, gi and gu systems are negative, except as otherwise noted.      Objective    /70 (BP Location: Right arm, Patient  "Position: Sitting, Cuff Size: Adult Large)   Pulse 52   Temp 97.1  F (36.2  C) (Tympanic)   Resp 18   Ht 1.803 m (5' 11\")   Wt 144.4 kg (318 lb 6.4 oz)   SpO2 99%   BMI 44.41 kg/m    Body mass index is 44.41 kg/m .  Physical Exam   GENERAL: healthy, alert and no distress  MS: tenderness to palpation right greater trochanter bursa, full internal and external rotation, full flexion, limited extension  NEURO: Normal strength and tone, mentation intact and speech normal  PSYCH: mentation appears normal, affect normal/bright    Diagnostic Test Results:  Xray - XR HIP RIGHT 2-3 VIEWS 12/28/2019 12:05 PM      HISTORY: Hip pain, right                                                                      IMPRESSION: Negative exam.     TOOTIE GASTELUM MD        Assessment & Plan     1. Hip pain, right  X ray unremarkable.  PT and ortho referral for symptoms.  Symptomatic care and follow up discussed.  - XR Hip Right 2-3 Views; Future  - ORTHO  REFERRAL  - PHYSICAL THERAPY REFERRAL; Future    Home care instructions were reviewed with the patient. The risks, benefits and treatment options of prescribed medications or other treatments have been discussed with the patient. The patient verbalized their understanding and should call or follow up if no improvement or if they develop further problems.     Patient Instructions   Ortho referral placed    Physical therapy for symptoms    Follow up if symptoms do not improve or worsen.    Patient Education     Arthralgia    Arthralgia is the term for pain in or around the joint. It is a symptom, not a disease. This pain may involve one or more joints. In some cases, the pain moves from joint to joint.  There are many causes for joint pain. These include:    Injury    Osteoarthritis (wearing out of the joint surface)    Gout (inflammation of the joint due to crystals in the joint fluid)    Infection inside the joint      Bursitis (inflammation of the fluid-filled sacs around " the joint)    Autoimmune disorders such as rheumatoid arthritis or lupus    Tendonitis (inflammation of chords that attach muscle to bone)  Home care    Rest the involved joint(s) until your symptoms improve.     You may be prescribed pain medicine. If none is prescribed, you may use acetaminophen or ibuprofen to control pain and inflammation.  Follow-up care  Follow up with your healthcare provider or as advised.  When to seek medical advice  Contact your healthcare provider right away if any of the following occurs:    Pain, swelling, or redness of joint increases    Pain worsens or recurs after a period of improvement    Pain moves to other joints    You cannot bear weight on the affected joint     You cannot move the affected joint    Joint appears deformed    New rash appears    Fever of 100.4 F (38 C) or higher, or as directed by your healthcare provider  Date Last Reviewed: 3/1/2017    [id number] [employer]. [address] [place] [address], [place] [address]. All rights reserved. This information is not intended as a substitute for professional medical care. Always follow your healthcare professional's instructions.               Return in about 2 weeks (around 1/11/2020), or if symptoms worsen or fail to improve.    JERRICA Sloan [facility]

## 2019-12-31 ENCOUNTER — TELEPHONE (OUTPATIENT)
Dept: FAMILY MEDICINE | Facility: CLINIC | Age: 69
End: 2019-12-31

## 2019-12-31 NOTE — TELEPHONE ENCOUNTER
Reason for call:  Patient reporting a symptom    FYPORFIRIO- Pt is calling today regarding her appt 12/28/19 with Emmy BECERRIL. Pt was given orders for Back Brace and Stockings for her Varicose  Veins. Pt said she came in for Rt Hip Pain. Does not need the back brace or stockings. Pt said she is not going to get them at this time.      Duration (how long have symptoms been present): See 12/28/19 OV    Phone Number patient can be reached at:  Home number on file [phone] (home)    Best Time:  Any Time      Can we leave a detailed message on this number:  YES    Call taken on 12/31/2019 at 8:31 AM by Maria Teresa Arriaga

## 2019-12-31 NOTE — TELEPHONE ENCOUNTER
Looks like these were old orders from Delmis Simons from 2012 and 2017, they were not ordered at that appointment.    Thanks,     JERRICA Sloan CNP

## 2020-01-07 DIAGNOSIS — I10 BENIGN ESSENTIAL HYPERTENSION: ICD-10-CM

## 2020-01-07 RX ORDER — LISINOPRIL 5 MG/1
TABLET ORAL
Qty: 30 TABLET | Refills: 5 | Status: SHIPPED | OUTPATIENT
Start: 2020-01-07 | End: 2020-07-08

## 2020-01-07 NOTE — TELEPHONE ENCOUNTER
"Requested Prescriptions   Pending Prescriptions Disp Refills     lisinopril (PRINIVIL/ZESTRIL) 5 MG tablet [Pharmacy Med Name: LISINOPRIL 5 MG TABLET] 30 tablet      Sig: TAKE ONE TABLET BY MOUTH ONCE DAILY       ACE Inhibitors (Including Combos) Protocol Passed - 1/7/2020  1:52 PM        Passed - Blood pressure under 140/90 in past 12 months     BP Readings from Last 3 Encounters:   12/28/19 130/70   12/16/19 138/77   11/05/19 136/78                 Passed - Recent (12 mo) or future (30 days) visit within the authorizing provider's specialty     Patient has had an office visit with the authorizing provider or a provider within the authorizing providers department within the previous 12 mos or has a future within next 30 days. See \"Patient Info\" tab in inbasket, or \"Choose Columns\" in Meds & Orders section of the refill encounter.              Passed - Medication is active on med list        Passed - Patient is age 18 or older        Passed - Normal serum creatinine on file in past 12 months     Recent Labs   Lab Test 08/16/19  0928   CR 1.08             Passed - Normal serum potassium on file in past 12 months     Recent Labs   Lab Test 08/16/19  0928   POTASSIUM 4.7             Last Written Prescription Date:  8/16/19  Last Fill Quantity: 90,  # refills: 1   Last office visit: 10/14/2019 with prescribing provider:      Future Office Visit:      "

## 2020-01-10 ENCOUNTER — PATIENT OUTREACH (OUTPATIENT)
Dept: GERIATRIC MEDICINE | Facility: CLINIC | Age: 70
End: 2020-01-10

## 2020-01-10 NOTE — PROGRESS NOTES
TRANSITIONS OF CARE (FRED) LOG   FRED tasks should be completed by the CC within one (1) business day of notification of each transition. Follow up contact with member is required after return to their usual care setting.  Note:  If CC finds out about the transitions fifteen (15) days or more after the member has returned to their usual care setting, no FRED log is needed. However, the CC should check in with the member to discuss the transition process, any changes needed to the care plan and document it in a case note.    Member Name:  Laila Perez [facility] Member ID[facility] [id number]   Product: [facility] Care Coordinator Contact:  Alexandra Helton RN-BC Agency/[facility]/Care System: [facility]   Transition Communication Actions from Care Management Contact   Transition #1   Notification Date: 1/10/2020 Transition Date:   1/6/2020 Transition From: Home     Is this the member s usual care setting?               yes Transition To:  Hospital, [facility]        Transition Type:  Unplanned  Reason for Admission/Comments:  Abdominal pain and Coffee Ground Emesis     Dx with 4 Ulcers    Shared CC contact info, care plan/services with receiving setting--Date completed: N/A - Notified after admission    Notified PCP of transition--Date completed:  1/10/2020     via  EMR   Transition #2   Transition #3  (if applicable)   Notification Date: 1/10/2020         Transition To:  Home  Transition Date: 1/9/2020     Transition Type:    Planned  Notified PCP -- Date completed: 1/10/2020              Shared CC contact info, care plan/services with receiving setting or, if applicable, home care agency--Date completed: 1/10/2020  *Complete additional tasks below, if this transition is a return to usual care setting.      Comments:  Contacted member to review hospital admission.           *Complete tasks below when the member is discharging TO their usual care setting within one (1) business day of  notification.  For situations where the Care Coordinator is notified of the discharge prior to the date of discharge, the Care Coordinator must follow up with the member or designated representative to confirm that discharge actually occurred and discuss required FRED tasks as outlined in the FRED Instructions.  (This includes situations where it may be a  new  usual care setting for the member. (i.e., a community member who decides upon permanent nursing home placement following hospitalization and rehab).    Date completed: 1/10/2020 Communicated with member or their designated representative about the following:  care transition process; about changes to the member s health status; plan of care updates; education about transitions and how to prevent unplanned transitions/readmissions  Four Pillars for Optimal Transition:    Check  Yes  - if the member, family member and/or SNF/facility staff manages the following:    If  No  provide explanation in the comments section.          [x]  Yes     []  No     Does the member have a follow-up appointment scheduled with primary care or specialist? (Mental health hospitalizations--the appt. should be w/in 7 days)   [x]  Yes     []  No     Can the member manage their medications or is there a system in place to manage medications (e.g. home care set-up)?         [x]  Yes     []  No     Can the member verbalize warning signs and symptoms to watch for and how to respond?         [x]  Yes     []  No     Does the member use a Personal Health Care Record?  Check  Yes  if visit summary, discharge summary, and/or healthcare summary are being used as a PHR.                                                                                                                                                                                    [] Yes      [x] No      Have you updated the member s care plan?  If  No  provide explanation in comments.   Comments:  cc updated member to contact me  when [facility] cc scheduled her yearly visit.  cc also mailed 2 buisiness cards to member to ensure she has my contact information.

## 2020-01-13 ENCOUNTER — DOCUMENTATION ONLY (OUTPATIENT)
Dept: CARE COORDINATION | Facility: CLINIC | Age: 70
End: 2020-01-13

## 2020-01-20 ENCOUNTER — OFFICE VISIT (OUTPATIENT)
Dept: OTHER | Facility: OUTPATIENT CENTER | Age: 70
End: 2020-01-20
Payer: COMMERCIAL

## 2020-01-20 VITALS
HEART RATE: 54 BPM | WEIGHT: 293 LBS | SYSTOLIC BLOOD PRESSURE: 125 MMHG | DIASTOLIC BLOOD PRESSURE: 75 MMHG | BODY MASS INDEX: 43.54 KG/M2

## 2020-01-20 DIAGNOSIS — F64.0 GENDER DYSPHORIA IN ADOLESCENT AND ADULT: Primary | ICD-10-CM

## 2020-01-20 DIAGNOSIS — K25.9 MULTIPLE GASTRIC ULCERS: ICD-10-CM

## 2020-01-20 DIAGNOSIS — F64.0 GENDER DYSPHORIA IN ADOLESCENT AND ADULT: ICD-10-CM

## 2020-01-20 RX ORDER — ESTRADIOL 0.1 MG/D
1 FILM, EXTENDED RELEASE TRANSDERMAL
Qty: 24 PATCH | Refills: 1
Start: 2020-01-20 | End: 2020-05-11

## 2020-01-20 RX ORDER — ACETAMINOPHEN 500 MG
1000 TABLET ORAL
COMMUNITY
End: 2022-03-18

## 2020-01-20 RX ORDER — PANTOPRAZOLE SODIUM 40 MG/1
40 TABLET, DELAYED RELEASE ORAL
COMMUNITY
Start: 2020-01-08 | End: 2020-03-02

## 2020-01-20 RX ORDER — TIMOLOL MALEATE 5 MG/ML
1 SOLUTION/ DROPS OPHTHALMIC 2 TIMES DAILY
Status: ON HOLD | COMMUNITY
End: 2022-10-04

## 2020-01-20 ASSESSMENT — ENCOUNTER SYMPTOMS
LIGHT-HEADEDNESS: 0
FEVER: 0
DYSPHORIC MOOD: 0
SHORTNESS OF BREATH: 1
NERVOUS/ANXIOUS: 0
FATIGUE: 1
VOMITING: 0
WEAKNESS: 0
UNEXPECTED WEIGHT CHANGE: 0
CHEST TIGHTNESS: 0
HEADACHES: 0
NUMBNESS: 0
CHILLS: 0
POLYDIPSIA: 0
FREQUENCY: 0
PALPITATIONS: 0
ABDOMINAL PAIN: 0

## 2020-01-20 NOTE — PROGRESS NOTES
[facility] -  Case Progress Note  Client Name: Laila Perez  YOB: 1950  MRN:  [id number]  Treating Provider: Estelle Gomez LP  Type of Session: Individual  Present in Session: client   Number of Minutes:  55    Treatment plan: 8/12/19    Health Maintenance Summary - Mental Health Treatment Plan       Status Date      MENTAL HEALTH TX PLAN Overdue 10/28/2019      Done 8/28/2019      Done 6/10/2019      Done 10/30/2017      Done 9/6/2016           Date of Service: 1/20/20    Current Symptoms/Status:  Distress about gender and secondary sex characteristics, wants genitals removed, bothers her daily of moderate to severe intensity, distress with work situation, distress with changing at the gym.    Progress Toward Treatment Goals:   Client reported progress with some socialization and feeling good about being able to live his female.    Intervention: Modality and Description:  Provided support and feedback. Used CBT to process gender dysphoria concerns. Client shared continuing to feel good about her gender presentation.  She shared that she recently was in the hospital for a few days to address having stomach ulcers.  She shared the ulcers she thinks came from taking ibuprofen to address what she thought was hip pain.  She stated that she is continuing to have leg pain.  She did have an x-ray and they told her there was nothing wrong with her hip.  She is now thinking that it might be her knee.  She talked about building up her energy so that [facility].  She did not bring up anything about her appointment with the surgeon.  Her primary focus today was concerns about her health.    Interactive Complexity:  There are four specific communication difficulties that complicate the work of the primary psychiatric procedure.  Interactive complexity (+[id number]) may be reported when at least one of these difficulties is  present.    Communication difficulties present during current the psychiatric procedure include:  1. The need to manage maladaptive communication (related to e.g. high anxiety, high reactivity, repeated questions, or disagreement) among participants that complicates delivery of care.        Response to Intervention:  Client reported validation.    Assignment:  Follow surgeon guidelines, maintain balanced approach to weight loss.      Diagnosis:  Encounter Diagnosis   Name Primary?     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 Yes           Plan / Need for Future Services:  Return for therapy in 4 weeks.      Estelle Gomez PsyD, LP

## 2020-01-20 NOTE — PROGRESS NOTES
"       BOO Ulloa is a 69 year old individual that uses pronouns She/Her/Hers/Herself that presents today for follow up of:  feminizing hormone therapy.   Alone or accompanied by: accompanied today by self  Gender identity: female  Started Hormone  therapy  2016  Continues on Vivelle dot 0.1 mg patch 2x/wk  Any special concerns today?    Had hematemesis and and was in [facility] from 1/6/-1/9/2020 diagnosed with acute GI bleed and gastric ulcers  No other findings--previously noted hepatitis steatosis  No other concerns, feeling better, off NSAIDS  Reviewed that even if miss a dose, do not use 2 patches to \"make up\" for missed dose    On hormones?  YES +++ Shot day of the week? Not applicable-taking pills/patch/gel      Due for labs?  Yes      +++ Refills of meds needed?  Yes  Gender related body changes since last visit: none    Breakthrough bleeding? Does Not Apply  Activity: working on regaining strength  New health concerns since last visit:  See above.   ---    Past Surgical History:   Procedure Laterality Date     COLONOSCOPY  2007     JOINT REPLACEMTN, KNEE RT/LT  2006    left     ORCHIECTOMY SCROTAL Bilateral 5/14/2019    Procedure: Bilatera Scrotal Orchiectomy;  Surgeon: Abhilash Weston MD;  Location: [facility] OR       Patient Active Problem List   Diagnosis     Gouty arthropathy     Mental or behavioral problem     Generalized osteoarthrosis, unspecified site     Disorder of bone and cartilage     Mixed hyperlipidemia     OA (osteoarthritis) of knee     HYPERLIPIDEMIA LDL GOAL <100     DDD (degenerative disc disease), lumbar     Alcoholism (H)     Advanced directives, counseling/discussion     Varicose veins of legs     Psoriasis     HTN, goal below 140/90     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     Family history of diabetes mellitus in first degree relative     Mitral valve disorder     Nonrheumatic aortic valve stenosis     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     Morbid obesity (H)     " Health Care Home       Current Outpatient Medications   Medication Sig Dispense Refill     pantoprazole (PROTONIX) 40 MG EC tablet Take 40 mg by mouth       acetaminophen (TYLENOL) 500 MG tablet Take 1,000 mg by mouth       Antiseptic Products, Misc. (UNI-SOLVE) PADS Externally apply 2 pads topically twice a week To remove adhesive from patches 50 each 3     atenolol (TENORMIN) 50 MG tablet Take 1 tablet (50 mg) by mouth daily 90 tablet 1     estradiol (VIVELLE-DOT) 0.1 MG/24HR bi-weekly patch Place 1 patch onto the skin twice a week 24 patch 0     latanoprost (XALATAN) 0.005 % ophthalmic solution 1 drop At Bedtime.       lisinopril (PRINIVIL/ZESTRIL) 5 MG tablet TAKE ONE TABLET BY MOUTH ONCE DAILY 30 tablet 5     lovastatin (MEVACOR) 20 MG tablet TAKE ONE TABLET BY MOUTH AT BEDTIME 30 tablet 11     Multiple Vitamin (DAILY-EVANGELISTA) TABS Take 1 tablet by mouth daily  11     order for DME Equipment being ordered: Sigvar[employer] 232 Cotton Thigh High for Men 20-30 mmHg- TWO PAIR TAN COLOR 2 each 5     ORDER FOR DME Equipment being ordered:   BACK BRACE    [employer] Back-n-Shape II Back Brace  Size XXXL with thermoplastic insert        (Patient not taking: Reported on 12/28/2019) 1 each 0     SM CALCIUM 600/VITAMIN D 600-400 MG-UNIT per tablet Take 1 tablet by mouth 2 times daily 60 tablet 3     timolol maleate (TIMOPTIC) 0.5 % ophthalmic solution Apply 1 drop to eye       topiramate (TOPAMAX) 25 MG tablet 25mg at bedtime for week 1, 50mg at bedtime for 1 week, and 75mg at bedtime thereafter 90 tablet 1       History   Smoking Status     Never Smoker   Smokeless Tobacco     Never Used          Allergies   Allergen Reactions     Penicillin [Esters] Itching     he is not sure what kind of reaction he had     Heparin      he lives in a assisted living program and is not sure what type of allergies he really has. He quit drinking     Zosyn [Penicillins]      unsure of reaction, allergy is listed on his med sheet       Health  Maintenance Due   Topic Date Due     MENTAL HEALTH TX PLAN  10/28/2019         Problem, Medication and Allergy Lists were reviewed and are current..         Review of Systems:   Review of Systems   Constitutional: Positive for fatigue. Negative for chills, fever and unexpected weight change.   Eyes: Negative for visual disturbance.   Respiratory: Positive for shortness of breath. Negative for chest tightness.         From recent GI bleed   Cardiovascular: Negative for chest pain, palpitations and leg swelling.   Gastrointestinal: Negative for abdominal pain and vomiting.   Endocrine: Negative for polydipsia and polyuria.   Genitourinary: Negative for frequency.   Neurological: Negative for weakness, light-headedness, numbness and headaches.        Unsteady/off balance   Psychiatric/Behavioral: Negative for dysphoric mood and suicidal ideas. The patient is not nervous/anxious.             Physical Exam:   Blood pressure 125/75, pulse 54, weight 141.6 kg (312 lb 3.2 oz).    Body mass index is 43.54 kg/m .      BMI= There is no height or weight on file to calculate BMI.   Wt Readings from Last 10 Encounters:   12/28/19 144.4 kg (318 lb 6.4 oz)   12/16/19 145.2 kg (320 lb 3.2 oz)   11/05/19 141 kg (310 lb 12.8 oz)   08/16/19 136.3 kg (300 lb 6.4 oz)   08/06/19 134.7 kg (297 lb)   06/13/19 131.5 kg (290 lb)   05/30/19 128.4 kg (283 lb)   05/14/19 128.4 kg (283 lb)   05/03/19 128.1 kg (282 lb 6.4 oz)   04/22/19 132 kg (291 lb)     Appearance: Female appearance and dress    GENERAL:: healthy, alert and no distress  RESP: lungs clear to auscultation - no rales, no rhonchi, no wheezes  CV: regular rates and rhythm, normal S1 S2, no S3 or S4 and 2/4 mumur, no click or rub -      Affect: Appropriate/mood-congruent           Labs:   Results from last visit:  Orders Only on 10/27/2019   Component Date Value Ref Range Status     Occult Blood Scn FIT 10/27/2019 Negative  NEG^Negative Final       Assessment and Plan   1. Gender  dysphoria s/p orchiectomy  Stable on current hormone regimen  Continue to work with PCP on overall health,   Continue on weight loss plan with Bariatrics        Follow up:  Follow up in 4-6 months  Results by tracet  Questions were elicited and answered.     Jluis Mane MD

## 2020-01-30 ENCOUNTER — ANCILLARY PROCEDURE (OUTPATIENT)
Dept: GENERAL RADIOLOGY | Facility: CLINIC | Age: 70
End: 2020-01-30
Attending: NURSE PRACTITIONER
Payer: COMMERCIAL

## 2020-01-30 ENCOUNTER — OFFICE VISIT (OUTPATIENT)
Dept: FAMILY MEDICINE | Facility: CLINIC | Age: 70
End: 2020-01-30
Payer: COMMERCIAL

## 2020-01-30 VITALS
DIASTOLIC BLOOD PRESSURE: 65 MMHG | BODY MASS INDEX: 41.02 KG/M2 | TEMPERATURE: 97.6 F | HEART RATE: 60 BPM | SYSTOLIC BLOOD PRESSURE: 114 MMHG | WEIGHT: 293 LBS | HEIGHT: 71 IN

## 2020-01-30 DIAGNOSIS — G89.29 CHRONIC BILATERAL LOW BACK PAIN WITH RIGHT-SIDED SCIATICA: ICD-10-CM

## 2020-01-30 DIAGNOSIS — M25.551 HIP PAIN, RIGHT: Primary | ICD-10-CM

## 2020-01-30 DIAGNOSIS — M54.41 CHRONIC BILATERAL LOW BACK PAIN WITH RIGHT-SIDED SCIATICA: ICD-10-CM

## 2020-01-30 DIAGNOSIS — M25.551 HIP PAIN, RIGHT: ICD-10-CM

## 2020-01-30 DIAGNOSIS — K25.9 GASTRIC ULCER DUE TO CHEMICAL: ICD-10-CM

## 2020-01-30 PROCEDURE — 72170 X-RAY EXAM OF PELVIS: CPT

## 2020-01-30 PROCEDURE — 73560 X-RAY EXAM OF KNEE 1 OR 2: CPT | Mod: RT

## 2020-01-30 PROCEDURE — 99214 OFFICE O/P EST MOD 30 MIN: CPT | Performed by: NURSE PRACTITIONER

## 2020-01-30 PROCEDURE — 72100 X-RAY EXAM L-S SPINE 2/3 VWS: CPT

## 2020-01-30 ASSESSMENT — MIFFLIN-ST. JEOR: SCORE: 2191.47

## 2020-01-30 NOTE — PROGRESS NOTES
Subjective     Laila Perez is a 69 year old adult who presents to clinic today for the following health issues:    HPI         Hospital Follow-up Visit:    Hospital/Nursing Home/IP Rehab Facility: [facility]  Date of Admission: 1/6/2020  Date of Discharge: 1/9/2020  Reason(s) for Admission: Gastric Ulcer    Diverticulitis, upper and lower GI bleed              Problems taking medications regularly:  None       Medication changes since discharge: None       Problems adhering to non-medication therapy:  None    Summary of hospitalization:  CareEverywhere information obtained and reviewed  Diagnostic Tests/Treatments reviewed.  Follow up needed: Repeat EGD  Other Healthcare Providers Involved in Patient s Care:         Homecare  Update since discharge: improved.     Post Discharge Medication Reconciliation: discharge medications reconciled and changed, per note/orders (see AVS).  Plan of care communicated with patient     Coding guidelines for this visit:  Type of Medical   Decision Making Face-to-Face Visit       within 7 Days of discharge Face-to-Face Visit        within 14 days of discharge   Moderate Complexity 93135 [id number]   High Complexity 14994 [id number]   -----------------------------------  ADMISSION DATE: 1/6/2020  DISCHARGE DATE: 1/9/2020   In ED, CT showed diverticulosis with a suggestion of mild diverticulitis. She was given Protonix IV, started on Cipro and Flagyl IV and transferred to [facility] for further GI workup. Of note, due to recent issues with hip pain patient has been using both ibuprofen and Aleve taking 3 at a time every 3-4 hours over the last few weeks although has used less in the last few days PTA. Was also tolerating food and liquid PO PTA.      On admission, GI consulted. She underwent upper GI endoscopy on 1/7, which showed non-bleeding gastric ulcers that were biopsied; otherwise normal. Also recommended outpatient colonoscopy in 6 weeks for screening and f/u of  possible diverticulitis. Her hemoglobin drifted down 8.7 on 1/8/20 with new hyperkalemia (5.5). Repeat labs showed stable Hb and nl K. She was discharged to home on 1/9/20 with PPI BID.            PROBLEMS TO ADD ON...  Increased right knee pain  Right hip pain  Low back pain  Limiting his sleep and range of motion  Back brace has been slightly helpful  Moods are stable      Patient Active Problem List   Diagnosis     Gouty arthropathy     Mental or behavioral problem     Generalized osteoarthrosis, unspecified site     Disorder of bone and cartilage     Mixed hyperlipidemia     OA (osteoarthritis) of knee     HYPERLIPIDEMIA LDL GOAL <100     DDD (degenerative disc disease), lumbar     Alcoholism (H)     Advanced directives, counseling/discussion     Varicose veins of legs     Psoriasis     HTN, goal below 140/90     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     Family history of diabetes mellitus in first degree relative     Mitral valve disorder     Nonrheumatic aortic valve stenosis     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     Morbid obesity (H)     Health Care Home     Multiple gastric ulcers     Past Surgical History:   Procedure Laterality Date     COLONOSCOPY  2007     JOINT REPLACEMTN, KNEE RT/LT  2006    left     ORCHIECTOMY SCROTAL Bilateral 5/14/2019    Procedure: Bilatera Scrotal Orchiectomy;  Surgeon: Abhilash Weston MD;  Location:  OR       Social History     Tobacco Use     Smoking status: Never Smoker     Smokeless tobacco: Never Used   Substance Use Topics     Alcohol use: No     Comment: Quit 3/24/05     Family History   Problem Relation Age of Onset     Cancer Father         lung cancer heavy smoker           -------------------------------------  Reviewed and updated as needed this visit by Provider         Review of Systems   ROS COMP: Constitutional, HEENT, cardiovascular, pulmonary, GI, , musculoskeletal, neuro, skin, endocrine and psych systems are negative, except as otherwise noted.     "  Objective    /65 (BP Location: Right arm, Patient Position: Chair, Cuff Size: Adult Large)   Pulse 60   Temp 97.6  F (36.4  C) (Tympanic)   Ht 1.803 m (5' 11\")   Wt 140.4 kg (309 lb 9.6 oz)   BMI 43.18 kg/m    Body mass index is 43.18 kg/m .  Physical Exam   GENERAL: alert, no distress and pale  EYES: Eyes grossly normal to inspection, PERRL and conjunctivae and sclerae normal  HENT: ear canals and TM's normal, nose and mouth without ulcers or lesions  NECK: no adenopathy, no asymmetry, masses, or scars and thyroid normal to palpation  RESP: lungs clear to auscultation - no rales, rhonchi or wheezes  BREAST: normal without masses, tenderness or nipple discharge and no palpable axillary masses or adenopathy  CV: regular rate and rhythm, normal S1 S2, no S3 or S4, no murmur, click or rub, no peripheral edema and peripheral pulses strong  ABDOMEN: soft, nontender, no hepatosplenomegaly, no masses and bowel sounds normal  MS: no gross musculoskeletal defects noted, no edema  Slight pain with palpation over the patella on the right.  Trochanter bursa tenderness right hip.  Right SI discomfort with flexion and extension of the lumbar spine  SKIN: no suspicious lesions or rashes  NEURO: Normal strength and tone, mentation intact and speech normal  PSYCH: mentation appears normal, affect normal/bright    Diagnostic Test Results:  Labs reviewed in Epic  Results for orders placed or performed in visit on 01/30/20   XR Lumbar Spine 2/3 Views     Status: None    Narrative    LUMBAR SPINE TWO-THREE  VIEWS  1/30/2020 4:49 PM     HISTORY: Chronic bilateral low back pain with right-sided sciatica;  Chronic bilateral low back pain with right-sided sciatica    COMPARISON: Film dated 4/5/2011    FINDINGS: Again noted is a chronic compression fracture of the L1  vertebral body. This was present on the previous film. There is severe  loss of disc space height. There are multilevel degenerative changes  with loss of disc " space height at T11-T12, T12-L1, L1-L2, L2-L3,  L3-L4, L5-S1. Degenerative change in the facet joints..      Impression    IMPRESSION:   1. Chronic compression fracture of L1. This is unchanged.  2. Multilevel degenerative changes. These have increased when compared  to 2011    CHRIS GUERRA MD   Results for orders placed or performed in visit on 01/30/20   XR Pelvis 1/2 Views     Status: None    Narrative    PELVIS ONE TO TWO VIEWS   1/30/2020 4:41 PM     HISTORY: Right hip pain.    COMPARISON: None.      Impression    IMPRESSION: Hip joint spaces appear fairly well-preserved with perhaps  mild narrowing superiorly on the right. No evidence of fracture or  AVN. SI joints are unremarkable.    KENDRA MARTI MD   Results for orders placed or performed in visit on 01/30/20   XR Knee Right 1/2 Views     Status: None    Narrative    KNEE RIGHT ONE TO TWO VIEWS    1/30/2020 4:35 PM     HISTORY: Right knee pain.    COMPARISON: None.      Impression    IMPRESSION: Mild medial compartment joint space narrowing. Mild  patellofemoral degenerative changes. No evidence of fracture or  effusion.    KENDRA MARTI MD           Assessment & Plan     Laila was seen today for [facility] f/u.    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  Continue Protonix 40 mg daily  Repeat EGD in 1 month  Tylenol extra strength up to 2500 mg daily  Avoid NSAIDs  X-rays done today we will contact with results as they become available           Regular exercise  Call or return to the clinic with any worsening of symptoms or no resolution. Patient/Parent verbalized understanding and is in agreement. Medication side effects  reviewed.   Current Outpatient Medications   Medication Sig Dispense Refill     acetaminophen (TYLENOL) 500 MG tablet Take 1,000 mg by mouth       Antiseptic Products, Misc. (UNI-SOLVE) PADS Externally apply 2 pads topically twice a week To remove adhesive from patches 50 each 3     atenolol (TENORMIN) 50 MG tablet Take 1 tablet (50 mg) by mouth daily 90 tablet 1     estradiol (VIVELLE-DOT) 0.1 MG/24HR bi-weekly patch Place 1 patch onto the skin twice a week 24 patch 1     latanoprost (XALATAN) 0.005 % ophthalmic solution 1 drop At Bedtime.       lisinopril (PRINIVIL/ZESTRIL) 5 MG tablet TAKE ONE TABLET BY MOUTH ONCE DAILY 30 tablet 5     lovastatin (MEVACOR) 20 MG tablet TAKE ONE TABLET BY MOUTH AT BEDTIME 30 tablet 11     Multiple Vitamin (DAILY-EVANGELISTA) TABS Take 1 tablet by mouth daily  11     order for DME Equipment being ordered: Sigvaris 232 Cotton Thigh High for Men 20-30 mmHg- TWO PAIR TAN COLOR 2 each 5     ORDER FOR DME Equipment being ordered:   BACK BRACE    [employer] Back-n-Shape II Back Brace  Size XXXL with thermoplastic insert        1 each 0     pantoprazole (PROTONIX) 40 MG EC tablet Take 40 mg by mouth       SM CALCIUM 600/VITAMIN D 600-400 MG-UNIT per tablet Take 1 tablet by mouth 2 times daily 60 tablet 3     timolol maleate (TIMOPTIC) 0.5 % ophthalmic solution Apply 1 drop to eye       topiramate (TOPAMAX) 25 MG tablet 25mg at bedtime for week 1, 50mg at bedtime for 1 week, and 75mg at bedtime thereafter 90 tablet 1     Chart documentation with Dragon Voice recognition Software. Although reviewed after completion, some words and grammatical errors may remain.    See Patient Instructions      JERRICA Salcedo [facility]

## 2020-01-30 NOTE — NURSING NOTE
"Chief Complaint   Patient presents with     Hospital F/U       Initial /65 (BP Location: Right arm, Patient Position: Chair, Cuff Size: Adult Large)   Pulse 60   Temp 97.6  F (36.4  C) (Tympanic)   Ht 1.803 m (5' 11\")   Wt 140.4 kg (309 lb 9.6 oz)   BMI 43.18 kg/m   Estimated body mass index is 43.18 kg/m  as calculated from the following:    Height as of this encounter: 1.803 m (5' 11\").    Weight as of this encounter: 140.4 kg (309 lb 9.6 oz).    Patient presents to the clinic using No DME    Health Maintenance that is potentially due pending provider review:  NONE        Is there anyone who you would like to be able to receive your results? No  If yes have patient fill out YASIR      "

## 2020-01-30 NOTE — PATIENT INSTRUCTIONS
Patient Education     Gastritis or Ulcer, No Antibiotic Treatment    Gastritis is irritation and inflammation of the stomach lining. This means the lining is red and swollen. It can cause shallow sores in the stomach lining called erosions. An ulcer is a deeper open sore in the lining of the stomach. It may also occur in the first part of the small intestine (duodenum). The causes and symptoms of gastritis and ulcers are very similar.  Causes and risk factors for both problems can include:    Long-term use of nonsteroidal anti-inflammatory drugs (NSAIDs), such as aspirin and ibuprofen    H. pylori bacteria infection    Tobacco use    Alcohol use    Certain other conditions such as immune disorders, certain medicines (high-dose iron supplements) and street drugs (such as cocaine)  Symptoms for both problems can include:    Dull or burning pain in the upper part of the belly    Loss of appetite    Heartburn or upset stomach    Frequent burping    Bloated feeling    Nausea with or without vomiting  You likely had an evaluation to help find the exact cause and extent of your problem. This may have included a health history, exam, and certain tests.  Results showed that your problem is not due to H. pylori infection. For this reason, you don't need antibiotics as part of your treatment.  Whether your problem is gastritis or an ulcer, you will still need to take other medicines, however. You will also need to follow instructions to help reduce stomach irritation so your stomach can heal.   Home care    Take any medicines you re prescribed exactly as directed. Common medicines used to treat gastritis include:  ? Antacids. These help neutralize the normal acids in your stomach.  ? Proton pump inhibitors. These block your stomach from making any acid.  ? H2 blockers. These reduce the amount of acid your stomach makes.  ? Bismuth subsalicylate. This helps protect the lining of your stomach from acid.    Don't take any  NSAIDs during your treatment. If you take NSAID to help treat other health problems, tell your healthcare provider. He or she may need to adjust your medicine plan or change the dosage.    Don t use tobacco. Also don t drink alcohol. These products can increase the amount of acid your stomach makes. This can delay healing. It can also worsen symptoms.  Follow-up care  Follow up with your healthcare provider, or as advised. In some cases, more testing may be needed.  When to seek medical advice  Call your healthcare provider right away if any of these occur:    Fever of 100.4 F (38 C) or higher, or as directed by your healthcare provider    Stomach pain that worsens or moves to the lower right part of belly    Extreme fatigue    Weakness or dizziness    Continued weight loss    Frequent vomiting, blood in your vomit, or coffee ground-like substance in your vomit    Black, tarry, or bloody stools  Call 911  Call 911 if any of these occur:    Chest pain appears or worsens, or spreads to the back, neck, shoulder, or arm    Unusually fast heart rate    Trouble breathing or swallowing    Confusion    Extreme drowsiness or trouble waking up    Fainting    Large amounts of blood present in vomit or stool  Date Last Reviewed: 3/1/2018    5621-4490 [employer]. [address] [place] [address], [place] [address]. All rights reserved. This information is not intended as a substitute for professional medical care. Always follow your healthcare professional's instructions.

## 2020-01-31 ENCOUNTER — TELEPHONE (OUTPATIENT)
Dept: ENDOCRINOLOGY | Facility: CLINIC | Age: 70
End: 2020-01-31

## 2020-01-31 NOTE — TELEPHONE ENCOUNTER
Maki,  Pt called to cancel her 2/11 appts as she hasn't been taking meds ( I think the Topomax) due to insurance coverage.  She'd like a call to discuss alternatives that might get covered. She didn't want to come in for that, and would come in after new script to discuss results.    [phone]  **OK to leave detailed VM**

## 2020-02-04 NOTE — PROGRESS NOTES
[facility] Care Coordination Contact      [facility] Six-Month Telephone Assessment    6 month telephone assessment completed on 11/4/2019.    ER visits: NoNo  Hospitalizations: No  TCU stays: No  Significant health status changes:  No  Falls/Injuries: No  ADL/IADL changes: No  Changes in services: No    Caregiver Assessment follow up:  NA    Goals: See POC in chart for goal progress documentation.      Will see member in 6 months for an annual health risk assessment.   Encouraged member to call CC with any questions or concerns in the meantime.     Alexandra Helton RN-[facility]   [phone]

## 2020-02-05 ENCOUNTER — ALLIED HEALTH/NURSE VISIT (OUTPATIENT)
Dept: PHARMACY | Facility: CLINIC | Age: 70
End: 2020-02-05
Payer: COMMERCIAL

## 2020-02-05 DIAGNOSIS — K25.9 MULTIPLE GASTRIC ULCERS: ICD-10-CM

## 2020-02-05 DIAGNOSIS — I10 BENIGN ESSENTIAL HYPERTENSION: ICD-10-CM

## 2020-02-05 DIAGNOSIS — E78.5 HYPERLIPIDEMIA LDL GOAL <100: ICD-10-CM

## 2020-02-05 DIAGNOSIS — F64.0 GENDER DYSPHORIA IN ADOLESCENT AND ADULT: ICD-10-CM

## 2020-02-05 DIAGNOSIS — E66.813 CLASS 3 SEVERE OBESITY DUE TO EXCESS CALORIES WITH BODY MASS INDEX (BMI) OF 40.0 TO 44.9 IN ADULT, UNSPECIFIED WHETHER SERIOUS COMORBIDITY PRESENT (H): Primary | ICD-10-CM

## 2020-02-05 DIAGNOSIS — E66.01 CLASS 3 SEVERE OBESITY DUE TO EXCESS CALORIES WITH BODY MASS INDEX (BMI) OF 40.0 TO 44.9 IN ADULT, UNSPECIFIED WHETHER SERIOUS COMORBIDITY PRESENT (H): Primary | ICD-10-CM

## 2020-02-05 DIAGNOSIS — M85.80 OSTEOPENIA, UNSPECIFIED LOCATION: ICD-10-CM

## 2020-02-05 PROCEDURE — 99607 MTMS BY PHARM ADDL 15 MIN: CPT | Performed by: PHARMACIST

## 2020-02-05 PROCEDURE — 99605 MTMS BY PHARM NP 15 MIN: CPT | Performed by: PHARMACIST

## 2020-02-05 NOTE — PROGRESS NOTES
"SUBJECTIVE/OBJECTIVE:                Laila Perez is a 69 year old adult called for a transitions of care visit.  She was discharged from [facility] on 1/6/2020 for Gastric Ulcer.     Chief Complaint: discussing weight loss medications.    Allergies/ADRs: Reviewed in Epic  Tobacco:  reports that she has never smoked. She has never used smokeless tobacco.  Alcohol: not currently using  Caffeine: 1 cups/day of coffee  PMH: Reviewed in Epic; alcoholism     Medication Adherence/Access:  no issues reported    Gastric Ulcer/GI Bleed:   Pantoprazole 40 mg BID     No longer having black tarry stool. No blood in stool. She has been able to eat better. She states that she had been exercising more \"pretty heavy\" and was taking ibuprofen 200 mg tablet: 11-12 per day and naproxen multiple times per day. She is no longer taking ibuprofen or naproxen. If she does have pain, she will take Tylenol but hasn't needed since discharging.     Obesity:   Topiramate prescribed - not started.     Followed by Maki Lund NP, first seen 12/16/2019 for [facility]. She was started no topiramate, but didn't start due to insurance coverage issues. However, she states that the insurance issues have now been figured out and thinks that she can  medication.   Weight History: Goal weight <250lb for vaginoplasty for gender dysphoria. S/p orchiectomy which was complicated by postoperative infection.   Diet/Eating Habits: Patient reports that she has been watching how much bread she is eating daily, will have 2 slices of bread daily. She doesn't eat pasta, no cookies, no cake. She stays away from chocolate. She does sugar free popsicles. Having 2-3 meals per day, she finds when she skips breakfast that she eats more late in the evening.   Exercise/Activity: Patient reports sedentary.   Weight gaining medications: Atenolol  Initial Consult Weight 12/16/2019: 320 lb 3.2 oz BMI 44.66 kg/m^2  Goal weight: <250 lb " "    Wt Readings from Last 4 Encounters:   01/30/20 309 lb 9.6 oz (140.4 kg)   12/28/19 318 lb 6.4 oz (144.4 kg)   12/16/19 320 lb 3.2 oz (145.2 kg)   11/05/19 310 lb 12.8 oz (141 kg)     Estimated body mass index is 43.18 kg/m  as calculated from the following:    Height as of 1/30/20: 5' 11\" (1.803 m).    Weight as of 1/30/20: 309 lb 9.6 oz (140.4 kg).    Gender Dysphoria:   Vivelle-Dot 0.1 mg twice weekly patch     She is happy with the results that she has gotten with the Vivelle Dot. She does remember to change patch twice weekly. No patch adhesive issues. No medication side effects.     Hypertension:   Atenolol 50 mg daily    Lisinopril 5 mg daily     Patient does not self-monitor BP.  Patient reports no current medication side effects. Sometimes when she bends over and gets up too quickly will get lightheaded. Otherwise no issues. She doesn't know if he is on atenolol for any other reason other than blood pressure.     Hyperlipidemia:   Lovastatin 20mg once daily.      Pt reports no significant myalgias or other side effects. She states that she cut back on beef, eating more chicken and fish.     Care Everywhere: LDL 47 on 8/2019    Osteopenia:    Calcium-vitamin D 600 mg-400 international unit(s) 1 tablet daily   Multivitamin 1 tablet daily     Last DEXA was 5/17/2016 - moderate osteopenia in the left hip, mild osteopenia in right hip. She states she does get 1-2 servings of cheese daily. No milk or yogurt. Will have vegetables 3 times per week, but usually carrots and peas. Thinks she is getting enough vitamin D between calcium/vitamin D and multivitamin.     Today's Vitals: There were no vitals taken for this visit. Telemedicine visit. No vitals.     BP Readings from Last 3 Encounters:   01/30/20 114/65   12/28/19 130/70   12/16/19 138/77     ASSESSMENT:                 Medication Adherence: good, no issues identified    Gastric Ulcer/GI Bleed: improving.     Obesity: Needs improvement. Will forward " patient's request to see if would qualify for bariatric surgery, discussed that there would still be a weight loss requirement prior to bariatric surgery. Discussed dietary modifications, below for plan. Discussed with patient that if she still runs into issues of the medication start then to call.     Gender Dysphoria: Stable.     Hypertension: Stable. Last BP was at goal <140/90 mmHg. Unclear of if there are additional indications of atenolol other than HTN. If not, could consider stopping atenolol and increasing lisinopril to decrease number of medications and delete potential weight gaining medication. Also, beta blockers are not considered first line agents for blood pressure management. Patient wished to hold off for now.     Hyperlipidemia: Stable.     Osteopenia: Stable. If finding difficulty with getting in calcium BID, it does appaer she is getting ~300 mg calcium from dietary which would put her around 6485-1689 mg daily between diet and supplement which is around goal, getting likely around 1000 international unit(s) vitamin D from calcium/vitamin D + multivitamin.     PLAN:                Post Discharge Medication Reconciliation Status: discharge medications reconciled, continue medications without change.    1. As patient interested in bariatric surgery - patient to follow up with Maki Lund CNP to discuss this.     2. Decrease the number of popsicles you are having per day as 1-2 popsicle per day.     3. If issues with getting weight loss medication, call.     Future: could consider alternative option for BP management, such as stopping atentolol and increasing lisinopril to decrease number of medications, delete medication that could be negatively impacting weight gain.     I spent 40 minutes with this patient today. A copy of the visit note was provided to the patient's referring provider.    Will follow up in 1 month either with Maki Lund CNP or [facility] Pharmacist     The patient declined a summary  of these recommendations as an after visit summary.      Lauren Bloch, PharmD  Medication Therapy Management Pharmacist   [facility]   Phone: [phone]

## 2020-02-17 ENCOUNTER — OFFICE VISIT (OUTPATIENT)
Dept: OTHER | Facility: OUTPATIENT CENTER | Age: 70
End: 2020-02-17
Payer: COMMERCIAL

## 2020-02-17 ENCOUNTER — OFFICE VISIT (OUTPATIENT)
Dept: FAMILY MEDICINE | Facility: CLINIC | Age: 70
End: 2020-02-17
Payer: COMMERCIAL

## 2020-02-17 DIAGNOSIS — F64.0 GENDER DYSPHORIA IN ADOLESCENT AND ADULT: Primary | ICD-10-CM

## 2020-02-17 DIAGNOSIS — Z53.9 ERRONEOUS ENCOUNTER--DISREGARD: Primary | ICD-10-CM

## 2020-02-17 NOTE — PROGRESS NOTES
This encounter was opened in error. Please disregard.  Patient was here and left.  Thanks Delmis Simons P-BC

## 2020-02-17 NOTE — PROGRESS NOTES
[facility] -  Case Progress Note  Client Name: Laila Perez  YOB: 1950  MRN:  [id number]  Treating Provider: Estelle Gomez LP  Type of Session: Individual  Present in Session: client   Number of Minutes:  50    Treatment plan: 2/17/20    Health Maintenance Summary - Mental Health Treatment Plan       Status Date      MENTAL HEALTH TX PLAN Overdue 10/28/2019      Done 8/28/2019      Done 6/10/2019      Done 10/30/2017      Done 9/6/2016             Date of Service: 2/17/20    Current Symptoms/Status:  Distress about gender and secondary sex characteristics, wants genitals removed, bothers her daily of moderate to severe intensity, distress with work situation, distress with changing at the gym, distress that the surgeon will not do surgery until she loses a certain amount of weight.    Progress Toward Treatment Goals:   Client reported progress with continuing her socialization and going to the gym despite her distress about secondary sex characteristics.    Intervention: Modality and Description:  Provided support and feedback. Used CBT to process gender dysphoria concerns. Client shared continuing to feel good about her gender presentation.  She shared she has figured out a way to go to the gym and be able to change close.  She shared she keeps covered up with a towel and is able to not have anyone see her genital area.  She shared that she still continues to have distress about having a penis and would like it removed.  She shared when she met with the surgeon she was upset that he still will not do the surgery until she has a significant weight loss.  She shared going to a weight loss clinic but her insurance would not pay for the medication they prescribed so she discontinued.  She shared she will continue to work on it on her own.  She shared she is back to exercising and is addressing her other pain and immobility with her knees.  She plans on discontinuing her work  once she returns 70 and we processed some of her anxiety about having enough money to pay her bills.  She shared that she has talked to her  and they have some options.  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  She continues to reach out to both of her children although her eldest son has not responded since she was in the hospital.    Interactive Complexity:  There are four specific communication difficulties that complicate the work of the primary psychiatric procedure.  Interactive complexity (+[id number]) may be reported when at least one of these difficulties is present.    Communication difficulties present during current the psychiatric procedure include:  1. The need to manage maladaptive communication (related to e.g. high anxiety, high reactivity, repeated questions, or disagreement) among participants that complicates delivery of care.        Response to Intervention:  Client reported validation.    Assignment:  Follow surgeon guidelines, maintain balanced approach to weight loss.      Diagnosis:  Encounter Diagnosis   Name Primary?     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 Yes           Plan / Need for Future Services:  Return for therapy in 4 weeks.      Estelle Gomez PsyD, LP

## 2020-03-02 ENCOUNTER — OFFICE VISIT (OUTPATIENT)
Dept: FAMILY MEDICINE | Facility: CLINIC | Age: 70
End: 2020-03-02
Payer: COMMERCIAL

## 2020-03-02 VITALS
SYSTOLIC BLOOD PRESSURE: 128 MMHG | DIASTOLIC BLOOD PRESSURE: 70 MMHG | RESPIRATION RATE: 16 BRPM | WEIGHT: 293 LBS | BODY MASS INDEX: 43.63 KG/M2 | HEART RATE: 72 BPM

## 2020-03-02 DIAGNOSIS — E87.5 HYPERKALEMIA: Primary | ICD-10-CM

## 2020-03-02 DIAGNOSIS — K25.9 GASTRIC ULCER DUE TO CHEMICAL: ICD-10-CM

## 2020-03-02 DIAGNOSIS — F33.41 RECURRENT MAJOR DEPRESSIVE DISORDER, IN PARTIAL REMISSION (H): ICD-10-CM

## 2020-03-02 DIAGNOSIS — F64.0 GENDER DYSPHORIA IN ADOLESCENT AND ADULT: ICD-10-CM

## 2020-03-02 DIAGNOSIS — F10.21 ALCOHOL DEPENDENCE IN REMISSION (H): ICD-10-CM

## 2020-03-02 DIAGNOSIS — D64.9 ANEMIA, UNSPECIFIED TYPE: ICD-10-CM

## 2020-03-02 DIAGNOSIS — G44.219 EPISODIC TENSION-TYPE HEADACHE, NOT INTRACTABLE: ICD-10-CM

## 2020-03-02 DIAGNOSIS — E66.01 OBESITY, CLASS III, BMI 40-49.9 (MORBID OBESITY) (H): ICD-10-CM

## 2020-03-02 LAB
ANION GAP SERPL CALCULATED.3IONS-SCNC: 1 MMOL/L (ref 3–14)
BUN SERPL-MCNC: 17 MG/DL (ref 7–30)
CALCIUM SERPL-MCNC: 9.2 MG/DL (ref 8.5–10.1)
CHLORIDE SERPL-SCNC: 106 MMOL/L (ref 94–109)
CO2 SERPL-SCNC: 29 MMOL/L (ref 20–32)
CREAT SERPL-MCNC: 1.22 MG/DL (ref 0.66–1.25)
ERYTHROCYTE [DISTWIDTH] IN BLOOD BY AUTOMATED COUNT: 12.5 % (ref 10–15)
GFR SERPL CREATININE-BSD FRML MDRD: 60 ML/MIN/{1.73_M2}
GLUCOSE SERPL-MCNC: 93 MG/DL (ref 70–99)
HCT VFR BLD AUTO: 39.6 % (ref 40–53)
HGB BLD-MCNC: 12.7 G/DL (ref 13.3–17.7)
MCH RBC QN AUTO: 28.7 PG (ref 26.5–33)
MCHC RBC AUTO-ENTMCNC: 32.1 G/DL (ref 31.5–36.5)
MCV RBC AUTO: 90 FL (ref 78–100)
PLATELET # BLD AUTO: 165 10E9/L (ref 150–450)
POTASSIUM SERPL-SCNC: 4.5 MMOL/L (ref 3.4–5.3)
RBC # BLD AUTO: 4.42 10E12/L (ref 4.4–5.9)
SODIUM SERPL-SCNC: 136 MMOL/L (ref 133–144)
WBC # BLD AUTO: 7.4 10E9/L (ref 4–11)

## 2020-03-02 PROCEDURE — 85027 COMPLETE CBC AUTOMATED: CPT | Performed by: NURSE PRACTITIONER

## 2020-03-02 PROCEDURE — 80048 BASIC METABOLIC PNL TOTAL CA: CPT | Performed by: NURSE PRACTITIONER

## 2020-03-02 PROCEDURE — 99214 OFFICE O/P EST MOD 30 MIN: CPT | Performed by: NURSE PRACTITIONER

## 2020-03-02 PROCEDURE — 36415 COLL VENOUS BLD VENIPUNCTURE: CPT | Performed by: NURSE PRACTITIONER

## 2020-03-02 RX ORDER — TOPIRAMATE 25 MG/1
TABLET, FILM COATED ORAL
Qty: 90 TABLET | Refills: 1 | Status: SHIPPED | OUTPATIENT
Start: 2020-03-02 | End: 2020-05-12

## 2020-03-02 NOTE — NURSING NOTE
"Chief Complaint   Patient presents with     Arthritis       Initial /70 (BP Location: Right arm, Patient Position: Chair, Cuff Size: Adult Large)   Pulse 72   Resp 16   Wt 141.9 kg (312 lb 12.8 oz)   BMI 43.63 kg/m   Estimated body mass index is 43.63 kg/m  as calculated from the following:    Height as of 1/30/20: 1.803 m (5' 11\").    Weight as of this encounter: 141.9 kg (312 lb 12.8 oz).    Patient presents to the clinic using No DME    Health Maintenance that is potentially due pending provider review:  NONE    n/a    Is there anyone who you would like to be able to receive your results? No  If yes have patient fill out YASIR  Arun Finney M.A.        "

## 2020-03-02 NOTE — PROGRESS NOTES
Subjective     Laila Perez is a 69 year old adult who presents to clinic today for the following health issues:    HPI   Musculoskeletal problem/pain      Duration: Ongoing     Description  Location: Patient is having some right knee pain and also right hip pain.  Had X-rays in Jan and following up     Intensity:  mild    Accompanying signs and symptoms: none    History  Previous similar problem: YES  Previous evaluation:  x-ray    Precipitating or alleviating factors:  Trauma or overuse: no   Aggravating factors include: walking    Therapies tried and outcome: Has been exercising more lately. Taking Tylenol when goes to work.     More depressed and irritated with the weight she has ongoing.  Has been working out and eating healthy and still having problems taking the weight off. Knee and hip pain are some better now.   Frustrated with court system about not being willing to classify her as female now that she has had surgery.   Occasional headaches. Has to get the weight off in order to have her last plastic surgery.  Was not able to get her Topamax filled.  Would like to see if we can get it filled now.  Taking 1-2 bananas a day.     Here to discuss his recent xrays of his right  Knee, right hip. Better with activity now.     Previous gastric ulcer with gastritis  -------------------------------------    Patient Active Problem List   Diagnosis     Gouty arthropathy     Mental or behavioral problem     Generalized osteoarthrosis, unspecified site     Disorder of bone and cartilage     Mixed hyperlipidemia     OA (osteoarthritis) of knee     HYPERLIPIDEMIA LDL GOAL <100     DDD (degenerative disc disease), lumbar     Alcoholism (H)     Advanced directives, counseling/discussion     Varicose veins of legs     Psoriasis     HTN, goal below 140/90     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     Family history of diabetes mellitus in first degree relative     Mitral valve disorder     Nonrheumatic aortic valve stenosis      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     Morbid obesity (H)     Health Care Home     Multiple gastric ulcers     Recurrent major depressive disorder, in partial remission (H)     Past Surgical History:   Procedure Laterality Date     COLONOSCOPY  2007     JOINT REPLACEMTN, KNEE RT/LT  2006    left     ORCHIECTOMY SCROTAL Bilateral 5/14/2019    Procedure: Bilatera Scrotal Orchiectomy;  Surgeon: Abhilash Weston MD;  Location:  OR       Social History     Tobacco Use     Smoking status: Never Smoker     Smokeless tobacco: Never Used   Substance Use Topics     Alcohol use: No     Comment: Quit 3/24/05     Family History   Problem Relation Age of Onset     Cancer Father         lung cancer heavy smoker           -------------------------------------  Reviewed and updated as needed this visit by Provider         Review of Systems   ROS COMP: Constitutional, HEENT, cardiovascular, pulmonary, GI, , musculoskeletal, neuro, skin, endocrine and psych systems are negative, except as otherwise noted.      Objective    /70 (BP Location: Right arm, Patient Position: Chair, Cuff Size: Adult Large)   Pulse 72   Resp 16   Wt 141.9 kg (312 lb 12.8 oz)   BMI 43.63 kg/m    Body mass index is 43.63 kg/m .  Physical Exam   GENERAL: healthy, alert and no distress  EYES: Eyes grossly normal to inspection, PERRL and conjunctivae and sclerae normal  HENT: ear canals and TM's normal, nose and mouth without ulcers or lesions  NECK: no adenopathy, no asymmetry, masses, or scars and thyroid normal to palpation  RESP: lungs clear to auscultation - no rales, rhonchi or wheezes  CV: regular rate and rhythm, normal S1 S2, no S3 or S4, no murmur, click or rub, no peripheral edema and peripheral pulses strong  ABDOMEN: soft, nontender, no hepatosplenomegaly, no masses and bowel sounds normal  MS: no gross musculoskeletal defects noted, no edema  SKIN: no suspicious lesions or rashes  NEURO: Normal strength and tone, mentation  intact and speech normal  PSYCH: mentation appears normal, affect normal/bright    Diagnostic Test Results:  Labs reviewed in Epic  Results for orders placed or performed in visit on 03/02/20   CBC with platelets     Status: Abnormal   Result Value Ref Range    WBC 7.4 4.0 - 11.0 10e9/L    RBC Count 4.42 4.4 - 5.9 10e12/L    Hemoglobin 12.7 (L) 13.3 - 17.7 g/dL    Hematocrit 39.6 (L) 40.0 - 53.0 %    MCV 90 78 - 100 fl    MCH 28.7 26.5 - 33.0 pg    MCHC 32.1 31.5 - 36.5 g/dL    RDW 12.5 10.0 - 15.0 %    Platelet Count 165 150 - 450 10e9/L   Basic metabolic panel  (Ca, Cl, CO2, Creat, Gluc, K, Na, BUN)     Status: None (In process)   Result Value Ref Range    Sodium 136 133 - 144 mmol/L    Potassium 4.5 3.4 - 5.3 mmol/L    Chloride 106 94 - 109 mmol/L    Carbon Dioxide PENDING 20 - 32 mmol/L    Anion Gap PENDING 3 - 14 mmol/L    Glucose PENDING 70 - 99 mg/dL    Urea Nitrogen PENDING 7 - 30 mg/dL    Creatinine PENDING 0.66 - 1.25 mg/dL    GFR Estimate PENDING >60 mL/min/[1.73_m2]    GFR Estimate If Black PENDING >60 mL/min/[1.73_m2]    Calcium PENDING 8.5 - 10.1 mg/dL           Assessment & Plan     Laila was seen today for arthritis.    Diagnoses and all orders for this visit:    Hyperkalemia  -     Basic metabolic panel  (Ca, Cl, CO2, Creat, Gluc, K, Na, BUN)    Gastric ulcer due to chemical  -     CBC with platelets    Anemia, unspecified type    Obesity, Class III, BMI 40-49.9 (morbid obesity) (H)    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  -     topiramate (TOPAMAX) 25 MG tablet; 25mg at bedtime for week 1, 50mg at bedtime for 1 week, and 75mg at bedtime thereafter    Episodic tension-type headache, not intractable  -     topiramate (TOPAMAX) 25 MG tablet; 25mg at bedtime for week 1, 50mg at bedtime for 1 week, and 75mg at bedtime thereafter    Alcohol dependence in remission (H)    Recurrent major depressive disorder, in partial remission (H)      Ongoing psychotherapy strongly recommended  Continue to  "maintain sobriety  Repeat EGD with ongoing anemia and gastritis  Stable now.  No recent use of protein pump inhibitor.  We will continue to monitor.  Continue the physical activity.  Continue to improve nutrition.  Labs done today we will contact with results as they become available  Avoid aspirin products.  Avoid NSAID products.    BMI:   Estimated body mass index is 43.63 kg/m  as calculated from the following:    Height as of 1/30/20: 1.803 m (5' 11\").    Weight as of this encounter: 141.9 kg (312 lb 12.8 oz).   Weight management plan: Discussed healthy diet and exercise guidelines        Work on weight loss  Regular exercise  Call or return to the clinic with any worsening of symptoms or no resolution. Patient/Parent verbalized understanding and is in agreement. Medication side effects reviewed.   See Patient Instructions    Follow-up labs repeat CBC 3 months    JERRICA Salcedo [facility]        "

## 2020-03-02 NOTE — LETTER
March 3, 2020      Laila Perez  [address] [address] [place] [id number]        Dear ,    We are writing to inform you of your test results.    Anemia improving.   Platelets now normal   Repeat cbc in 3 months to monitor  Normal electrolytes.  Normal kidney function.  Normal glucose    Resulted Orders   CBC with platelets   Result Value Ref Range    WBC 7.4 4.0 - 11.0 10e9/L    RBC Count 4.42 4.4 - 5.9 10e12/L    Hemoglobin 12.7 (L) 13.3 - 17.7 g/dL    Hematocrit 39.6 (L) 40.0 - 53.0 %    MCV 90 78 - 100 fl    MCH 28.7 26.5 - 33.0 pg    MCHC 32.1 31.5 - 36.5 g/dL    RDW 12.5 10.0 - 15.0 %    Platelet Count 165 150 - 450 10e9/L   Basic metabolic panel  (Ca, Cl, CO2, Creat, Gluc, K, Na, BUN)   Result Value Ref Range    Sodium 136 133 - 144 mmol/L    Potassium 4.5 3.4 - 5.3 mmol/L    Chloride 106 94 - 109 mmol/L    Carbon Dioxide 29 20 - 32 mmol/L    Anion Gap 1 (L) 3 - 14 mmol/L    Glucose 93 70 - 99 mg/dL      Comment:      Non Fasting    Urea Nitrogen 17 7 - 30 mg/dL    Creatinine 1.22 0.66 - 1.25 mg/dL    GFR Estimate 60 (L) >60 mL/min/[1.73_m2]      Comment:      Non  GFR Calc  Starting 12/18/2018, serum creatinine based estimated GFR (eGFR) will be   calculated using the Chronic Kidney Disease Epidemiology Collaboration   (CKD-EPI) equation.      GFR Estimate If Black 69 >60 mL/min/[1.73_m2]      Comment:       GFR Calc  Starting 12/18/2018, serum creatinine based estimated GFR (eGFR) will be   calculated using the Chronic Kidney Disease Epidemiology Collaboration   (CKD-EPI) equation.      Calcium 9.2 8.5 - 10.1 mg/dL       If you have any questions or concerns, please call the clinic at the number listed above.       Sincerely,        JERRICA Salcedo CNP

## 2020-03-02 NOTE — LETTER
March 3, 2020      Laila Perez  [address] [address] [place] [phone]        Dear ,    We are writing to inform you of your test results.    Anemia improving.   Platelets now normal   Repeat cbc in 3 months to monitor.    Resulted Orders   CBC with platelets   Result Value Ref Range    WBC 7.4 4.0 - 11.0 10e9/L    RBC Count 4.42 4.4 - 5.9 10e12/L    Hemoglobin 12.7 (L) 13.3 - 17.7 g/dL    Hematocrit 39.6 (L) 40.0 - 53.0 %    MCV 90 78 - 100 fl    MCH 28.7 26.5 - 33.0 pg    MCHC 32.1 31.5 - 36.5 g/dL    RDW 12.5 10.0 - 15.0 %    Platelet Count 165 150 - 450 10e9/L       If you have any questions or concerns, please call the clinic at the number listed above.       Sincerely,        JERRICA Salcedo CNP

## 2020-03-03 NOTE — PATIENT INSTRUCTIONS
Patient Education     Gastritis or Ulcer, No Antibiotic Treatment    Gastritis is irritation and inflammation of the stomach lining. This means the lining is red and swollen. It can cause shallow sores in the stomach lining called erosions. An ulcer is a deeper open sore in the lining of the stomach. It may also occur in the first part of the small intestine (duodenum). The causes and symptoms of gastritis and ulcers are very similar.  Causes and risk factors for both problems can include:    Long-term use of nonsteroidal anti-inflammatory drugs (NSAIDs), such as aspirin and ibuprofen    H. pylori bacteria infection    Tobacco use    Alcohol use    Certain other conditions such as immune disorders, certain medicines (high-dose iron supplements) and street drugs (such as cocaine)  Symptoms for both problems can include:    Dull or burning pain in the upper part of the belly    Loss of appetite    Heartburn or upset stomach    Frequent burping    Bloated feeling    Nausea with or without vomiting  You likely had an evaluation to help find the exact cause and extent of your problem. This may have included a health history, exam, and certain tests.  Results showed that your problem is not due to H. pylori infection. For this reason, you don't need antibiotics as part of your treatment.  Whether your problem is gastritis or an ulcer, you will still need to take other medicines, however. You will also need to follow instructions to help reduce stomach irritation so your stomach can heal.   Home care    Take any medicines you re prescribed exactly as directed. Common medicines used to treat gastritis include:  ? Antacids. These help neutralize the normal acids in your stomach.  ? Proton pump inhibitors. These block your stomach from making any acid.  ? H2 blockers. These reduce the amount of acid your stomach makes.  ? Bismuth subsalicylate. This helps protect the lining of your stomach from acid.    Don't take any  NSAIDs during your treatment. If you take NSAID to help treat other health problems, tell your healthcare provider. He or she may need to adjust your medicine plan or change the dosage.    Don t use tobacco. Also don t drink alcohol. These products can increase the amount of acid your stomach makes. This can delay healing. It can also worsen symptoms.  Follow-up care  Follow up with your healthcare provider, or as advised. In some cases, more testing may be needed.  When to seek medical advice  Call your healthcare provider right away if any of these occur:    Fever of 100.4 F (38 C) or higher, or as directed by your healthcare provider    Stomach pain that worsens or moves to the lower right part of belly    Extreme fatigue    Weakness or dizziness    Continued weight loss    Frequent vomiting, blood in your vomit, or coffee ground-like substance in your vomit    Black, tarry, or bloody stools  Call 911  Call 911 if any of these occur:    Chest pain appears or worsens, or spreads to the back, neck, shoulder, or arm    Unusually fast heart rate    Trouble breathing or swallowing    Confusion    Extreme drowsiness or trouble waking up    Fainting    Large amounts of blood present in vomit or stool  Date Last Reviewed: 3/1/2018    4973-6318 [employer]. [address] [place] [address]. All rights reserved. This information is not intended as a substitute for professional medical care. Always follow your healthcare professional's instructions.           Patient Education     Peptic Ulcer    A peptic ulcer is an open sore in the lining of the stomach. It may also occur in the duodenum (first part of the small intestine).   Causes  The most common causes of ulcers are:    H. pylori bacteria infection    Long-term use of nonsteroidal anti-inflammatory drugs (NSAIDs), such as aspirin or ibuprofen  Other factors that can increase the risk for ulcers include older age and family history of peptic  ulcers. Tobacco and alcohol use are also risk factors. Emotional stress, worry, and spicy foods are not causes of peptic ulcers.  Symptoms  A peptic ulcer may or may not cause symptoms. If symptoms do occur, they can include:    Dull or burning pain in the stomach region (anywhere between your belly button and breastbone)    Loss of appetite    Heartburn or upset stomach    Frequent burping    Bloated feeling    Nausea or vomiting (vomit may be bloody or look like coffee grounds)    Black, tarry, or bloody stools (which means the ulcer is bleeding)  Sometimes the bleeding is not seen. In these cases, it may be discovered by symptoms of low blood count (anemia) such as dizziness, weakness, shortness of breath, pale skin, difficulty with exercise, or a blood test.  If an ulcer is suspected, tests may be done to check for H. pylori infection. These can include blood, stool, or breath tests. Upper endoscopy (also called EGD) is usually done to check for ulcers and allows for samples (biopsies) to be taken and evaluated under the microscope. An X-ray test called an upper GI series can be done in some situations but it could miss small ulcers that would be seen on the upper endoscopy.  Without treatment, a peptic ulcer may worsen. This can lead to serious problems such as bleeding or perforation (a hole) in the stomach or duodenum. Treatment is needed to prevent these problems.  Medicines are the most common treatment for peptic ulcers. In severe cases, surgery may be needed.  Home care  Medicines  If you re prescribed medicines, be sure to take them as directed. Common medicines prescribed include:    Antibiotics. These kill H. pylori bacteria. In many cases, you ll need to take at least two types of antibiotics. The regimens can be complicated and require many medicines either at once, or taken in order. This is because this bacteria is often difficult to treat. It is very important to take the medicines as  prescribed.     Proton pump inhibitors. These block your stomach from making any acid.    H2 blockers. These reduce the amount of acid your stomach makes. These are sometimes used for duodenal ulcers.    Bismuth subsalicylate. This helps protect the lining of your stomach and duodenum from acid.  General care    Don t take any NSAIDS without talking with your healthcare provider first. They may delay healing. Also check with your healthcare provider before taking any antacids.    Don't use alcohol or tobacco. These may delay healing. They may also make symptoms worse.  Follow-up care  Follow up with your healthcare provider as directed. If testing was done, you ll be told the results when they are ready. In some situations of gastric ulcer, a repeat upper endoscopy is needed to check for healing. Your healthcare provider may also do a test of cure after treatment for H. pylori.  When to seek medical advice  Call your healthcare provider right away if any of these occur:    Fever of 100.4 F (38 C) or higher, or as directed by your healthcare provider    Stomach pain that worsens or moves to the lower right part of abdomen    Continued weight loss    Pale skin    Fast heartbeat    Weakness or dizziness    Extreme fatigue    Shortness of breath    Frequent vomiting, blood in your vomit, or coffee ground-like substance in your vomit    Black, tarry, or bloody stools  Call 911  Call 911 right away if any of these occur:    Sudden or severe pain in the stomach region    Stomach becomes rigid    Low body temperature    Unusually fast heart rate    Chest pain appears or worsens, or spreads to the back, neck, shoulder, or arm    Trouble breathing or swallowing    Confusion    Extreme drowsiness or trouble waking up    Fainting    Large amounts of blood present in vomit or stool  Date Last Reviewed: 9/1/2017 2000-2019 [employer]. [address] [place] [address]. All rights reserved. This  information is not intended as a substitute for professional medical care. Always follow your healthcare professional's instructions.

## 2020-03-06 ENCOUNTER — TELEPHONE (OUTPATIENT)
Dept: FAMILY MEDICINE | Facility: CLINIC | Age: 70
End: 2020-03-06

## 2020-03-06 DIAGNOSIS — F64.0 GENDER DYSPHORIA IN ADOLESCENT AND ADULT: Primary | ICD-10-CM

## 2020-03-06 NOTE — TELEPHONE ENCOUNTER
As expected.  Please call and let her know they would not cover.  Thanks Delmis Simons [facility]

## 2020-03-06 NOTE — TELEPHONE ENCOUNTER
Pt said she was to take this for two Issues. 1) Weight Loss  2) Mood Swings.  Pt is wondering if the Insurance won't cover for the Weight loss. What can she take for her Mood Swings.  Pt said she was into see Delmis GIBSON 3/2/20  [facility]

## 2020-03-06 NOTE — TELEPHONE ENCOUNTER
Prior Authorization Retail Medication Request    Medication/Dose: topiramate  ICD code (if different than what is on RX):    Previously Tried and Failed:    Rationale:      Insurance Name:  [facility] DUAL  Insurance ID:  [id number]  [phone]      We tried splitting into 2 rx's for less quantity, didn't work either!      Thank You,  Jaja Garcia, [facility], [place]

## 2020-03-10 RX ORDER — DULOXETIN HYDROCHLORIDE 20 MG/1
20 CAPSULE, DELAYED RELEASE ORAL 2 TIMES DAILY
Qty: 60 CAPSULE | Refills: 2 | Status: SHIPPED | OUTPATIENT
Start: 2020-03-10 | End: 2020-07-08

## 2020-03-10 NOTE — TELEPHONE ENCOUNTER
Could start cymbalta.  RX sent to pharmacy one tablet daily for one week then increase to bid .   Recheck in 1 month.   Thanks Delmis Simons [facility]

## 2020-03-10 NOTE — TELEPHONE ENCOUNTER
Pt said she bought the Topiramate for $16.00. Pt said she is ok with buying it. Pt has been taking it since Sunday.  [facility]

## 2020-03-30 ENCOUNTER — VIRTUAL VISIT (OUTPATIENT)
Dept: OTHER | Facility: OUTPATIENT CENTER | Age: 70
End: 2020-03-30
Payer: COMMERCIAL

## 2020-03-30 DIAGNOSIS — F33.41 RECURRENT MAJOR DEPRESSIVE DISORDER, IN PARTIAL REMISSION (H): ICD-10-CM

## 2020-03-30 DIAGNOSIS — F64.0 GENDER DYSPHORIA IN ADOLESCENT AND ADULT: Primary | ICD-10-CM

## 2020-03-31 NOTE — PROGRESS NOTES
"[facility] -  Case Progress Note  Client Name: Laila Perez  YOB: 1950  MRN:  [id number]  Treating Provider: Estelle Gomez LP  Type of Session: Individual  Present in Session: client   Number of Minutes:  45    Start time: 1:30 pm  End time: 2:15 pm    The following was reviewed with the patient.   \"We have found that certain health care needs can be provided without the need for a face to face visit.  This service lets us provide the care you need with a phone conversation. I will have full access to your [facility] medical record during this entire phone call.   I will be taking notes for your medical record. Since this is like an office visit, we will bill your insurance company for this service. There are potential benefits and risks of telephone visits (e.g. limits to patient confidentiality) that differ from in-person visits.?  Confidentiality still applies for telephone services, and nobody will record the visit.  It is important to be in a quiet, private space that is free of distractions (including cell phone or other devices) during the visit.??If during the course of the call I believe a telephone visit is not appropriate, you will not be charged for this service\"    Consent has been obtained for this service by care team member: Yes      Treatment plan: 2/17/20    Health Maintenance Summary - Mental Health Treatment Plan       Status Date      MENTAL HEALTH TX PLAN Next Due 4/17/2020      Done 2/17/2020 [facility] MENTAL HEALTH TX PLAN SCAN     Done 8/28/2019      Done 6/10/2019      Done 10/30/2017      Done 9/6/2016             Date of Service: 3/30/20    Current Symptoms/Status:  Distress about gender and secondary sex characteristics, wants genitals removed, bothers her daily of moderate to severe intensity, distress with work situation, distress with changing at the gym, distress that the surgeon will not do surgery until she loses a certain amount of " weight.    Progress Toward Treatment Goals:   Client reported progress with working on a new process for losing weight so she can get her surgery.    Intervention: Modality and Description:  Provided support and feedback. Used CBT to process gender dysphoria concerns. Client shared she is currently following all of the social distancing guidelines regarding the coronavirus.  She has not been able to work for a few weeks and is not sure that she will go back to work.  She has been allowed to do some things on the property so she can keep busy as long as she is social distancing.  She shared that she has been unwell and not feeling sick.  She shared that she decided to pay for the medication Topamax and is taking it and feels like it is helping her with losing weight.  She shared she plans on not weighing herself for some time because she wants to see if that will work.  She shared she gets to down on herself when she tries to weigh and finds out she has not lost much weight.  She shared that she has been meaning contact with people via her phone.  She did not have any health concerns at this time.    Interactive Complexity:  There are four specific communication difficulties that complicate the work of the primary psychiatric procedure.  Interactive complexity (+12426) may be reported when at least one of these difficulties is present.    Communication difficulties present during current the psychiatric procedure include:  none      Response to Intervention:  Client reported validation.    Assignment:  Follow surgeon guidelines, maintain balanced approach to weight loss.      Diagnosis:  Encounter Diagnoses   Name Primary?     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 Yes     Recurrent major depressive disorder, in partial remission (H)            Plan / Need for Future Services:  Return for therapy in 4 weeks.      Estelle Gomez PsyD, LP

## 2020-04-08 ENCOUNTER — TELEPHONE (OUTPATIENT)
Dept: SURGERY | Facility: CLINIC | Age: 70
End: 2020-04-08

## 2020-04-10 ENCOUNTER — VIRTUAL VISIT (OUTPATIENT)
Dept: ENDOCRINOLOGY | Facility: CLINIC | Age: 70
End: 2020-04-10
Payer: COMMERCIAL

## 2020-04-10 DIAGNOSIS — E66.01 OBESITY, CLASS III, BMI 40-49.9 (MORBID OBESITY) (H): Primary | ICD-10-CM

## 2020-04-10 DIAGNOSIS — I10 HTN, GOAL BELOW 140/90: ICD-10-CM

## 2020-04-10 RX ORDER — PHENTERMINE HYDROCHLORIDE 15 MG/1
15 CAPSULE ORAL DAILY
Qty: 30 CAPSULE | Refills: 0 | Status: SHIPPED | OUTPATIENT
Start: 2020-04-10 | End: 2020-05-13

## 2020-04-10 NOTE — PATIENT INSTRUCTIONS
Phentermine -- take it in the morning, no later than 12pm  Send me a few blood pressure readings from home after you start it.     MEDICATION STARTED AT THIS APPOINTMENT    We are starting Phentermine. Take one tablet before 12pm daily.     For any questions/concerns contact Minal Escalante LPN at [phone]     (Do not stop taking it if you don't think it's working. For some people it works without them knowing it.)    Phentermine is being prescribed because you identified hunger as one of the main causes for your extra weight.      Our patients on Phentermine find that they:    >feel less hunger    >find it easier to push the plate away   >have an easier time eating less    For some of our patients, these feelings are very real and immediate. For other patients, the feelings are less obvious. They don't feel much of a change but find they've lost weight. Like all weight loss medications, Phentermine  works best when you help it work. This means:  1. Having less tempting high calorie (fattening) food around the house or office. (For people with strong cravings this is very important.)   2. Staying away from situations or people that may trigger your cravings .   3. Eating out only one time or less each week.  4. Eating your meals at a table with the TV or computer off.    Side-effects. Phentermine is generally well tolerated. The main side-effects we see are feelings of racing pulse or rapid heart beat. Some people can get an elevated blood pressure. Because of this we may have you come back within a week or so of starting the medication for a blood pressure check.         In order to get refills of this or any medication we prescribe you must be seen in the medical weight mgmt clinic every 2-3 months. Please have your pharmacy fax a refill request to [phone].

## 2020-04-10 NOTE — PROGRESS NOTES
"Laila Perez is a 70 year old adult who is being evaluated via a billable telephone visit.      The patient has been notified of following:     \"This telephone visit will be conducted via a call between you and your physician/provider. We have found that certain health care needs can be provided without the need for a physical exam.  This service lets us provide the care you need with a short phone conversation.  If a prescription is necessary we can send it directly to your pharmacy.  If lab work is needed we can place an order for that and you can then stop by our lab to have the test done at a later time.    Telephone visits are billed at different rates depending on your insurance coverage. During this emergency period, for some insurers they may be billed the same as an in-person visit.  Please reach out to your insurance provider with any questions.    If during the course of the call the physician/provider feels a telephone visit is not appropriate, you will not be charged for this service.\"    Patient has given verbal consent for Telephone visit?  Yes    How would you like to obtain your AVS? Mail a copy    Phone call duration: 16 minutes    Alexsandra Womack MD    Return Medical Weight Management Note  Laila Perez  MRN:  [id number]  :  1950  FRANCIS:  4/10/2020    Dear Delmis Simons,    I had the pleasure of seeing your patient Laila Perez for follow-up consultation.  She is a 70 year old adult who I am continuing to see for treatment of obesity related to:       2019   I have the following health issues associated with obesity: High Blood Pressure   I have the following symptoms associated with obesity: Knee Pain     INTERVAL HISTORY:  Phone start: 12:10pm, end 12;26pm     Per patient, she is doing well. Has had the topamax for a month, her weight is down to 309lb. Saw Maki Lund in Dec 2019.    Topamax: takes 75mg at night. Takes them at 5pm-6pm, in the middle of " dinner. She takes all of her meds in middle of dinner. Tried once before dinner with no side effects. She was taking phentermine in the evening.     She is not going out to the [facility] and not getting out and doing things. Trying to do walking.     Is drinking orange juice with iron in it (8 oz every-other day).     Problem   Htn, Goal Below 140/90        CURRENT WEIGHT:   309 lbs 0 oz    Wt Readings from Last 4 Encounters:   04/29/20 140.2 kg (309 lb)   03/02/20 141.9 kg (312 lb 12.8 oz)   01/30/20 140.4 kg (309 lb 9.6 oz)   12/28/19 144.4 kg (318 lb 6.4 oz)       Height:  Data Unavailable  Body Mass Index:  Body mass index is 43.1 kg/m .  Vitals:  B/P: Data Unavailable, P: Data Unavailable, R: Data Unavailable     Diet Recall Review with Patient 12/16/2019   Do you typically eat lunch? Yes   If you do eat lunch, what types of food do you typically eat?  sandwich chipss   Do you typically eat supper? Yes   If you do eat supper, what types of food do you typically eat? meal that IMake   Do you typically eat snacks? No   If you do snack, what types of food do you typically eat? cookie   How many glasses of juice do you drink in a typical day? 0   How many of glasses of milk do you drink in a typical day? 2   If you do drink milk, what type? Whole   How many 8oz glasses of sugar containing drinks such as Carlos-Aid/sweet tea do you drink in a day? 0   How many cans/bottles of sugar pop/soda/tea/sports drinks do you drink in a day? 4   How many cans/bottles of diet pop/soda/tea or sports drink do you drink in a day? 0   How often do you have a drink of alcohol? Never       MEDICATIONS:   Current Outpatient Medications   Medication     phentermine (ADIPEX-P) 15 MG capsule     acetaminophen (TYLENOL) 500 MG tablet     Antiseptic Products, Misc. (UNI-SOLVE) PADS     atenolol (TENORMIN) 50 MG tablet     DULoxetine (CYMBALTA) 20 MG capsule     estradiol (VIVELLE-DOT) 0.1 MG/24HR bi-weekly patch     latanoprost (XALATAN) 0.005 %  ophthalmic solution     lisinopril (PRINIVIL/ZESTRIL) 5 MG tablet     lovastatin (MEVACOR) 20 MG tablet     Multiple Vitamin (DAILY-EVANGELISTA) TABS     order for DME     ORDER FOR DME     SM CALCIUM 600/VITAMIN D 600-400 MG-UNIT per tablet     timolol maleate (TIMOPTIC) 0.5 % ophthalmic solution     topiramate (TOPAMAX) 25 MG tablet     No current facility-administered medications for this visit.        No flowsheet data found.    LABS:  Hemoglobin A1C   Date Value Ref Range Status   04/26/2016 4.8 4.3 - 6.0 % Final   04/26/2013 5.2 4.3 - 6.0 % Final     Cholesterol   Date Value Ref Range Status   05/09/2018 115 <200 mg/dL Final     TSH   Date Value Ref Range Status   05/02/2017 1.44 0.40 - 4.00 mU/L Final     Creatinine   Date Value Ref Range Status   03/02/2020 1.22 0.66 - 1.25 mg/dL Final     ALT   Date Value Ref Range Status   04/17/2019 19 0 - 70 U/L Final       ASSESSMENT:  Mr. Perez is a 70 year old adult with history of Class 3 obesity with current body mass index is 43.1 kg/m . and with current health consequences who presents for weight management follow-up consultation. Overall Laila Perez is doing well with some recent weight loss on topiramate.     The following diagnoses are relevant to Laila Perez's history of obesity:     PLAN:    HTN, goal below 140/90  Well controlled last few times in clinic.     1. Obesity, Class III, BMI 40-49.9 (morbid obesity) (H)  Will start phentermine, to be taken in AM.   - phentermine (ADIPEX-P) 15 MG capsule; Take 1 capsule (15 mg) by mouth daily Before 12pm.  Dispense: 30 capsule; Refill: 0    2. HTN, goal below 140/90  Has been well controlled.     With motivational interviewing, Laila took part in making the following plan for their Class 3 Obesity:   Patient Instructions   Phentermine -- take it in the morning, no later than 12pm  Send me a few blood pressure readings from home after you start it.     MEDICATION STARTED AT THIS APPOINTMENT    We are  starting Phentermine. Take one tablet before 12pm daily.     For any questions/concerns contact Minal Escalante LPN at [phone]     (Do not stop taking it if you don't think it's working. For some people it works without them knowing it.)    Phentermine is being prescribed because you identified hunger as one of the main causes for your extra weight.      Our patients on Phentermine find that they:    >feel less hunger    >find it easier to push the plate away   >have an easier time eating less    For some of our patients, these feelings are very real and immediate. For other patients, the feelings are less obvious. They don't feel much of a change but find they've lost weight. Like all weight loss medications, Phentermine  works best when you help it work. This means:  1. Having less tempting high calorie (fattening) food around the house or office. (For people with strong cravings this is very important.)   2. Staying away from situations or people that may trigger your cravings .   3. Eating out only one time or less each week.  4. Eating your meals at a table with the TV or computer off.    Side-effects. Phentermine is generally well tolerated. The main side-effects we see are feelings of racing pulse or rapid heart beat. Some people can get an elevated blood pressure. Because of this we may have you come back within a week or so of starting the medication for a blood pressure check.         In order to get refills of this or any medication we prescribe you must be seen in the medical weight mgmt clinic every 2-3 months. Please have your pharmacy fax a refill request to [phone].        FOLLOW-UP:    8 weeks.    Time: ** min spent on evaluation, management, counseling, education, & motivational interviewing with greater than 50 % of the total time was spent on counseling and coordinating care    Thank you for including me in the care of your patient.  Please do not hesitate to call with questions or  concerns.    Sincerely,    Alexsandra Womack MD MPH  Diplomate, American Board of Obesity Medicine, American Board of Internal Medicine, American Board of Pediatrics    Departments of Internal Medicine and Pediatrics  [facility]        [unfilled]

## 2020-04-18 ENCOUNTER — PATIENT OUTREACH (OUTPATIENT)
Dept: GERIATRIC MEDICINE | Facility: CLINIC | Age: 70
End: 2020-04-18

## 2020-04-22 NOTE — PROGRESS NOTES
[facility] Care Coordination Contact    cc contacted Kimberly CHARLES, krish[facility] cc.  She met with member in Feb. And was not told of my involvement.   Kimberly will forward cc CSP, CSSP, etc and cc will plan to see member the end of May so a face to face assessment can be done.    Tentatively scheduled for 5/21/20 @ 10a.m.    Alexandra Helton RN-[facility]   [phone]

## 2020-04-29 VITALS — BODY MASS INDEX: 43.1 KG/M2 | WEIGHT: 293 LBS

## 2020-04-30 NOTE — PROGRESS NOTES
"TELEPHONE VISIT: COVID19  The patient has been notified of the following:   \"This telephone visit will be conducted via a call between you and your provider. We have found that certain health care needs can be provided without a physical exam. This evaluation will be a billable telephone visit.\"    Weight Management Nutrition Consultation    Laila Perez is a 69 year old adult presents today for return weight management nutrition consultation.  Patient referred by Maki Lund NP on December 16, 2019.    Patient with Co-morbidities of obesity including:  Type II DM no  Renal Failure no  Sleep apnea no  Hypertension yes   Dyslipidemia no  Joint pain no  Back pain no  GERD no     Anthropometrics:  Estimated body mass index is 44.66 kg/m  as calculated from the following:    Height as of an earlier encounter on 12/16/19: 1.803 m (5' 11\").    Weight as of an earlier encounter on 12/16/19: 145.2 kg (320 lb 3.2 oz).    Current weight per pt report: 309 lbs 2-3 weeks ago (-10.8 lbs in the past 5 months)    Pt states her goal is to be <250 lbs.    Medications for Weight Loss:  Topamax and phentermine     NUTRITION HISTORY  See MD note for details.  - Likes to cook. Buys her own groceries.  - Works 3 days out of 2 weeks  - Told to clean plate when young, now working on putting extra food away right away.  - Eats softer foods, missing teeth and doesn't wear dentures.   - Likes peas, carrots, sour kraut. Uses a lot of canned fruit.    - Pt accompanied by residential staff member, Darby, today.     Since last visit pt hasn t been able to work out as much as she would like. Started eating bread recently, going through drive through 1-2 times per week.     Recent Diet Recall:  Breakfast: 2 HB eggs + banana  Lunch: Banana; Mer[employer] s hamburger  Dinner: [employer] s chicken noodle soup + chicken; Canned pea soup; Canned chili    Snacks: Trying not to snack; occ has a handful chips   Beverages: OJ (8 oz every other day), water (16 " oz), vitamin D whole milk (8 oz every other day), coffee (16-32 oz/day)  Dining Out: Renzo Thorpe Hardy s (1-2 times per week)     Physical Activity:  [employer] pool exercise for 30 mins one time per week. Relies on residential staff to take her to gym. Would like to go 2x/week, and thinks that can be arranged. Reduced regular exercise now with gym shut down. Pt plans to start taking more walks, doing squats in home, cleaning to compensate.     MALNUTRITION  % Intake: No decreased intake noted  % Weight Loss: None noted  Subcutaneous Fat Loss: None observed  Muscle Loss: None observed  Fluid Accumulation/Edema: None noted  Malnutrition Diagnosis: Patient does not meet two of the established criteria necessary for diagnosing malnutrition    Nutrition Prescription  Recommended energy/nutrient modification.  Volumetrics/Plate Method    Nutrition Diagnosis  Obesity r/t long history of self-monitoring deficit and excessive energy intake aeb BMI >30. -improving    Nutrition Intervention  Materials/education provided: Reviewed previous goals. Reviewed the Plate Method of structuring meals. Discussed low calorie and low sodium meal planning (sent tips for low sodium diet handout). Pt would like to start cooking her own soups using fresh ingredients and lean protein to control calories and sodium. Discussed using frozen instead of canned veggies for cooking. Encouraged pt to limit dining-out/fast-food to help control caloric intake. Pt finds she increased frequency of fast-food lately d/t boredom with COVID-19 shut down. Discussed taking a walk or doing some physical activity as permitted outside instead. Pt has plans to do so with staff at residence. Encouraged increased daily fluid intake with goal to consume 64 oz/day while limiting caloric beverage intake. Mailed goals and handouts to pt.      Patient Understanding: fair  Expected Compliance: fair  Follow-Up Plans: Food log     Nutrition Goals  1) Eat 3 meals per day.   -  "Use the 9\" Plate method to build meal (1/2 plate non-starchy vegetables/fruit, 1/4 plate lean protein, 1/4 plate whole grain starch - no more than 1/2 cup carb/meal). Meal ideas:   - Breakfast: 2 hard boiled eggs and 1 banana   - Lunch: high-protein soup (homemade chili, extra chicken noodle soup, lentil and lean ham soup)   - Dinner: grilled pork chop with roasted veggies (carrots and peppers); Low calorie chicken pot pie  2) Avoid snacking between meals  3) Limit milk to 8 oz per day. Switch to 1% milk.   4) Limit OJ to 8 oz per day.  5) Consume at least 48 oz (16 oz glasses/bottles x 3) of water per day.  6) Use frozen vegetables instead of canned vegetables  7) Limit fast-food to once per week  8) Take a walk 2-3 times per week for at least 30 mins.     Follow-Up:  Monthly    Phone call contact time:   Call Started at: 12:00  Call Ended at: 12:26    Time spent with patient: 26 minutes.  Kathy Esteves, ZAK, LD      "

## 2020-05-01 ENCOUNTER — VIRTUAL VISIT (OUTPATIENT)
Dept: SURGERY | Facility: CLINIC | Age: 70
End: 2020-05-01
Payer: COMMERCIAL

## 2020-05-01 DIAGNOSIS — Z71.3 NUTRITIONAL COUNSELING: ICD-10-CM

## 2020-05-01 DIAGNOSIS — E66.9 OBESITY: Primary | ICD-10-CM

## 2020-05-01 NOTE — PATIENT INSTRUCTIONS
"Nutrition Goals  1) Eat 3 meals per day.   - Use the 9\" Plate method to build meal (1/2 plate non-starchy vegetables/fruit, 1/4 plate lean protein, 1/4 plate whole grain starch - no more than 1/2 cup carb/meal). Meal ideas:   - Breakfast: 2 hard boiled eggs and 1 banana   - Lunch: high-protein soup (homemade chili, extra chicken noodle soup, lentil and lean ham soup)   - Dinner: grilled pork chop with roasted veggies (carrots and peppers); Low calorie chicken pot pie  2) Avoid snacking between meals  3) Limit milk to 8 oz per day. Switch to 1% milk.   4) Limit OJ to 8 oz per day.  5) Consume at least 48 oz (16 oz glasses/bottles x 3) of water per day.  6) Use frozen vegetables instead of canned vegetables  7) Limit fast-food to once per week  8) Take a walk 2-3 times per week for at least 30 mins.       Follow-up with RD in one month. Call [phone] to schedule.    Kathy Esteves RD, LD  If you would like to schedule or reschedule an appointment with the RD, please call [phone]    Interested in working with a health ?  Health coaches work with you to improve your overall health and wellbeing.  They look at the whole person, and may involve discussion of different areas of life, including, but not limited to the four pillars of health (sleep, exercise, nutrition, and stress management). Discuss with your care team if you would like to start working a health .    Health Coaching-3 Pack:    $99 for three health coaching visits    Visits may be done in person or via phone    Coaching is a partnership between the  and the client; Coaches do not prescribe or diagnose    Coaching helps inspire the client to reach his/her personal goals            "

## 2020-05-05 ENCOUNTER — VIRTUAL VISIT (OUTPATIENT)
Dept: OTHER | Facility: OUTPATIENT CENTER | Age: 70
End: 2020-05-05
Payer: COMMERCIAL

## 2020-05-05 DIAGNOSIS — F64.0 GENDER DYSPHORIA IN ADOLESCENT AND ADULT: Primary | ICD-10-CM

## 2020-05-05 NOTE — PROGRESS NOTES
"[facility] -  Case Progress Note  Client Name: Laila Perez  YOB: 1950  MRN:  [id number]  Treating Provider: Estelle Gomez LP  Type of Session: Individual  Present in Session: client   Number of Minutes:  55    Start time: 2:00 pm  End time: 2:55 pm    Client does not have Internet or technology to do video sessions.    The following was reviewed with the patient.   \"We have found that certain health care needs can be provided without the need for a face to face visit.  This service lets us provide the care you need with a phone conversation. I will have full access to your [facility] medical record during this entire phone call.   I will be taking notes for your medical record. Since this is like an office visit, we will bill your insurance company for this service. There are potential benefits and risks of telephone visits (e.g. limits to patient confidentiality) that differ from in-person visits.?  Confidentiality still applies for telephone services, and nobody will record the visit.  It is important to be in a quiet, private space that is free of distractions (including cell phone or other devices) during the visit.??If during the course of the call I believe a telephone visit is not appropriate, you will not be charged for this service\"    Consent has been obtained for this service by care team member: Yes      Treatment plan: 2/17/20    Health Maintenance Summary - Mental Health Treatment Plan       Status Date      MENTAL HEALTH TX PLAN Overdue 4/17/2020      Done 2/17/2020 [facility] MENTAL HEALTH TX PLAN SCAN     Done 8/28/2019      Done 6/10/2019      Done 10/30/2017      Done 9/6/2016             Date of Service: 5/05/20    Current Symptoms/Status:  Distress about gender and secondary sex characteristics, she shared feeling angry and frustrated about continuing to have a penis, distress and frustration of having to wait for surgery, fear that surgery will not " happen, some distress about increased isolation with the coronavirus.    Progress Toward Treatment Goals:   Client reported progress with working on her weight loss goals so that she can have further genital surgery.    Intervention: Modality and Description:  Provided support and feedback. Used CBT to process gender dysphoria concerns. She is losing weight so that she can meet the surgery requirement.  She believes that she will need it by the end of the summer and strongly believes she can have surgery in on September.  She starts to express anger when she thinks this will be postponed longer.  Informed her I do not know when they are going to start doing nonemergent surgeries again.  They had a number of surgeries they canceled and so goals ones will get scheduled first.  She shared with her that it is highly unlikely that she would be able to get her surgery done in the fall.  Discussed when she reaches her weight loss goal she should contact the surgeon's office and work with them on getting surgery scheduled.  I assured her that surgeries will start happening again it is just a matter of time and the corona virus will not prevent surgeries from happening indefinitely.  Client shared she has talked to some family members and says she is engaging in some social activity.  She is starting to work on her garden and is looking forward to having fresh vegetables this summer.  She shared she is maintaining social distancing and we processed some of her anxiety about the virus.      Interactive Complexity:  There are four specific communication difficulties that complicate the work of the primary psychiatric procedure.  Interactive complexity (+[id number]) may be reported when at least one of these difficulties is present.    Communication difficulties present during current the psychiatric procedure include:  none      Response to Intervention:  Client reported validation.    Assignment:  Follow surgeon guidelines,  maintain balanced approach to weight loss.      Diagnosis:  Encounter Diagnosis   Name Primary?     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 Yes       Plan / Need for Future Services:  Return for therapy in 4 weeks.      Estelle Gomez PsyD, LP

## 2020-05-07 ENCOUNTER — VIRTUAL VISIT (OUTPATIENT)
Dept: OTHER | Facility: OUTPATIENT CENTER | Age: 70
End: 2020-05-07
Payer: COMMERCIAL

## 2020-05-07 DIAGNOSIS — F64.0 GENDER DYSPHORIA IN ADOLESCENT AND ADULT: Primary | ICD-10-CM

## 2020-05-07 ASSESSMENT — ENCOUNTER SYMPTOMS
FEVER: 0
ABDOMINAL PAIN: 0
NUMBNESS: 0
HEADACHES: 0
NERVOUS/ANXIOUS: 0
LIGHT-HEADEDNESS: 0
POLYDIPSIA: 0
VOMITING: 0
PALPITATIONS: 0
CHILLS: 0
WEAKNESS: 0
FREQUENCY: 0
SHORTNESS OF BREATH: 0
CHEST TIGHTNESS: 0
UNEXPECTED WEIGHT CHANGE: 0
DYSPHORIC MOOD: 0

## 2020-05-07 NOTE — Clinical Note
Patient has been waiting for letter from Dr. Weston, who did orchiectomy for her, to write a letter to assist with documentation for gender marker change on birth certificate. Can you look into this?

## 2020-05-07 NOTE — PROGRESS NOTES
"The following was reviewed with the patient.     \"This telephone visit will be conducted via a call between you and your physician/provider. We have found that certain health care needs can be provided without the need for a physical exam.  This service lets us provide the care you need with a short phone conversation.  If a prescription is necessary we can send it directly to your pharmacy.  If lab work is needed we can place an order for that and you can then stop by our lab to have the test done at a later time.    If during the course of the call the physician/provider feels a telephone visit is not appropriate, you will not be charged for this service.\"     Patient has given verbal consent for Telephone visit?  Yes         BOO Ulloa is a 70 year old individual that uses pronouns She/Her/Hers/Herself that presents today for follow up of:  feminizing hormone therapy.   Alone or accompanied by: accompanied today by self  Gender identity: female  Started Hormone  therapy   2016  Continues on Vivelle dot 0.1 mg patch 2x/wk  Any special concerns today?    Doing well on hormone therapy, still occasionally needing to tweeze facial hair  Occasionally diarrhea with Topamax for weight loss, no more ulcers/bleeding  Needs letter from Dr. Weston for gender marker change on birth certificate change       On hormones?  YES +++ Shot day of the week? Not applicable-taking pills/patch/gel      Due for labs?  Yes      +++ Refills of meds needed?  Yes  Gender related body changes since last visit:   Stable, no changes    Breakthrough bleeding? Does Not Apply    New health concerns since last visit:  None  ---    Past Surgical History:   Procedure Laterality Date     COLONOSCOPY  2007     JOINT REPLACEMTN, KNEE RT/LT  2006    left     ORCHIECTOMY SCROTAL Bilateral 5/14/2019    Procedure: Bilatera Scrotal Orchiectomy;  Surgeon: Abhilash Weston MD;  Location: [facility] OR       Patient Active Problem List   Diagnosis     Gouty " arthropathy     Mental or behavioral problem     Generalized osteoarthrosis, unspecified site     Disorder of bone and cartilage     Mixed hyperlipidemia     OA (osteoarthritis) of knee     HYPERLIPIDEMIA LDL GOAL <100     DDD (degenerative disc disease), lumbar     Alcoholism (H)     Advanced directives, counseling/discussion     Varicose veins of legs     Psoriasis     HTN, goal below 140/90     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     Family history of diabetes mellitus in first degree relative     Mitral valve disorder     Nonrheumatic aortic valve stenosis     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     Morbid obesity (H)     Health Care Home     Multiple gastric ulcers     Recurrent major depressive disorder, in partial remission (H)       Current Outpatient Medications   Medication Sig Dispense Refill     acetaminophen (TYLENOL) 500 MG tablet Take 1,000 mg by mouth       Antiseptic Products, Misc. (UNI-SOLVE) PADS Externally apply 2 pads topically twice a week To remove adhesive from patches 50 each 3     atenolol (TENORMIN) 50 MG tablet TAKE ONE TABLET BY MOUTH DAILY 90 tablet 1     DULoxetine (CYMBALTA) 20 MG capsule Take 1 capsule (20 mg) by mouth 2 times daily 60 capsule 2     estradiol (VIVELLE-DOT) 0.1 MG/24HR bi-weekly patch Place 1 patch onto the skin twice a week 24 patch 1     latanoprost (XALATAN) 0.005 % ophthalmic solution 1 drop At Bedtime.       lisinopril (PRINIVIL/ZESTRIL) 5 MG tablet TAKE ONE TABLET BY MOUTH ONCE DAILY 30 tablet 5     lovastatin (MEVACOR) 20 MG tablet TAKE ONE TABLET BY MOUTH AT BEDTIME 30 tablet 11     Multiple Vitamin (DAILY-EVANGELISTA) TABS Take 1 tablet by mouth daily  11     order for DME Equipment being ordered: Sigvaris 232 Cotton Thigh High for Men 20-30 mmHg- TWO PAIR TAN COLOR 2 each 5     ORDER FOR DME Equipment being ordered:   BACK BRACE    3FunnelSpec Back-n-Shape II Back Brace  Size XXXL with thermoplastic insert        1 each 0     phentermine (ADIPEX-P) 15 MG capsule  Take 1 capsule (15 mg) by mouth daily Before 12pm. 30 capsule 0     SM CALCIUM 600/VITAMIN D 600-400 MG-UNIT per tablet Take 1 tablet by mouth 2 times daily 60 tablet 3     timolol maleate (TIMOPTIC) 0.5 % ophthalmic solution Apply 1 drop to eye       topiramate (TOPAMAX) 25 MG tablet 25mg at bedtime for week 1, 50mg at bedtime for 1 week, and 75mg at bedtime thereafter 90 tablet 1       History   Smoking Status     Never Smoker   Smokeless Tobacco     Never Used          Allergies   Allergen Reactions     Penicillin [Esters] Itching     he is not sure what kind of reaction he had     Heparin      he lives in a assisted living program and is not sure what type of allergies he really has. He quit drinking     Zosyn [Penicillins]      unsure of reaction, allergy is listed on his med sheet       Health Maintenance Due   Topic Date Due     MENTAL HEALTH TX PLAN  04/17/2020         Problem, Medication and Allergy Lists were reviewed and are current..         Review of Systems:   Review of Systems   Constitutional: Negative for chills, fever and unexpected weight change.   Eyes: Negative for visual disturbance.   Respiratory: Negative for chest tightness and shortness of breath.    Cardiovascular: Negative for chest pain, palpitations and leg swelling.   Gastrointestinal: Negative for abdominal pain and vomiting.   Endocrine: Negative for polydipsia and polyuria.   Genitourinary: Negative for frequency.   Neurological: Negative for weakness, light-headedness, numbness and headaches.   Psychiatric/Behavioral: Negative for dysphoric mood and suicidal ideas. The patient is not nervous/anxious.                 Labs:   Results from last visit:  Office Visit on 03/02/2020   Component Date Value Ref Range Status     WBC 03/02/2020 7.4  4.0 - 11.0 10e9/L Final     RBC Count 03/02/2020 4.42  4.4 - 5.9 10e12/L Final     Hemoglobin 03/02/2020 12.7* 13.3 - 17.7 g/dL Final     Hematocrit 03/02/2020 39.6* 40.0 - 53.0 % Final     MCV  03/02/2020 90  78 - 100 fl Final     MCH 03/02/2020 28.7  26.5 - 33.0 pg Final     MCHC 03/02/2020 32.1  31.5 - 36.5 g/dL Final     RDW 03/02/2020 12.5  10.0 - 15.0 % Final     Platelet Count 03/02/2020 165  150 - 450 10e9/L Final     Sodium 03/02/2020 136  133 - 144 mmol/L Final     Potassium 03/02/2020 4.5  3.4 - 5.3 mmol/L Final     Chloride 03/02/2020 106  94 - 109 mmol/L Final     Carbon Dioxide 03/02/2020 29  20 - 32 mmol/L Final     Anion Gap 03/02/2020 1* 3 - 14 mmol/L Final     Glucose 03/02/2020 93  70 - 99 mg/dL Final    Non Fasting     Urea Nitrogen 03/02/2020 17  7 - 30 mg/dL Final     Creatinine 03/02/2020 1.22  0.66 - 1.25 mg/dL Final     GFR Estimate 03/02/2020 60* >60 mL/min/[1.73_m2] Final    Comment: Non  GFR Calc  Starting 12/18/2018, serum creatinine based estimated GFR (eGFR) will be   calculated using the Chronic Kidney Disease Epidemiology Collaboration   (CKD-EPI) equation.       GFR Estimate If Black 03/02/2020 69  >60 mL/min/[1.73_m2] Final    Comment:  GFR Calc  Starting 12/18/2018, serum creatinine based estimated GFR (eGFR) will be   calculated using the Chronic Kidney Disease Epidemiology Collaboration   (CKD-EPI) equation.       Calcium 03/02/2020 9.2  8.5 - 10.1 mg/dL Final       Objective:  Alert, sounds in no distress, mood euthymic    Assessment and Plan   1. Gender dysphoria s/p orchiectomy  Doing well on current dose hormone therapy  Reviewed labs with patient  Will [place] [facility] [place] to assist with contacting surgeon letter for gender marker change         Follow up:  Follow up in 4 months    Phone call duration: 10 minutes        Results by tracet  Questions were elicited and answered.     Jluis Mane MD

## 2020-05-08 DIAGNOSIS — E66.01 OBESITY, CLASS III, BMI 40-49.9 (MORBID OBESITY) (H): ICD-10-CM

## 2020-05-11 DIAGNOSIS — F64.0 GENDER DYSPHORIA IN ADOLESCENT AND ADULT: ICD-10-CM

## 2020-05-11 RX ORDER — ESTRADIOL 0.1 MG/D
1 FILM, EXTENDED RELEASE TRANSDERMAL
Qty: 24 PATCH | Refills: 1 | Status: SHIPPED | OUTPATIENT
Start: 2020-05-11 | End: 2020-12-02

## 2020-05-11 NOTE — TELEPHONE ENCOUNTER
Requested Medication: Estradiol   Dose: 0.1mg/24hr  Quantity: 24  Refills: 1    Apply 1 patch twice weekly    Last seen at [facility]: 5/7 - 4 mo follow up  Next Appointment with Provider:  Visit date not found      Alexandra Long CMA

## 2020-05-12 ENCOUNTER — DOCUMENTATION ONLY (OUTPATIENT)
Dept: OTHER | Facility: OUTPATIENT CENTER | Age: 70
End: 2020-05-12

## 2020-05-12 DIAGNOSIS — G44.219 EPISODIC TENSION-TYPE HEADACHE, NOT INTRACTABLE: ICD-10-CM

## 2020-05-12 DIAGNOSIS — F64.0 GENDER DYSPHORIA IN ADOLESCENT AND ADULT: ICD-10-CM

## 2020-05-12 RX ORDER — TOPIRAMATE 25 MG/1
TABLET, FILM COATED ORAL
Qty: 90 TABLET | Refills: 1 | Status: SHIPPED | OUTPATIENT
Start: 2020-05-12 | End: 2020-05-22

## 2020-05-12 NOTE — PROGRESS NOTES
Approved: Estradiol 0.1mg/24hr patch    4/11/2020 - 5/11/2021    C# [id number]    Alexandra Long CMA      Pharmacy notified

## 2020-05-12 NOTE — TELEPHONE ENCOUNTER
"I left a message for the patient to return call to [facility] RN.  What dose is she currently taking?     Requested Prescriptions   Pending Prescriptions Disp Refills     topiramate (TOPAMAX) 25 MG tablet 90 tablet 1     Simg at bedtime for week 1, 50mg at bedtime for 1 week, and 75mg at bedtime thereafter       Anti-Seizure Meds Protocol  Failed - 2020  3:27 PM        Failed - Review Authorizing provider's last note.      Refer to last progress notes: confirm request is for original authorizing provider (cannot be through other providers).          Failed - Normal CBC on file in past 26 months     Recent Labs   Lab Test 20  1233   WBC 7.4   RBC 4.42   HGB 12.7*   HCT 39.6*                    Passed - Recent (12 mo) or future (30 days) visit within the authorizing provider's specialty     Patient has had an office visit with the authorizing provider or a provider within the authorizing providers department within the previous 12 mos or has a future within next 30 days. See \"Patient Info\" tab in inbasket, or \"Choose Columns\" in Meds & Orders section of the refill encounter.              Passed - Normal serum creatinine on file in past 26 months     Recent Labs   Lab Test 20  1233   CR 1.22       Ok to refill medication if creatinine is low          Passed - Normal ALT or AST on file in past 26 months     Recent Labs   Lab Test 19  1026   ALT 19     Recent Labs   Lab Test 19  1026   AST 15             Passed - Normal platelet count on file in past 26 months     Recent Labs   Lab Test 20  1233                  Passed - Medication is active on med list           Marija RAMIREZ RN, BSN      "

## 2020-05-12 NOTE — TELEPHONE ENCOUNTER
Patient returned call, she is taking 25mg - 3 tablets at bedtime.    Patient also states that she needs a refill on phentermine (ADIPEX-P) 15 MG capsule .

## 2020-05-12 NOTE — TELEPHONE ENCOUNTER
Routing refill request to provider for review/approval because:  Drug not on the [facility] refill protocol   Ordered by Alexsandra Womack MD Cindy F. RN, BSN

## 2020-05-12 NOTE — TELEPHONE ENCOUNTER
"Called pt and spoke with her in regards topiramate (TOPAMAX) 25 MG tablet. She is sleeping better and her headaches are starting to go away. She takes this about 6pm every night.    Discussed with pt that the phentermine is not ordered through [facility] but through Dr. W[facility]ack at [facility]. She is now taking this in the AM. This refill request has been sent in a separate encounter.    Wt Readings from Last 2 Encounters:   04/29/20 140.2 kg (309 lb)   03/02/20 141.9 kg (312 lb 12.8 oz)     She needs repeat CBC around 6/2/2020.  She wants to come in for a \"complete Physical,\" she will call back to schedule for that and she needs her ECHO done as well. Cardiology has not called her back to reschedule this as of yet.      She is gonna be \"calling everybody up\" next week to start scheduling her appts in July.    Marija RAMIREZ RN, BSN          "

## 2020-05-13 DIAGNOSIS — F64.0 GENDER DYSPHORIA IN ADOLESCENT AND ADULT: ICD-10-CM

## 2020-05-13 DIAGNOSIS — G44.219 EPISODIC TENSION-TYPE HEADACHE, NOT INTRACTABLE: ICD-10-CM

## 2020-05-13 RX ORDER — TOPIRAMATE 25 MG/1
TABLET, FILM COATED ORAL
Qty: 90 TABLET | Refills: 1 | Status: CANCELLED | OUTPATIENT
Start: 2020-05-13

## 2020-05-13 RX ORDER — PHENTERMINE HYDROCHLORIDE 15 MG/1
CAPSULE ORAL
Qty: 30 CAPSULE | Refills: 0 | Status: SHIPPED | OUTPATIENT
Start: 2020-05-13 | End: 2020-05-22

## 2020-05-22 ENCOUNTER — VIRTUAL VISIT (OUTPATIENT)
Dept: ENDOCRINOLOGY | Facility: CLINIC | Age: 70
End: 2020-05-22
Payer: COMMERCIAL

## 2020-05-22 DIAGNOSIS — E66.01 OBESITY, CLASS III, BMI 40-49.9 (MORBID OBESITY) (H): ICD-10-CM

## 2020-05-22 DIAGNOSIS — G44.219 EPISODIC TENSION-TYPE HEADACHE, NOT INTRACTABLE: ICD-10-CM

## 2020-05-22 DIAGNOSIS — F64.0 GENDER DYSPHORIA IN ADOLESCENT AND ADULT: ICD-10-CM

## 2020-05-22 RX ORDER — PHENTERMINE HYDROCHLORIDE 15 MG/1
CAPSULE ORAL
Qty: 30 CAPSULE | Refills: 1 | Status: SHIPPED | OUTPATIENT
Start: 2020-05-22 | End: 2020-07-08

## 2020-05-22 RX ORDER — TOPIRAMATE 25 MG/1
TABLET, FILM COATED ORAL
Qty: 90 TABLET | Refills: 1 | Status: SHIPPED | OUTPATIENT
Start: 2020-05-22 | End: 2020-07-08

## 2020-05-22 ASSESSMENT — PAIN SCALES - GENERAL: PAINLEVEL: NO PAIN (0)

## 2020-05-22 NOTE — NURSING NOTE
Chief Complaint   Patient presents with     RECHECK     1 month weight management follow up.       There were no vitals filed for this visit.    There is no height or weight on file to calculate BMI.         Rosario Benjamin, CMA

## 2020-05-22 NOTE — LETTER
"2020       RE: Laila Perez  [address] [facility] [address]     Dear Colleague,    Thank you for referring your patient, Laila Perez, to the [facility] MEDICAL WEIGHT MANAGEMENT at [facility]. Please see a copy of my visit note below.    Laila Perez is a 70 year old adult who is being evaluated via a billable telephone visit.      The patient has been notified of following:     \"This telephone visit will be conducted via a call between you and your physician/provider. We have found that certain health care needs can be provided without the need for a physical exam.  This service lets us provide the care you need with a short phone conversation.  If a prescription is necessary we can send it directly to your pharmacy.  If lab work is needed we can place an order for that and you can then stop by our lab to have the test done at a later time.    Telephone visits are billed at different rates depending on your insurance coverage. During this emergency period, for some insurers they may be billed the same as an in-person visit.  Please reach out to your insurance provider with any questions.    If during the course of the call the physician/provider feels a telephone visit is not appropriate, you will not be charged for this service.\"    Patient has given verbal consent for Telephone visit?  Yes    What phone number would you like to be contacted at? [phone]    How would you like to obtain your AVS? Pt does not need one    Phone call duration: 16 minutes      Return Medical Weight Management Note  Laila Perez  MRN:  [id number]  :  1950  FRANCIS:  2020    Dear Delmis Simons,    I had the pleasure of seeing your patient Laila Perez for follow-up consultation.  She is a 70 year old adult who I am continuing to see for treatment of obesity related to:       2019   I have the following health issues associated with " obesity: High Blood Pressure   I have the following symptoms associated with obesity: Knee Pain     INTERVAL HISTORY:  Much more active now since starting phentermine, takes them at 6am-7am in the morning  Feels much better  Feels weight is down, but hasn't weighed herself    Problem   Episodic Tension-Type Headache, Not Intractable   Obesity, Class III, Bmi 40-49.9 (Morbid Obesity) (H)    Will continue phentermine 15mg daily  Will start topiramate, 25mg and increasing to 75mg daily          CURRENT WEIGHT:   0 lbs 0 oz    Wt Readings from Last 4 Encounters:   04/29/20 140.2 kg (309 lb)   03/02/20 141.9 kg (312 lb 12.8 oz)   01/30/20 140.4 kg (309 lb 9.6 oz)   12/28/19 144.4 kg (318 lb 6.4 oz)       Height:  Data Unavailable  Body Mass Index:  There is no height or weight on file to calculate BMI.  Vitals:  B/P: Data Unavailable, P: Data Unavailable, R: Data Unavailable     Starting weight in weight management was:     Diet Recall Review with Patient 12/16/2019   Do you typically eat lunch? Yes   If you do eat lunch, what types of food do you typically eat?  sandwich chipss   Do you typically eat supper? Yes   If you do eat supper, what types of food do you typically eat? meal that IMake   Do you typically eat snacks? No   If you do snack, what types of food do you typically eat? cookie   How many glasses of juice do you drink in a typical day? 0   How many of glasses of milk do you drink in a typical day? 2   If you do drink milk, what type? Whole   How many 8oz glasses of sugar containing drinks such as Carlos-Aid/sweet tea do you drink in a day? 0   How many cans/bottles of sugar pop/soda/tea/sports drinks do you drink in a day? 4   How many cans/bottles of diet pop/soda/tea or sports drink do you drink in a day? 0   How often do you have a drink of alcohol? Never       MEDICATIONS:   Current Outpatient Medications   Medication     acetaminophen (TYLENOL) 500 MG tablet     Antiseptic Products, Misc. (UNI-SOLVE)  PADS     atenolol (TENORMIN) 50 MG tablet     DULoxetine (CYMBALTA) 20 MG capsule     estradiol (VIVELLE-DOT) 0.1 MG/24HR bi-weekly patch     latanoprost (XALATAN) 0.005 % ophthalmic solution     lisinopril (PRINIVIL/ZESTRIL) 5 MG tablet     lovastatin (MEVACOR) 20 MG tablet     Multiple Vitamin (DAILY-EVANGELISTA) TABS     order for DME     ORDER FOR DME     phentermine (ADIPEX-P) 15 MG capsule     SM CALCIUM 600/VITAMIN D 600-400 MG-UNIT per tablet     timolol maleate (TIMOPTIC) 0.5 % ophthalmic solution     topiramate (TOPAMAX) 25 MG tablet     No current facility-administered medications for this visit.        Weight Loss Medication History Reviewed With Patient 5/22/2020   Which weight loss medications are you currently taking on a regular basis?  Phentermine, Topamax (topiramate)   Are you having any side effects from the weight loss medication that we have prescribed you? No       LABS:  Hemoglobin A1C   Date Value Ref Range Status   04/26/2016 4.8 4.3 - 6.0 % Final   04/26/2013 5.2 4.3 - 6.0 % Final     Cholesterol   Date Value Ref Range Status   05/09/2018 115 <200 mg/dL Final     TSH   Date Value Ref Range Status   05/02/2017 1.44 0.40 - 4.00 mU/L Final     Creatinine   Date Value Ref Range Status   03/02/2020 1.22 0.66 - 1.25 mg/dL Final     ALT   Date Value Ref Range Status   04/17/2019 19 0 - 70 U/L Final       ASSESSMENT:  MS. Perez is a 70 year old adult with history of Class 3 obesity with current body mass index is unknown because there is no height or weight on file. and with current health consequences who presents for weight management follow-up consultation. Overall Laila Perez is doing better weight eight loss. .     The following diagnoses are relevant to Laila Perez's history of obesity:     PLAN:    Problem   Episodic Tension-Type Headache, Not Intractable   Obesity, Class III, Bmi 40-49.9 (Morbid Obesity) (H)    Will continue phentermine 15mg daily  Will start topiramate, 25mg and  increasing to 75mg daily        No problem-specific Assessment & Plan notes found for this encounter.      No orders of the defined types were placed in this encounter.       With motivational interviewing, Laila took part in making the following plan:  There are no Patient Instructions on file for this visit.    FOLLOW-UP:    8 weeks.    Time: ** min spent on evaluation, management, counseling, education, & motivational interviewing with greater than 50 % of the total time was spent on counseling and coordinating care    Thank you for including me in the care of your patient.  Please do not hesitate to call with questions or concerns.    Sincerely,    Alexsandra Womack MD MPH  Diplomate, American Board of Obesity Medicine, American Board of Internal Medicine, American Board of Pediatrics    Departments of Internal Medicine and Pediatrics  [facility]        [unfilled]     Again, thank you for allowing me to participate in the care of your patient.      Sincerely,    Alexsandra Womack MD

## 2020-05-22 NOTE — PROGRESS NOTES
"Laila Perez is a 70 year old adult who is being evaluated via a billable telephone visit.      The patient has been notified of following:     \"This telephone visit will be conducted via a call between you and your physician/provider. We have found that certain health care needs can be provided without the need for a physical exam.  This service lets us provide the care you need with a short phone conversation.  If a prescription is necessary we can send it directly to your pharmacy.  If lab work is needed we can place an order for that and you can then stop by our lab to have the test done at a later time.    Telephone visits are billed at different rates depending on your insurance coverage. During this emergency period, for some insurers they may be billed the same as an in-person visit.  Please reach out to your insurance provider with any questions.    If during the course of the call the physician/provider feels a telephone visit is not appropriate, you will not be charged for this service.\"    Patient has given verbal consent for Telephone visit?  Yes    What phone number would you like to be contacted at? [phone]    How would you like to obtain your AVS? Pt does not need one    Phone call duration: 16 minutes    "

## 2020-05-22 NOTE — PROGRESS NOTES
Return Medical Weight Management Note  Laila Perez  MRN:  [id number]  :  1950  FRANCIS:  2020    Dear Ronak, Delmis García,    I had the pleasure of seeing your patient Laila Perez for follow-up consultation.  She is a 70 year old adult who I am continuing to see for treatment of obesity related to:       2019   I have the following health issues associated with obesity: High Blood Pressure   I have the following symptoms associated with obesity: Knee Pain     INTERVAL HISTORY:  Much more active now since starting phentermine, takes them at 6am-7am in the morning  Feels much better  Feels weight is down, but hasn't weighed herself    Problem   Episodic Tension-Type Headache, Not Intractable   Obesity, Class III, Bmi 40-49.9 (Morbid Obesity) (H)    Will continue phentermine 15mg daily  Will start topiramate, 25mg and increasing to 75mg daily          CURRENT WEIGHT:   0 lbs 0 oz    Wt Readings from Last 4 Encounters:   20 140.2 kg (309 lb)   20 141.9 kg (312 lb 12.8 oz)   20 140.4 kg (309 lb 9.6 oz)   19 144.4 kg (318 lb 6.4 oz)       Height:  Data Unavailable  Body Mass Index:  There is no height or weight on file to calculate BMI.  Vitals:  B/P: Data Unavailable, P: Data Unavailable, R: Data Unavailable     Starting weight in weight management was:     Diet Recall Review with Patient 2019   Do you typically eat lunch? Yes   If you do eat lunch, what types of food do you typically eat?  sandwich chipss   Do you typically eat supper? Yes   If you do eat supper, what types of food do you typically eat? meal that IMake   Do you typically eat snacks? No   If you do snack, what types of food do you typically eat? cookie   How many glasses of juice do you drink in a typical day? 0   How many of glasses of milk do you drink in a typical day? 2   If you do drink milk, what type? Whole   How many 8oz glasses of sugar containing drinks such as Carlos-Aid/sweet tea do  you drink in a day? 0   How many cans/bottles of sugar pop/soda/tea/sports drinks do you drink in a day? 4   How many cans/bottles of diet pop/soda/tea or sports drink do you drink in a day? 0   How often do you have a drink of alcohol? Never       MEDICATIONS:   Current Outpatient Medications   Medication     acetaminophen (TYLENOL) 500 MG tablet     Antiseptic Products, Misc. (UNI-SOLVE) PADS     atenolol (TENORMIN) 50 MG tablet     DULoxetine (CYMBALTA) 20 MG capsule     estradiol (VIVELLE-DOT) 0.1 MG/24HR bi-weekly patch     latanoprost (XALATAN) 0.005 % ophthalmic solution     lisinopril (PRINIVIL/ZESTRIL) 5 MG tablet     lovastatin (MEVACOR) 20 MG tablet     Multiple Vitamin (DAILY-EVANGELISTA) TABS     order for DME     ORDER FOR DME     phentermine (ADIPEX-P) 15 MG capsule     SM CALCIUM 600/VITAMIN D 600-400 MG-UNIT per tablet     timolol maleate (TIMOPTIC) 0.5 % ophthalmic solution     topiramate (TOPAMAX) 25 MG tablet     No current facility-administered medications for this visit.        Weight Loss Medication History Reviewed With Patient 5/22/2020   Which weight loss medications are you currently taking on a regular basis?  Phentermine, Topamax (topiramate)   Are you having any side effects from the weight loss medication that we have prescribed you? No       LABS:  Hemoglobin A1C   Date Value Ref Range Status   04/26/2016 4.8 4.3 - 6.0 % Final   04/26/2013 5.2 4.3 - 6.0 % Final     Cholesterol   Date Value Ref Range Status   05/09/2018 115 <200 mg/dL Final     TSH   Date Value Ref Range Status   05/02/2017 1.44 0.40 - 4.00 mU/L Final     Creatinine   Date Value Ref Range Status   03/02/2020 1.22 0.66 - 1.25 mg/dL Final     ALT   Date Value Ref Range Status   04/17/2019 19 0 - 70 U/L Final       ASSESSMENT:  MS. Perez is a 70 year old adult with history of Class 3 obesity with current body mass index is unknown because there is no height or weight on file. and with current health consequences who presents for  weight management follow-up consultation. Overall Laila Perez is doing better weight eight loss. .     The following diagnoses are relevant to Laila Perez's history of obesity:     PLAN:    Problem   Episodic Tension-Type Headache, Not Intractable   Obesity, Class III, Bmi 40-49.9 (Morbid Obesity) (H)    Will continue phentermine 15mg daily  Will start topiramate, 25mg and increasing to 75mg daily        No problem-specific Assessment & Plan notes found for this encounter.      No orders of the defined types were placed in this encounter.       With motivational interviewing, Laila took part in making the following plan:  There are no Patient Instructions on file for this visit.    FOLLOW-UP:    8 weeks.    Time: ** min spent on evaluation, management, counseling, education, & motivational interviewing with greater than 50 % of the total time was spent on counseling and coordinating care    Thank you for including me in the care of your patient.  Please do not hesitate to call with questions or concerns.    Sincerely,    Alexsandra Womack MD MPH  Diplomate, American Board of Obesity Medicine, American Board of Internal Medicine, American Board of Pediatrics    Departments of Internal Medicine and Pediatrics  [facility]        [unfilled]

## 2020-06-03 ENCOUNTER — PATIENT OUTREACH (OUTPATIENT)
Dept: GERIATRIC MEDICINE | Facility: CLINIC | Age: 70
End: 2020-06-03

## 2020-06-08 ENCOUNTER — VIRTUAL VISIT (OUTPATIENT)
Dept: OTHER | Facility: OUTPATIENT CENTER | Age: 70
End: 2020-06-08
Payer: COMMERCIAL

## 2020-06-08 DIAGNOSIS — F64.0 GENDER DYSPHORIA IN ADOLESCENT AND ADULT: Primary | ICD-10-CM

## 2020-06-08 DIAGNOSIS — F33.41 RECURRENT MAJOR DEPRESSIVE DISORDER, IN PARTIAL REMISSION (H): ICD-10-CM

## 2020-06-08 ASSESSMENT — ANXIETY QUESTIONNAIRES
6. BECOMING EASILY ANNOYED OR IRRITABLE: NEARLY EVERY DAY
5. BEING SO RESTLESS THAT IT IS HARD TO SIT STILL: SEVERAL DAYS
1. FEELING NERVOUS, ANXIOUS, OR ON EDGE: SEVERAL DAYS
IF YOU CHECKED OFF ANY PROBLEMS ON THIS QUESTIONNAIRE, HOW DIFFICULT HAVE THESE PROBLEMS MADE IT FOR YOU TO DO YOUR WORK, TAKE CARE OF THINGS AT HOME, OR GET ALONG WITH OTHER PEOPLE: NOT DIFFICULT AT ALL
GAD7 TOTAL SCORE: 8
2. NOT BEING ABLE TO STOP OR CONTROL WORRYING: SEVERAL DAYS
3. WORRYING TOO MUCH ABOUT DIFFERENT THINGS: SEVERAL DAYS
7. FEELING AFRAID AS IF SOMETHING AWFUL MIGHT HAPPEN: NOT AT ALL

## 2020-06-08 ASSESSMENT — PATIENT HEALTH QUESTIONNAIRE - PHQ9
SUM OF ALL RESPONSES TO PHQ QUESTIONS 1-9: 1
5. POOR APPETITE OR OVEREATING: SEVERAL DAYS

## 2020-06-08 NOTE — PROGRESS NOTES
"The following was reviewed with the patient.   \"We have found that certain health care needs can be provided without the need for a face to face visit.  This service lets us provide the care you need with a phone conversation. I will have full access to your [facility] medical record during this entire phone call.   I will be taking notes for your medical record. Since this is like an office visit, we will bill your insurance company for this service. There are potential benefits and risks of telephone visits (e.g. limits to patient confidentiality) that differ from in-person visits.?  Confidentiality still applies for telephone services, and nobody will record the visit.  It is important to be in a quiet, private space that is free of distractions (including cell phone or other devices) during the visit.??If during the course of the call I believe a telephone visit is not appropriate, you will not be charged for this service\"    Consent has been obtained for this service by care team member: Yes                      [facility]  [address] [place] [address] [address]  [place] [address]                                                                                Phone: [phone]                                                                                  Fax: [phone]                                                                    [email or web address]    ANNUAL UPDATE: Diagnostic Assessment Interview     Client Name: Laila Perez  YOB: 1950  70 year old  MRN:  [id number]  Gender/Gender Identity: male/female  Treating Provider: Estelle Gomez PsyD, LP  Program:  [facility]  Type of Session: Assessment  Present in Session: client   Number of Minutes:  50    Date of Service: 6/8/2020     Updated Presenting Problem and Goals:  Client shared she continues to experience distress about gender and secondary sex characteristics.  She is prescribed feminizing " hormones which have helped reduce some of her dysphoria.  She also had an orchiectomy which allowed her to reduce the dose of feminizing hormones and maintain her feminization.  Client continues to have high distress regarding her external genitals not matching her identified gender.  She has been frustrated with the requirement of having to reduce her weight in order to qualify for the surgery.  She has been working with some weight loss professionals to try and reduce her weight.  She is hoping to reach this goal by September 2020.      Updated Mental Health History:   Client endorsed minimal symptoms of depression and anxiety.  She was on fluoxetine in the past to treat mood disorder but has been discontinued for over a year with no return of symptoms.  The mood symptoms she does experience seems to be primarily related to her anatomical dysphoria.     PHQ-9:  PHQ-9 ([employer]) 6/8/2020   No Interest In Doing Things -   Feeling Depressed -   Trouble Sleeping -   Tired / No Energy -   No appetite or Over-Eating -   Feeling Bad about Self -   Trouble Concentrating -   Moving Slow or Restless -   Suicidal Thoughts -   Total Score -   1.  Little interest or pleasure in doing things 0   2.  Feeling down, depressed, or hopeless 0   3.  Trouble falling or staying asleep, or sleeping too much 0   4.  Feeling tired or having little energy 0   5.  Poor appetite or overeating 0   6.  Feeling bad about yourself 0   7.  Trouble concentrating 0   8.  Moving slowly or restless 1   9.  Suicidal or self-harm thoughts 0   PHQ-9 Total Score 1   Difficulty at work, home, or with people Not difficult at all   1.  Little interest or pleasure in doing things -   2.  Feeling down, depressed, or hopeless -   3.  Trouble falling or staying asleep, or sleeping too much -   4.  Feeling tired or having little energy -   5.  Poor appetite or overeating -   6.  Feeling bad about yourself -   7.  Trouble concentrating -   8.  Moving slowly or  restless -   9.  Suicidal or self-harm thoughts -   PHQ-9 via PT PALt TOTAL SCORE-----> -   Difficulty at work, home, or with people -         PHQ-9 SCORING CARE FOR SEVERITY  For health professional use only.     Scoring - add up all selected items on PHQ-9  For every item selected:  Not at all = 0  Several days = 1  More than half the days = 2  Nearly every day = 3      Interpretation of Total Score  Total Score Depression Severity and Recommendations   0-9 No Major Depression   10-14 Moderate.  Initial weekly follow up.  If patient is responding, monthly contacts.  Meds or therapy.   15-19 Moderate severe.  Initial weekly follow up.  If patient is responding, 2-4 week contacts.  Meds and/or therapy.   >20 Severe.  Weekly contact.  Meds and therapy.       DESTINY  1. Feeling nervous, anxious, or on edge: Several days  2. Not being able to stop or control worrying: Several days  3. Worrying too much about different things: Several days  4. Trouble relaxing: Several days  5. Being so restless that it is hard to sit still: Several days  6. Becoming easily annoyed or irritable: Nearly every day  7. Feeling afraid, as if something awful might happen: Not at all    DESTINY-7 Total Score: 8    Interpretation:    DESTINY-7 total score for the 7 items ranges from 0 - 21.    0 - 5     Minimal anxiety  6 - 10   Mild anxiety  11 - 15 Moderate anxiety  16 - 21 Severe anxiety    Updated Substance Use:   Client had a lengthy history of alcoholism. She has been sober for over 8 years. She does not experience any significant preoccupation or urges to drink alcohol. She did not abuse any other drugs. S    CAGE  Have you ever felt you should Cut down on your drinking or drug use?: No  Have people Annoyed you by criticizing your drinking or drug use?: No  Have you ever felt bad or Guilty about your drinking or drug use?: No  Have you ever had a drink or used drugs first thing in the morning to steady your nerves or to get rid of a hangover? (Eye  opener): No  CAGE-AID SCORE: 0       Updated Medical History:   She has  ongoing medical issues.  These include high blood pressure and arthritis.  She was hospitalized in the past year for multiple stomach ulcers.  She had to discontinue her over-the-counter medication for her arthritis.  She did have some increased difficulty with her arthritis but has since subsided some.  She does have a history of heart issues and has had some surgery in the past.  There are no current concerns about her heart functioning.    Updated Family History:   No updates.    Updated Current Significant Relationship/Partner:      Updated Educational History:  There are no updates to her educational history.    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    Updated Legal Issues:  No current legal issues.      Interactive Complexity:  There are four specific communication difficulties that complicate the work of the primary psychiatric procedure.  Interactive complexity (+[id number]) may be reported when at least one of these difficulties is present.    Communication difficulties present during current the psychiatric procedure include:  1.  none        Updated Strengths and Liabilities:   Her strengths are positive, open attitude. Her limitations are managing frustration and anger.  Updated Symptoms:    See updated PHQ-9, DESTINY-7, CAGE, and Safety Screening    Updated Mental Status:      Mental Status:   Appearance:  Casually dressed and Well groomed  Behavior/relationship to examiner/demeanor:  Cooperative and Engaged  Orientation: Oriented to person, place, time and situation  Speech Rate:  Normal  Speech Spontaneity:  Normal  Mood:  pleasant and unremarkable  Affect:  Appropriate/mood-congruent  Thought Process (Associations):  Logical  Thought Content:  no evidence of suicidal or homicidal ideation  Abnormal Perception:  None  Attention/Concentration:  Fair  Language:   Intact  Insight:  Adequate  Judgment:  Fair    Motor activity/EPS:  Normal      Updated DSM-5 Diagnoses:  Encounter Diagnoses   Name Primary?     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 Yes     Recurrent major depressive disorder, in partial remission (H)          Conclusions/Recommendations/Initial Treatment Goals:   Ms. Tasia Perez  is a 70 year old transgender female assigned male at birth who identifies as female. Client has a lengthy history of gender dysphoria that was not treated for many years. She also has a history of alcoholism and depression. These were at least partially linked to her distress about gender.  She lives independently in her own apartment but has support from staff of the agency.  This changed with in the past couple of years and now she lives in her own unit with support. She has made progress with meeting her goals of being on female hormones.  She has had gender affirming surgery of an orchiectomy and continues to have anatomical distress.  She is working on weight loss so that she can undergo surgery to have her male genitalia removed.    Estelle Gomez PsyD, LP      TREATMENT PLAN    Date of Treatment Plan 2020  Name: aLila Perez  : 1950  Medical Record Number: [id number]   Treating Provider: Estelle Gomez PsyD, LP  Type of Session: Individual  Present in Session: client      Current Status:    Depression/Mood:  Aggitation  Irritable    Anxiety/Panic:  Worry  Sweating  GI Distress    Thought:   Reports no problems or symptoms at this time    Sensorium:  Reports no problems or symptoms at this time    Behavior/Health:  Reports no problems or symptoms at this time    Chemical Use:  Client is recovering alcoholic. Abstinent for about 9 years.    Sexual Problems:  Gender Problems    Suicide Risk Assessment:  Assessed Level of Immediate Risk:  YES  Ideation:  NO  Plan:  NO  Means:  NO  Intent:  NO    Homicide Risk Assessment:  Assessed Level of Immediate Risk:   YES  Ideation:  NO  Plan:  NO  Means:  NO  Intent:  NO    Impact of Symptoms on Function:  Decreased Physical/Health Status    DSM-5 Diagnoses:   Diagnoses       Codes Comments    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    -  Primary F64.0     Recurrent major depressive disorder, in partial remission (H)     F33.41           Screening Questionnaires:  Please indicate whether the following screening questionnaires have been completed:  CAGE: Yes  PHQ-9: Yes    DESTINY-7: Yes  Safety Screening: Yes  WHODAS: No  Other:     Problem(s):  1. Dysphoria and distress about anatomical body parts not matching her gender identity  2.   3.    Short-Long Term Goals  Decrease Symptoms  Increase Coping    Interventions  Cognitive-Behavioral Therapy  Behavior Therapy    Expected Outcomes and Prognosis  Relieve acute symptoms    Anticipated Treatment Duration:  Unknown    Frequency of Sessions  Monthly    Progress Update (for Plan Update Only)  Compliant, Progressing and Improving - Needs More Sessions    Other Specific Goal Objectives for Treatment:  none    Current Psychoactive Medications:  Receives medication from outside provider, no mental health medications at this time.    Discharge & Aftercare Goals: To Be Determined.    Care Coordination: Yes    Consent to Treatment:     Patient participated in this treatment planning process and indicated verbal agreement with the above treatment plan.  none  Patient Signature: verbal  Date: 6/08/2020      Provider Signature: Estelle Gomez PsyD, COREY    Date: 6/08/2020

## 2020-06-09 ASSESSMENT — ANXIETY QUESTIONNAIRES: GAD7 TOTAL SCORE: 8

## 2020-06-11 PROBLEM — G44.219 EPISODIC TENSION-TYPE HEADACHE, NOT INTRACTABLE: Status: ACTIVE | Noted: 2020-06-11

## 2020-06-11 PROBLEM — E66.813 OBESITY, CLASS III, BMI 40-49.9 (MORBID OBESITY) (H): Status: ACTIVE | Noted: 2018-08-06

## 2020-06-11 PROBLEM — E66.01 OBESITY, CLASS III, BMI 40-49.9 (MORBID OBESITY) (H): Status: ACTIVE | Noted: 2018-08-06

## 2020-06-17 ASSESSMENT — ACTIVITIES OF DAILY LIVING (ADL): DEPENDENT_IADLS:: INDEPENDENT

## 2020-06-17 NOTE — PROGRESS NOTES
[facility] Care Coordination Contact    [facility] Home Visit Assessment     Home visit for Health Risk Assessment with Laila Perez completed on Jamia 3, 2020    Type of residence:: Private home - no stairs  Current living arrangement:: I live alone     Assessment completed with:: Patient    Current Care Plan  Member currently receiving the following home care services: None    Member currently receiving the following community resources: Unk - She spoke of a visitor coming 1x wkly to assist with Misc. Paperwork.  Attempted contact of DD cc to obtain additional information.      Medication Review  Medication reconciliation completed in Epic: Yes  Medication set-up & administration: Independent and sets up on own weekly.  Self-administers medications.  Medication Risk Assessment Medication (1 or more, place referral to MTM): N/A: No risk factors identified  MTM Referral Placed: No: Client declined     Mental/Behavioral Health   Depression Screening:   PHQ-2 Total Score (Adult) - Positive if 3 or more points; Administer PHQ-9 if positive: 0       Mental health DX:: Yes   Mental health DX how managed:: Medication, Outpatient Counseling    Falls Assessment:   Fallen 2 or more times in the past year?: No   Any fall with injury in the past year?: No    ADL/IADL Dependencies:   Dependent ADLs:: Independent  Dependent IADLs:: Independent    [facility] sponsored benefits: Shared information re: Silver Sneakers/gym memberships, ASA, Calcium +D.    PCA Assessment completed at visit: Not Applicable     Elderly Waiver Eligibility: On DD Waiver    Care Plan & Recommendations:Member requested a bath rail to ensure safety when getting in and out of bath tub.  cc to order through [facility].    cc was asked to call members  @ [phone] as she was having concerns regarding bills member was receiving.  cc contacted  and will review financial concerns with  DD cc.    VM left at [place]  [facility] to obtain the [facility] cc contact info.    See [facility] for detailed assessment information.    Follow-Up Plan: Member informed of future contact, plan to f/u with member with a 6 month telephone assessment.  Contact information shared with member and family, encouraged member to call with any questions or concerns at any time.    [place] care continuum providers: Please refer to Health Care Home on the Epic Problem List to view this patient's [facility] Care Plan Summary.      Alexandra Helton RN-[facility]   [phone]

## 2020-06-18 ENCOUNTER — PATIENT OUTREACH (OUTPATIENT)
Dept: GERIATRIC MEDICINE | Facility: CLINIC | Age: 70
End: 2020-06-18

## 2020-06-30 ENCOUNTER — PATIENT OUTREACH (OUTPATIENT)
Dept: GERIATRIC MEDICINE | Facility: CLINIC | Age: 70
End: 2020-06-30

## 2020-06-30 NOTE — LETTER
June 30, 2020      LAILA AMEZQUITA  [address] [address] [place] [id number]      Dear Laila:    At [facility], we are dedicated to improving your health and well-being. Enclosed is the Comprehensive Care Plan that we developed with you on 6/3/2020. Please review the Care Plan carefully.    As a reminder, some of the things we discussed at your visit include:    Your physical and mental health    Ways to reduce falls    Health care needs you may have    Don t forget to contact your care coordinator if you:    Have been hospitalized or plan to be hospitalized     Have had a fall     Have experienced a change in physical health    Are experiencing emotional problems     If you do not agree with your Care Plan, have questions about it, or have experienced a change in your needs, please call me at [phone]. If you are hearing impaired, please call the [place] [employer] at 987 or [phone] (qdtjrl-xp-nrwvlk relay service).    Sincerely,    REMY Johnston    E-mail: [email or web address]  Phone: [phone]      [facility] ([facility]) is a health plan that contracts with both Medicare and the [place] Medical Assistance (Medicaid) program to provide benefits of both programs to enrollees. Enrollment in [facility] depends on contract renewal.    [id number])     [id number] (11/18)

## 2020-06-30 NOTE — PROGRESS NOTES
[facility] Care Coordination Contact    Received after visit chart from care coordinator.  Completed following tasks: Mailed copy of care plan to client and Updated services in access   and Provider Signature - No POC Shared:  Member indicates that they do not want their POC shared with any [facility] providers.     Mailed POC signature page to member to sign and return. Assessment done over the phone.     Bath rail ordered thru [facility]. Tracked on DME tracking spreadsheet. No auth needed for health plan. KIM Updated.     Yolanda Humphrey  Care Management Specialist   [facility]   [phone]

## 2020-06-30 NOTE — PROGRESS NOTES
[facility] Care Coordination Contact    cc received the following notification:    Previous Cadi cc:  Meg Palmer with  [facility]. ([phone]). Kimberly is new cc. Contacted Meg to obtain contact information.    Alexandra Helton RN-[facility]   [phone]     7/7/2020  cc has contacted Meg Palmer x3 with no response.    Alexandra Helton RN-BC  [facility]   [phone]

## 2020-07-08 ENCOUNTER — OFFICE VISIT (OUTPATIENT)
Dept: FAMILY MEDICINE | Facility: CLINIC | Age: 70
End: 2020-07-08
Payer: COMMERCIAL

## 2020-07-08 VITALS
WEIGHT: 288.6 LBS | TEMPERATURE: 96.3 F | BODY MASS INDEX: 40.25 KG/M2 | SYSTOLIC BLOOD PRESSURE: 118 MMHG | RESPIRATION RATE: 24 BRPM | OXYGEN SATURATION: 98 % | DIASTOLIC BLOOD PRESSURE: 66 MMHG | HEART RATE: 51 BPM

## 2020-07-08 DIAGNOSIS — I10 HTN, GOAL BELOW 140/90: ICD-10-CM

## 2020-07-08 DIAGNOSIS — E78.5 HYPERLIPIDEMIA LDL GOAL <100: ICD-10-CM

## 2020-07-08 DIAGNOSIS — F64.0 GENDER DYSPHORIA IN ADOLESCENT AND ADULT: ICD-10-CM

## 2020-07-08 DIAGNOSIS — E66.01 OBESITY, CLASS III, BMI 40-49.9 (MORBID OBESITY) (H): ICD-10-CM

## 2020-07-08 DIAGNOSIS — G44.219 EPISODIC TENSION-TYPE HEADACHE, NOT INTRACTABLE: ICD-10-CM

## 2020-07-08 DIAGNOSIS — Z00.00 MEDICARE ANNUAL WELLNESS VISIT, SUBSEQUENT: Primary | ICD-10-CM

## 2020-07-08 LAB
CHOLEST SERPL-MCNC: 119 MG/DL
HDLC SERPL-MCNC: 52 MG/DL
LDLC SERPL CALC-MCNC: 38 MG/DL
NONHDLC SERPL-MCNC: 67 MG/DL
TRIGL SERPL-MCNC: 143 MG/DL

## 2020-07-08 PROCEDURE — 99214 OFFICE O/P EST MOD 30 MIN: CPT | Mod: 25 | Performed by: NURSE PRACTITIONER

## 2020-07-08 PROCEDURE — 80061 LIPID PANEL: CPT | Performed by: NURSE PRACTITIONER

## 2020-07-08 PROCEDURE — 36415 COLL VENOUS BLD VENIPUNCTURE: CPT | Performed by: NURSE PRACTITIONER

## 2020-07-08 PROCEDURE — 99397 PER PM REEVAL EST PAT 65+ YR: CPT | Performed by: NURSE PRACTITIONER

## 2020-07-08 RX ORDER — PHENTERMINE HYDROCHLORIDE 15 MG/1
CAPSULE ORAL
Qty: 30 CAPSULE | Refills: 1 | Status: SHIPPED | OUTPATIENT
Start: 2020-07-08 | End: 2020-08-14

## 2020-07-08 RX ORDER — DULOXETIN HYDROCHLORIDE 20 MG/1
20 CAPSULE, DELAYED RELEASE ORAL 2 TIMES DAILY
Qty: 180 CAPSULE | Refills: 1 | Status: SHIPPED | OUTPATIENT
Start: 2020-07-08 | End: 2020-11-02

## 2020-07-08 RX ORDER — ATENOLOL 50 MG/1
50 TABLET ORAL DAILY
Qty: 90 TABLET | Refills: 1 | Status: SHIPPED | OUTPATIENT
Start: 2020-07-08 | End: 2020-12-02

## 2020-07-08 RX ORDER — TOPIRAMATE 25 MG/1
75 TABLET, FILM COATED ORAL AT BEDTIME
Qty: 270 TABLET | Refills: 1 | Status: SHIPPED | OUTPATIENT
Start: 2020-07-08 | End: 2020-08-14

## 2020-07-08 RX ORDER — LISINOPRIL 5 MG/1
5 TABLET ORAL DAILY
Qty: 90 TABLET | Refills: 1 | Status: SHIPPED | OUTPATIENT
Start: 2020-07-08 | End: 2021-05-13

## 2020-07-08 NOTE — PATIENT INSTRUCTIONS
Everything looks good today     No changes in medications     Follow-up with Cardiology and Endocrinology as advised     Follow-up with [facility] in 6 months for recheck

## 2020-07-08 NOTE — PROGRESS NOTES
SUBJECTIVE:   Laila Perez is a 70 year old adult who presents for Preventive Visit.    Are you in the first 12 months of your Medicare coverage?  No    HPI  Do you feel safe in your environment? Yes    Have you ever done Advance Care Planning? (For example, a Health Directive, POLST, or a discussion with a medical provider or your loved ones about your wishes): Yes, patient states has an Advance Care Planning document and will bring a copy to the clinic.      Fall risk  Fallen 2 or more times in the past year?: No  Any fall with injury in the past year?: No    Cognitive Screening   1) Repeat 3 items (Leader, Season, Table)    2) Clock draw: NORMAL  3) 3 item recall: Recalls 2 objects   Results: NORMAL clock, 1-2 items recalled: COGNITIVE IMPAIRMENT LESS LIKELY    Mini-CogTM Copyright [employer]. Licensed by the author for use in [facility]; reprinted with permission (alexey@[facility]). All rights reserved.      Do you have sleep apnea, excessive snoring or daytime drowsiness?: no    Reviewed and updated as needed this visit by clinical staff  Tobacco  Allergies  Meds  Problems  Med Hx  Surg Hx  Fam Hx  Soc Hx          Reviewed and updated as needed this visit by Provider  Tobacco  Allergies  Meds  Problems  Med Hx  Surg Hx  Fam Hx        Social History     Tobacco Use     Smoking status: Never Smoker     Smokeless tobacco: Never Used   Substance Use Topics     Alcohol use: No     Comment: Quit 3/24/05     If you drink alcohol do you typically have >3 drinks per day or >7 drinks per week? No    Alcohol Use 7/8/2020   Prescreen: >3 drinks/day or >7 drinks/week? No         Current providers sharing in care for this patient include:   Patient Care Team:  Delmis Simons APRN CNP as PCP - General (Family Practice)  Delmis Simons APRN CNP as Referring Physician (Family Practice)  Delmis Simons APRN CNP as Assigned PCP  Abhilash Weston MD as MD (Urology)  Mahsa  Vandana, RN as Specialty Care Coordinator (Urology)  Alexandra Helton, PALMER as Lead Care Coordinator (Primary Care - CC)  Tunde Garcia as Specialty Care Coordinator (Plastic Surgery)  Bloch, Lauren Turner, [facility] as Pharmacist (Pharmacist)    The following health maintenance items are reviewed in Epic and correct as of today:  Health Maintenance   Topic Date Due     ZOSTER IMMUNIZATION (1 of 2) 03/09/2000     PNEUMOCOCCAL IMMUNIZATION 65+ LOW/MEDIUM RISK (2 of 2 - PPSV23) 05/21/2019     MENTAL HEALTH TX PLAN  08/08/2020     INFLUENZA VACCINE (1) 09/01/2020     COLORECTAL CANCER SCREENING  10/27/2020     DESTINY ASSESSMENT  06/08/2021     PHQ-9  06/08/2021     MEDICARE ANNUAL WELLNESS VISIT  07/08/2021     FALL RISK ASSESSMENT  07/08/2021     LIPID  07/08/2025     ADVANCE CARE PLANNING  07/08/2025     DTAP/TDAP/TD IMMUNIZATION (3 - Td) 05/21/2028     HEPATITIS C SCREENING  Completed     DEPRESSION ACTION PLAN  Completed     AORTIC ANEURYSM SCREENING (SYSTEM ASSIGNED)  Completed     IPV IMMUNIZATION  Aged Out     MENINGITIS IMMUNIZATION  Aged Out     Labs reviewed in EPIC  BP Readings from Last 3 Encounters:   07/08/20 118/66   03/02/20 128/70   01/30/20 114/65    Wt Readings from Last 3 Encounters:   07/08/20 130.9 kg (288 lb 9.6 oz)   04/29/20 140.2 kg (309 lb)   03/02/20 141.9 kg (312 lb 12.8 oz)                  Patient Active Problem List   Diagnosis     Gouty arthropathy     Mental or behavioral problem     Generalized osteoarthrosis, unspecified site     Disorder of bone and cartilage     Mixed hyperlipidemia     OA (osteoarthritis) of knee     HYPERLIPIDEMIA LDL GOAL <100     DDD (degenerative disc disease), lumbar     Alcoholism (H)     Advanced directives, counseling/discussion     Varicose veins of legs     Psoriasis     HTN, goal below 140/90     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     Family history of diabetes mellitus in first degree relative     Mitral valve disorder     Nonrheumatic aortic valve stenosis     Gender  dysphoria in adolescent and adult     Obesity, Class III, BMI 40-49.9 (morbid obesity) (H)     Health Care Home     Multiple gastric ulcers     Recurrent major depressive disorder, in partial remission (H)     Episodic tension-type headache, not intractable     Past Surgical History:   Procedure Laterality Date     COLONOSCOPY  2007     JOINT REPLACEMTN, KNEE RT/LT  2006    left     ORCHIECTOMY SCROTAL Bilateral 5/14/2019    Procedure: Bilatera Scrotal Orchiectomy;  Surgeon: Abhilash Weston MD;  Location:  OR       Social History     Tobacco Use     Smoking status: Never Smoker     Smokeless tobacco: Never Used   Substance Use Topics     Alcohol use: No     Comment: Quit 3/24/05     Family History   Problem Relation Age of Onset     Cancer Father         lung cancer heavy smoker         Current Outpatient Medications   Medication Sig Dispense Refill     acetaminophen (TYLENOL) 500 MG tablet Take 1,000 mg by mouth       Antiseptic Products, Misc. (UNI-SOLVE) PADS Externally apply 2 pads topically twice a week To remove adhesive from patches 50 each 3     atenolol (TENORMIN) 50 MG tablet Take 1 tablet (50 mg) by mouth daily 90 tablet 1     DULoxetine (CYMBALTA) 20 MG capsule Take 1 capsule (20 mg) by mouth 2 times daily 180 capsule 1     estradiol (VIVELLE-DOT) 0.1 MG/24HR bi-weekly patch Place 1 patch onto the skin twice a week 24 patch 1     latanoprost (XALATAN) 0.005 % ophthalmic solution 1 drop At Bedtime.       lisinopril (ZESTRIL) 5 MG tablet Take 1 tablet (5 mg) by mouth daily 90 tablet 1     lovastatin (MEVACOR) 20 MG tablet TAKE ONE TABLET BY MOUTH AT BEDTIME 30 tablet 11     Multiple Vitamin (DAILY-EVANGELISTA) TABS Take 1 tablet by mouth daily  11     order for DME Equipment being ordered: Sigvaris 232 Cotton Thigh High for Men 20-30 mmHg- TWO PAIR TAN COLOR 2 each 5     ORDER FOR DME Equipment being ordered:   BACK BRACE    Aureliant[employer] Back-n-Shape II Back Brace  Size XXXL with thermoplastic insert        1  "each 0     phentermine (ADIPEX-P) 15 MG capsule TAKE ONE CAPSULE BY MOUTH ONCE PER DAY IN THE MORNING 30 capsule 1     SM CALCIUM 600/VITAMIN D 600-400 MG-UNIT per tablet Take 1 tablet by mouth 2 times daily 60 tablet 3     timolol maleate (TIMOPTIC) 0.5 % ophthalmic solution Apply 1 drop to eye       topiramate (TOPAMAX) 25 MG tablet Take 3 tablets (75 mg) by mouth At Bedtime 270 tablet 1     Allergies   Allergen Reactions     Penicillin [Esters] Itching     he is not sure what kind of reaction he had     Heparin      he lives in a assisted living program and is not sure what type of allergies he really has. He quit drinking     Zosyn [Penicillins]      unsure of reaction, allergy is listed on his med sheet       Review of Systems  Constitutional, HEENT, cardiovascular, pulmonary, GI, , musculoskeletal, neuro, skin, endocrine and psych systems are negative, except as otherwise noted.    OBJECTIVE:   /66   Pulse 51   Temp 96.3  F (35.7  C) (Tympanic)   Resp 24   Wt 130.9 kg (288 lb 9.6 oz)   SpO2 98%   BMI 40.25 kg/m   Estimated body mass index is 40.25 kg/m  as calculated from the following:    Height as of 1/30/20: 1.803 m (5' 11\").    Weight as of this encounter: 130.9 kg (288 lb 9.6 oz).  Physical Exam  GENERAL APPEARANCE: healthy, alert and no distress  EYES: Eyes grossly normal to inspection, PERRL and conjunctivae and sclerae normal  HENT: ear canals and TM's normal, nose and mouth without ulcers or lesions, oropharynx clear and oral mucous membranes moist  NECK: no adenopathy, no asymmetry, masses, or scars and thyroid normal to palpation  RESP: lungs clear to auscultation - no rales, rhonchi or wheezes  BREAST: normal without masses, tenderness or nipple discharge and no palpable axillary masses or adenopathy  CV: regular rate and rhythm, normal S1 S2, no S3 or S4, no murmur, click or rub, no peripheral edema and peripheral pulses strong  ABDOMEN: soft, nontender, no hepatosplenomegaly, no " "masses and bowel sounds normal  MS: no musculoskeletal defects are noted and gait is age appropriate without ataxia  SKIN: no suspicious lesions or rashes  NEURO: Normal strength and tone, sensory exam grossly normal, mentation intact and speech normal  PSYCH: mentation appears normal and affect normal/bright    Diagnostic Test Results:  Labs reviewed in Epic  pending    ASSESSMENT / PLAN:   1. Medicare annual wellness visit, subsequent      2. HTN, goal below 140/90  Chronic, stable. No changes. Labs recently completed.   - atenolol (TENORMIN) 50 MG tablet; Take 1 tablet (50 mg) by mouth daily  Dispense: 90 tablet; Refill: 1  - lisinopril (ZESTRIL) 5 MG tablet; Take 1 tablet (5 mg) by mouth daily  Dispense: 90 tablet; Refill: 1    3. 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  Chronic, no changes.   - DULoxetine (CYMBALTA) 20 MG capsule; Take 1 capsule (20 mg) by mouth 2 times daily  Dispense: 180 capsule; Refill: 1  - topiramate (TOPAMAX) 25 MG tablet; Take 3 tablets (75 mg) by mouth At Bedtime  Dispense: 270 tablet; Refill: 1    5. Obesity, Class III, BMI 40-49.9 (morbid obesity) (H)  Chronic, stable. Continue Phentermine. Follow-up in 2 months with primary care provider.   - phentermine (ADIPEX-P) 15 MG capsule; TAKE ONE CAPSULE BY MOUTH ONCE PER DAY IN THE MORNING  Dispense: 30 capsule; Refill: 1    6. Episodic tension-type headache, not intractable  Chronic, stable. No changes.   - topiramate (TOPAMAX) 25 MG tablet; Take 3 tablets (75 mg) by mouth At Bedtime  Dispense: 270 tablet; Refill: 1    7. Hyperlipidemia LDL goal <100  Chronic, stable. Recheck lipids.   - Lipid panel reflex to direct LDL Fasting    COUNSELING:  Reviewed preventive health counseling, as reflected in patient instructions    Estimated body mass index is 40.25 kg/m  as calculated from the following:    Height as of 1/30/20: 1.803 m (5' 11\").    Weight as of this encounter: 130.9 kg (288 lb 9.6 oz).    Weight management plan: Current weight " managment plan and Phentermine     reports that she has never smoked. She has never used smokeless tobacco.      Appropriate preventive services were discussed with this patient, including applicable screening as appropriate for cardiovascular disease, diabetes, osteopenia/osteoporosis, and glaucoma.  As appropriate for age/gender, discussed screening for colorectal cancer, prostate cancer, breast cancer, and cervical cancer. Checklist reviewing preventive services available has been given to the patient.    Reviewed patients plan of care and provided an AVS. The Basic Care Plan (routine screening as documented in Health Maintenance) for Laila meets the Care Plan requirement. This Care Plan has been established and reviewed with the Patient.    Counseling Resources:  ATP IV Guidelines  Pooled Cohorts Equation Calculator  Breast Cancer Risk Calculator  FRAX Risk Assessment  ICSI Preventive Guidelines  Dietary Guidelines for Americans, 2010  USDA's MyPlate  ASA Prophylaxis  Lung CA Screening    JERRICA [facility]    Identified Health Risks:

## 2020-07-13 ENCOUNTER — HOSPITAL ENCOUNTER (OUTPATIENT)
Dept: CARDIOLOGY | Facility: CLINIC | Age: 70
Discharge: HOME | End: 2020-07-13
Attending: INTERNAL MEDICINE | Admitting: INTERNAL MEDICINE
Payer: COMMERCIAL

## 2020-07-13 DIAGNOSIS — I35.0 AORTIC VALVE STENOSIS, UNSPECIFIED ETIOLOGY: ICD-10-CM

## 2020-07-13 PROCEDURE — 93306 TTE W/DOPPLER COMPLETE: CPT | Mod: 26 | Performed by: INTERNAL MEDICINE

## 2020-07-13 PROCEDURE — 93306 TTE W/DOPPLER COMPLETE: CPT

## 2020-07-14 ENCOUNTER — TELEPHONE (OUTPATIENT)
Dept: FAMILY MEDICINE | Facility: CLINIC | Age: 70
End: 2020-07-14

## 2020-07-14 ENCOUNTER — VIRTUAL VISIT (OUTPATIENT)
Dept: OTHER | Facility: OUTPATIENT CENTER | Age: 70
End: 2020-07-14
Payer: COMMERCIAL

## 2020-07-14 DIAGNOSIS — F64.0 GENDER DYSPHORIA IN ADOLESCENT AND ADULT: Primary | ICD-10-CM

## 2020-07-14 NOTE — RESULT ENCOUNTER NOTE
Results noted: EF 60-65%; borderline LVH; ascending aorta mildly dilated at 4.4 cm; mod severe to severe aortic stenosis with mean gradient of 42.2 mmHg and ARISTEO 1.1 cm2. To be discussed at [facility] with Dr Ibrahim on 7/22/20

## 2020-07-14 NOTE — TELEPHONE ENCOUNTER
Reason for Call:  Other prescription    Detailed comments: Akiko,  at [facility] is calling stating that patient had appt with PAUL Jameson on 07/08 and was given a refill of Cymbalta.  Patient told Akiko that she is not sure why she was prescribed them and that she is not going to take them.  Reviewing her chart, she was prescribed them on 03/08 by ARACELI Simons (patient PCP).  Patient did not start taking them at that time and has not been talking them.  Please review telephone note from 03/06, should patient be taking Cymbalta and Topiramate or just the Topiramate?      Phone Number Patient can be reached at: Akiko- [phone]    Best Time: Any    Can we leave a detailed message on this number? YES    Call taken on 7/14/2020 at 11:36 AM by Blank Goncalves

## 2020-07-14 NOTE — TELEPHONE ENCOUNTER
Will forward to Francia Jameson DNP to address with the patient as she just saw her for her medicare annual wellness visit.  Thanks Delmis Simons [facility]

## 2020-07-15 NOTE — TELEPHONE ENCOUNTER
Please notify group home that Delmis had put her on Cymbalta for the depression, that she was struggling with at the time she saw Delmis. I would advise that she starts this. Also, she is on the Topamax to help weight loss, which she is also struggling with. I would also recommend she starts this.     Please let me know if there are any further questions,  JERRICA Loomis CNP

## 2020-07-19 NOTE — PROGRESS NOTES
"[facility] -  Case Progress Note  Client Name: Laila Perez  YOB: 1950  MRN:  [id number]  Treating Provider: Estelle Gomez LP  Type of Session: Individual  Present in Session: client   Number of Minutes:  53    Start time: 2:00 pm  End time: 2:53 pm    Client does not have Internet or technology to do video sessions.    The following was reviewed with the patient.   \"We have found that certain health care needs can be provided without the need for a face to face visit.  This service lets us provide the care you need with a phone conversation. I will have full access to your [facility] medical record during this entire phone call.   I will be taking notes for your medical record. Since this is like an office visit, we will bill your insurance company for this service. There are potential benefits and risks of telephone visits (e.g. limits to patient confidentiality) that differ from in-person visits.?  Confidentiality still applies for telephone services, and nobody will record the visit.  It is important to be in a quiet, private space that is free of distractions (including cell phone or other devices) during the visit.??If during the course of the call I believe a telephone visit is not appropriate, you will not be charged for this service\"    Consent has been obtained for this service by care team member: Yes      Treatment plan: 6/8/2020    Health Maintenance Summary - Mental Health Treatment Plan       Status Date      MENTAL HEALTH TX PLAN Next Due 8/8/2020      Done 6/8/2020      Done 2/17/2020 [facility] MENTAL HEALTH TX PLAN SCAN     Done 8/28/2019      Done 6/10/2019      Done 10/30/2017      Patient has more history with this topic...            Date of Service: 7/14/20    Current Symptoms/Status:  Distress about gender and secondary sex characteristics, she shared feeling angry and frustrated about continuing to have a penis, distress and frustration of having to " wait for surgery, fear that surgery will not happen, some distress about increased isolation with the coronavirus.    Progress Toward Treatment Goals:   Client reported progress with losing 21 pounds which gets her closer to her goal weight for having surgery..    Intervention: Modality and Description:  Provided support and feedback. Used CBT to process gender dysphoria concerns.  Client shared making progress with losing weight.  She shared she lost 21 pounds and feels very good about making this progress.  She shared that it gets her closer to having surgery to have her penis removed.  She believes that she needs to get down to 250 pounds in order to qualify for having the surgery.  She shared she has about 30 more pounds to go and is feeling good about her progress.  She shared that she will continue working on weight loss.  She is also trying to be active as well.  She processed continued isolation with COVID-19.  She has had contact with her children by phone/text.  She shared she is keeping busy with her garden and engaging in other activities at home.  She continued to talk about her distress regarding genitalia.  Validated how long client has been distressed about her gender and anatomy.  We talked about the progress that she has made with getting hormones and some of the surgeries but how important will be for her to have her penis removed.    Interactive Complexity:  There are four specific communication difficulties that complicate the work of the primary psychiatric procedure.  Interactive complexity (+[id number]) may be reported when at least one of these difficulties is present.    Communication difficulties present during current the psychiatric procedure include:  none      Response to Intervention:  Client reported validation.    Assignment:  Follow surgeon guidelines, maintain balanced approach to weight loss.      Diagnosis:  Encounter Diagnosis   Name Primary?     Gender dysphoria in adolescent and  adult Yes       Plan / Need for Future Services:  Return for therapy in 4 weeks.      Estelle Gomez PsyD, LP

## 2020-07-22 ENCOUNTER — VIRTUAL VISIT (OUTPATIENT)
Dept: CARDIOLOGY | Facility: CLINIC | Age: 70
End: 2020-07-22
Payer: COMMERCIAL

## 2020-07-22 VITALS
DIASTOLIC BLOOD PRESSURE: 89 MMHG | WEIGHT: 284 LBS | SYSTOLIC BLOOD PRESSURE: 140 MMHG | BODY MASS INDEX: 39.61 KG/M2 | HEART RATE: 65 BPM

## 2020-07-22 DIAGNOSIS — I71.20 THORACIC AORTIC ANEURYSM WITHOUT RUPTURE (H): Primary | ICD-10-CM

## 2020-07-22 DIAGNOSIS — I35.0 AORTIC VALVE STENOSIS, UNSPECIFIED ETIOLOGY: ICD-10-CM

## 2020-07-22 PROCEDURE — 99213 OFFICE O/P EST LOW 20 MIN: CPT | Mod: 95 | Performed by: INTERNAL MEDICINE

## 2020-07-22 NOTE — LETTER
7/22/2020    Delmis Simons, APRN CNP  [address] [facility] [address]    RE: Laila Perez       Dear Colleague,    I had the pleasure of seeing Laila Perez in the [facility].    See note.    Laila Perez is a 70 year old adult who is being evaluated via a billable telephone visit.      Ms. Laila Perez is now 70 years old and follows for a history of aortic stenosis.     She has remained healthy during the COVID shut-down.  She is caring for her vegetable garden and is outside on a regular basis.  She gives away the peppers and she is also growing corn in a bucket.  She rides a bicycle as well but has not started riding this year yet due to the insects.  She denies shortness of breath or decreased exertional tolerance.     She has been planning gender reassignment surgery for some time and can complete her surgery when she reaches 250 pounds.  A  murmur has previously been identified and aortic stenosis diagnosed by echocardiography.  She underwent repeat echocardiography in preparation for this visit and the mean gradient has risen to 42 mmHg (peak 70 mmHg) with a calculated valve area of 1.1 cm .  The prior echocardiogram demonstrated a mean gradient has increased to 36 mmHg and the calculated aortic valve area estimated at 1.3-1.4 cm2.  She has normal left ventricular systolic performance.  She has a trileaflet aortic valve, but a mildly dilated aortic root with the sinuses of Valsalva and the ascending aorta being approximately 4.2 cm.       She has not had chest, neck, arm or back discomfort.  In preparation for planned surgery in 2018, a stress echocardiogram was done.  There was no evidence of ischemia and she was able to exercise 6 minutes.  The blood pressure response to exercise was normal.      She notes that she has been told she needs to lose weight and so she has been doing that.  She is exercising on a regular basis, trying to climb  stairs and doing outdoor activities.  Her weight previously was 305 pounds and today to 284 pounds.  She is has undergone partial gender reassignment surgery.      She is on lipid-lowering therapy and her cholesterol today was 38 mg/dL with an HDL of 52 mg/dL.  Her blood pressure was not taken today and the one  reported high at 148/89 mmHg was taken sometime in the past.  That was taken at least 3 months ago. but the blood pressure on the echocardiogram earlier this month was 107/71 mmHg.       Exam:  The blood pressure was 107/71 mmHg.    Assessment/Plan:  At this point, Ms. Perez is doing very well.  She notes that her gender reassignment surgery will include removal of testes and that is considered to be a relatively low cardiac risk surgery.  She will then be able to reduce her hormone supplementation which from a cardiac standpoint should be beneficial.  She is asymptomatic with regard to her aortic stenosis and has moderate aortic stenosis.  She should be very appropriate to proceed with the planned surgery.      With regard to the aortic stenosis, I have recommended that she return in next months with a repeat echocardiogram.      Given that she has very mild aortic root dilation, chest MRA is recommended.    Phone call duration: 21 minutes    Thank you for allowing me to participate in the care of your patient.    Sincerely,     Tia Ibrahim MD     [facility]

## 2020-07-22 NOTE — PROGRESS NOTES
"Laila Perez is a 70 year old adult who is being evaluated via a billable telephone visit.      The patient has been notified of following:     \"This telephone visit will be conducted via a call between you and your physician/provider. We have found that certain health care needs can be provided without the need for a physical exam.  This service lets us provide the care you need with a short phone conversation.  If a prescription is necessary we can send it directly to your pharmacy.  If lab work is needed we can place an order for that and you can then stop by our lab to have the test done at a later time.    Telephone visits are billed at different rates depending on your insurance coverage. During this emergency period, for some insurers they may be billed the same as an in-person visit.  Please reach out to your insurance provider with any questions.    If during the course of the call the physician/provider feels a telephone visit is not appropriate, you will not be charged for this service.\"    Patient has given verbal consent for Telephone visit?  Yes    What phone number would you like to be contacted at? [phone]    How would you like to obtain your AVS? Mail a copy    Ms. Laila Perez is now 70 years old and follows for a history of aortic stenosis.     She has remained healthy during the COVID shut-down.  She is caring for her vegetable garden and is outside on a regular basis.  She gives away the peppers and she is also growing corn in a bucket.  She rides a bicycle as well but has not started riding this year yet due to the insects.  She denies shortness of breath or decreased exertional tolerance.     She has been planning gender reassignment surgery for some time and can complete her surgery when she reaches 250 pounds.  A  murmur has previously been identified and aortic stenosis diagnosed by echocardiography.  She underwent repeat echocardiography in preparation for this visit and the " mean gradient has risen to 42 mmHg (peak 70 mmHg) with a calculated valve area of 1.1 cm .  The prior echocardiogram demonstrated a mean gradient has increased to 36 mmHg and the calculated aortic valve area estimated at 1.3-1.4 cm2.  She has normal left ventricular systolic performance.  She has a trileaflet aortic valve, but a mildly dilated aortic root with the sinuses of Valsalva and the ascending aorta being approximately 4.2 cm.       She has not had chest, neck, arm or back discomfort.  In preparation for planned surgery in 2018, a stress echocardiogram was done.  There was no evidence of ischemia and she was able to exercise 6 minutes.  The blood pressure response to exercise was normal.      She notes that she has been told she needs to lose weight and so she has been doing that.  She is exercising on a regular basis, trying to climb stairs and doing outdoor activities.  Her weight previously was 305 pounds and today to 284 pounds.  She is has undergone partial gender reassignment surgery.      She is on lipid-lowering therapy and her cholesterol today was 38 mg/dL with an HDL of 52 mg/dL.  Her blood pressure was not taken today and the one  reported high at 148/89 mmHg was taken sometime in the past.  That was taken at least 3 months ago. but the blood pressure on the echocardiogram earlier this month was 107/71 mmHg.       Exam:  The blood pressure was 107/71 mmHg.    Assessment/Plan:  At this point, Ms. Perez is doing very well.  She notes that her gender reassignment surgery will include removal of testes and that is considered to be a relatively low cardiac risk surgery.  She will then be able to reduce her hormone supplementation which from a cardiac standpoint should be beneficial.  She is asymptomatic with regard to her aortic stenosis and has moderate aortic stenosis.  She should be very appropriate to proceed with the planned surgery.      With regard to the aortic stenosis, I have recommended that  she return in next months with a repeat echocardiogram.      Given that she has very mild aortic root dilation, chest MRA is recommended.    Phone call duration: 21 minutes    Tia Ibrahim MD

## 2020-08-11 ENCOUNTER — VIRTUAL VISIT (OUTPATIENT)
Dept: OTHER | Facility: OUTPATIENT CENTER | Age: 70
End: 2020-08-11
Payer: COMMERCIAL

## 2020-08-11 DIAGNOSIS — F33.41 RECURRENT MAJOR DEPRESSIVE DISORDER, IN PARTIAL REMISSION (H): ICD-10-CM

## 2020-08-11 DIAGNOSIS — F64.0 GENDER DYSPHORIA IN ADOLESCENT AND ADULT: Primary | ICD-10-CM

## 2020-08-12 NOTE — PROGRESS NOTES
"[facility] -  Case Progress Note  Client Name: Laila Perez  YOB: 1950  MRN:  [id number]  Treating Provider: Estelle Gomez LP  Type of Session: Individual  Present in Session: client   Number of Minutes:  40    Start time: 2:00 pm  End time: 2:40 pm    Client does not have Internet or technology to do video sessions.    The following was reviewed with the patient.   \"We have found that certain health care needs can be provided without the need for a face to face visit.  This service lets us provide the care you need with a phone conversation. I will have full access to your [facility] medical record during this entire phone call.   I will be taking notes for your medical record. Since this is like an office visit, we will bill your insurance company for this service. There are potential benefits and risks of telephone visits (e.g. limits to patient confidentiality) that differ from in-person visits.?  Confidentiality still applies for telephone services, and nobody will record the visit.  It is important to be in a quiet, private space that is free of distractions (including cell phone or other devices) during the visit.??If during the course of the call I believe a telephone visit is not appropriate, you will not be charged for this service\"    Consent has been obtained for this service by care team member: Yes      Treatment plan: 6/8/2020    Health Maintenance Summary - Mental Health Treatment Plan       Status Date      MENTAL HEALTH TX PLAN Overdue 8/8/2020      Done 6/8/2020      Done 2/17/2020 [facility] MENTAL HEALTH TX PLAN SCAN     Done 8/28/2019      Done 6/10/2019      Done 10/30/2017      Patient has more history with this topic...            Date of Service: 8/11/20    Current Symptoms/Status:  Distress about gender and secondary sex characteristics, she shared feeling angry and frustrated about continuing to have a penis, distress and frustration of having to " wait for surgery, fear that surgery will not happen, some distress about increased isolation with the coronavirus.    Progress Toward Treatment Goals:   Client reported progress with continuing to lose weight and get closer to surgery goal.    Intervention: Modality and Description:  Provided support and feedback. Used CBT to process gender dysphoria concerns.  Client shared she is continuing to make progress with her weight loss.  She shared when she gets down to 270 she is probably going to contact the surgeon for an appointment.  She shared that she has been busy with her garden and has done some bike riding.  She shared that she fell off her bike but was not injured seriously.  She continues to spend time with friends somewhat.  She shared her son came to visit with her granddaughters.  She helped her granddaughter by about them feels really good about being able to contribute to this.  She shared her mood has been relatively stable.  We processed the process for contacting the surgeon.  Making progress with losing weight.  She shared she lost 21 pounds and feels very good about making this progress.  She shared that it gets her closer to having surgery to have her penis removed.      Interactive Complexity:  There are four specific communication difficulties that complicate the work of the primary psychiatric procedure.  Interactive complexity (+38099) may be reported when at least one of these difficulties is present.    Communication difficulties present during current the psychiatric procedure include:  none      Response to Intervention:  Client reported validation.    Assignment:  Follow surgeon guidelines, maintain balanced approach to weight loss.      Diagnosis:  Encounter Diagnoses   Name Primary?     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 Yes     Recurrent major depressive disorder, in partial remission (H)        Plan / Need for Future Services:  Return for therapy in 4 weeks.      Estelle Clemente  Tiffanie Gomez, LP

## 2020-08-14 ENCOUNTER — VIRTUAL VISIT (OUTPATIENT)
Dept: ENDOCRINOLOGY | Facility: CLINIC | Age: 70
End: 2020-08-14
Payer: COMMERCIAL

## 2020-08-14 ENCOUNTER — VIRTUAL VISIT (OUTPATIENT)
Dept: SURGERY | Facility: CLINIC | Age: 70
End: 2020-08-14
Payer: COMMERCIAL

## 2020-08-14 VITALS — WEIGHT: 282 LBS | BODY MASS INDEX: 39.48 KG/M2 | HEIGHT: 71 IN

## 2020-08-14 DIAGNOSIS — F64.0 GENDER DYSPHORIA IN ADOLESCENT AND ADULT: ICD-10-CM

## 2020-08-14 DIAGNOSIS — Z71.3 NUTRITIONAL COUNSELING: Primary | ICD-10-CM

## 2020-08-14 DIAGNOSIS — E66.01 OBESITY, CLASS III, BMI 40-49.9 (MORBID OBESITY) (H): Primary | ICD-10-CM

## 2020-08-14 DIAGNOSIS — E66.9 OBESITY: ICD-10-CM

## 2020-08-14 DIAGNOSIS — G44.219 EPISODIC TENSION-TYPE HEADACHE, NOT INTRACTABLE: ICD-10-CM

## 2020-08-14 RX ORDER — TOPIRAMATE 25 MG/1
75 TABLET, FILM COATED ORAL AT BEDTIME
Qty: 270 TABLET | Refills: 1 | Status: SHIPPED | OUTPATIENT
Start: 2020-08-14 | End: 2020-11-30

## 2020-08-14 RX ORDER — PHENTERMINE HYDROCHLORIDE 15 MG/1
CAPSULE ORAL
Qty: 30 CAPSULE | Refills: 1 | Status: SHIPPED | OUTPATIENT
Start: 2020-08-14 | End: 2020-11-30

## 2020-08-14 ASSESSMENT — MIFFLIN-ST. JEOR: SCORE: 2061.27

## 2020-08-14 ASSESSMENT — PAIN SCALES - GENERAL: PAINLEVEL: NO PAIN (0)

## 2020-08-14 NOTE — PATIENT INSTRUCTIONS
Try to not add in high-sugar foods like pop tarts.  Add fruit instead.     You can weigh yourself every day or every week, it usually helps with weight loss.     Send a few home blood pressure numbers    Keep taking the phentermine and topiramate like you are, no changes.

## 2020-08-14 NOTE — LETTER
"2020       RE: Laila Perez  [id number] [address] [address]     Dear Colleague,    Thank you for referring your patient, Laila Perez, to the [facility] MEDICAL WEIGHT MANAGEMENT at [facility]. Please see a copy of my visit note below.    Return Medical Weight Management Note  Laila Perez  MRN:  [id number]  :  1950  FRANCIS:  2020    Dear Delmis Simons,    I had the pleasure of seeing your patient Laila Perez for follow-up consultation.  She is a 70 year old adult who I am continuing to see for treatment of obesity related to:       2019   I have the following health issues associated with obesity: High Blood Pressure   I have the following symptoms associated with obesity: Knee Pain       INTERVAL HISTORY:  Doing very well    Problem   Obesity, Class III, Bmi 40-49.9 (Morbid Obesity) (H)    Will continue phentermine 15mg daily  Will start topiramate, 25mg and increasing to 75mg daily  Aug 2020: has been very physically active, weight down 20 pounds from April, now 282. Says have low energy during the day, so wants to eat more sugar.   Sleep: in bed at 8pm, falls asleep by 8:30pm, wakes at 4am-5pm. Naps from 3pm-3:30pm. Wakes for bathroom 3-4 times, falls asleep right after. Not sure if she snores.  Phentermine: takes 15mg every other day, and topiramate 75mg every evening. GFR was 60 in 3/2020. Has no history of kidney stones.   - will continue phentermine 15mg and topiramate 75mg daily  - home BP measures have been normal per patient  - goal for gender surgery is 250pounds         CURRENT WEIGHT:   282 lbs 0 oz    Wt Readings from Last 4 Encounters:   20 127.9 kg (282 lb)   20 128.8 kg (284 lb)   20 130.9 kg (288 lb 9.6 oz)   20 140.2 kg (309 lb)     Height:  5' 11\"  Body Mass Index:  Body mass index is 39.33 kg/m .  Vitals:  B/P: Data Unavailable, P: Data Unavailable, R: Data Unavailable "     Diet Recall Review with Patient 12/16/2019   Do you typically eat lunch? Yes   If you do eat lunch, what types of food do you typically eat?  sandwich chipss   Do you typically eat supper? Yes   If you do eat supper, what types of food do you typically eat? meal that IMake   Do you typically eat snacks? No   If you do snack, what types of food do you typically eat? cookie   How many glasses of juice do you drink in a typical day? 0   How many of glasses of milk do you drink in a typical day? 2   If you do drink milk, what type? Whole   How many 8oz glasses of sugar containing drinks such as Carlos-Aid/sweet tea do you drink in a day? 0   How many cans/bottles of sugar pop/soda/tea/sports drinks do you drink in a day? 4   How many cans/bottles of diet pop/soda/tea or sports drink do you drink in a day? 0   How often do you have a drink of alcohol? Never       MEDICATIONS:   Current Outpatient Medications   Medication     acetaminophen (TYLENOL) 500 MG tablet     Antiseptic Products, Misc. (UNI-SOLVE) PADS     atenolol (TENORMIN) 50 MG tablet     DULoxetine (CYMBALTA) 20 MG capsule     estradiol (VIVELLE-DOT) 0.1 MG/24HR bi-weekly patch     latanoprost (XALATAN) 0.005 % ophthalmic solution     lisinopril (ZESTRIL) 5 MG tablet     lovastatin (MEVACOR) 20 MG tablet     Multiple Vitamin (DAILY-EVANGELISTA) TABS     order for DME     ORDER FOR DME     phentermine (ADIPEX-P) 15 MG capsule     SM CALCIUM 600/VITAMIN D 600-400 MG-UNIT per tablet     timolol maleate (TIMOPTIC) 0.5 % ophthalmic solution     topiramate (TOPAMAX) 25 MG tablet     No current facility-administered medications for this visit.        Weight Loss Medication History Reviewed With Patient 5/22/2020   Which weight loss medications are you currently taking on a regular basis?  Phentermine, Topamax (topiramate)   Are you having any side effects from the weight loss medication that we have prescribed you? No       LABS:  Hemoglobin A1C   Date Value Ref Range  Status   04/26/2016 4.8 4.3 - 6.0 % Final   04/26/2013 5.2 4.3 - 6.0 % Final     Cholesterol   Date Value Ref Range Status   07/08/2020 119 <200 mg/dL Final     TSH   Date Value Ref Range Status   05/02/2017 1.44 0.40 - 4.00 mU/L Final     Creatinine   Date Value Ref Range Status   03/02/2020 1.22 0.66 - 1.25 mg/dL Final     ALT   Date Value Ref Range Status   04/17/2019 19 0 - 70 U/L Final       ASSESSMENT:  Mr. Perez is a 70 year old adult with history of Class 3 obesity with current body mass index is 39.33 kg/m . and with current health consequences who presents for weight management follow-up consultation. Overall Laila Perez is doing very well .     The following diagnoses are relevant to Laila Perez's history of obesity:     PLAN:    Problem   Obesity, Class III, Bmi 40-49.9 (Morbid Obesity) (H)    Will continue phentermine 15mg daily  Will start topiramate, 25mg and increasing to 75mg daily  Aug 2020: has been very physically active, weight down 20 pounds from April, now 282. Says have low energy during the day, so wants to eat more sugar.   Sleep: in bed at 8pm, falls asleep by 8:30pm, wakes at 4am-5pm. Naps from 3pm-3:30pm. Wakes for bathroom 3-4 times, falls asleep right after. Not sure if she snores.  Phentermine: takes 15mg every other day, and topiramate 75mg every evening. GFR was 60 in 3/2020. Has no history of kidney stones.   - will continue phentermine 15mg and topiramate 75mg daily  - home BP measures have been normal per patient  - goal for gender surgery is 250pounds        No problem-specific Assessment & Plan notes found for this encounter.      No orders of the defined types were placed in this encounter.       With motivational interviewing, Laila took part in making the following plan:  Patient Instructions   Try to not add in high-sugar foods like pop tarts.  Add fruit instead.     You can weigh yourself every day or every week, it usually helps with weight loss.     Send  "a few home blood pressure numbers    Keep taking the phentermine and topiramate like you are, no changes.       FOLLOW-UP:    4 weeks.    Time: ** min spent on evaluation, management, counseling, education, & motivational interviewing with greater than 50 % of the total time was spent on counseling and coordinating care    Thank you for including me in the care of your patient.  Please do not hesitate to call with questions or concerns.    Sincerely,    Alexsandra Womack MD MPH  Diplomate, American Board of Obesity Medicine, American Board of Internal Medicine, American Board of Pediatrics    Departments of Internal Medicine and Pediatrics  [facility]        [unfilled]     Laila Alexandra Perez is a 70 year old adult who is being evaluated via a billable telephone visit.      The patient has been notified of following:     \"This telephone visit will be conducted via a call between you and your physician/provider. We have found that certain health care needs can be provided without the need for a physical exam.  This service lets us provide the care you need with a short phone conversation.  If a prescription is necessary we can send it directly to your pharmacy.  If lab work is needed we can place an order for that and you can then stop by our lab to have the test done at a later time.    Telephone visits are billed at different rates depending on your insurance coverage. During this emergency period, for some insurers they may be billed the same as an in-person visit.  Please reach out to your insurance provider with any questions.    If during the course of the call the physician/provider feels a telephone visit is not appropriate, you will not be charged for this service.\"    Patient has given verbal consent for Telephone visit?  Yes    What phone number would you like to be contacted at? [phone]    How would you like to obtain your AVS? Baitianshihart    Phone call duration: 15 " minutes    Alexsandra Womack MD  During this virtual visit the patient is located in [place], patient verifies this as the location during the entirety of this visit.

## 2020-08-14 NOTE — LETTER
"8/14/2020       RE: Laila Perez  [address] [facility] [address]     Dear Colleague,    Thank you for referring your patient, Laila Perez, to the [facility] SURGICAL WEIGHT MANAGEMENT at [facility]. Please see a copy of my visit note below.    Laila Perez is a 70 year old adult who is being evaluated via a billable telephone visit.        Phone call duration: 20 minutes        Weight Management Nutrition Consultation    Laila Perez is a 69 year old adult presents today for return weight management nutrition consultation.  Patient referred by Maki Lund NP on December 16, 2019, and Dr. Womack.    Patient with Co-morbidities of obesity including:  Type II DM no  Renal Failure no  Sleep apnea no  Hypertension yes   Dyslipidemia no  Joint pain no  Back pain no  GERD no     Anthropometrics:  Estimated body mass index is 44.66 kg/m  as calculated from the following:    Height as of an earlier encounter on 12/16/19: 1.803 m (5' 11\").    Weight as of an earlier encounter on 12/16/19: 145.2 kg (320 lb 3.2 oz).    Current weight per pt report: 282 lbs with BMI of 39.33 kg/m2(-27 lbs in the past 3 months, - 38 lbs (-12%) from initial)  - Checking her weight every 2 weeks      Pt states her goal is to be <250 lbs.    Medications for Weight Loss:  Topamax and phentermine     NUTRITION HISTORY  See MD note for details.    Info from initial consult:  - Likes to cook. Buys her own groceries.  - Works 3 days out of 2 weeks  - Told to clean plate when young, now working on putting extra food away right away.  - Eats softer foods, missing teeth and doesn't wear dentures.   - Likes peas, carrots, sour kraut. Uses a lot of canned fruit.    - Pt accompanied by residential staff member, Darby, today.     Since working with Laila, she has focused on consuming moise protein sources, increased non-starchy vegetable intake and has significantly reduced sugary " "beverage intake. She typically eats 2 meals per day, skipping lunch. She states that this is because she does not feel hungry for lunch. Occasionally she is snacking on sweets at night.     Recent Diet Recall:  Breakfast: Poptart; egg and banana  Lunch: skipping sometime  Dinner: 4 pm - baked chicken/pork chops with mixed vegetables   Snacks: occ cookies (instead of buying a box of cookies, will buy just cookie dough and bake one cookie at a time)  Beverages: Water, grape juice box (8 oz), black coffee (3 cups)   Dining Out: Mashed potato gravy and ham and bread     Progress Towards Previous Goals:  1) Eat 3 meals per day.  - Met at times  - Use the 9\" Plate method to build meal (1/2 plate non-starchy vegetables/fruit, 1/4 plate lean protein, 1/4 plate whole grain starch - no more than 1/2 cup carb/meal). - Improving   Meal ideas:   - Breakfast: 2 hard boiled eggs and 1 banana   - Lunch: high-protein soup (homemade chili, extra chicken noodle soup, lentil and lean ham soup)   - Dinner: grilled pork chop with roasted veggies (carrots and peppers); Low calorie chicken pot pie  2) Avoid snacking between meals - Improving, having snacks occ and some high sugar snacks   3) Limit milk to 8 oz per day. Switch to 1% milk. - Met, continue  4) Limit OJ to 8 oz per day. - Met, continues  5) Consume at least 48 oz (16 oz glasses/bottles x 3) of water per day. - Improving, drinking 2 bottles per day  6) Use frozen vegetables instead of canned vegetables B - met, continues  7) Limit fast-food to once per week - twice per week with staff  8) Take a walk 2-3 times per week for at least 30 mins. - Will walk a couple miles 3 times per week.       Physical Activity:  [facility] pool exercise for 30 mins one time per week. Relies on residential staff to take her to gym. Would like to go 2x/week, and thinks that can be arranged. Reduced regular exercise now with gym shut down. Pt plans to start taking more walks, doing squats in home, " "cleaning to compensate.     MALNUTRITION  % Intake: No decreased intake noted  % Weight Loss: None noted  Subcutaneous Fat Loss: None observed  Muscle Loss: None observed  Fluid Accumulation/Edema: None noted  Malnutrition Diagnosis: Patient does not meet two of the established criteria necessary for diagnosing malnutrition    Nutrition Prescription  Recommended energy/nutrient modification.  Volumetrics/Plate Method    Nutrition Diagnosis  Obesity r/t long history of self-monitoring deficit and excessive energy intake aeb BMI >30. -improving    Nutrition Intervention  Materials/education provided:   - Reviewed previous goals.   - Reviewed the Plate Method of structuring meals.  - Encouraged increased daily fluid intake with goal to consume 64 oz/day while limiting caloric beverage intake.  - Discussed healthy sweet options   - Provided encouragement and support. Praised pt on the positive changes to diet/lifestyle she has made.   - Mailed goals and handouts to pt.      Patient Understanding: good  Expected Compliance: good  Follow-Up Plans: Food log     Nutrition Goals  1) Eat 3 meals per day. Try not to skip meals. This may help with improving your energy.  2) Use the 9\" Plate method to build meal (1/2 plate non-starchy vegetables/fruit, 1/4 plate lean protein, 1/4 plate whole grain starch - no more than 1/2 cup carb/meal).   Meal ideas:   - Breakfast: 2 hard boiled eggs and 1 banana   - Lunch: high-protein soup (homemade chili, extra chicken noodle soup, lentil and lean ham soup); low-fat cottage cheese and fruit;    - Dinner: grilled pork chop with roasted veggies (carrots and peppers); Low calorie chicken pot pie  3) Avoid snacking between meals. Try to drink water first before snacking. If you have a sweet craving try - blended frozen banana, lite greek yogurt, fresh fruit  4) Eliminate juice intake. Drink calorie-free beverages only.  5) Consume at least 48 oz (3 water bottles per day) of water per day.  6) " Use frozen vegetables instead of canned vegetables  7) Take a walk 3 times per week for at least 30 mins.     Follow-Up:  3 months    Kathy Esteves RD, LD

## 2020-08-14 NOTE — PROGRESS NOTES
"Laila Perez is a 70 year old adult who is being evaluated via a billable telephone visit.      The patient has been notified of following:     \"This telephone visit will be conducted via a call between you and your physician/provider. We have found that certain health care needs can be provided without the need for a physical exam.  This service lets us provide the care you need with a short phone conversation.  If a prescription is necessary we can send it directly to your pharmacy.  If lab work is needed we can place an order for that and you can then stop by our lab to have the test done at a later time.    Telephone visits are billed at different rates depending on your insurance coverage. During this emergency period, for some insurers they may be billed the same as an in-person visit.  Please reach out to your insurance provider with any questions.    If during the course of the call the physician/provider feels a telephone visit is not appropriate, you will not be charged for this service.\"    Patient has given verbal consent for Telephone visit?  Yes    What phone number would you like to be contacted at? [phone]    How would you like to obtain your AVS? Mail a copy    Phone call duration: 20 minutes        Weight Management Nutrition Consultation    Laila Perez is a 69 year old adult presents today for return weight management nutrition consultation.  Patient referred by Maki Lund NP on December 16, 2019, and Dr. Womack.    Patient with Co-morbidities of obesity including:  Type II DM no  Renal Failure no  Sleep apnea no  Hypertension yes   Dyslipidemia no  Joint pain no  Back pain no  GERD no     Anthropometrics:  Estimated body mass index is 44.66 kg/m  as calculated from the following:    Height as of an earlier encounter on 12/16/19: 1.803 m (5' 11\").    Weight as of an earlier encounter on 12/16/19: 145.2 kg (320 lb 3.2 oz).    Current weight per pt report: 282 lbs with BMI of " "39.33 kg/m2(-27 lbs in the past 3 months, - 38 lbs (-12%) from initial)  - Checking her weight every 2 weeks      Pt states her goal is to be <250 lbs.    Medications for Weight Loss:  Topamax and phentermine     NUTRITION HISTORY  See MD note for details.    Info from initial consult:  - Likes to cook. Buys her own groceries.  - Works 3 days out of 2 weeks  - Told to clean plate when young, now working on putting extra food away right away.  - Eats softer foods, missing teeth and doesn't wear dentures.   - Likes peas, carrots, sour kraut. Uses a lot of canned fruit.    - Pt accompanied by residential staff member, Darby, today.     Since working with Laila, she has focused on consuming moise protein sources, increased non-starchy vegetable intake and has significantly reduced sugary beverage intake. She typically eats 2 meals per day, skipping lunch. She states that this is because she does not feel hungry for lunch. Occasionally she is snacking on sweets at night.     Recent Diet Recall:  Breakfast: Poptart; egg and banana  Lunch: skipping sometime  Dinner: 4 pm - baked chicken/pork chops with mixed vegetables   Snacks: occ cookies (instead of buying a box of cookies, will buy just cookie dough and bake one cookie at a time)  Beverages: Water, grape juice box (8 oz), black coffee (3 cups)   Dining Out: Mashed potato gravy and ham and bread     Progress Towards Previous Goals:  1) Eat 3 meals per day.  - Met at times  - Use the 9\" Plate method to build meal (1/2 plate non-starchy vegetables/fruit, 1/4 plate lean protein, 1/4 plate whole grain starch - no more than 1/2 cup carb/meal). - Improving   Meal ideas:   - Breakfast: 2 hard boiled eggs and 1 banana   - Lunch: high-protein soup (homemade chili, extra chicken noodle soup, lentil and lean ham soup)   - Dinner: grilled pork chop with roasted veggies (carrots and peppers); Low calorie chicken pot pie  2) Avoid snacking between meals - Improving, having snacks " occ and some high sugar snacks   3) Limit milk to 8 oz per day. Switch to 1% milk. - Met, continue  4) Limit OJ to 8 oz per day. - Met, continues  5) Consume at least 48 oz (16 oz glasses/bottles x 3) of water per day. - Improving, drinking 2 bottles per day  6) Use frozen vegetables instead of canned vegetables B - met, continues  7) Limit fast-food to once per week - twice per week with staff  8) Take a walk 2-3 times per week for at least 30 mins. - Will walk a couple miles 3 times per week.       Physical Activity:  [employer] pool exercise for 30 mins one time per week. Relies on residential staff to take her to gym. Would like to go 2x/week, and thinks that can be arranged. Reduced regular exercise now with gym shut down. Pt plans to start taking more walks, doing squats in home, cleaning to compensate.     MALNUTRITION  % Intake: No decreased intake noted  % Weight Loss: None noted  Subcutaneous Fat Loss: None observed  Muscle Loss: None observed  Fluid Accumulation/Edema: None noted  Malnutrition Diagnosis: Patient does not meet two of the established criteria necessary for diagnosing malnutrition    Nutrition Prescription  Recommended energy/nutrient modification.  Volumetrics/Plate Method    Nutrition Diagnosis  Obesity r/t long history of self-monitoring deficit and excessive energy intake aeb BMI >30. -improving    Nutrition Intervention  Materials/education provided:   - Reviewed previous goals.   - Reviewed the Plate Method of structuring meals.  - Encouraged increased daily fluid intake with goal to consume 64 oz/day while limiting caloric beverage intake.  - Discussed healthy sweet options   - Provided encouragement and support. Praised pt on the positive changes to diet/lifestyle she has made.   - Mailed goals and handouts to pt.      Patient Understanding: good  Expected Compliance: good  Follow-Up Plans: Food log     Nutrition Goals  1) Eat 3 meals per day. Try not to skip meals. This may help with  "improving your energy.  2) Use the 9\" Plate method to build meal (1/2 plate non-starchy vegetables/fruit, 1/4 plate lean protein, 1/4 plate whole grain starch - no more than 1/2 cup carb/meal).   Meal ideas:   - Breakfast: 2 hard boiled eggs and 1 banana   - Lunch: high-protein soup (homemade chili, extra chicken noodle soup, lentil and lean ham soup); low-fat cottage cheese and fruit;    - Dinner: grilled pork chop with roasted veggies (carrots and peppers); Low calorie chicken pot pie  3) Avoid snacking between meals. Try to drink water first before snacking. If you have a sweet craving try - blended frozen banana, lite greek yogurt, fresh fruit  4) Eliminate juice intake. Drink calorie-free beverages only.  5) Consume at least 48 oz (3 water bottles per day) of water per day.  6) Use frozen vegetables instead of canned vegetables  7) Take a walk 3 times per week for at least 30 mins.     Follow-Up:  3 months    Kathy Esteves RD, LD    "

## 2020-08-14 NOTE — NURSING NOTE
"Chief Complaint   Patient presents with     RECHECK     Follow up appointment.       Vitals:    08/14/20 1201   Weight: 127.9 kg (282 lb)   Height: 1.803 m (5' 11\")       Body mass index is 39.33 kg/m .                            NIKKI YUNG, EMT    "

## 2020-08-14 NOTE — PROGRESS NOTES
"Return Medical Weight Management Note  Laila Perez  MRN:  [id number]  :  1950  FRANCIS:  2020    Dear Ronak, Delmis García,    I had the pleasure of seeing your patient Laila Perez for follow-up consultation.  She is a 70 year old adult who I am continuing to see for treatment of obesity related to:       2019   I have the following health issues associated with obesity: High Blood Pressure   I have the following symptoms associated with obesity: Knee Pain       INTERVAL HISTORY:  Doing very well    Problem   Obesity, Class III, Bmi 40-49.9 (Morbid Obesity) (H)    Will continue phentermine 15mg daily  Will start topiramate, 25mg and increasing to 75mg daily  Aug 2020: has been very physically active, weight down 20 pounds from April, now 282. Says have low energy during the day, so wants to eat more sugar.   Sleep: in bed at 8pm, falls asleep by 8:30pm, wakes at 4am-5pm. Naps from 3pm-3:30pm. Wakes for bathroom 3-4 times, falls asleep right after. Not sure if she snores.  Phentermine: takes 15mg every other day, and topiramate 75mg every evening. GFR was 60 in 3/2020. Has no history of kidney stones.   - will continue phentermine 15mg and topiramate 75mg daily  - home BP measures have been normal per patient  - goal for gender surgery is 250pounds         CURRENT WEIGHT:   282 lbs 0 oz    Wt Readings from Last 4 Encounters:   20 127.9 kg (282 lb)   20 128.8 kg (284 lb)   20 130.9 kg (288 lb 9.6 oz)   20 140.2 kg (309 lb)     Height:  5' 11\"  Body Mass Index:  Body mass index is 39.33 kg/m .  Vitals:  B/P: Data Unavailable, P: Data Unavailable, R: Data Unavailable     Diet Recall Review with Patient 2019   Do you typically eat lunch? Yes   If you do eat lunch, what types of food do you typically eat?  sandwich chipss   Do you typically eat supper? Yes   If you do eat supper, what types of food do you typically eat? meal that IMake   Do you typically eat " snacks? No   If you do snack, what types of food do you typically eat? cookie   How many glasses of juice do you drink in a typical day? 0   How many of glasses of milk do you drink in a typical day? 2   If you do drink milk, what type? Whole   How many 8oz glasses of sugar containing drinks such as Carlos-Aid/sweet tea do you drink in a day? 0   How many cans/bottles of sugar pop/soda/tea/sports drinks do you drink in a day? 4   How many cans/bottles of diet pop/soda/tea or sports drink do you drink in a day? 0   How often do you have a drink of alcohol? Never       MEDICATIONS:   Current Outpatient Medications   Medication     acetaminophen (TYLENOL) 500 MG tablet     Antiseptic Products, Misc. (UNI-SOLVE) PADS     atenolol (TENORMIN) 50 MG tablet     DULoxetine (CYMBALTA) 20 MG capsule     estradiol (VIVELLE-DOT) 0.1 MG/24HR bi-weekly patch     latanoprost (XALATAN) 0.005 % ophthalmic solution     lisinopril (ZESTRIL) 5 MG tablet     lovastatin (MEVACOR) 20 MG tablet     Multiple Vitamin (DAILY-EVANGELISTA) TABS     order for DME     ORDER FOR DME     phentermine (ADIPEX-P) 15 MG capsule     SM CALCIUM 600/VITAMIN D 600-400 MG-UNIT per tablet     timolol maleate (TIMOPTIC) 0.5 % ophthalmic solution     topiramate (TOPAMAX) 25 MG tablet     No current facility-administered medications for this visit.        Weight Loss Medication History Reviewed With Patient 5/22/2020   Which weight loss medications are you currently taking on a regular basis?  Phentermine, Topamax (topiramate)   Are you having any side effects from the weight loss medication that we have prescribed you? No       LABS:  Hemoglobin A1C   Date Value Ref Range Status   04/26/2016 4.8 4.3 - 6.0 % Final   04/26/2013 5.2 4.3 - 6.0 % Final     Cholesterol   Date Value Ref Range Status   07/08/2020 119 <200 mg/dL Final     TSH   Date Value Ref Range Status   05/02/2017 1.44 0.40 - 4.00 mU/L Final     Creatinine   Date Value Ref Range Status   03/02/2020 1.22 0.66  - 1.25 mg/dL Final     ALT   Date Value Ref Range Status   04/17/2019 19 0 - 70 U/L Final       ASSESSMENT:  Mr. Perez is a 70 year old adult with history of Class 3 obesity with current body mass index is 39.33 kg/m . and with current health consequences who presents for weight management follow-up consultation. Overall Laila Perez is doing very well .     The following diagnoses are relevant to Laila Perez's history of obesity:     PLAN:    Problem   Obesity, Class III, Bmi 40-49.9 (Morbid Obesity) (H)    Will continue phentermine 15mg daily  Will start topiramate, 25mg and increasing to 75mg daily  Aug 2020: has been very physically active, weight down 20 pounds from April, now 282. Says have low energy during the day, so wants to eat more sugar.   Sleep: in bed at 8pm, falls asleep by 8:30pm, wakes at 4am-5pm. Naps from 3pm-3:30pm. Wakes for bathroom 3-4 times, falls asleep right after. Not sure if she snores.  Phentermine: takes 15mg every other day, and topiramate 75mg every evening. GFR was 60 in 3/2020. Has no history of kidney stones.   - will continue phentermine 15mg and topiramate 75mg daily  - home BP measures have been normal per patient  - goal for gender surgery is 250pounds        No problem-specific Assessment & Plan notes found for this encounter.      No orders of the defined types were placed in this encounter.       With motivational interviewing, Laila took part in making the following plan:  Patient Instructions   Try to not add in high-sugar foods like pop tarts.  Add fruit instead.     You can weigh yourself every day or every week, it usually helps with weight loss.     Send a few home blood pressure numbers    Keep taking the phentermine and topiramate like you are, no changes.       FOLLOW-UP:    4 weeks.    Time: ** min spent on evaluation, management, counseling, education, & motivational interviewing with greater than 50 % of the total time was spent on counseling and  "coordinating care    Thank you for including me in the care of your patient.  Please do not hesitate to call with questions or concerns.    Sincerely,    Alexsandra Womack MD MPH  Diplomate, American Board of Obesity Medicine, American Board of Internal Medicine, American Board of Pediatrics    Departments of Internal Medicine and Pediatrics  [facility]        [unfilled]     Laila Perez is a 70 year old adult who is being evaluated via a billable telephone visit.      The patient has been notified of following:     \"This telephone visit will be conducted via a call between you and your physician/provider. We have found that certain health care needs can be provided without the need for a physical exam.  This service lets us provide the care you need with a short phone conversation.  If a prescription is necessary we can send it directly to your pharmacy.  If lab work is needed we can place an order for that and you can then stop by our lab to have the test done at a later time.    Telephone visits are billed at different rates depending on your insurance coverage. During this emergency period, for some insurers they may be billed the same as an in-person visit.  Please reach out to your insurance provider with any questions.    If during the course of the call the physician/provider feels a telephone visit is not appropriate, you will not be charged for this service.\"    Patient has given verbal consent for Telephone visit?  Yes    What phone number would you like to be contacted at? [phone]    How would you like to obtain your AVS? QMedichart    Phone call duration: 15 minutes    Alexsandra Womack MD  During this virtual visit the patient is located in [place], patient verifies this as the location during the entirety of this visit.     "

## 2020-08-14 NOTE — PATIENT INSTRUCTIONS
"Nutrition Goals  1) Eat 3 meals per day. Try not to skip meals. This may help with improving your energy.  2) Use the 9\" Plate method to build meal (1/2 plate non-starchy vegetables/fruit, 1/4 plate lean protein, 1/4 plate whole grain starch - no more than 1/2 cup carb/meal).   Meal ideas:   - Breakfast: 2 hard boiled eggs and 1 banana   - Lunch: high-protein soup (homemade chili, extra chicken noodle soup, lentil and lean ham soup); low-fat cottage cheese and fruit;    - Dinner: grilled pork chop with roasted veggies (carrots and peppers); Chicken stir lewis with mixed veggies.   3) Avoid snacking between meals. Try to drink water first before snacking. If you have a sweet craving try - blended frozen banana, lite greek yogurt, fresh fruit  4) Eliminate juice intake. Drink calorie-free beverages only.  5) Consume at least 48 oz (3 water bottles per day) of water per day.  6) Use frozen vegetables instead of canned vegetables  7) Take a walk 3 times per week for at least 30 mins.     Follow-up with RD in 3 months.     Thank you,    Kathy Esteves, ZAK, LD  If you would like to schedule or reschedule an appointment with the RD, please call [phone]      Interested in working with a health ?  Health coaches work with you to improve your overall health and wellbeing.  They look at the whole person, and may involve discussion of different areas of life, including, but not limited to the four pillars of health (sleep, exercise, nutrition, and stress management). Discuss with your care team if you would like to start working a health .    Health Coaching-3 Pack:    $99 for three health coaching visits    Visits may be done in person or via phone    Coaching is a partnership between the  and the client; Coaches do not prescribe or diagnose    Coaching helps inspire the client to reach his/her personal goals    Healthy Lifestyle Support Group  [facility]    Virtual Support " Group Now Available      60 minutes of small group guided discussion, support and resources    Led by Health , Erika Sullivan    For questions, and to receive the invite information, contact Erika at [email or web address]    All our welcome!    One Friday per month, 12:30pm to 1:30pm      SELF COMPASSION: July 31st    CREATING MY WELLNESS VISION: August 28th    MINDFULNESS PRACTICES FOR A CALM BODY & MIND: September 25th    MINDFUL EATING TOOLS: October 30th    HEALTHY& HAPPY HOLIDAYS: November 20th    OPEN FORUM: December 18th

## 2020-09-08 ENCOUNTER — VIRTUAL VISIT (OUTPATIENT)
Dept: OTHER | Facility: OUTPATIENT CENTER | Age: 70
End: 2020-09-08
Payer: COMMERCIAL

## 2020-09-08 VITALS — BODY MASS INDEX: 38.77 KG/M2 | WEIGHT: 278 LBS

## 2020-09-08 DIAGNOSIS — F64.0 GENDER DYSPHORIA IN ADOLESCENT AND ADULT: Primary | ICD-10-CM

## 2020-09-08 DIAGNOSIS — F33.41 RECURRENT MAJOR DEPRESSIVE DISORDER, IN PARTIAL REMISSION (H): ICD-10-CM

## 2020-09-08 ASSESSMENT — ENCOUNTER SYMPTOMS
FREQUENCY: 0
SHORTNESS OF BREATH: 0
DYSPHORIC MOOD: 0
UNEXPECTED WEIGHT CHANGE: 0
NUMBNESS: 0
POLYDIPSIA: 0
VOMITING: 0
NERVOUS/ANXIOUS: 0
FEVER: 0
ABDOMINAL PAIN: 0
PALPITATIONS: 0
LIGHT-HEADEDNESS: 0
WEAKNESS: 0
CHEST TIGHTNESS: 0
HEADACHES: 0
CHILLS: 0

## 2020-09-08 NOTE — PROGRESS NOTES
"The following was reviewed with the patient.     \"This telephone visit will be conducted via a call between you and your physician/provider. We have found that certain health care needs can be provided without the need for a physical exam.  This service lets us provide the care you need with a short phone conversation.  If a prescription is necessary we can send it directly to your pharmacy.  If lab work is needed we can place an order for that and you can then stop by our lab to have the test done at a later time.    If during the course of the call the physician/provider feels a telephone visit is not appropriate, you will not be charged for this service.\"     Patient has given verbal consent for Telephone visit?  Yes         BOO Ulloa is a 70 year old individual that uses pronouns She/Her/Hers/Herself that presents today for follow up of:  feminizing hormone therapy.   Alone or accompanied by: accompanied today by self  Gender identity: female  Started Hormone  therapy  2016  Continues on Vivelle dot 0.1 mg patch 2x/wk  Any special concerns today?    She is down to 278 lbs 8/24/2020  She is sore around the \"love handle\" area  On hormones?  YES +++ Shot day of the week? Not applicable-taking pills/patch/gel      Due for labs?  Yes      +++ Refills of meds needed?  Yes  Gender related body changes since last visit: stable    Breakthrough bleeding? Does Not Apply    New health concerns since last visit:  None  Saw the cardiologist, aortic aneurysm stable, aortic stenosis stable  Note bp 107/71 then -    Past Surgical History:   Procedure Laterality Date     COLONOSCOPY  2007     JOINT REPLACEMTN, KNEE RT/LT  2006    left     ORCHIECTOMY SCROTAL Bilateral 5/14/2019    Procedure: Bilatera Scrotal Orchiectomy;  Surgeon: Abhilash Weston MD;  Location: [facility] OR       Patient Active Problem List   Diagnosis     Gouty arthropathy     Mental or behavioral problem     Generalized osteoarthrosis, unspecified site     " Disorder of bone and cartilage     Mixed hyperlipidemia     OA (osteoarthritis) of knee     HYPERLIPIDEMIA LDL GOAL <100     DDD (degenerative disc disease), lumbar     Alcoholism (H)     Advanced directives, counseling/discussion     Varicose veins of legs     Psoriasis     HTN, goal below 140/90     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     Family history of diabetes mellitus in first degree relative     Mitral valve disorder     Nonrheumatic aortic valve stenosis     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     Obesity, Class III, BMI 40-49.9 (morbid obesity) (H)     Health Care Home     Multiple gastric ulcers     Recurrent major depressive disorder, in partial remission (H)     Episodic tension-type headache, not intractable       Current Outpatient Medications   Medication Sig Dispense Refill     acetaminophen (TYLENOL) 500 MG tablet Take 1,000 mg by mouth       Antiseptic Products, Misc. (UNI-SOLVE) PADS Externally apply 2 pads topically twice a week To remove adhesive from patches 50 each 3     atenolol (TENORMIN) 50 MG tablet Take 1 tablet (50 mg) by mouth daily 90 tablet 1     DULoxetine (CYMBALTA) 20 MG capsule Take 1 capsule (20 mg) by mouth 2 times daily 180 capsule 1     estradiol (VIVELLE-DOT) 0.1 MG/24HR bi-weekly patch Place 1 patch onto the skin twice a week 24 patch 1     latanoprost (XALATAN) 0.005 % ophthalmic solution 1 drop At Bedtime.       lisinopril (ZESTRIL) 5 MG tablet Take 1 tablet (5 mg) by mouth daily 90 tablet 1     lovastatin (MEVACOR) 20 MG tablet TAKE ONE TABLET BY MOUTH AT BEDTIME 30 tablet 11     Multiple Vitamin (DAILY-EVANGELISTA) TABS Take 1 tablet by mouth daily  11     phentermine (ADIPEX-P) 15 MG capsule TAKE ONE CAPSULE BY MOUTH ONCE PER DAY IN THE MORNING 30 capsule 1     SM CALCIUM 600/VITAMIN D 600-400 MG-UNIT per tablet Take 1 tablet by mouth 2 times daily 60 tablet 3     timolol maleate (TIMOPTIC) 0.5 % ophthalmic solution Apply 1 drop to eye       topiramate (TOPAMAX) 25 MG  tablet Take 3 tablets (75 mg) by mouth At Bedtime 270 tablet 1     order for DME Equipment being ordered: Sigvaris 232 Cotton Thigh High for Men 20-30 mmHg- TWO PAIR TAN COLOR 2 each 5     ORDER FOR DME Equipment being ordered:   BACK BRACE    MedSpec Back-n-Shape II Back Brace  Size XXXL with thermoplastic insert        1 each 0       History   Smoking Status     Never Smoker   Smokeless Tobacco     Never Used          Allergies   Allergen Reactions     Penicillin [Esters] Itching     he is not sure what kind of reaction he had     Heparin      he lives in a assisted living program and is not sure what type of allergies he really has. He quit drinking     Zosyn [Penicillins]      unsure of reaction, allergy is listed on his med sheet       Health Maintenance Due   Topic Date Due     MENTAL HEALTH TX PLAN  08/08/2020         Problem, Medication and Allergy Lists were reviewed and are current..         Review of Systems:   Review of Systems   Constitutional: Negative for chills, fever and unexpected weight change.   Eyes: Negative for visual disturbance.   Respiratory: Negative for chest tightness and shortness of breath.    Cardiovascular: Negative for chest pain, palpitations and leg swelling.   Gastrointestinal: Negative for abdominal pain and vomiting.   Endocrine: Negative for polydipsia and polyuria.   Genitourinary: Negative for frequency.   Neurological: Negative for weakness, light-headedness, numbness and headaches.   Psychiatric/Behavioral: Negative for dysphoric mood and suicidal ideas. The patient is not nervous/anxious.      Exam  140/95 p. 63 per pt just now.  Alert, NAD  No cough, speaks easily in complete sentences  Mood euthymic         Labs:   Results from last visit:  Office Visit on 07/08/2020   Component Date Value Ref Range Status     Cholesterol 07/08/2020 119  <200 mg/dL Final     Triglycerides 07/08/2020 143  <150 mg/dL Final    Fasting specimen     HDL Cholesterol 07/08/2020 52  >39 mg/dL  Final     LDL Cholesterol Calculated 07/08/2020 38  <100 mg/dL Final    Desirable:       <100 mg/dl     Non HDL Cholesterol 07/08/2020 67  <130 mg/dL Final       Assessment and Plan   1. Gender dysphoria s/p orchiectomy   Stable on current hormone replacement  Refill due in November    Follow-up 4 months  Consider reduce dose at that time.     Phone call duration: 15 minutes        Results by mychart  Questions were elicited and answered.     Jluis Mane MD

## 2020-09-09 ENCOUNTER — TELEPHONE (OUTPATIENT)
Dept: ENDOCRINOLOGY | Facility: CLINIC | Age: 70
End: 2020-09-09

## 2020-09-10 NOTE — PROGRESS NOTES
"[facility] -  Case Progress Note  Client Name: Laila Perez  YOB: 1950  MRN:  [id number]  Treating Provider: Estelle Gomez LP  Type of Session: Individual  Present in Session: client   Number of Minutes:  55    Start time: 2:00 pm  End time: 2:55 pm    Client does not have Internet or technology to do video sessions.    The following was reviewed with the patient.   \"We have found that certain health care needs can be provided without the need for a face to face visit.  This service lets us provide the care you need with a phone conversation. I will have full access to your [facility] medical record during this entire phone call.   I will be taking notes for your medical record. Since this is like an office visit, we will bill your insurance company for this service. There are potential benefits and risks of telephone visits (e.g. limits to patient confidentiality) that differ from in-person visits.?  Confidentiality still applies for telephone services, and nobody will record the visit.  It is important to be in a quiet, private space that is free of distractions (including cell phone or other devices) during the visit.??If during the course of the call I believe a telephone visit is not appropriate, you will not be charged for this service\"    Consent has been obtained for this service by care team member: Yes      Treatment plan: 6/8/2020    Health Maintenance Summary - Mental Health Treatment Plan       Status Date      MENTAL HEALTH TX PLAN Overdue 8/8/2020      Done 6/8/2020      Done 2/17/2020 [facility] MENTAL HEALTH TX PLAN SCAN     Done 8/28/2019      Done 6/10/2019      Done 10/30/2017      Patient has more history with this topic...            Date of Service: 9/08/20    Current Symptoms/Status:  Distress about gender and secondary sex characteristics, she shared feeling angry and frustrated about continuing to have a penis, distress and frustration of having to " wait for surgery, fear that surgery will not happen, some distress about increased isolation with the coronavirus.    Progress Toward Treatment Goals:   Client reported progress with losing weight so she can have gender affirming surgery.    Intervention: Modality and Description:  Provided support and feedback. Used CBT to process gender dysphoria concerns.  Client shared she is continuing to make progress with weight loss. She will contact surgeon's office once she gets down to 250lbs. She hopes there will be no other roadblocks to getting her surgery. She shared having a penis distresses her everyday. She really wants it gone. She shared a sense of being female in other ways, but then she notices her penis. She shared she is keeping herself busy with her garden and has done some activities.. She wants to go back to the gym and wonders if it is safe. Told she would need to discuss this with her physician. Client has had contact with her children by phone. She plans to go meet a highschool friend soon. She believes she can do this safely with using a mask. Client shared it feels good that people always refer to her with she/her pronouns.    Interactive Complexity:  There are four specific communication difficulties that complicate the work of the primary psychiatric procedure.  Interactive complexity (+95217) may be reported when at least one of these difficulties is present.    Communication difficulties present during current the psychiatric procedure include:  none      Response to Intervention:  Client reported validation.    Assignment:  Follow surgeon guidelines, maintain balanced approach to weight loss.      Diagnosis:  Encounter Diagnoses   Name Primary?     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 Yes     Recurrent major depressive disorder, in partial remission (H)        Plan / Need for Future Services:  Return for therapy in 4 weeks.      Estelle Gomez PsyD, LP

## 2020-10-05 ENCOUNTER — HOSPITAL ENCOUNTER (OUTPATIENT)
Dept: MRI IMAGING | Facility: CLINIC | Age: 70
Discharge: HOME | End: 2020-10-05
Attending: INTERNAL MEDICINE | Admitting: INTERNAL MEDICINE
Payer: COMMERCIAL

## 2020-10-05 DIAGNOSIS — I71.20 THORACIC AORTIC ANEURYSM WITHOUT RUPTURE (H): ICD-10-CM

## 2020-10-05 LAB
CREAT BLD-MCNC: 0.8 MG/DL (ref 0.66–1.25)
GFR SERPL CREATININE-BSD FRML MDRD: >90 ML/MIN/{1.73_M2}

## 2020-10-05 PROCEDURE — A9585 GADOBUTROL INJECTION: HCPCS | Performed by: INTERNAL MEDICINE

## 2020-10-05 PROCEDURE — 82565 ASSAY OF CREATININE: CPT

## 2020-10-05 PROCEDURE — 71555 MRI ANGIO CHEST W OR W/O DYE: CPT

## 2020-10-05 PROCEDURE — 255N000002 HC RX 255 OP 636: Performed by: INTERNAL MEDICINE

## 2020-10-05 RX ORDER — GADOBUTROL 604.72 MG/ML
15 INJECTION INTRAVENOUS ONCE
Status: COMPLETED | OUTPATIENT
Start: 2020-10-05 | End: 2020-10-05

## 2020-10-05 RX ADMIN — GADOBUTROL 15 ML: 604.72 INJECTION INTRAVENOUS at 10:30

## 2020-10-06 ENCOUNTER — TELEPHONE (OUTPATIENT)
Dept: CARDIOLOGY | Facility: CLINIC | Age: 70
End: 2020-10-06

## 2020-10-06 NOTE — TELEPHONE ENCOUNTER
MRA Chest 10/5/20    IMPRESSION:  1. Moderate to large right pleural effusion.  2. Age-indeterminate posterior numerous right rib fractures. Uncertain  if any possibility of hemothorax.  3. Ascending thoracic aorta measures up to 4.3 cm AP x 4.4 cm  transverse. No acute aortic pathology.    Will route to Dr Ibrahim for review  BETTYE CONTE RN on 10/6/2020 at 7:37 AM

## 2020-10-07 NOTE — TELEPHONE ENCOUNTER
ADDENDUM: Pt called back to discuss. States she has a cold so is a bit SOB. Disc results and that she should contact her PCP to be seen in the interim between now and when Dr Ibrahim is back in clinic. Pt verbalized understanding and agreed with plan. Did instruct her to present to ED if SOB got worse of she was concerned with ability to breathe. Karlene Carpenter RN Cardiology October 7, 2020, 10:01 AM      Addendum:  LM for pt to call back re: MRI result - pleural effusion  Will also route result to Delmis Simons (PCP)  BETTYE CASTAÑEDA RN on 10/7/2020 at 9:18 AM      ----- Message from JERRICA Gallo CNP sent at 10/7/2020  9:02 AM CDT -----  Regarding: RE: result  Looks like she is off until next Wed. MRA was to assess aorta so looks like this was incidental. I would have her follow-up with PCP for next steps as I'm not sure why she has developed this and if she has any history of this in the past (nothing noted in Dr. Ibrahim's note). She likely needs a thoracentesis and since I've never seen or examined her don't feel comfortable ordering. Looks like she has moderate aortic stenosis and if that worsened could be causing heart failure resulting in the effusion? I don't see that the repeat echo is expected until Jan 2021. After fluid is removed we could do earlier echo to reassess aortic valve but dr. Ibrahim can give her input on that when she is back.      Carley Childs-JERRICA Castelan CNP        ----- Message -----  From: Bettye Castañeda RN  Sent: 10/6/2020  12:56 PM CDT  To: JERRICA Gallo CNP  Subject: result                                           Hi Carley    I am working from home so I dont know where Dr Ibrahim is today.  This pt had an MRI yesterday and a Moderate to large right pleural effusion was found.    Im just a little worried if Dr Ibrahim does not reply for a few days and this is not addressed.    Any recommendations?  Thank you  BETTYE CASTAÑEDA RN on 10/6/2020 at 12:57  PM

## 2020-10-13 ENCOUNTER — DOCUMENTATION ONLY (OUTPATIENT)
Dept: CARE COORDINATION | Facility: CLINIC | Age: 70
End: 2020-10-13

## 2020-10-22 ENCOUNTER — TELEPHONE (OUTPATIENT)
Dept: CARDIOLOGY | Facility: CLINIC | Age: 70
End: 2020-10-22

## 2020-10-22 NOTE — TELEPHONE ENCOUNTER
----- Message from Tia Ibrahim MD sent at 10/22/2020  9:47 AM CDT -----  Saw this MRA result again in another result queue:  Do not see that she has yet followed up with PCP.  There were right rib fractures and right pleural effusion on the MRA.  She needs to see her PCP about this result with a CXR.  Please also contact her primary clinic to be certain they get her in.  If she has symptoms, she should go to ED.  Thanks.  CD    ADDENDUM: Routed this staff message to Delmis Simons NP. Talked with patient and stressed importance of following up. She will call today to make an appointment. Karlene Carpenter RN Cardiology October 22, 2020, 10:35 AM  '

## 2020-10-23 DIAGNOSIS — E78.5 HYPERLIPIDEMIA LDL GOAL <100: ICD-10-CM

## 2020-10-23 NOTE — TELEPHONE ENCOUNTER
"  Routing refill request to provider for review/approval because:  Had Medicare Annual Wellness with DANNY BECERRIL 7/8/2020 & lipids were done. Rx last ordered by DG BECERRIL    Requested Prescriptions   Pending Prescriptions Disp Refills     lovastatin (MEVACOR) 20 MG tablet [Pharmacy Med Name: LOVASTATIN 20MG TABS] 30 tablet 6     Sig: TAKE ONE TABLET BY MOUTH ONCE A DAY AT BEDTIME       Statins Protocol Passed - 10/23/2020  2:03 PM        Passed - LDL on file in past 12 months     Recent Labs   Lab Test 07/08/20  1047   LDL 38             Passed - No abnormal creatine kinase in past 12 months     No lab results found.             Passed - Recent (12 mo) or future (30 days) visit within the authorizing provider's specialty     Patient has had an office visit with the authorizing provider or a provider within the authorizing providers department within the previous 12 mos or has a future within next 30 days. See \"Patient Info\" tab in inbasket, or \"Choose Columns\" in Meds & Orders section of the refill encounter.              Passed - Medication is active on med list        Passed - Patient is age 18 or older             "

## 2020-10-26 RX ORDER — LOVASTATIN 20 MG
TABLET ORAL
Qty: 30 TABLET | Refills: 9 | Status: SHIPPED | OUTPATIENT
Start: 2020-10-26 | End: 2021-05-13

## 2020-11-02 ENCOUNTER — OFFICE VISIT (OUTPATIENT)
Dept: FAMILY MEDICINE | Facility: CLINIC | Age: 70
End: 2020-11-02
Payer: COMMERCIAL

## 2020-11-02 VITALS
HEART RATE: 51 BPM | SYSTOLIC BLOOD PRESSURE: 122 MMHG | BODY MASS INDEX: 37.74 KG/M2 | RESPIRATION RATE: 18 BRPM | DIASTOLIC BLOOD PRESSURE: 78 MMHG | WEIGHT: 270.6 LBS | TEMPERATURE: 97.4 F | OXYGEN SATURATION: 96 %

## 2020-11-02 DIAGNOSIS — J90 PLEURAL EFFUSION: ICD-10-CM

## 2020-11-02 DIAGNOSIS — S22.41XD CLOSED FRACTURE OF MULTIPLE RIBS OF RIGHT SIDE WITH ROUTINE HEALING, SUBSEQUENT ENCOUNTER: Primary | ICD-10-CM

## 2020-11-02 PROCEDURE — 99213 OFFICE O/P EST LOW 20 MIN: CPT | Performed by: NURSE PRACTITIONER

## 2020-11-02 NOTE — PROGRESS NOTES
SUBJECTIVE   Laila Perez is a  adult who presents to clinic today for the following health issue(s):     Chief Complaint   Patient presents with     Rib Injury     Medication Reconciliation     prescription not filled for Cymbalta, patient stopped Adipex, stopped Lisinopril because thought was getting too much-light headed, stopped Topamax because caused urine frequency        Concern - right rib fractures and right pleural effusion on the MRA.  Per Cardiology department  Patient should see pcp regarding the chest MRA result  10/5/2020 MRA CHEST WITHOUT AND WITH CONTRAST   IMPRESSION:  1. Moderate to large right pleural effusion.  2. Age-indeterminate posterior numerous right rib fractures. Uncertain  if any possibility of hemothorax.  3. Ascending thoracic aorta measures up to 4.3 cm AP x 4.4 cm  transverse. No acute aortic pathology.     Onset: approx 4-6 months ago  Description: fall on side when tipped 4 oleary  Intensity: 0  Progression of Symptoms:  Improving, ribs do not bother her any longer  Accompanying Signs & Symptoms: 0, denies breathing problem  Previous history of similar problem: 0  Precipitating factors:        Worsened by: 0  Alleviating factors:        Improved by: 0  Therapies tried and outcome:  none       Tipped four-oleary over and landed on right side approximately 4 months ago. Going kind of fast. Was never seen for this.   Had difficulty sleeping at night for a while, this is better. Had trouble breathing then, now is back to normal.   Very active, no shortness of breath, chest pain or pressure     PCP   Delmis Simons, APRN -[phone]    Health Maintenance        Health Maintenance Due   Topic Date Due     ZOSTER IMMUNIZATION (1 of 2) 03/09/2000     Pneumococcal Vaccine: 65+ Years (2 of 2 - PPSV23) 05/21/2019     INFLUENZA VACCINE (1) 09/01/2020     COLORECTAL CANCER SCREENING  10/27/2020       HPI        Patient Active Problem List   Diagnosis     Gouty  arthropathy     Mental or behavioral problem     Generalized osteoarthrosis, unspecified site     Disorder of bone and cartilage     Mixed hyperlipidemia     OA (osteoarthritis) of knee     HYPERLIPIDEMIA LDL GOAL <100     DDD (degenerative disc disease), lumbar     Alcoholism (H)     Advanced directives, counseling/discussion     Varicose veins of legs     Psoriasis     HTN, goal below 140/90     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     Family history of diabetes mellitus in first degree relative     Mitral valve disorder     Nonrheumatic aortic valve stenosis     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     Obesity, Class III, BMI 40-49.9 (morbid obesity) (H)     Health Care Home     Multiple gastric ulcers     Recurrent major depressive disorder, in partial remission (H)     Episodic tension-type headache, not intractable     Current Outpatient Medications   Medication     acetaminophen (TYLENOL) 500 MG tablet     Antiseptic Products, Misc. (UNI-SOLVE) PADS     atenolol (TENORMIN) 50 MG tablet     estradiol (VIVELLE-DOT) 0.1 MG/24HR bi-weekly patch     latanoprost (XALATAN) 0.005 % ophthalmic solution     lovastatin (MEVACOR) 20 MG tablet     Multiple Vitamin (DAILY-EVANGELISTA) TABS     order for DME     ORDER FOR DME     SM CALCIUM 600/VITAMIN D 600-400 MG-UNIT per tablet     timolol maleate (TIMOPTIC) 0.5 % ophthalmic solution     lisinopril (ZESTRIL) 5 MG tablet     phentermine (ADIPEX-P) 15 MG capsule     topiramate (TOPAMAX) 25 MG tablet     No current facility-administered medications for this visit.        Reviewed and updated as needed this visit by Provider:  Tobacco  Allergies  Meds  Med Hx  Surg Hx  Fam Hx  Soc Hx     ROS:  Constitutional, HEENT, cardiovascular, pulmonary, gi and gu systems are negative, except as otherwise noted.    PHYSICAL EXAM   /78   Pulse 51   Temp 97.4  F (36.3  C)   Resp 18   Wt 122.7 kg (270 lb 9.6 oz)   SpO2 96%   BMI 37.74 kg/m    Body mass index is 37.74  kg/m .  GENERAL APPEARANCE: healthy, alert and no distress  RESP: lungs clear to auscultation - no rales, rhonchi or wheezes  CV: regular rates and rhythm, normal S1 S2, no S3 or S4 and no murmur, click or rub  ABDOMEN: soft, nontender, without hepatosplenomegaly or masses, bowel sounds normal and obese  MS: extremities normal- no gross deformities noted and peripheral pulses normal  SKIN: no suspicious lesions or rashes  NEURO: Normal strength and tone, mentation intact and speech normal  PSYCH: mentation appears normal and affect normal/bright      Diagnostic Test Results:  CXR - Pending     ASSESSMENT & PLAN   Assessment & Plan     1. Closed fracture of multiple ribs of right side with routine healing, subsequent encounter  MRA of chest in October identified multiple rib fractures on right as well as pleural effusion. Patient did have a cold at that time and also tipped four-oleary over a couple months prior. Denies any shortness of breath, chest pain or pressure. Will re-check with a chest x-ray - call [phone] to schedule. Sometime this week. Will call with results and recommendations. If symptoms of shortness of breath, chest pain or pressure please go into ER.   - XR Chest 2 Views; Future    2. Pleural effusion  See above. No symptoms. Recheck Chest x-ray   - XR Chest 2 Views; Future       Risks, benefits, side effects and rationale for treatment plan fully discussed with the patient and understanding expressed.    See Patient Instructions    Return in about 9 months (around 8/2/2021) for Physical Exam.    JERRICA [facility]    Risks, benefits, side effects and rationale for treatment plan fully discussed with the patient and understanding expressed.

## 2020-11-02 NOTE — PATIENT INSTRUCTIONS
1. Closed fracture of multiple ribs of right side with routine healing, subsequent encounter  MRA of chest in October identified multiple rib fractures on right as well as pleural effusion. Patient did have a cold at that time and also tipped four-oleary over a couple months prior. Denies any shortness of breath, chest pain or pressure. Will re-check with a chest x-ray - call [phone] to schedule. Sometime this week. Will call with results and recommendations. If symptoms of shortness of breath, chest pain or pressure please go into ER.   - XR Chest 2 Views; Future    2. Pleural effusion  See above. No symptoms. Recheck Chest x-ray   - XR Chest 2 Views; Future

## 2020-11-04 ENCOUNTER — VIRTUAL VISIT (OUTPATIENT)
Dept: PSYCHOLOGY | Facility: CLINIC | Age: 70
End: 2020-11-04
Payer: COMMERCIAL

## 2020-11-04 DIAGNOSIS — F64.0 GENDER DYSPHORIA IN ADOLESCENT AND ADULT: Primary | ICD-10-CM

## 2020-11-04 PROCEDURE — 90791 PSYCH DIAGNOSTIC EVALUATION: CPT | Mod: 95 | Performed by: PSYCHOLOGIST

## 2020-11-04 PROCEDURE — 99207 PR NO CHARGE LOS: CPT | Mod: TEL | Performed by: PSYCHOLOGIST

## 2020-11-04 ASSESSMENT — ANXIETY QUESTIONNAIRES
5. BEING SO RESTLESS THAT IT IS HARD TO SIT STILL: NOT AT ALL
GAD7 TOTAL SCORE: 0
3. WORRYING TOO MUCH ABOUT DIFFERENT THINGS: NOT AT ALL
IF YOU CHECKED OFF ANY PROBLEMS ON THIS QUESTIONNAIRE, HOW DIFFICULT HAVE THESE PROBLEMS MADE IT FOR YOU TO DO YOUR WORK, TAKE CARE OF THINGS AT HOME, OR GET ALONG WITH OTHER PEOPLE: NOT DIFFICULT AT ALL
7. FEELING AFRAID AS IF SOMETHING AWFUL MIGHT HAPPEN: NOT AT ALL
2. NOT BEING ABLE TO STOP OR CONTROL WORRYING: NOT AT ALL
1. FEELING NERVOUS, ANXIOUS, OR ON EDGE: NOT AT ALL
6. BECOMING EASILY ANNOYED OR IRRITABLE: NOT AT ALL

## 2020-11-04 ASSESSMENT — PATIENT HEALTH QUESTIONNAIRE - PHQ9
SUM OF ALL RESPONSES TO PHQ QUESTIONS 1-9: 0
5. POOR APPETITE OR OVEREATING: NOT AT ALL

## 2020-11-04 NOTE — PROGRESS NOTES
"                [facility]  [address] [place] [address] [address]  [place] [address]                                                                                Phone: [phone]                                                                                  Fax: [phone]                                                                    [email or web address]    ANNUAL UPDATE: Diagnostic Assessment Interview     The following was reviewed with the patient.   \"We have found that certain health care needs can be provided without the need for a face to face visit.  This service lets us provide the care you need with a phone conversation. I will have full access to your [facility] medical record during this entire phone call.   I will be taking notes for your medical record. Since this is like an office visit, we will bill your insurance company for this service. There are potential benefits and risks of telephone visits (e.g. limits to patient confidentiality) that differ from in-person visits.?  Confidentiality still applies for telephone services, and nobody will record the visit.  It is important to be in a quiet, private space that is free of distractions (including cell phone or other devices) during the visit.??If during the course of the call I believe a telephone visit is not appropriate, you will not be charged for this service\"    Consent has been obtained for this service by care team member: Yes    Start time: 4:00 pm   End time:4:45 pm    Client Name: Laila Perez  YOB: 1950  70 year old  MRN:  [id number]  Gender/Gender Identity: male/female  Treating Provider: Estelle Gomez PsyD, LP  Program:  TG  Type of Session: Assessment  Present in Session: client   Number of Minutes:  45    Date of Service: 11/04/20       Updated Presenting Problem and Goals:  Client shared she continues to experience distress about gender and secondary sex " characteristics. She had gender affirming surgery of an orchiectomy. This improved her self-esteem related to her identity. She continues to have distress about male genitalia and is losing weight so she can have surgery to remove her penis. In 2016 she was granted a legal name change, but the  would not legally change gender markers. She is going back to court with a letter from surgeon to get her gender marker changed. She is still distressed about having male genitalia. She is seeking surgery to address continued anatomical dysphoria. She has letters supporting surgery from a therapeutic standpoint. The surgeon has requested she reduce her weight to have surgery. Client is working toward this goal and is frustrated with the slow process of losing weight and then gets angry about it being a requirement.     Updated Mental Health History:   Client endorsed no symptoms of depression and anxiety on recent self-report questionnaires. She reported her fluoxetine was discontinued.  She has not had any significant symptoms of depression with in the past year.        Updated Substance Use:   Client had a lengthy history of alcoholism. She has been sober for over 9 years. She does not experience any significant preoccupation or urges to drink alcohol. She did not abuse any other drugs. She did complete treatment more than 1 time.    Updated Medical History:   She has  ongoing medical issues.  These include high blood pressure and arthritis.  . She has a primary care physician who manages her health. She recently stopped taking medication that was assisting with weight loss. She also stopped one of her blood pressure medications. She informed her doctor, but only at her next appointment--did not consult with doctor when making the decision. She is not sexual and denies any sexual concerns. She is working on weight loss so she can have gender affirming surgery of removing her penis. She has lost 5-60 lbs in the past  year or more.    Updated Family History:   Client has reconnected with her eldest son. This son did not have contact with her for some time because of her gender identity as female. They now talk on the phone. Client is having more regular visits and conversation with her younger son.    Updated Current Significant Relationship/Partner:  No updates.     Updated Educational History:  No updates    Updated Occupational History:  Client retired from working when she turned 70.    Updated Legal Issues:  No current legal issues. Past conviction. Completed probation.      Interactive Complexity:  There are four specific communication difficulties that complicate the work of the primary psychiatric procedure.  Interactive complexity (+17104) may be reported when at least one of these difficulties is present.    Communication difficulties present during current the psychiatric procedure include:  none      Updated Strengths and Liabilities:   Her strengths are positive, open attitude. Her limitations are managing frustration and anger.    Updated Symptoms:    See updated PHQ-9, DESTINY-7, CAGE, and Safety Screening    Updated Mental Status:      Mental Status:   Appearance:  Casually dressed and Well groomed  Behavior/relationship to examiner/demeanor:  Cooperative and Engaged  Orientation: Oriented to person, place, time and situation  Speech Rate:  Normal  Speech Spontaneity:  Normal  Mood:  pleasant and unremarkable  Affect:  Appropriate/mood-congruent  Thought Process (Associations):  Logical  Thought Content:  no evidence of suicidal or homicidal ideation  Abnormal Perception:  None  Attention/Concentration:  Fair  Language:  Intact  Insight:  Adequate  Judgment:  Fair    Motor activity/EPS:  Normal      Updated DSM-5 Diagnoses:  Encounter Diagnosis   Name Primary?     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 Yes         Conclusions/Recommendations/Initial Treatment Goals:   Tasia Perez is a 70 year old transgender female  assigned male at birth who identifies as female. Client has a lengthy history of gender dysphoria that was not treated for many years. She also has a history of alcoholism and depression. These were at least partially linked to her distress about gender. She lived in a structured setting for people with lower intellectual functioning and mental health issues.  She now lives in her own apartment with supportive help.  Since she has been able to live full-time presenting as female her depression and anxiety symptoms have discontinued for the most part.  She does have some ongoing anxiety about having a penis and is working toward weight loss so that she can have her surgery to remove her penis.   She has made progress with meeting her goals of being on female hormones. She is working toward the goal of having gender confirming surgery. The surgeon is requiring weight loss, client is aware of the goal needed to have surgery. It is recommended that she continue with individual therapy to address gender dysphoria. She will also continue with working with medical doctor to prescribe and monitor her hormone treatment. It would benefit client to participate in a transgender group, but she is currently not able to travel the distance to get to a group.        Estelle Gomez PsyD, LP

## 2020-11-05 ASSESSMENT — ANXIETY QUESTIONNAIRES: GAD7 TOTAL SCORE: 0

## 2020-11-09 ENCOUNTER — ANCILLARY PROCEDURE (OUTPATIENT)
Dept: GENERAL RADIOLOGY | Facility: CLINIC | Age: 70
End: 2020-11-09
Attending: NURSE PRACTITIONER
Payer: COMMERCIAL

## 2020-11-09 DIAGNOSIS — S22.41XD CLOSED FRACTURE OF MULTIPLE RIBS OF RIGHT SIDE WITH ROUTINE HEALING, SUBSEQUENT ENCOUNTER: ICD-10-CM

## 2020-11-09 DIAGNOSIS — J90 PLEURAL EFFUSION: ICD-10-CM

## 2020-11-09 PROCEDURE — 71046 X-RAY EXAM CHEST 2 VIEWS: CPT | Performed by: RADIOLOGY

## 2020-11-13 ENCOUNTER — VIRTUAL VISIT (OUTPATIENT)
Dept: ENDOCRINOLOGY | Facility: CLINIC | Age: 70
End: 2020-11-13
Payer: COMMERCIAL

## 2020-11-13 VITALS — HEIGHT: 71 IN | BODY MASS INDEX: 37.38 KG/M2 | WEIGHT: 267 LBS

## 2020-11-13 DIAGNOSIS — E66.9 OBESITY: ICD-10-CM

## 2020-11-13 DIAGNOSIS — E66.01 OBESITY, CLASS III, BMI 40-49.9 (MORBID OBESITY) (H): ICD-10-CM

## 2020-11-13 DIAGNOSIS — Z71.3 NUTRITIONAL COUNSELING: Primary | ICD-10-CM

## 2020-11-13 PROCEDURE — 99213 OFFICE O/P EST LOW 20 MIN: CPT | Mod: 95 | Performed by: INTERNAL MEDICINE

## 2020-11-13 PROCEDURE — 97803 MED NUTRITION INDIV SUBSEQ: CPT | Mod: 95 | Performed by: DIETITIAN, REGISTERED

## 2020-11-13 ASSESSMENT — MIFFLIN-ST. JEOR: SCORE: 1993.23

## 2020-11-13 ASSESSMENT — PAIN SCALES - GENERAL: PAINLEVEL: NO PAIN (0)

## 2020-11-13 NOTE — PROGRESS NOTES
"Laila Perez is a 70 year old adult who is being evaluated via a billable telephone visit.      The patient has been notified of following:     \"This telephone visit will be conducted via a call between you and your physician/provider. We have found that certain health care needs can be provided without the need for a physical exam.  This service lets us provide the care you need with a short phone conversation.  If a prescription is necessary we can send it directly to your pharmacy.  If lab work is needed we can place an order for that and you can then stop by our lab to have the test done at a later time.    Telephone visits are billed at different rates depending on your insurance coverage. During this emergency period, for some insurers they may be billed the same as an in-person visit.  Please reach out to your insurance provider with any questions.    If during the course of the call the physician/provider feels a telephone visit is not appropriate, you will not be charged for this service.\"    Patient has given verbal consent for Telephone visit?  Yes    What phone number would you like to be contacted at? [phone]    How would you like to obtain your AVS? Mail a copy    Phone call duration: 17 minutes      Weight Management Nutrition Consultation    Laila Perez is a 69 year old adult presents today for return weight management nutrition consultation.  Patient referred by Maki Lund NP on December 16, 2019, and Dr. Womack.    Patient with Co-morbidities of obesity including:  Type II DM no  Renal Failure no  Sleep apnea no  Hypertension yes   Dyslipidemia no  Joint pain no  Back pain no  GERD no     Anthropometrics:  Estimated body mass index is 44.66 kg/m  as calculated from the following:    Height as of an earlier encounter on 12/16/19: 1.803 m (5' 11\").    Weight as of an earlier encounter on 12/16/19: 145.2 kg (320 lb 3.2 oz).    Current weight per pt report: 267 lbs with BMI 37.25 " "(-15 lbs in the past 3 months, - 53 lbs (-16.5%) from initial)  - Checking her weight every 2 weeks      Pt states her goal is to be <250 lbs.    Medications for Weight Loss:  Topamax and phentermine     NUTRITION HISTORY  See MD note for details.    Info from initial consult:  - Likes to cook. Buys her own groceries.  - Works 3 days out of 2 weeks  - Told to clean plate when young, now working on putting extra food away right away.  - Eats softer foods, missing teeth and doesn't wear dentures.   - Likes peas, carrots, sour kraut. Uses a lot of canned fruit.    - Pt accompanied by residential staff member, Darby, today.     Since working with Laila, she has focused on consuming lean protein sources, increased non-starchy vegetable intake and has significantly reduced sugary beverage intake. She typically eats 2 meals per day, skipping lunch. She states that this is because she does not feel hungry for lunch. Occasionally she is snacking on sweets at night.     Over the past 3 months, has eliminated milk and bread intake. Staying away from baked goods. Estimates she is consuming 1000 calories per day. Reading food labels for calorie content. Pt reports she has stopped taking topiramate and phentermine, continues to lose weight and keep portion control and meal structure. Notes she more active now, feels like she can move easier and has more energy with lower weight. Pt is very excited to be near goal for surgery she has been waiting for.     Recent Diet Recall:  Breakfast: SF jello  Lunch: 2 pm - 2 cups rice, mushroom and hamburger hotdish  Dinner: 6 pm - potpie    HS Snack: Slimfast protein shake   Beverages: Water, black coffee (4 cups)   Dining Out: Mashed potato gravy and ham and bread     Progress Towards Previous Goals:  1) Eat 3 meals per day. Try not to skip meals. This may help with improving your energy. - Met with 2 meals and protein shake daily  2) Use the 9\" Plate method to build meal (1/2 plate " "non-starchy vegetables/fruit, 1/4 plate lean protein, 1/4 plate whole grain starch - no more than 1/2 cup carb/meal). - Improving, sometimes using frozen meal reaplcement  3) Avoid snacking between meals. Try to drink water first before snacking. If you have a sweet craving try - blended frozen banana, lite greek yogurt, fresh fruit - Met, avoiding snacking in general, or using SF option.   4) Eliminate juice intake. Drink calorie-free beverages only. - Met, continues   5) Consume at least 48 oz (3 water bottles per day) of water per day. - Met, continues   6) Use frozen vegetables instead of canned vegetables - Not discussed today, but had made the switch at previous visit.   7) Take a walk 3 times per week for at least 30 mins. - Improving      Physical Activity:  [employer] pool exercise for 30 mins one time per week. Relies on residential staff to take her to gym. Would like to go 2x/week, and thinks that can be arranged. Reduced regular exercise now with gym shut down. Pt plans to start taking more walks, doing squats in home, cleaning to compensate.     Nutrition Prescription  Recommended energy/nutrient modification.  Volumetrics/Plate Method    Nutrition Diagnosis  Obesity r/t long history of self-monitoring deficit and excessive energy intake aeb BMI >30. -improving    Nutrition Intervention  Materials/education provided:   - Reviewed previous goals.   - Provided encouragement and support. Praised pt on the positive changes to diet/lifestyle she has made/sustained.   - Encouraged continues meal pattern of 2 meals and one protein shake per day. Avoid snacking or using, low-natalya/sugar-free option.   - Patient demonstrates understanding.     Expected Enagement: good    Nutrition Goals  1) Continue to eat 2 meals and 1 protein shake (Slim Fast) daily  2) Use the 9\" Plate method to build meal (1/2 plate non-starchy vegetables/fruit, 1/4 plate lean protein, 1/4 plate whole grain starch - no more than 1/2 cup " carb/meal).  3) Avoid snacking between meals. Try to drink water first before snacking. If you have a sweet craving continue using sugar-free/low-calorie options.   4) Eliminate juice intake. Drink calorie-free beverages only.  5) Consume at least 48 oz (3 water bottles per day) of water per day.  6) Continue daily, regular physical activity.     Follow-Up:  3 months    Kahty Esteves RD, LD

## 2020-11-13 NOTE — NURSING NOTE
"Chief Complaint   Patient presents with     RECHECK     Return mwm.       Vitals:    11/13/20 1352   Weight: 121.1 kg (267 lb)   Height: 1.803 m (5' 11\")       Body mass index is 37.24 kg/m .                            NIKKI YUNG, EMT  "  girl with hemorrhagic ovarian cyst  spent time reassuring/counseling family  will plan for f/u in 1 month with repeat sono  Family counseled and follow-up arranged.

## 2020-11-13 NOTE — LETTER
"11/13/2020       RE: Laila Perez  [address] [facility] [address]     Dear Colleague,    Thank you for referring your patient, Laila Perez, to the [facility] [place] at [facility]. Please see a copy of my visit note below.    Laila Perez is a 70 year old adult who is being evaluated via a billable telephone visit.      The patient has been notified of following:     \"This telephone visit will be conducted via a call between you and your physician/provider. We have found that certain health care needs can be provided without the need for a physical exam.  This service lets us provide the care you need with a short phone conversation.  If a prescription is necessary we can send it directly to your pharmacy.  If lab work is needed we can place an order for that and you can then stop by our lab to have the test done at a later time.    Telephone visits are billed at different rates depending on your insurance coverage. During this emergency period, for some insurers they may be billed the same as an in-person visit.  Please reach out to your insurance provider with any questions.    If during the course of the call the physician/provider feels a telephone visit is not appropriate, you will not be charged for this service.\"    Patient has given verbal consent for Telephone visit?  Yes    What phone number would you like to be contacted at? [phone]    How would you like to obtain your AVS? Mail a copy    Phone call duration: 17 minutes      Weight Management Nutrition Consultation    Laila Perez is a 69 year old adult presents today for return weight management nutrition consultation.  Patient referred by Maki Lund NP on December 16, 2019, and Dr. Womack.    Patient with Co-morbidities of obesity including:  Type II DM no  Renal Failure no  Sleep apnea no  Hypertension yes   Dyslipidemia no  Joint pain " "no  Back pain no  GERD no     Anthropometrics:  Estimated body mass index is 44.66 kg/m  as calculated from the following:    Height as of an earlier encounter on 12/16/19: 1.803 m (5' 11\").    Weight as of an earlier encounter on 12/16/19: 145.2 kg (320 lb 3.2 oz).    Current weight per pt report: 267 lbs with BMI 37.25 (-15 lbs in the past 3 months, - 53 lbs (-16.5%) from initial)  - Checking her weight every 2 weeks      Pt states her goal is to be <250 lbs.    Medications for Weight Loss:  Topamax and phentermine     NUTRITION HISTORY  See MD note for details.    Info from initial consult:  - Likes to cook. Buys her own groceries.  - Works 3 days out of 2 weeks  - Told to clean plate when young, now working on putting extra food away right away.  - Eats softer foods, missing teeth and doesn't wear dentures.   - Likes peas, carrots, sour kraut. Uses a lot of canned fruit.    - Pt accompanied by residential staff member, Darby, today.     Since working with Laila, she has focused on consuming lean protein sources, increased non-starchy vegetable intake and has significantly reduced sugary beverage intake. She typically eats 2 meals per day, skipping lunch. She states that this is because she does not feel hungry for lunch. Occasionally she is snacking on sweets at night.     Over the past 3 months, has eliminated milk and bread intake. Staying away from baked goods. Estimates she is consuming 1000 calories per day. Reading food labels for calorie content. Pt reports she has stopped taking topiramate and phentermine, continues to lose weight and keep portion control and meal structure. Notes she more active now, feels like she can move easier and has more energy with lower weight. Pt is very excited to be near goal for surgery she has been waiting for.     Recent Diet Recall:  Breakfast: SF jello  Lunch: 2 pm - 2 cups rice, mushroom and hamburger hotdish  Dinner: 6 pm - potpie    HS Snack: Slimfast protein " "shake   Beverages: Water, black coffee (4 cups)   Dining Out: Mashed potato gravy and ham and bread     Progress Towards Previous Goals:  1) Eat 3 meals per day. Try not to skip meals. This may help with improving your energy. - Met with 2 meals and protein shake daily  2) Use the 9\" Plate method to build meal (1/2 plate non-starchy vegetables/fruit, 1/4 plate lean protein, 1/4 plate whole grain starch - no more than 1/2 cup carb/meal). - Improving, sometimes using frozen meal reaplcement  3) Avoid snacking between meals. Try to drink water first before snacking. If you have a sweet craving try - blended frozen banana, lite greek yogurt, fresh fruit - Met, avoiding snacking in general, or using SF option.   4) Eliminate juice intake. Drink calorie-free beverages only. - Met, continues   5) Consume at least 48 oz (3 water bottles per day) of water per day. - Met, continues   6) Use frozen vegetables instead of canned vegetables - Not discussed today, but had made the switch at previous visit.   7) Take a walk 3 times per week for at least 30 mins. - Improving      Physical Activity:  [employer] pool exercise for 30 mins one time per week. Relies on residential staff to take her to gym. Would like to go 2x/week, and thinks that can be arranged. Reduced regular exercise now with gym shut down. Pt plans to start taking more walks, doing squats in home, cleaning to compensate.     Nutrition Prescription  Recommended energy/nutrient modification.  Volumetrics/Plate Method    Nutrition Diagnosis  Obesity r/t long history of self-monitoring deficit and excessive energy intake aeb BMI >30. -improving    Nutrition Intervention  Materials/education provided:   - Reviewed previous goals.   - Provided encouragement and support. Praised pt on the positive changes to diet/lifestyle she has made/sustained.   - Encouraged continues meal pattern of 2 meals and one protein shake per day. Avoid snacking or using, low-natalya/sugar-free option. " "  - Patient demonstrates understanding.     Expected Enagement: good    Nutrition Goals  1) Continue to eat 2 meals and 1 protein shake (Slim Fast) daily  2) Use the 9\" Plate method to build meal (1/2 plate non-starchy vegetables/fruit, 1/4 plate lean protein, 1/4 plate whole grain starch - no more than 1/2 cup carb/meal).  3) Avoid snacking between meals. Try to drink water first before snacking. If you have a sweet craving continue using sugar-free/low-calorie options.   4) Eliminate juice intake. Drink calorie-free beverages only.  5) Consume at least 48 oz (3 water bottles per day) of water per day.  6) Continue daily, regular physical activity.     Follow-Up:  3 months    Kathy Esteves RD, LD    "

## 2020-11-13 NOTE — LETTER
"2020       RE: Laila Perez  [id number] [address] [address]     Dear Colleague,    Thank you for referring your patient, Laila Perez, to the [facility] [place] at [facility]. Please see a copy of my visit note below.    Return Medical Weight Management Note  Laila Perez  MRN:  [id number]  :  1950  FRANCIS:  2020    Dear Delmis Simons,    I had the pleasure of seeing your patient Laila Perez for follow-up consultation.  She is a 70 year old adult who I am continuing to see for treatment of obesity related to:       2019   I have the following health issues associated with obesity: High Blood Pressure   I have the following symptoms associated with obesity: Knee Pain     INTERVAL HISTORY:  Doing well, see below.     CURRENT WEIGHT:   267 lbs 0 oz    Wt Readings from Last 4 Encounters:   20 121.1 kg (267 lb)   20 122.7 kg (270 lb 9.6 oz)   20 127.9 kg (282 lb)   20 128.8 kg (284 lb)     Height:  5' 11\"  Body Mass Index:  Body mass index is 37.24 kg/m .  Vitals:  B/P: Data Unavailable, P: Data Unavailable, R: Data Unavailable     Diet Recall Review with Patient 2019   Do you typically eat lunch? Yes   If you do eat lunch, what types of food do you typically eat?  sandwich chipss   Do you typically eat supper? Yes   If you do eat supper, what types of food do you typically eat? meal that IMake   Do you typically eat snacks? No   If you do snack, what types of food do you typically eat? cookie   How many glasses of juice do you drink in a typical day? 0   How many of glasses of milk do you drink in a typical day? 2   If you do drink milk, what type? Whole   How many 8oz glasses of sugar containing drinks such as Carlos-Aid/sweet tea do you drink in a day? 0   How many cans/bottles of sugar pop/soda/tea/sports drinks do you drink in a day? 4   How many " cans/bottles of diet pop/soda/tea or sports drink do you drink in a day? 0   How often do you have a drink of alcohol? Never       MEDICATIONS:   Current Outpatient Medications   Medication     acetaminophen (TYLENOL) 500 MG tablet     Antiseptic Products, Misc. (UNI-SOLVE) PADS     atenolol (TENORMIN) 50 MG tablet     estradiol (VIVELLE-DOT) 0.1 MG/24HR bi-weekly patch     latanoprost (XALATAN) 0.005 % ophthalmic solution     lovastatin (MEVACOR) 20 MG tablet     Multiple Vitamin (DAILY-EVANGELISTA) TABS     order for DME     ORDER FOR DME     SM CALCIUM 600/VITAMIN D 600-400 MG-UNIT per tablet     timolol maleate (TIMOPTIC) 0.5 % ophthalmic solution     lisinopril (ZESTRIL) 5 MG tablet     No current facility-administered medications for this visit.        Weight Loss Medication History Reviewed With Patient 11/13/2020   Which weight loss medications are you currently taking on a regular basis?  None   If you are not taking a weight loss medication that was prescribed to you, please indicate why: I did not like the side effects   Are you having any side effects from the weight loss medication that we have prescribed you? Yes   If you are having side effects please describe: bladder problems       LABS:  Hemoglobin A1C   Date Value Ref Range Status   04/26/2016 4.8 4.3 - 6.0 % Final   04/26/2013 5.2 4.3 - 6.0 % Final     Cholesterol   Date Value Ref Range Status   07/08/2020 119 <200 mg/dL Final     TSH   Date Value Ref Range Status   05/02/2017 1.44 0.40 - 4.00 mU/L Final     Creatinine   Date Value Ref Range Status   03/02/2020 1.22 0.66 - 1.25 mg/dL Final     ALT   Date Value Ref Range Status   04/17/2019 19 0 - 70 U/L Final       ASSESSMENT:  Mr. Perez is a 70 year old adult with history of Class 3 obesity with current body mass index is 37.24 kg/m . and with current health consequences who presents for weight management follow-up consultation. Overall Laila Perez is doing well and is close to goal weight.      The following diagnoses are relevant to Laila Perez's history of obesity:     PLAN:    Problem   Obesity, Class III, Bmi 40-49.9 (Morbid Obesity) (H)    Will continue phentermine 15mg daily  Will start topiramate, 25mg and increasing to 75mg daily  Aug 2020: has been very physically active, weight down 20 pounds from April, now 282. Says have low energy during the day, so wants to eat more sugar.   Sleep: in bed at 8pm, falls asleep by 8:30pm, wakes at 4am-5pm. Naps from 3pm-3:30pm. Wakes for bathroom 3-4 times, falls asleep right after. Not sure if she snores.  Phentermine: takes 15mg every other day, and topiramate 75mg every evening. GFR was 60 in 3/2020. Has no history of kidney stones.   - will continue phentermine 15mg and topiramate 75mg daily  - home BP measures have been normal per patient  - goal for gender surgery is 250pounds  Nov 2020: weighs 267 today, goal is 250. Is very happy about this progress. Is exercising more and eating juan carlos when she gets hungry. Has stopped topiramate and phentermine because she wants to make this last push on her own without medications. She felt that they were making her have to urinate more often than before. This may be due to ketosis diuresis, but it is fine if she wants to be off of medications.   - supported her goals and healhty changes  - will hold phentermine and topiramate         No problem-specific Assessment & Plan notes found for this encounter.      No orders of the defined types were placed in this encounter.       With motivational interviewing, Laila took part in making the following plan:  There are no Patient Instructions on file for this visit.    FOLLOW-UP:    12 weeks.    Time: ** min spent on evaluation, management, counseling, education, & motivational interviewing with greater than 50 % of the total time was spent on counseling and coordinating care    Thank you for including me in the care of your patient.  Please do not hesitate to  "call with questions or concerns.    Sincerely,    Alexsandra Womack MD MPH  Diplomate, American Board of Obesity Medicine, American Board of Internal Medicine, American Board of Pediatrics    Departments of Internal Medicine and Pediatrics  [facility]        [unfilled]     Laila Alexandra Perez is a 70 year old adult who is being evaluated via a billable telephone visit.      The patient has been notified of following:     \"This telephone visit will be conducted via a call between you and your physician/provider. We have found that certain health care needs can be provided without the need for a physical exam.  This service lets us provide the care you need with a short phone conversation.  If a prescription is necessary we can send it directly to your pharmacy.  If lab work is needed we can place an order for that and you can then stop by our lab to have the test done at a later time.    Telephone visits are billed at different rates depending on your insurance coverage. During this emergency period, for some insurers they may be billed the same as an in-person visit.  Please reach out to your insurance provider with any questions.    If during the course of the call the physician/provider feels a telephone visit is not appropriate, you will not be charged for this service.\"    Patient has given verbal consent for Telephone visit?  Yes    What phone number would you like to be contacted at? [phone]    How would you like to obtain your AVS? [employer]    Phone call duration: 22 minutes    Alexsandra Womack MD  During this virtual visit the patient is located in [place], patient verifies this as the location during the entirety of this visit.     "

## 2020-11-13 NOTE — PROGRESS NOTES
"Return Medical Weight Management Note  Laila Perez  MRN:  [id number]  :  1950  FRANCIS:  2020    Dear Ronak, Delmis García,    I had the pleasure of seeing your patient Laila Perez for follow-up consultation.  She is a 70 year old adult who I am continuing to see for treatment of obesity related to:       2019   I have the following health issues associated with obesity: High Blood Pressure   I have the following symptoms associated with obesity: Knee Pain     INTERVAL HISTORY:  Doing well, see below.     CURRENT WEIGHT:   267 lbs 0 oz    Wt Readings from Last 4 Encounters:   20 121.1 kg (267 lb)   20 122.7 kg (270 lb 9.6 oz)   20 127.9 kg (282 lb)   20 128.8 kg (284 lb)     Height:  5' 11\"  Body Mass Index:  Body mass index is 37.24 kg/m .  Vitals:  B/P: Data Unavailable, P: Data Unavailable, R: Data Unavailable     Diet Recall Review with Patient 2019   Do you typically eat lunch? Yes   If you do eat lunch, what types of food do you typically eat?  sandwich chipss   Do you typically eat supper? Yes   If you do eat supper, what types of food do you typically eat? meal that IMake   Do you typically eat snacks? No   If you do snack, what types of food do you typically eat? cookie   How many glasses of juice do you drink in a typical day? 0   How many of glasses of milk do you drink in a typical day? 2   If you do drink milk, what type? Whole   How many 8oz glasses of sugar containing drinks such as Carlos-Aid/sweet tea do you drink in a day? 0   How many cans/bottles of sugar pop/soda/tea/sports drinks do you drink in a day? 4   How many cans/bottles of diet pop/soda/tea or sports drink do you drink in a day? 0   How often do you have a drink of alcohol? Never       MEDICATIONS:   Current Outpatient Medications   Medication     acetaminophen (TYLENOL) 500 MG tablet     Antiseptic Products, Misc. (UNI-SOLVE) PADS     atenolol (TENORMIN) 50 MG tablet     " estradiol (VIVELLE-DOT) 0.1 MG/24HR bi-weekly patch     latanoprost (XALATAN) 0.005 % ophthalmic solution     lovastatin (MEVACOR) 20 MG tablet     Multiple Vitamin (DAILY-EVANGELISTA) TABS     order for DME     ORDER FOR DME     SM CALCIUM 600/VITAMIN D 600-400 MG-UNIT per tablet     timolol maleate (TIMOPTIC) 0.5 % ophthalmic solution     lisinopril (ZESTRIL) 5 MG tablet     No current facility-administered medications for this visit.        Weight Loss Medication History Reviewed With Patient 11/13/2020   Which weight loss medications are you currently taking on a regular basis?  None   If you are not taking a weight loss medication that was prescribed to you, please indicate why: I did not like the side effects   Are you having any side effects from the weight loss medication that we have prescribed you? Yes   If you are having side effects please describe: bladder problems       LABS:  Hemoglobin A1C   Date Value Ref Range Status   04/26/2016 4.8 4.3 - 6.0 % Final   04/26/2013 5.2 4.3 - 6.0 % Final     Cholesterol   Date Value Ref Range Status   07/08/2020 119 <200 mg/dL Final     TSH   Date Value Ref Range Status   05/02/2017 1.44 0.40 - 4.00 mU/L Final     Creatinine   Date Value Ref Range Status   03/02/2020 1.22 0.66 - 1.25 mg/dL Final     ALT   Date Value Ref Range Status   04/17/2019 19 0 - 70 U/L Final       ASSESSMENT:  Mr. Perez is a 70 year old adult with history of Class 3 obesity with current body mass index is 37.24 kg/m . and with current health consequences who presents for weight management follow-up consultation. Overall Laila Perez is doing well and is close to goal weight.     The following diagnoses are relevant to Laila Perez's history of obesity:     PLAN:    Problem   Obesity, Class III, Bmi 40-49.9 (Morbid Obesity) (H)    Will continue phentermine 15mg daily  Will start topiramate, 25mg and increasing to 75mg daily  Aug 2020: has been very physically active, weight down 20 pounds  from April, now 282. Says have low energy during the day, so wants to eat more sugar.   Sleep: in bed at 8pm, falls asleep by 8:30pm, wakes at 4am-5pm. Naps from 3pm-3:30pm. Wakes for bathroom 3-4 times, falls asleep right after. Not sure if she snores.  Phentermine: takes 15mg every other day, and topiramate 75mg every evening. GFR was 60 in 3/2020. Has no history of kidney stones.   - will continue phentermine 15mg and topiramate 75mg daily  - home BP measures have been normal per patient  - goal for gender surgery is 250pounds  Nov 2020: weighs 267 today, goal is 250. Is very happy about this progress. Is exercising more and eating juan carlos when she gets hungry. Has stopped topiramate and phentermine because she wants to make this last push on her own without medications. She felt that they were making her have to urinate more often than before. This may be due to ketosis diuresis, but it is fine if she wants to be off of medications.   - supported her goals and healhty changes  - will hold phentermine and topiramate         No problem-specific Assessment & Plan notes found for this encounter.      No orders of the defined types were placed in this encounter.       With motivational interviewing, Laila took part in making the following plan:  There are no Patient Instructions on file for this visit.    FOLLOW-UP:    12 weeks.    Time: ** min spent on evaluation, management, counseling, education, & motivational interviewing with greater than 50 % of the total time was spent on counseling and coordinating care    Thank you for including me in the care of your patient.  Please do not hesitate to call with questions or concerns.    Sincerely,    Alexsandra Womack MD MPH  Diplomate, American Board of Obesity Medicine, American Board of Internal Medicine, American Board of Pediatrics    Departments of Internal Medicine and Pediatrics  [facility]        [unfilled]     Laila Morris  "Ana is a 70 year old adult who is being evaluated via a billable telephone visit.      The patient has been notified of following:     \"This telephone visit will be conducted via a call between you and your physician/provider. We have found that certain health care needs can be provided without the need for a physical exam.  This service lets us provide the care you need with a short phone conversation.  If a prescription is necessary we can send it directly to your pharmacy.  If lab work is needed we can place an order for that and you can then stop by our lab to have the test done at a later time.    Telephone visits are billed at different rates depending on your insurance coverage. During this emergency period, for some insurers they may be billed the same as an in-person visit.  Please reach out to your insurance provider with any questions.    If during the course of the call the physician/provider feels a telephone visit is not appropriate, you will not be charged for this service.\"    Patient has given verbal consent for Telephone visit?  Yes    What phone number would you like to be contacted at? [phone]    [facility]    Phone call duration: 22 minutes    Alexsandra Womack MD  During this virtual visit the patient is located in [place], patient verifies this as the location during the entirety of this visit.   "

## 2020-11-17 DIAGNOSIS — J90 PLEURAL EFFUSION: Primary | ICD-10-CM

## 2020-11-19 ENCOUNTER — PATIENT OUTREACH (OUTPATIENT)
Dept: GERIATRIC MEDICINE | Facility: CLINIC | Age: 70
End: 2020-11-19

## 2020-11-20 NOTE — PROGRESS NOTES
[facility] Care Coordination Contact    cc attempted to contact member to complete 6 month assessment.    Requested return call.    Alexandra Helton RN-[facility]   [phone]         [facility] Care Coordination Contact      [facility] Six-Month Telephone Assessment    6 month telephone assessment completed on 11/30/2020    ER visits: No  Hospitalizations: No  TCU stays: No  Significant health status changes: continues to be seen at [facility] for sexual transition.  Falls/Injuries: No  ADL/IADL changes: No  Changes in services: No    Caregiver Assessment follow up:  N/A    Goals: See POC in chart for goal progress documentation.      Will see member in 6 months for an annual health risk assessment.   Encouraged member to call CC with any questions or concerns in the meantime.     Alexandra Helton RN-[facility]   [phone]

## 2020-11-24 NOTE — TELEPHONE ENCOUNTER
Noé. Asked pt to call and schedule into one of Dr. Rosa's open appts in May to update her secondary letter of support before her procedure at the [facility] on 5/14/19.  
actual

## 2020-12-02 DIAGNOSIS — F64.0 GENDER DYSPHORIA IN ADOLESCENT AND ADULT: ICD-10-CM

## 2020-12-02 DIAGNOSIS — I10 HTN, GOAL BELOW 140/90: ICD-10-CM

## 2020-12-02 RX ORDER — ATENOLOL 50 MG/1
TABLET ORAL
Qty: 90 TABLET | Refills: 1 | Status: SHIPPED | OUTPATIENT
Start: 2020-12-02 | End: 2021-05-12

## 2020-12-03 RX ORDER — ESTRADIOL 0.1 MG/D
1 FILM, EXTENDED RELEASE TRANSDERMAL
Qty: 24 PATCH | Refills: 1 | Status: SHIPPED | OUTPATIENT
Start: 2020-12-03 | End: 2021-04-12 | Stop reason: DRUGHIGH

## 2021-01-12 ENCOUNTER — VIRTUAL VISIT (OUTPATIENT)
Dept: FAMILY MEDICINE | Facility: CLINIC | Age: 71
End: 2021-01-12
Payer: COMMERCIAL

## 2021-01-12 DIAGNOSIS — E66.01 OBESITY, CLASS III, BMI 40-49.9 (MORBID OBESITY) (H): ICD-10-CM

## 2021-01-12 PROCEDURE — 99214 OFFICE O/P EST MOD 30 MIN: CPT | Mod: 95 | Performed by: FAMILY MEDICINE

## 2021-01-12 ASSESSMENT — ENCOUNTER SYMPTOMS
FEVER: 0
FATIGUE: 0
PALPITATIONS: 0
PSYCHIATRIC NEGATIVE: 1
HEADACHES: 0
SHORTNESS OF BREATH: 0
CHILLS: 0

## 2021-01-12 NOTE — PROGRESS NOTES
Patient has given verbal consent for Telephone visit?  Yes         BOO Ulloa is a 70 year old individual that uses pronouns She/Her/Hers/Herself that presents today for follow up of:  feminizing hormone therapy.   Alone or accompanied by: accompanied today by      Gender identity: female  Started Hormone  therapy  2016  Continues on Vivelle dot 0.1 mg patch 2x/wk  Any special concerns today?    Had birth certificate gender marker changed  Gained a few pounds over holidays, but will start back [facility] for activity  Last weight around 267 lbs    On hormones?  YES +++ Shot day of the week? Not applicable-taking pills/patch/gel      Due for labs?  Yes      +++ Refills of meds needed?  Yes  Gender related body changes since last visit: stble    Breakthrough bleeding? Does Not Apply    New health concerns since last visit:  ---No new health concerns; new glaucoma medication    Past Surgical History:   Procedure Laterality Date     COLONOSCOPY  2007     JOINT REPLACEMTN, KNEE RT/LT  2006    left     ORCHIECTOMY SCROTAL Bilateral 5/14/2019    Procedure: Bilatera Scrotal Orchiectomy;  Surgeon: Abhilash Weston MD;  Location: [facility] OR       Patient Active Problem List   Diagnosis     Gouty arthropathy     Mental or behavioral problem     Generalized osteoarthrosis, unspecified site     Disorder of bone and cartilage     Mixed hyperlipidemia     OA (osteoarthritis) of knee     HYPERLIPIDEMIA LDL GOAL <100     DDD (degenerative disc disease), lumbar     Alcoholism (H)     Advanced directives, counseling/discussion     Varicose veins of legs     Psoriasis     HTN, goal below 140/90     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     Family history of diabetes mellitus in first degree relative     Mitral valve disorder     Nonrheumatic aortic valve stenosis     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     Obesity, Class III, BMI 40-49.9 (morbid obesity) (H)     Health Care Home     Multiple gastric ulcers     Recurrent major  depressive disorder, in partial remission (H)     Episodic tension-type headache, not intractable       Current Outpatient Medications   Medication Sig Dispense Refill     acetaminophen (TYLENOL) 500 MG tablet Take 1,000 mg by mouth       atenolol (TENORMIN) 50 MG tablet TAKE ONE TABLET BY MOUTH ONCE DAILY 90 tablet 1     estradiol (VIVELLE-DOT) 0.1 MG/24HR bi-weekly patch Place 1 patch onto the skin twice a week 24 patch 1     latanoprost (XALATAN) 0.005 % ophthalmic solution 1 drop At Bedtime.       lisinopril (ZESTRIL) 5 MG tablet Take 1 tablet (5 mg) by mouth daily 90 tablet 1     lovastatin (MEVACOR) 20 MG tablet TAKE ONE TABLET BY MOUTH ONCE A DAY AT BEDTIME 30 tablet 9     Multiple Vitamin (DAILY-EVANGELISTA) TABS Take 1 tablet by mouth daily  11     order for DME Equipment being ordered: [employer]var[employer] 232 Cotton Thigh High for Men 20-30 mmHg- TWO PAIR TAN COLOR 2 each 5     ORDER FOR DME Equipment being ordered:   BACK BRACE    [employer] Medical Back-n-Shape II Back Brace  Size XXXL with thermoplastic insert        1 each 0     SM CALCIUM 600/VITAMIN D 600-400 MG-UNIT per tablet Take 1 tablet by mouth 2 times daily 60 tablet 3     timolol maleate (TIMOPTIC) 0.5 % ophthalmic solution Apply 1 drop to eye         History   Smoking Status     Never Smoker   Smokeless Tobacco     Never Used          Allergies   Allergen Reactions     Penicillin [Esters] Itching     he is not sure what kind of reaction he had     Heparin      he lives in a assisted living program and is not sure what type of allergies he really has. He quit drinking     Zosyn [Penicillins]      unsure of reaction, allergy is listed on his med sheet       Health Maintenance Due   Topic Date Due     MENTAL HEALTH TX PLAN  01/04/2021         Problem, Medication and Allergy Lists were reviewed and are current..         Review of Systems:   Review of Systems   Constitutional: Negative for chills, fatigue and fever.   Respiratory: Negative for shortness of breath.     Cardiovascular: Negative for chest pain, palpitations and leg swelling.   Neurological: Negative for headaches.   Psychiatric/Behavioral: Negative.      Exam  Vitals copied forward from outside visit  Vital Signs 11/2/2020   Systolic 122   Diastolic 78   Pulse 51   Temperature 97.4   Respirations 18   Weight (LB) 270 lb 9.6 oz   Height    BMI (Calculated)    Pain    O2 96     Alert, NAD  Speaks easily in complete sentences  Mood euthymic         Labs:   Results from last visit:  Hospital Outpatient Visit on 10/05/2020   Component Date Value Ref Range Status     Creatinine 10/05/2020 0.8  0.66 - 1.25 mg/dL Final     GFR Estimate 10/05/2020 >90  >60 mL/min/[1.73_m2] Final     GFR Estimate If Black 10/05/2020 >90  >60 mL/min/[1.73_m2] Final       Assessment and Plan   1. Gender dysphoria s/p orchiectomy  2. Obesity  3. HTN  Stable on current dose estradiol  Continuing to lose weight with goal of bottom surgery  BP well controlled on current regimen  Would like to have next visit in person--agreed with patient can do if had vaccine, given her risk factors for COVID    Consider reduce estradiol dose next visit to life time maintenance dose  Introduced topic with patient and will discuss further next visit.    Phone call duration: 13* minutes        Results by mychart  Questions were elicited and answered.     Jluis Mane MD

## 2021-02-19 ENCOUNTER — VIRTUAL VISIT (OUTPATIENT)
Dept: PSYCHOLOGY | Facility: CLINIC | Age: 71
End: 2021-02-19
Payer: COMMERCIAL

## 2021-02-19 DIAGNOSIS — F64.0 GENDER DYSPHORIA IN ADOLESCENT AND ADULT: Primary | ICD-10-CM

## 2021-02-19 PROCEDURE — 90837 PSYTX W PT 60 MINUTES: CPT | Mod: 95 | Performed by: PSYCHOLOGIST

## 2021-02-21 NOTE — PROGRESS NOTES
"                      [facility] -  Case Progress Note  Client Name: Laila Perez  YOB: 1950  MRN:  [id number]  Treating Provider: Estelle Gomez LP  Type of Session: Individual  Present in Session: client   Number of Minutes:  55    Start time: 2:00 pm  End time: 2:55 pm    Client does not have Internet or technology to do video sessions.    The following was reviewed with the patient.   \"We have found that certain health care needs can be provided without the need for a face to face visit.  This service lets us provide the care you need with a phone conversation. I will have full access to your [facility] medical record during this entire phone call.   I will be taking notes for your medical record. Since this is like an office visit, we will bill your insurance company for this service. There are potential benefits and risks of telephone visits (e.g. limits to patient confidentiality) that differ from in-person visits.?  Confidentiality still applies for telephone services, and nobody will record the visit.  It is important to be in a quiet, private space that is free of distractions (including cell phone or other devices) during the visit.??If during the course of the call I believe a telephone visit is not appropriate, you will not be charged for this service\"    Consent has been obtained for this service by care team member: Yes      Treatment plan: 2/19/2021 updated tx plan next 3 months. DA update 11/4/2021    Health Maintenance Summary - Mental Health Treatment Plan       Status Date      MENTAL HEALTH TX PLAN Next Due 4/21/2021      Done 2/21/2021 [facility] MENTAL HEALTH TX PLAN SCAN     Declined 11/4/2020      Done 6/8/2020      Done 2/17/2020 [facility] MENTAL HEALTH TX PLAN SCAN     Done 8/28/2019      Patient has more history with this topic...            Date of Service: 2/19/21    Current Symptoms/Status:  Distress about gender and secondary sex characteristics, " client indicated sadness and anxiety related to her sister dying of COVID-19 and not being informed right away, she shared feeling angry and frustrated about continuing to have a penis, distress and frustration of having to wait for surgery, fear that surgery will not happen, some distress about increased isolation with the coronavirus, increase in stress eating which has resulted in weight gain which concerns her for her surgery goal.    Progress Toward Treatment Goals:   Client reported progress with working on managing eating and progressing to her goal of surgery.    Intervention: Modality and Description:  Provided support and feedback. Used CBT to process gender dysphoria concerns.  Client shared she was quite sad for a while.  She shared that in December she found out her sister  from COVID and no one had informed her.  This upset her as well because she did have regular contact with her sister.  She shared that she has been feeling some sadness since then and this is resulted in her increased eating to cope with these feelings.  She shared that she gained a bunch of weight and also feels disappointed because now she is further away from her needed goal weight so she can have her surgery.  Validated her recognition of having a difficult time and using food.  Discussed the importance of getting back on track which she is doing at this time.  She processed some of her loneliness and sadness with ongoing isolation from pandemic.  She has decided to work again 1/2-day a week so that she has some things to do.    Interactive Complexity:  There are four specific communication difficulties that complicate the work of the primary psychiatric procedure.  Interactive complexity (+18378) may be reported when at least one of these difficulties is present.    Communication difficulties present during current the psychiatric procedure include:  none      Response to Intervention:  Client reported  validation.    Assignment:  Follow surgeon guidelines, maintain balanced approach to weight loss.      Diagnosis:  Encounter Diagnosis   Name Primary?     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 Yes       Plan / Need for Future Services:  Return for therapy in 4 weeks.      Estelle Gomez PsyD, LP

## 2021-03-23 ENCOUNTER — VIRTUAL VISIT (OUTPATIENT)
Dept: PSYCHOLOGY | Facility: CLINIC | Age: 71
End: 2021-03-23
Payer: COMMERCIAL

## 2021-03-23 DIAGNOSIS — F64.0 GENDER DYSPHORIA IN ADOLESCENT AND ADULT: Primary | ICD-10-CM

## 2021-03-23 PROCEDURE — 99207 PR NO CHARGE LOS: CPT | Mod: TEL | Performed by: PSYCHOLOGIST

## 2021-03-23 PROCEDURE — 90834 PSYTX W PT 45 MINUTES: CPT | Mod: 95 | Performed by: PSYCHOLOGIST

## 2021-03-24 NOTE — PROGRESS NOTES
"                      [facility] -  Case Progress Note  Client Name: Laila Perez  YOB: 1950  MRN:  [id number]  Treating Provider: Estelle Gomez LP  Type of Session: Individual  Present in Session: client   Number of Minutes:  45  Start time: 2:00 pm  End time: 2:45 pm    Client does not have Internet or technology to do video sessions.    The following was reviewed with the patient.   \"We have found that certain health care needs can be provided without the need for a face to face visit.  This service lets us provide the care you need with a phone conversation. I will have full access to your [facility] medical record during this entire phone call.   I will be taking notes for your medical record. Since this is like an office visit, we will bill your insurance company for this service. There are potential benefits and risks of telephone visits (e.g. limits to patient confidentiality) that differ from in-person visits.?  Confidentiality still applies for telephone services, and nobody will record the visit.  It is important to be in a quiet, private space that is free of distractions (including cell phone or other devices) during the visit.??If during the course of the call I believe a telephone visit is not appropriate, you will not be charged for this service\"    Consent has been obtained for this service by care team member: Yes      Treatment plan: 2/19/2021 updated tx plan next 3 months. DA update 11/4/2021    Health Maintenance Summary - Mental Health Treatment Plan       Status Date      MENTAL HEALTH TX PLAN Next Due 4/21/2021      Done 2/21/2021 [facility] MENTAL HEALTH TX PLAN SCAN     Declined 11/4/2020      Done 6/8/2020      Done 2/17/2020 [facility] MENTAL HEALTH TX PLAN SCAN     Done 8/28/2019      Patient has more history with this topic...            Date of Service: 3/23/21    Current Symptoms/Status:  Distress about gender and secondary sex characteristics, client " indicated sadness and anxiety related to her sister dying of COVID-19 and not being informed right away, she shared feeling angry and frustrated about continuing to have a penis, distress and frustration of having to wait for surgery, fear that surgery will not happen, some distress about increased isolation with the coronavirus, increase in stress eating which has resulted in weight gain which concerns her for her surgery goal.    Progress Toward Treatment Goals:   Client reported progress with working on managing eating and progressing to her goal of surgery.    Intervention: Modality and Description:  Provided support and feedback. Used CBT to process gender dysphoria concerns.  Client shared she was quite sad for a while.  She shared that in December she found out her sister  from COVID and no one had informed her.  This upset her as well because she did have regular contact with her sister.  She shared she is trying to find out more information about her sister's death and estate. She shared having gained weight and thus it is going to postpone her surgery longer. She shared she has been eating more coping with loss of sister and long winter. She is trying to do some exercise. Is continuing to have consistent contact with children and grandchild.    Interactive Complexity:  There are four specific communication difficulties that complicate the work of the primary psychiatric procedure.  Interactive complexity (+[id number]) may be reported when at least one of these difficulties is present.    Communication difficulties present during current the psychiatric procedure include:  none      Response to Intervention:  Client reported validation.    Assignment:  Follow surgeon guidelines, maintain balanced approach to weight loss.      Diagnosis:  Encounter Diagnosis   Name Primary?     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 Yes       Plan / Need for Future Services:  Return for therapy in 4 weeks.      Estelle Clemente  Tiffanie Gomez, LP

## 2021-04-12 ENCOUNTER — OFFICE VISIT (OUTPATIENT)
Dept: FAMILY MEDICINE | Facility: CLINIC | Age: 71
End: 2021-04-12
Payer: COMMERCIAL

## 2021-04-12 ENCOUNTER — MEDICAL CORRESPONDENCE (OUTPATIENT)
Dept: HEALTH INFORMATION MANAGEMENT | Facility: CLINIC | Age: 71
End: 2021-04-12

## 2021-04-12 VITALS
DIASTOLIC BLOOD PRESSURE: 81 MMHG | HEIGHT: 71 IN | TEMPERATURE: 96.1 F | WEIGHT: 293 LBS | HEART RATE: 52 BPM | BODY MASS INDEX: 41.02 KG/M2 | SYSTOLIC BLOOD PRESSURE: 148 MMHG

## 2021-04-12 DIAGNOSIS — I35.0 AORTIC VALVE STENOSIS, ETIOLOGY OF CARDIAC VALVE DISEASE UNSPECIFIED: ICD-10-CM

## 2021-04-12 DIAGNOSIS — F64.0 GENDER DYSPHORIA IN ADOLESCENT AND ADULT: Primary | ICD-10-CM

## 2021-04-12 DIAGNOSIS — I10 ESSENTIAL HYPERTENSION: ICD-10-CM

## 2021-04-12 PROCEDURE — 99214 OFFICE O/P EST MOD 30 MIN: CPT | Performed by: FAMILY MEDICINE

## 2021-04-12 RX ORDER — ESTRADIOL 0.05 MG/D
1 PATCH, EXTENDED RELEASE TRANSDERMAL
Qty: 24 PATCH | Refills: 1 | Status: SHIPPED | OUTPATIENT
Start: 2021-04-12 | End: 2021-05-13

## 2021-04-12 ASSESSMENT — ENCOUNTER SYMPTOMS
CHILLS: 0
FEVER: 0
SHORTNESS OF BREATH: 0

## 2021-04-12 ASSESSMENT — MIFFLIN-ST. JEOR: SCORE: 2102.2

## 2021-04-27 PROBLEM — Z76.89 HEALTH CARE HOME: Status: ACTIVE | Noted: 2019-05-10

## 2021-05-12 DIAGNOSIS — I10 HTN, GOAL BELOW 140/90: ICD-10-CM

## 2021-05-12 RX ORDER — ATENOLOL 50 MG/1
TABLET ORAL
Qty: 90 TABLET | Refills: 1 | Status: SHIPPED | OUTPATIENT
Start: 2021-05-12 | End: 2021-11-17

## 2021-05-13 ENCOUNTER — OFFICE VISIT (OUTPATIENT)
Dept: FAMILY MEDICINE | Facility: CLINIC | Age: 71
End: 2021-05-13
Payer: COMMERCIAL

## 2021-05-13 VITALS
OXYGEN SATURATION: 96 % | SYSTOLIC BLOOD PRESSURE: 138 MMHG | WEIGHT: 293 LBS | RESPIRATION RATE: 14 BRPM | HEART RATE: 52 BPM | HEIGHT: 70 IN | BODY MASS INDEX: 41.95 KG/M2 | TEMPERATURE: 97.3 F | DIASTOLIC BLOOD PRESSURE: 82 MMHG

## 2021-05-13 DIAGNOSIS — E78.5 HYPERLIPIDEMIA LDL GOAL <100: ICD-10-CM

## 2021-05-13 DIAGNOSIS — D64.9 ANEMIA, UNSPECIFIED TYPE: ICD-10-CM

## 2021-05-13 DIAGNOSIS — F64.0 GENDER DYSPHORIA IN ADOLESCENT AND ADULT: ICD-10-CM

## 2021-05-13 DIAGNOSIS — E87.5 HYPERKALEMIA: ICD-10-CM

## 2021-05-13 DIAGNOSIS — I10 HTN, GOAL BELOW 140/90: Primary | ICD-10-CM

## 2021-05-13 DIAGNOSIS — Z12.11 COLON CANCER SCREENING: ICD-10-CM

## 2021-05-13 DIAGNOSIS — M19.91 PRIMARY OSTEOARTHRITIS, UNSPECIFIED SITE: ICD-10-CM

## 2021-05-13 LAB
ALBUMIN SERPL-MCNC: 3.8 G/DL (ref 3.4–5)
ALP SERPL-CCNC: 72 U/L (ref 40–150)
ALT SERPL W P-5'-P-CCNC: 18 U/L (ref 0–70)
ANION GAP SERPL CALCULATED.3IONS-SCNC: 1 MMOL/L (ref 3–14)
AST SERPL W P-5'-P-CCNC: 13 U/L (ref 0–45)
BASOPHILS # BLD AUTO: 0 10E9/L (ref 0–0.2)
BASOPHILS NFR BLD AUTO: 0.3 %
BILIRUB SERPL-MCNC: 0.5 MG/DL (ref 0.2–1.3)
BUN SERPL-MCNC: 16 MG/DL (ref 7–30)
CALCIUM SERPL-MCNC: 9.3 MG/DL (ref 8.5–10.1)
CHLORIDE SERPL-SCNC: 107 MMOL/L (ref 94–109)
CHOLEST SERPL-MCNC: 142 MG/DL
CO2 SERPL-SCNC: 30 MMOL/L (ref 20–32)
CREAT SERPL-MCNC: 0.97 MG/DL (ref 0.66–1.25)
DIFFERENTIAL METHOD BLD: ABNORMAL
EOSINOPHIL # BLD AUTO: 0.5 10E9/L (ref 0–0.7)
EOSINOPHIL NFR BLD AUTO: 7.8 %
ERYTHROCYTE [DISTWIDTH] IN BLOOD BY AUTOMATED COUNT: 13.1 % (ref 10–15)
GFR SERPL CREATININE-BSD FRML MDRD: 78 ML/MIN/{1.73_M2}
GLUCOSE SERPL-MCNC: 92 MG/DL (ref 70–99)
HCT VFR BLD AUTO: 41.8 % (ref 40–53)
HDLC SERPL-MCNC: 60 MG/DL
HGB BLD-MCNC: 13.7 G/DL (ref 13.3–17.7)
LDLC SERPL CALC-MCNC: 50 MG/DL
LYMPHOCYTES # BLD AUTO: 1.4 10E9/L (ref 0.8–5.3)
LYMPHOCYTES NFR BLD AUTO: 22.5 %
MCH RBC QN AUTO: 30.1 PG (ref 26.5–33)
MCHC RBC AUTO-ENTMCNC: 32.8 G/DL (ref 31.5–36.5)
MCV RBC AUTO: 92 FL (ref 78–100)
MONOCYTES # BLD AUTO: 0.4 10E9/L (ref 0–1.3)
MONOCYTES NFR BLD AUTO: 7 %
NEUTROPHILS # BLD AUTO: 3.7 10E9/L (ref 1.6–8.3)
NEUTROPHILS NFR BLD AUTO: 62.4 %
NONHDLC SERPL-MCNC: 82 MG/DL
PLATELET # BLD AUTO: 126 10E9/L (ref 150–450)
POTASSIUM SERPL-SCNC: 5.6 MMOL/L (ref 3.4–5.3)
PROT SERPL-MCNC: 7.8 G/DL (ref 6.8–8.8)
RBC # BLD AUTO: 4.55 10E12/L (ref 4.4–5.9)
SODIUM SERPL-SCNC: 138 MMOL/L (ref 133–144)
TRIGL SERPL-MCNC: 159 MG/DL
WBC # BLD AUTO: 6 10E9/L (ref 4–11)

## 2021-05-13 PROCEDURE — 36415 COLL VENOUS BLD VENIPUNCTURE: CPT | Performed by: NURSE PRACTITIONER

## 2021-05-13 PROCEDURE — 85025 COMPLETE CBC W/AUTO DIFF WBC: CPT | Performed by: NURSE PRACTITIONER

## 2021-05-13 PROCEDURE — 80061 LIPID PANEL: CPT | Performed by: NURSE PRACTITIONER

## 2021-05-13 PROCEDURE — 80053 COMPREHEN METABOLIC PANEL: CPT | Performed by: NURSE PRACTITIONER

## 2021-05-13 PROCEDURE — 99213 OFFICE O/P EST LOW 20 MIN: CPT | Performed by: NURSE PRACTITIONER

## 2021-05-13 RX ORDER — LOVASTATIN 20 MG
TABLET ORAL
Qty: 90 TABLET | Refills: 3 | Status: SHIPPED | OUTPATIENT
Start: 2021-05-13 | End: 2022-06-07

## 2021-05-13 RX ORDER — ESTRADIOL 0.05 MG/D
1 PATCH, EXTENDED RELEASE TRANSDERMAL
Qty: 24 PATCH | Refills: 1 | COMMUNITY
Start: 2021-05-13 | End: 2021-10-20

## 2021-05-13 ASSESSMENT — MIFFLIN-ST. JEOR: SCORE: 2090.29

## 2021-05-13 NOTE — PATIENT INSTRUCTIONS
Patient Education        Patient Education     Arthritis: Exercise     Look for exercise classes for arthritis in your community.     Exercise is important to your overall health. It is especially important in people with arthritis. Regular exercise can:    Keep your heart and blood vessels healthy    Help with weight management, or weight loss    Improve your mood    Help prevent and manage health problems such as:  ? Diabetes  ? High blood pressure  ? High cholesterol  ? Depression  In people with arthritis, it offers all of those benefits and it can:    Lessen pain and stiffness    Strengthen muscles that support your joints    Help you to be able to do the things you enjoy  Exercise and arthritis  Exercise is an important part of any arthritis treatment plan. A complete program consists of the following 3 types of exercises:    Aerobic exercises for cardiovascular health and overall fitness.     Strengthening exercises to build up muscles to help prevent injury and keep joints stable.    Range-of-motion exercises to keep muscles and joints flexible.  Getting started  Talk with your healthcare provider about what is safe for you. Make sure you:    Learn how to do exercises correctly and safely. Consider talking with a physical therapist or  used to working with people with arthritis.    Start slowly and build. If you haven't been exercising, start slowly. Don't exercise too hard or too long.    Create a routine. Set aside specific times for exercise every day.    Warm up carefully. Take 5 to 10 minutes at the beginning and end of exercising to warm up and cool down. Just do the same exercises at a slower pace for 5 to 10 minutes.    Work at a comfortable, smooth pace. Move your joints gently to prevent injury.    Pay attention to your body. Don't exercise a painful or swollen joint; switch to another activity. Follow the 2-hour pain rule: You did too much if your joint or muscle pain lasts 2 hours or  more after exercising, or is worse the next day. This doesn't mean you should stop exercising. Just do less.  Aerobic exercise  Aerobic exercise improves overall health and helps control weight. Choose those that don't add extra stress to your joints. For example, walking, swimming, or bicycling.  Most people should exercise for at least 30 minutes, most days of the week. You don't have to exercise all at once. Try exercising for 10 minutes, 3 times a day, for example.  Strengthening exercises  Strengthening your muscles helps to protect your joints and prevent injuries. Try to do strengthening exercises 2 to 3 times a week:    These exercises can be done with exercise or resistance bands (inexpensive exercise aids that add resistance), or with light weights. Some people use soup cans as weights.    Isometric exercises are done by tightening the muscles without moving the joint. This may be a good way to strengthen the muscles around a stiff joint.  A physical therapist or  can teach you how to do these exercises.  Range-of-motion (ROM) exercises  Range-of-motion (ROM) exercises allow you to move each of your joints in every way they are intended to move. You should do ROM exercises for each joint 2 to 3 times a day. This will help you maintain full use of all of your joints.  Sample ROM exercises  The following are just a sample of ROM exercises--one for your neck, shoulders, elbows, hips, knees, and ankles. To completely move each joint through its full range of motion, you will have to do a few exercises for each joint. A physical therapist or  can teach you how to do full ROM exercises for each joint.   Repeat each for these exercises 5 to 10 times. Make sure you move slowly:  1. Neck turns. Sit in a straight-backed chair. Look straight ahead. Slowly turn your head to the right, then return it to center. Repeat. Do the same thing, turning your head to the left. Repeat.  2. Shoulder raise. Lie on  your back or sit in a chair. Raise one arm over your head, keeping your elbow straight. Keep the arm close to your ear. Return it slowly to your side. Repeat with your other arm.  3. Elbow stretch. Sit in a chair. If you are able, put both arms out to your sides to form a T. Slowly touch your shoulders with the tips of your fingers. Then return to the T-position. Repeat.  4. Hip stretch. Lie on your back with your legs straight and about 6 inches apart. With your foot flexed, slide your leg out to the side, then slide it back to the starting position. Repeat with your other leg.  5. Knee bend. Sit in a chair with your legs bent at the knees in front of you. Straighten one leg as much as you can, then bring it back to the floor. Repeat this 5 to 10 times. Then do the same thing with the other leg.   6. Ankle stretch. Sit with your feet flat on the floor. Lift your toes off of the floor while your heels stay down. Repeat. Then lift your heels off the floor while your toes stay down. Repeat.  Other exercise  Many other exercise and activities benefit people with arthritis. It is most important to find exercise and activities that you enjoy. You might try:    Yoga, including chair yoga, helps to keep your joints strong and flexible    Brandon Chi, an ancient type of exercise with slow, gentle movements    Water exercise, including water walking  For more information on exercise for arthritis go to the [employer] website: www.arthritis.org.  [employer] last reviewed this educational content on 6/1/2018 2000-2021 [employer]. All rights reserved. This information is not intended as a substitute for professional medical care. Always follow your healthcare professional's instructions.           Patient Education     Low-Salt Choices  Eating salt (sodium) can make your body retain too much water. Extra water makes your heart work harder. Canned, packaged, and frozen foods are easy to prepare. But they  are often high in sodium. Here are some ideas for low-salt foods you can easily make yourself.   For breakfast    Fruit or 100% fruit juice. It's better to have whole fruit instead of 100% fruit juice.    Whole-wheat bread or an English muffin. Look for sodium content on Nutrition Facts labels.    Low-fat milk or yogurt    Unsalted eggs    Shredded wheat    Corn tortillas    Unsalted steamed rice    Regular (not instant) hot cereal, made without salt    Stay away from    Sausage, reed, and ham    Flour tortillas    Packaged muffins, pancakes, and biscuits    Instant hot cereals    Cottage cheese    For lunch and dinner    Fresh fish, chicken, turkey, or meat--baked, broiled, or roasted without salt    Dry beans, cooked without salt    Tofu, stir-fried without salt    Unsalted fresh fruit and vegetables, or frozen or canned fruit and vegetables with no added salt  Stay away from    Lunch or deli meat that is cured or smoked    Cheese    Tomato juice and ketchup    Canned vegetables, soups, and fish not labeled as no-salt-added or reduced sodium    Packaged gravies and sauces    Olives, pickles, and relish    Bottled salad dressings    For snacks and desserts    Yogurt    Unsalted, air-popped popcorn    Unsalted nuts or seeds  Stay away from    Pies and cakes    Packaged dessert mixes    Pizza    Canned and packaged puddings    Pretzels, chips, crackers, and nuts--unless the label says unsalted    [employer] last reviewed this educational content on 11/1/2019 2000-2021 [employer]. All rights reserved. This information is not intended as a substitute for professional medical care. Always follow your healthcare professional's instructions.           Patient Education     Lifestyle Changes to Control Cholesterol  You can control your cholesterol through diet, exercise, weight management, quitting smoking, stress management, and taking your medicines right. These things can also lower your risk for  cardiovascular disease.     Eating healthy  Your healthcare provider will give you information on diet changes you may need to make. Your provider may recommend that you see a registered dietitian for help with diet changes. Changes may include:     Cutting back on the amount of fat and cholesterol in your meals    Eating less salt (sodium). This is especially important if you also have high blood pressure.    Eating more fresh vegetables and fruits    Eating lean proteins such as fish, poultry, beans, and peas    Eating less red meat and processed meats    Using low-fat dairy products    Using vegetable and nut oils in limited amounts    Limiting how many sweets and processed foods like chips, cookies, and baked goods that you eat     Limiting how many sugar-sweetened beverages you drink    Limiting how often you eat out    Limiting alcohol  Getting exercise  Regular exercise is a good way to help your body control cholesterol. Regular exercise can help in many ways. It can:     Raise your good cholesterol    Help lower your bad cholesterol    Let blood flow better through your body    Give more oxygen to your muscles and tissues    Help you manage your weight    Help your heart pump better    Lower your blood pressure  Your healthcare provider may recommend that you get more physical activity if you haven't been active. Your provider may recommend that you get moderate to vigorous physical activity for at least 40 minutes each day. You should do this for at least 3 to 4 days each week. A few examples of moderate to vigorous activity are:     Walking at a brisk pace. This is about 3 to 4 miles per hour.    Jogging or running    Swimming or water aerobics    Hiking    Dancing    Martial arts    Tennis    Riding a bicycle or stationary bike    Dancing  Managing your weight  If you are overweight or obese, your healthcare provider will work with you to help you lose weight and lower your BMI (body mass index). Making  diet changes and getting more physical activity can help. Changing your diet will help you lose weight more easily than adding exercise.   Quitting smoking  Smoking and other tobacco use can raise cholesterol and make it harder to control. Quitting is tough. But millions of people have given up tobacco for good. You can quit, too! Think about some of the reasons below to quit smoking. Do any of them make you think twice about your smoking habit?   Stop smoking because it:    Keeps your cholesterol high, even if you make all the other changes you re supposed to    Damages your body. It especially harms your heart, lungs, skin, and blood vessels.    Makes you more likely to have a heart attack (acute myocardial infarction), stroke, or cancer    Stains your teeth    Makes your skin, clothes, and breath smell bad    Costs a lot of money  Ask your healthcare provider for medicines or nicotine replacement products to help you quit.   Controlling stress   Learn ways to control stress. This will help you deal with stress in your home and work life. Controlling stress can greatly lower your risk of getting cardiovascular disease.   Making the most of medicines  Healthy eating and exercise are a good start to keeping your cholesterol down. But you may need some extra help from medicine. If your doctor prescribes medicine, be sure to take it exactly as directed. Remember:     Tell your healthcare provider about all other medicines you take. This includes vitamins and herbs.    Tell your healthcare provider if you have any side effects after starting to take a medicine. Examples of side effects to watch for include muscle aches, weakness, blurred vision, rust-colored urine, yellowing of eyes or skin (jaundice), abdominal pain, and headache.    Don t skip a dose or stop taking your medicine because you feel better or because your cholesterol numbers go down. Never stop taking your medicine unless your healthcare provider has  told you it s OK.    Ask your healthcare provider if you have any questions about your medicines.  Risk groups  Some people may need to take medicines called statins to control their cholesterol. This is in addition to eating a healthy diet and getting regular exercise. The dose or intensity of the statin depends on your risk factors.   Statins can help you stay healthy. They can also help prevent a heart attack or stroke. You may need to take a statin if you are in one of the intermediate, high, or very-high risk groups or if you have a multiple risk (enhancers) conditions.   Calculating a risk score is done looking at certain risk factors that are considered as an increased risk for atherosclerosis. The risk calculator tool looks at:     Ages between 40-79 years of age    Gender    Race    Blood pressure (systolic and diastolic measurements)    Total cholesterol, good cholesterol (HDL), and bad cholesterol (LDL) levels    History of diabetes    Current or past smoking history    Treatment for high blood pressure    Treatment of cholesterol with a statin    Current use of aspirin  If you are uncertain about your risk factors, you and your provider may decide to proceed with a scan to determine a coronary artery calcium (CAC) score. Depending on the results of this scan you and your provider may decide on whether starting a medicine for cholesterol is the best treatment option for you.   Talk with your healthcare provider about your treatment goals. Make sure you understand why these goals are important, based on your own health history and your family history of heart disease or high cholesterol.   Make a plan to have regular cholesterol checks. You may need to fast before getting this test. Also ask your provider about any side effects your medicines may cause. Let your provider know about any side effects you have. You may need to take more than one medicine to reach the cholesterol goals that you and your  provider decide on.   [employer] last reviewed this educational content on 7/1/2019 2000-2021 [employer]. All rights reserved. This information is not intended as a substitute for professional medical care. Always follow your healthcare professional's instructions.           Patient Education     Eating Heart-Healthy Foods  Eating has a big impact on your heart health. In fact, eating healthier can improve several of your heart risks at once. For instance, it helps you manage weight, cholesterol, and blood pressure. Here are ideas to help you make heart-healthy changes without giving up all the foods and flavors you love.   Getting started    Talk with your healthcare provider about eating plans, such as the DASH or Mediterranean diet. You may also be referred to a dietitian.    Change a few things at a time. Give yourself time to get used to a few eating changes before adding more.    Work to create a tasty, healthy eating plan that you can stick to for the rest of your life.    Goals for healthy eating  Below are some tips to improve your eating habits:     Limit saturated fats and trans fats. Saturated fats raise your levels of cholesterol, so keep these fats to a minimum. They are found in foods such as fatty meats, whole milk, cheese, and palm and coconut oils. Avoid trans fats because they lower good cholesterol as well as raise bad cholesterol. Trans fats are most often found in processed foods, such as pastries, cookies, pies, muffins, fried foods, stick margarines, and shortening.    Reduce how much sodium (salt) you have. Eating too much salt may increase your blood pressure. Limit your sodium intake to 2,300 milligrams (mg) per day (the amount in 1 teaspoon of salt), or less if your healthcare provider recommends it. Dining out less often and eating fewer processed foods are two great ways to decrease the amount of salt you consume. At home, flavor your foods with other spices and herbs  instead of salt.    Managing calories. A calorie is a unit of energy. Your body burns calories for fuel, but if you eat more calories than your body burns, the extras are stored as fat. Your healthcare provider can help you create a diet plan to manage your calories. This will likely include eating healthier foods and getting regular exercise. To help you track your progress, keep a diary to record what you eat and how often you exercise.  Choose the right foods  Aim to make these foods staples of your diet. If you have diabetes, you may have different recommendations than what is listed here:     Fruits and vegetables provide plenty of nutrients without a lot of calories. At meals, fill half your plate with these foods. Choose between fresh, frozen, canned, or dried without added sauces, salt, or sugars. Split the other half of your plate between whole grains and lean protein.    Whole grains are high in fiber and rich in vitamins and nutrients. Good choices include whole wheat bread, pasta, oats, and brown rice. Make at least half of your grains whole grains.    Lean proteins give you nutrition with less fat. Good choices include fish, skinless chicken and turkey, and beans. Draining the fat from cooked ground meat is another way to reduce the amount of fat you eat.    Low-fat and nonfat dairy provide nutrients without a lot of fat. Try low-fat or nonfat milk, cheese, or yogurt.    Healthy fats can be good for you in small amounts. These are unsaturated fats, such as olive oil, nuts, and fish. Try to have at least 2 servings per week of fatty fish, such as salmon, sardines, mackerel, rainbow trout, and albacore tuna. These contain omega-3 fatty acids, which are good for your heart. Flaxseed and walnuts are other sources of heart-healthy fats.  More on heart-healthy eating  Read food labels  Healthy eating starts at the grocery store. Be sure to pay attention to food labels on packaged foods. Look for products that  are high in fiber and protein, and low in saturated fat, added sugars, and sodium. Avoid products that contain trans fat. And pay close attention to serving size. For instance, if you plan to eat two servings, double all the numbers on the label.   Prepare food right  A key part of healthy cooking is cutting down on added fat, sugar and salt. Look on the internet for lower-fat, lower-sodium recipes without a lot of added sugars. Also try these tips:     Remove fat from meat and skin from poultry before cooking.    Skim fat from the surface of soups and sauces.    Broil, roast, boil, bake, steam, grill, or microwave food without added fats.    Choose ingredients that spice up your food without adding calories, fat, sugar, or sodium. Try these items: horseradish, hot sauce, lemon, mustard, nonfat salad dressings, and vinegar. Small amounts of olive oil-based vinaigrettes are OK, too. For salt-free herbs and spices, try basil, cilantro, cinnamon, cumin, paprika, pepper, and rosemary.  [employer] last reviewed this educational content on 7/1/2020 2000-2021 [employer]. All rights reserved. This information is not intended as a substitute for professional medical care. Always follow your healthcare professional's instructions.           Patient Education     Aerobic Exercise for a Healthy Heart  Exercise is a lot more than an energy booster and a stress reliever. It also strengthens your heart muscle, lowers your blood pressure and cholesterol, and burns calories. It can also improve your resting muscle tone, and your mood.     Choose an aerobic activity  Choose an activity that makes your heart and lungs work harder than they do when you rest or walk normally. This aerobic exercise can improve the way your heart and other muscles use oxygen. Make it fun by exercising with a friend and choosing an activity you enjoy. Here are some ideas:    Walking    Swimming    Bicycling    Stair  climbing    Dancing    Jogging    Gardening  Exercise regularly  If you haven t been exercising regularly,  get your doctor s OK first. Then start slowly.  Here are some tips:    Begin exercising 3 times a week for 5 to 10 minutes at a time.    When you feel comfortable, add a few minutes each session.    Slowly build up to exercising 3 to 4 times each week. Each session should last for 40 minutes, on average, and involve moderate- to vigorous-intensity physical activity.    Your goal should be at least 30 minutes of moderate-intensity aerobic activity at least 5 days per week for a total of 150 or at least 25 minutes of vigorous aerobic activity at least 3 days per week for a total of 75 minutes    If you have been given nitroglycerin, be sure to carry it when you exercise.    If you get chest pain (angina) when you re exercising, stop what you re doing, take your nitroglycerin, and call your doctor.  [employer] last reviewed this educational content on 6/1/2019 2000-2021 [employer]. All rights reserved. This information is not intended as a substitute for professional medical care. Always follow your healthcare professional's instructions.           Patient Education     Prevention Guidelines, Men Ages 65 and Older  Screening tests and vaccines are an important part of managing your health.A screening test is done to find possible disorders or diseases in people who don't have any symptoms. The goal is to find a disease early so lifestyle changes can be made and you can be watched more closely to reduce the risk of disease, or to detect it early enough to treat it most effectively. Screening tests are not considered diagnostic, but are used to determine if more testing is needed.  Health counseling is essential, too. Below are guidelines for these, for men ages 65 and older. Talk with your healthcare provider to make sure you re up-to-date on what you need.  Screening Who needs it How often    Abdominal aortic aneurysm Men ages 65 to 75 who have ever smoked. Men in this age group who have never smoked could still be offered screening, depending on their family history or other risk factors they may have. 1-time ultrasound   Unhealthy alcohol use All men in this age group At routine exams   Blood pressure All men in this age group Yearly checkup if your blood pressure is normal  Normal blood pressure is less than 120/80 mm Hg  If your blood pressure reading is higher than normal, follow the advice of your healthcare provider   Colorectal cancer All men at average risk in this age group through age 75 who are in good health. For men ages 76 to 85, talk with your healthcare provider to see if you should continue screening. For men 85 and older, screening is not needed. Several tests are available and are used at different times. Possible tests include:    Flexible sigmoidoscopy every 5 years, or    Colonoscopy every 10 years, or    CT colonography (virtual colonoscopy) every 5 years, or    Yearly fecal occult blood test, or    Stool DNA test every 3 years  If you choose a test other than a colonoscopy and have an abnormal test result, you will need to have a colonoscopy. Screening recommendations vary among expert groups. Talk with your healthcare provider about which tests are best for you.  Some men should be screened using a different schedule because of their personal or family history. Talk with your healthcare provider about your health history.   Depression All men in this age group At routine exams   Type 2 diabetes or prediabetes All men beginning at age 45 and men without symptoms at any age who are overweight or obese and have one or more other risk factors for diabetes At least every 3 years (yearly if your blood sugar has already begun to rise)   Type 2 diabetes All men with prediabetes Every year   Hepatitis C Men at increased risk for infection; those born between 1945 and 1965 At routine  exams   High cholesterol or triglycerides All men in this age group At least every 5 years   HIV Men at increased risk for infection At routine exams   Lung cancer Men ages 55 to 74 who are in fairly good health and are at higher risk for lung cancer    Currently smoke or who have quite within the past 15 years    30-pack year smoking history  Eligibility criteria and age limit (possibly up to age 80) may vary across major organizations  Talk with your healthcare provider for more information Yearly lung screening in smokers with low-dose CT scan (LDCT)   Obesity All men in this age group At yearly routine exams   Prostate cancer All men in this age group, talk with your healthcare provider about risks and benefits of testing with digital rectal exam (ABHI) and prostate-specific antigen (PSA) screening At routine exams if you decide to be tested   Syphilis Men at increased risk for infection At routine exams   Tuberculosis Men at increased risk for infection Talk with your healthcare provider   Vision All men in this age group Every 1 to 2 years; if you have a chronic health condition, ask your healthcare provider if you need exams more often   Vaccine Who needs it How often   Chickenpox (varicella) All men in this age group who have no record of this infection or vaccine 2 doses; second dose should be given at least 4 weeks after the first dose   Hepatitis A Men at increased risk for infection 2 or 3 doses (depending on vaccine) given at least 6 months apart   Hepatitis B Men at increased risk for infection 2 or 3 doses (depending on the vaccine); second dose should be given 1 month after the first dose; if a third dose, it should be given at least 2 months after the second dose and at least 4 months after the first dose   Haemophilus influenzae type B (HIB) Men at increased risk for infection 1 to 3 doses   Influenza (flu) All men in this age group Once a year   Meningococcal Men at increased risk for infection 1,  2, or 3 doses (depending on vaccine); ask your healthcare provider if you need a booster dose   Pneumococcal conjugate vaccine (PCV13) and pneumococcal polysaccharide vaccine (PPSV23) PPSV 23: All men in this age group who have not been vaccinated or have not had infection  PCV 13: Men at risk for infection PPSV 23: 1 dose  PCV 13: 1 dose   Tetanus/diphtheria/pertussis (Td/Tdap) booster All men in this age group Td every 10 years, or a 1-time dose of Tdap instead of a Td booster, then Td every 10 years   Zoster (shingles) All men in this age group 2 vaccines are available:    Recombinant zoster vaccine (RZV; Shingrix) is the preferred shingles vaccine. It's given in a series of 2 doses. The second dose is given 2 to 6 months after the first. This is given even if you had shingles before or have had a zoster live vaccine in the past.    Zoster live vaccine (ZVL; Zostavax) may be given to healthy adults over age 60. It's given in one dose.   Counseling Who needs it How often   Diet and exercise Men who are overweight or obese When diagnosed, and then at routine exams   Fall prevention (exercise, vitamin D supplements) All men in this age group At yearly routine exams   Sexually transmitted infection Men at increased risk for infection At yearly routine exams   Use of daily aspirin Men up to age 70 who are at high risk for cardiovascular problems and not at an increased risk of bleeding as identified by your healthcare provider When your risk is known   Use of tobacco and the health effects it can cause All men in this age group Every visit   [employer] last reviewed this educational content on 4/1/2020 2000-2021 [employer]. All rights reserved. This information is not intended as a substitute for professional medical care. Always follow your healthcare professional's instructions.

## 2021-05-13 NOTE — PROGRESS NOTES
Assessment & Plan     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  Follow up with Dr Mane as planned.   Meds refilled today   - estradiol (VIVELLE-DOT) 0.05 MG/24HR bi-weekly patch; Place 1 patch onto the skin twice a week    Hyperlipidemia LDL goal <100  Continue statin  Fasting labs today  Increase activity and work on weight loss  - lovastatin (MEVACOR) 20 MG tablet; TAKE ONE TABLET BY MOUTH ONCE A DAY AT BEDTIME  - Lipid panel reflex to direct LDL Fasting    HTN, goal below 140/90  stable  - CBC with platelets and differential  - Comprehensive metabolic panel (BMP + Alb, Alk Phos, ALT, AST, Total. Bili, TP)    Hyperkalemia  Labs today to monitor   - CBC with platelets and differential  - Comprehensive metabolic panel (BMP + Alb, Alk Phos, ALT, AST, Total. Bili, TP)    Anemia, unspecified type  Labs today to monitor   - CBC with platelets and differential  - Comprehensive metabolic panel (BMP + Alb, Alk Phos, ALT, AST, Total. Bili, TP)    Primary osteoarthritis, unspecified site  Tylenol up to 3500 mg daily  Topical aspercream, lidocaine, biofreeze as needed.     Colon cancer screening  Due .. colonoscopy declined. To complete and return FIT cards.   - Fecal colorectal cancer screen FIT; Future      20 minutes spent on the date of the encounter doing chart review, history and exam, documentation and further activities per the note  Call or return to the clinic with any worsening of symptoms or no resolution. Patient/Parent verbalized understanding and is in agreement. Medication side effects reviewed.   Current Outpatient Medications   Medication Sig Dispense Refill     atenolol (TENORMIN) 50 MG tablet TAKE ONE TABLET BY MOUTH ONCE DAILY 90 tablet 1     estradiol (VIVELLE-DOT) 0.05 MG/24HR bi-weekly patch Place 1 patch onto the skin twice a week 24 patch 1     latanoprost (XALATAN) 0.005 % ophthalmic solution 1 drop At Bedtime.       lovastatin (MEVACOR) 20 MG tablet TAKE ONE TABLET BY MOUTH ONCE A DAY AT  BEDTIME 90 tablet 3     Multiple Vitamin (DAILY-EVANGELISTA) TABS Take 1 tablet by mouth daily  11     SM CALCIUM 600/VITAMIN D 600-400 MG-UNIT per tablet Take 1 tablet by mouth 2 times daily 60 tablet 3     timolol maleate (TIMOPTIC) 0.5 % ophthalmic solution Apply 1 drop to eye       acetaminophen (TYLENOL) 500 MG tablet Take 1,000 mg by mouth       order for DME Equipment being ordered: Sigvaris 232 Cotton Thigh High for Men 20-30 mmHg- TWO PAIR TAN COLOR 2 each 5     ORDER FOR DME Equipment being ordered:   BACK BRACE    MedSpec Back-n-Shape II Back Brace  Size XXXL with thermoplastic insert        1 each 0     Chart documentation with Dragon Voice recognition Software. Although reviewed after completion, some words and grammatical errors may remain.    JERRICA Salcedo [facility]    Juana Ulloa is a 71 year old who presents for the following health issues     HPI     Hyperlipidemia Follow-Up      Are you regularly taking any medication or supplement to lower your cholesterol?   No    Are you having muscle aches or other side effects that you think could be caused by your cholesterol lowering medication?  No   LDL Cholesterol Calculated   Date Value Ref Range Status   2020 38 <100 mg/dL Final     Comment:     Desirable:       <100 mg/dl             Hypertension Follow-up      Do you check your blood pressure regularly outside of the clinic? Yes     Are you following a low salt diet? No    Are your blood pressures ever more than 140 on the top number (systolic) OR more   than 90 on the bottom number (diastolic), for example 140/90? Yes  BP Readings from Last 3 Encounters:   21 138/82   21 (!) 148/81   20 122/78       COVID vaccine done in February   Sister and her   from COVID          Review of Systems   Constitutional, HEENT, cardiovascular, pulmonary, GI, , musculoskeletal, neuro, skin, endocrine and psych systems are negative,  "except as otherwise noted.      Objective    /82   Pulse 52   Temp 97.3  F (36.3  C) (Tympanic)   Resp 14   Ht 1.778 m (5' 10\")   Wt 132.9 kg (293 lb)   SpO2 96%   BMI 42.04 kg/m    Body mass index is 42.04 kg/m .  Physical Exam   GENERAL: healthy, alert and no distress  EYES: Eyes grossly normal to inspection, PERRL and conjunctivae and sclerae normal  HENT: ear canals and TM's normal, nose and mouth without ulcers or lesions  NECK: no adenopathy, no asymmetry, masses, or scars and thyroid normal to palpation  RESP: lungs clear to auscultation - no rales, rhonchi or wheezes  CV: regular rate and rhythm, normal S1 S2, no S3 or S4, no murmur, click or rub, no peripheral edema and peripheral pulses strong  ABDOMEN: soft, nontender, no hepatosplenomegaly, no masses and bowel sounds normal  MS: no gross musculoskeletal defects noted, no edema  SKIN: no suspicious lesions or rashes  NEURO: Normal strength and tone, mentation intact and speech normal  PSYCH: mentation appears normal, affect normal/bright  LYMPH: no cervical, supraclavicular, axillary, or inguinal adenopathy    FASTING LABS PENDING     Results for orders placed or performed in visit on 05/13/21 (from the past 24 hour(s))   CBC with platelets and differential   Result Value Ref Range    WBC 6.0 4.0 - 11.0 10e9/L    RBC Count 4.55 4.4 - 5.9 10e12/L    Hemoglobin 13.7 13.3 - 17.7 g/dL    Hematocrit 41.8 40.0 - 53.0 %    MCV 92 78 - 100 fl    MCH 30.1 26.5 - 33.0 pg    MCHC 32.8 31.5 - 36.5 g/dL    RDW 13.1 10.0 - 15.0 %    Platelet Count 126 (L) 150 - 450 10e9/L    % Neutrophils 62.4 %    % Lymphocytes 22.5 %    % Monocytes 7.0 %    % Eosinophils 7.8 %    % Basophils 0.3 %    Absolute Neutrophil 3.7 1.6 - 8.3 10e9/L    Absolute Lymphocytes 1.4 0.8 - 5.3 10e9/L    Absolute Monocytes 0.4 0.0 - 1.3 10e9/L    Absolute Eosinophils 0.5 0.0 - 0.7 10e9/L    Absolute Basophils 0.0 0.0 - 0.2 10e9/L    Diff Method Automated Method                "

## 2021-05-14 DIAGNOSIS — D69.6 THROMBOCYTOPENIA (H): ICD-10-CM

## 2021-05-14 DIAGNOSIS — E87.5 HYPERKALEMIA: Primary | ICD-10-CM

## 2021-05-17 ENCOUNTER — HOSPITAL ENCOUNTER (OUTPATIENT)
Dept: CARDIOLOGY | Facility: CLINIC | Age: 71
Discharge: HOME | End: 2021-05-17
Attending: INTERNAL MEDICINE | Admitting: INTERNAL MEDICINE
Payer: COMMERCIAL

## 2021-05-17 DIAGNOSIS — I35.0 AORTIC VALVE STENOSIS, UNSPECIFIED ETIOLOGY: ICD-10-CM

## 2021-05-17 PROCEDURE — 93306 TTE W/DOPPLER COMPLETE: CPT | Mod: 26 | Performed by: INTERNAL MEDICINE

## 2021-05-17 PROCEDURE — 93306 TTE W/DOPPLER COMPLETE: CPT

## 2021-05-18 NOTE — RESULT ENCOUNTER NOTE
EF 55-60% (60-65% on 7/2020 echo); mod concentric LVH (borderline on 7/2020 echo); mod to severe aortic stenosis with mean gradient of 35.4 mmHg and ARISTEO 0.99 cm2 (1.1 cm2 on 7/2020 echo); ascending aorta mod dilated at 4.4 cm. Follow up with Dr Ibrahim on 6/10/21

## 2021-05-19 ENCOUNTER — PATIENT OUTREACH (OUTPATIENT)
Dept: OTHER | Age: 71
End: 2021-05-19

## 2021-05-19 NOTE — Clinical Note
Jameson is doing well. [facility] completed in May. No concerns at this time.    Alexandra Helton RN-[facility]   [phone]

## 2021-05-28 ENCOUNTER — RECORDS - HEALTHEAST (OUTPATIENT)
Dept: ADMINISTRATIVE | Facility: CLINIC | Age: 71
End: 2021-05-28

## 2021-05-29 ENCOUNTER — RECORDS - HEALTHEAST (OUTPATIENT)
Dept: ADMINISTRATIVE | Facility: CLINIC | Age: 71
End: 2021-05-29

## 2021-06-08 ENCOUNTER — VIRTUAL VISIT (OUTPATIENT)
Dept: PSYCHOLOGY | Facility: CLINIC | Age: 71
End: 2021-06-08
Payer: COMMERCIAL

## 2021-06-08 DIAGNOSIS — F64.0 GENDER DYSPHORIA IN ADOLESCENT AND ADULT: Primary | ICD-10-CM

## 2021-06-08 PROCEDURE — 90834 PSYTX W PT 45 MINUTES: CPT | Mod: TEL | Performed by: PSYCHOLOGIST

## 2021-06-08 PROCEDURE — 99207 PR NO CHARGE LOS: CPT | Performed by: PSYCHOLOGIST

## 2021-06-10 ENCOUNTER — OFFICE VISIT (OUTPATIENT)
Dept: CARDIOLOGY | Facility: CLINIC | Age: 71
End: 2021-06-10
Attending: INTERNAL MEDICINE
Payer: COMMERCIAL

## 2021-06-10 VITALS
WEIGHT: 293 LBS | DIASTOLIC BLOOD PRESSURE: 69 MMHG | HEART RATE: 56 BPM | SYSTOLIC BLOOD PRESSURE: 121 MMHG | OXYGEN SATURATION: 94 % | BODY MASS INDEX: 42.73 KG/M2 | TEMPERATURE: 97.3 F

## 2021-06-10 DIAGNOSIS — I71.20 THORACIC AORTIC ANEURYSM WITHOUT RUPTURE (H): Primary | ICD-10-CM

## 2021-06-10 DIAGNOSIS — I35.0 AORTIC VALVE STENOSIS, UNSPECIFIED ETIOLOGY: ICD-10-CM

## 2021-06-10 PROCEDURE — 99214 OFFICE O/P EST MOD 30 MIN: CPT | Performed by: INTERNAL MEDICINE

## 2021-06-10 NOTE — LETTER
"6/10/2021    Delmis Simons, APRN CNP  [address] [facility] [id number]    RE: Laila Perez       Dear Colleague,    I had the pleasure of seeing Laila Perez in the [facility].    HPI and Plan:     Ms. Laila Perez is 71 years old and follows for a history of aortic stenosis and mild aortic dilation.     She is feeling very well and notes that she has had her Covid vaccination.  She is caring for her vegetable garden again this year.  She grows peppers and she is also growing corn and this year. She denies shortness of breath or decreased exertional tolerance.     She has been planning full gender reassignment surgery for some time and can complete her surgery when she reaches 250 pounds.  A  murmur has previously been identified and aortic stenosis diagnosed by echocardiography.  Repeat echocardiography for this visit demonstrated a mean gradient of 35 mmHg with a calculated aortic valve area of about 1 cm  -essentially unchanged from last year's echocardiogram.  She has normal left ventricular systolic performance and a trileaflet aortic valve.  An MRA was done during October 2020 through radiology at [facility] and demonstrated the mid-ascending aorta to be 4.4 x 4.3 cm.  The Sinus measurements were not noted and cannot be done through [facility].  She had evidence of right rib fractures and a right pleural effusion on that study and her primary clinic was contacted.  The findings were the result of \"rolling\" her 4 oleary and she has determined to be more careful in the future.  .  She has not had chest, neck, arm or back discomfort.  In preparation for planned surgery in 2018, a stress echocardiogram was done.  There was no evidence of ischemia and she was able to exercise 6 minutes.  The blood pressure response to exercise was normal.  She is on lipid-lowering therapy and her LDL cholesterol is month was 50 mg/dL with an HDL of 60 " mg/dL.  Her blood pressure have been normal.        Exam:  The blood pressure was 107/71 mmHg.  This is a woman in no apparent distress.  The chest was clear to auscultation.  On cardiac auscultation, there was an S1 and a grade 2/6 mid-peaking systolic murmur which extended to but not through S2.  There was no S4.  The rhythm was regular.    Assessment/Plan:  In assessment, Ms. Garcia is doing quite well.  We discussed the aortic stenosis today and I suggested that she is likely to need aortic valve replacement in the next 1 to 2 years.  We briefly discussed the options of surgery versus transcatheter aortic valve replacement.  I recommended a repeat echocardiogram in 6 months.  I have also arranged for follow-up in this clinic given my impending [facility].    A repeat MRA to reassess the aorta is recommended for 1 year which will be October 2021.  Ideally, this would be done through cardiology but if she cannot travel further than [facility], then the sinuses as well as the mid a sending aorta should be measured.  She has no known family history of aortic valve or aortic disease.          Encounter Diagnosis   Name Primary?     Aortic valve stenosis, unspecified etiology        CURRENT MEDICATIONS:  Current Outpatient Medications   Medication Sig Dispense Refill     acetaminophen (TYLENOL) 500 MG tablet Take 1,000 mg by mouth       atenolol (TENORMIN) 50 MG tablet TAKE ONE TABLET BY MOUTH ONCE DAILY 90 tablet 1     estradiol (VIVELLE-DOT) 0.05 MG/24HR bi-weekly patch Place 1 patch onto the skin twice a week 24 patch 1     latanoprost (XALATAN) 0.005 % ophthalmic solution 1 drop At Bedtime.       lovastatin (MEVACOR) 20 MG tablet TAKE ONE TABLET BY MOUTH ONCE A DAY AT BEDTIME 90 tablet 3     Multiple Vitamin (DAILY-EVANGELISTA) TABS Take 1 tablet by mouth daily  11     SM CALCIUM 600/VITAMIN D 600-400 MG-UNIT per tablet Take 1 tablet by mouth 2 times daily (Patient taking differently: daily ) 60 tablet 3     timolol  maleate (TIMOPTIC) 0.5 % ophthalmic solution Apply 1 drop to eye       order for DME Equipment being ordered: Sigvaris 232 Cotton Thigh High for Men 20-30 mmHg- TWO PAIR TAN COLOR 2 each 5     ORDER FOR DME Equipment being ordered:   BACK BRACE    MedSpec Back-n-Shape II Back Brace  Size XXXL with thermoplastic insert        1 each 0       ALLERGIES     Allergies   Allergen Reactions     Penicillin [Esters] Itching     he is not sure what kind of reaction he had     Heparin      he lives in a assisted living program and is not sure what type of allergies he really has. He quit drinking     Zosyn [Penicillins]      unsure of reaction, allergy is listed on his med sheet       PAST MEDICAL HISTORY:  Past Medical History:   Diagnosis Date     Alcoholism (H)      DDD (degenerative disc disease), cervical      Gender dysphoria in adult      HLD (hyperlipidemia)      HTN (hypertension)      large compression fracture 3/13/2006    Worked up for neoplasm, none found.       OA (osteoarthritis of spine)      Unspecified internal derangement of knee     CHRONIC KNEE PAIN,LEG,NECK AND HEAD INJURIES       PAST SURGICAL HISTORY:  Past Surgical History:   Procedure Laterality Date     COLONOSCOPY  2007     JOINT REPLACEMTN, KNEE RT/LT  2006    left     ORCHIECTOMY SCROTAL Bilateral 5/14/2019    Procedure: Bilatera Scrotal Orchiectomy;  Surgeon: Abhilash Weston MD;  Location: [facility] OR       FAMILY HISTORY:  Family History   Problem Relation Age of Onset     Cancer Father         lung cancer heavy smoker       SOCIAL HISTORY:  Social History     Socioeconomic History     Marital status:      Spouse name: None     Number of children: None     Years of education: None     Highest education level: None   Occupational History     None   Social Needs     Financial resource strain: None     Food insecurity     Worry: None     Inability: None     Transportation needs     Medical: None     Non-medical: None   Tobacco Use     Smoking  status: Never Smoker     Smokeless tobacco: Never Used   Substance and Sexual Activity     Alcohol use: No     Comment: Quit 3/24/05     Drug use: No     Sexual activity: Not Currently   Lifestyle     Physical activity     Days per week: None     Minutes per session: None     Stress: None   Relationships     Social connections     Talks on phone: None     Gets together: None     Attends [employer] service: None     Active member of club or organization: None     Attends meetings of clubs or organizations: None     Relationship status: None     Intimate partner violence     Fear of current or ex partner: None     Emotionally abused: None     Physically abused: None     Forced sexual activity: None   Other Topics Concern     Parent/sibling w/ CABG, MI or angioplasty before 65F 55M? Not Asked   Social History Narrative     None       Review of Systems:  Skin:  Negative       Eyes:  Positive for glasses;glaucoma;cataracts    ENT:  Negative for hearing loss;epistaxis;vertigo    Respiratory:  Negative for shortness of breath;cough;sleep apnea     Cardiovascular:  Negative for;palpitations;chest pain;edema      Gastroenterology: Negative for vomiting;heartburn;nausea    Genitourinary:  Negative      Musculoskeletal:  Positive for arthritis    Neurologic:  Negative      Psychiatric:  Negative for sleep disturbances;anxiety;depression    Heme/Lymph/Imm:  Negative for anemia;bleeding disorder    Endocrine:  Negative for thyroid disorder;diabetes      Physical Exam:  Vitals: /69 (BP Location: Right arm, Patient Position: Sitting, Cuff Size: Adult Large)   Pulse 56   Temp 97.3  F (36.3  C) (Tympanic)   Wt 135.1 kg (297 lb 12.8 oz)   SpO2 94%   BMI 42.73 kg/m      Constitutional:  cooperative, alert and oriented, well developed, well nourished, in no acute distress        Skin:  warm and dry to the touch          Head:  normocephalic        Eyes:  sclera white        Lymph:      ENT:           Neck:            Respiratory:  normal breath sounds, clear to auscultation, normal A-P diameter, normal symmetry, normal respiratory excursion, no use of accessory muscles         Cardiac: regular rhythm;normal S1 and S2;no S3 or S4              2/6 mid-systolic peaking systolic murmur at the mid to upper left sternal border.                                           GI:           Extremities and Muscular Skeletal:                 Neurological:  no gross motor deficits;affect appropriate        Psych:  Alert and Oriented x 3;affect appropriate, oriented to time, person and place          Thank you for allowing me to participate in the care of your patient.      Sincerely,     Tia Ibrahim MD     [facility]    cc:   Tia Ibrahim MD  [address] [place] [id number]

## 2021-06-10 NOTE — PROGRESS NOTES
"HPI and Plan:     Ms. Laila Perez is 71 years old and follows for a history of aortic stenosis and mild aortic dilation.     She is feeling very well and notes that she has had her Covid vaccination.  She is caring for her vegetable garden again this year.  She grows peppers and she is also growing corn and this year. She denies shortness of breath or decreased exertional tolerance.     She has been planning full gender reassignment surgery for some time and can complete her surgery when she reaches 250 pounds.  A  murmur has previously been identified and aortic stenosis diagnosed by echocardiography.  Repeat echocardiography for this visit demonstrated a mean gradient of 35 mmHg with a calculated aortic valve area of about 1 cm  -essentially unchanged from last year's echocardiogram.  She has normal left ventricular systolic performance and a trileaflet aortic valve.  An MRA was done during October 2020 through radiology at [facility] and demonstrated the mid-ascending aorta to be 4.4 x 4.3 cm.  The Sinus measurements were not noted and cannot be done through [facility].  She had evidence of right rib fractures and a right pleural effusion on that study and her primary clinic was contacted.  The findings were the result of \"rolling\" her 4 oleary and she has determined to be more careful in the future.  .  She has not had chest, neck, arm or back discomfort.  In preparation for planned surgery in 2018, a stress echocardiogram was done.  There was no evidence of ischemia and she was able to exercise 6 minutes.  The blood pressure response to exercise was normal.  She is on lipid-lowering therapy and her LDL cholesterol is month was 50 mg/dL with an HDL of 60 mg/dL.  Her blood pressure have been normal.        Exam:  The blood pressure was 107/71 mmHg.  This is a woman in no apparent distress.  The chest was clear to auscultation.  On cardiac auscultation, there was an S1 and a grade 2/6 mid-peaking systolic murmur " which extended to but not through S2.  There was no S4.  The rhythm was regular.    Assessment/Plan:  In assessment, Ms. Garcia is doing quite well.  We discussed the aortic stenosis today and I suggested that she is likely to need aortic valve replacement in the next 1 to 2 years.  We briefly discussed the options of surgery versus transcatheter aortic valve replacement.  I recommended a repeat echocardiogram in 6 months.  I have also arranged for follow-up in this clinic given my impending assisted.    A repeat MRA to reassess the aorta is recommended for 1 year which will be October 2021.  Ideally, this would be done through cardiology but if she cannot travel further than [facility], then the sinuses as well as the mid a sending aorta should be measured.  She has no known family history of aortic valve or aortic disease.          Encounter Diagnosis   Name Primary?     Aortic valve stenosis, unspecified etiology        CURRENT MEDICATIONS:  Current Outpatient Medications   Medication Sig Dispense Refill     acetaminophen (TYLENOL) 500 MG tablet Take 1,000 mg by mouth       atenolol (TENORMIN) 50 MG tablet TAKE ONE TABLET BY MOUTH ONCE DAILY 90 tablet 1     estradiol (VIVELLE-DOT) 0.05 MG/24HR bi-weekly patch Place 1 patch onto the skin twice a week 24 patch 1     latanoprost (XALATAN) 0.005 % ophthalmic solution 1 drop At Bedtime.       lovastatin (MEVACOR) 20 MG tablet TAKE ONE TABLET BY MOUTH ONCE A DAY AT BEDTIME 90 tablet 3     Multiple Vitamin (DAILY-EVANGELISTA) TABS Take 1 tablet by mouth daily  11     SM CALCIUM 600/VITAMIN D 600-400 MG-UNIT per tablet Take 1 tablet by mouth 2 times daily (Patient taking differently: daily ) 60 tablet 3     timolol maleate (TIMOPTIC) 0.5 % ophthalmic solution Apply 1 drop to eye       order for DME Equipment being ordered: [employer] 232 Cotton Thigh High for Men 20-30 mmHg- TWO PAIR TAN COLOR 2 each 5     ORDER FOR DME Equipment being ordered:   BACK BRACE    [employer]  Back-n-Shape II Back Brace  Size XXXL with thermoplastic insert        1 each 0       ALLERGIES     Allergies   Allergen Reactions     Penicillin [Esters] Itching     he is not sure what kind of reaction he had     Heparin      he lives in a assisted living program and is not sure what type of allergies he really has. He quit drinking     Zosyn [Penicillins]      unsure of reaction, allergy is listed on his med sheet       PAST MEDICAL HISTORY:  Past Medical History:   Diagnosis Date     Alcoholism (H)      DDD (degenerative disc disease), cervical      Gender dysphoria in adult      HLD (hyperlipidemia)      HTN (hypertension)      large compression fracture 3/13/2006    Worked up for neoplasm, none found.       OA (osteoarthritis of spine)      Unspecified internal derangement of knee     CHRONIC KNEE PAIN,LEG,NECK AND HEAD INJURIES       PAST SURGICAL HISTORY:  Past Surgical History:   Procedure Laterality Date     COLONOSCOPY  2007     JOINT REPLACEMTN, KNEE RT/LT  2006    left     ORCHIECTOMY SCROTAL Bilateral 5/14/2019    Procedure: Bilatera Scrotal Orchiectomy;  Surgeon: Abhilash Weston MD;  Location:  OR       FAMILY HISTORY:  Family History   Problem Relation Age of Onset     Cancer Father         lung cancer heavy smoker       SOCIAL HISTORY:  Social History     Socioeconomic History     Marital status:      Spouse name: None     Number of children: None     Years of education: None     Highest education level: None   Occupational History     None   Social Needs     Financial resource strain: None     Food insecurity     Worry: None     Inability: None     Transportation needs     Medical: None     Non-medical: None   Tobacco Use     Smoking status: Never Smoker     Smokeless tobacco: Never Used   Substance and Sexual Activity     Alcohol use: No     Comment: Quit 3/24/05     Drug use: No     Sexual activity: Not Currently   Lifestyle     Physical activity     Days per week: None     Minutes  per session: None     Stress: None   Relationships     Social connections     Talks on phone: None     Gets together: None     Attends Orthodoxy service: None     Active member of club or organization: None     Attends meetings of clubs or organizations: None     Relationship status: None     Intimate partner violence     Fear of current or ex partner: None     Emotionally abused: None     Physically abused: None     Forced sexual activity: None   Other Topics Concern     Parent/sibling w/ CABG, MI or angioplasty before 65F 55M? Not Asked   Social History Narrative     None       Review of Systems:  Skin:  Negative       Eyes:  Positive for glasses;glaucoma;cataracts    ENT:  Negative for hearing loss;epistaxis;vertigo    Respiratory:  Negative for shortness of breath;cough;sleep apnea     Cardiovascular:  Negative for;palpitations;chest pain;edema      Gastroenterology: Negative for vomiting;heartburn;nausea    Genitourinary:  Negative      Musculoskeletal:  Positive for arthritis    Neurologic:  Negative      Psychiatric:  Negative for sleep disturbances;anxiety;depression    Heme/Lymph/Imm:  Negative for anemia;bleeding disorder    Endocrine:  Negative for thyroid disorder;diabetes      Physical Exam:  Vitals: /69 (BP Location: Right arm, Patient Position: Sitting, Cuff Size: Adult Large)   Pulse 56   Temp 97.3  F (36.3  C) (Tympanic)   Wt 135.1 kg (297 lb 12.8 oz)   SpO2 94%   BMI 42.73 kg/m      Constitutional:  cooperative, alert and oriented, well developed, well nourished, in no acute distress        Skin:  warm and dry to the touch          Head:  normocephalic        Eyes:  sclera white        Lymph:      ENT:           Neck:           Respiratory:  normal breath sounds, clear to auscultation, normal A-P diameter, normal symmetry, normal respiratory excursion, no use of accessory muscles         Cardiac: regular rhythm;normal S1 and S2;no S3 or S4              2/6 mid-systolic peaking systolic  murmur at the mid to upper left sternal border.                                           GI:           Extremities and Muscular Skeletal:                 Neurological:  no gross motor deficits;affect appropriate        Psych:  Alert and Oriented x 3;affect appropriate, oriented to time, person and place          CC  Tia Ibrahim MD  [address]  [place] [id number]

## 2021-06-14 NOTE — PROGRESS NOTES
"                      [facility] -  Case Progress Note  Client Name: Laila Perze  YOB: 1950  MRN:  [id number]  Treating Provider: Estelle Gomez LP  Type of Session: Individual  Present in Session: client   Number of Minutes:  45  Start time: 3:00 pm  End time: 3:45 pm    Client does not have Internet or technology to do video sessions.    The following was reviewed with the patient.   \"We have found that certain health care needs can be provided without the need for a face to face visit.  This service lets us provide the care you need with a phone conversation. I will have full access to your [facility] medical record during this entire phone call.   I will be taking notes for your medical record. Since this is like an office visit, we will bill your insurance company for this service. There are potential benefits and risks of telephone visits (e.g. limits to patient confidentiality) that differ from in-person visits.?  Confidentiality still applies for telephone services, and nobody will record the visit.  It is important to be in a quiet, private space that is free of distractions (including cell phone or other devices) during the visit.??If during the course of the call I believe a telephone visit is not appropriate, you will not be charged for this service\"    Consent has been obtained for this service by care team member: Yes      Treatment plan: 6/08/2021  Therapist and patient/guardian/family reviewed the Treatment Plan and goals, agree the plan remains current, accurate, and there are no additions or changes.      Health Maintenance Summary - Mental Health Treatment Plan       Status Date      MENTAL HEALTH TX PLAN Overdue 4/21/2021      Done 2/21/2021 HIM MENTAL HEALTH TX PLAN SCAN     Declined 11/4/2020      Done 6/8/2020      Done 2/17/2020 HIM MENTAL HEALTH TX PLAN SCAN     Done 8/28/2019      Patient has more history with this topic...            Date of " Service: 6/08/21    Current Symptoms/Status:  Distress about gender and secondary sex characteristics, distress about weight gain and not meeting her weight loss goal for surgery, he shared feeling angry and frustrated about continuing to have a penis, distress and frustration of having to wait for surgery, fear that surgery will not happen, some distress about increased isolation with the coronavirus, increase in stress eating which has resulted in weight gain which concerns her for her surgery goal.    Progress Toward Treatment Goals:   Client reported progress returning to working on weight loss.    Intervention: Modality and Description:  Provided support and feedback. Used CBT to process gender dysphoria concerns.  Client shared she is back to working on her weight loss goals.  She shared that during the time of COVID-19 and the death of her sister she did a lot of overeating and gained a considerable amount of weight.  She shared that she has lost some weight since then but talked about her frustration with needing to have the goal of weight loss to have her surgery.  She shared that either way she plans to contact the surgeon this fall and wants to demand having surgery.  Informed her that Dr. Holm is no longer at the [facility].  Discussed what she wanted for surgery and she stated having the penis removed and vaginoplasty.  Had some difficulty understanding with the client how much depth that she wanted because this could make a difference in possible surgeons.  She asked if she should start calling and asking different clinics for referrals for surgery and I told her know that should come through us when she meets her goals.  We discussed a possible referral to the [facility] and also trying to get more information on her thoughts about the surgery so we could make a better decision.  Client shared feeling angry about having to lose weight to get a surgery that is essential to her.      Interactive  Complexity:  There are four specific communication difficulties that complicate the work of the primary psychiatric procedure.  Interactive complexity (+49100) may be reported when at least one of these difficulties is present.    Communication difficulties present during current the psychiatric procedure include:  none      Response to Intervention:  Client reported validation.    Assignment:  Follow surgeon guidelines, maintain balanced approach to weight loss.      Diagnosis:  Encounter Diagnosis   Name Primary?     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 Yes       Plan / Need for Future Services:  Return for therapy in 4 weeks.      Estelle Gomez PsyD, LP

## 2021-06-16 DIAGNOSIS — Z12.11 COLON CANCER SCREENING: ICD-10-CM

## 2021-06-16 PROCEDURE — 82274 ASSAY TEST FOR BLOOD FECAL: CPT | Performed by: NURSE PRACTITIONER

## 2021-06-20 LAB — HEMOCCULT STL QL IA: NEGATIVE

## 2021-06-20 SDOH — ECONOMIC STABILITY: FOOD INSECURITY: WITHIN THE PAST 12 MONTHS, THE FOOD YOU BOUGHT JUST DIDN'T LAST AND YOU DIDN'T HAVE MONEY TO GET MORE.: PATIENT DECLINED

## 2021-06-20 SDOH — SOCIAL STABILITY: SOCIAL NETWORK: HOW OFTEN DO YOU GET TOGETHER WITH FRIENDS OR RELATIVES?: PATIENT DECLINED

## 2021-06-20 SDOH — ECONOMIC STABILITY: TRANSPORTATION INSECURITY
IN THE PAST 12 MONTHS, HAS LACK OF TRANSPORTATION KEPT YOU FROM MEETINGS, WORK, OR FROM GETTING THINGS NEEDED FOR DAILY LIVING?: PATIENT DECLINED

## 2021-06-20 SDOH — ECONOMIC STABILITY: TRANSPORTATION INSECURITY
IN THE PAST 12 MONTHS, HAS THE LACK OF TRANSPORTATION KEPT YOU FROM MEDICAL APPOINTMENTS OR FROM GETTING MEDICATIONS?: PATIENT DECLINED

## 2021-06-20 SDOH — SOCIAL STABILITY: SOCIAL NETWORK: ARE YOU MARRIED, WIDOWED, DIVORCED, SEPARATED, NEVER MARRIED, OR LIVING WITH A PARTNER?: PATIENT DECLINED

## 2021-06-20 SDOH — HEALTH STABILITY: PHYSICAL HEALTH: ON AVERAGE, HOW MANY MINUTES DO YOU ENGAGE IN EXERCISE AT THIS LEVEL?: PATIENT DECLINED

## 2021-06-20 SDOH — SOCIAL STABILITY: SOCIAL NETWORK
DO YOU BELONG TO ANY CLUBS OR ORGANIZATIONS SUCH AS CHURCH GROUPS UNIONS, FRATERNAL OR ATHLETIC GROUPS, OR SCHOOL GROUPS?: PATIENT DECLINED

## 2021-06-20 SDOH — ECONOMIC STABILITY: FOOD INSECURITY: WITHIN THE PAST 12 MONTHS, YOU WORRIED THAT YOUR FOOD WOULD RUN OUT BEFORE YOU GOT MONEY TO BUY MORE.: PATIENT DECLINED

## 2021-06-20 SDOH — HEALTH STABILITY: PHYSICAL HEALTH
ON AVERAGE, HOW MANY DAYS PER WEEK DO YOU ENGAGE IN MODERATE TO STRENUOUS EXERCISE (LIKE A BRISK WALK)?: PATIENT DECLINED

## 2021-06-20 SDOH — SOCIAL STABILITY: SOCIAL INSECURITY: WITHIN THE LAST YEAR, HAVE YOU BEEN AFRAID OF YOUR PARTNER OR EX-PARTNER?: PATIENT DECLINED

## 2021-06-20 SDOH — SOCIAL STABILITY: SOCIAL INSECURITY
WITHIN THE LAST YEAR, HAVE YOU BEEN HUMILIATED OR EMOTIONALLY ABUSED IN OTHER WAYS BY YOUR PARTNER OR EX-PARTNER?: PATIENT DECLINED

## 2021-06-20 SDOH — SOCIAL STABILITY: SOCIAL NETWORK: IN A TYPICAL WEEK, HOW MANY TIMES DO YOU TALK ON THE PHONE WITH FAMILY, FRIENDS, OR NEIGHBORS?: PATIENT DECLINED

## 2021-06-20 SDOH — ECONOMIC STABILITY: INCOME INSECURITY: HOW HARD IS IT FOR YOU TO PAY FOR THE VERY BASICS LIKE FOOD, HOUSING, MEDICAL CARE, AND HEATING?: NOT HARD AT ALL

## 2021-06-20 SDOH — HEALTH STABILITY: MENTAL HEALTH
STRESS IS WHEN SOMEONE FEELS TENSE, NERVOUS, ANXIOUS, OR CAN'T SLEEP AT NIGHT BECAUSE THEIR MIND IS TROUBLED. HOW STRESSED ARE YOU?: ONLY A LITTLE

## 2021-06-20 SDOH — SOCIAL STABILITY: SOCIAL INSECURITY
WITHIN THE LAST YEAR, HAVE YOU BEEN KICKED, HIT, SLAPPED, OR OTHERWISE PHYSICALLY HURT BY YOUR PARTNER OR EX-PARTNER?: PATIENT DECLINED

## 2021-06-20 SDOH — SOCIAL STABILITY: SOCIAL NETWORK: HOW OFTEN DO YOU ATTENT MEETINGS OF THE CLUB OR ORGANIZATION YOU BELONG TO?: PATIENT DECLINED

## 2021-06-20 SDOH — SOCIAL STABILITY: SOCIAL NETWORK: HOW OFTEN DO YOU ATTEND CHURCH OR RELIGIOUS SERVICES?: PATIENT DECLINED

## 2021-06-20 SDOH — SOCIAL STABILITY: SOCIAL INSECURITY
WITHIN THE LAST YEAR, HAVE TO BEEN RAPED OR FORCED TO HAVE ANY KIND OF SEXUAL ACTIVITY BY YOUR PARTNER OR EX-PARTNER?: PATIENT DECLINED

## 2021-06-20 ASSESSMENT — ACTIVITIES OF DAILY LIVING (ADL): DEPENDENT_IADLS:: INDEPENDENT

## 2021-06-21 NOTE — PROGRESS NOTES
[facility] Care Coordination Contact    [facility] Home Visit Assessment     Home visit for Health Risk Assessment with Laila Perez completed on May 19, 2021    Type of residence:: Apartment  Current living arrangement:: I live alone, I live in a private home     Assessment completed with:: Patient    Current Care Plan  Member currently receiving the following home care services:   None  Member currently receiving the following community resources: None    Medication Review  Medication reconciliation completed in Epic: Yes  Medication set-up & administration: Independent and sets up on own weekly.  Self-administers medications.  Medication Risk Assessment Medication (1 or more, place referral to MTM): N/A: No risk factors identified  MTM Referral Placed: No: Not interested    Mental/Behavioral Health   Depression Screening:   PHQ-2 Total Score (Adult) - Positive if 3 or more points; Administer PHQ-9 if positive: 0       Mental health DX:: Yes   Mental health DX how managed:: Medication, Other    Falls Assessment:   Fallen 2 or more times in the past year?: No   Any fall with injury in the past year?: No    ADL/IADL Dependencies:   Dependent ADLs:: Independent  Dependent IADLs:: Independent    [facility] Health Plan sponsored benefits: Shared information re: Silver Sneakers/gym memberships, ASA, Calcium +D.    PCA Assessment completed at visit: Not Applicable     Elderly Waiver Eligibility: CADI    Care Plan & Recommendations: Member will review Advance Directive information and complete as desired. No additional needs.     cc contacted [employer] cc Kimberly, requested forwarding of CSSP, etc. ([employer] cc have not completed follow through in the past and send the info as requested.  cc to f/u again if not received.)    See CC for detailed assessment information.    Follow-Up Plan: Member informed of future contact, plan to f/u with member with a 6 month telephone assessment.  Contact information shared with  member and family, encouraged member to call with any questions or concerns at any time.    [place] care continuum providers: Please refer to Health Care Home on the Epic Problem List to view this patient's [facility] Care Plan Summary.    Alexandra Helton RN-[facility]   [phone]

## 2021-06-22 ENCOUNTER — PATIENT OUTREACH (OUTPATIENT)
Dept: GERIATRIC MEDICINE | Facility: CLINIC | Age: 71
End: 2021-06-22

## 2021-06-22 NOTE — LETTER
[facility] Advance Care Planning       Laila Perez  [address] [address] [place] [id number]      Dear Alexandra Ulloa, RN-BC shared with me your interest in receiving information on Advance Care Planning and Health Care Directives. Discussing and making decisions about this part of our health is very important.  A Health Care Directive is a written document that outlines your goals, values, beliefs and choices for health care and medical treatment in the event you are unable to speak for yourself.     We greatly value the opportunity to assist you in documenting your choices and to honor your   wishes. We ve enclosed educational materials to help you get started thinking about your values and goals. We have several options for additional resources:       Health Care Directives and Advance Care Planning resources can be viewed and printed   for free at our web site:  [email or web address].       Free group classes on Advance Care Planning and completing a Health Care Directive are available at multiple locations and times. These classes are led by trained staff who will provide information and guide you through a Health Care Directive.  They can also review, notarize and add your Health Care Directive to your medical record. [place] for a class at [email or web address] or by calling [email or web address] at [phone] or toll free [phone].      COPIES of completed Health Care Directives can be brought or mailed to any of our   locations, including the address listed below. You can also email a copy to [email or web address] .      Email or call me at the contact information listed below for questions, assistance, or to   make an appointment to discuss creating a Health Care Directive. You can also contact   our [facility] Department for questions or assistance.       Sincerely,     Yolanda Humphrey  Care Management Specialist   [facility]

## 2021-06-22 NOTE — LETTER
June 22, 2021      LAILA AMEZQUITA  [address] [address] [place] [id number]      Dear Laila:    At [facility], we are dedicated to improving your health and well-being. Enclosed is the Comprehensive Care Plan that we developed with you on 5/19/21. Please review the Care Plan carefully.    As a reminder, some of the things we discussed at your visit include:    Your physical and mental health    Ways to reduce falls    Health care needs you may have    Don t forget to contact your care coordinator if you:    Have been hospitalized or plan to be hospitalized     Have had a fall     Have experienced a change in physical health    Are experiencing emotional problems     If you do not agree with your Care Plan, have questions about it, or have experienced a change in your needs, please call me at [phone]. If you are hearing impaired, please call the [place] [employer] at 842 or [phone] (yqswmi-ui-mhgqzl relay service).    Sincerely,    REMY Johnston    E-mail: [email or web address]  Phone: [phone]      [facility] ([facility]) is a health plan that contracts with both Medicare and the [place] Medical Assistance (Medicaid) program to provide benefits of both programs to enrollees. Enrollment in [facility] depends on contract renewal.    [id number])     [id number] (11/18)

## 2021-06-22 NOTE — PROGRESS NOTES
[facility] Care Coordination Contact    Mailed copy of care plan to client.  Updated services in access as needed.     Mailed POC sig age to member to signa and return in SASE.     Mailed HCD letter and educational materials per CC.     Yolanda Humphrey  Care Management Specialist   [facility]   [phone]    EOV given to CMS today- date of assmt 5/19/21

## 2021-07-29 ENCOUNTER — VIRTUAL VISIT (OUTPATIENT)
Dept: PSYCHOLOGY | Facility: CLINIC | Age: 71
End: 2021-07-29
Payer: COMMERCIAL

## 2021-07-29 DIAGNOSIS — F64.0 GENDER DYSPHORIA IN ADULT: Primary | ICD-10-CM

## 2021-07-29 PROCEDURE — 90834 PSYTX W PT 45 MINUTES: CPT | Mod: 95 | Performed by: PSYCHOLOGIST

## 2021-07-29 PROCEDURE — 99207 PR NO CHARGE LOS: CPT | Performed by: PSYCHOLOGIST

## 2021-08-01 NOTE — PROGRESS NOTES
"                      [facility] -  Case Progress Note  Client Name: Laila Perez  YOB: 1950  MRN:  [id number]  Treating Provider: Estelle Gomez LP  Type of Session: Individual  Present in Session: client   Number of Minutes:  45  Start time: 3:00 pm  End time: 3:45 pm    Client does not have Internet or technology to do video sessions.    The following was reviewed with the patient.   \"We have found that certain health care needs can be provided without the need for a face to face visit.  This service lets us provide the care you need with a phone conversation. I will have full access to your [facility] medical record during this entire phone call.   I will be taking notes for your medical record. Since this is like an office visit, we will bill your insurance company for this service. There are potential benefits and risks of telephone visits (e.g. limits to patient confidentiality) that differ from in-person visits.?  Confidentiality still applies for telephone services, and nobody will record the visit.  It is important to be in a quiet, private space that is free of distractions (including cell phone or other devices) during the visit.??If during the course of the call I believe a telephone visit is not appropriate, you will not be charged for this service\"    Consent has been obtained for this service by care team member: Yes      Treatment plan: 6/08/2021  Therapist and patient/guardian/family reviewed the Treatment Plan and goals, agree the plan remains current, accurate, and there are no additions or changes.      Health Maintenance Summary - Mental Health Treatment Plan       Status Date      MENTAL HEALTH TX PLAN Next Due 8/15/2021      Done 6/15/2021 [facility] MENTAL HEALTH TX PLAN SCAN     Done 2/21/2021 HIM MENTAL HEALTH TX PLAN SCAN     Declined 11/4/2020      Done 6/8/2020      Done 2/17/2020 HIM MENTAL HEALTH TX PLAN SCAN     Patient has more history with " this topic...            Date of Service: 7/29/21    Current Symptoms/Status:  Distress about gender and secondary sex characteristics, mild anxiety, frustration, and grief with not being able to achieve weight loss to be able to have further gender affirming surgery.    Progress Toward Treatment Goals:   Client reported progress with accepting she may not reach her weight loss goals    Intervention: Modality and Description:  Provided support and feedback. Used CBT to process gender dysphoria concerns.  Client shared she has gained more weight with dealing with grief and enjoying eating.  She stated that she has made progress with excepting that she may never make the weight loss goal to have surgery.  She shared she has been doing thinking about excepting that she is female even though she still has a penis because others do not know this.  She does not think that she can accomplish the weight loss goal and is currently letting goal of that goal.  We discussed therapy goals and discussed with client that I do not see a need for her to continue with psychotherapy because things are relatively stable.  She agreed that she does not need therapy once I retire.  Informed her that she could always come to the clinic as needed for letters for surgery if something changes in the future.  Reviewed client's progress throughout treatment and how far she has been able to calm with reaching her goal of living her life is a female.      Interactive Complexity:  There are four specific communication difficulties that complicate the work of the primary psychiatric procedure.  Interactive complexity (+94975) may be reported when at least one of these difficulties is present.    Communication difficulties present during current the psychiatric procedure include:  none      Response to Intervention:  Client reported validation.    Assignment:  Follow surgeon guidelines, maintain balanced approach to weight  loss.      Diagnosis:  Encounter Diagnosis   Name Primary?     Gender dysphoria in adult Yes       Plan / Need for Future Services:  No futher appointments needed. Client has reached most of her goals.     Estelle Gomez PsyD, LP

## 2021-08-16 ENCOUNTER — VIRTUAL VISIT (OUTPATIENT)
Dept: FAMILY MEDICINE | Facility: CLINIC | Age: 71
End: 2021-08-16
Payer: COMMERCIAL

## 2021-08-16 VITALS
SYSTOLIC BLOOD PRESSURE: 142 MMHG | TEMPERATURE: 97.3 F | WEIGHT: 293 LBS | HEART RATE: 62 BPM | DIASTOLIC BLOOD PRESSURE: 85 MMHG | BODY MASS INDEX: 45.48 KG/M2

## 2021-08-16 DIAGNOSIS — I35.0 AORTIC VALVE STENOSIS, ETIOLOGY OF CARDIAC VALVE DISEASE UNSPECIFIED: ICD-10-CM

## 2021-08-16 DIAGNOSIS — F64.0 GENDER DYSPHORIA IN ADULT: Primary | ICD-10-CM

## 2021-08-16 PROBLEM — F33.41 RECURRENT MAJOR DEPRESSIVE DISORDER, IN PARTIAL REMISSION (H): Status: RESOLVED | Noted: 2020-03-02 | Resolved: 2021-08-16

## 2021-08-16 PROCEDURE — 99213 OFFICE O/P EST LOW 20 MIN: CPT | Performed by: FAMILY MEDICINE

## 2021-08-16 ASSESSMENT — ENCOUNTER SYMPTOMS
LIGHT-HEADEDNESS: 0
SHORTNESS OF BREATH: 0
CHILLS: 0
FEVER: 0

## 2021-08-16 NOTE — PROGRESS NOTES
.            HPI     Laila is a 71 year old individual that uses pronouns She/Her/Hers/Herself that presents today for follow up of:  feminizing hormone therapy.   Alone or accompanied by: accompanied today by  Gender identity: female  Started Hormone  therapy  2016  Continues on Vivelle dot 0.05 mg patch 2x/wk  Any special concerns today?    Less energy than in the past   Urinary frequency, has to urinate every hour  Saw cardiologist 6/10/2021--note likely need aortic valve replacement in 1-2 years, also MRA for re-evaluatel anortic aneurysm      On hormones?  YES +++ Shot day of the week? Not applicable-taking pills/patch/gel      Due for labs?  Yes      +++ Refills of meds needed?  Yes  Gender related body changes since last visit: stable    Breakthrough bleeding? Does Not Apply    New health concerns since last visit:  ---see above      Past Surgical History:   Procedure Laterality Date     COLONOSCOPY  2007     JOINT REPLACEMTN, KNEE RT/LT  2006    left     ORCHIECTOMY SCROTAL Bilateral 5/14/2019    Procedure: Bilatera Scrotal Orchiectomy;  Surgeon: Abhilash Weston MD;  Location: [facility] OR       Patient Active Problem List   Diagnosis     Gouty arthropathy     Mental or behavioral problem     Generalized osteoarthrosis, unspecified site     Disorder of bone and cartilage     Mixed hyperlipidemia     OA (osteoarthritis) of knee     DDD (degenerative disc disease), lumbar     Advanced directives, counseling/discussion     Varicose veins of legs     Psoriasis     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     Family history of diabetes mellitus in first degree relative     Mitral valve disorder     Nonrheumatic aortic valve stenosis     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     Obesity, Class III, BMI 40-49.9 (morbid obesity) (H)     Multiple gastric ulcers     Recurrent major depressive disorder, in partial remission (H)     Episodic tension-type headache, not intractable       Current Outpatient Medications    Medication Sig Dispense Refill     acetaminophen (TYLENOL) 500 MG tablet Take 1,000 mg by mouth       atenolol (TENORMIN) 50 MG tablet TAKE ONE TABLET BY MOUTH ONCE DAILY 90 tablet 1     estradiol (VIVELLE-DOT) 0.05 MG/24HR bi-weekly patch Place 1 patch onto the skin twice a week 24 patch 1     latanoprost (XALATAN) 0.005 % ophthalmic solution 1 drop At Bedtime.       lovastatin (MEVACOR) 20 MG tablet TAKE ONE TABLET BY MOUTH ONCE A DAY AT BEDTIME 90 tablet 3     Multiple Vitamin (DAILY-EVANGELISTA) TABS Take 1 tablet by mouth daily  11     SM CALCIUM 600/VITAMIN D 600-400 MG-UNIT per tablet Take 1 tablet by mouth 2 times daily (Patient taking differently: daily ) 60 tablet 3     timolol maleate (TIMOPTIC) 0.5 % ophthalmic solution Apply 1 drop to eye       order for DME Equipment being ordered: Sigvaris 232 Cotton Thigh High for Men 20-30 mmHg- TWO PAIR TAN COLOR 2 each 5     ORDER FOR DME Equipment being ordered:   BACK BRACE    MedSpec Back-n-Shape II Back Brace  Size XXXL with thermoplastic insert        1 each 0       History   Smoking Status     Never Smoker   Smokeless Tobacco     Never Used          Allergies   Allergen Reactions     Penicillin [Esters] Itching     he is not sure what kind of reaction he had     Heparin      he lives in a assisted living program and is not sure what type of allergies he really has. He quit drinking     Zosyn [Penicillins]      unsure of reaction, allergy is listed on his med sheet       Health Maintenance Due   Topic Date Due     MENTAL HEALTH TX PLAN  08/15/2021         Problem, Medication and Allergy Lists were reviewed and are current..         Review of Systems:   Review of Systems   Constitutional: Negative for chills and fever.   Respiratory: Negative for shortness of breath.    Cardiovascular: Negative for chest pain.   Neurological: Negative for light-headedness.              Labs:   Results from last visit:  Orders Only on 06/16/2021   Component Date Value Ref Range  Status     Occult Blood Scn FIT 06/16/2021 Negative  NEG^Negative Final   Reviewed 5/2021  PCP follow K+ and low platelets      EXAM:  Blood pressure (!) 142/85, pulse 62, temperature 97.3  F (36.3  C), temperature source Oral, weight 143.8 kg (317 lb).  From cardiology visit: /71 mmHg.    EXam from PCP 5/13/2021 reviewed   Today:  Constitutional: healthy, alert and no distress   Cardiovascular: negative, PMI normal. No lifts, heaves, or thrills. RRR. 3+ systolic M,no clicks gallops or rub  Respiratory: negative, Percussion normal. Good diaphragmatic excursion. Lungs clear   Ext trace edema on L ankle  Assessment and Plan   1. Gender dysphoria s/p orchiectomy  Stable on current estradiol dose  2. Aortic valve stenosis  3. Aortic aneurysm  --Continue with cardiology      She has good medical and social supports in place, including good working relationship with PCP provider Dr. Simons    Follow-up 4-6 months           Results by mychart  Questions were elicited and answered.     Jluis Mane MD

## 2021-08-20 ENCOUNTER — TELEPHONE (OUTPATIENT)
Dept: FAMILY MEDICINE | Facility: CLINIC | Age: 71
End: 2021-08-20

## 2021-08-20 NOTE — TELEPHONE ENCOUNTER
Form receive back signed 8/19/21    Faxed back and sent to be scanned in to epic 8/20/21    [facility]

## 2021-08-26 ENCOUNTER — DOCUMENTATION ONLY (OUTPATIENT)
Dept: OTHER | Facility: CLINIC | Age: 71
End: 2021-08-26

## 2021-10-18 ENCOUNTER — HOSPITAL ENCOUNTER (OUTPATIENT)
Dept: MRI IMAGING | Facility: CLINIC | Age: 71
Discharge: HOME | End: 2021-10-18
Attending: INTERNAL MEDICINE | Admitting: INTERNAL MEDICINE
Payer: COMMERCIAL

## 2021-10-18 DIAGNOSIS — I71.20 THORACIC AORTIC ANEURYSM WITHOUT RUPTURE (H): ICD-10-CM

## 2021-10-18 PROCEDURE — 255N000002 HC RX 255 OP 636: Performed by: INTERNAL MEDICINE

## 2021-10-18 PROCEDURE — 71555 MRI ANGIO CHEST W OR W/O DYE: CPT

## 2021-10-18 PROCEDURE — A9585 GADOBUTROL INJECTION: HCPCS | Performed by: INTERNAL MEDICINE

## 2021-10-18 RX ORDER — GADOBUTROL 604.72 MG/ML
15 INJECTION INTRAVENOUS ONCE
Status: COMPLETED | OUTPATIENT
Start: 2021-10-18 | End: 2021-10-18

## 2021-10-18 RX ADMIN — GADOBUTROL 15 ML: 604.72 INJECTION INTRAVENOUS at 10:55

## 2021-10-19 DIAGNOSIS — F64.0 GENDER DYSPHORIA IN ADOLESCENT AND ADULT: ICD-10-CM

## 2021-10-20 RX ORDER — ESTRADIOL 0.05 MG/D
1 PATCH, EXTENDED RELEASE TRANSDERMAL
Qty: 24 PATCH | Refills: 1 | Status: SHIPPED | OUTPATIENT
Start: 2021-10-21 | End: 2021-12-06

## 2021-10-20 NOTE — TELEPHONE ENCOUNTER
Requested Medication: Mariel Patch  Dose: 0.05 mg  Quantity: 24  Refills: 0    Apply 1 patch twice weekly    Last seen at [facility]: 8/16 - follow up 4/6 mo  Next Appointment with Provider:  Visit date not found    Alexandra Long CMA

## 2021-11-10 ENCOUNTER — PATIENT OUTREACH (OUTPATIENT)
Dept: OTHER | Age: 71
End: 2021-11-10

## 2021-11-17 DIAGNOSIS — I10 HTN, GOAL BELOW 140/90: ICD-10-CM

## 2021-11-17 NOTE — TELEPHONE ENCOUNTER
Laila is requesting a refill on her Atenolol 50mg written on 5/12/21 by Francia Jameson      Thank You,  Jaja Garcia, [facility], [place]

## 2021-11-18 RX ORDER — ATENOLOL 50 MG/1
50 TABLET ORAL DAILY
Qty: 90 TABLET | Refills: 1 | Status: SHIPPED | OUTPATIENT
Start: 2021-11-18 | End: 2022-06-07

## 2021-12-06 ENCOUNTER — OFFICE VISIT (OUTPATIENT)
Dept: FAMILY MEDICINE | Facility: CLINIC | Age: 71
End: 2021-12-06
Payer: COMMERCIAL

## 2021-12-06 DIAGNOSIS — R60.0 PERIPHERAL EDEMA: ICD-10-CM

## 2021-12-06 DIAGNOSIS — E66.01 OBESITY, CLASS III, BMI 40-49.9 (MORBID OBESITY) (H): Primary | ICD-10-CM

## 2021-12-06 DIAGNOSIS — F64.0 GENDER DYSPHORIA IN ADOLESCENT AND ADULT: ICD-10-CM

## 2021-12-06 PROCEDURE — 99214 OFFICE O/P EST MOD 30 MIN: CPT | Performed by: FAMILY MEDICINE

## 2021-12-06 RX ORDER — ESTRADIOL 0.05 MG/D
1 PATCH, EXTENDED RELEASE TRANSDERMAL
Qty: 24 PATCH | Refills: 1 | Status: SHIPPED | OUTPATIENT
Start: 2021-12-06 | End: 2022-11-14

## 2021-12-06 ASSESSMENT — ENCOUNTER SYMPTOMS
FEVER: 0
UNEXPECTED WEIGHT CHANGE: 0
SHORTNESS OF BREATH: 0
CHILLS: 0
LIGHT-HEADEDNESS: 0

## 2021-12-06 ASSESSMENT — MIFFLIN-ST. JEOR: SCORE: 2314.82

## 2021-12-06 NOTE — PROGRESS NOTES
[facility] Care Coordination Contact      [facility] Six-Month Telephone Assessment    6 month telephone assessment completed on 11/20/21.    ER visits: No  Hospitalizations: No  TCU stays: No  Significant health status changes: None  Falls/Injuries: No  ADL/IADL changes: No  Changes in services: No    Caregiver Assessment follow up:  N/A  Goals: See POC in chart for goal progress documentation.      Will see member in 6 months for an annual health risk assessment.   Encouraged member to call CC with any questions or concerns in the meantime.     Alexandra Helton RN-[facility]   [phone]

## 2021-12-06 NOTE — PROGRESS NOTES
BOO Ulloa is a 71 year old individual that uses pronouns She/Her/Hers/Herself that presents today for follow up of:  feminizing hormone therapy.   Alone or accompanied by: accompanied today by  Gender identity: female  Started Hormone  therapy  2016  Continues on Vivelle dot 0.05 mg patch 2x/wk  Any special concerns today?    R knee may need replacement  No concerns about hormone replacement  On hormones?  YES +++ Shot day of the week? Not applicable-taking pills/patch/gel      Due for labs?  No      +++ Refills of meds needed?  Yes  Gender related body changes since last visit: stable    Breakthrough bleeding? Does Not Apply    New health concerns since last visit:  ---Thoracic aneursym on MRA unchanged per MRA on 10/18/2021    Past Surgical History:   Procedure Laterality Date     COLONOSCOPY  2007     JOINT REPLACEMTN, KNEE RT/LT  2006    left     ORCHIECTOMY SCROTAL Bilateral 5/14/2019    Procedure: Bilatera Scrotal Orchiectomy;  Surgeon: Abhilash Weston MD;  Location: [facility] OR       Patient Active Problem List   Diagnosis     Gouty arthropathy     Mental or behavioral problem     Generalized osteoarthrosis, unspecified site     Disorder of bone and cartilage     Mixed hyperlipidemia     OA (osteoarthritis) of knee     DDD (degenerative disc disease), lumbar     Varicose veins of legs     Psoriasis     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     Family history of diabetes mellitus in first degree relative     Mitral valve disorder     Nonrheumatic aortic valve stenosis     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     Obesity, Class III, BMI 40-49.9 (morbid obesity) (H)     Multiple gastric ulcers     Episodic tension-type headache, not intractable       Current Outpatient Medications   Medication Sig Dispense Refill     acetaminophen (TYLENOL) 500 MG tablet Take 1,000 mg by mouth       atenolol (TENORMIN) 50 MG tablet Take 1 tablet (50 mg) by mouth daily 90 tablet 1     estradiol (VIVELLE-DOT)  0.05 MG/24HR bi-weekly patch Place 1 patch onto the skin twice a week 24 patch 1     latanoprost (XALATAN) 0.005 % ophthalmic solution 1 drop At Bedtime.       lovastatin (MEVACOR) 20 MG tablet TAKE ONE TABLET BY MOUTH ONCE A DAY AT BEDTIME 90 tablet 3     Multiple Vitamin (DAILY-EVANGELISTA) TABS Take 1 tablet by mouth daily  11     order for DME Equipment being ordered: Sigvaris 232 Cotton Thigh High for Men 20-30 mmHg- TWO PAIR TAN COLOR 2 each 5     ORDER FOR DME Equipment being ordered:   BACK BRACE    MedSpec Back-n-Shape II Back Brace  Size XXXL with thermoplastic insert        1 each 0     SM CALCIUM 600/VITAMIN D 600-400 MG-UNIT per tablet Take 1 tablet by mouth 2 times daily (Patient taking differently: daily ) 60 tablet 3     timolol maleate (TIMOPTIC) 0.5 % ophthalmic solution Apply 1 drop to eye         History   Smoking Status     Never Smoker   Smokeless Tobacco     Never Used          Allergies   Allergen Reactions     Penicillin [Esters] Itching     he is not sure what kind of reaction he had     Heparin      he lives in a assisted living program and is not sure what type of allergies he really has. He quit drinking     Zosyn [Penicillins]      unsure of reaction, allergy is listed on his med sheet       Health Maintenance Due   Topic Date Due     MENTAL HEALTH TX PLAN  08/15/2021         Problem, Medication and Allergy Lists were reviewed and are current..         Review of Systems:   Review of Systems   Constitutional: Negative for chills, fever and unexpected weight change.   Respiratory: Negative for shortness of breath.    Cardiovascular: Positive for leg swelling. Negative for chest pain.   Neurological: Negative for light-headedness.   --chronic intermittent swelling in L leg           Labs:   Results from last visit:  Orders Only on 06/16/2021   Component Date Value Ref Range Status     Occult Blood Scn FIT 06/16/2021 Negative  NEG^Negative Final         EXAM:  Weight 339 lbs ( up from 317 in  "8/2021)   Blood pressure (!) 142/89, pulse 62, temperature (!) 96.3  F (35.7  C), height 1.803 m (5' 11\"), weight (!) 153.8 kg (339 lb).  Body mass index is 47.28 kg/m .    Constitutional: healthy, alert and no distress   Cardiovascular: negative, positive findings: 3/6 systolic murmur  Respiratory: negative, Percussion normal. Good diaphragmatic excursion. Lungs clear   +2 edema bilateral  Assessment and Plan   1. Gender dysphoria, s/p orchiectomy  Response-: Stable response to current hormone regimen/dose   2. Obesity  3. Peripheral edema, chronic  Continue current dose estradiol  Recommend stabilize weight gain, ideally lose weight--counseled patient   Recommend continue work with PCP and cardiology on edema--including use of compression stocking; has known aortic stenosis    Follow-up 6 months             Results by mychart  Questions were elicited and answered.     Jluis Mane MD    "

## 2021-12-07 VITALS
DIASTOLIC BLOOD PRESSURE: 89 MMHG | HEART RATE: 62 BPM | TEMPERATURE: 96.3 F | HEIGHT: 71 IN | BODY MASS INDEX: 41.02 KG/M2 | SYSTOLIC BLOOD PRESSURE: 142 MMHG | WEIGHT: 293 LBS

## 2021-12-09 ENCOUNTER — ANCILLARY PROCEDURE (OUTPATIENT)
Dept: GENERAL RADIOLOGY | Facility: CLINIC | Age: 71
End: 2021-12-09
Attending: NURSE PRACTITIONER
Payer: COMMERCIAL

## 2021-12-09 ENCOUNTER — OFFICE VISIT (OUTPATIENT)
Dept: FAMILY MEDICINE | Facility: CLINIC | Age: 71
End: 2021-12-09
Payer: COMMERCIAL

## 2021-12-09 ENCOUNTER — MEDICAL CORRESPONDENCE (OUTPATIENT)
Dept: HEALTH INFORMATION MANAGEMENT | Facility: CLINIC | Age: 71
End: 2021-12-09

## 2021-12-09 VITALS
HEART RATE: 60 BPM | BODY MASS INDEX: 47.14 KG/M2 | TEMPERATURE: 97.3 F | RESPIRATION RATE: 14 BRPM | SYSTOLIC BLOOD PRESSURE: 138 MMHG | WEIGHT: 293 LBS | OXYGEN SATURATION: 96 % | DIASTOLIC BLOOD PRESSURE: 82 MMHG

## 2021-12-09 DIAGNOSIS — M17.11 PRIMARY OSTEOARTHRITIS OF RIGHT KNEE: Primary | ICD-10-CM

## 2021-12-09 DIAGNOSIS — Z23 NEED FOR PROPHYLACTIC VACCINATION AND INOCULATION AGAINST INFLUENZA: ICD-10-CM

## 2021-12-09 DIAGNOSIS — B35.1 ONYCHOMYCOSIS: ICD-10-CM

## 2021-12-09 DIAGNOSIS — M17.11 PRIMARY OSTEOARTHRITIS OF RIGHT KNEE: ICD-10-CM

## 2021-12-09 PROCEDURE — 99214 OFFICE O/P EST MOD 30 MIN: CPT | Mod: 25 | Performed by: NURSE PRACTITIONER

## 2021-12-09 PROCEDURE — G0008 ADMIN INFLUENZA VIRUS VAC: HCPCS | Performed by: NURSE PRACTITIONER

## 2021-12-09 PROCEDURE — 90662 IIV NO PRSV INCREASED AG IM: CPT | Performed by: NURSE PRACTITIONER

## 2021-12-09 PROCEDURE — 73562 X-RAY EXAM OF KNEE 3: CPT | Mod: RT | Performed by: RADIOLOGY

## 2021-12-09 ASSESSMENT — ANXIETY QUESTIONNAIRES
IF YOU CHECKED OFF ANY PROBLEMS ON THIS QUESTIONNAIRE, HOW DIFFICULT HAVE THESE PROBLEMS MADE IT FOR YOU TO DO YOUR WORK, TAKE CARE OF THINGS AT HOME, OR GET ALONG WITH OTHER PEOPLE: NOT DIFFICULT AT ALL
7. FEELING AFRAID AS IF SOMETHING AWFUL MIGHT HAPPEN: NOT AT ALL
3. WORRYING TOO MUCH ABOUT DIFFERENT THINGS: NOT AT ALL
GAD7 TOTAL SCORE: 0
1. FEELING NERVOUS, ANXIOUS, OR ON EDGE: NOT AT ALL
5. BEING SO RESTLESS THAT IT IS HARD TO SIT STILL: NOT AT ALL
6. BECOMING EASILY ANNOYED OR IRRITABLE: NOT AT ALL
2. NOT BEING ABLE TO STOP OR CONTROL WORRYING: NOT AT ALL

## 2021-12-09 ASSESSMENT — PATIENT HEALTH QUESTIONNAIRE - PHQ9: 5. POOR APPETITE OR OVEREATING: NOT AT ALL

## 2021-12-09 ASSESSMENT — PAIN SCALES - GENERAL: PAINLEVEL: NO PAIN (0)

## 2021-12-09 NOTE — PATIENT INSTRUCTIONS
Patient Education     Nail Fungal Infection  A nail fungal infection changes the way fingernails and toenails look. They may thicken, discolor, change shape, or split. This condition is hard to treat because nails grow slowly and have limited blood supply. The infection often comes back after treatment.   There are 2 types of medicines used to treat this condition:     Antifungal medicines for the skin (topical).  These are applied to the skin and nail area. These medicines don't work well because they can t get deep into the nail.    Oral antifungal medicines. These medicines work better because they go into the nail from the inside out. But the infection may still come back. It may take 9 to 12 months for your nail to look normal again. This means you are cured. You can repeat treatment if needed. Most people take these medicines without any problems. It's rare to stop therapy because of side effects. But your healthcare provider may give you some monitoring tests. Talk about possible side effects with your provider before starting treatment.  If medicines fail, the nail can be removed surgically or chemically. These methods physically remove the fungus from the body. This helps medical treatment be more effective.   If the changes in your nails are not bothering you, you may not need treatment. Discuss this with your healthcare provider.   Home care    Use medicines exactly as directed for as long as directed. Treating a fungal infection can take longer than other kinds of infections.    Smoking is a risk factor for fungal infection. This is one more reason to quit.    Wear absorbent socks, and shoes that let your feet breathe. Sweaty feet increase your risk of fungal infection. They also make an existing infection harder to treat.    Use footwear when in damp public places like swimming pools, gyms, and shower rooms. This will help you stay away from the fungus that grows there.    Don't share nail clippers or  Patient states she is symptomatic and desires surgery. scissors with others.    Follow-up care  Follow up with your healthcare provider, or as advised.  When to seek medical advice  Call your healthcare provider right away if any of these occur:    Skin by the nail becomes red, swollen, painful, or drains pus (a creamy yellow or white liquid)    Side effects from oral anti-fungal medicines  [employer] last reviewed this educational content on 7/1/2019 2000-2021 [employer]. All rights reserved. This information is not intended as a substitute for professional medical care. Always follow your healthcare professional's instructions.           Patient Education     Understanding Chronic Venous Insufficiency  Problems with the veins in the legs may lead to chronic venous insufficiency (CVI). CVI means that there is a long-term problem with the veins not being able to pump blood back to your heart. When this happens, blood stays in the legs and causes swelling and aching.   Two problems that may lead to chronic venous insufficiency are:    Damaged valves. Valves keep blood flowing from the legs through the blood vessels and back to the heart. When the valves are damaged, blood does not flow as well.     Deep vein thrombosis (DVT).  Blood clots may form in the deep veins of the legs. This may cause pain, redness, and swelling in the legs. It may also block the flow of blood back to the heart. Seek medical care right away if you have these symptoms.  A blood clot in the leg can also break off and travel to the lungs. This is called  pulmonary embolism (PE).  In the lungs, the clot can cut off the flow of blood. This may cause chest pain, trouble breathing, sweating, a fast heartbeat, coughing (may cough up blood), and fainting. This is a medical emergency and may cause death. Call 911 if you have these symptoms.  CVI can t be cured, but you can control leg swelling to reduce the likelihood of sores (ulcers).  Recognizing the symptoms  Be aware of the  following:    If you stand or sit with your feet down for long periods, your legs may ache or feel heavy.    Swollen ankles are possibly the most common symptom of CVI.    As swelling increases, the skin over your ankles may show red spots or a brownish tinge. The skin may feel leathery or scaly, and may start to itch.    If swelling is not controlled, an ulcer (open wound) may form.  What you can do  Reduce your risk of developing ulcers by doing the following:    Increase blood flow back to your heart by elevating your legs, exercising daily, and wearing elastic stockings.    Boost blood flow in your legs by losing extra weight.    If you must stand or sit in one place for a period of time, keep your blood moving by wiggling your toes, shifting your body position, and rising up on the balls of your feet.    Tala last reviewed this educational content on 10/1/2019    9616-4415 [employer]. All rights reserved. This information is not intended as a substitute for professional medical care. Always follow your healthcare professional's instructions.           Patient Education     Arthritis: Exercise     Look for exercise classes for arthritis in your community.     Exercise is important to your overall health. It is especially important in people with arthritis. Regular exercise can:    Keep your heart and blood vessels healthy    Help with weight management, or weight loss    Improve your mood    Help prevent and manage health problems such as:  ? Diabetes  ? High blood pressure  ? High cholesterol  ? Depression  In people with arthritis, it offers all of those benefits and it can:    Lessen pain and stiffness    Strengthen muscles that support your joints    Help you to be able to do the things you enjoy  Exercise and arthritis  Exercise is an important part of any arthritis treatment plan. A complete program consists of the following 3 types of exercises:    Aerobic exercises for cardiovascular  health and overall fitness.     Strengthening exercises to build up muscles to help prevent injury and keep joints stable.    Range-of-motion exercises to keep muscles and joints flexible.  Getting started  Talk with your healthcare provider about what is safe for you. Make sure you:    Learn how to do exercises correctly and safely. Consider talking with a physical therapist or  used to working with people with arthritis.    Start slowly and build. If you haven't been exercising, start slowly. Don't exercise too hard or too long.    Create a routine. Set aside specific times for exercise every day.    Warm up carefully. Take 5 to 10 minutes at the beginning and end of exercising to warm up and cool down. Just do the same exercises at a slower pace for 5 to 10 minutes.    Work at a comfortable, smooth pace. Move your joints gently to prevent injury.    Pay attention to your body. Don't exercise a painful or swollen joint; switch to another activity. Follow the 2-hour pain rule: You did too much if your joint or muscle pain lasts 2 hours or more after exercising, or is worse the next day. This doesn't mean you should stop exercising. Just do less.  Aerobic exercise  Aerobic exercise improves overall health and helps control weight. Choose those that don't add extra stress to your joints. For example, walking, swimming, or bicycling.  Most people should exercise for at least 30 minutes, most days of the week. You don't have to exercise all at once. Try exercising for 10 minutes, 3 times a day, for example.  Strengthening exercises  Strengthening your muscles helps to protect your joints and prevent injuries. Try to do strengthening exercises 2 to 3 times a week:    These exercises can be done with exercise or resistance bands (inexpensive exercise aids that add resistance), or with light weights. Some people use soup cans as weights.    Isometric exercises are done by tightening the muscles without moving the  joint. This may be a good way to strengthen the muscles around a stiff joint.  A physical therapist or  can teach you how to do these exercises.  Range-of-motion (ROM) exercises  Range-of-motion (ROM) exercises allow you to move each of your joints in every way they are intended to move. You should do ROM exercises for each joint 2 to 3 times a day. This will help you maintain full use of all of your joints.  Sample ROM exercises  The following are just a sample of ROM exercises--one for your neck, shoulders, elbows, hips, knees, and ankles. To completely move each joint through its full range of motion, you will have to do a few exercises for each joint. A physical therapist or  can teach you how to do full ROM exercises for each joint.   Repeat each for these exercises 5 to 10 times. Make sure you move slowly:  1. Neck turns. Sit in a straight-backed chair. Look straight ahead. Slowly turn your head to the right, then return it to center. Repeat. Do the same thing, turning your head to the left. Repeat.  2. Shoulder raise. Lie on your back or sit in a chair. Raise one arm over your head, keeping your elbow straight. Keep the arm close to your ear. Return it slowly to your side. Repeat with your other arm.  3. Elbow stretch. Sit in a chair. If you are able, put both arms out to your sides to form a T. Slowly touch your shoulders with the tips of your fingers. Then return to the T-position. Repeat.  4. Hip stretch. Lie on your back with your legs straight and about 6 inches apart. With your foot flexed, slide your leg out to the side, then slide it back to the starting position. Repeat with your other leg.  5. Knee bend. Sit in a chair with your legs bent at the knees in front of you. Straighten one leg as much as you can, then bring it back to the floor. Repeat this 5 to 10 times. Then do the same thing with the other leg.   6. Ankle stretch. Sit with your feet flat on the floor. Lift your toes off of  the floor while your heels stay down. Repeat. Then lift your heels off the floor while your toes stay down. Repeat.  Other exercise  Many other exercise and activities benefit people with arthritis. It is most important to find exercise and activities that you enjoy. You might try:    Yoga, including chair yoga, helps to keep your joints strong and flexible    Brandon Chi, an ancient type of exercise with slow, gentle movements    Water exercise, including water walking  For more information on exercise for arthritis go to the Arthritis Foundation website: www.arthritis.org.  [employer] last reviewed this educational content on 6/1/2018 2000-2021 [employer]. All rights reserved. This information is not intended as a substitute for professional medical care. Always follow your healthcare professional's instructions.           Patient Education     Osteoarthritis: Common Sites  Osteoarthritis (OA) is sometimes called degenerative joint disease or wear-and-tear arthritis. It's the most common type of arthritis. In OA, the cartilage wears away. Cartilage covers the ends of bones and acts as a cushion. If enough cartilage wears away, bone rubs against bone. The joint changes in OA cause pain, stiffness, and trouble moving. OA may occur in any joint. Some joints that are commonly affected are the spine, neck,  hips, knees, fingers, and toes.  Neck    Joints between small bones in the neck may wear out. Pain may travel to the shoulder or the base of the skull.  Lumbar spine  Bony spurs may form on the joints between the vertebrae (spinal bones). And disks (cushions of cartilage between vertebrae) may wear down. Pain may affect the lower back or leg.  Hip  Cartilage damage can occur in the large  ball and socket  joint that connects the pelvis and thighbone (femur). Pain may travel to the groin, buttocks, or knee.  Knee  The cartilage in the knee joint may wear down. Weakness or instability in the knee joint may  make walking or climbing stairs difficult.  Fingers  Finger joints may become enlarged and knobby. Grasping objects may be hard, especially if the joint at the base of the thumb is affected.  Toes  Toes may be affected. Arthritis may cause a bunion, a bump at the base of the big toe. Standing or walking may be painful.  IMANIN last reviewed this educational content on 6/1/2018 2000-2021 [employer]. All rights reserved. This information is not intended as a substitute for professional medical care. Always follow your healthcare professional's instructions.           Patient Education     Osteoarthritis: Coping with Pain    There are many ways to control your pain. You re making a good start by learning about osteoarthritis and its treatments. Knowing more about this condition helps you work with your healthcare provider to find answers to problems. Keeping a positive outlook can help you manage pain from day to day. And making time each day to relax and enjoy yourself may help you control osteoarthritis pain, instead of letting it control you. Try these methods to help you cope with, and even reduce, your pain.  Take control  Relaxing may help relieve muscle aches that result from joint pain. To relax, try these techniques:    Breathe slowly and calmly and think of a peaceful scene.    Meditate by focusing your mind on one word, object, or idea.  Getting plenty of sleep can help reduce pain and let you function better. If pain is making it hard for you to sleep, ask your healthcare provider about ways to control pain and ensure a good night s sleep. Cutting back on caffeine and alcohol can help you sleep better. So can going to bed and getting up at about the same time every day.  Use distraction  Getting your mind off the pain may seem hard to do. But it can actually help reduce pain. When you are in pain, try one of these ways of distracting yourself:    Watch a funny movie with a  friend.    Listen to music you enjoy.    Read a novel.    Talk with friends or family.    Go to a museum, park, or other favorite attraction.    Arrange to do a regular activity, such as volunteer work.  Heat and cold  Using heat and cold treatments are simple ways to lessen arthritis symptoms:    Heat soothes stiff joints and tired muscles. Heat works well before exercise, for example. Heat treatments include:  ? A warm shower or bath, or soak (for example, fill the sink with warm water and move your fingers, hands, and wrists around in the water)  ? A moist heating pad  ? A warm, moist wash cloth  ? An electric blanket or throw    Cold treatments help to numb painful areas and decrease swelling. Cold treatments include the following wrapped in a thin towel:  ? An ice pack or bag of ice. To make an ice pack, put ice cubes in a plastic bag that seals at the top.  ? A gel-filled cold pack  Be careful when using heat or cold. You can injure your skin. Each treatment should only last for 10 to 20 minutes. Your healthcare provider or therapist can give you specific instructions.  Acupuncture  Acupuncture is a 2,000-year-old practice. Providers insert thin needles in specific parts of the body. Research shows that it can help to relieve the pain of arthritis.  For more information or to find a provider in your area, contact the American Academy of Medical Acupuncture. Its website is: www.medicalacupuncture.org/.  Massage  Therapeutic massage has many benefits. It may:    Help you and your muscles relax    Improve blood flow to muscles and joints    Help joints stay more flexible  Look for a certified massage therapist. Many are trained to treat sore muscles and joint pain and stiffness.  Vitamins, supplements, and herbs  People with arthritis, or other long-term conditions that cause pain, often look for alternative ways to lessen pain. Vitamins, supplements, and herbs may or may not help you to feel better. Before you  try any vitamin, supplement, or herb, make sure you ask your healthcare provider or pharmacist.  Physical therapy and occupational therapy  Evaluation by a physical therapist and or occupational therapist for assessment for limitations in activities of daily living  Help with developing an appropriate exercise routine for both muscle strengthening and cardiovascular health  Weight management  Studies have shown that weight loss in overweight people can improve osteoarthritis symptoms.  Talk with your healthcare provider about your optimal ideal weight and weight management methods if needed.  Psychological treatments  Research shows that many psychological therapies or those that deal with thinking and emotions, help people cope with arthritis pain. Therapies include cognitive behavioral therapy (CBT), pain coping skills training, biofeedback, stress management, and hypnosis. Ask your healthcare provider for more information about these therapies.  For more information about many of these methods, contact the National Center for Complementary and Alternative Medicine (NCCAM) at  https://nccih.nih.gov.  Tala last reviewed this educational content on 6/1/2018 2000-2021 [employer]. All rights reserved. This information is not intended as a substitute for professional medical care. Always follow your healthcare professional's instructions.

## 2021-12-09 NOTE — PROGRESS NOTES
Assessment & Plan     Primary osteoarthritis of right knee  more THAO for support.   Xray today  Ortho consultation placed.   - XR Knee Right 3 Views  - Orthopedic  Referral    Need for prophylactic vaccination and inoculation against influenza  Done today  - INFLUENZA, QUAD, HIGH DOSE, PF, 65YR + (FLUZONE HD)    To pharmacy today for shingles vaccine      Onychomycosis  Left great toe  Topical agents recommended. Trial tea tree oil daily for 3 months.     Hyperkalemia  OTC supplements stopped  Labs decline today    LE swelling bilaterally   Increase activity 1/2 hour day recommended.   Compression stocking on in am and off in PM.       Call or return to the clinic with any worsening of symptoms or no resolution. Patient/Parent verbalized understanding and is in agreement. Medication side effects reviewed.   Current Outpatient Medications   Medication Sig Dispense Refill     acetaminophen (TYLENOL) 500 MG tablet Take 1,000 mg by mouth       atenolol (TENORMIN) 50 MG tablet Take 1 tablet (50 mg) by mouth daily 90 tablet 1     estradiol (VIVELLE-DOT) 0.05 MG/24HR bi-weekly patch Place 1 patch onto the skin twice a week 24 patch 1     latanoprost (XALATAN) 0.005 % ophthalmic solution 1 drop At Bedtime.       lovastatin (MEVACOR) 20 MG tablet TAKE ONE TABLET BY MOUTH ONCE A DAY AT BEDTIME 90 tablet 3     Multiple Vitamin (DAILY-EVANGELISTA) TABS Take 1 tablet by mouth daily  11     order for DME Equipment being ordered: Sigvaris 232 Cotton Thigh High for Men 20-30 mmHg- TWO PAIR TAN COLOR 2 each 5     ORDER FOR DME Equipment being ordered:   BACK BRACE    MedSpec Back-n-Shape II Back Brace  Size XXXL with thermoplastic insert        1 each 0     SM CALCIUM 600/VITAMIN D 600-400 MG-UNIT per tablet Take 1 tablet by mouth 2 times daily (Patient taking differently: daily ) 60 tablet 3     timolol maleate (TIMOPTIC) 0.5 % ophthalmic solution Apply 1 drop to eye       Chart documentation with Dragon Voice recognition  "Software. Although reviewed after completion, some words and grammatical errors may remain.    JERRICA Salcedo CNP  [facility]    Juana Ulloa is a 71 year old who presents for the following health issues     HPI     Concern - RIGHT KNEE   Onset: 6 months   Description: Right knee pain grinding and painful  Intensity: moderate  Progression of Symptoms:  worsening  Accompanying Signs & Symptoms: pain   Previous history of similar problem: imaging in the past   Therapies tried and outcome: Tylenol   Hard to get out of the chair due to knee pain.  Walking with a cane now due to pain       Toenail fungus   Left great toe    Needs to work on weight loss  Estimated body mass index is 47.14 kg/m  as calculated from the following:    Height as of 12/6/21: 1.803 m (5' 11\").    Weight as of this encounter: 153.3 kg (338 lb).    Sister passed away , right knee painful stopped caring and stopped walking   More swelling in the LE bilaterally   Seen by Dr Mane this past week  Dose changed decreased to 0.05 twice a week.      Stopped the OTC supplements she was taking with the extra potassium  Hyperkalemia on last labs has not had repeat labs done as directed previously  Wants to wait to have blood work done in May with physical   Labs declined today.    Wants shingles vaccine         Review of Systems   Constitutional, HEENT, cardiovascular, pulmonary, GI, , musculoskeletal, neuro, skin, endocrine and psych systems are negative, except as otherwise noted.      Objective    /82   Pulse 60   Temp 97.3  F (36.3  C) (Tympanic)   Resp 14   Wt (!) 153.3 kg (338 lb)   SpO2 96%   BMI 47.14 kg/m    Body mass index is 47.14 kg/m .  Physical Exam   GENERAL: healthy, alert and no distress  EYES: Eyes grossly normal to inspection, PERRL and conjunctivae and sclerae normal  HENT: ear canals and TM's normal, nose and mouth without ulcers or lesions  NECK: no adenopathy, no " asymmetry, masses, or scars and thyroid normal to palpation  RESP: lungs clear to auscultation - no rales, rhonchi or wheezes  CV: regular rates and rhythm, peripheral pulses strong and 1+ bilateral lower extremity pitting edema  ABDOMEN: soft, nontender, no hepatosplenomegaly, no masses and bowel sounds normal  MS: decreased range of motion right knee with slight swelling   SKIN: no suspicious lesions or rashes left great toenail thick and yellow   NEURO: Normal strength and tone, mentation intact and speech normal  PSYCH: mentation appears normal, affect normal/bright    Orders Only on 06/16/2021   Component Date Value Ref Range Status     Occult Blood Scn FIT 06/16/2021 Negative  NEG^Negative Final

## 2021-12-10 ASSESSMENT — ANXIETY QUESTIONNAIRES: GAD7 TOTAL SCORE: 0

## 2021-12-10 ASSESSMENT — PATIENT HEALTH QUESTIONNAIRE - PHQ9: SUM OF ALL RESPONSES TO PHQ QUESTIONS 1-9: 0

## 2021-12-13 ENCOUNTER — HOSPITAL ENCOUNTER (OUTPATIENT)
Dept: CARDIOLOGY | Facility: CLINIC | Age: 71
Discharge: HOME | End: 2021-12-13
Attending: INTERNAL MEDICINE | Admitting: INTERNAL MEDICINE
Payer: COMMERCIAL

## 2021-12-13 DIAGNOSIS — I35.0 AORTIC VALVE STENOSIS, UNSPECIFIED ETIOLOGY: ICD-10-CM

## 2021-12-13 LAB — LVEF ECHO: NORMAL

## 2021-12-13 PROCEDURE — 999N000208 ECHOCARDIOGRAM COMPLETE

## 2021-12-13 PROCEDURE — 255N000002 HC RX 255 OP 636: Performed by: INTERNAL MEDICINE

## 2021-12-13 PROCEDURE — 93306 TTE W/DOPPLER COMPLETE: CPT | Mod: 26 | Performed by: INTERNAL MEDICINE

## 2021-12-13 RX ADMIN — HUMAN ALBUMIN MICROSPHERES AND PERFLUTREN 2 ML: 10; .22 INJECTION, SOLUTION INTRAVENOUS at 11:55

## 2021-12-30 ENCOUNTER — TELEPHONE (OUTPATIENT)
Dept: FAMILY MEDICINE | Facility: CLINIC | Age: 71
End: 2021-12-30
Payer: COMMERCIAL

## 2022-01-03 ENCOUNTER — OFFICE VISIT (OUTPATIENT)
Dept: ORTHOPEDICS | Facility: CLINIC | Age: 72
End: 2022-01-03
Payer: COMMERCIAL

## 2022-01-03 VITALS
HEIGHT: 70 IN | SYSTOLIC BLOOD PRESSURE: 124 MMHG | DIASTOLIC BLOOD PRESSURE: 70 MMHG | BODY MASS INDEX: 41.95 KG/M2 | WEIGHT: 293 LBS

## 2022-01-03 DIAGNOSIS — M17.11 PRIMARY OSTEOARTHRITIS OF RIGHT KNEE: ICD-10-CM

## 2022-01-03 PROCEDURE — 99203 OFFICE O/P NEW LOW 30 MIN: CPT | Mod: 25 | Performed by: FAMILY MEDICINE

## 2022-01-03 PROCEDURE — 20611 DRAIN/INJ JOINT/BURSA W/US: CPT | Mod: RT | Performed by: FAMILY MEDICINE

## 2022-01-03 RX ORDER — BETAMETHASONE SODIUM PHOSPHATE AND BETAMETHASONE ACETATE 3; 3 MG/ML; MG/ML
6 INJECTION, SUSPENSION INTRA-ARTICULAR; INTRALESIONAL; INTRAMUSCULAR; SOFT TISSUE
Status: DISCONTINUED | OUTPATIENT
Start: 2022-01-03 | End: 2022-10-04

## 2022-01-03 RX ORDER — ROPIVACAINE HYDROCHLORIDE 5 MG/ML
3 INJECTION, SOLUTION EPIDURAL; INFILTRATION; PERINEURAL
Status: DISCONTINUED | OUTPATIENT
Start: 2022-01-03 | End: 2022-10-04

## 2022-01-03 RX ADMIN — BETAMETHASONE SODIUM PHOSPHATE AND BETAMETHASONE ACETATE 6 MG: 3; 3 INJECTION, SUSPENSION INTRA-ARTICULAR; INTRALESIONAL; INTRAMUSCULAR; SOFT TISSUE at 11:03

## 2022-01-03 RX ADMIN — ROPIVACAINE HYDROCHLORIDE 3 ML: 5 INJECTION, SOLUTION EPIDURAL; INFILTRATION; PERINEURAL at 11:03

## 2022-01-03 ASSESSMENT — MIFFLIN-ST. JEOR: SCORE: 2310.74

## 2022-01-03 NOTE — LETTER
1/3/2022         RE: Laila Perez  [address] [address] [place] [id number]        Dear Colleague,    Thank you for referring your patient, Laila Perez, to the [facility]. Please see a copy of my visit note below.    ASSESSMENT & PLAN    Laila was seen today for pain.    Diagnoses and all orders for this visit:    Primary osteoarthritis of right knee  -     Orthopedic  Referral  -     diclofenac (VOLTAREN) 1 % topical gel; Apply 4 g topically 4 times daily    Other orders  -     Large Joint Injection/Arthocentesis: R knee joint      This issue is acute on chronic and Worsening.    # Right Knee Arthritis Flare: Has had pain over the past year, worsening recently due to increase walking.  There is pain over the medial joint line with previous x-ray showing moderate arthritis in the medial side of the knee.  Likely cause of her pain due to flare of arthritis.  Counseled patient on treatment options including home exercise/physical therapy, steroid injections, total knee replacement.  Given this plan to treat as below and follow-up if not improving.  Image Findings: Medial knee arthritis  Treatment: Activities as tolerated, home exercises given today  Job: No restrictions  Medications/Injections: Limited tylenol/ibuprofen for pain for 1-2 weeks, right knee steroid injection  Follow-up: In 3 months if symptoms do not improve, sooner if worsening  Can consider repeat steroid injections      Hema Duncan MD  [facility]    -----  Chief Complaint   Patient presents with     Right Knee - Pain       SUBJECTIVE  Laila Perez is a/an 71 year old adult who is seen in consultation at the request of  Delmis Simons C.N.P. for evaluation of right knee pain.     The patient is seen with their staff.       Onset: 1 years(s) ago. Patient describes injury as trying to increase activity. Saw PCP 12/9/21 and xrays  "were performed.  She is trying to lose weight and pain with walking at the [facility] feels like it is worsening.  She uses cane for knee pain, concerned about slipping on ice.  She has had steroid injections in the past 2+ years ago that helped.  Patient is hoping for something to help with her pain.  Reviewed PCP note and x-rays from 1/30/2020 as well as 12/9/221.    Location of Pain: right medial knee  Worsened by: sit to stand transition, walking   Better with: nothing   Treatments tried: Tylenol cane  Associated symptoms: grinding,  popping     Orthopedic/Surgical history: YES - Date: about 2009 [facility]   Social History/Occupation: Part time work     No family history pertinent to patient's problem today.      REVIEW OF SYSTEMS:  Review of Systems  Constitutional, HEENT, cardiovascular, pulmonary, GI, , musculoskeletal, neuro, skin, endocrine and psych systems are negative, except as otherwise noted.    OBJECTIVE:  /70   Ht 1.778 m (5' 10\")   Wt (!) 154.9 kg (341 lb 9.6 oz)   BMI 49.01 kg/m     General: healthy, alert and in no distress  HEENT: no scleral icterus or conjunctival erythema  Skin: no suspicious lesions or rash. No jaundice.  CV: distal perfusion intact    Resp: normal respiratory effort without conversational dyspnea   Psych: normal mood and affect  Gait: normal steady gait with appropriate coordination and balance    Neuro: Normal light sensory exam of right lower extremity     RIGHT KNEE  Inspection:    Normal alignment; no edema, erythema, or ecchymosis present  Palpation:    Tender about the medial joint line. Remainder of bony and ligamentous landmarks are nontender.    No effusion is present    Patellofemoral crepitus is Present  Range of Motion:     00 extension to 1350 flexion  Strength:    Quadriceps 5/5, hamstrings 5/5, gastrocsoleus 5/5 and tibialis anterior 5/5    Extensor mechanism intact  Special Tests:    Positive: None    Negative: Patellar grind, MCL/valgus stress (0 & 30 deg), " LCL/varus stress (0 & 30 deg), Lachman's, anterior drawer, posterior drawer, Ayad's    RADIOLOGY:  I independently, visualized and reviewed these images with the patient     RIGHT KNEE THREE VIEWS    12/9/2021 11:55 AM      HISTORY:  Primary osteoarthritis of right knee.     COMPARISON: Radiographs from 1/30/2020.                                                                      IMPRESSION: Mild-to-moderate medial compartment narrowing. Small  medial and patellofemoral compartment osteophytes. Tiny lateral  compartment osteophytes. 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 There is no evidence of fracture,  effusion or calcified intra-articular body. The medial compartment  degenerative changes have slightly progressed.      CHRISTOS TAET MD     Review of external notes as documented elsewhere in note  Review of the result(s) of each unique test - right knee x-ray      Large Joint Injection/Arthocentesis: R knee joint    Date/Time: 1/3/2022 11:03 AM  Performed by: Hema Duncan MD  Authorized by: Hema Duncan MD     Indications:  Pain and osteoarthritis  Needle Size:  25 G  Guidance: ultrasound    Approach:  Superolateral  Location:  Knee      Medications:  6 mg betamethasone acet & sod phos 6 (3-3) MG/ML; 3 mL ropivacaine 5 MG/ML  Medications comment:  Actual amount of ropivacaine used 4 mL  Outcome:  Tolerated well, no immediate complications  Procedure discussed: discussed risks, benefits, and alternatives    Consent Given by:  Patient  Timeout: timeout called immediately prior to procedure    Prep: patient was prepped and draped in usual sterile fashion     Patient reported significant improvement of pain after the numbing portion right knee steroid injection.  Aftercare instructions given to patient.  Plan to follow-up as discussed above.     Hema Duncan MD [facility]              Again, thank you for allowing me to  participate in the care of your patient.        Sincerely,        Hema Duncan MD

## 2022-01-03 NOTE — PATIENT INSTRUCTIONS
# Right Knee Arthritis Flare: Has had pain over the past year, worsening recently due to increase walking.  There is pain over the medial joint line with previous x-ray showing moderate arthritis in the medial side of the knee.  Likely cause of her pain due to flare of arthritis.  Counseled patient on treatment options including home exercise/physical therapy, steroid injections, total knee replacement.  Given this plan to treat as below and follow-up if not improving.  Image Findings: Medial knee arthritis  Treatment: Activities as tolerated, home exercises given today  Job: No restrictions  Medications/Injections: Limited tylenol/ibuprofen for pain for 1-2 weeks, right knee steroid injection  Follow-up: In 3 months if symptoms do not improve, sooner if worsening  Can consider repeat steroid injections    Please call [phone]   Ask for my team if you have any questions or concerns    If you have not yet received the influenza vaccine but would like to get one, please call  [phone] or you can schedule via [employer]    It was great seeing you today!    Hema Duncan MD, CAQSM     [facility] Injection Discharge Instructions    Procedure: Right knee steroid injection       You may shower, however avoid swimming, tub baths or hot tubs for 24 hours following your procedure    You may have a mild to moderate increase in pain for several days following the injection.    It may take up to 14 days for the steroid medication to start working although you may feel the effect as early as a few days after the procedure.    You may use ice packs for 10-15 minutes, 3 to 4 times a day at the injection site for comfort    You may use anti-inflammatory medications (such as Ibuprofen or Aleve or Advil) or Tylenol for pain control if necessary    If you were fasting, you may resume your normal diet and medications after the procedure    If you have diabetes, check your blood sugar more frequently than usual as your blood sugar  may be higher than normal for 10-14 days following a steroid injection. Contact your doctor who manages your diabetes if your blood sugar is higher than usual      If you experience any of the following, call [facility] @ [phone] or [phone]  -Fever over 100 degree F  -Swelling, bleeding, redness, drainage, warmth at the injection site  - New or worsening pain    It was great seeing you today!    Hema Duncan

## 2022-01-03 NOTE — PROGRESS NOTES
ASSESSMENT & PLAN    Laila was seen today for pain.    Diagnoses and all orders for this visit:    Primary osteoarthritis of right knee  -     Orthopedic  Referral  -     diclofenac (VOLTAREN) 1 % topical gel; Apply 4 g topically 4 times daily    Other orders  -     Large Joint Injection/Arthocentesis: R knee joint      This issue is acute on chronic and Worsening.    # Right Knee Arthritis Flare: Has had pain over the past year, worsening recently due to increase walking.  There is pain over the medial joint line with previous x-ray showing moderate arthritis in the medial side of the knee.  Likely cause of her pain due to flare of arthritis.  Counseled patient on treatment options including home exercise/physical therapy, steroid injections, total knee replacement.  Given this plan to treat as below and follow-up if not improving.  Image Findings: Medial knee arthritis  Treatment: Activities as tolerated, home exercises given today  Job: No restrictions  Medications/Injections: Limited tylenol/ibuprofen for pain for 1-2 weeks, right knee steroid injection  Follow-up: In 3 months if symptoms do not improve, sooner if worsening  Can consider repeat steroid injections      Hema Duncan MD  [facility] [place]    -----  Chief Complaint   Patient presents with     Right Knee - Pain       SUBJECTIVE  Laila Perez is a/an 71 year old adult who is seen in consultation at the request of  Delmis Simons C.N.P. for evaluation of right knee pain.     The patient is seen with their staff.       Onset: 1 years(s) ago. Patient describes injury as trying to increase activity. Saw PCP 12/9/21 and xrays were performed.  She is trying to lose weight and pain with walking at the [facility] feels like it is worsening.  She uses cane for knee pain, concerned about slipping on ice.  She has had steroid injections in the past 2+ years ago that helped.  Patient is hoping for  "something to help with her pain.  Reviewed PCP note and x-rays from 1/30/2020 as well as 12/9/221.    Location of Pain: right medial knee  Worsened by: sit to stand transition, walking   Better with: nothing   Treatments tried: Tylenol cane  Associated symptoms: grinding,  popping     Orthopedic/Surgical history: YES - Date: about 2009 [facility]   Social History/Occupation: Part time work     No family history pertinent to patient's problem today.      REVIEW OF SYSTEMS:  Review of Systems  Constitutional, HEENT, cardiovascular, pulmonary, GI, , musculoskeletal, neuro, skin, endocrine and psych systems are negative, except as otherwise noted.    OBJECTIVE:  /70   Ht 1.778 m (5' 10\")   Wt (!) 154.9 kg (341 lb 9.6 oz)   BMI 49.01 kg/m     General: healthy, alert and in no distress  HEENT: no scleral icterus or conjunctival erythema  Skin: no suspicious lesions or rash. No jaundice.  CV: distal perfusion intact    Resp: normal respiratory effort without conversational dyspnea   Psych: normal mood and affect  Gait: normal steady gait with appropriate coordination and balance    Neuro: Normal light sensory exam of right lower extremity     RIGHT KNEE  Inspection:    Normal alignment; no edema, erythema, or ecchymosis present  Palpation:    Tender about the medial joint line. Remainder of bony and ligamentous landmarks are nontender.    No effusion is present    Patellofemoral crepitus is Present  Range of Motion:     00 extension to 1350 flexion  Strength:    Quadriceps 5/5, hamstrings 5/5, gastrocsoleus 5/5 and tibialis anterior 5/5    Extensor mechanism intact  Special Tests:    Positive: None    Negative: Patellar grind, MCL/valgus stress (0 & 30 deg), LCL/varus stress (0 & 30 deg), Lachman's, anterior drawer, posterior drawer, Ayad's    RADIOLOGY:  I independently, visualized and reviewed these images with the patient     RIGHT KNEE THREE VIEWS    12/9/2021 11:55 AM      HISTORY:  Primary osteoarthritis of " right knee.     COMPARISON: Radiographs from 1/30/2020.                                                                      IMPRESSION: Mild-to-moderate medial compartment narrowing. Small  medial and patellofemoral compartment osteophytes. Tiny lateral  compartment osteophytes. 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 There is no evidence of fracture,  effusion or calcified intra-articular body. The medial compartment  degenerative changes have slightly progressed.      CHRISTOS TATE MD     Review of external notes as documented elsewhere in note  Review of the result(s) of each unique test - right knee x-ray      Large Joint Injection/Arthocentesis: R knee joint    Date/Time: 1/3/2022 11:03 AM  Performed by: Hema Duncan MD  Authorized by: Hema Duncan MD     Indications:  Pain and osteoarthritis  Needle Size:  25 G  Guidance: ultrasound    Approach:  Superolateral  Location:  Knee      Medications:  6 mg betamethasone acet & sod phos 6 (3-3) MG/ML; 3 mL ropivacaine 5 MG/ML  Medications comment:  Actual amount of ropivacaine used 4 mL  Outcome:  Tolerated well, no immediate complications  Procedure discussed: discussed risks, benefits, and alternatives    Consent Given by:  Patient  Timeout: timeout called immediately prior to procedure    Prep: patient was prepped and draped in usual sterile fashion     Patient reported significant improvement of pain after the numbing portion right knee steroid injection.  Aftercare instructions given to patient.  Plan to follow-up as discussed above.     Hema Duncan MD [facility]

## 2022-01-04 NOTE — TELEPHONE ENCOUNTER
Form received back signed  1/4/21   Faxed Back & Sent to be scanned to this encounter  Maria Teresa [place] Sec

## 2022-02-02 NOTE — TELEPHONE ENCOUNTER
"Kamran Olivas Mothilal Sonia, MD 13 hours ago (6:45 PM)     TF      Saw Yo. Got my test results and MyChart did not include hemoglobin count      Mychart message sent back to patient with Hgb result of 6.9 and that Shaniqua Ram was reaching out to his hematologist about setting up a transfusion.     \"Hi Dr. Nash,   I saw Kamran yesterday in the cardiology clinic and he was feeling poorly with generalized fatigue and exertional dyspnea. We checked a hemoglobin which came back at 6.9. Would you be able to arrange a transfusion for him this week?   Thanks,  Shaniqua Ram PA-C\"   " Requested Medication: Estradiol 0.1mg  Dose: 0.1mg  Quantity: 24  Refills: 1    Apply 1 patch twice weekly    Last seen at [facility]: 9/8 - 4 mo follow up  Next Appointment with Provider:  Visit date not found      Alexandra Long CMA

## 2022-02-17 PROBLEM — Z76.89 HEALTH CARE HOME: Status: RESOLVED | Noted: 2019-05-10 | Resolved: 2021-06-22

## 2022-03-08 ENCOUNTER — VIRTUAL VISIT (OUTPATIENT)
Dept: FAMILY MEDICINE | Facility: CLINIC | Age: 72
End: 2022-03-08
Payer: COMMERCIAL

## 2022-03-08 DIAGNOSIS — B96.89 ACUTE BACTERIAL RHINOSINUSITIS: Primary | ICD-10-CM

## 2022-03-08 DIAGNOSIS — R06.2 WHEEZING: ICD-10-CM

## 2022-03-08 DIAGNOSIS — J01.90 ACUTE BACTERIAL RHINOSINUSITIS: Primary | ICD-10-CM

## 2022-03-08 PROCEDURE — 99213 OFFICE O/P EST LOW 20 MIN: CPT | Mod: 95 | Performed by: NURSE PRACTITIONER

## 2022-03-08 RX ORDER — ALBUTEROL SULFATE 90 UG/1
2 AEROSOL, METERED RESPIRATORY (INHALATION) EVERY 6 HOURS
Qty: 18 G | Refills: 0 | Status: SHIPPED | OUTPATIENT
Start: 2022-03-08 | End: 2022-05-05

## 2022-03-08 RX ORDER — DOXYCYCLINE 100 MG/1
100 CAPSULE ORAL 2 TIMES DAILY
Qty: 20 CAPSULE | Refills: 0 | Status: SHIPPED | OUTPATIENT
Start: 2022-03-08 | End: 2022-03-18

## 2022-03-08 NOTE — PROGRESS NOTES
Laila is a 71 year old who is being evaluated via a billable telephone visit.      What phone number would you like to be contacted at? [phone]   How would you like to obtain your AVS? Mail a copy    Assessment & Plan     Acute bacterial rhinosinusitis  Ok to use nyquil and vicks for symptom management  Increase fluids  Tylenol for discomfort  Afrin for 2-3 days   Begin   - doxycycline hyclate (VIBRAMYCIN) 100 MG capsule  Dispense: 20 capsule; Refill: 0    Wheezing  As needed for wheezing use   - albuterol (PROAIR HFA/PROVENTIL HFA/VENTOLIN HFA) 108 (90 Base) MCG/ACT inhaler  Dispense: 18 g; Refill: 0      Call or return to the clinic with any worsening of symptoms or no resolution. Patient/Parent verbalized understanding and is in agreement. Medication side effects reviewed.   Current Outpatient Medications   Medication Sig Dispense Refill     albuterol (PROAIR HFA/PROVENTIL HFA/VENTOLIN HFA) 108 (90 Base) MCG/ACT inhaler Inhale 2 puffs into the lungs every 6 hours 18 g 0     atenolol (TENORMIN) 50 MG tablet Take 1 tablet (50 mg) by mouth daily 90 tablet 1     diclofenac (VOLTAREN) 1 % topical gel Apply 4 g topically 4 times daily 150 g 4     doxycycline hyclate (VIBRAMYCIN) 100 MG capsule Take 1 capsule (100 mg) by mouth 2 times daily for 10 days 20 capsule 0     estradiol (VIVELLE-DOT) 0.05 MG/24HR bi-weekly patch Place 1 patch onto the skin twice a week 24 patch 1     latanoprost (XALATAN) 0.005 % ophthalmic solution 1 drop At Bedtime.       lovastatin (MEVACOR) 20 MG tablet TAKE ONE TABLET BY MOUTH ONCE A DAY AT BEDTIME 90 tablet 3     Multiple Vitamin (DAILY-EVANGELISTA) TABS Take 1 tablet by mouth daily  11     order for DME Equipment being ordered: Sigvaris 232 Cotton Thigh High for Men 20-30 mmHg- TWO PAIR TAN COLOR 2 each 5     ORDER FOR DME Equipment being ordered:   BACK BRACE    MedSpec Back-n-Shape II Back Brace  Size XXXL with thermoplastic insert        1 each 0     SM CALCIUM 600/VITAMIN D 600-400  MG-UNIT per tablet Take 1 tablet by mouth 2 times daily (Patient taking differently: daily ) 60 tablet 3     timolol maleate (TIMOPTIC) 0.5 % ophthalmic solution Apply 1 drop to eye       acetaminophen (TYLENOL) 500 MG tablet Take 1,000 mg by mouth (Patient not taking: Reported on 3/8/2022)       Chart documentation with Dragon Voice recognition Software. Although reviewed after completion, some words and grammatical errors may remain.  As the provider for this telephone/video service, I attest that I introduced myself to the patient, provided my credentials, disclosed my location, and determined that, based on a review of the patient's chart and/or a discussion with members of the patient's treatment team, a telephone/video visit is an appropriate and effective means of providing this service. The patient and I mutually agree that this visit is appropriate for telephone/video visit as well.    JERRICA Salcedo [facility]    Juana Ulloa is a 71 year old who presents for the following health issues     HPI     Pt would like to be seen for a URI for 3 weeks . Cough with phlegm. sinus burning and drainage but these symptom are not as bad.   Taking Nyquil with some relief. Some use of vicks  No fever or chills  Clear phlem. A lot of nasal congestion. No blood  No nausea.        has a past medical history of Alcoholism (H), DDD (degenerative disc disease), cervical, Gender dysphoria in adult, HLD (hyperlipidemia), HTN (hypertension), large compression fracture (3/13/2006), OA (osteoarthritis of spine), and Unspecified internal derangement of knee.      Review of Systems   Constitutional, HEENT, cardiovascular, pulmonary, GI, , musculoskeletal, neuro, skin, endocrine and psych systems are negative, except as otherwise noted.      Objective           Vitals:  No vitals were obtained today due to virtual visit.    Physical Exam   healthy and no distress  PSYCH: Alert  and oriented times 3; coherent speech, normal   rate and volume, able to articulate logical thoughts, able   to abstract reason, no tangential thoughts, no hallucinations   or delusions  Her affect is normal  RESP: No cough, no audible wheezing, able to talk in full sentences  Remainder of exam unable to be completed due to telephone visits    Hospital Outpatient Visit on 12/13/2021   Component Date Value Ref Range Status     LVEF  12/13/2021 55-60%   Final               Phone call duration: 11 minutes

## 2022-03-15 ENCOUNTER — TELEPHONE (OUTPATIENT)
Dept: FAMILY MEDICINE | Facility: CLINIC | Age: 72
End: 2022-03-15
Payer: COMMERCIAL

## 2022-03-15 NOTE — TELEPHONE ENCOUNTER
Spoke with Dr oconnell at [facility] regarding this pt.  Presented with SOA.    Was treated for fluid overload with Duoneb, Lasix. Doxycycline increased from daily to bid. Covid antigen pending. Wbc and electrolytes are normal.    New order for lasix 20 every day and daily am weight.  Patient has been advised to return to clinic end of week and number to schedule is provided.    Discussed with primary Delmis DIEHL

## 2022-03-15 NOTE — TELEPHONE ENCOUNTER
Reason for Call:  ER Visit [facility]     Detailed comments: Alexandra would like to discuss pt that is currently in the ER at [facility]    Phone Number Patient can be reached at: Other phone number:  [phone]*    Best Time: Any Time      Can we leave a detailed message on this number? YES    Call taken on 3/15/2022 at 10:59 AM by Maria Teresa Arriaga

## 2022-03-18 ENCOUNTER — OFFICE VISIT (OUTPATIENT)
Dept: FAMILY MEDICINE | Facility: CLINIC | Age: 72
End: 2022-03-18
Payer: COMMERCIAL

## 2022-03-18 VITALS
RESPIRATION RATE: 24 BRPM | OXYGEN SATURATION: 99 % | SYSTOLIC BLOOD PRESSURE: 118 MMHG | HEART RATE: 60 BPM | HEIGHT: 70 IN | DIASTOLIC BLOOD PRESSURE: 76 MMHG | TEMPERATURE: 97.3 F | BODY MASS INDEX: 41.95 KG/M2 | WEIGHT: 293 LBS

## 2022-03-18 DIAGNOSIS — R06.09 DYSPNEA ON EXERTION: ICD-10-CM

## 2022-03-18 DIAGNOSIS — R60.0 PEDAL EDEMA: ICD-10-CM

## 2022-03-18 DIAGNOSIS — I71.9 AORTIC ANEURYSM WITHOUT RUPTURE, UNSPECIFIED PORTION OF AORTA (H): ICD-10-CM

## 2022-03-18 DIAGNOSIS — E66.01 OBESITY, CLASS III, BMI 40-49.9 (MORBID OBESITY) (H): ICD-10-CM

## 2022-03-18 DIAGNOSIS — J40 BRONCHITIS, NOT SPECIFIED AS ACUTE OR CHRONIC: Primary | ICD-10-CM

## 2022-03-18 DIAGNOSIS — F10.21 ALCOHOL DEPENDENCE IN REMISSION (H): ICD-10-CM

## 2022-03-18 LAB
ANION GAP SERPL CALCULATED.3IONS-SCNC: 3 MMOL/L (ref 3–14)
BASOPHILS # BLD AUTO: 0 10E3/UL (ref 0–0.2)
BASOPHILS NFR BLD AUTO: 0 %
BUN SERPL-MCNC: 26 MG/DL (ref 7–30)
CALCIUM SERPL-MCNC: 9.4 MG/DL (ref 8.5–10.1)
CHLORIDE BLD-SCNC: 106 MMOL/L (ref 94–109)
CO2 SERPL-SCNC: 30 MMOL/L (ref 20–32)
CREAT SERPL-MCNC: 1.02 MG/DL (ref 0.52–1.25)
EOSINOPHIL # BLD AUTO: 0.4 10E3/UL (ref 0–0.7)
EOSINOPHIL NFR BLD AUTO: 6 %
ERYTHROCYTE [DISTWIDTH] IN BLOOD BY AUTOMATED COUNT: 13.4 % (ref 10–15)
GFR SERPL CREATININE-BSD FRML MDRD: 78 ML/MIN/1.73M2
GLUCOSE BLD-MCNC: 105 MG/DL (ref 70–99)
HCT VFR BLD AUTO: 46.1 % (ref 35–53)
HGB BLD-MCNC: 14.4 G/DL (ref 11.7–17.7)
LYMPHOCYTES # BLD AUTO: 1.4 10E3/UL (ref 0.8–5.3)
LYMPHOCYTES NFR BLD AUTO: 21 %
MCH RBC QN AUTO: 30.9 PG (ref 26.5–33)
MCHC RBC AUTO-ENTMCNC: 31.2 G/DL (ref 31.5–36.5)
MCV RBC AUTO: 99 FL (ref 78–100)
MONOCYTES # BLD AUTO: 0.6 10E3/UL (ref 0–1.3)
MONOCYTES NFR BLD AUTO: 9 %
NEUTROPHILS # BLD AUTO: 4.4 10E3/UL (ref 1.6–8.3)
NEUTROPHILS NFR BLD AUTO: 65 %
NT-PROBNP SERPL-MCNC: 173 PG/ML (ref 0–125)
PLATELET # BLD AUTO: 104 10E3/UL (ref 150–450)
POTASSIUM BLD-SCNC: 4.9 MMOL/L (ref 3.4–5.3)
RBC # BLD AUTO: 4.66 10E6/UL (ref 3.8–5.9)
SODIUM SERPL-SCNC: 139 MMOL/L (ref 133–144)
WBC # BLD AUTO: 6.8 10E3/UL (ref 4–11)

## 2022-03-18 PROCEDURE — 85025 COMPLETE CBC W/AUTO DIFF WBC: CPT | Performed by: NURSE PRACTITIONER

## 2022-03-18 PROCEDURE — 99214 OFFICE O/P EST MOD 30 MIN: CPT | Performed by: NURSE PRACTITIONER

## 2022-03-18 PROCEDURE — 83880 ASSAY OF NATRIURETIC PEPTIDE: CPT | Performed by: NURSE PRACTITIONER

## 2022-03-18 PROCEDURE — 36415 COLL VENOUS BLD VENIPUNCTURE: CPT | Performed by: NURSE PRACTITIONER

## 2022-03-18 PROCEDURE — 80048 BASIC METABOLIC PNL TOTAL CA: CPT | Performed by: NURSE PRACTITIONER

## 2022-03-18 RX ORDER — IPRATROPIUM BROMIDE AND ALBUTEROL SULFATE 2.5; .5 MG/3ML; MG/3ML
1 SOLUTION RESPIRATORY (INHALATION) EVERY 6 HOURS PRN
Qty: 90 ML | Refills: 0 | Status: SHIPPED | OUTPATIENT
Start: 2022-03-18 | End: 2022-04-05

## 2022-03-18 RX ORDER — DORZOLAMIDE HYDROCHLORIDE AND TIMOLOL MALEATE 20; 5 MG/ML; MG/ML
1 SOLUTION/ DROPS OPHTHALMIC 2 TIMES DAILY
COMMUNITY
Start: 2021-11-16

## 2022-03-18 RX ORDER — BRIMONIDINE TARTRATE 1.5 MG/ML
1 SOLUTION/ DROPS OPHTHALMIC EVERY 8 HOURS
Status: ON HOLD | COMMUNITY
Start: 2022-01-26 | End: 2022-10-04

## 2022-03-18 RX ORDER — FUROSEMIDE 20 MG
TABLET ORAL
COMMUNITY
Start: 2022-03-15 | End: 2022-04-05

## 2022-03-18 RX ORDER — FUROSEMIDE 20 MG
20 TABLET ORAL DAILY
Qty: 30 TABLET | Refills: 0 | Status: SHIPPED | OUTPATIENT
Start: 2022-03-18 | End: 2022-04-05

## 2022-03-18 NOTE — LETTER
March 25, 2022      Laila Perez  [address] [address] [place] [phone]        Dear ,    We are writing to inform you of your test results.    BNP shows no evidence of congestive heart failure  Okay to wean off the Lasix once the lower extremity edema resolves    Continue on other medications    Call with any questions or concerns    Resulted Orders   Basic metabolic panel  (Ca, Cl, CO2, Creat, Gluc, K, Na, BUN)   Result Value Ref Range    Sodium 139 133 - 144 mmol/L    Potassium 4.9 3.4 - 5.3 mmol/L    Chloride 106 94 - 109 mmol/L      Comment:      0-20 years:       Female:  mmol/L  Male:    mmol/L       20 years and older:   Female:  mmol/L   Male:    mmol/L      Carbon Dioxide (CO2) 30 20 - 32 mmol/L    Anion Gap 3 3 - 14 mmol/L    Urea Nitrogen 26 7 - 30 mg/dL      Comment:      Female  0 to 15 days           3-23  15 days to 1 year      3-17  1 to 10 years          9-22  10 to 19 years         7-19  19 years and older     7-30    Male  0 to 15 days           3-23  15 days to 1 year      3-17  1 to 10 years          9-22  10 to 19 years         7-21  19 years and older     7-30    Creatinine 1.02 0.52 - 1.25 mg/dL      Comment:      20 y and older Female 0.52-1.04 mg/dL  20 y and older Male 0.66-1.25 mg/dL    Varies with the amount of muscle mass present.    GICH  Female: 0.6-1.2 mg/dL  Male: 0.7-1.3 mg/dL      Calcium 9.4 8.5 - 10.1 mg/dL    Glucose 105 (H) 70 - 99 mg/dL    GFR Estimate 78 >60 mL/min/1.73m2      Comment:      GFR not calculated when sex unspecified or nonbinary.  Effective December 21, 2021 eGFRcr in adults is calculated using the 2021 CKD-EPI creatinine equation which includes age and gender (Keila et al., NEJM, DOI: 10.1056/GTNKbr6318298)    Narrative    The sex of this patient cannot be reliably determined based on discrepancies in demographics (legal sex, sex assigned at birth, gender identity).  Both male and female reference ranges are provided  where applicable.  Careful evaluation of the patient s results as compared to the gender specific reference intervals is required in this setting.        If you have any questions or concerns, please call the clinic at the number listed above.       Sincerely,      JERRICA Salcedo CNP

## 2022-03-18 NOTE — LETTER
March 25, 2022      Laila Perez  [address] [address] [place] [id number]        Dear ,    We are writing to inform you of your test results.        Resulted Orders   BNP-N terminal pro   Result Value Ref Range    N Terminal Pro BNP Outpatient 173 (H) 0 - 125 pg/mL      Comment:      Reference range shown and results flagged as abnormal are for the outpatient, non acute settings. Establishing a baseline value for each individual patient is useful for follow-up.    Suggested inpatient cut points for confirming diagnosis of CHF in an acute setting are:  >450 pg/mL (age 18 to less than 50)  >900 pg/mL (age 50 to less than 75)  >1800 pg/mL (75 yrs and older)    An inpatient or emergency department NT-proPBNP <300 pg/mL effectively rules out acute CHF, with 99% negative predictive value.       Basic metabolic panel  (Ca, Cl, CO2, Creat, Gluc, K, Na, BUN)   Result Value Ref Range    Sodium 139 133 - 144 mmol/L    Potassium 4.9 3.4 - 5.3 mmol/L    Chloride 106 94 - 109 mmol/L      Comment:      0-20 years:       Female:  mmol/L  Male:    mmol/L       20 years and older:   Female:  mmol/L   Male:    mmol/L      Carbon Dioxide (CO2) 30 20 - 32 mmol/L    Anion Gap 3 3 - 14 mmol/L    Urea Nitrogen 26 7 - 30 mg/dL      Comment:      Female  0 to 15 days           3-23  15 days to 1 year      3-17  1 to 10 years          9-22  10 to 19 years         7-19  19 years and older     7-30    Male  0 to 15 days           3-23  15 days to 1 year      3-17  1 to 10 years          9-22  10 to 19 years         7-21  19 years and older     7-30    Creatinine 1.02 0.52 - 1.25 mg/dL      Comment:      20 y and older Female 0.52-1.04 mg/dL  20 y and older Male 0.66-1.25 mg/dL    Varies with the amount of muscle mass present.    GICH  Female: 0.6-1.2 mg/dL  Male: 0.7-1.3 mg/dL      Calcium 9.4 8.5 - 10.1 mg/dL    Glucose 105 (H) 70 - 99 mg/dL    GFR Estimate 78 >60 mL/min/1.73m2      Comment:      GFR not  calculated when sex unspecified or nonbinary.  Effective December 21, 2021 eGFRcr in adults is calculated using the 2021 CKD-EPI creatinine equation which includes age and gender (Keila et al., NE, DOI: 10.1056/NHDHyv9992095)    Narrative    The sex of this patient cannot be reliably determined based on discrepancies in demographics (legal sex, sex assigned at birth, gender identity).  Both male and female reference ranges are provided where applicable.  Careful evaluation of the patient s results as compared to the gender specific reference intervals is required in this setting.    CBC with platelets and differential   Result Value Ref Range    WBC Count 6.8 4.0 - 11.0 10e3/uL    RBC Count 4.66 3.80 - 5.90 10e6/uL      Comment:      Reference Range:                                                     Female 3.80-5.20 10e6/uL                                      Male 4.40-5.90 10e6u/L    Hemoglobin 14.4 11.7 - 17.7 g/dL      Comment:      Reference Range:                                                     Female 11.7-15.7 g/dL                                      Male 13.3-17.7 g/dL    Hematocrit 46.1 35.0 - 53.0 %      Comment:      Reference Range:                                                     Female 35.0-47.0 %                                            Male 40.0-54.0 %    MCV 99 78 - 100 fL    MCH 30.9 26.5 - 33.0 pg    MCHC 31.2 (L) 31.5 - 36.5 g/dL    RDW 13.4 10.0 - 15.0 %    Platelet Count 104 (L) 150 - 450 10e3/uL    % Neutrophils 65 %    % Lymphocytes 21 %    % Monocytes 9 %    % Eosinophils 6 %    % Basophils 0 %    Absolute Neutrophils 4.4 1.6 - 8.3 10e3/uL    Absolute Lymphocytes 1.4 0.8 - 5.3 10e3/uL    Absolute Monocytes 0.6 0.0 - 1.3 10e3/uL    Absolute Eosinophils 0.4 0.0 - 0.7 10e3/uL    Absolute Basophils 0.0 0.0 - 0.2 10e3/uL    Narrative    The sex of this patient cannot be reliably determined based on discrepancies in demographics (legal sex, sex assigned at birth, gender identity).   Both male and female reference ranges are provided where applicable.  Careful evaluation of the patient s results as compared to the gender specific reference intervals is required in this setting.        If you have any questions or concerns, please call the clinic at the number listed above.       Sincerely,      JERRICA Salcedo CNP

## 2022-03-18 NOTE — PATIENT INSTRUCTIONS
Call [facility] at [phone] if nebulizer machine/supplies need to be dispensed by [facility].

## 2022-03-18 NOTE — PROGRESS NOTES
"  Assessment & Plan     Acute bronchitis with symptoms > 10 days  Finish doxy  Begin duo neb every 6 hours  Labs today  - BNP-N terminal pro  - Basic metabolic panel  (Ca, Cl, CO2, Creat, Gluc, K, Na, BUN)  - CBC with platelets and differential  - Nebulizer and Supplies Order for DME - ONLY FOR DME  - ipratropium - albuterol 0.5 mg/2.5 mg/3 mL (DUONEB) 0.5-2.5 (3) MG/3ML neb solution  Dispense: 90 mL; Refill: 0    Pedal edema  Continue lasix  - CBC with platelets and differential  - Basic metabolic panel  (Ca, Cl, CO2, Creat, Gluc, K, Na, BUN)  - BNP-N terminal pro  - furosemide (LASIX) 20 MG tablet  Dispense: 30 tablet; Refill: 0    Dyspnea on exertion   - BNP-N terminal pro  - furosemide (LASIX) 20 MG tablet  Dispense: 30 tablet; Refill: 0  - BNP-N terminal pro  Recheck 2 weeks.      Alcohol dependence in remission (H)  Stop etoh use.. will discuss at follow up visit.       BMI:   Estimated body mass index is 48.53 kg/m  as calculated from the following:    Height as of this encounter: 1.778 m (5' 10\").    Weight as of this encounter: 153.4 kg (338 lb 3.2 oz).   Weight management plan: Discussed healthy diet and exercise guidelines    Work on weight loss  Regular exercise  Call or return to the clinic with any worsening of symptoms or no resolution. Patient/Parent verbalized understanding and is in agreement. Medication side effects reviewed.   Current Outpatient Medications   Medication Sig Dispense Refill     atenolol (TENORMIN) 50 MG tablet Take 1 tablet (50 mg) by mouth daily 90 tablet 1     brimonidine (ALPHAGAN-P) 0.15 % ophthalmic solution        dorzolamide-timolol (COSOPT) 2-0.5 % ophthalmic solution        doxycycline hyclate (VIBRAMYCIN) 100 MG capsule Take 1 capsule (100 mg) by mouth 2 times daily for 10 days 20 capsule 0     estradiol (VIVELLE-DOT) 0.05 MG/24HR bi-weekly patch Place 1 patch onto the skin twice a week 24 patch 1     furosemide (LASIX) 20 MG tablet        furosemide (LASIX) 20 MG tablet " Take 1 tablet (20 mg) by mouth daily 30 tablet 0     ipratropium - albuterol 0.5 mg/2.5 mg/3 mL (DUONEB) 0.5-2.5 (3) MG/3ML neb solution Take 1 vial (3 mLs) by nebulization every 6 hours as needed for shortness of breath / dyspnea or wheezing 90 mL 0     latanoprost (XALATAN) 0.005 % ophthalmic solution 1 drop At Bedtime.       lovastatin (MEVACOR) 20 MG tablet TAKE ONE TABLET BY MOUTH ONCE A DAY AT BEDTIME 90 tablet 3     Multiple Vitamin (DAILY-EVANGELISTA) TABS Take 1 tablet by mouth daily  11     timolol maleate (TIMOPTIC) 0.5 % ophthalmic solution Apply 1 drop to eye       acetaminophen (TYLENOL) 500 MG tablet Take 1,000 mg by mouth (Patient not taking: Reported on 3/8/2022)       albuterol (PROAIR HFA/PROVENTIL HFA/VENTOLIN HFA) 108 (90 Base) MCG/ACT inhaler Inhale 2 puffs into the lungs every 6 hours 18 g 0     diclofenac (VOLTAREN) 1 % topical gel Apply 4 g topically 4 times daily (Patient not taking: Reported on 3/18/2022) 150 g 4     order for DME Equipment being ordered: Sig[employer] 232 Cotton Thigh High for Men 20-30 mmHg- TWO PAIR BRENNER COLOR (Patient not taking: Reported on 3/18/2022) 2 each 5     ORDER FOR DME Equipment being ordered:   BACK BRACE    MedSpec Back-n-Shape II Back Brace  Size XXXL with thermoplastic insert        (Patient not taking: Reported on 3/18/2022) 1 each 0     SM CALCIUM 600/VITAMIN D 600-400 MG-UNIT per tablet Take 1 tablet by mouth 2 times daily (Patient not taking: Reported on 3/18/2022) 60 tablet 3     Chart documentation with Dragon Voice recognition Software. Although reviewed after completion, some words and grammatical errors may remain.  Follow up 2 weeks.   See Patient Instructions      JERRICA Salcedo [facility]    Juana Ulloa is a 72 year old who presents for the following health issues  accompanied by her staff member.    HPI     ED/UC Followup:    Facility:  [facility]  Date of visit:  "03/15/22  Reason for visit: SOKITTY  Current Status: Patient reports she is doing a little bit better.  She has been taking Lasix.     Wt Readings from Last 5 Encounters:   03/18/22 (!) 153.4 kg (338 lb 3.2 oz)   01/03/22 (!) 154.9 kg (341 lb 9.6 oz)   12/09/21 (!) 153.3 kg (338 lb)   12/06/21 (!) 153.8 kg (339 lb)   08/16/21 143.8 kg (317 lb)   has 3 days left of doxycycline  SHORTNESS OF BREATH on exertion better but still there.   Needs nebulizer  Significant relief at the hospital with nebulizer  Inhaler does not give as much relief from the wheezing and cough.   Has a lot of LE edema yet  Weight gain 30 lbs  Hard to sleep due to cough and SHORTNESS OF BREATH  Has been drinking a lot of nyquil for symptom relief  History of alcoholism  Staff Taylor has told me today that Laila has been drinking alcohol again  But wont discuss this with the staff   Lasix was started and is helping with the LE edema  Spending a lot of time in the recliner. Minimal physical activity  Colored sputum produced yesterday. No fever generalized fatigue  Has compression stockings but has not been wearing them. No chest pain.         Review of Systems   Constitutional, HEENT, cardiovascular, pulmonary, GI, , musculoskeletal, neuro, skin, endocrine and psych systems are negative, except as otherwise noted.      Objective    /76 (BP Location: Right arm, Patient Position: Sitting, Cuff Size: Adult Large)   Pulse 60   Temp 97.3  F (36.3  C) (Tympanic)   Resp 24   Ht 1.778 m (5' 10\")   Wt (!) 153.4 kg (338 lb 3.2 oz)   SpO2 99%   BMI 48.53 kg/m    Body mass index is 48.53 kg/m .  Physical Exam   GENERAL: healthy, alert and no distress  EYES: Eyes grossly normal to inspection, PERRL and conjunctivae and sclerae normal  HENT: ear canals and TM's normal, nose and mouth without ulcers or lesions  NECK: no adenopathy, no asymmetry, masses, or scars and thyroid normal to palpation  RESP: lungs clear to auscultation - no rales, rhonchi " or wheezes  CV: regular rates and rhythm, grade 2/6 SE murmur  bilateral lower extremity pitting edema to 1+    ABDOMEN: soft, nontender, no hepatosplenomegaly, no masses and bowel sounds normal  MS: no gross musculoskeletal defects noted, no edema  SKIN: no suspicious lesions or rashes  NEURO: Normal strength and tone, mentation intact and speech normal  PSYCH: mentation appears normal, affect normal/[facility] Outpatient Visit on 12/13/2021   Component Date Value Ref Range Status     LVEF  12/13/2021 55-60%   Final

## 2022-04-01 ENCOUNTER — PATIENT OUTREACH (OUTPATIENT)
Dept: OTHER | Age: 72
End: 2022-04-01

## 2022-04-01 NOTE — PROGRESS NOTES
[facility] Care Coordination Contact    cc attempted to contact member at 11a.m. to complete home visit via telephone. Unable to reach.    Alexandra Helton RN-[facility]   [phone]      cc attempted to contact member at 1p.m.. to complete home visit via telephone. Unable to reach.    REMY Johnston  [facility]   [phone]

## 2022-04-04 ENCOUNTER — OFFICE VISIT (OUTPATIENT)
Dept: ORTHOPEDICS | Facility: CLINIC | Age: 72
End: 2022-04-04
Payer: COMMERCIAL

## 2022-04-04 DIAGNOSIS — M17.11 PRIMARY OSTEOARTHRITIS OF RIGHT KNEE: Primary | ICD-10-CM

## 2022-04-04 PROCEDURE — 20611 DRAIN/INJ JOINT/BURSA W/US: CPT | Mod: RT | Performed by: FAMILY MEDICINE

## 2022-04-04 RX ORDER — BETAMETHASONE SODIUM PHOSPHATE AND BETAMETHASONE ACETATE 3; 3 MG/ML; MG/ML
6 INJECTION, SUSPENSION INTRA-ARTICULAR; INTRALESIONAL; INTRAMUSCULAR; SOFT TISSUE
Status: DISCONTINUED | OUTPATIENT
Start: 2022-04-04 | End: 2022-10-04

## 2022-04-04 RX ORDER — ROPIVACAINE HYDROCHLORIDE 5 MG/ML
4 INJECTION, SOLUTION EPIDURAL; INFILTRATION; PERINEURAL
Status: DISCONTINUED | OUTPATIENT
Start: 2022-04-04 | End: 2022-10-04

## 2022-04-04 RX ADMIN — BETAMETHASONE SODIUM PHOSPHATE AND BETAMETHASONE ACETATE 6 MG: 3; 3 INJECTION, SUSPENSION INTRA-ARTICULAR; INTRALESIONAL; INTRAMUSCULAR; SOFT TISSUE at 09:55

## 2022-04-04 RX ADMIN — ROPIVACAINE HYDROCHLORIDE 4 ML: 5 INJECTION, SOLUTION EPIDURAL; INFILTRATION; PERINEURAL at 09:55

## 2022-04-04 NOTE — PATIENT INSTRUCTIONS
[facility] Injection Discharge Instructions    Procedure: Right knee steroid injection, repeat    Follow-up: As needed can consider gel injection if knee steroid injections do not help    You may shower, however avoid swimming, tub baths or hot tubs for 24 hours following your procedure    You may have a mild to moderate increase in pain for several days following the injection.    It may take up to 14 days for the steroid medication to start working although you may feel the effect as early as a few days after the procedure.    You may use ice packs for 10-15 minutes, 3 to 4 times a day at the injection site for comfort    You may use anti-inflammatory medications (such as Ibuprofen or Aleve or Advil) or Tylenol for pain control if necessary    If you were fasting, you may resume your normal diet and medications after the procedure    If you have diabetes, check your blood sugar more frequently than usual as your blood sugar may be higher than normal for 10-14 days following a steroid injection. Contact your doctor who manages your diabetes if your blood sugar is higher than usual      If you experience any of the following, call [facility] @ [phone] or [phone]  -Fever over 100 degree F  -Swelling, bleeding, redness, drainage, warmth at the injection site  - New or worsening pain    It was great seeing you again today!    Hema Duncan

## 2022-04-04 NOTE — PROGRESS NOTES
Large Joint Injection/Arthocentesis: R knee joint    Date/Time: 4/4/2022 9:55 AM  Performed by: Hema Duncan MD  Authorized by: Hema Duncan MD     Indications:  Pain  Needle Size:  25 G  Guidance: ultrasound    Approach:  Superolateral  Location:  Knee      Medications:  6 mg betamethasone acet & sod phos 6 (3-3) MG/ML; 4 mL ropivacaine 5 MG/ML  Outcome:  Tolerated well, no immediate complications  Procedure discussed: discussed risks, benefits, and alternatives    Consent Given by:  Patient  Timeout: timeout called immediately prior to procedure    Prep: patient was prepped and draped in usual sterile fashion     Ultrasound images of procedure were permanently stored.      Patient reported significant improvement of pain after the numbing portion right knee joint steroid injection.  Ultrasound guided images were permanently stored.  Aftercare instructions given to patient.  Plan to follow-up as previously discussed with referring provider.     Hema Duncan MD [facility]

## 2022-04-04 NOTE — LETTER
4/4/2022         RE: Laila Perez  [address] [address] [facility] [address]        Dear Colleague,    Thank you for referring your patient, Laila Perez, to the [facility] [place]. Please see a copy of my visit note below.      Large Joint Injection/Arthocentesis: R knee joint    Date/Time: 4/4/2022 9:55 AM  Performed by: Hema Dnucan MD  Authorized by: Hema Duncan MD     Indications:  Pain  Needle Size:  25 G  Guidance: ultrasound    Approach:  Superolateral  Location:  Knee      Medications:  6 mg betamethasone acet & sod phos 6 (3-3) MG/ML; 4 mL ropivacaine 5 MG/ML  Outcome:  Tolerated well, no immediate complications  Procedure discussed: discussed risks, benefits, and alternatives    Consent Given by:  Patient  Timeout: timeout called immediately prior to procedure    Prep: patient was prepped and draped in usual sterile fashion     Ultrasound images of procedure were permanently stored.      Patient reported significant improvement of pain after the numbing portion right knee joint steroid injection.  Ultrasound guided images were permanently stored.  Aftercare instructions given to patient.  Plan to follow-up as previously discussed with referring provider.     Hema Duncan MD [facility]              Again, thank you for allowing me to participate in the care of your patient.        Sincerely,        Hema Duncan MD

## 2022-04-05 ENCOUNTER — OFFICE VISIT (OUTPATIENT)
Dept: FAMILY MEDICINE | Facility: CLINIC | Age: 72
End: 2022-04-05
Payer: COMMERCIAL

## 2022-04-05 VITALS
BODY MASS INDEX: 49.07 KG/M2 | WEIGHT: 293 LBS | RESPIRATION RATE: 14 BRPM | OXYGEN SATURATION: 96 % | TEMPERATURE: 97.3 F | HEART RATE: 63 BPM | SYSTOLIC BLOOD PRESSURE: 132 MMHG | DIASTOLIC BLOOD PRESSURE: 82 MMHG

## 2022-04-05 DIAGNOSIS — R60.0 PEDAL EDEMA: ICD-10-CM

## 2022-04-05 DIAGNOSIS — M17.11 PRIMARY OSTEOARTHRITIS OF RIGHT KNEE: ICD-10-CM

## 2022-04-05 DIAGNOSIS — H91.93 BILATERAL CHANGE IN HEARING: ICD-10-CM

## 2022-04-05 DIAGNOSIS — E78.5 HYPERLIPIDEMIA LDL GOAL <100: ICD-10-CM

## 2022-04-05 DIAGNOSIS — E66.01 MORBID OBESITY (H): ICD-10-CM

## 2022-04-05 DIAGNOSIS — F10.21 ALCOHOL DEPENDENCE IN REMISSION (H): ICD-10-CM

## 2022-04-05 DIAGNOSIS — R06.09 DYSPNEA ON EXERTION: Primary | ICD-10-CM

## 2022-04-05 PROCEDURE — 99214 OFFICE O/P EST MOD 30 MIN: CPT | Performed by: NURSE PRACTITIONER

## 2022-04-05 RX ORDER — IPRATROPIUM BROMIDE AND ALBUTEROL SULFATE 2.5; .5 MG/3ML; MG/3ML
1 SOLUTION RESPIRATORY (INHALATION) EVERY 6 HOURS PRN
Qty: 90 ML | Refills: 1 | Status: SHIPPED | OUTPATIENT
Start: 2022-04-05 | End: 2022-05-09

## 2022-04-05 RX ORDER — FUROSEMIDE 20 MG
20 TABLET ORAL DAILY
Qty: 90 TABLET | Refills: 1 | Status: SHIPPED | OUTPATIENT
Start: 2022-04-05 | End: 2023-03-23

## 2022-04-05 ASSESSMENT — PAIN SCALES - GENERAL: PAINLEVEL: NO PAIN (0)

## 2022-04-05 NOTE — PROGRESS NOTES
Assessment & Plan     Dyspnea on exertion   Follow-up with cardiology as previously directed  Begin walking daily  Continue furosemide  - Adult Cardiology Eval  Referral  - furosemide (LASIX) 20 MG tablet  Dispense: 90 tablet; Refill: 1    Bilateral change in hearing  Referral placed for formal hearing evaluation  - Adult Audiology Referral    Alcohol dependence in remission (H)  Recommended alcohol cessation again    Primary osteoarthritis of right knee  Symptomatic care strategies reviewed.  Follow-up with Ortho as needed    Pedal edema  Recurrent.  Continue compression socks.  Continue furosemide  - furosemide (LASIX) 20 MG tablet  Dispense: 90 tablet; Refill: 1    Hyperlipidemia LDL goal <100  Continue lovastatin.  Lipids done today  - Lipid panel reflex to direct LDL Fasting    Morbid obesity (H)  Increase physical activity as able.  Improve nutrition.  Weight loss recommended  Recommend 34 pound weight loss in the next 12 months  Consider weight management consultation    Call or return to the clinic with any worsening of symptoms or no resolution. Patient/Parent verbalized understanding and is in agreement. Medication side effects reviewed.   Current Outpatient Medications   Medication Sig Dispense Refill     atenolol (TENORMIN) 50 MG tablet Take 1 tablet (50 mg) by mouth daily 90 tablet 1     brimonidine (ALPHAGAN-P) 0.15 % ophthalmic solution        dorzolamide-timolol (COSOPT) 2-0.5 % ophthalmic solution        estradiol (VIVELLE-DOT) 0.05 MG/24HR bi-weekly patch Place 1 patch onto the skin twice a week 24 patch 1     furosemide (LASIX) 20 MG tablet Take 1 tablet (20 mg) by mouth daily 90 tablet 1     ipratropium - albuterol 0.5 mg/2.5 mg/3 mL (DUONEB) 0.5-2.5 (3) MG/3ML neb solution Take 1 vial (3 mLs) by nebulization every 6 hours as needed for shortness of breath / dyspnea or wheezing 90 mL 1     latanoprost (XALATAN) 0.005 % ophthalmic solution 1 drop At Bedtime.       lovastatin (MEVACOR)  20 MG tablet TAKE ONE TABLET BY MOUTH ONCE A DAY AT BEDTIME 90 tablet 3     Multiple Vitamin (DAILY-EVANGELISTA) TABS Take 1 tablet by mouth daily  11     timolol maleate (TIMOPTIC) 0.5 % ophthalmic solution Apply 1 drop to eye       albuterol (PROAIR HFA/PROVENTIL HFA/VENTOLIN HFA) 108 (90 Base) MCG/ACT inhaler Inhale 2 puffs into the lungs every 6 hours 18 g 0     Chart documentation with Dragon Voice recognition Software. Although reviewed after completion, some words and grammatical errors may remain.    JERRICA Salcedo CNP  [facility]    Juana Ulloa is a 72 year old who presents for the following health issues  accompanied by her Group home skills worker.    HPI     Bronchitis Followup:    Facility:  [place]   Date of visit: 03/18/2022  Reason for visit: Cough and SOB   Current Status: better   Nebs help a lot at bedtime     Wt Readings from Last 4 Encounters:   04/05/22 (!) 155.1 kg (342 lb)   03/18/22 (!) 153.4 kg (338 lb 3.2 oz)   01/03/22 (!) 154.9 kg (341 lb 9.6 oz)   12/09/21 (!) 153.3 kg (338 lb)     Injection yesterday in the right knee  Authorized by: Hema Duncan MD      Indications:  Pain  Needle Size:  25 G  Guidance: ultrasound    Approach:  Superolateral  Location:  Knee  Medications:  6 mg betamethasone acet & sod phos 6 (3-3) MG/ML; 4 mL ropivacaine 5 MG/ML    Alcohol use on occasion with going out with friends  Previous history of alcoholism  Has a couple of beers once a week.   Able to stop per his report.  Just chooses not to.  Gaining weight with decreasing ambulation due to right knee pain  Worsening pedal edema  Taking cholesterol medication lovastatin without any difficulties  Changes in hearing bilaterally would like audiology referral  Dyspnea on exertion improved.     has a past medical history of Alcoholism (H), DDD (degenerative disc disease), cervical, Gender dysphoria in adult, HLD (hyperlipidemia), HTN (hypertension), large  compression fracture (3/13/2006), OA (osteoarthritis of spine), and Unspecified internal derangement of knee.      Review of Systems   Constitutional, HEENT, cardiovascular, pulmonary, GI, , musculoskeletal, neuro, skin, endocrine and psych systems are negative, except as otherwise noted.      Objective    /82   Pulse 63   Temp 97.3  F (36.3  C) (Tympanic)   Resp 14   Wt (!) 155.1 kg (342 lb)   SpO2 96%   BMI 49.07 kg/m    Body mass index is 49.07 kg/m .  Physical Exam   GENERAL: healthy, alert and no distress  EYES: Eyes grossly normal to inspection, PERRL and conjunctivae and sclerae normal  HENT: ear canals and TM's normal, nose and mouth without ulcers or lesions  NECK: no adenopathy, no asymmetry, masses, or scars and thyroid normal to palpation  RESP: lungs clear to auscultation - no rales, rhonchi or wheezes  CV: regular rate and rhythm, normal S1 S2, no S3 or S4, no murmur, click or rub, 1+ lower extremity bilateral peripheral edema and peripheral pulses strong  ABDOMEN: soft, nontender, no hepatosplenomegaly, no masses and bowel sounds normal  MS: tenderness to palpation right knee medial  SKIN: no suspicious lesions or rashes  NEURO: Normal strength and tone, mentation intact and speech normal  PSYCH: mentation appears normal, affect normal/bright    Office Visit on 03/18/2022   Component Date Value Ref Range Status     N Terminal Pro BNP Outpatient 03/18/2022 173 (A) 0 - 125 pg/mL Final    Reference range shown and results flagged as abnormal are for the outpatient, non acute settings. Establishing a baseline value for each individual patient is useful for follow-up.    Suggested inpatient cut points for confirming diagnosis of CHF in an acute setting are:  >450 pg/mL (age 18 to less than 50)  >900 pg/mL (age 50 to less than 75)  >1800 pg/mL (75 yrs and older)    An inpatient or emergency department NT-proPBNP <300 pg/mL effectively rules out acute CHF, with 99% negative predictive  value.         Sodium 03/18/2022 139  133 - 144 mmol/L Final     Potassium 03/18/2022 4.9  3.4 - 5.3 mmol/L Final     Chloride 03/18/2022 106  94 - 109 mmol/L Final    0-20 years:       Female:  mmol/L  Male:    mmol/L       20 years and older:   Female:  mmol/L   Male:    mmol/L       Carbon Dioxide (CO2) 03/18/2022 30  20 - 32 mmol/L Final     Anion Gap 03/18/2022 3  3 - 14 mmol/L Final     Urea Nitrogen 03/18/2022 26  7 - 30 mg/dL Final    Female  0 to 15 days           3-23  15 days to 1 year      3-17  1 to 10 years          9-22  10 to 19 years         7-19  19 years and older     7-30    Male  0 to 15 days           3-23  15 days to 1 year      3-17  1 to 10 years          9-22  10 to 19 years         7-21  19 years and older     7-30     Creatinine 03/18/2022 1.02  0.52 - 1.25 mg/dL Final    20 y and older Female 0.52-1.04 mg/dL  20 y and older Male 0.66-1.25 mg/dL    Varies with the amount of muscle mass present.    GICH  Female: 0.6-1.2 mg/dL  Male: 0.7-1.3 mg/dL       Calcium 03/18/2022 9.4  8.5 - 10.1 mg/dL Final     Glucose 03/18/2022 105 (A) 70 - 99 mg/dL Final     GFR Estimate 03/18/2022 78  >60 mL/min/1.73m2 Final    GFR not calculated when sex unspecified or nonbinary.  Effective December 21, 2021 eGFRcr in adults is calculated using the 2021 CKD-EPI creatinine equation which includes age and gender (Keila et al., NEJM, DOI: 10.1056/PGJFyx4864312)     WBC Count 03/18/2022 6.8  4.0 - 11.0 10e3/uL Final     RBC Count 03/18/2022 4.66  3.80 - 5.90 10e6/uL Final    Reference Range:                                                     Female 3.80-5.20 10e6/uL                                      Male 4.40-5.90 10e6u/L     Hemoglobin 03/18/2022 14.4  11.7 - 17.7 g/dL Final    Reference Range:                                                     Female 11.7-15.7 g/dL                                      Male 13.3-17.7 g/dL     Hematocrit 03/18/2022 46.1  35.0 - 53.0 % Final     Reference Range:                                                     Female 35.0-47.0 %                                            Male 40.0-54.0 %     MCV 03/18/2022 99  78 - 100 fL Final     MCH 03/18/2022 30.9  26.5 - 33.0 pg Final     MCHC 03/18/2022 31.2 (A) 31.5 - 36.5 g/dL Final     RDW 03/18/2022 13.4  10.0 - 15.0 % Final     Platelet Count 03/18/2022 104 (A) 150 - 450 10e3/uL Final     % Neutrophils 03/18/2022 65  % Final     % Lymphocytes 03/18/2022 21  % Final     % Monocytes 03/18/2022 9  % Final     % Eosinophils 03/18/2022 6  % Final     % Basophils 03/18/2022 0  % Final     Absolute Neutrophils 03/18/2022 4.4  1.6 - 8.3 10e3/uL Final     Absolute Lymphocytes 03/18/2022 1.4  0.8 - 5.3 10e3/uL Final     Absolute Monocytes 03/18/2022 0.6  0.0 - 1.3 10e3/uL Final     Absolute Eosinophils 03/18/2022 0.4  0.0 - 0.7 10e3/uL Final     Absolute Basophils 03/18/2022 0.0  0.0 - 0.2 10e3/uL Final

## 2022-04-07 ENCOUNTER — PATIENT OUTREACH (OUTPATIENT)
Dept: OTHER | Age: 72
End: 2022-04-07

## 2022-04-07 NOTE — Clinical Note
Delmis,  I met with Laila. Her only concern was regarding her Hormone patch and she requested that she not have to see a specialty provider for her refills.  Alexandra Helton RN-[facility]  [phone]

## 2022-04-20 ENCOUNTER — PATIENT OUTREACH (OUTPATIENT)
Dept: GERIATRIC MEDICINE | Facility: CLINIC | Age: 72
End: 2022-04-20
Payer: COMMERCIAL

## 2022-04-20 SDOH — ECONOMIC STABILITY: TRANSPORTATION INSECURITY
IN THE PAST 12 MONTHS, HAS LACK OF TRANSPORTATION KEPT YOU FROM MEETINGS, WORK, OR FROM GETTING THINGS NEEDED FOR DAILY LIVING?: NO

## 2022-04-20 SDOH — ECONOMIC STABILITY: TRANSPORTATION INSECURITY
IN THE PAST 12 MONTHS, HAS THE LACK OF TRANSPORTATION KEPT YOU FROM MEDICAL APPOINTMENTS OR FROM GETTING MEDICATIONS?: NO

## 2022-04-20 SDOH — ECONOMIC STABILITY: INCOME INSECURITY: IN THE LAST 12 MONTHS, WAS THERE A TIME WHEN YOU WERE NOT ABLE TO PAY THE MORTGAGE OR RENT ON TIME?: YES

## 2022-04-20 SDOH — HEALTH STABILITY: PHYSICAL HEALTH: ON AVERAGE, HOW MANY MINUTES DO YOU ENGAGE IN EXERCISE AT THIS LEVEL?: 130 MIN

## 2022-04-20 SDOH — ECONOMIC STABILITY: FOOD INSECURITY: WITHIN THE PAST 12 MONTHS, YOU WORRIED THAT YOUR FOOD WOULD RUN OUT BEFORE YOU GOT MONEY TO BUY MORE.: NEVER TRUE

## 2022-04-20 SDOH — ECONOMIC STABILITY: INCOME INSECURITY: IN THE LAST 12 MONTHS, WAS THERE A TIME WHEN YOU WERE NOT ABLE TO PAY THE MORTGAGE OR RENT ON TIME?: NO

## 2022-04-20 SDOH — HEALTH STABILITY: PHYSICAL HEALTH: ON AVERAGE, HOW MANY DAYS PER WEEK DO YOU ENGAGE IN MODERATE TO STRENUOUS EXERCISE (LIKE A BRISK WALK)?: 7 DAYS

## 2022-04-20 SDOH — ECONOMIC STABILITY: FOOD INSECURITY: WITHIN THE PAST 12 MONTHS, THE FOOD YOU BOUGHT JUST DIDN'T LAST AND YOU DIDN'T HAVE MONEY TO GET MORE.: NEVER TRUE

## 2022-04-20 ASSESSMENT — SOCIAL DETERMINANTS OF HEALTH (SDOH)
IN A TYPICAL WEEK, HOW MANY TIMES DO YOU TALK ON THE PHONE WITH FAMILY, FRIENDS, OR NEIGHBORS?: MORE THAN THREE TIMES A WEEK
HOW OFTEN DO YOU ATTEND CHURCH OR RELIGIOUS SERVICES?: NEVER
HOW HARD IS IT FOR YOU TO PAY FOR THE VERY BASICS LIKE FOOD, HOUSING, MEDICAL CARE, AND HEATING?: SOMEWHAT HARD
WITHIN THE LAST YEAR, HAVE YOU BEEN HUMILIATED OR EMOTIONALLY ABUSED IN OTHER WAYS BY YOUR PARTNER OR EX-PARTNER?: NO
DO YOU BELONG TO ANY CLUBS OR ORGANIZATIONS SUCH AS CHURCH GROUPS UNIONS, FRATERNAL OR ATHLETIC GROUPS, OR SCHOOL GROUPS?: NO
WITHIN THE LAST YEAR, HAVE YOU BEEN AFRAID OF YOUR PARTNER OR EX-PARTNER?: NO
HOW OFTEN DO YOU GET TOGETHER WITH FRIENDS OR RELATIVES?: ONCE A WEEK
HOW OFTEN DO YOU ATTENT MEETINGS OF THE CLUB OR ORGANIZATION YOU BELONG TO?: NEVER
WITHIN THE LAST YEAR, HAVE YOU BEEN KICKED, HIT, SLAPPED, OR OTHERWISE PHYSICALLY HURT BY YOUR PARTNER OR EX-PARTNER?: NO
WITHIN THE LAST YEAR, HAVE TO BEEN RAPED OR FORCED TO HAVE ANY KIND OF SEXUAL ACTIVITY BY YOUR PARTNER OR EX-PARTNER?: NO

## 2022-04-20 ASSESSMENT — LIFESTYLE VARIABLES
HOW MANY STANDARD DRINKS CONTAINING ALCOHOL DO YOU HAVE ON A TYPICAL DAY: PATIENT DOES NOT DRINK
HOW OFTEN DO YOU HAVE SIX OR MORE DRINKS ON ONE OCCASION: NEVER
AUDIT-C TOTAL SCORE: 0
HOW OFTEN DO YOU HAVE A DRINK CONTAINING ALCOHOL: NEVER
SKIP TO QUESTIONS 9-10: 1

## 2022-04-20 ASSESSMENT — ACTIVITIES OF DAILY LIVING (ADL): DEPENDENT_IADLS:: SHOPPING;TRANSPORTATION

## 2022-04-20 NOTE — LETTER
April 20, 2022      LAILA AMEZQUITA  [address] [address] [place] [phone]      Dear Laila:    At [facility], we are dedicated to improving your health and well-being. Enclosed is the Comprehensive Care Plan that we developed with you on 4/7/2022. Please review the Care Plan carefully.    As a reminder, some of the things we discussed at your visit include:    Your physical and mental health    Ways to reduce falls    Health care needs you may have    Don t forget to contact your care coordinator if you:    Have been hospitalized or plan to be hospitalized     Have had a fall     Have experienced a change in physical health    Are experiencing emotional problems     If you do not agree with your Care Plan, have questions about it, or have experienced a change in your needs, please call me at [phone]. If you are hearing impaired, please call the [place] [employer] at 073 or [phone] ([id number] relay service).    Sincerely,    REMY Johnston    E-mail: [email or web address]  Phone: [phone]      [facility] ([facility]) is a health plan that contracts with both Medicare and the [place] Medical Assistance (Medicaid) program to provide benefits of both programs to enrollees. Enrollment in [facility] depends on contract renewal.    [id number])     [id number] (11/18)

## 2022-04-20 NOTE — PROGRESS NOTES
[facility] Care Coordination Contact    Received after visit chart from care coordinator.  Completed following tasks: Mailed copy of care plan to client, Mailed copy of POC signature sheet for member to sign and return in SASE  and Mailed are Safe Medication Disposal    and Mailed copy of care plan to client.  Updated services in database as needed.     Yolanda Humphrey  Care Management Specialist   [facility]   [phone]

## 2022-04-20 NOTE — PROGRESS NOTES
[facility] Care Coordination Contact    [facility] Home Visit Assessment     Home visit for Health Risk Assessment with Laila Perez completed on April 7, 2022    Type of residence:: Adult foster care  Current living arrangement:: I live in a private home with family, Other     Assessment completed with:: Patient    Current Care Plan  Member currently receiving the following home care services:     Member currently receiving the following community resources: Transportation Services and shopping assistance.    Medication Review  Medication reconciliation completed in Epic: Yes  Medication set-up & administration: Independent and sets up on own daily.  Self-administers medications.  Medication Risk Assessment Medication (1 or more, place referral to MTM): Taking 1 or more high-risk medications for adults >65 years  MTM Referral Placed: No: Declined    Mental/Behavioral Health   Depression Screening:   PHQ-2 Total Score (Adult) - Positive if 3 or more points; Administer PHQ-9 if positive: 0       Mental health DX:: Yes   Mental health DX how managed:: Medication, Psychiatrist    Falls Assessment:   Fallen 2 or more times in the past year?: No   Any fall with injury in the past year?: No    ADL/IADL Dependencies:   Dependent ADLs:: Independent  Dependent IADLs:: Shopping, Transportation    [facility] sponsored benefits: Shared information re: Silver Sneakers/gym memberships, ASA, Calcium +D.    PCA Assessment completed at visit: Not Applicable     Elderly Waiver Eligibility: CADI Waiver    Care Plan & Recommendations:  Continue living in foster setting, receiving 24hrs/wk support for shopping and transportation.     CADI Documentation requested for cc chart: JUANITA Guevara   [phone]        See LTCC for detailed assessment information.    Follow-Up Plan: Member informed of future contact, plan to f/u with member with a 6 month telephone assessment.  Contact information shared with  member and family, encouraged member to call with any questions or concerns at any time.    [facility] care continuum providers: Please see Snapshot and Care Management Flowsheets for Specific details of care plan.    This CC note routed to PCP.    Alexandra Helton RN-[facility]   [phone]

## 2022-05-05 ENCOUNTER — OFFICE VISIT (OUTPATIENT)
Dept: FAMILY MEDICINE | Facility: CLINIC | Age: 72
End: 2022-05-05
Payer: COMMERCIAL

## 2022-05-05 ENCOUNTER — ANCILLARY PROCEDURE (OUTPATIENT)
Dept: GENERAL RADIOLOGY | Facility: CLINIC | Age: 72
End: 2022-05-05
Attending: NURSE PRACTITIONER
Payer: COMMERCIAL

## 2022-05-05 ENCOUNTER — MEDICAL CORRESPONDENCE (OUTPATIENT)
Dept: HEALTH INFORMATION MANAGEMENT | Facility: CLINIC | Age: 72
End: 2022-05-05

## 2022-05-05 VITALS
HEIGHT: 71 IN | OXYGEN SATURATION: 94 % | TEMPERATURE: 97.3 F | SYSTOLIC BLOOD PRESSURE: 134 MMHG | WEIGHT: 293 LBS | RESPIRATION RATE: 14 BRPM | BODY MASS INDEX: 41.02 KG/M2 | HEART RATE: 71 BPM | DIASTOLIC BLOOD PRESSURE: 80 MMHG

## 2022-05-05 DIAGNOSIS — R05.9 COUGH: ICD-10-CM

## 2022-05-05 DIAGNOSIS — D69.6 THROMBOCYTOPENIA (H): ICD-10-CM

## 2022-05-05 DIAGNOSIS — E78.5 HYPERLIPIDEMIA LDL GOAL <100: ICD-10-CM

## 2022-05-05 DIAGNOSIS — Z00.01 ENCOUNTER FOR ROUTINE ADULT PHYSICAL EXAM WITH ABNORMAL FINDINGS: Primary | ICD-10-CM

## 2022-05-05 DIAGNOSIS — E87.5 HYPERKALEMIA: ICD-10-CM

## 2022-05-05 DIAGNOSIS — Z88.0 PENICILLIN ALLERGY: ICD-10-CM

## 2022-05-05 DIAGNOSIS — R06.2 WHEEZING: ICD-10-CM

## 2022-05-05 LAB
BASOPHILS # BLD AUTO: 0 10E3/UL (ref 0–0.2)
BASOPHILS NFR BLD AUTO: 0 %
CHOLEST SERPL-MCNC: 133 MG/DL
EOSINOPHIL # BLD AUTO: 0.7 10E3/UL (ref 0–0.7)
EOSINOPHIL NFR BLD AUTO: 9 %
ERYTHROCYTE [DISTWIDTH] IN BLOOD BY AUTOMATED COUNT: 12.9 % (ref 10–15)
FASTING STATUS PATIENT QL REPORTED: YES
HCT VFR BLD AUTO: 48.1 % (ref 35–53)
HDLC SERPL-MCNC: 60 MG/DL
HGB BLD-MCNC: 15.4 G/DL (ref 11.7–17.7)
IMM GRANULOCYTES # BLD: 0 10E3/UL
IMM GRANULOCYTES NFR BLD: 0 %
LDLC SERPL CALC-MCNC: 46 MG/DL
LYMPHOCYTES # BLD AUTO: 1.5 10E3/UL (ref 0.8–5.3)
LYMPHOCYTES NFR BLD AUTO: 20 %
MCH RBC QN AUTO: 31 PG (ref 26.5–33)
MCHC RBC AUTO-ENTMCNC: 32 G/DL (ref 31.5–36.5)
MCV RBC AUTO: 97 FL (ref 78–100)
MONOCYTES # BLD AUTO: 0.5 10E3/UL (ref 0–1.3)
MONOCYTES NFR BLD AUTO: 7 %
NEUTROPHILS # BLD AUTO: 4.8 10E3/UL (ref 1.6–8.3)
NEUTROPHILS NFR BLD AUTO: 64 %
NONHDLC SERPL-MCNC: 73 MG/DL
PLATELET # BLD AUTO: 156 10E3/UL (ref 150–450)
POTASSIUM BLD-SCNC: 5.1 MMOL/L (ref 3.4–5.3)
RBC # BLD AUTO: 4.96 10E6/UL (ref 3.8–5.9)
RETICS # AUTO: 0.06 10E6/UL (ref 0.03–0.1)
RETICS/RBC NFR AUTO: 1.4 % (ref 0.5–2)
TRIGL SERPL-MCNC: 136 MG/DL
WBC # BLD AUTO: 7.5 10E3/UL (ref 4–11)

## 2022-05-05 PROCEDURE — 99397 PER PM REEVAL EST PAT 65+ YR: CPT | Performed by: NURSE PRACTITIONER

## 2022-05-05 PROCEDURE — 99214 OFFICE O/P EST MOD 30 MIN: CPT | Mod: 25 | Performed by: NURSE PRACTITIONER

## 2022-05-05 PROCEDURE — 71046 X-RAY EXAM CHEST 2 VIEWS: CPT | Mod: TC | Performed by: RADIOLOGY

## 2022-05-05 PROCEDURE — 36415 COLL VENOUS BLD VENIPUNCTURE: CPT | Performed by: NURSE PRACTITIONER

## 2022-05-05 PROCEDURE — 80061 LIPID PANEL: CPT | Performed by: NURSE PRACTITIONER

## 2022-05-05 PROCEDURE — 85045 AUTOMATED RETICULOCYTE COUNT: CPT | Performed by: NURSE PRACTITIONER

## 2022-05-05 PROCEDURE — 85025 COMPLETE CBC W/AUTO DIFF WBC: CPT | Performed by: NURSE PRACTITIONER

## 2022-05-05 PROCEDURE — 84132 ASSAY OF SERUM POTASSIUM: CPT | Performed by: NURSE PRACTITIONER

## 2022-05-05 RX ORDER — ALBUTEROL SULFATE 90 UG/1
2 AEROSOL, METERED RESPIRATORY (INHALATION) EVERY 6 HOURS
Qty: 18 G | Refills: 0 | Status: SHIPPED | OUTPATIENT
Start: 2022-05-05

## 2022-05-05 ASSESSMENT — PAIN SCALES - GENERAL: PAINLEVEL: NO PAIN (0)

## 2022-05-05 NOTE — LETTER
May 5, 2022      Laila Perez  [address] [address] [place] [phone]        Dear ,    We are writing to inform you of your test results.  Normal cholesterol levels   Normal potassium   Normal red and white blood cell count.  Normal platelets.  Normal reticulocytes   Labs look good   Continue current plan of care       Resulted Orders   Lipid panel reflex to direct LDL Fasting   Result Value Ref Range    Cholesterol 133 <200 mg/dL    Triglycerides 136 <150 mg/dL    Direct Measure HDL 60 >=40 mg/dL    LDL Cholesterol Calculated 46 <=100 mg/dL    Non HDL Cholesterol 73 <130 mg/dL    Patient Fasting > 8hrs? Yes     Narrative    The sex of this patient cannot be reliably determined based on discrepancies in demographics (legal sex, sex assigned at birth, gender identity).  Both male and female reference ranges are provided where applicable.  Careful evaluation of the patient s results as compared to the gender specific reference intervals is required in this setting.   Cholesterol  Desirable:  <200 mg/dL    Triglycerides  Normal:  Less than 150 mg/dL  Borderline High:  150-199 mg/dL  High:  200-499 mg/dL  Very High:  Greater than or equal to 500 mg/dL    Direct Measure HDL  Female:  Greater than or equal to 50 mg/dL   Male:  Greater than or equal to 40 mg/dL    LDL Cholesterol  Desirable:  <100mg/dL  Above Desirable:  100-129 mg/dL   Borderline High:  130-159 mg/dL   High:  160-189 mg/dL   Very High:  >= 190 mg/dL    Non HDL Cholesterol  Desirable:  130 mg/dL  Above Desirable:  130-159 mg/dL  Borderline High:  160-189 mg/dL  High:  190-219 mg/dL  Very High:  Greater than or equal to 220 mg/dL   **Potassium FUTURE anytime   Result Value Ref Range    Potassium 5.1 3.4 - 5.3 mmol/L   Reticulocyte Count   Result Value Ref Range    % Reticulocyte 1.4 0.5 - 2.0 %    Absolute Reticulocyte 0.058 0.025 - 0.095 10e6/uL   CBC with platelets and differential   Result Value Ref Range    WBC Count 7.5 4.0 - 11.0 10e3/uL     RBC Count 4.96 3.80 - 5.90 10e6/uL      Comment:      Reference Range:                                                     Female 3.80-5.20 10e6/uL                                      Male 4.40-5.90 10e6u/L    Hemoglobin 15.4 11.7 - 17.7 g/dL      Comment:      Reference Range:                                                     Female 11.7-15.7 g/dL                                      Male 13.3-17.7 g/dL    Hematocrit 48.1 35.0 - 53.0 %      Comment:      Reference Range:                                                     Female 35.0-47.0 %                                            Male 40.0-54.0 %    MCV 97 78 - 100 fL    MCH 31.0 26.5 - 33.0 pg    MCHC 32.0 31.5 - 36.5 g/dL    RDW 12.9 10.0 - 15.0 %    Platelet Count 156 150 - 450 10e3/uL    % Neutrophils 64 %    % Lymphocytes 20 %    % Monocytes 7 %    % Eosinophils 9 %    % Basophils 0 %    % Immature Granulocytes 0 %    Absolute Neutrophils 4.8 1.6 - 8.3 10e3/uL    Absolute Lymphocytes 1.5 0.8 - 5.3 10e3/uL    Absolute Monocytes 0.5 0.0 - 1.3 10e3/uL    Absolute Eosinophils 0.7 0.0 - 0.7 10e3/uL    Absolute Basophils 0.0 0.0 - 0.2 10e3/uL    Absolute Immature Granulocytes 0.0 <=0.4 10e3/uL    Narrative    The sex of this patient cannot be reliably determined based on discrepancies in demographics (legal sex, sex assigned at birth, gender identity).  Both male and female reference ranges are provided where applicable.  Careful evaluation of the patient s results as compared to the gender specific reference intervals is required in this setting.        If you have any questions or concerns, please call the clinic at the number listed above.       Sincerely,      Delmis Simons, JERRICA CNP/dw

## 2022-05-05 NOTE — PROGRESS NOTES
SUBJECTIVE:   Laila Perez is a 72 year old adult who presents for Preventive Visit.        Are you in the first 12 months of your Medicare coverage?  No    Healthy Habits:    In general, how would you rate your overall health?  Good    Frequency of exercise:  2-3 days/week    Duration of exercise:  15-30 minutes    Taking medications regularly:  Yes    Barriers to taking medications:  Not applicable    Medication side effects:  Not applicable    Current function: [facility]     Home Safety:  No safety concerns identified    Hearing Impairment:  No hearing concerns    In the past 6 months, have you been bothered by leaking of urine? Yes    In general, how would you rate your overall mental or emotional health?  Good      PHQ-2 Total Score:    Additional concerns today:  No     Do you feel safe in your environment? Yes    Have you ever done Advance Care Planning? (For example, a Health Directive, POLST, or a discussion with a medical provider or your loved ones about your wishes): Yes, patient states has an Advance Care Planning document and will bring a copy to the clinic.       Fall risk  Fallen 2 or more times in the past year?: No  Any fall with injury in the past year?: No  click delete button to remove this line now    Do you have sleep apnea, excessive snoring or daytime drowsiness?: no    Reviewed and updated as needed this visit by clinical staff   Tobacco  Allergies  Meds  Problems  Med Hx  Surg Hx  Fam Hx            Reviewed and updated as needed this visit by Provider   Tobacco  Allergies  Meds  Problems  Med Hx  Surg Hx  Fam Hx           Social History     Tobacco Use     Smoking status: Never Smoker     Smokeless tobacco: Never Used   Substance Use Topics     Alcohol use: No     Comment: Quit 3/24/05     If you drink alcohol do you typically have >3 drinks per day or >7 drinks per week? No  Previous alcoholic with significant psoriasis of the liver  Was drinking again  approximately a month ago  After discussion patient stopped drinking alcohol again  Psychotherapy recommended ongoing and chronic    Eye exam done this year.  Appt next month for the eye doctor  Hearing aides getting them on Tuesday  Estradiol patches .. appt in June with Dr Mane      ED/UC Followup:    Facility:  [facility]   Date of visit: 04/26/2022  Reason for visit: Breathing   Current Status: better    She was told in the ER there  Take the lasix and follow up here. Xray clear per her report.  Unfortunately we cannot see the  emergency department records today for review.  covid negative.   hadnt been taking her lasix for 3 weeks  No alcohol  X 1 month again  Staff with patient to appointments now as she has not been very compliant with her medications or forthright during prior appointments  Staff states today that that she thinks she may have some depression going on as she was not able to lose weight so that she could have her other gender reassignment surgery    She does not like taking the Lasix as it makes her urinate more frequently and she hates  that    Current providers sharing in care for this patient include:   Patient Care Team:  Delmis Simons APRN CNP as PCP - General (Family Practice)  Delmis Simons APRN CNP as Referring Physician (Family Practice)  Abhilash Weston MD as MD (Urology)  Vandana Bragg, RN as Specialty Care Coordinator (Urology)  Alexandra Helton, PALMER as Lead Care Coordinator (Primary Care - CC)  Tunde Garcia as Specialty Care Coordinator (Plastic Surgery)  Bloch, Lauren Turner, [facility] as Pharmacist (Pharmacist)  Tia Ibrahim MD as Assigned Heart and Vascular Provider  Estelle Gomez LP as Assigned Behavioral Health Provider  Hema Duncan MD as Assigned Musculoskeletal Provider  Delmis Simons APRN CNP as Assigned PCP    The following health maintenance items are reviewed in Epic and correct as of today:  Health Maintenance  Due   Topic Date Due     ZOSTER IMMUNIZATION (1 of 2) Never done     Pneumococcal Vaccine: 65+ Years (2 - PPSV23 or PCV20) 05/21/2019     MENTAL HEALTH TX PLAN  08/15/2021     COVID-19 Vaccine (4 - Booster for Pfizer series) 02/25/2022     Lab work is in process  Labs reviewed in EPIC  BP Readings from Last 3 Encounters:   05/05/22 134/80   04/05/22 132/82   03/18/22 118/76    Wt Readings from Last 3 Encounters:   05/05/22 (!) 150.6 kg (332 lb)   04/05/22 (!) 155.1 kg (342 lb)   03/18/22 (!) 153.4 kg (338 lb 3.2 oz)                  Patient Active Problem List   Diagnosis     Gouty arthropathy     Mental or behavioral problem     Generalized osteoarthrosis, unspecified site     Disorder of bone and cartilage     Mixed hyperlipidemia     OA (osteoarthritis) of knee     DDD (degenerative disc disease), lumbar     Varicose veins of legs     Psoriasis     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     Family history of diabetes mellitus in first degree relative     Mitral valve disorder     Nonrheumatic aortic valve stenosis     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     Obesity, Class III, BMI 40-49.9 (morbid obesity) (H)     Multiple gastric ulcers     Episodic tension-type headache, not intractable     Alcohol dependence in remission (H)     Aortic aneurysm without rupture, unspecified portion of aorta (H)     Past Surgical History:   Procedure Laterality Date     COLONOSCOPY  2007     JOINT REPLACEMTN, KNEE RT/LT  2006    left     ORCHIECTOMY SCROTAL Bilateral 5/14/2019    Procedure: Bilatera Scrotal Orchiectomy;  Surgeon: Abhilash Weston MD;  Location:  OR       Social History     Tobacco Use     Smoking status: Never Smoker     Smokeless tobacco: Never Used   Substance Use Topics     Alcohol use: No     Comment: Quit 3/24/05     Family History   Problem Relation Age of Onset     Cancer Father         lung cancer heavy smoker         Current Outpatient Medications   Medication Sig Dispense Refill     albuterol  "(PROAIR HFA/PROVENTIL HFA/VENTOLIN HFA) 108 (90 Base) MCG/ACT inhaler Inhale 2 puffs into the lungs every 6 hours 18 g 0     atenolol (TENORMIN) 50 MG tablet Take 1 tablet (50 mg) by mouth daily 90 tablet 1     dorzolamide-timolol (COSOPT) 2-0.5 % ophthalmic solution        estradiol (VIVELLE-DOT) 0.05 MG/24HR bi-weekly patch Place 1 patch onto the skin twice a week 24 patch 1     furosemide (LASIX) 20 MG tablet Take 1 tablet (20 mg) by mouth daily 90 tablet 1     ipratropium - albuterol 0.5 mg/2.5 mg/3 mL (DUONEB) 0.5-2.5 (3) MG/3ML neb solution Take 1 vial (3 mLs) by nebulization every 6 hours as needed for shortness of breath / dyspnea or wheezing 90 mL 1     latanoprost (XALATAN) 0.005 % ophthalmic solution 1 drop At Bedtime.       lovastatin (MEVACOR) 20 MG tablet TAKE ONE TABLET BY MOUTH ONCE A DAY AT BEDTIME 90 tablet 3     brimonidine (ALPHAGAN-P) 0.15 % ophthalmic solution        Multiple Vitamin (DAILY-EVANGELISTA) TABS Take 1 tablet by mouth daily  11     timolol maleate (TIMOPTIC) 0.5 % ophthalmic solution Apply 1 drop to eye       Allergies   Allergen Reactions     External Allergen Needs Reconciliation - See Comment      Please reconcile the Patient's allergy reported as ESTERSPENICILLIN and update accordingly           Heparin      he lives in a assisted living program and is not sure what type of allergies he really has. He quit drinking     Zosyn [Penicillins]      unsure of reaction, allergy is listed on his med sheet         PCN allergy 2004 hospitalized  Would like to see if she was still allergic to the PCN    Review of Systems  Constitutional, HEENT, cardiovascular, pulmonary, GI, , musculoskeletal, neuro, skin, endocrine and psych systems are negative, except as otherwise noted.    OBJECTIVE:   /80   Pulse 71   Temp 97.3  F (36.3  C) (Tympanic)   Resp 14   Ht 1.803 m (5' 11\")   Wt (!) 150.6 kg (332 lb)   SpO2 94%   BMI 46.30 kg/m   Estimated body mass index is 46.3 kg/m  as calculated " "from the following:    Height as of this encounter: 1.803 m (5' 11\").    Weight as of this encounter: 150.6 kg (332 lb).  Physical Exam   Wt Readings from Last 5 Encounters:   05/05/22 (!) 150.6 kg (332 lb)   04/05/22 (!) 155.1 kg (342 lb)   03/18/22 (!) 153.4 kg (338 lb 3.2 oz)   01/03/22 (!) 154.9 kg (341 lb 9.6 oz)   12/09/21 (!) 153.3 kg (338 lb)       GENERAL APPEARANCE: alert, morbidly obese, mild distress with ambulation and pale  EYES: Eyes grossly normal to inspection, PERRL and conjunctivae and sclerae normal  HENT: ear canals and TM's normal, nose and mouth without ulcers or lesions, oropharynx clear and oral mucous membranes moist  NECK: no adenopathy, no asymmetry, masses, or scars and thyroid normal to palpation  RESP: lungs rhonchi right lower lobe with slight expiratory wheezes  CV: regular rate and rhythm, normal S1 S2, no S3 or S4, no murmur, click or rub, no peripheral edema and peripheral pulses strong  ABDOMEN: soft, nontender, no hepatosplenomegaly, no masses and bowel sounds normal  MS: 2+ pitting edema to the lower extremities  SKIN: no suspicious lesions or rashes  NEURO: Normal strength and tone, sensory exam grossly normal, mentation intact and speech normal  PSYCH: affect flat    Diagnostic Test Results:  Labs reviewed in Epic  Results for orders placed or performed in visit on 05/05/22   XR Chest 2 Views     Status: None    Narrative    CHEST TWO VIEWS 5/5/2022 10:36 AM     HISTORY: Cough    COMPARISON: 11/9/2020    FINDINGS: Heart size and pulmonary vascularity are within normal  limits. The lungs are clear. No pneumothorax or pleural effusion. Old  right-sided rib fractures noted.      Impression    IMPRESSION: No radiographic evidence of acute chest abnormality.     BHUPINDER DELGADO MD         SYSTEM ID:  [id number]   Results for orders placed or performed in visit on 05/05/22   Lipid panel reflex to direct LDL Fasting     Status: None   Result Value Ref Range    Cholesterol 133 <200 " mg/dL    Triglycerides 136 <150 mg/dL    Direct Measure HDL 60 >=40 mg/dL    LDL Cholesterol Calculated 46 <=100 mg/dL    Non HDL Cholesterol 73 <130 mg/dL    Patient Fasting > 8hrs? Yes     Narrative    The sex of this patient cannot be reliably determined based on discrepancies in demographics (legal sex, sex assigned at birth, gender identity).  Both male and female reference ranges are provided where applicable.  Careful evaluation of the patient s results as compared to the gender specific reference intervals is required in this setting.   Cholesterol  Desirable:  <200 mg/dL    Triglycerides  Normal:  Less than 150 mg/dL  Borderline High:  150-199 mg/dL  High:  200-499 mg/dL  Very High:  Greater than or equal to 500 mg/dL    Direct Measure HDL  Female:  Greater than or equal to 50 mg/dL   Male:  Greater than or equal to 40 mg/dL    LDL Cholesterol  Desirable:  <100mg/dL  Above Desirable:  100-129 mg/dL   Borderline High:  130-159 mg/dL   High:  160-189 mg/dL   Very High:  >= 190 mg/dL    Non HDL Cholesterol  Desirable:  130 mg/dL  Above Desirable:  130-159 mg/dL  Borderline High:  160-189 mg/dL  High:  190-219 mg/dL  Very High:  Greater than or equal to 220 mg/dL   **Potassium FUTURE anytime     Status: Normal   Result Value Ref Range    Potassium 5.1 3.4 - 5.3 mmol/L   CBC with platelets differential     Status: None    Narrative    The following orders were created for panel order CBC with platelets differential.  Procedure                               Abnormality         Status                     ---------                               -----------         ------                     CBC with platelets and d...[[id number]]                      Final result                 Please view results for these tests on the individual orders.   Reticulocyte Count     Status: Normal   Result Value Ref Range    % Reticulocyte 1.4 0.5 - 2.0 %    Absolute Reticulocyte 0.058 0.025 - 0.095 10e6/uL   CBC with platelets and  differential     Status: None   Result Value Ref Range    WBC Count 7.5 4.0 - 11.0 10e3/uL    RBC Count 4.96 3.80 - 5.90 10e6/uL    Hemoglobin 15.4 11.7 - 17.7 g/dL    Hematocrit 48.1 35.0 - 53.0 %    MCV 97 78 - 100 fL    MCH 31.0 26.5 - 33.0 pg    MCHC 32.0 31.5 - 36.5 g/dL    RDW 12.9 10.0 - 15.0 %    Platelet Count 156 150 - 450 10e3/uL    % Neutrophils 64 %    % Lymphocytes 20 %    % Monocytes 7 %    % Eosinophils 9 %    % Basophils 0 %    % Immature Granulocytes 0 %    Absolute Neutrophils 4.8 1.6 - 8.3 10e3/uL    Absolute Lymphocytes 1.5 0.8 - 5.3 10e3/uL    Absolute Monocytes 0.5 0.0 - 1.3 10e3/uL    Absolute Eosinophils 0.7 0.0 - 0.7 10e3/uL    Absolute Basophils 0.0 0.0 - 0.2 10e3/uL    Absolute Immature Granulocytes 0.0 <=0.4 10e3/uL    Narrative    The sex of this patient cannot be reliably determined based on discrepancies in demographics (legal sex, sex assigned at birth, gender identity).  Both male and female reference ranges are provided where applicable.  Careful evaluation of the patient s results as compared to the gender specific reference intervals is required in this setting.        ASSESSMENT / PLAN:   Laila was seen today for physical and er f/u.    Diagnoses and all orders for this visit:    Encounter for routine adult physical exam with abnormal findings    Cough  -     XR Chest 2 Views; Future    Hyperlipidemia LDL goal <100  -     Lipid panel reflex to direct LDL Fasting    Hyperkalemia  -     **Potassium FUTURE anytime    Thrombocytopenia (H)  -     CBC with platelets differential  -     Reticulocyte Count    Penicillin allergy  -     Adult Allergy/Asthma Referral; Future    Wheezing  -     albuterol (PROAIR HFA/PROVENTIL HFA/VENTOLIN HFA) 108 (90 Base) MCG/ACT inhaler; Inhale 2 puffs into the lungs every 6 hours    Continue daily Lasix  Continue compression stocks  Follow-up with cardiology as planned  Follow-up with Dr. Mane  No medication changes        Estimated body mass  "index is 46.3 kg/m  as calculated from the following:    Height as of this encounter: 1.803 m (5' 11\").    Weight as of this encounter: 150.6 kg (332 lb).    Weight management plan: Discussed healthy diet and exercise guidelines    She reports that she has never smoked. She has never used smokeless tobacco.      Appropriate preventive services were discussed with this patient, including applicable screening as appropriate for cardiovascular disease, diabetes, osteopenia/osteoporosis, and glaucoma.  As appropriate for age/gender, discussed screening for colorectal cancer, prostate cancer, breast cancer, and cervical cancer. Checklist reviewing preventive services available has been given to the patient.    Reviewed patients plan of care and provided an AVS. The Complex Care Plan (for patients with higher acuity and needing more deliberate coordination of services) for Laila meets the Care Plan requirement. This Care Plan has been established and reviewed with the Patient and Group home staff.    Call or return to the clinic with any worsening of symptoms or no resolution. Patient/Parent verbalized understanding and is in agreement. Medication side effects reviewed.   Current Outpatient Medications   Medication Sig Dispense Refill     albuterol (PROAIR HFA/PROVENTIL HFA/VENTOLIN HFA) 108 (90 Base) MCG/ACT inhaler Inhale 2 puffs into the lungs every 6 hours 18 g 0     atenolol (TENORMIN) 50 MG tablet Take 1 tablet (50 mg) by mouth daily 90 tablet 1     dorzolamide-timolol (COSOPT) 2-0.5 % ophthalmic solution        estradiol (VIVELLE-DOT) 0.05 MG/24HR bi-weekly patch Place 1 patch onto the skin twice a week 24 patch 1     furosemide (LASIX) 20 MG tablet Take 1 tablet (20 mg) by mouth daily 90 tablet 1     ipratropium - albuterol 0.5 mg/2.5 mg/3 mL (DUONEB) 0.5-2.5 (3) MG/3ML neb solution Take 1 vial (3 mLs) by nebulization every 6 hours as needed for shortness of breath / dyspnea or wheezing 90 mL 1     latanoprost " (XALATAN) 0.005 % ophthalmic solution 1 drop At Bedtime.       lovastatin (MEVACOR) 20 MG tablet TAKE ONE TABLET BY MOUTH ONCE A DAY AT BEDTIME 90 tablet 3     brimonidine (ALPHAGAN-P) 0.15 % ophthalmic solution        Multiple Vitamin (DAILY-EVANGELISTA) TABS Take 1 tablet by mouth daily  11     timolol maleate (TIMOPTIC) 0.5 % ophthalmic solution Apply 1 drop to eye       Chart documentation with Dragon Voice recognition Software. Although reviewed after completion, some words and grammatical errors may remain.      JERRICA Salcedo CNP  [facility]    Identified Health Risks:  Mental health  Alcohol dependence

## 2022-05-09 DIAGNOSIS — R06.09 DYSPNEA ON EXERTION: ICD-10-CM

## 2022-05-09 RX ORDER — IPRATROPIUM BROMIDE AND ALBUTEROL SULFATE 2.5; .5 MG/3ML; MG/3ML
1 SOLUTION RESPIRATORY (INHALATION) EVERY 6 HOURS PRN
Qty: 90 ML | Refills: 4 | Status: SHIPPED | OUTPATIENT
Start: 2022-05-09 | End: 2023-05-25

## 2022-05-09 NOTE — TELEPHONE ENCOUNTER
Laila is requesting a new rx for her Ipratropium-Albuterol vials for her nebulizer, written by Delmis Simons on 4/5/22,      Thank You,  Jaja Garcia, [facility]

## 2022-06-06 ENCOUNTER — OFFICE VISIT (OUTPATIENT)
Dept: FAMILY MEDICINE | Facility: CLINIC | Age: 72
End: 2022-06-06
Payer: COMMERCIAL

## 2022-06-06 VITALS
OXYGEN SATURATION: 94 % | RESPIRATION RATE: 16 BRPM | HEIGHT: 71 IN | HEART RATE: 64 BPM | DIASTOLIC BLOOD PRESSURE: 74 MMHG | WEIGHT: 293 LBS | BODY MASS INDEX: 41.02 KG/M2 | TEMPERATURE: 97 F | SYSTOLIC BLOOD PRESSURE: 128 MMHG

## 2022-06-06 DIAGNOSIS — R06.09 DYSPNEA ON EXERTION: ICD-10-CM

## 2022-06-06 DIAGNOSIS — E66.01 OBESITY, CLASS III, BMI 40-49.9 (MORBID OBESITY) (H): ICD-10-CM

## 2022-06-06 DIAGNOSIS — I10 HTN, GOAL BELOW 140/90: ICD-10-CM

## 2022-06-06 DIAGNOSIS — E78.5 HYPERLIPIDEMIA LDL GOAL <100: ICD-10-CM

## 2022-06-06 DIAGNOSIS — R06.2 WHEEZING: Primary | ICD-10-CM

## 2022-06-06 PROCEDURE — 99213 OFFICE O/P EST LOW 20 MIN: CPT | Performed by: NURSE PRACTITIONER

## 2022-06-06 ASSESSMENT — PAIN SCALES - GENERAL: PAINLEVEL: NO PAIN (0)

## 2022-06-06 NOTE — PATIENT INSTRUCTIONS
Wheezing  Recent increased wheezing and dyspnea on exertion, was seen in ER a little over a month ago.  Was not taking her Lasix consistently due to increased urination.  Chest x-ray completed approximately 1 month ago which was normal.  Has since been taking Lasix daily, using nebulizers now down to 3 times daily and albuterol inhaler as needed.  Patient notes no further wheezing in breathing is much improved, almost back to baseline.  Recommend continued consistent use of Lasix daily, and nebulizer and albuterol inhaler as needed.  Advised patient to elevate legs is much as possible, having difficulty doing this at home, order for lift chair as below.  Also, not wearing compression stockings as they are difficult to get on.  Advised patient to try to wear them even if it is a couple days a week, patient does have staff that comes 2 days a week and will have them put on.  Follow-up with cardiology and Dr. Mane as scheduled and Delmis as needed.    Dyspnea on exertion  Improved as above, almost back to baseline.  Lift chair ordered.  - Lift Chair W Seat Lift Mechanism Order    Obesity, Class III, BMI 40-49.9 (morbid obesity) (H)  Chronic, stable.  Difficulty getting in and out of chair and elevating legs.  Lift chair ordered.  - Lift Chair W Seat Lift Mechanism Order

## 2022-06-06 NOTE — PROGRESS NOTES
Assessment & Plan     Wheezing  Recent increased wheezing and dyspnea on exertion, was seen in ER a little over a month ago.  Was not taking her Lasix consistently due to increased urination.  Chest x-ray completed approximately 1 month ago which was normal.  Has since been taking Lasix daily, using nebulizers now down to 3 times daily and albuterol inhaler as needed.  Patient notes no further wheezing in breathing is much improved, almost back to baseline.  Recommend continued consistent use of Lasix daily, and nebulizer and albuterol inhaler as needed.  Advised patient to elevate legs is much as possible, having difficulty doing this at home, order for lift chair as below.  Also, not wearing compression stockings as they are difficult to get on.  Advised patient to try to wear them even if it is a couple days a week, patient does have staff that comes 2 days a week and will have them put on.  Follow-up with cardiology and Dr. Mane as scheduled and Delmis as needed.    Dyspnea on exertion  Improved as above, almost back to baseline.  Lift chair ordered.  - Lift Chair W Seat Lift Mechanism Order    Obesity, Class III, BMI 40-49.9 (morbid obesity) (H)  Chronic, stable.  Difficulty getting in and out of chair and elevating legs.  Lift chair ordered.  - Lift Chair W Seat Lift Mechanism Order             See Patient Instructions    Return in about 3 months (around 9/6/2022) for Recheck.    Francia Lew, EDGAR, APRN-CNP   [facility]    Juana Ulloa is a 72 year old who presents for the following health issues:    HPI     Patient is here to follow up with her breathing. She states that it has been getting better. The nebulizer has been helping and she hasn't been using it as much.    Can get around - almost back to baseline  Still using nebulizer about three times daily and using inhaler as needed at work   Hasn't noticed any wheezing      Taking Lasix everyday   Gets legs up  "on a footstool - difficulty getting feet up in current recliner   Is difficult to get in and out of the chair   Has a hard time getting compression stockings on - hasn't really been wearing them. Does have staff come in 2 days/week   Would like a lift chair to help get feet elevated better; talked to  at [phone]    Has Cardiology appointment in approximately 1 week   Sees Dr. Mane next week         CHEST TWO VIEWS 5/5/2022 10:36 AM   HISTORY: Cough  COMPARISON: 11/9/2020     FINDINGS: Heart size and pulmonary vascularity are within normal  limits. The lungs are clear. No pneumothorax or pleural effusion. Old  right-sided rib fractures noted.                                                               IMPRESSION: No radiographic evidence of acute chest abnormality.      BHUPINDER DELGADO MD      Review of Systems   Constitutional, HEENT, cardiovascular, pulmonary, gi and gu systems are negative, except as otherwise noted.      Objective    /74 (BP Location: Right arm, Cuff Size: Adult Large)   Pulse 64   Temp 97  F (36.1  C) (Tympanic)   Resp 16   Ht 1.803 m (5' 11\")   Wt (!) 157.4 kg (347 lb)   SpO2 94%   BMI 48.40 kg/m    Body mass index is 48.4 kg/m .  Physical Exam   GENERAL APPEARANCE: healthy, alert and no distress  RESP: lungs clear to auscultation - no rales, rhonchi or wheezes - slightly diminished   CV: regular rates and rhythm, normal S1 S2, no S3 or S4 and murmur noted   ABDOMEN: soft, nontender, without hepatosplenomegaly or masses, bowel sounds normal and obese  MS: extremities normal- no gross deformities noted, peripheral pulses normal and trace bilateral lower extremities edema  SKIN: no suspicious lesions or rashes  NEURO: Normal strength and tone, mentation intact and speech normal  PSYCH: mentation appears normal and affect normal/bright    Diagnostic Test Results:  none            DME (Durable Medical Equipment) Orders and Documentation  Orders Placed This " Encounter   Procedures     Lift Chair W Seat Lift Mechanism Order      The patient was assessed and it was determined the patient is in need of the following listed DME Supplies/Equipment. Please complete supporting documentation below to demonstrate medical necessity.      DME All Other Item(s) Documentation    List reason for need and supporting documentation for medical necessity below for each DME item.     1. Dyspnea on exertion and obesity

## 2022-06-07 RX ORDER — ATENOLOL 50 MG/1
50 TABLET ORAL DAILY
Qty: 90 TABLET | Refills: 1 | Status: SHIPPED | OUTPATIENT
Start: 2022-06-07 | End: 2022-10-06

## 2022-06-07 RX ORDER — LOVASTATIN 20 MG
TABLET ORAL
Qty: 90 TABLET | Refills: 3 | Status: SHIPPED | OUTPATIENT
Start: 2022-06-07 | End: 2023-07-21

## 2022-06-08 ENCOUNTER — MEDICAL CORRESPONDENCE (OUTPATIENT)
Dept: HEALTH INFORMATION MANAGEMENT | Facility: CLINIC | Age: 72
End: 2022-06-08

## 2022-06-08 ENCOUNTER — TELEPHONE (OUTPATIENT)
Dept: FAMILY MEDICINE | Facility: CLINIC | Age: 72
End: 2022-06-08
Payer: COMMERCIAL

## 2022-06-09 NOTE — TELEPHONE ENCOUNTER
Forms completed and signed by Francia Lew. Faxed back on 6/9/2022. Sent to scanning.    Lisseth Melo Patient

## 2022-06-13 ENCOUNTER — OFFICE VISIT (OUTPATIENT)
Dept: FAMILY MEDICINE | Facility: CLINIC | Age: 72
End: 2022-06-13
Payer: COMMERCIAL

## 2022-06-13 VITALS
WEIGHT: 293 LBS | DIASTOLIC BLOOD PRESSURE: 86 MMHG | HEART RATE: 56 BPM | SYSTOLIC BLOOD PRESSURE: 137 MMHG | BODY MASS INDEX: 48.95 KG/M2

## 2022-06-13 DIAGNOSIS — R60.0 PERIPHERAL EDEMA: ICD-10-CM

## 2022-06-13 DIAGNOSIS — R06.02 SOB (SHORTNESS OF BREATH): ICD-10-CM

## 2022-06-13 DIAGNOSIS — I35.0 AORTIC VALVE STENOSIS, ETIOLOGY OF CARDIAC VALVE DISEASE UNSPECIFIED: ICD-10-CM

## 2022-06-13 DIAGNOSIS — F64.0 GENDER DYSPHORIA IN ADOLESCENT AND ADULT: Primary | ICD-10-CM

## 2022-06-13 PROCEDURE — 99214 OFFICE O/P EST MOD 30 MIN: CPT | Performed by: FAMILY MEDICINE

## 2022-06-13 ASSESSMENT — ENCOUNTER SYMPTOMS
FEVER: 0
CHILLS: 0
WHEEZING: 1
SHORTNESS OF BREATH: 1
COUGH: 1

## 2022-06-13 NOTE — PROGRESS NOTES
"         HPI     Laila is a 72 year old individual that uses pronouns She/Her/Hers/Herself that presents today for follow up of:  feminizing hormone therapy.   Alone or accompanied by: accompanied today by  Gender identity: female  Started Hormone  therapy  2016  Continues on Vivelle dot 0.05 mg patch 2x/wk  Any special concerns today?   Was in ED a few months ago for wheezing and dyspnea on exertion, told \"had water around heart and lungs\" so placed on inhaler and \"water pill\"  Reviewed 3/18/2022 note from PCP; ED note unavailable  Unclear if PE ruled out, unclear what cardiac evaluation done  --appears diagnosis of  Bronchitis; --was started on Lasix and doxcycycline  --had been drinking alcohol, \"got fed up things\" lonely, deaths of others--states was drinking off and for about 1 month. States stopped only 2 days ago. 12-case beer per week  Not able to put compression stocking by herself    Laila garduno known severe aortic stenosis, seeing cardiology this week  Will be getting tested for PCN allergy per patient    Down to  Using nebulizer 3x/day, had been 6+x/day prior to ED visit; negative Covid per patient    On hormones?  YES +++ Shot day of the week? Not applicable-taking pills/patch/gel      Due for labs?  Yes      +++ Refills of meds needed?  Yes  Gender related body changes since last visit: stable    Breakthrough bleeding? Does Not Apply    New health concerns since last visit:  ---  See above  Past Surgical History:   Procedure Laterality Date     COLONOSCOPY  2007     JOINT REPLACEMTN, KNEE RT/LT  2006    left     ORCHIECTOMY SCROTAL Bilateral 5/14/2019    Procedure: Bilatera Scrotal Orchiectomy;  Surgeon: Abhilash Weston MD;  Location: [facility] OR       Patient Active Problem List   Diagnosis     Gouty arthropathy     Mental or behavioral problem     Generalized osteoarthrosis, unspecified site     Disorder of bone and cartilage     Mixed hyperlipidemia     OA (osteoarthritis) of knee     DDD " (degenerative disc disease), lumbar     Varicose veins of legs     Psoriasis     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     Family history of diabetes mellitus in first degree relative     Mitral valve disorder     Nonrheumatic aortic valve stenosis     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     Obesity, Class III, BMI 40-49.9 (morbid obesity) (H)     Multiple gastric ulcers     Episodic tension-type headache, not intractable     Alcohol dependence in remission (H)     Aortic aneurysm without rupture, unspecified portion of aorta (H)       Current Outpatient Medications   Medication Sig Dispense Refill     albuterol (PROAIR HFA/PROVENTIL HFA/VENTOLIN HFA) 108 (90 Base) MCG/ACT inhaler Inhale 2 puffs into the lungs every 6 hours 18 g 0     atenolol (TENORMIN) 50 MG tablet Take 1 tablet (50 mg) by mouth daily 90 tablet 1     brimonidine (ALPHAGAN-P) 0.15 % ophthalmic solution        dorzolamide-timolol (COSOPT) 2-0.5 % ophthalmic solution        estradiol (VIVELLE-DOT) 0.05 MG/24HR bi-weekly patch Place 1 patch onto the skin twice a week 24 patch 1     furosemide (LASIX) 20 MG tablet Take 1 tablet (20 mg) by mouth daily 90 tablet 1     ipratropium - albuterol 0.5 mg/2.5 mg/3 mL (DUONEB) 0.5-2.5 (3) MG/3ML neb solution Take 1 vial (3 mLs) by nebulization every 6 hours as needed for shortness of breath / dyspnea or wheezing 90 mL 4     latanoprost (XALATAN) 0.005 % ophthalmic solution 1 drop At Bedtime.       lovastatin (MEVACOR) 20 MG tablet TAKE ONE TABLET BY MOUTH ONCE A DAY AT BEDTIME 90 tablet 3     Multiple Vitamin (DAILY-EVANGELISTA) TABS Take 1 tablet by mouth daily  11     timolol maleate (TIMOPTIC) 0.5 % ophthalmic solution Apply 1 drop to eye         History   Smoking Status     Never Smoker   Smokeless Tobacco     Never Used          Allergies   Allergen Reactions     External Allergen Needs Reconciliation - See Comment      Please reconcile the Patient's allergy reported as ESTERSPENICILLIN and update  accordingly           Heparin      he lives in a assisted living program and is not sure what type of allergies he really has. He quit drinking     Zosyn [Penicillins]      unsure of reaction, allergy is listed on his med sheet       Health Maintenance Due   Topic Date Due     MENTAL HEALTH TX PLAN  08/15/2021         Problem, Medication and Allergy Lists were reviewed and are current..         Review of Systems:   Review of Systems   Constitutional: Negative for chills and fever.   Respiratory: Positive for cough, shortness of breath and wheezing.    Cardiovascular: Positive for leg swelling. Negative for chest pain.              Labs:   Results from last visit:  Office Visit on 05/05/2022   Component Date Value Ref Range Status     Cholesterol 05/05/2022 133  <200 mg/dL Final     Triglycerides 05/05/2022 136  <150 mg/dL Final     Direct Measure HDL 05/05/2022 60  >=40 mg/dL Final     LDL Cholesterol Calculated 05/05/2022 46  <=100 mg/dL Final     Non HDL Cholesterol 05/05/2022 73  <130 mg/dL Final     Patient Fasting > 8hrs? 05/05/2022 Yes   Final     Potassium 05/05/2022 5.1  3.4 - 5.3 mmol/L Final     % Reticulocyte 05/05/2022 1.4  0.5 - 2.0 % Final     Absolute Reticulocyte 05/05/2022 0.058  0.025 - 0.095 10e6/uL Final     WBC Count 05/05/2022 7.5  4.0 - 11.0 10e3/uL Final     RBC Count 05/05/2022 4.96  3.80 - 5.90 10e6/uL Final    Reference Range:                                                     Female 3.80-5.20 10e6/uL                                      Male 4.40-5.90 10e6u/L     Hemoglobin 05/05/2022 15.4  11.7 - 17.7 g/dL Final    Reference Range:                                                     Female 11.7-15.7 g/dL                                      Male 13.3-17.7 g/dL     Hematocrit 05/05/2022 48.1  35.0 - 53.0 % Final    Reference Range:                                                     Female 35.0-47.0 %                                            Male 40.0-54.0 %     MCV 05/05/2022 97  78 -  100 fL Final     MCH 05/05/2022 31.0  26.5 - 33.0 pg Final     MCHC 05/05/2022 32.0  31.5 - 36.5 g/dL Final     RDW 05/05/2022 12.9  10.0 - 15.0 % Final     Platelet Count 05/05/2022 156  150 - 450 10e3/uL Final     % Neutrophils 05/05/2022 64  % Final     % Lymphocytes 05/05/2022 20  % Final     % Monocytes 05/05/2022 7  % Final     % Eosinophils 05/05/2022 9  % Final     % Basophils 05/05/2022 0  % Final     % Immature Granulocytes 05/05/2022 0  % Final     Absolute Neutrophils 05/05/2022 4.8  1.6 - 8.3 10e3/uL Final     Absolute Lymphocytes 05/05/2022 1.5  0.8 - 5.3 10e3/uL Final     Absolute Monocytes 05/05/2022 0.5  0.0 - 1.3 10e3/uL Final     Absolute Eosinophils 05/05/2022 0.7  0.0 - 0.7 10e3/uL Final     Absolute Basophils 05/05/2022 0.0  0.0 - 0.2 10e3/uL Final     Absolute Immature Granulocytes 05/05/2022 0.0  <=0.4 10e3/uL Final         EXAM:  Blood pressure 137/86, pulse 56, weight (!) 159.2 kg (351 lb).    Constitutional: healthy, alert and no distress   Cardiovascular: negative findings: PMI normal, regular rate and rhythm, S1 normal, S2 normal, positive findings: 2/6 mid systolic    Respiratory: negative findings: normal respiratory rate and rhythm, positive findings: wheezing bibasilar, tends to shallow inspiration  2+ pitting edema to mid calf bilateral     Assessment and Plan   1. Gender dysphoria s/p orchiectomy  2. Dyspnea with lower extremity edema  3. Aortic stenosis  4. Alcohol use disorder, relapse  Stable on current hormone therapy; on low dose, unlikely to be sginificantly contributory to current symptoms  Symptoms most consistent with CHF, unclear if cardiac event vs. Aortic stenosis  Counseled patient to stop all alcohol use due to impact on heart and current symptoms  Counseled patient regarding importance to see cardiology    Follow-up  4 months, sooner if need         [facility]  Questions were elicited and answered.     Jluis Mane MD

## 2022-06-17 ENCOUNTER — OFFICE VISIT (OUTPATIENT)
Dept: CARDIOLOGY | Facility: CLINIC | Age: 72
End: 2022-06-17
Attending: NURSE PRACTITIONER
Payer: COMMERCIAL

## 2022-06-17 VITALS
HEART RATE: 57 BPM | OXYGEN SATURATION: 98 % | HEIGHT: 71 IN | BODY MASS INDEX: 41.02 KG/M2 | SYSTOLIC BLOOD PRESSURE: 138 MMHG | DIASTOLIC BLOOD PRESSURE: 88 MMHG | WEIGHT: 293 LBS

## 2022-06-17 DIAGNOSIS — I71.9 AORTIC ANEURYSM WITHOUT RUPTURE, UNSPECIFIED PORTION OF AORTA (H): Primary | ICD-10-CM

## 2022-06-17 DIAGNOSIS — I35.0 AORTIC VALVE STENOSIS, ETIOLOGY OF CARDIAC VALVE DISEASE UNSPECIFIED: ICD-10-CM

## 2022-06-17 DIAGNOSIS — R06.09 DYSPNEA ON EXERTION: ICD-10-CM

## 2022-06-17 PROCEDURE — 99214 OFFICE O/P EST MOD 30 MIN: CPT | Performed by: INTERNAL MEDICINE

## 2022-06-17 NOTE — PATIENT INSTRUCTIONS
Repeat echocardiogram   Karlene Carpenter (nurse) will call you about scheduling next steps in [place] for TAVR (aortic valve replacement)  Follow up with Dr. Cerda in 6 months

## 2022-06-17 NOTE — PROGRESS NOTES
Vascular Cardiology Consultation      HPI:     This is 72 year old female with PMH etoh use, aortic stenosis and aortic aneurysm here for follow up. Last echocardiogram was reviewed:     The visual ejection fraction is 55-60%.  The right ventricle is normal in size and function.  The aortic valve is not well visualized. There is mild (1+) aortic  regurgitation. The mean AoV pressure gradient is 36 mmHg. The calculated aortic valve are is 0.9-1.0 cm^2. Severe valvular aortic stenosis. Peak aortic velocities are 4.1 m/sec. The aortic root measures 4.1 and the proximal aorta measures 4.5 cms.    An MRA was done during October 2020 through radiology at [facility] and demonstrated the mid-ascending aorta to be 4.4 x 4.3 cm. She had a borderline dilated aortic root.     I reviewed today her symptoms since she last saw Dr. Ibrahim. She is with her health aid today who reports significant shortness of breath. Despite lasix, she has leg edema that is significant. She denies syncope or chest pain or pressure. She was told in the past she would need an aortic valve replacement and now patient is agreeable to proceed with the work up.    ASSESSMENT/PLAN:    1. Aortic stenosis: severe, mean gradient 36 mmHg, ARISTEO is 0.9 cm2 and vmax 4.1 cm/sec. Her ascending aorta by MRA is 4.4 cm thus a separate valve replacement without aortic root / ascending aorta repair is appropriate. If her aorta for whatever reason has grown over the past 6 months then would need to consider an open approach although I am not sure the patient would be a good candidate for this.  -Will repeat echocardiogram  -referral to TAVR team  -will need coronary angiogram and CT Tavr (TAVR group can arrange)   -follow up with me in 6 months     2. Aortic aneurysm: measurement by MRA 4.4 cm.   -will do formal genetic aortopathy evaluation next visit   -continue beta blocker     3. Suspected HFpEF from severe aortic stenosis:  -volume overloaded by exam today  but she reports improved symptoms with lasix, will continue for now  -can consider increase dose at future visit   -BP well controlled     Shante Cerda MD MSC  [facility]          PAST MEDICAL HISTORY  Past Medical History:   Diagnosis Date     Alcoholism (H)      DDD (degenerative disc disease), cervical      Gender dysphoria in adult      HLD (hyperlipidemia)      HTN (hypertension)      large compression fracture 3/13/2006    Worked up for neoplasm, none found.       OA (osteoarthritis of spine)      Unspecified internal derangement of knee     CHRONIC KNEE PAIN,LEG,NECK AND HEAD INJURIES       CURRENT MEDICATIONS  Current Outpatient Medications   Medication Sig Dispense Refill     atenolol (TENORMIN) 50 MG tablet Take 1 tablet (50 mg) by mouth daily 90 tablet 1     dorzolamide-timolol (COSOPT) 2-0.5 % ophthalmic solution        estradiol (VIVELLE-DOT) 0.05 MG/24HR bi-weekly patch Place 1 patch onto the skin twice a week 24 patch 1     furosemide (LASIX) 20 MG tablet Take 1 tablet (20 mg) by mouth daily 90 tablet 1     ipratropium - albuterol 0.5 mg/2.5 mg/3 mL (DUONEB) 0.5-2.5 (3) MG/3ML neb solution Take 1 vial (3 mLs) by nebulization every 6 hours as needed for shortness of breath / dyspnea or wheezing 90 mL 4     latanoprost (XALATAN) 0.005 % ophthalmic solution 1 drop At Bedtime.       lovastatin (MEVACOR) 20 MG tablet TAKE ONE TABLET BY MOUTH ONCE A DAY AT BEDTIME 90 tablet 3     Multiple Vitamin (DAILY-EVANGELISTA) TABS Take 1 tablet by mouth daily  11     albuterol (PROAIR HFA/PROVENTIL HFA/VENTOLIN HFA) 108 (90 Base) MCG/ACT inhaler Inhale 2 puffs into the lungs every 6 hours 18 g 0     brimonidine (ALPHAGAN-P) 0.15 % ophthalmic solution  (Patient not taking: Reported on 6/17/2022)       timolol maleate (TIMOPTIC) 0.5 % ophthalmic solution Apply 1 drop to eye (Patient not taking: Reported on 6/17/2022)         PAST SURGICAL HISTORY:  Past Surgical History:   Procedure Laterality Date      COLONOSCOPY  2007     JOINT REPLACEMTN, KNEE RT/LT  2006    left     ORCHIECTOMY SCROTAL Bilateral 5/14/2019    Procedure: Bilatera Scrotal Orchiectomy;  Surgeon: Abhilash Weston MD;  Location: [facility] OR       ALLERGIES     Allergies   Allergen Reactions     External Allergen Needs Reconciliation - See Comment      Please reconcile the Patient's allergy reported as ESTERSPENICILLIN and update accordingly           Heparin      he lives in a assisted living program and is not sure what type of allergies he really has. He quit drinking     Zosyn [Penicillins]      unsure of reaction, allergy is listed on his med sheet       FAMILY HISTORY  Family History   Problem Relation Age of Onset     Cancer Father         lung cancer heavy smoker         SOCIAL HISTORY  Social History     Socioeconomic History     Marital status:      Spouse name: Not on file     Number of children: Not on file     Years of education: Not on file     Highest education level: Not on file   Occupational History     Not on file   Tobacco Use     Smoking status: Never Smoker     Smokeless tobacco: Never Used   Vaping Use     Vaping Use: Never used   Substance and Sexual Activity     Alcohol use: No     Comment: Quit 3/24/05     Drug use: No     Sexual activity: Not Currently   Other Topics Concern     Parent/sibling w/ CABG, MI or angioplasty before 65F 55M? Not Asked   Social History Narrative     Not on file     Social Determinants of Health     Financial Resource Strain: Medium Risk     Difficulty of Paying Living Expenses: Somewhat hard   Food Insecurity: No Food Insecurity     Worried About Running Out of Food in the Last Year: Never true     Ran Out of Food in the Last Year: Never true   Transportation Needs: No Transportation Needs     Lack of Transportation (Medical): No     Lack of Transportation (Non-Medical): No   Physical Activity: Sufficiently Active     Days of Exercise per Week: 7 days     Minutes of Exercise per Session: 130  "min   Stress: No Stress Concern Present     Feeling of Stress : Only a little   Social Connections: Socially Isolated     Frequency of Communication with Friends and Family: More than three times a week     Frequency of Social Gatherings with Friends and Family: Once a week     Attends Sabianism Services: Never     Active Member of Clubs or Organizations: No     Attends Club or Organization Meetings: Never     Marital Status:    Intimate Partner Violence: Not At Risk     Fear of Current or Ex-Partner: No     Emotionally Abused: No     Physically Abused: No     Sexually Abused: No   Housing Stability: Low Risk      Unable to Pay for Housing in the Last Year: No     Number of Places Lived in the Last Year: 1     Unstable Housing in the Last Year: No       ROS:   Constitutional: No fever, chills, or sweats. No weight gain/loss   ENT: No visual disturbance, ear ache, epistaxis, sore throat  Allergies/Immunologic: Negative  Respiratory: No cough, hemoptysia  Cardiovascular: As per HPI  GI: No nausea, vomiting, hematemesis, melena, or hematochezia  : No urinary frequency, dysuria, or hematuria  Integument: Negative  Psychiatric: Negative  Neuro: Negative  Endocrinology: Negative   Musculoskeletal: Negative  Vascular: No walking impairment, claudication, ischemic rest pain or nonhealing wounds    EXAM:  /88 (BP Location: Right arm, Patient Position: Sitting)   Pulse 57   Ht 1.803 m (5' 11\")   Wt (!) 154.2 kg (340 lb)   SpO2 98%   BMI 47.42 kg/m    In general, the patient is a pleasant adult in no apparent distress.    HEENT: NC/AT.  PERRLA.  EOMI.  Sclerae white, not injected.  Nares clear.  Pharynx without erythema or exudate.  Dentition intact.    Neck: No adenopathy.  No thyromegaly. Carotids +2/2 bilaterally without bruits.  No jugular venous distension.   Heart: RRR. Normal S1, S2 splits physiologically. No murmur, rub, click, or gallop.  Lungs: CTA.  No ronchi, wheezes, rales.  No dullness to " percussion.   Abdomen: Soft, nontender, nondistended.   Extremities: No clubbing, cyanosis, 2+ edema.  No wounds. No varicose veins signs of chronic venous insufficiency.   Vascular: No bruits are noted.    Labs:  LIPID RESULTS:  Lab Results   Component Value Date    CHOL 133 05/05/2022    CHOL 142 05/13/2021    HDL 60 05/05/2022    HDL 60 05/13/2021    LDL 46 05/05/2022    LDL 50 05/13/2021    TRIG 136 05/05/2022    TRIG 159 (H) 05/13/2021    CHOLHDLRATIO 3.0 04/25/2014    NHDL 73 05/05/2022    NHDL 82 05/13/2021       LIVER ENZYME RESULTS:  Lab Results   Component Value Date    AST 13 05/13/2021    ALT 18 05/13/2021       CBC RESULTS:  Lab Results   Component Value Date    WBC 7.5 05/05/2022    WBC 6.0 05/13/2021    RBC 4.96 05/05/2022    RBC 4.55 05/13/2021    HGB 15.4 05/05/2022    HGB 13.7 05/13/2021    HCT 48.1 05/05/2022    HCT 41.8 05/13/2021    MCV 97 05/05/2022    MCV 92 05/13/2021    MCH 31.0 05/05/2022    MCH 30.1 05/13/2021    MCHC 32.0 05/05/2022    MCHC 32.8 05/13/2021    RDW 12.9 05/05/2022    RDW 13.1 05/13/2021     05/05/2022     (L) 05/13/2021       BMP RESULTS:  Lab Results   Component Value Date     03/18/2022     05/13/2021    POTASSIUM 5.1 05/05/2022    POTASSIUM 5.6 (H) 05/13/2021    CHLORIDE 106 03/18/2022    CHLORIDE 107 05/13/2021    CO2 30 03/18/2022    CO2 30 05/13/2021    ANIONGAP 3 03/18/2022    ANIONGAP 1 (L) 05/13/2021     (H) 03/18/2022    GLC 92 05/13/2021    BUN 26 03/18/2022    BUN 16 05/13/2021    CR 1.02 03/18/2022    CR 0.97 05/13/2021    GFRESTIMATED 78 03/18/2022    GFRESTIMATED 78 05/13/2021    GFRESTBLACK >90 05/13/2021    GOPAL 9.4 03/18/2022    GOPAL 9.3 05/13/2021        A1C RESULTS:  Lab Results   Component Value Date    A1C 4.8 04/26/2016

## 2022-06-17 NOTE — LETTER
6/17/2022    Delmis García Ronak, APRN CNP  [address] [address]    RE: Laila Morris Ana       Dear Colleague,     I had the pleasure of seeing Laila Perez in the [facility].           Vascular Cardiology Consultation      HPI:     This is 72 year old female with PMH etoh use, aortic stenosis and aortic aneurysm here for follow up. Last echocardiogram was reviewed:     The visual ejection fraction is 55-60%.  The right ventricle is normal in size and function.  The aortic valve is not well visualized. There is mild (1+) aortic  regurgitation. The mean AoV pressure gradient is 36 mmHg. The calculated aortic valve are is 0.9-1.0 cm^2. Severe valvular aortic stenosis. Peak aortic velocities are 4.1 m/sec. The aortic root measures 4.1 and the proximal aorta measures 4.5 cms.    An MRA was done during October 2020 through radiology at [facility] and demonstrated the mid-ascending aorta to be 4.4 x 4.3 cm. She had a borderline dilated aortic root.     I reviewed today her symptoms since she last saw Dr. Ibrahim. She is with her health aid today who reports significant shortness of breath. Despite lasix, she has leg edema that is significant. She denies syncope or chest pain or pressure. She was told in the past she would need an aortic valve replacement and now patient is agreeable to proceed with the work up.    ASSESSMENT/PLAN:    1. Aortic stenosis: severe, mean gradient 36 mmHg, ARISTEO is 0.9 cm2 and vmax 4.1 cm/sec. Her ascending aorta by MRA is 4.4 cm thus a separate valve replacement without aortic root / ascending aorta repair is appropriate. If her aorta for whatever reason has grown over the past 6 months then would need to consider an open approach although I am not sure the patient would be a good candidate for this.  -Will repeat echocardiogram  -referral to TAVR team  -will need coronary angiogram and CT Tavr (TAVR group can arrange)   -follow up with me in 6  months     2. Aortic aneurysm: measurement by MRA 4.4 cm.   -will do formal genetic aortopathy evaluation next visit   -continue beta blocker     3. Suspected HFpEF from severe aortic stenosis:  -volume overloaded by exam today but she reports improved symptoms with lasix, will continue for now  -can consider increase dose at future visit   -BP well controlled     Shante Cerda MD MSC  [facility]          PAST MEDICAL HISTORY  Past Medical History:   Diagnosis Date     Alcoholism (H)      DDD (degenerative disc disease), cervical      Gender dysphoria in adult      HLD (hyperlipidemia)      HTN (hypertension)      large compression fracture 3/13/2006    Worked up for neoplasm, none found.       OA (osteoarthritis of spine)      Unspecified internal derangement of knee     CHRONIC KNEE PAIN,LEG,NECK AND HEAD INJURIES       CURRENT MEDICATIONS  Current Outpatient Medications   Medication Sig Dispense Refill     atenolol (TENORMIN) 50 MG tablet Take 1 tablet (50 mg) by mouth daily 90 tablet 1     dorzolamide-timolol (COSOPT) 2-0.5 % ophthalmic solution        estradiol (VIVELLE-DOT) 0.05 MG/24HR bi-weekly patch Place 1 patch onto the skin twice a week 24 patch 1     furosemide (LASIX) 20 MG tablet Take 1 tablet (20 mg) by mouth daily 90 tablet 1     ipratropium - albuterol 0.5 mg/2.5 mg/3 mL (DUONEB) 0.5-2.5 (3) MG/3ML neb solution Take 1 vial (3 mLs) by nebulization every 6 hours as needed for shortness of breath / dyspnea or wheezing 90 mL 4     latanoprost (XALATAN) 0.005 % ophthalmic solution 1 drop At Bedtime.       lovastatin (MEVACOR) 20 MG tablet TAKE ONE TABLET BY MOUTH ONCE A DAY AT BEDTIME 90 tablet 3     Multiple Vitamin (DAILY-EVANGELISTA) TABS Take 1 tablet by mouth daily  11     albuterol (PROAIR HFA/PROVENTIL HFA/VENTOLIN HFA) 108 (90 Base) MCG/ACT inhaler Inhale 2 puffs into the lungs every 6 hours 18 g 0     brimonidine (ALPHAGAN-P) 0.15 % ophthalmic solution  (Patient not taking: Reported on  6/17/2022)       timolol maleate (TIMOPTIC) 0.5 % ophthalmic solution Apply 1 drop to eye (Patient not taking: Reported on 6/17/2022)         PAST SURGICAL HISTORY:  Past Surgical History:   Procedure Laterality Date     COLONOSCOPY  2007     JOINT REPLACEMTN, KNEE RT/LT  2006    left     ORCHIECTOMY SCROTAL Bilateral 5/14/2019    Procedure: Bilatera Scrotal Orchiectomy;  Surgeon: Abhilash Weston MD;  Location: [facility] OR       ALLERGIES     Allergies   Allergen Reactions     External Allergen Needs Reconciliation - See Comment      Please reconcile the Patient's allergy reported as ESTERSPENICILLIN and update accordingly           Heparin      he lives in a assisted living program and is not sure what type of allergies he really has. He quit drinking     Zosyn [Penicillins]      unsure of reaction, allergy is listed on his med sheet       FAMILY HISTORY  Family History   Problem Relation Age of Onset     Cancer Father         lung cancer heavy smoker         SOCIAL HISTORY  Social History     Socioeconomic History     Marital status:      Spouse name: Not on file     Number of children: Not on file     Years of education: Not on file     Highest education level: Not on file   Occupational History     Not on file   Tobacco Use     Smoking status: Never Smoker     Smokeless tobacco: Never Used   Vaping Use     Vaping Use: Never used   Substance and Sexual Activity     Alcohol use: No     Comment: Quit 3/24/05     Drug use: No     Sexual activity: Not Currently   Other Topics Concern     Parent/sibling w/ CABG, MI or angioplasty before 65F 55M? Not Asked   Social History Narrative     Not on file     Social Determinants of Health     Financial Resource Strain: Medium Risk     Difficulty of Paying Living Expenses: Somewhat hard   Food Insecurity: No Food Insecurity     Worried About Running Out of Food in the Last Year: Never true     Ran Out of Food in the Last Year: Never true   Transportation Needs: No  "Transportation Needs     Lack of Transportation (Medical): No     Lack of Transportation (Non-Medical): No   Physical Activity: Sufficiently Active     Days of Exercise per Week: 7 days     Minutes of Exercise per Session: 130 min   Stress: No Stress Concern Present     Feeling of Stress : Only a little   Social Connections: Socially Isolated     Frequency of Communication with Friends and Family: More than three times a week     Frequency of Social Gatherings with Friends and Family: Once a week     Attends Quaker Services: Never     Active Member of Clubs or Organizations: No     Attends Club or Organization Meetings: Never     Marital Status:    Intimate Partner Violence: Not At Risk     Fear of Current or Ex-Partner: No     Emotionally Abused: No     Physically Abused: No     Sexually Abused: No   Housing Stability: Low Risk      Unable to Pay for Housing in the Last Year: No     Number of Places Lived in the Last Year: 1     Unstable Housing in the Last Year: No       ROS:   Constitutional: No fever, chills, or sweats. No weight gain/loss   ENT: No visual disturbance, ear ache, epistaxis, sore throat  Allergies/Immunologic: Negative  Respiratory: No cough, hemoptysia  Cardiovascular: As per HPI  GI: No nausea, vomiting, hematemesis, melena, or hematochezia  : No urinary frequency, dysuria, or hematuria  Integument: Negative  Psychiatric: Negative  Neuro: Negative  Endocrinology: Negative   Musculoskeletal: Negative  Vascular: No walking impairment, claudication, ischemic rest pain or nonhealing wounds    EXAM:  /88 (BP Location: Right arm, Patient Position: Sitting)   Pulse 57   Ht 1.803 m (5' 11\")   Wt (!) 154.2 kg (340 lb)   SpO2 98%   BMI 47.42 kg/m    In general, the patient is a pleasant adult in no apparent distress.    HEENT: NC/AT.  PERRLA.  EOMI.  Sclerae white, not injected.  Nares clear.  Pharynx without erythema or exudate.  Dentition intact.    Neck: No adenopathy.  No " thyromegaly. Carotids +2/2 bilaterally without bruits.  No jugular venous distension.   Heart: RRR. Normal S1, S2 splits physiologically. No murmur, rub, click, or gallop.  Lungs: CTA.  No ronchi, wheezes, rales.  No dullness to percussion.   Abdomen: Soft, nontender, nondistended.   Extremities: No clubbing, cyanosis, 2+ edema.  No wounds. No varicose veins signs of chronic venous insufficiency.   Vascular: No bruits are noted.    Labs:  LIPID RESULTS:  Lab Results   Component Value Date    CHOL 133 05/05/2022    CHOL 142 05/13/2021    HDL 60 05/05/2022    HDL 60 05/13/2021    LDL 46 05/05/2022    LDL 50 05/13/2021    TRIG 136 05/05/2022    TRIG 159 (H) 05/13/2021    CHOLHDLRATIO 3.0 04/25/2014    NHDL 73 05/05/2022    NHDL 82 05/13/2021       LIVER ENZYME RESULTS:  Lab Results   Component Value Date    AST 13 05/13/2021    ALT 18 05/13/2021       CBC RESULTS:  Lab Results   Component Value Date    WBC 7.5 05/05/2022    WBC 6.0 05/13/2021    RBC 4.96 05/05/2022    RBC 4.55 05/13/2021    HGB 15.4 05/05/2022    HGB 13.7 05/13/2021    HCT 48.1 05/05/2022    HCT 41.8 05/13/2021    MCV 97 05/05/2022    MCV 92 05/13/2021    MCH 31.0 05/05/2022    MCH 30.1 05/13/2021    MCHC 32.0 05/05/2022    MCHC 32.8 05/13/2021    RDW 12.9 05/05/2022    RDW 13.1 05/13/2021     05/05/2022     (L) 05/13/2021       BMP RESULTS:  Lab Results   Component Value Date     03/18/2022     05/13/2021    POTASSIUM 5.1 05/05/2022    POTASSIUM 5.6 (H) 05/13/2021    CHLORIDE 106 03/18/2022    CHLORIDE 107 05/13/2021    CO2 30 03/18/2022    CO2 30 05/13/2021    ANIONGAP 3 03/18/2022    ANIONGAP 1 (L) 05/13/2021     (H) 03/18/2022    GLC 92 05/13/2021    BUN 26 03/18/2022    BUN 16 05/13/2021    CR 1.02 03/18/2022    CR 0.97 05/13/2021    GFRESTIMATED 78 03/18/2022    GFRESTIMATED 78 05/13/2021    GFRESTBLACK >90 05/13/2021    GOPAL 9.4 03/18/2022    GOPAL 9.3 05/13/2021        A1C RESULTS:  Lab Results   Component Value  Date    A1C 4.8 04/26/2016         Thank you for allowing me to participate in the care of your patient.      Sincerely,     Shante Cerda MD     [facility]  cc:   Tia Ibrahim MD  [address]  [place] [id number]

## 2022-06-17 NOTE — NURSING NOTE
"Initial /88 (BP Location: Right arm, Patient Position: Sitting)   Pulse 57   Ht 1.803 m (5' 11\")   Wt (!) 154.2 kg (340 lb)   SpO2 98%   BMI 47.42 kg/m   Estimated body mass index is 47.42 kg/m  as calculated from the following:    Height as of this encounter: 1.803 m (5' 11\").    Weight as of this encounter: 154.2 kg (340 lb). .      "

## 2022-06-20 ENCOUNTER — TELEPHONE (OUTPATIENT)
Dept: CARDIOLOGY | Facility: CLINIC | Age: 72
End: 2022-06-20

## 2022-06-20 DIAGNOSIS — I35.0 AORTIC VALVE STENOSIS, ETIOLOGY OF CARDIAC VALVE DISEASE UNSPECIFIED: Primary | ICD-10-CM

## 2022-06-20 NOTE — TELEPHONE ENCOUNTER
"TAVR referral received by: Dr. Cerda    Chart reviewed, recent Cr/GFR noted:  Recent Labs   Lab Test 03/18/22  1145 05/13/21  1038    138   POTASSIUM 4.9 5.6*   CHLORIDE 106 107   CO2 30 30   BUN 26 16   GFR 78 78   CR 1.02 0.97   ANIONGAP 3 1*   GOPAL 9.4 9.3   * 92   Can schedule TAVR CT prior to office visit per protocol.  Contrast allergy: No  Telephoned patient to introduce self, provide education on aortic stenosis.   Last dental visit: Patient states \"I have no teeth\"  Support/living situation: Lives in group home, will bring  with her to appointment  Will provide new patient packet. Provided patient direct contact information.    Annie Rowland RN  Structural Heart Coordinator  [facility]          ----- Message -----  From: Shante Cerda MD  Sent: 6/17/2022  11:31 AM CDT  To: Emmy Carpenter RN    Hello! Seeing Laila. She is ready for TAVR. I am repeating echo, can she be set up with the TAVR team? She has a ride to [place] for anything if needed.     Thank you!    Shante        "

## 2022-08-04 ENCOUNTER — HOSPITAL ENCOUNTER (OUTPATIENT)
Dept: CARDIOLOGY | Facility: CLINIC | Age: 72
Discharge: HOME | End: 2022-08-04
Attending: INTERNAL MEDICINE | Admitting: INTERNAL MEDICINE
Payer: COMMERCIAL

## 2022-08-04 DIAGNOSIS — I71.9 AORTIC ANEURYSM WITHOUT RUPTURE, UNSPECIFIED PORTION OF AORTA (H): ICD-10-CM

## 2022-08-04 DIAGNOSIS — R06.09 DYSPNEA ON EXERTION: ICD-10-CM

## 2022-08-04 LAB — LVEF ECHO: NORMAL

## 2022-08-04 PROCEDURE — 93306 TTE W/DOPPLER COMPLETE: CPT

## 2022-08-04 PROCEDURE — 93306 TTE W/DOPPLER COMPLETE: CPT | Mod: 26 | Performed by: INTERNAL MEDICINE

## 2022-08-08 ENCOUNTER — OFFICE VISIT (OUTPATIENT)
Dept: ORTHOPEDICS | Facility: CLINIC | Age: 72
End: 2022-08-08
Payer: COMMERCIAL

## 2022-08-08 VITALS — DIASTOLIC BLOOD PRESSURE: 84 MMHG | SYSTOLIC BLOOD PRESSURE: 162 MMHG | HEIGHT: 71 IN | BODY MASS INDEX: 47.42 KG/M2

## 2022-08-08 DIAGNOSIS — M17.11 PRIMARY OSTEOARTHRITIS OF RIGHT KNEE: Primary | ICD-10-CM

## 2022-08-08 DIAGNOSIS — E66.01 MORBID OBESITY (H): ICD-10-CM

## 2022-08-08 PROCEDURE — 20611 DRAIN/INJ JOINT/BURSA W/US: CPT | Mod: RT | Performed by: FAMILY MEDICINE

## 2022-08-08 PROCEDURE — 99213 OFFICE O/P EST LOW 20 MIN: CPT | Mod: 25 | Performed by: FAMILY MEDICINE

## 2022-08-08 RX ORDER — ROPIVACAINE HYDROCHLORIDE 5 MG/ML
4 INJECTION, SOLUTION EPIDURAL; INFILTRATION; PERINEURAL
Status: DISCONTINUED | OUTPATIENT
Start: 2022-08-08 | End: 2022-10-04

## 2022-08-08 RX ORDER — BETAMETHASONE SODIUM PHOSPHATE AND BETAMETHASONE ACETATE 3; 3 MG/ML; MG/ML
6 INJECTION, SUSPENSION INTRA-ARTICULAR; INTRALESIONAL; INTRAMUSCULAR; SOFT TISSUE
Status: DISCONTINUED | OUTPATIENT
Start: 2022-08-08 | End: 2022-10-04

## 2022-08-08 RX ADMIN — ROPIVACAINE HYDROCHLORIDE 4 ML: 5 INJECTION, SOLUTION EPIDURAL; INFILTRATION; PERINEURAL at 10:36

## 2022-08-08 RX ADMIN — BETAMETHASONE SODIUM PHOSPHATE AND BETAMETHASONE ACETATE 6 MG: 3; 3 INJECTION, SUSPENSION INTRA-ARTICULAR; INTRALESIONAL; INTRAMUSCULAR; SOFT TISSUE at 10:36

## 2022-08-08 NOTE — RESULT ENCOUNTER NOTE
EF 60-65%; no WMAs; severe AS with mean gradient of 41 mmHg and ARISTEO 0.9 cm2; mild aortic root dilatation at 4.3 cm; mild dilatation asc aorta at 4.4 cm. No significant change per reader. Follow up with Dr Ramires on 8/25/22

## 2022-08-08 NOTE — LETTER
8/8/2022         RE: Laila Perez  [address] [address] [place] [id number]        Dear Colleague,    Thank you for referring your patient, Laila Perez, to the [facility] [place]. Please see a copy of my visit note below.    ASSESSMENT & PLAN    Laila was seen today for pain.    Diagnoses and all orders for this visit:    Primary osteoarthritis of right knee    Morbid obesity (H)        # Right knee osteoarthritis: long-standing condition for patient with last visit on 4/4/22. She did get significant improvement of right knee pain after stair injection then. Symptoms have returned she does have pain over the joint lines bilaterally. Reviewed previous x-rays showing significant arthritis in her knees. Counseled patient on treatment including possible referral to the weight loss center. She would like to hold off for this now she has a heart surgery plan. Given previous injections helped plan to treat as below and follow-up if not improving.    # Morbid Obesity: counseled patient that weight loss could help her heart health as well as her knee pain. She would like to hold off on referral to the weight loss center at this time. Names of providers at the weight loss center given today.    Image Findings: reviewed previous x-rays showing arthritis in the knee  Treatment: Activities as tolerated, can continue home exercises  Medications/Injections: Limited tylenol/ibuprofen for pain for 1-2 weeks, repeat left knee steroid injection  Follow-up: In 3 months if symptoms do not improve, sooner if worsening  Can consider repeat steroid injection     -----    SUBJECTIVE:  Laila Perez is a 72 year old adult who is seen in follow-up for right knee pain. They were last seen 4/4/2022 and right knee joint steroid injection performed.  The patient is seen with their staff.    Since their last visit reports right knee pain.  They indicate that their current pain level is severe.  "They have tried Voltaren gel, and steroid injection.      Has heart surgery coming up plan valve replacement    Patient's past medical, surgical, social, and family histories were reviewed today and no changes are noted.    REVIEW OF SYSTEMS:  Constitutional: NEGATIVE for fever, chills, change in weight  Skin: NEGATIVE for worrisome rashes, moles or lesions  GI/: NEGATIVE for bowel or bladder changes  Neuro: NEGATIVE for weakness, dizziness or paresthesias    OBJECTIVE:  BP (!) 162/84   Ht 1.803 m (5' 11\")   BMI 47.42 kg/m     General: healthy, alert and in no distress  HEENT: no scleral icterus or conjunctival erythema  Skin: no suspicious lesions or rash. No jaundice.  CV: regular rhythm by palpation, no pedal edema  Resp: normal respiratory effort without conversational dyspnea   Psych: normal mood and affect  Gait: normal steady gait with appropriate coordination and balance  Neuro: normal light touch sensory exam of the extremities.    MSK:    RIGHT KNEE  Inspection:    Normal alignment; no edema, erythema, or ecchymosis present  Palpation:    Tender about the lateral joint line and medial joint line. Remainder of bony and ligamentous landmarks are nontender.    No effusion is present    Patellofemoral crepitus is Present  Range of Motion:     00 extension to 1350 flexion  Strength:    Quadriceps 5/5, hamstrings 5/5, gastrocsoleus 5/5 and tibialis anterior 5/5    Extensor mechanism intact  Special Tests:    Positive: None    Negative: Patellar grind, MCL/valgus stress (0 & 30 deg), LCL/varus stress (0 & 30 deg), Lachman's, anterior drawer, posterior drawer      Independent visualization of the below image:    IMPRESSION: Mild-to-moderate medial compartment narrowing. Small  medial and patellofemoral compartment osteophytes. Tiny lateral  compartment osteophytes. 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 There is no evidence of fracture,  effusion or calcified intra-articular " body. The medial compartment  degenerative changes have slightly progressed.      MD Hema AVILA MD, [facility]    Disclaimer: This note consists of symbols derived from keyboarding, dictation and/or voice recognition software. As a result, there may be errors in the script that have gone undetected. Please consider this when interpreting information found in this chart.    Large Joint Injection/Arthocentesis: R knee joint    Date/Time: 8/8/2022 10:36 AM  Performed by: Hema Duncan MD  Authorized by: Hema Duncan MD     Indications:  Pain and osteoarthritis  Needle Size:  21 G  Guidance: ultrasound    Approach:  Superolateral  Location:  Knee      Medications:  6 mg betamethasone acet & sod phos 6 (3-3) MG/ML; 4 mL ropivacaine 5 MG/ML  Aspirate amount (mL):  3  Aspirate:  Clear  Outcome:  Tolerated well, no immediate complications  Procedure discussed: discussed risks, benefits, and alternatives    Consent Given by:  Patient  Timeout: timeout called immediately prior to procedure    Prep: patient was prepped and draped in usual sterile fashion     Patient reported significant improvement of pain after the numbing portion right knee joint aspiration/steroid injection.  Ultrasound guided images were permanently stored.   Aftercare instructions given to patient.  Plan to follow-up as discussed above.     Hema Duncan MD [facility]              Again, thank you for allowing me to participate in the care of your patient.        Sincerely,        Hema Duncan MD

## 2022-08-08 NOTE — PROGRESS NOTES
ASSESSMENT & PLAN    Laila was seen today for pain.    Diagnoses and all orders for this visit:    Primary osteoarthritis of right knee    Morbid obesity (H)        # Right knee osteoarthritis: long-standing condition for patient with last visit on 4/4/22. She did get significant improvement of right knee pain after stair injection then. Symptoms have returned she does have pain over the joint lines bilaterally. Reviewed previous x-rays showing significant arthritis in her knees. Counseled patient on treatment including possible referral to the weight loss center. She would like to hold off for this now she has a heart surgery plan. Given previous injections helped plan to treat as below and follow-up if not improving.    # Morbid Obesity: counseled patient that weight loss could help her heart health as well as her knee pain. She would like to hold off on referral to the weight loss center at this time. Names of providers at the weight loss center given today.    Image Findings: reviewed previous x-rays showing arthritis in the knee  Treatment: Activities as tolerated, can continue home exercises  Medications/Injections: Limited tylenol/ibuprofen for pain for 1-2 weeks, repeat left knee steroid injection  Follow-up: In 3 months if symptoms do not improve, sooner if worsening  Can consider repeat steroid injection     -----    SUBJECTIVE:  Laila Perez is a 72 year old adult who is seen in follow-up for right knee pain. They were last seen 4/4/2022 and right knee joint steroid injection performed.  The patient is seen with their staff.    Since their last visit reports right knee pain.  They indicate that their current pain level is severe. They have tried Voltaren gel, and steroid injection.      Has heart surgery coming up plan valve replacement    Patient's past medical, surgical, social, and family histories were reviewed today and no changes are noted.    REVIEW OF SYSTEMS:  Constitutional: NEGATIVE  "for fever, chills, change in weight  Skin: NEGATIVE for worrisome rashes, moles or lesions  GI/: NEGATIVE for bowel or bladder changes  Neuro: NEGATIVE for weakness, dizziness or paresthesias    OBJECTIVE:  BP (!) 162/84   Ht 1.803 m (5' 11\")   BMI 47.42 kg/m     General: healthy, alert and in no distress  HEENT: no scleral icterus or conjunctival erythema  Skin: no suspicious lesions or rash. No jaundice.  CV: regular rhythm by palpation, no pedal edema  Resp: normal respiratory effort without conversational dyspnea   Psych: normal mood and affect  Gait: normal steady gait with appropriate coordination and balance  Neuro: normal light touch sensory exam of the extremities.    MSK:    RIGHT KNEE  Inspection:    Normal alignment; no edema, erythema, or ecchymosis present  Palpation:    Tender about the lateral joint line and medial joint line. Remainder of bony and ligamentous landmarks are nontender.    No effusion is present    Patellofemoral crepitus is Present  Range of Motion:     00 extension to 1350 flexion  Strength:    Quadriceps 5/5, hamstrings 5/5, gastrocsoleus 5/5 and tibialis anterior 5/5    Extensor mechanism intact  Special Tests:    Positive: None    Negative: Patellar grind, MCL/valgus stress (0 & 30 deg), LCL/varus stress (0 & 30 deg), Lachman's, anterior drawer, posterior drawer      Independent visualization of the below image:    IMPRESSION: Mild-to-moderate medial compartment narrowing. Small  medial and patellofemoral compartment osteophytes. Tiny lateral  compartment osteophytes. 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 There is no evidence of fracture,  effusion or calcified intra-articular body. The medial compartment  degenerative changes have slightly progressed.      MD Hema AVILA MD, [facility]    Disclaimer: This note consists of symbols derived from keyboarding, dictation and/or voice " recognition software. As a result, there may be errors in the script that have gone undetected. Please consider this when interpreting information found in this chart.    Large Joint Injection/Arthocentesis: R knee joint    Date/Time: 8/8/2022 10:36 AM  Performed by: Hema Duncan MD  Authorized by: Hema Duncan MD     Indications:  Pain and osteoarthritis  Needle Size:  21 G  Guidance: ultrasound    Approach:  Superolateral  Location:  Knee      Medications:  6 mg betamethasone acet & sod phos 6 (3-3) MG/ML; 4 mL ropivacaine 5 MG/ML  Aspirate amount (mL):  3  Aspirate:  Clear  Outcome:  Tolerated well, no immediate complications  Procedure discussed: discussed risks, benefits, and alternatives    Consent Given by:  Patient  Timeout: timeout called immediately prior to procedure    Prep: patient was prepped and draped in usual sterile fashion     Patient reported significant improvement of pain after the numbing portion right knee joint aspiration/steroid injection.  Ultrasound guided images were permanently stored.   Aftercare instructions given to patient.  Plan to follow-up as discussed above.     Hema Duncan MD [facility]

## 2022-08-08 NOTE — PATIENT INSTRUCTIONS
# Right knee osteoarthritis: long-standing condition for patient with last visit on 4/4/22. She did get significant improvement of right knee pain after stair injection then. Symptoms have returned she does have pain over the joint lines bilaterally. Reviewed previous x-rays showing significant arthritis in her knees. Counseled patient on treatment including possible referral to the weight loss center. She would like to hold off for this now she has a heart surgery plan. Given previous injections helped plan to trees below and follow-up if not improving.  Image Findings: reviewed previous x-rays showing arthritis in the knee  Treatment: Activities as tolerated, can continue home exercises  Medications/Injections: Limited tylenol/ibuprofen for pain for 1-2 weeks, repeat left knee steroid injection  Follow-up: In 3 months if symptoms do not improve, sooner if worsening  Can consider repeat steroid injection     Please call [phone]   Ask for my team if you have any questions or concerns    If you have not yet received the influenza vaccine but would like to get one, please call  [phone] or you can schedule via [facility]    It was great seeing you again today!    Hema Duncan MD, [facility] Injection Discharge Instructions    Procedure: right knee aspiration/steroid injection    You may shower, however avoid swimming, tub baths or hot tubs for 24 hours following your procedure  You may have a mild to moderate increase in pain for several days following the injection.  It may take up to 14 days for the steroid medication to start working although you may feel the effect as early as a few days after the procedure.  You may use ice packs for 10-15 minutes, 3 to 4 times a day at the injection site for comfort  You may use anti-inflammatory medications (such as Ibuprofen or Aleve or Advil) or Tylenol for pain control if necessary  If you were fasting, you may resume your normal diet and medications after  the procedure  If you have diabetes, check your blood sugar more frequently than usual as your blood sugar may be higher than normal for 10-14 days following a steroid injection. Contact your doctor who manages your diabetes if your blood sugar is higher than usual    If you experience any of the following, call [facility] [place] @ [phone] or [phone]  -Fever over 100 degree F  -Swelling, bleeding, redness, drainage, warmth at the injection site  - New or worsening pain

## 2022-08-11 ENCOUNTER — OFFICE VISIT (OUTPATIENT)
Dept: ALLERGY | Facility: CLINIC | Age: 72
End: 2022-08-11
Payer: COMMERCIAL

## 2022-08-11 VITALS
SYSTOLIC BLOOD PRESSURE: 124 MMHG | BODY MASS INDEX: 48.58 KG/M2 | WEIGHT: 293 LBS | DIASTOLIC BLOOD PRESSURE: 75 MMHG | OXYGEN SATURATION: 99 % | TEMPERATURE: 97.4 F | HEART RATE: 60 BPM

## 2022-08-11 DIAGNOSIS — Z88.0 H/O ALLERGY TO PENICILLIN: Primary | ICD-10-CM

## 2022-08-11 PROCEDURE — 99203 OFFICE O/P NEW LOW 30 MIN: CPT | Performed by: ALLERGY & IMMUNOLOGY

## 2022-08-11 ASSESSMENT — ENCOUNTER SYMPTOMS
JOINT SWELLING: 0
EYE DISCHARGE: 0
HEADACHES: 0
DIARRHEA: 0
FATIGUE: 0
CHEST TIGHTNESS: 0
MYALGIAS: 0
ACTIVITY CHANGE: 0
SHORTNESS OF BREATH: 0
NAUSEA: 0
EYE REDNESS: 0
WHEEZING: 0
RHINORRHEA: 0
ARTHRALGIAS: 0
FACIAL SWELLING: 0
FEVER: 0
VOMITING: 0
COUGH: 0
SINUS PRESSURE: 0
ADENOPATHY: 0
EYE ITCHING: 0

## 2022-08-11 NOTE — PATIENT INSTRUCTIONS
Allergy Staff Appt Hours Shot Hours Locations    Physician     Dom Gipson MD       Support Staff     Nhi RN     Alta RN     Marvin LPN     Laureano CMA    Tuesday:   [place] :  [place]: :         [facility]  [place] [facility]  [place]        Thursday: :        Friday: 7-12:20     [place]        Tuesday: : : :: :: :    [place]       Tues & Wed: :       Mon & Thurs: :       Fri: :           [facility]  [address] [place] [address]  Appt Line: [phone]      [facility]  [address] [place] [address]  Appt Line: [phone]    Pulmonary Function Scheduling:  [place]: [phone]  [place]: [phone]  [phone]     Important Scheduling Information (if recommended by provider):  Aspirin Desensitization: Appt will last 2 clinic days. Please call the Allergy RN line for your clinic to schedule. Discontinue antihistamines 7 days prior to the appointment.     Food Challenges: Appt will last 3-4 hours. Please call the Allergy RN line for your clinic to schedule. Discontinue antihistamines 7 days prior to the appointment.     Penicillin Testing: Appt will last 2-3 hours. Please call the Allergy RN line for your clinic to schedule. Discontinue antihistamines 7 days prior to the appointment.     Skin Testing: Appt will about 40 minutes. Call the appointment line for your clinic to schedule. Discontinue antihistamines 7 days prior to the appointment.     Thank you for trusting us with your Allergy, Asthma, and Immunology care. Please feel free to contact us with any questions or concerns you may have.      [facility] [phone]

## 2022-08-11 NOTE — PROGRESS NOTES
SUBJECTIVE:                                                                   Laila Perez is a 72 year old adult  presents today to our Allergy Clinic at [facility]; She is being seen in consultation at the request of Delmis Simons CNP for a new patient visit. The patient states that years ago, she was a heavy drinker.  In 2004, she was initially hospitalized at [facility] and then transferred to [facility]. States that during that hospitalization, she was told that she had a penicillin allergy. She states that she was not, at that time. Unclear if hospitalization was due to the reaction to penicillin or for a different reason. Unclear if it happened because of an allergic reaction to penicillin or for another reason.  The patient does not remember the reason of hospitalization due to heavy drinking in the past.        Patient Active Problem List   Diagnosis     Gouty arthropathy     Mental or behavioral problem     Generalized osteoarthrosis, unspecified site     Disorder of bone and cartilage     Mixed hyperlipidemia     OA (osteoarthritis) of knee     DDD (degenerative disc disease), lumbar     Varicose veins of legs     Psoriasis     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     Family history of diabetes mellitus in first degree relative     Mitral valve disorder     Nonrheumatic aortic valve stenosis     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     Obesity, Class III, BMI 40-49.9 (morbid obesity) (H)     Multiple gastric ulcers     Episodic tension-type headache, not intractable     Alcohol dependence in remission (H)     Aortic aneurysm without rupture, unspecified portion of aorta (H)       Past Medical History:   Diagnosis Date     Alcoholism (H)      DDD (degenerative disc disease), cervical      Gender dysphoria in adult      HLD (hyperlipidemia)      HTN (hypertension)      large compression fracture 3/13/2006    Worked up for neoplasm, none found.       OA  (osteoarthritis of spine)      Unspecified internal derangement of knee     CHRONIC KNEE PAIN,LEG,NECK AND HEAD INJURIES      Problem (# of Occurrences) Relation (Name,Age of Onset)    Allergies (1) Father: Enviromental    Cancer (1) Father: lung cancer heavy smoker    Diabetes (2) Mother, Maternal Grandmother    Emphysema (1) Father    Other - See Comments (1) Other (Paternal side): FHx of Glaucoma        Past Surgical History:   Procedure Laterality Date     COLONOSCOPY  2007     JOINT REPLACEMTN, KNEE RT/LT  2006    left     ORCHIECTOMY SCROTAL Bilateral 5/14/2019    Procedure: Bilatera Scrotal Orchiectomy;  Surgeon: Abhilash Weston MD;  Location:  OR     Social History     Socioeconomic History     Marital status:      Spouse name: None     Number of children: None     Years of education: None     Highest education level: None   Occupational History     Occupation: RETIRED   Tobacco Use     Smoking status: Never Smoker     Smokeless tobacco: Never Used   Vaping Use     Vaping Use: Never used   Substance and Sexual Activity     Alcohol use: No     Comment: Quit 3/24/05     Drug use: No     Sexual activity: Not Currently   Social History Narrative    August 11, 2022    ENVIRONMENTAL HISTORY: [place] is heated with a forced air. They do have central air conditioning. The patient's bedroom is furnished with hard edna in bedroom and fabric window coverings.  No pets. There is no history of cockroach or mice infestation. There is/are 0 smokers in the house.  The house does not have a basement.      Social Determinants of Health     Financial Resource Strain: Medium Risk     Difficulty of Paying Living Expenses: Somewhat hard   Food Insecurity: No Food Insecurity     Worried About Running Out of Food in the Last Year: Never true     Ran Out of Food in the Last Year: Never true   Transportation Needs: No Transportation Needs     Lack of  Transportation (Medical): No     Lack of Transportation (Non-Medical): No   Physical Activity: Sufficiently Active     Days of Exercise per Week: 7 days     Minutes of Exercise per Session: 130 min   Stress: No Stress Concern Present     Feeling of Stress : Only a little   Social Connections: Socially Isolated     Frequency of Communication with Friends and Family: More than three times a week     Frequency of Social Gatherings with Friends and Family: Once a week     Attends Confucianist Services: Never     Active Member of Clubs or Organizations: No     Attends Club or Organization Meetings: Never     Marital Status:    Intimate Partner Violence: Not At Risk     Fear of Current or Ex-Partner: No     Emotionally Abused: No     Physically Abused: No     Sexually Abused: No   Housing Stability: Low Risk      Unable to Pay for Housing in the Last Year: No     Number of Places Lived in the Last Year: 1     Unstable Housing in the Last Year: No           Review of Systems   Constitutional: Negative for activity change, fatigue and fever.   HENT: Negative for congestion, dental problem, ear pain, facial swelling, nosebleeds, postnasal drip, rhinorrhea, sinus pressure and sneezing.    Eyes: Negative for discharge, redness and itching.   Respiratory: Negative for cough, chest tightness, shortness of breath and wheezing.    Cardiovascular: Negative for chest pain.   Gastrointestinal: Negative for diarrhea, nausea and vomiting.   Musculoskeletal: Negative for arthralgias, joint swelling and myalgias.   Skin: Negative for rash.   Neurological: Negative for headaches.   Hematological: Negative for adenopathy.   Psychiatric/Behavioral: Negative for behavioral problems and self-injury.           Current Outpatient Medications:      atenolol (TENORMIN) 50 MG tablet, Take 1 tablet (50 mg) by mouth daily, Disp: 90 tablet, Rfl: 1     brimonidine (ALPHAGAN-P) 0.15 % ophthalmic solution, , Disp: , Rfl:      dorzolamide-timolol  (COSOPT) 2-0.5 % ophthalmic solution, , Disp: , Rfl:      estradiol (VIVELLE-DOT) 0.05 MG/24HR bi-weekly patch, Place 1 patch onto the skin twice a week, Disp: 24 patch, Rfl: 1     furosemide (LASIX) 20 MG tablet, Take 1 tablet (20 mg) by mouth daily, Disp: 90 tablet, Rfl: 1     latanoprost (XALATAN) 0.005 % ophthalmic solution, 1 drop At Bedtime., Disp: , Rfl:      lovastatin (MEVACOR) 20 MG tablet, TAKE ONE TABLET BY MOUTH ONCE A DAY AT BEDTIME, Disp: 90 tablet, Rfl: 3     Multiple Vitamin (DAILY-EVANGELISTA) TABS, Take 1 tablet by mouth daily, Disp: , Rfl: 11     timolol maleate (TIMOPTIC) 0.5 % ophthalmic solution, Apply 1 drop to eye, Disp: , Rfl:      albuterol (PROAIR HFA/PROVENTIL HFA/VENTOLIN HFA) 108 (90 Base) MCG/ACT inhaler, Inhale 2 puffs into the lungs every 6 hours (Patient not taking: Reported on 8/11/2022), Disp: 18 g, Rfl: 0     ipratropium - albuterol 0.5 mg/2.5 mg/3 mL (DUONEB) 0.5-2.5 (3) MG/3ML neb solution, Take 1 vial (3 mLs) by nebulization every 6 hours as needed for shortness of breath / dyspnea or wheezing (Patient not taking: Reported on 8/11/2022), Disp: 90 mL, Rfl: 4    Current Facility-Administered Medications:      betamethasone acet & sod phos (CELESTONE) injection 6 mg, 6 mg, , , Hema Duncan MD, 6 mg at 08/08/22 1036     betamethasone acet & sod phos (CELESTONE) injection 6 mg, 6 mg, , , Hema Duncan MD, 6 mg at 04/04/22 0955     betamethasone acet & sod phos (CELESTONE) injection 6 mg, 6 mg, , , Hema Duncan MD, 6 mg at 01/03/22 1103     ropivacaine (NAROPIN) injection 3 mL, 3 mL, , , Hema Duncan MD, 3 mL at 01/03/22 1103     ropivacaine (NAROPIN) injection 4 mL, 4 mL, , , Hema Duncan MD, 4 mL at 08/08/22 1036     ropivacaine (NAROPIN) injection 4 mL, 4 mL, , , Hema Duncan MD, 4 mL at 04/04/22 0955  Immunization History   Administered Date(s) Administered     COVID-19,АНДРЕЙ,Pfizer (12+ Yrs) 01/22/2021, 02/12/2021, 10/25/2021     DTaP,  Unspecified 05/21/2018     Influenza, Quad, High Dose, Pf, 65yr+ (Fluzone HD) 12/09/2021     Mantoux Tuberculin Skin Test 11/15/2007, 10/30/2008, 10/23/2009     Pneumo Conj 13-V (2010&after) 04/26/2016, 05/21/2018     TDAP Vaccine (Adacel) 10/30/2008     Tdap (Adacel,Boostrix) 05/21/2018     Tdap (Adult) Unspecified Formulation 1950     Allergies   Allergen Reactions     External Allergen Needs Reconciliation - See Comment      Please reconcile the Patient's allergy reported as ESTERSPENICILLIN and update accordingly           Zosyn [Penicillins]      unsure of reaction, allergy is listed on his med sheet     Heparin Rash     he lives in a assisted living program and is not sure what type of allergies he really has. He quit drinking     OBJECTIVE:                                                                 /75 (BP Location: Left arm, Patient Position: Sitting, Cuff Size: Adult Large)   Pulse 60   Temp 97.4  F (36.3  C) (Tympanic)   Wt (!) 158 kg (348 lb 5.2 oz)   SpO2 99%   BMI 48.58 kg/m          Physical Exam  Vitals and nursing note reviewed.   Constitutional:       General: She is not in acute distress.     Appearance: She is not diaphoretic.   HENT:      Head: Normocephalic and atraumatic.      Right Ear: Tympanic membrane, ear canal and external ear normal.      Left Ear: Tympanic membrane, ear canal and external ear normal.      Nose: No mucosal edema or rhinorrhea.   Eyes:      General:         Right eye: No discharge.         Left eye: No discharge.      Conjunctiva/sclera: Conjunctivae normal.   Cardiovascular:      Rate and Rhythm: Normal rate and regular rhythm.      Heart sounds: Normal heart sounds. No murmur heard.  Pulmonary:      Effort: Pulmonary effort is normal. No respiratory distress.      Breath sounds: Normal breath sounds. No wheezing or rales.   Neurological:      Mental Status: She is alert and oriented to person, place, and time.   Psychiatric:         Mood and  Affect: Mood normal.         Behavior: Behavior normal.          ASSESSMENT/PLAN:    H/O allergy to penicillin     Considering that there is a potential that she was hospitalized for penicillin reaction, I would like to review the records first.  IgE versus other severe non-IgE mediated reaction?  - Asked the patient to sign a consent for the release of information from [facility] and [facility].    Thank you for allowing us to participate in the care of this patient. Please feel free to contact us if there are any questions or concerns about the patient.    Disclaimer: This note consists of symbols derived from keyboarding, dictation and/or voice recognition software. As a result, there may be errors in the script that have gone undetected. Please consider this when interpreting information found in this chart.    Dom Gipson MD, FAAAAI, FACAAI  Allergy, Asthma and Immunology     [facility]

## 2022-08-11 NOTE — LETTER
8/11/2022         RE: Laila Perez  [id number] [address] [address]        Dear Colleague,    Thank you for referring your patient, Laila Perez, to the [facility]. Please see a copy of my visit note below.    SUBJECTIVE:                                                                   Laila Perez is a 72 year old adult  presents today to our Allergy Clinic at [facility]; She is being seen in consultation at the request of Delmis Simons CNP for a new patient visit. The patient states that years ago, she was a heavy drinker.  In 2004, she was initially hospitalized at [facility] and then transferred to [facility]. States that during that hospitalization, she was told that she had a penicillin allergy. She states that she was not, at that time. Unclear if hospitalization was due to the reaction to penicillin or for a different reason. Unclear if it happened because of an allergic reaction to penicillin or for another reason.  The patient does not remember the reason of hospitalization due to heavy drinking in the past.        Patient Active Problem List   Diagnosis     Gouty arthropathy     Mental or behavioral problem     Generalized osteoarthrosis, unspecified site     Disorder of bone and cartilage     Mixed hyperlipidemia     OA (osteoarthritis) of knee     DDD (degenerative disc disease), lumbar     Varicose veins of legs     Psoriasis     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     Family history of diabetes mellitus in first degree relative     Mitral valve disorder     Nonrheumatic aortic valve stenosis     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     Obesity, Class III, BMI 40-49.9 (morbid obesity) (H)     Multiple gastric ulcers     Episodic tension-type headache, not intractable     Alcohol dependence in remission (H)     Aortic aneurysm without rupture, unspecified portion of aorta (H)       Past Medical  History:   Diagnosis Date     Alcoholism (H)      DDD (degenerative disc disease), cervical      Gender dysphoria in adult      HLD (hyperlipidemia)      HTN (hypertension)      large compression fracture 3/13/2006    Worked up for neoplasm, none found.       OA (osteoarthritis of spine)      Unspecified internal derangement of knee     CHRONIC KNEE PAIN,LEG,NECK AND HEAD INJURIES      Problem (# of Occurrences) Relation (Name,Age of Onset)    Allergies (1) Father: Enviromental    Cancer (1) Father: lung cancer heavy smoker    Diabetes (2) Mother, Maternal Grandmother    Emphysema (1) Father    Other - See Comments (1) Other (Paternal side): FHx of Glaucoma        Past Surgical History:   Procedure Laterality Date     COLONOSCOPY  2007     JOINT REPLACEMTN, KNEE RT/LT  2006    left     ORCHIECTOMY SCROTAL Bilateral 5/14/2019    Procedure: Bilatera Scrotal Orchiectomy;  Surgeon: Abhilash Weston MD;  Location:  OR     Social History     Socioeconomic History     Marital status:      Spouse name: None     Number of children: None     Years of education: None     Highest education level: None   Occupational History     Occupation: RETIRED   Tobacco Use     Smoking status: Never Smoker     Smokeless tobacco: Never Used   Vaping Use     Vaping Use: Never used   Substance and Sexual Activity     Alcohol use: No     Comment: Quit 3/24/05     Drug use: No     Sexual activity: Not Currently   Social History Narrative    August 11, 2022    ENVIRONMENTAL HISTORY: [place] The home is heated with a forced air. They do have central air conditioning. The patient's bedroom is furnished with hard edna in bedroom and fabric window coverings.  No pets. There is no history of cockroach or mice infestation. There is/are 0 smokers in the house.  The house does not have a basement.      Social Determinants of Health     Financial Resource Strain: Medium Risk      Difficulty of Paying Living Expenses: Somewhat hard   Food Insecurity: No Food Insecurity     Worried About Running Out of Food in the Last Year: Never true     Ran Out of Food in the Last Year: Never true   Transportation Needs: No Transportation Needs     Lack of Transportation (Medical): No     Lack of Transportation (Non-Medical): No   Physical Activity: Sufficiently Active     Days of Exercise per Week: 7 days     Minutes of Exercise per Session: 130 min   Stress: No Stress Concern Present     Feeling of Stress : Only a little   Social Connections: Socially Isolated     Frequency of Communication with Friends and Family: More than three times a week     Frequency of Social Gatherings with Friends and Family: Once a week     Attends Nondenominational Services: Never     Active Member of Clubs or Organizations: No     Attends Club or Organization Meetings: Never     Marital Status:    Intimate Partner Violence: Not At Risk     Fear of Current or Ex-Partner: No     Emotionally Abused: No     Physically Abused: No     Sexually Abused: No   Housing Stability: Low Risk      Unable to Pay for Housing in the Last Year: No     Number of Places Lived in the Last Year: 1     Unstable Housing in the Last Year: No           Review of Systems   Constitutional: Negative for activity change, fatigue and fever.   HENT: Negative for congestion, dental problem, ear pain, facial swelling, nosebleeds, postnasal drip, rhinorrhea, sinus pressure and sneezing.    Eyes: Negative for discharge, redness and itching.   Respiratory: Negative for cough, chest tightness, shortness of breath and wheezing.    Cardiovascular: Negative for chest pain.   Gastrointestinal: Negative for diarrhea, nausea and vomiting.   Musculoskeletal: Negative for arthralgias, joint swelling and myalgias.   Skin: Negative for rash.   Neurological: Negative for headaches.   Hematological: Negative for adenopathy.   Psychiatric/Behavioral: Negative for behavioral  problems and self-injury.           Current Outpatient Medications:      atenolol (TENORMIN) 50 MG tablet, Take 1 tablet (50 mg) by mouth daily, Disp: 90 tablet, Rfl: 1     brimonidine (ALPHAGAN-P) 0.15 % ophthalmic solution, , Disp: , Rfl:      dorzolamide-timolol (COSOPT) 2-0.5 % ophthalmic solution, , Disp: , Rfl:      estradiol (VIVELLE-DOT) 0.05 MG/24HR bi-weekly patch, Place 1 patch onto the skin twice a week, Disp: 24 patch, Rfl: 1     furosemide (LASIX) 20 MG tablet, Take 1 tablet (20 mg) by mouth daily, Disp: 90 tablet, Rfl: 1     latanoprost (XALATAN) 0.005 % ophthalmic solution, 1 drop At Bedtime., Disp: , Rfl:      lovastatin (MEVACOR) 20 MG tablet, TAKE ONE TABLET BY MOUTH ONCE A DAY AT BEDTIME, Disp: 90 tablet, Rfl: 3     Multiple Vitamin (DAILY-EVANGELISTA) TABS, Take 1 tablet by mouth daily, Disp: , Rfl: 11     timolol maleate (TIMOPTIC) 0.5 % ophthalmic solution, Apply 1 drop to eye, Disp: , Rfl:      albuterol (PROAIR HFA/PROVENTIL HFA/VENTOLIN HFA) 108 (90 Base) MCG/ACT inhaler, Inhale 2 puffs into the lungs every 6 hours (Patient not taking: Reported on 8/11/2022), Disp: 18 g, Rfl: 0     ipratropium - albuterol 0.5 mg/2.5 mg/3 mL (DUONEB) 0.5-2.5 (3) MG/3ML neb solution, Take 1 vial (3 mLs) by nebulization every 6 hours as needed for shortness of breath / dyspnea or wheezing (Patient not taking: Reported on 8/11/2022), Disp: 90 mL, Rfl: 4    Current Facility-Administered Medications:      betamethasone acet & sod phos (CELESTONE) injection 6 mg, 6 mg, , , Hema Duncan MD, 6 mg at 08/08/22 1036     betamethasone acet & sod phos (CELESTONE) injection 6 mg, 6 mg, , , Hema Duncan MD, 6 mg at 04/04/22 0955     betamethasone acet & sod phos (CELESTONE) injection 6 mg, 6 mg, , , Hema Duncan MD, 6 mg at 01/03/22 1103     ropivacaine (NAROPIN) injection 3 mL, 3 mL, , , Hema Duncan MD, 3 mL at 01/03/22 1103     ropivacaine (NAROPIN) injection 4 mL, 4 mL, , , eHma Duncan MD,  4 mL at 08/08/22 1036     ropivacaine (NAROPIN) injection 4 mL, 4 mL, , , Hema Duncan MD, 4 mL at 04/04/22 0955  Immunization History   Administered Date(s) Administered     COVID-19,PF,Pfizer (12+ Yrs) 01/22/2021, 02/12/2021, 10/25/2021     DTaP, Unspecified 05/21/2018     Influenza, Quad, High Dose, Pf, 65yr+ (Fluzone HD) 12/09/2021     Mantoux Tuberculin Skin Test 11/15/2007, 10/30/2008, 10/23/2009     Pneumo Conj 13-V (2010&after) 04/26/2016, 05/21/2018     TDAP Vaccine (Adacel) 10/30/2008     Tdap (Adacel,Boostrix) 05/21/2018     Tdap (Adult) Unspecified Formulation 1950     Allergies   Allergen Reactions     External Allergen Needs Reconciliation - See Comment      Please reconcile the Patient's allergy reported as ESTERSPENICILLIN and update accordingly           Zosyn [Penicillins]      unsure of reaction, allergy is listed on his med sheet     Heparin Rash     he lives in a assisted living program and is not sure what type of allergies he really has. He quit drinking     OBJECTIVE:                                                                 /75 (BP Location: Left arm, Patient Position: Sitting, Cuff Size: Adult Large)   Pulse 60   Temp 97.4  F (36.3  C) (Tympanic)   Wt (!) 158 kg (348 lb 5.2 oz)   SpO2 99%   BMI 48.58 kg/m          Physical Exam  Vitals and nursing note reviewed.   Constitutional:       General: She is not in acute distress.     Appearance: She is not diaphoretic.   HENT:      Head: Normocephalic and atraumatic.      Right Ear: Tympanic membrane, ear canal and external ear normal.      Left Ear: Tympanic membrane, ear canal and external ear normal.      Nose: No mucosal edema or rhinorrhea.   Eyes:      General:         Right eye: No discharge.         Left eye: No discharge.      Conjunctiva/sclera: Conjunctivae normal.   Cardiovascular:      Rate and Rhythm: Normal rate and regular rhythm.      Heart sounds: Normal heart sounds. No murmur heard.  Pulmonary:       Effort: Pulmonary effort is normal. No respiratory distress.      Breath sounds: Normal breath sounds. No wheezing or rales.   Neurological:      Mental Status: She is alert and oriented to person, place, and time.   Psychiatric:         Mood and Affect: Mood normal.         Behavior: Behavior normal.          ASSESSMENT/PLAN:    H/O allergy to penicillin     Considering that there is a potential that she was hospitalized for penicillin reaction, I would like to review the records first.  IgE versus other severe non-IgE mediated reaction?  - Asked the patient to sign a consent for the release of information from [facility].    Thank you for allowing us to participate in the care of this patient. Please feel free to contact us if there are any questions or concerns about the patient.    Disclaimer: This note consists of symbols derived from keyboarding, dictation and/or voice recognition software. As a result, there may be errors in the script that have gone undetected. Please consider this when interpreting information found in this chart.    Dom Gipson MD, FAAAAI, FACLORI  Allergy, Asthma and Immunology     [facility]       Again, thank you for allowing me to participate in the care of your patient.        Sincerely,        Dom Gipson MD

## 2022-08-21 NOTE — PROGRESS NOTES
.         HPI     Laila is a 71 year old individual that uses pronouns She/Her/Hers/Herself that presents today for follow up of:  feminizing hormone therapy.   Alone or accompanied by: accompanied today by  Gender identity: female  Started Hormone  therapy  2016  Continues on Vivelle dot 0.1 mg patch 2x/wk  Any special concerns today?    Continues to walk 1 mile/day  Gained a few pounds after sister's death    On hormones?  YES +++ Shot day of the week? Not applicable-taking pills/patch/gel      Due for labs?  No      +++ Refills of meds needed?  Yes  Gender related body changes since last visit: stable    Breakthrough bleeding? Does Not Apply    New health concerns since last visit:  ---feeling well  Physical due in June  Echocardiogram in June    Past Surgical History:   Procedure Laterality Date     COLONOSCOPY  2007     JOINT REPLACEMTN, KNEE RT/LT  2006    left     ORCHIECTOMY SCROTAL Bilateral 5/14/2019    Procedure: Bilatera Scrotal Orchiectomy;  Surgeon: Abhilash Weston MD;  Location: [facility] OR       Patient Active Problem List   Diagnosis     Gouty arthropathy     Mental or behavioral problem     Generalized osteoarthrosis, unspecified site     Disorder of bone and cartilage     Mixed hyperlipidemia     OA (osteoarthritis) of knee     HYPERLIPIDEMIA LDL GOAL <100     DDD (degenerative disc disease), lumbar     Advanced directives, counseling/discussion     Varicose veins of legs     Psoriasis     HTN, goal below 140/90     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     Family history of diabetes mellitus in first degree relative     Mitral valve disorder     Nonrheumatic aortic valve stenosis     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     Obesity, Class III, BMI 40-49.9 (morbid obesity) (H)     Health Care Home     Multiple gastric ulcers     Recurrent major depressive disorder, in partial remission (H)     Episodic tension-type headache, not intractable       Current Outpatient Medications   Medication Sig  Dispense Refill     acetaminophen (TYLENOL) 500 MG tablet Take 1,000 mg by mouth       atenolol (TENORMIN) 50 MG tablet TAKE ONE TABLET BY MOUTH ONCE DAILY 90 tablet 1     estradiol (VIVELLE-DOT) 0.1 MG/24HR bi-weekly patch Place 1 patch onto the skin twice a week 24 patch 1     latanoprost (XALATAN) 0.005 % ophthalmic solution 1 drop At Bedtime.       lisinopril (ZESTRIL) 5 MG tablet Take 1 tablet (5 mg) by mouth daily 90 tablet 1     lovastatin (MEVACOR) 20 MG tablet TAKE ONE TABLET BY MOUTH ONCE A DAY AT BEDTIME 30 tablet 9     Multiple Vitamin (DAILY-EVANGELISTA) TABS Take 1 tablet by mouth daily  11     SM CALCIUM 600/VITAMIN D 600-400 MG-UNIT per tablet Take 1 tablet by mouth 2 times daily 60 tablet 3     timolol maleate (TIMOPTIC) 0.5 % ophthalmic solution Apply 1 drop to eye       order for DME Equipment being ordered: Sigvar[employer] 232 Cotton Thigh High for Men 20-30 mmHg- TWO PAIR TAN COLOR 2 each 5     ORDER FOR DME Equipment being ordered:   BACK BRACE    MedSpec Back-n-Shape II Back Brace  Size XXXL with thermoplastic insert        1 each 0       History   Smoking Status     Never Smoker   Smokeless Tobacco     Never Used          Allergies   Allergen Reactions     Penicillin [Esters] Itching     he is not sure what kind of reaction he had     Heparin      he lives in a assisted living program and is not sure what type of allergies he really has. He quit drinking     Zosyn [Penicillins]      unsure of reaction, allergy is listed on his med sheet       Health Maintenance Due   Topic Date Due     MENTAL HEALTH TX PLAN  04/21/2021         Problem, Medication and Allergy Lists were reviewed and are current..         Review of Systems:   Review of Systems   Constitutional: Negative for chills and fever.   Respiratory: Negative for shortness of breath.    Cardiovascular: Negative for chest pain and leg swelling.              Labs:   Results from last visit:  Hospital Outpatient Visit on 10/05/2020   Component Date Value  "Ref Range Status     Creatinine 10/05/2020 0.8  0.66 - 1.25 mg/dL Final     GFR Estimate 10/05/2020 >90  >60 mL/min/[1.73_m2] Final     GFR Estimate If Black 10/05/2020 >90  >60 mL/min/[1.73_m2] Final         EXAM:  Blood pressure (!) 148/81, pulse 52, temperature 96.1  F (35.6  C), height 1.797 m (5' 10.75\"), weight 132.9 kg (293 lb).  Constitutional: healthy, alert and no distress   Cardiovascular: negative, No edema or JVD., negative findings: PMI normal, regular rate and rhythm, S1 normal, S2 normal, positive findings: Murmur 2/4  Respiratory: negative, Percussion normal. Good diaphragmatic excursion. Lungs clear   Ext no edema  Assessment and Plan   1. Gender dysphoria s/p orchiectomy  Response-: Stable response to current hormone regimen/dose   Reduce 0.05 mg TD estradiol 2x/wk for safe maintenance dose  Continue weight loss efforts towards vaginoplary    2. HTN  3.Aortic stenosis  Had coffee today, at home monitor says bp lower    Continue with PCP and cardiology for bp control, monitoring of cardiac status  Keep appt for echo and physical      Follow-up 4 months      Results by mychart  Questions were elicited and answered.     Jluis Mane MD    " Point of Care Ultrasound Cardiac

## 2022-08-25 ENCOUNTER — PATIENT OUTREACH (OUTPATIENT)
Dept: GERIATRIC MEDICINE | Facility: CLINIC | Age: 72
End: 2022-08-25

## 2022-08-25 ENCOUNTER — HOSPITAL ENCOUNTER (OUTPATIENT)
Dept: CARDIOLOGY | Facility: CLINIC | Age: 72
Discharge: HOME | End: 2022-08-25
Attending: INTERNAL MEDICINE | Admitting: INTERNAL MEDICINE
Payer: COMMERCIAL

## 2022-08-25 ENCOUNTER — OFFICE VISIT (OUTPATIENT)
Dept: CARDIOLOGY | Facility: CLINIC | Age: 72
End: 2022-08-25
Attending: INTERNAL MEDICINE
Payer: COMMERCIAL

## 2022-08-25 VITALS
BODY MASS INDEX: 41.02 KG/M2 | HEART RATE: 72 BPM | HEIGHT: 71 IN | DIASTOLIC BLOOD PRESSURE: 80 MMHG | WEIGHT: 293 LBS | SYSTOLIC BLOOD PRESSURE: 138 MMHG

## 2022-08-25 DIAGNOSIS — R93.1 ABNORMAL FINDINGS DIAGNOSTIC IMAGING OF HEART AND CORONARY CIRCULATION: Primary | ICD-10-CM

## 2022-08-25 DIAGNOSIS — I71.9 AORTIC ANEURYSM WITHOUT RUPTURE, UNSPECIFIED PORTION OF AORTA (H): ICD-10-CM

## 2022-08-25 DIAGNOSIS — I35.0 AORTIC VALVE STENOSIS, ETIOLOGY OF CARDIAC VALVE DISEASE UNSPECIFIED: ICD-10-CM

## 2022-08-25 DIAGNOSIS — I35.0 AORTIC VALVE STENOSIS, ETIOLOGY OF CARDIAC VALVE DISEASE UNSPECIFIED: Primary | ICD-10-CM

## 2022-08-25 LAB
CREAT BLD-MCNC: 1.1 MG/DL (ref 0.5–1.3)
GFR SERPL CREATININE-BSD FRML MDRD: >60 ML/MIN/1.73M2

## 2022-08-25 PROCEDURE — 99204 OFFICE O/P NEW MOD 45 MIN: CPT | Performed by: INTERNAL MEDICINE

## 2022-08-25 PROCEDURE — 71275 CT ANGIOGRAPHY CHEST: CPT

## 2022-08-25 PROCEDURE — 250N000011 HC RX IP 250 OP 636: Performed by: INTERNAL MEDICINE

## 2022-08-25 PROCEDURE — 74174 CTA ABD&PLVS W/CONTRAST: CPT | Mod: 26 | Performed by: INTERNAL MEDICINE

## 2022-08-25 PROCEDURE — 82565 ASSAY OF CREATININE: CPT

## 2022-08-25 PROCEDURE — 93005 ELECTROCARDIOGRAM TRACING: CPT | Performed by: INTERNAL MEDICINE

## 2022-08-25 PROCEDURE — 71275 CT ANGIOGRAPHY CHEST: CPT | Mod: 26 | Performed by: INTERNAL MEDICINE

## 2022-08-25 PROCEDURE — 74174 CTA ABD&PLVS W/CONTRAST: CPT

## 2022-08-25 RX ORDER — ONDANSETRON 2 MG/ML
4 INJECTION INTRAMUSCULAR; INTRAVENOUS
Status: DISCONTINUED | OUTPATIENT
Start: 2022-08-25 | End: 2022-08-26 | Stop reason: HOSPADM

## 2022-08-25 RX ORDER — DIPHENHYDRAMINE HCL 25 MG
25 CAPSULE ORAL
Status: DISCONTINUED | OUTPATIENT
Start: 2022-08-25 | End: 2022-08-26 | Stop reason: HOSPADM

## 2022-08-25 RX ORDER — IOPAMIDOL 755 MG/ML
500 INJECTION, SOLUTION INTRAVASCULAR ONCE
Status: COMPLETED | OUTPATIENT
Start: 2022-08-25 | End: 2022-08-25

## 2022-08-25 RX ORDER — ACYCLOVIR 200 MG/1
0-1 CAPSULE ORAL
Status: DISCONTINUED | OUTPATIENT
Start: 2022-08-25 | End: 2022-08-26 | Stop reason: HOSPADM

## 2022-08-25 RX ORDER — DIPHENHYDRAMINE HYDROCHLORIDE 50 MG/ML
25-50 INJECTION INTRAMUSCULAR; INTRAVENOUS
Status: DISCONTINUED | OUTPATIENT
Start: 2022-08-25 | End: 2022-08-26 | Stop reason: HOSPADM

## 2022-08-25 RX ORDER — METHYLPREDNISOLONE SODIUM SUCCINATE 125 MG/2ML
125 INJECTION, POWDER, LYOPHILIZED, FOR SOLUTION INTRAMUSCULAR; INTRAVENOUS
Status: DISCONTINUED | OUTPATIENT
Start: 2022-08-25 | End: 2022-08-26 | Stop reason: HOSPADM

## 2022-08-25 RX ADMIN — IOPAMIDOL 115 ML: 755 INJECTION, SOLUTION INTRAVENOUS at 12:12

## 2022-08-25 NOTE — PROGRESS NOTES
[facility] Care Coordination Contact    Internal CC change effective 9/1/22.  Mailed member CC Change letter.  Additional tasks to be completed by CMS include: update database & EPIC, enter CC Change in MMIS, and move member files on Q drive.    Margaret Montoya  Case Management Specialist  [facility]  [phone]

## 2022-08-25 NOTE — PROGRESS NOTES
HPI and Plan:   See dictation    Today's clinic visit entailed:  45 minutes spent on the date of the encounter doing chart review, history and exam, documentation and further activities per the note  Provider  Link to [facility] Help Grid      Orders Placed This Encounter   Procedures     EKG 12-lead complete w/read - Clinics (performed today)       No orders of the defined types were placed in this encounter.      There are no discontinued medications.      Encounter Diagnoses   Name Primary?     Aortic aneurysm without rupture, unspecified portion of aorta (H)      Aortic valve stenosis, etiology of cardiac valve disease unspecified Yes       CURRENT MEDICATIONS:  Current Outpatient Medications   Medication Sig Dispense Refill     atenolol (TENORMIN) 50 MG tablet Take 1 tablet (50 mg) by mouth daily 90 tablet 1     brimonidine (ALPHAGAN-P) 0.15 % ophthalmic solution        dorzolamide-timolol (COSOPT) 2-0.5 % ophthalmic solution        estradiol (VIVELLE-DOT) 0.05 MG/24HR bi-weekly patch Place 1 patch onto the skin twice a week 24 patch 1     furosemide (LASIX) 20 MG tablet Take 1 tablet (20 mg) by mouth daily 90 tablet 1     latanoprost (XALATAN) 0.005 % ophthalmic solution 1 drop At Bedtime.       lovastatin (MEVACOR) 20 MG tablet TAKE ONE TABLET BY MOUTH ONCE A DAY AT BEDTIME 90 tablet 3     Multiple Vitamin (DAILY-EVANGELISTA) TABS Take 1 tablet by mouth daily  11     timolol maleate (TIMOPTIC) 0.5 % ophthalmic solution Apply 1 drop to eye       albuterol (PROAIR HFA/PROVENTIL HFA/VENTOLIN HFA) 108 (90 Base) MCG/ACT inhaler Inhale 2 puffs into the lungs every 6 hours (Patient not taking: No sig reported) 18 g 0     ipratropium - albuterol 0.5 mg/2.5 mg/3 mL (DUONEB) 0.5-2.5 (3) MG/3ML neb solution Take 1 vial (3 mLs) by nebulization every 6 hours as needed for shortness of breath / dyspnea or wheezing (Patient not taking: No sig reported) 90 mL 4       ALLERGIES     Allergies   Allergen Reactions     External Allergen  Needs Reconciliation - See Comment      Please reconcile the Patient's allergy reported as ESTERSPENICILLIN and update accordingly           Zosyn [Penicillins]      unsure of reaction, allergy is listed on his med sheet     Heparin Rash     he lives in a assisted living program and is not sure what type of allergies he really has. He quit drinking       PAST MEDICAL HISTORY:  Past Medical History:   Diagnosis Date     Alcoholism (H)      DDD (degenerative disc disease), cervical      Gender dysphoria in adult      HLD (hyperlipidemia)      HTN (hypertension)      large compression fracture 3/13/2006    Worked up for neoplasm, none found.       OA (osteoarthritis of spine)      Unspecified internal derangement of knee     CHRONIC KNEE PAIN,LEG,NECK AND HEAD INJURIES       PAST SURGICAL HISTORY:  Past Surgical History:   Procedure Laterality Date     COLONOSCOPY  2007     JOINT REPLACEMTN, KNEE RT/LT  2006    left     ORCHIECTOMY SCROTAL Bilateral 5/14/2019    Procedure: Bilatera Scrotal Orchiectomy;  Surgeon: Abhilash Weston MD;  Location:  OR       FAMILY HISTORY:  Family History   Problem Relation Age of Onset     Diabetes Mother      Cancer Father         lung cancer heavy smoker     Emphysema Father      Allergies Father         Enviromental     Diabetes Maternal Grandmother      Other - See Comments Other         FHx of Glaucoma       SOCIAL HISTORY:  Social History     Socioeconomic History     Marital status:      Spouse name: None     Number of children: None     Years of education: None     Highest education level: None   Occupational History     Occupation: RETIRED   Tobacco Use     Smoking status: Never Smoker     Smokeless tobacco: Never Used   Vaping Use     Vaping Use: Never used   Substance and Sexual Activity     Alcohol use: No     Comment: Quit 3/24/05     Drug use: No     Sexual activity: Not Currently   Social History Narrative    August 11, 2022    ENVIRONMENTAL HISTORY: [place] The home is heated with a forced air. They do have central air conditioning. The patient's bedroom is furnished with hard edna in bedroom and fabric window coverings.  No pets. There is no history of cockroach or mice infestation. There is/are 0 smokers in the house.  The house does not have a basement.      Social Determinants of Health     Financial Resource Strain: Medium Risk     Difficulty of Paying Living Expenses: Somewhat hard   Food Insecurity: No Food Insecurity     Worried About Running Out of Food in the Last Year: Never true     Ran Out of Food in the Last Year: Never true   Transportation Needs: No Transportation Needs     Lack of Transportation (Medical): No     Lack of Transportation (Non-Medical): No   Physical Activity: Sufficiently Active     Days of Exercise per Week: 7 days     Minutes of Exercise per Session: 130 min   Stress: No Stress Concern Present     Feeling of Stress : Only a little   Social Connections: Socially Isolated     Frequency of Communication with Friends and Family: More than three times a week     Frequency of Social Gatherings with Friends and Family: Once a week     Attends Worship Services: Never     Active Member of Clubs or Organizations: No     Attends Club or Organization Meetings: Never     Marital Status:    Intimate Partner Violence: Not At Risk     Fear of Current or Ex-Partner: No     Emotionally Abused: No     Physically Abused: No     Sexually Abused: No   Housing Stability: Low Risk      Unable to Pay for Housing in the Last Year: No     Number of Places Lived in the Last Year: 1     Unstable Housing in the Last Year: No       Review of Systems:  Skin:          Eyes:         ENT:         Respiratory:          Cardiovascular:         Gastroenterology:        Genitourinary:         Musculoskeletal:         Neurologic:         Psychiatric:         Heme/Lymph/Imm:         Endocrine:        "    Physical Exam:  Vitals: /80   Pulse 72   Ht 1.803 m (5' 11\")   Wt (!) 157.4 kg (347 lb)   BMI 48.40 kg/m      Constitutional:  cooperative morbidly obese      Skin:  warm and dry to the touch          Head:  normocephalic        Eyes:  conjunctivae and lids unremarkable        Lymph:      ENT:  no pallor or cyanosis        Neck:  no carotid bruit        Respiratory:  clear to auscultation         Cardiac: regular rhythm         grade 2;systolic ejection murmur      pulses full and equal                                        GI:  abdomen soft obese      Extremities and Muscular Skeletal:      bilateral LE edema;1+          Neurological:  no gross motor deficits        Psych:  Alert and Oriented x 3        CC  Shante Cerda MD  [address] [place] [address]              "

## 2022-08-25 NOTE — LETTER
8/25/2022    Delmis Simons, APRN CNP  [address] [place] [id number]    RE: Laila Carnesrusty Perez       Dear Colleague,     I had the pleasure of seeing Laila Alexandra Perez in the [facility].  HPI and Plan:   See dictation    Today's clinic visit entailed:  45 minutes spent on the date of the encounter doing chart review, history and exam, documentation and further activities per the note  Provider  Link to [facility] Help Grid      Orders Placed This Encounter   Procedures     EKG 12-lead complete w/read - Clinics (performed today)       No orders of the defined types were placed in this encounter.      There are no discontinued medications.      Encounter Diagnoses   Name Primary?     Aortic aneurysm without rupture, unspecified portion of aorta (H)      Aortic valve stenosis, etiology of cardiac valve disease unspecified Yes       CURRENT MEDICATIONS:  Current Outpatient Medications   Medication Sig Dispense Refill     atenolol (TENORMIN) 50 MG tablet Take 1 tablet (50 mg) by mouth daily 90 tablet 1     brimonidine (ALPHAGAN-P) 0.15 % ophthalmic solution        dorzolamide-timolol (COSOPT) 2-0.5 % ophthalmic solution        estradiol (VIVELLE-DOT) 0.05 MG/24HR bi-weekly patch Place 1 patch onto the skin twice a week 24 patch 1     furosemide (LASIX) 20 MG tablet Take 1 tablet (20 mg) by mouth daily 90 tablet 1     latanoprost (XALATAN) 0.005 % ophthalmic solution 1 drop At Bedtime.       lovastatin (MEVACOR) 20 MG tablet TAKE ONE TABLET BY MOUTH ONCE A DAY AT BEDTIME 90 tablet 3     Multiple Vitamin (DAILY-EVANGELISTA) TABS Take 1 tablet by mouth daily  11     timolol maleate (TIMOPTIC) 0.5 % ophthalmic solution Apply 1 drop to eye       albuterol (PROAIR HFA/PROVENTIL HFA/VENTOLIN HFA) 108 (90 Base) MCG/ACT inhaler Inhale 2 puffs into the lungs every 6 hours (Patient not taking: No sig reported) 18 g 0     ipratropium - albuterol 0.5 mg/2.5 mg/3 mL (DUONEB) 0.5-2.5 (3) MG/3ML neb solution Take 1  vial (3 mLs) by nebulization every 6 hours as needed for shortness of breath / dyspnea or wheezing (Patient not taking: No sig reported) 90 mL 4       ALLERGIES     Allergies   Allergen Reactions     External Allergen Needs Reconciliation - See Comment      Please reconcile the Patient's allergy reported as ESTERSPENICILLIN and update accordingly           Zosyn [Penicillins]      unsure of reaction, allergy is listed on his med sheet     Heparin Rash     he lives in a assisted living program and is not sure what type of allergies he really has. He quit drinking       PAST MEDICAL HISTORY:  Past Medical History:   Diagnosis Date     Alcoholism (H)      DDD (degenerative disc disease), cervical      Gender dysphoria in adult      HLD (hyperlipidemia)      HTN (hypertension)      large compression fracture 3/13/2006    Worked up for neoplasm, none found.       OA (osteoarthritis of spine)      Unspecified internal derangement of knee     CHRONIC KNEE PAIN,LEG,NECK AND HEAD INJURIES       PAST SURGICAL HISTORY:  Past Surgical History:   Procedure Laterality Date     COLONOSCOPY  2007     JOINT REPLACEMTN, KNEE RT/LT  2006    left     ORCHIECTOMY SCROTAL Bilateral 5/14/2019    Procedure: Bilatera Scrotal Orchiectomy;  Surgeon: Abhilash Weston MD;  Location:  OR       FAMILY HISTORY:  Family History   Problem Relation Age of Onset     Diabetes Mother      Cancer Father         lung cancer heavy smoker     Emphysema Father      Allergies Father         Enviromental     Diabetes Maternal Grandmother      Other - See Comments Other         FHx of Glaucoma       SOCIAL HISTORY:  Social History     Socioeconomic History     Marital status:      Spouse name: None     Number of children: None     Years of education: None     Highest education level: None   Occupational History     Occupation: RETIRED   Tobacco Use     Smoking status: Never Smoker     Smokeless tobacco: Never Used   Vaping Use     Vaping Use:  Never used   Substance and Sexual Activity     Alcohol use: No     Comment: Quit 3/24/05     Drug use: No     Sexual activity: Not Currently   Social History Narrative    August 11, 2022    ENVIRONMENTAL HISTORY: [place] The [place] is heated with a forced air. They do have central air conditioning. The patient's bedroom is furnished with hard edna in bedroom and fabric window coverings.  No pets. There is no history of cockroach or mice infestation. There is/are 0 smokers in the house.  The house does not have a basement.      Social Determinants of Health     Financial Resource Strain: Medium Risk     Difficulty of Paying Living Expenses: Somewhat hard   Food Insecurity: No Food Insecurity     Worried About Running Out of Food in the Last Year: Never true     Ran Out of Food in the Last Year: Never true   Transportation Needs: No Transportation Needs     Lack of Transportation (Medical): No     Lack of Transportation (Non-Medical): No   Physical Activity: Sufficiently Active     Days of Exercise per Week: 7 days     Minutes of Exercise per Session: 130 min   Stress: No Stress Concern Present     Feeling of Stress : Only a little   Social Connections: Socially Isolated     Frequency of Communication with Friends and Family: More than three times a week     Frequency of Social Gatherings with Friends and Family: Once a week     Attends Temple Services: Never     Active Member of Clubs or Organizations: No     Attends Club or Organization Meetings: Never     Marital Status:    Intimate Partner Violence: Not At Risk     Fear of Current or Ex-Partner: No     Emotionally Abused: No     Physically Abused: No     Sexually Abused: No   Housing Stability: Low Risk      Unable to Pay for Housing in the Last Year: No     Number of Places Lived in the Last Year: 1     Unstable Housing in the Last Year: No       Review of Systems:  Skin:          Eyes:         ENT:  "        Respiratory:          Cardiovascular:         Gastroenterology:        Genitourinary:         Musculoskeletal:         Neurologic:         Psychiatric:         Heme/Lymph/Imm:         Endocrine:           Physical Exam:  Vitals: /80   Pulse 72   Ht 1.803 m (5' 11\")   Wt (!) 157.4 kg (347 lb)   BMI 48.40 kg/m      Constitutional:  cooperative morbidly obese      Skin:  warm and dry to the touch          Head:  normocephalic        Eyes:  conjunctivae and lids unremarkable        Lymph:      ENT:  no pallor or cyanosis        Neck:  no carotid bruit        Respiratory:  clear to auscultation         Cardiac: regular rhythm         grade 2;systolic ejection murmur      pulses full and equal                                        GI:  abdomen soft obese      Extremities and Muscular Skeletal:      bilateral LE edema;1+          Neurological:  no gross motor deficits        Psych:  Alert and Oriented x 3        CC  Shante Cerda MD  [address] [place] [id number]                TAVR Coordinator visit:  Provided additional education regarding TAVR procedure, after being present for discussion with physician. Explained the work-up process and next steps for patient. Patient provided our direct contact number and instructed to call with any questions.     Completed frailty testing and KCCQ.   5 meter walk: 6.3 seconds  Frailty score: 1/5 (ambulation)    KCCQ Results:   1a. 5  1b. 1  1c. 1  2. 1  3. 2  4. 2  5. 1  6. 2  7. 3  8a. 2  8b. 2  8c. 4    Preliminary STS Risk Score: 3.55%  NYHA Class: III    Patient had TAVR CT done this AM and got scheduled for a coronary angiogram on 09/08/2022. Encouraged patient and patient's support person, Vicki, to call with any additional questions or concerns.       Concetta Segura RN  Structural Heart Coordinator  [facility]      Service Date: 08/25/2022    OFFICE PROGRESS NOTE     REASON FOR CONSULTATION:  Severe symptomatic " aortic stenosis.    REFERRING PHYSICIAN:  Dr. Cerda    HISTORY OF PRESENT ILLNESS:  I had the pleasure of seeing Laila Perez at the [facility] in [place] this afternoon.  She is a pleasant 72-year-old female with history of aortic stenosis, ascending aortic aneurysm, morbid obesity and hypertension who was referred for further evaluation regarding severe, symptomatic aortic stenosis.    She has followed her aortic stenosis closely over the years.  However, she developed progressive, debilitating dyspnea on exertion over the past 6 months.  As such, a repeat echocardiogram was performed which I personally reviewed.  The echo from 08/04/2022 revealed severe aortic stenosis with a mean gradient of 41 mmHg, a valve area of 0.9 cm2 and peak velocity of 4.1 meters per second.  Ascending aorta was mild to moderately dilated at 4.4 cm.  MRA of the chest from 2021 showed ascending aortic aneurysm measuring 4.4 cm.    She does not endorse chest pain, palpitations, presyncope or syncope.  She had CT TAVR this morning and the results are currently pending.    REVIEW OF SYSTEMS:  Comprehensive review of systems was performed and essentially negative except what is mentioned in HPI.    ASSESSMENT AND PLAN:  A 72-year-old female with severe symptomatic aortic stenosis.  I did review her recent transthoracic echocardiogram.  Her aortic stenosis has clearly progressed and now is in the severe range.  Given this and progressive symptoms over the last 6 months, aortic valve replacement is indicated.  She does have mild to moderately dilated ascending aorta which has been stable over the last several years.  As such, aortic valve replacement without aortic root/ascending aortic repair is reasonable.  She has significant underlying comorbidities including morbid obesity which certainly makes her high risk for surgical aortic valve replacement.  As such, I believe she will be best treated with transcatheter approach  unless the CT TAVR shows significant progression in her ascending aortic aneurysm.    We will follow up on the CT TAVR.  We will proceed with coronary angiography to rule out significant obstructive coronary artery disease.  We will then present her to our multidisciplinary TAVR conference to discuss her case further.    It was a pleasure seeing Ms. Perez in the clinic this afternoon.  I appreciate the opportunity to participate in her care.    Young Ramires MD        D: 2022   T: 2022   MT: ruby    Name:     DANYELLE PEREZ  MRN:      [id number]        Account:      [id number]   :      1950           Service Date: 2022       Document: [id number]      Thank you for allowing me to participate in the care of your patient.      Sincerely,     Young Ramires MD, MD     [facility]  cc:   Shante Cerda MD  [address] [place] [id number]

## 2022-08-25 NOTE — PROGRESS NOTES
TAVR Coordinator visit:  Provided additional education regarding TAVR procedure, after being present for discussion with physician. Explained the work-up process and next steps for patient. Patient provided our direct contact number and instructed to call with any questions.     Completed frailty testing and KCCQ.   5 meter walk: 6.3 seconds  Frailty score: 1/5 (ambulation)    KCCQ Results:   1a. 5  1b. 1  1c. 1  2. 1  3. 2  4. 2  5. 1  6. 2  7. 3  8a. 2  8b. 2  8c. 4    Preliminary STS Risk Score: 3.55%  NYHA Class: III    Patient had TAVR CT done this AM and got scheduled for a coronary angiogram on 09/08/2022. Encouraged patient and patient's support person, Vicki, to call with any additional questions or concerns.       Concetta Segura RN  Structural Heart Coordinator  [facility]

## 2022-08-25 NOTE — PROGRESS NOTES
Service Date: 08/25/2022    OFFICE PROGRESS NOTE     REASON FOR CONSULTATION:  Severe symptomatic aortic stenosis.    REFERRING PHYSICIAN:  Dr. Cerda    HISTORY OF PRESENT ILLNESS:  I had the pleasure of seeing Laila Perez at the [facility] in [place] this afternoon.  She is a pleasant 72-year-old female with history of aortic stenosis, ascending aortic aneurysm, morbid obesity and hypertension who was referred for further evaluation regarding severe, symptomatic aortic stenosis.    She has followed her aortic stenosis closely over the years.  However, she developed progressive, debilitating dyspnea on exertion over the past 6 months.  As such, a repeat echocardiogram was performed which I personally reviewed.  The echo from 08/04/2022 revealed severe aortic stenosis with a mean gradient of 41 mmHg, a valve area of 0.9 cm2 and peak velocity of 4.1 meters per second.  Ascending aorta was mild to moderately dilated at 4.4 cm.  MRA of the chest from 2021 showed ascending aortic aneurysm measuring 4.4 cm.    She does not endorse chest pain, palpitations, presyncope or syncope.  She had CT TAVR this morning and the results are currently pending.    REVIEW OF SYSTEMS:  Comprehensive review of systems was performed and essentially negative except what is mentioned in HPI.    ASSESSMENT AND PLAN:  A 72-year-old female with severe symptomatic aortic stenosis.  I did review her recent transthoracic echocardiogram.  Her aortic stenosis has clearly progressed and now is in the severe range.  Given this and progressive symptoms over the last 6 months, aortic valve replacement is indicated.  She does have mild to moderately dilated ascending aorta which has been stable over the last several years.  As such, aortic valve replacement without aortic root/ascending aortic repair is reasonable.  She has significant underlying comorbidities including morbid obesity which certainly makes her high risk for surgical  aortic valve replacement.  As such, I believe she will be best treated with transcatheter approach unless the CT TAVR shows significant progression in her ascending aortic aneurysm.    We will follow up on the CT TAVR.  We will proceed with coronary angiography to rule out significant obstructive coronary artery disease.  We will then present her to our multidisciplinary TAVR conference to discuss her case further.    It was a pleasure seeing Ms. Perez in the clinic this afternoon.  I appreciate the opportunity to participate in her care.    Young Ramires MD        D: 2022   T: 2022   MT: ruby    Name:     DANYELLE PEREZ  MRN:      [id number]        Account:      [id number]   :      1950           Service Date: 2022       Document: [id number]

## 2022-08-25 NOTE — LETTER
August 25, 2022    DANYELLE MONICA ALIRIO  [address] [place] [id number]      Dear Danyelle:    As a member of [facility] ([facility] ([facility]), you are provided a care coordinator. I will be your new care coordinator as of 9/1/22. I will be calling you soon to see how you are doing and determine your needs.    If you have any questions, please feel free to call me at [phone]. If you reach my voice mail, please leave a message and your phone number. If you are hearing impaired, please call the [place] Relay at 117 or [phone] ([email or web address] relay service).    I look forward to speaking with you soon.    Sincerely,       Chanel Laura RN  [phone]  [email or web address] is a health plan that contracts with both Medicare and the [place] Medical Assistance (Medicaid) program to provide benefits of both programs to enrollees. Enrollment in [facility] depends on contract renewal.      [facility]  [id number] DHS Approved ([id number])  [id number] (11/18)

## 2022-09-01 ENCOUNTER — TELEPHONE (OUTPATIENT)
Dept: CARDIOLOGY | Facility: CLINIC | Age: 72
End: 2022-09-01

## 2022-09-01 ENCOUNTER — PATIENT OUTREACH (OUTPATIENT)
Dept: GERIATRIC MEDICINE | Facility: CLINIC | Age: 72
End: 2022-09-01

## 2022-09-01 DIAGNOSIS — R93.1 ABNORMAL FINDINGS DIAGNOSTIC IMAGING OF HEART AND CORONARY CIRCULATION: Primary | ICD-10-CM

## 2022-09-01 RX ORDER — SODIUM CHLORIDE 9 MG/ML
INJECTION, SOLUTION INTRAVENOUS CONTINUOUS
Status: CANCELLED | OUTPATIENT
Start: 2022-09-01

## 2022-09-01 RX ORDER — ASPIRIN 81 MG/1
243 TABLET, CHEWABLE ORAL ONCE
Status: CANCELLED | OUTPATIENT
Start: 2022-09-01

## 2022-09-01 RX ORDER — LIDOCAINE 40 MG/G
CREAM TOPICAL
Status: CANCELLED | OUTPATIENT
Start: 2022-09-01

## 2022-09-01 RX ORDER — POTASSIUM CHLORIDE 1500 MG/1
20 TABLET, EXTENDED RELEASE ORAL
Status: CANCELLED | OUTPATIENT
Start: 2022-09-01

## 2022-09-01 RX ORDER — ASPIRIN 325 MG
325 TABLET ORAL ONCE
Status: CANCELLED | OUTPATIENT
Start: 2022-09-01 | End: 2022-09-01

## 2022-09-01 NOTE — TELEPHONE ENCOUNTER
Call patient and reviewed the below instructions with request , Vicki:     [facility] - ANGIOGRAM INSTRUCTIONS:  - Laila Peerz is scheduled for a coronary angiogram at [facility] on 2022. Check in time is at 0730, scheduled as 0930 case.  - Advised patient not eat or drink after midnight on day of procedure.   - Patient advised to take morning medications with a sip of water on the day of the procedure, noting the followin. Aspirin: Patient does not currently take aspirin, patient will take 325 mg the morning of the procedure.  2. Diabetic Medications: Patient does not take Metformin or other diabetic medications.  3. Anticoagulants: Patient does not take an anticoagulant.  4. Diuretics: Patient advised to hold furosemide the morning of the procedure.   5. All vitamins and supplements to be held the morning of the procedure.  - Verified patient does not have an allergy to contrast dye.  - Verified patient has someone available to drive them home from the hospital and can stay with them for 24 hours after the procedure. They understand that one visitor may accompany them into the hospital on the day of angiogram, with both patient and visitor to wear a mask.  - COVID testing: Patient to take at home covid test and bring in proof of negative results  - Patient was instructed to take their temperature the morning of procedure and if temperature is >100 degrees F patient should not come in and call [phone].  - Angiogram orders have been signed & held.    Concetta Segura RN  [facility]

## 2022-09-01 NOTE — PROGRESS NOTES
CC updated program tasks and targets for [facility] launch. No contact with member.  Chanel Laura RN  [facility]  [phone]

## 2022-09-02 NOTE — PROGRESS NOTES
CC faxed [phone] to [facility] notifying of new CC.  Chanel Laura RN  [facility]  [phone]

## 2022-09-06 ENCOUNTER — TELEPHONE (OUTPATIENT)
Dept: CARDIOLOGY | Facility: CLINIC | Age: 72
End: 2022-09-06

## 2022-09-06 NOTE — TELEPHONE ENCOUNTER
"Received call from Akiko at patient's group home. She states that patient had a new red and swollen area on her left leg. Akiko is concerned that this will interfere with patient having angiogram done 9/8/22.  Patient states that his area is painful but also states that her legs are \"always painful\".       "

## 2022-09-06 NOTE — TELEPHONE ENCOUNTER
Reviewed the below image and message with Alia Angulo NP. Alia stated as long as there was no concern about it being infected (fevers, discharge, increased redness spreading, etc.) she should be fine for scheduled angiogram on 09/08/2022.      Called patient back and left message with the above information. Did ask patient to call me back to confirm she got my message and to follow-up on any additional information regarding her left leg. Direct call back phone number provided.

## 2022-09-06 NOTE — TELEPHONE ENCOUNTER
Received return call. Spoke with patient. She denies any fevers or drainage from her leg. She states that she has had this redness for a couple weeks. She feels that it gets better when she elevates her legs band she does not feel that it is spreading.   I did ask that patient make an appointment to see her PCP regarding her leg.     I also spoke with Akiko at patient's group home and reviewed this information. We discussed that as there doesn't seem to be any concerns for infection she should be fine to proceed with angiogram as scheduled.     Patient additionally expressed concern about her family being updated after procedure. We reviewed that we have the contact information for both of her sons in her chart and she can request that they be contacted with an update. She states understanding.

## 2022-09-07 ENCOUNTER — TELEPHONE (OUTPATIENT)
Dept: ALLERGY | Facility: CLINIC | Age: 72
End: 2022-09-07

## 2022-09-07 NOTE — TELEPHONE ENCOUNTER
What is the status of the release of information? I reviewed the notes from [facility] from August 2004.  No mention about penicillin or amoxicillin allergy.  I still have not received notes from [facility].  Please verify with the patient that she was actually hospitalized at that hospital.    Dom Gipson MD

## 2022-09-08 ENCOUNTER — HOSPITAL ENCOUNTER (OUTPATIENT)
Facility: CLINIC | Age: 72
Discharge: HOME | End: 2022-09-08
Admitting: INTERNAL MEDICINE
Payer: COMMERCIAL

## 2022-09-08 VITALS
BODY MASS INDEX: 41.02 KG/M2 | OXYGEN SATURATION: 96 % | DIASTOLIC BLOOD PRESSURE: 68 MMHG | RESPIRATION RATE: 16 BRPM | HEART RATE: 60 BPM | SYSTOLIC BLOOD PRESSURE: 112 MMHG | HEIGHT: 71 IN | WEIGHT: 293 LBS | TEMPERATURE: 97.9 F

## 2022-09-08 DIAGNOSIS — R93.1 ABNORMAL FINDINGS DIAGNOSTIC IMAGING OF HEART AND CORONARY CIRCULATION: ICD-10-CM

## 2022-09-08 PROBLEM — Z98.890 STATUS POST CORONARY ANGIOGRAM: Status: ACTIVE | Noted: 2022-09-08

## 2022-09-08 LAB
ANION GAP SERPL CALCULATED.3IONS-SCNC: 2 MMOL/L (ref 3–14)
APTT PPP: 30 SECONDS (ref 22–38)
BUN SERPL-MCNC: 26 MG/DL (ref 7–30)
CALCIUM SERPL-MCNC: 9.5 MG/DL (ref 8.5–10.1)
CHLORIDE BLD-SCNC: 102 MMOL/L (ref 94–109)
CO2 SERPL-SCNC: 31 MMOL/L (ref 20–32)
CREAT SERPL-MCNC: 1.05 MG/DL (ref 0.52–1.25)
ERYTHROCYTE [DISTWIDTH] IN BLOOD BY AUTOMATED COUNT: 13.2 % (ref 10–15)
GFR SERPL CREATININE-BSD FRML MDRD: 75 ML/MIN/1.73M2
GLUCOSE BLD-MCNC: 112 MG/DL (ref 70–99)
HCT VFR BLD AUTO: 42.8 % (ref 35–53)
HGB BLD-MCNC: 13.9 G/DL (ref 11.7–17.7)
INR PPP: 1.14 (ref 0.85–1.15)
MCH RBC QN AUTO: 29.8 PG (ref 26.5–33)
MCHC RBC AUTO-ENTMCNC: 32.5 G/DL (ref 31.5–36.5)
MCV RBC AUTO: 92 FL (ref 78–100)
PLATELET # BLD AUTO: 121 10E3/UL (ref 150–450)
POTASSIUM BLD-SCNC: 4.4 MMOL/L (ref 3.4–5.3)
RBC # BLD AUTO: 4.67 10E6/UL (ref 3.8–5.9)
SODIUM SERPL-SCNC: 135 MMOL/L (ref 133–144)
WBC # BLD AUTO: 7.9 10E3/UL (ref 4–11)

## 2022-09-08 PROCEDURE — 99152 MOD SED SAME PHYS/QHP 5/>YRS: CPT | Performed by: INTERNAL MEDICINE

## 2022-09-08 PROCEDURE — 93010 ELECTROCARDIOGRAM REPORT: CPT | Performed by: INTERNAL MEDICINE

## 2022-09-08 PROCEDURE — 250N000011 HC RX IP 250 OP 636: Performed by: INTERNAL MEDICINE

## 2022-09-08 PROCEDURE — 250N000009 HC RX 250: Performed by: INTERNAL MEDICINE

## 2022-09-08 PROCEDURE — 85730 THROMBOPLASTIN TIME PARTIAL: CPT | Performed by: INTERNAL MEDICINE

## 2022-09-08 PROCEDURE — 85014 HEMATOCRIT: CPT | Performed by: INTERNAL MEDICINE

## 2022-09-08 PROCEDURE — 36415 COLL VENOUS BLD VENIPUNCTURE: CPT | Performed by: INTERNAL MEDICINE

## 2022-09-08 PROCEDURE — 93454 CORONARY ARTERY ANGIO S&I: CPT | Mod: 26 | Performed by: INTERNAL MEDICINE

## 2022-09-08 PROCEDURE — 258N000003 HC RX IP 258 OP 636: Performed by: INTERNAL MEDICINE

## 2022-09-08 PROCEDURE — 93005 ELECTROCARDIOGRAM TRACING: CPT

## 2022-09-08 PROCEDURE — 93454 CORONARY ARTERY ANGIO S&I: CPT | Performed by: INTERNAL MEDICINE

## 2022-09-08 PROCEDURE — 250N000013 HC RX MED GY IP 250 OP 250 PS 637: Performed by: INTERNAL MEDICINE

## 2022-09-08 PROCEDURE — 85610 PROTHROMBIN TIME: CPT | Performed by: INTERNAL MEDICINE

## 2022-09-08 PROCEDURE — 272N000001 HC OR GENERAL SUPPLY STERILE: Performed by: INTERNAL MEDICINE

## 2022-09-08 PROCEDURE — 999N000184 HC STATISTIC TELEMETRY

## 2022-09-08 PROCEDURE — 999N000071 HC STATISTIC HEART CATH LAB OR EP LAB

## 2022-09-08 PROCEDURE — 999N000054 HC STATISTIC EKG NON-CHARGEABLE

## 2022-09-08 PROCEDURE — 82310 ASSAY OF CALCIUM: CPT | Performed by: INTERNAL MEDICINE

## 2022-09-08 RX ORDER — LIDOCAINE 40 MG/G
CREAM TOPICAL
Status: DISCONTINUED | OUTPATIENT
Start: 2022-09-08 | End: 2022-09-08 | Stop reason: HOSPADM

## 2022-09-08 RX ORDER — NALOXONE HYDROCHLORIDE 0.4 MG/ML
0.2 INJECTION, SOLUTION INTRAMUSCULAR; INTRAVENOUS; SUBCUTANEOUS
Status: DISCONTINUED | OUTPATIENT
Start: 2022-09-08 | End: 2022-09-08 | Stop reason: HOSPADM

## 2022-09-08 RX ORDER — ATROPINE SULFATE 0.1 MG/ML
0.5 INJECTION INTRAVENOUS
Status: DISCONTINUED | OUTPATIENT
Start: 2022-09-08 | End: 2022-09-08 | Stop reason: HOSPADM

## 2022-09-08 RX ORDER — SODIUM CHLORIDE 9 MG/ML
INJECTION, SOLUTION INTRAVENOUS CONTINUOUS
Status: DISCONTINUED | OUTPATIENT
Start: 2022-09-08 | End: 2022-09-08 | Stop reason: HOSPADM

## 2022-09-08 RX ORDER — FENTANYL CITRATE 50 UG/ML
INJECTION, SOLUTION INTRAMUSCULAR; INTRAVENOUS
Status: DISCONTINUED | OUTPATIENT
Start: 2022-09-08 | End: 2022-09-08 | Stop reason: HOSPADM

## 2022-09-08 RX ORDER — NALOXONE HYDROCHLORIDE 0.4 MG/ML
0.4 INJECTION, SOLUTION INTRAMUSCULAR; INTRAVENOUS; SUBCUTANEOUS
Status: DISCONTINUED | OUTPATIENT
Start: 2022-09-08 | End: 2022-09-08 | Stop reason: HOSPADM

## 2022-09-08 RX ORDER — FENTANYL CITRATE 50 UG/ML
25 INJECTION, SOLUTION INTRAMUSCULAR; INTRAVENOUS
Status: DISCONTINUED | OUTPATIENT
Start: 2022-09-08 | End: 2022-09-08 | Stop reason: HOSPADM

## 2022-09-08 RX ORDER — ASPIRIN 325 MG
325 TABLET ORAL ONCE
Status: COMPLETED | OUTPATIENT
Start: 2022-09-08 | End: 2022-09-08

## 2022-09-08 RX ORDER — OXYCODONE HYDROCHLORIDE 5 MG/1
5 TABLET ORAL EVERY 4 HOURS PRN
Status: DISCONTINUED | OUTPATIENT
Start: 2022-09-08 | End: 2022-09-08 | Stop reason: HOSPADM

## 2022-09-08 RX ORDER — ASPIRIN 81 MG/1
243 TABLET, CHEWABLE ORAL ONCE
Status: COMPLETED | OUTPATIENT
Start: 2022-09-08 | End: 2022-09-08

## 2022-09-08 RX ORDER — OXYCODONE HYDROCHLORIDE 5 MG/1
10 TABLET ORAL EVERY 4 HOURS PRN
Status: DISCONTINUED | OUTPATIENT
Start: 2022-09-08 | End: 2022-09-08 | Stop reason: HOSPADM

## 2022-09-08 RX ORDER — FLUMAZENIL 0.1 MG/ML
0.2 INJECTION, SOLUTION INTRAVENOUS
Status: DISCONTINUED | OUTPATIENT
Start: 2022-09-08 | End: 2022-09-08 | Stop reason: HOSPADM

## 2022-09-08 RX ORDER — ACETAMINOPHEN 325 MG/1
650 TABLET ORAL EVERY 4 HOURS PRN
Status: DISCONTINUED | OUTPATIENT
Start: 2022-09-08 | End: 2022-09-08 | Stop reason: HOSPADM

## 2022-09-08 RX ORDER — POTASSIUM CHLORIDE 1500 MG/1
20 TABLET, EXTENDED RELEASE ORAL
Status: DISCONTINUED | OUTPATIENT
Start: 2022-09-08 | End: 2022-09-08 | Stop reason: HOSPADM

## 2022-09-08 RX ORDER — IOPAMIDOL 755 MG/ML
INJECTION, SOLUTION INTRAVASCULAR
Status: DISCONTINUED | OUTPATIENT
Start: 2022-09-08 | End: 2022-09-08 | Stop reason: HOSPADM

## 2022-09-08 RX ADMIN — ASPIRIN 325 MG ORAL TABLET 325 MG: 325 PILL ORAL at 08:18

## 2022-09-08 RX ADMIN — SODIUM CHLORIDE: 9 INJECTION, SOLUTION INTRAVENOUS at 08:04

## 2022-09-08 ASSESSMENT — ACTIVITIES OF DAILY LIVING (ADL)
ADLS_ACUITY_SCORE: 35

## 2022-09-08 NOTE — PROGRESS NOTES
Care Suites Admission Nursing Note    Patient Information  Name: Laila Perez  Age: 72 year old  Reason for admission: heart cath  Care Suites arrival time: 0730    Visitor Information  Name: amira   Informed of visitor restrictions: Yes  1 visitor allowed per patient   Visitor must screen negative for COVID symptoms   Visitor must wear a mask  Waiting rooms closed to visitors    Patient Admission/Assessment   Pre-procedure assessment complete: Yes  If abnormal assessment/labs, provider notified: N/A  NPO: Yes  Medications held per instructions/orders: Yes  Consent: deferred  If applicable, pregnancy test status: deferred  Patient oriented to room: Yes  Education/questions answered: Yes  Plan/other: angiogram    Discharge Planning  Discharge name/phone number: amira [phone]   Overnight post sedation caregiver: amira   Discharge location: [place]    Bc Gong RN

## 2022-09-08 NOTE — PROGRESS NOTES
Care Suites Post Procedure Note    Patient Information  Name: Laila Perez  Age: 72 year old    Post Procedure  Time patient returned to Care Suites: 1150  Concerns/abnormal assessment: none at present  If abnormal assessment, provider notified: N/A  Plan/Other: monitor    Mandeep Markham RN

## 2022-09-08 NOTE — PROGRESS NOTES
Care Suites Discharge Nursing Note    Patient Information  Name: Laila Perez  Age: 72 year old    Discharge Education:  Discharge instructions reviewed: Yes  Additional education/resources provided: no  Patient/patient representative verbalizes understanding: Yes  Patient discharging on new medications: No  Medication education completed: Yes    Discharge Plans:   Discharge location: home  Discharge ride contacted: Yes  Approximate discharge time: 1415    Discharge Criteria:  Discharge criteria met and vital signs stable: Yes    Patient Belongs:  Patient belongings returned to patient: Yes    Bc Gong RN

## 2022-09-08 NOTE — DISCHARGE INSTRUCTIONS
Cardiac Angiogram Discharge Instructions - Radial    After you go home:    Have an adult stay with you until tomorrow.  Drink extra fluids for 2 days.  You may resume your normal diet.  No smoking       For 24 hours - due to the sedation you received:  Relax and take it easy.  Do NOT make any important or legal decisions.  Do NOT drive or operate machines at home or at work.  Do NOT drink alcohol.    Care of Wrist Puncture Site:    For the first 24 hrs - check the puncture site every 1-2 hours while awake.  It is normal to have soreness at the puncture site and mild tingling in your hand for up to 3 days.  Remove the bandaid after 24 hours. If there is minor oozing, apply another bandaid and remove it after 12 hours.  You may shower tomorrow.  Do NOT take a bath, or use a hot tub or pool for at least 3 days. Do NOT scrub the site. Do not use lotion or powder near the puncture site.           Activity:        For 2 days:   do not use your hand or arm to support your weight (such as rising from a chair)   do not bend your wrist (such as lifting a garage door).  do not lift more than 5 pounds or exercise your arm (such as tennis, golf or bowling).  Do NOT do any heavy activity such as exercise, lifting, or straining.     Bleeding:    If you start bleeding from the site in your wrist, sit down and press firmly on/above the site for 10 minutes.   Once bleeding stops, keep arm still for 2 hours.   Call [facility] as soon as you can.       Call 911 right away if you have heavy bleeding or bleeding that does not stop.      Medicines:    If you are taking an antiplatelet medication such as Plavix, Brilinta or Effient, do not stop taking it until you talk to your cardiologist.      If you are on Metformin (Glucophage), do not restart it until you have blood tests (within 2 to 3 days after discharge).  After you have your blood drawn, you may restart the Metformin.   Take your medications, including blood thinners, unless your  provider tells you not to.    If you take Coumadin (Warfarin), have your INR checked by your provider in  3-5 days. Call your clinic to schedule this.  If you have stopped any medicines, check with your provider about when to restart them.    Follow Up Appointments:    Follow up with [facility] Nurse Practitioner at [facility] of patient preference in 7-10 days.    Call the clinic if:    You have a large or growing hard lump around the site.  The site is red, swollen, hot or tender.  Blood or fluid is draining from the site.  You have chills or a fever greater than 101 F (38 C).  Your arm feels numb, cool or changes color.  You have hives, a rash or unusual itching.  Any questions or concerns.          [facility] [place]:    [phone] [facility] (7 days a week)

## 2022-09-08 NOTE — PRE-PROCEDURE
GENERAL PRE-PROCEDURE:   Procedure:  Left heart catheterization, coronary angiogram, possible intervention  Date/Time:  9/8/2022 10:44 AM    Verbal consent obtained?: No    Risks and benefits: Risks, benefits and alternatives were discussed    Patient states understanding of procedure being performed: Yes    Patient's understanding of procedure matches consent: Yes    Procedure consent matches procedure scheduled: Yes    Appropriately NPO:  Yes  Mallampati  :  Grade 3- soft palate visible, posterior pharyngeal wall not visible  Lungs:  Lungs clear with good breath sounds bilaterally  Heart:  Normal heart sounds and rate  History & Physical reviewed:  History and physical reviewed and no updates needed  Statement of review:  I have reviewed the lab findings, diagnostic data, medications, and the plan for sedation

## 2022-09-09 NOTE — TELEPHONE ENCOUNTER
I phoned [facility] and spoke with Medical records. I gave them patient's previous name and there were no records found for 2004.    Laureano CROWLEY CMA

## 2022-09-10 LAB
ATRIAL RATE - MUSE: 66 BPM
DIASTOLIC BLOOD PRESSURE - MUSE: NORMAL MMHG
INTERPRETATION ECG - MUSE: NORMAL
P AXIS - MUSE: 75 DEGREES
PR INTERVAL - MUSE: 184 MS
QRS DURATION - MUSE: 104 MS
QT - MUSE: 430 MS
QTC - MUSE: 450 MS
R AXIS - MUSE: 9 DEGREES
SYSTOLIC BLOOD PRESSURE - MUSE: NORMAL MMHG
T AXIS - MUSE: 31 DEGREES
VENTRICULAR RATE- MUSE: 66 BPM

## 2022-09-10 NOTE — TELEPHONE ENCOUNTER
Can you please call them again and see if there are records of hospitalizations besides 2004?      Dom Gipson MD

## 2022-09-12 ENCOUNTER — PATIENT OUTREACH (OUTPATIENT)
Dept: GERIATRIC MEDICINE | Facility: CLINIC | Age: 72
End: 2022-09-12

## 2022-09-15 ENCOUNTER — TELEPHONE (OUTPATIENT)
Dept: CARDIOLOGY | Facility: CLINIC | Age: 72
End: 2022-09-15

## 2022-09-15 ENCOUNTER — TELEPHONE (OUTPATIENT)
Dept: FAMILY MEDICINE | Facility: CLINIC | Age: 72
End: 2022-09-15

## 2022-09-15 DIAGNOSIS — I35.0 AORTIC VALVE STENOSIS, UNSPECIFIED ETIOLOGY: Primary | ICD-10-CM

## 2022-09-15 DIAGNOSIS — R06.02 SHORTNESS OF BREATH: ICD-10-CM

## 2022-09-15 NOTE — TELEPHONE ENCOUNTER
Patient was presented this morning at the multidisciplinary valve conference. Plan is to proceed with TAVR procedure.    Valve: Medtronic  Approach: TF   [place]: Consider  Sedation: MAC    Above information was called out to the patient. Reviewed plan with patient that as long as she has no concerns from her angio incision site we can cancel scheduled appointment with Carley Childs NP today 09/15/2022. Will get her scheduled for pre-procedural office with hospital labs in [place] with Carley 1-2 weeks prior to TAVR dated. Additionally will get patient scheduled for covid test prior to TAVR procedure.     Patient and patient's home manager, Vicki, are aware that Francia, our  will be calling to arrange all needed follow-up appointments.     Since cancelled patient's angio follow-up, reviewed with patient that procedure puncture site does not show any signs of redness, drainage, swelling, or firm nodules. Patient denied any fever and stated she has been feeling really well. Encouraged patient to call with any additional questions or concerns.     Concetta Segura RN  Structural Heart Coordinator  [facility]

## 2022-09-15 NOTE — TELEPHONE ENCOUNTER
Patient calls updating us on their current health situation..  They will be having a heart valve replaced on October 4th.  Will make an appointment here with us when they are feeling better.    Alexandra

## 2022-09-16 ENCOUNTER — PATIENT OUTREACH (OUTPATIENT)
Dept: GERIATRIC MEDICINE | Facility: CLINIC | Age: 72
End: 2022-09-16

## 2022-09-16 NOTE — TELEPHONE ENCOUNTER
Called [facility] Medical Records again to see if they had any notes from 2004 that showed patient having any kind of reaction to any kind of medication. They did send some records. I labeled and put them on Dr. Gipson's desk for review.      Laureano CROWLEY CMA

## 2022-09-16 NOTE — PROGRESS NOTES
[facility] Care Coordination Contact      [facility] Six-Month Telephone Assessment    CC spoke to Taylor  and introduced new CC. Taylor stated she did see the new CC letter however took down CC's contact information.     6 month telephone assessment completed on 9/16/22.    ER visits: No  Hospitalizations: No  TCU stays: No  Significant health status changes: Has upcoming heart surgery.  Falls/Injuries: No  ADL/IADL changes: No  Changes in services: No    Caregiver Assessment follow up:  NA    Goals: See POC in chart for goal progress documentation.  See updated care plan.    Will see member in 6 months for an annual health risk assessment.   Encouraged member to call CC with any questions or concerns in the meantime.   Chanel Laura RN  [facility]  [phone]

## 2022-09-26 NOTE — TELEPHONE ENCOUNTER
I reviewed available notes.  Some of the handwriting I cannot understand, but I could not find the description of penicillin allergy.    However, if the patient is interested, I would recommend penicillin testing with subsequent amoxicillin challenge.    Dom Gipson MD

## 2022-09-26 NOTE — TELEPHONE ENCOUNTER
Left message on answering machine for patient to call back.    Nhi CHANDRA RN  Specialty/Allergy Clinics

## 2022-09-27 NOTE — PATIENT INSTRUCTIONS
TAVR PRE-PROCEDURE INSTRUCTIONS:    - Your TAVR is scheduled Tuesday 10/04/2022, 2nd case. Please check-in at 08:00AM at the Registration Desk in the [place] [facility]obby at [facility].    - Use the Hibiclens soap or an antibacterial soap the night before and morning of the procedure. Wash from chin to toes, please avoid your face and eyes.    - Nothing to eat or drink the morning of your TAVR.     Medication instructions:  - You make take your morning medications with sips of water.   - Hold your Furosemide the morning of your TAVR.  - Please hold all vitamins and supplements the morning of your procedure.  - You will need to take 325mg of aspirin the morning of your TAVR.    - COVID-19 restrictions: You will need a COVID test prior to procedure, this is scheduled on 09/30/2022. Please do your best to quarantine/isolate between this test and your procedure day. On the day of the procedure, you may have one visitor in the pre-operative area. Patient and visitor must wear face masks. Two visitors may see you when you get to your room in the Heart Center (2nd floor of [facility]).    - You will stay overnight on Tuesday night. We will monitor you overnight for signs of bleeding, stroke, as well as need for pacemaker. On Wednesday morning, you will have an echo of your new valve and work with cardiac rehab.     Please call with any other questions, concerns, or any changes in your health: [phone].    Concetta Segura RN  Structural Heart Coordinator  [facility]

## 2022-09-28 LAB
ABO/RH(D): NORMAL
ANTIBODY SCREEN: NEGATIVE
SPECIMEN EXPIRATION DATE: NORMAL

## 2022-09-29 ENCOUNTER — OFFICE VISIT (OUTPATIENT)
Dept: CARDIOLOGY | Facility: CLINIC | Age: 72
End: 2022-09-29

## 2022-09-29 ENCOUNTER — LAB (OUTPATIENT)
Dept: LAB | Facility: CLINIC | Age: 72
End: 2022-09-29
Payer: COMMERCIAL

## 2022-09-29 VITALS
DIASTOLIC BLOOD PRESSURE: 74 MMHG | TEMPERATURE: 97.4 F | HEART RATE: 70 BPM | WEIGHT: 293 LBS | OXYGEN SATURATION: 95 % | SYSTOLIC BLOOD PRESSURE: 130 MMHG | BODY MASS INDEX: 48.23 KG/M2

## 2022-09-29 DIAGNOSIS — I71.9 AORTIC ANEURYSM WITHOUT RUPTURE, UNSPECIFIED PORTION OF AORTA (H): ICD-10-CM

## 2022-09-29 DIAGNOSIS — R06.02 SHORTNESS OF BREATH: ICD-10-CM

## 2022-09-29 DIAGNOSIS — I35.0 AORTIC VALVE STENOSIS, UNSPECIFIED ETIOLOGY: Primary | ICD-10-CM

## 2022-09-29 DIAGNOSIS — I10 BENIGN ESSENTIAL HYPERTENSION: ICD-10-CM

## 2022-09-29 DIAGNOSIS — I35.0 AORTIC VALVE STENOSIS, UNSPECIFIED ETIOLOGY: ICD-10-CM

## 2022-09-29 DIAGNOSIS — I71.20 THORACIC AORTIC ANEURYSM WITHOUT RUPTURE (H): ICD-10-CM

## 2022-09-29 LAB
ALBUMIN SERPL BCG-MCNC: 4.2 G/DL (ref 3.5–5.2)
ALP SERPL-CCNC: 87 U/L (ref 35–129)
ALT SERPL W P-5'-P-CCNC: 33 U/L (ref 10–50)
ANION GAP SERPL CALCULATED.3IONS-SCNC: 11 MMOL/L (ref 7–15)
AST SERPL W P-5'-P-CCNC: 34 U/L (ref 10–50)
BILIRUB SERPL-MCNC: 0.4 MG/DL
BUN SERPL-MCNC: 19 MG/DL (ref 8–23)
CALCIUM SERPL-MCNC: 9.7 MG/DL (ref 8.8–10.2)
CHLORIDE SERPL-SCNC: 104 MMOL/L (ref 98–107)
CREAT SERPL-MCNC: 1.11 MG/DL (ref 0.51–1.17)
DEPRECATED HCO3 PLAS-SCNC: 29 MMOL/L (ref 22–29)
ERYTHROCYTE [DISTWIDTH] IN BLOOD BY AUTOMATED COUNT: 13.3 % (ref 10–15)
GFR SERPL CREATININE-BSD FRML MDRD: 71 ML/MIN/1.73M2
GLUCOSE SERPL-MCNC: 84 MG/DL (ref 70–99)
HCT VFR BLD AUTO: 44.9 % (ref 35–53)
HGB BLD-MCNC: 14.3 G/DL (ref 11.7–17.7)
INR PPP: 1.15 (ref 0.85–1.15)
MCH RBC QN AUTO: 30.2 PG (ref 26.5–33)
MCHC RBC AUTO-ENTMCNC: 31.8 G/DL (ref 31.5–36.5)
MCV RBC AUTO: 95 FL (ref 78–100)
NT-PROBNP SERPL-MCNC: 326 PG/ML (ref 0–125)
PLATELET # BLD AUTO: 136 10E3/UL (ref 150–450)
POTASSIUM SERPL-SCNC: 4.7 MMOL/L (ref 3.4–5.3)
PROT SERPL-MCNC: 7.7 G/DL (ref 6.4–8.3)
RBC # BLD AUTO: 4.73 10E6/UL (ref 3.8–5.9)
SODIUM SERPL-SCNC: 144 MMOL/L (ref 136–145)
WBC # BLD AUTO: 8.2 10E3/UL (ref 4–11)

## 2022-09-29 PROCEDURE — 86901 BLOOD TYPING SEROLOGIC RH(D): CPT | Performed by: INTERNAL MEDICINE

## 2022-09-29 PROCEDURE — 85610 PROTHROMBIN TIME: CPT | Performed by: INTERNAL MEDICINE

## 2022-09-29 PROCEDURE — 36415 COLL VENOUS BLD VENIPUNCTURE: CPT | Performed by: INTERNAL MEDICINE

## 2022-09-29 PROCEDURE — 86850 RBC ANTIBODY SCREEN: CPT | Performed by: INTERNAL MEDICINE

## 2022-09-29 PROCEDURE — 85027 COMPLETE CBC AUTOMATED: CPT | Performed by: INTERNAL MEDICINE

## 2022-09-29 PROCEDURE — 83880 ASSAY OF NATRIURETIC PEPTIDE: CPT | Performed by: INTERNAL MEDICINE

## 2022-09-29 PROCEDURE — 86900 BLOOD TYPING SEROLOGIC ABO: CPT | Performed by: INTERNAL MEDICINE

## 2022-09-29 PROCEDURE — 99214 OFFICE O/P EST MOD 30 MIN: CPT | Performed by: NURSE PRACTITIONER

## 2022-09-29 PROCEDURE — 80053 COMPREHEN METABOLIC PANEL: CPT | Performed by: INTERNAL MEDICINE

## 2022-09-29 RX ORDER — BRIMONIDINE TARTRATE 2 MG/ML
1 SOLUTION/ DROPS OPHTHALMIC 2 TIMES DAILY
COMMUNITY
Start: 2022-09-13

## 2022-09-29 NOTE — LETTER
9/29/2022    Delmischristel García Ronak, APRN CNP  [address] [facility] [address]    RE: Laila Perez       Dear Colleague,     I had the pleasure of seeing Laila Perez in the [facility].        STRUCTURAL HEART CARE  TAVR Clinic Visit      Cardiology Clinic Progress Note  Laila Perez MRN# [id number]   YOB: 1950 Age: 72 year old      Primary Cardiologist:   Dr. Cerda          History of Presenting Illness:      Laila Perez is a pleasant 72 year old patient with a past cardiac history significant for   1. Severe symptomatic aortic stenosis  2. Ascending aortic aneurysm    Ascending aorta 4.4 cm 2021 and 2022  3. Hypertension  Past medical history significant for obesity    Patient was seen by Dr. Ramires in August 2022.  She was noted to have progressive, debilitating dyspnea on exertion over the prior 6 months.  Echocardiogram showed severe aortic stenosis with mean gradient of 41 mmHg and ARISTEO of 0.9 cm , ascending aorta moderately dilated 4.4 cm similar to prior MRA from 2021.    Patient was evaluated in TAVR conference and felt to be an acceptable candidate for elective TAVR. TAVR is scheduled to be performed on 10/4/2022 with Dr. Ramires using Medtronic valve through transfemoral approach with MAC and consider sentinel device.    TAVR workup consisting of a TAVR CT showing an aortic valve calcium score of 3962.  Preop coronary angiogram showing normal coronary arteries. EKG 9/8/2022 showing sinus rhythm. They have had labs drawn today. BMP CBC  BNP  Type and screen.    Patient presents today, with her  Akiko, with continued symptoms of dyspnea on exertion.  Blood pressures have been controlled.  There are no dental concerns.  She does not have any teeth.  They are aware that they should not have any dental appointments at least 3 months post TAVR.  She continues with bilateral lower extremity edema 1+ pitting which has been stable. Patient reports  no chest pain, PND, orthopnea, presyncope, syncope, edema, heart racing, or palpitations.    Risks and benefits of transcatheter aortic valve replacement procedure were discussed today including, bleeding at access site, bruising, infection, allergic reaction, kidney damage (including need for dialysis), stroke (3-4%), heart attack, vascular damage, arrhythmic abnormalities including high degree AV block (requiring Pacemaker implant- 10%) or atrial fibrillation,  emergency open heart surgery (0.5-1%), up to and including death.   Fever, chills, or urinary symptoms: no  History of blood transfusions/reactions: no   History of abnormal bleeding on anti-platelet use: no    Steroid use in the last year: no   Personal or family history of  difficulty with anesthesia:  no   Contrast dye allergy: no   Patient is optimized and is acceptable candidate for the proposed procedure. Pre-TAVR instructions provided in written format.        Labs:  LIPID RESULTS:  Lab Results   Component Value Date    CHOL 133 05/05/2022    CHOL 142 05/13/2021    HDL 60 05/05/2022    HDL 60 05/13/2021    LDL 46 05/05/2022    LDL 50 05/13/2021    TRIG 136 05/05/2022    TRIG 159 (H) 05/13/2021    CHOLHDLRATIO 3.0 04/25/2014       LIVER ENZYME RESULTS:  Lab Results   Component Value Date    AST 13 05/13/2021    ALT 18 05/13/2021       CBC RESULTS:  Lab Results   Component Value Date    WBC 8.2 09/29/2022    WBC 6.0 05/13/2021    RBC 4.73 09/29/2022    RBC 4.55 05/13/2021    HGB 14.3 09/29/2022    HGB 13.7 05/13/2021    HCT 44.9 09/29/2022    HCT 41.8 05/13/2021    MCV 95 09/29/2022    MCV 92 05/13/2021    MCH 30.2 09/29/2022    MCH 30.1 05/13/2021    MCHC 31.8 09/29/2022    MCHC 32.8 05/13/2021    RDW 13.3 09/29/2022    RDW 13.1 05/13/2021     (L) 09/29/2022     (L) 05/13/2021       BMP RESULTS:  Lab Results   Component Value Date     09/08/2022     05/13/2021    POTASSIUM 4.4 09/08/2022    POTASSIUM 5.6 (H) 05/13/2021     CHLORIDE 102 09/08/2022    CHLORIDE 107 05/13/2021    CO2 31 09/08/2022    CO2 30 05/13/2021    ANIONGAP 2 (L) 09/08/2022    ANIONGAP 1 (L) 05/13/2021     (H) 09/08/2022    GLC 92 05/13/2021    BUN 26 09/08/2022    BUN 16 05/13/2021    CR 1.05 09/08/2022    CR 0.97 05/13/2021    GFRESTIMATED 75 09/08/2022    GFRESTIMATED >60 08/25/2022    GFRESTIMATED 78 05/13/2021    GFRESTBLACK >90 05/13/2021    GOPAL 9.5 09/08/2022    GOPAL 9.3 05/13/2021        A1C RESULTS:  Lab Results   Component Value Date    A1C 4.8 04/26/2016       INR RESULTS:  Lab Results   Component Value Date    INR 1.14 09/08/2022    INR 0.98 10/25/2005       Results reviewed today.       Current Cardiac Medications     Atenolol 50 mg daily  Lasix 20 mg daily  Lovastatin 20 mg daily                   Assessment and Plan:       Plan    Patient Instructions   TAVR PRE-PROCEDURE INSTRUCTIONS:    - Your TAVR is scheduled Tuesday 10/04/2022, 2nd case. Please check-in at 08:00AM at the Registration Desk in the [facility] Lobby at [facility].    - Use the Hibiclens soap or an antibacterial soap the night before and morning of the procedure. Wash from chin to toes, please avoid your face and eyes.    - Nothing to eat or drink the morning of your TAVR.     Medication instructions:  - You make take your morning medications with sips of water.   - Hold your Furosemide the morning of your TAVR.  - Please hold all vitamins and supplements the morning of your procedure.  - You will need to take 325mg of aspirin the morning of your TAVR.    - COVID-19 restrictions: You will need a COVID test prior to procedure, this is scheduled on 09/30/2022. Please do your best to quarantine/isolate between this test and your procedure day. On the day of the procedure, you may have one visitor in the pre-operative area. Patient and visitor must wear face masks. Two visitors may see you when you get to your room in the Heart Center (2nd floor of [facility]  hospital).    - You will stay overnight on Tuesday night. We will monitor you overnight for signs of bleeding, stroke, as well as need for pacemaker. On Wednesday morning, you will have an echo of your new valve and work with cardiac rehab.     Please call with any other questions, concerns, or any changes in your health: [phone].    Concetta Segura RN  Structural Heart Coordinator  [facility]            1. Severe symptomatic aortic stenosis    Plan for TAVR      2. Ascending aortic aneurysm    Stable    Follow with echo      3. Hypertension    Controlled    Continue atenolol             Thank you for allowing me to participate in this delightful patient's care.      This note was completed in part using Dragon voice recognition software. Although reviewed after completion, some word and grammatical errors may occur.    JERRICA Marley CNP, JERRICA, CNP           Data:   All laboratory data reviewed      Total time spent today was 32 mins, reviewing labs, testing, notes, documenting notes, and seeing patient.          Constitutional:  cooperative, alert and oriented, well developed, well nourished, in no acute distress  overweight    Skin:  warm and dry to the touch         Head:  normocephalic       Eyes:  pupils equal and round       ENT:  no pallor or cyanosis       Neck:  no stiffness       Respiratory:  clear to auscultation; normal symmetry        Cardiac: regular rate and rhythm; normal S1 and S2                 pulses full and equal , murmur 3/6    GI:  abdomen soft, nondistended     Extremities and Muscular Skeletal:   Bilateral lower extremity edema 1+ pitting    Neurological:  affect appropriate; no gross motor deficits       Psych:  Alert and Oriented x 3 , appropriate affact    Thank you for allowing me to participate in the care of your patient.      Sincerely,     JERRICA Marley CNP     [facility]  [facility]  cc:   No referring provider defined for this encounter.

## 2022-09-29 NOTE — TELEPHONE ENCOUNTER
Pt stopped into clinic to schedule PCN challenge. Appointment scheduled. Informed of medication to hold and that she will need to be healthy. Agreeable.     Nhi CHANDRA RN  Specialty/Allergy Clinics

## 2022-09-29 NOTE — PROGRESS NOTES
[facility]  TAVR Clinic Visit      Cardiology Clinic Progress Note  Laila Perez MRN# [id number]   YOB: 1950 Age: 72 year old      Primary Cardiologist:   Dr. Cerda          History of Presenting Illness:      Laila Perez is a pleasant 72 year old patient with a past cardiac history significant for   1. Severe symptomatic aortic stenosis  2. Ascending aortic aneurysm    Ascending aorta 4.4 cm 2021 and 2022  3. Hypertension  Past medical history significant for obesity    Patient was seen by Dr. Ramires in August 2022.  She was noted to have progressive, debilitating dyspnea on exertion over the prior 6 months.  Echocardiogram showed severe aortic stenosis with mean gradient of 41 mmHg and ARISTEO of 0.9 cm , ascending aorta moderately dilated 4.4 cm similar to prior MRA from 2021.    Patient was evaluated in TAVR conference and felt to be an acceptable candidate for elective TAVR. TAVR is scheduled to be performed on 10/4/2022 with Dr. Ramires using Medtronic valve through transfemoral approach with MAC and consider sentinel device.    TAVR workup consisting of a TAVR CT showing an aortic valve calcium score of 3962.  Preop coronary angiogram showing normal coronary arteries. EKG 9/8/2022 showing sinus rhythm. They have had labs drawn today. BMP CBC  BNP  Type and screen.    Patient presents today, with her  Akiko, with continued symptoms of dyspnea on exertion.  Blood pressures have been controlled.  There are no dental concerns.  She does not have any teeth.  They are aware that they should not have any dental appointments at least 3 months post TAVR.  She continues with bilateral lower extremity edema 1+ pitting which has been stable. Patient reports no chest pain, PND, orthopnea, presyncope, syncope, edema, heart racing, or palpitations.    Risks and benefits of transcatheter aortic valve replacement procedure were discussed today including, bleeding at access site,  bruising, infection, allergic reaction, kidney damage (including need for dialysis), stroke (3-4%), heart attack, vascular damage, arrhythmic abnormalities including high degree AV block (requiring Pacemaker implant- 10%) or atrial fibrillation,  emergency open heart surgery (0.5-1%), up to and including death.   Fever, chills, or urinary symptoms: no  History of blood transfusions/reactions: no   History of abnormal bleeding on anti-platelet use: no    Steroid use in the last year: no   Personal or family history of  difficulty with anesthesia:  no   Contrast dye allergy: no   Patient is optimized and is acceptable candidate for the proposed procedure. Pre-TAVR instructions provided in written format.        Labs:  LIPID RESULTS:  Lab Results   Component Value Date    CHOL 133 05/05/2022    CHOL 142 05/13/2021    HDL 60 05/05/2022    HDL 60 05/13/2021    LDL 46 05/05/2022    LDL 50 05/13/2021    TRIG 136 05/05/2022    TRIG 159 (H) 05/13/2021    CHOLHDLRATIO 3.0 04/25/2014       LIVER ENZYME RESULTS:  Lab Results   Component Value Date    AST 13 05/13/2021    ALT 18 05/13/2021       CBC RESULTS:  Lab Results   Component Value Date    WBC 8.2 09/29/2022    WBC 6.0 05/13/2021    RBC 4.73 09/29/2022    RBC 4.55 05/13/2021    HGB 14.3 09/29/2022    HGB 13.7 05/13/2021    HCT 44.9 09/29/2022    HCT 41.8 05/13/2021    MCV 95 09/29/2022    MCV 92 05/13/2021    MCH 30.2 09/29/2022    MCH 30.1 05/13/2021    MCHC 31.8 09/29/2022    MCHC 32.8 05/13/2021    RDW 13.3 09/29/2022    RDW 13.1 05/13/2021     (L) 09/29/2022     (L) 05/13/2021       BMP RESULTS:  Lab Results   Component Value Date     09/08/2022     05/13/2021    POTASSIUM 4.4 09/08/2022    POTASSIUM 5.6 (H) 05/13/2021    CHLORIDE 102 09/08/2022    CHLORIDE 107 05/13/2021    CO2 31 09/08/2022    CO2 30 05/13/2021    ANIONGAP 2 (L) 09/08/2022    ANIONGAP 1 (L) 05/13/2021     (H) 09/08/2022    GLC 92 05/13/2021    BUN 26 09/08/2022     BUN 16 05/13/2021    CR 1.05 09/08/2022    CR 0.97 05/13/2021    GFRESTIMATED 75 09/08/2022    GFRESTIMATED >60 08/25/2022    GFRESTIMATED 78 05/13/2021    GFRESTBLACK >90 05/13/2021    GOPAL 9.5 09/08/2022    GOPAL 9.3 05/13/2021        A1C RESULTS:  Lab Results   Component Value Date    A1C 4.8 04/26/2016       INR RESULTS:  Lab Results   Component Value Date    INR 1.14 09/08/2022    INR 0.98 10/25/2005       Results reviewed today.       Current Cardiac Medications     Atenolol 50 mg daily  Lasix 20 mg daily  Lovastatin 20 mg daily                   Assessment and Plan:       Plan    Patient Instructions   TAVR PRE-PROCEDURE INSTRUCTIONS:    - Your TAVR is scheduled Tuesday 10/04/2022, 2nd case. Please check-in at 08:00AM at the Registration Desk [facility].    - Use the Hibiclens soap or an antibacterial soap the night before and morning of the procedure. Wash from chin to toes, please avoid your face and eyes.    - Nothing to eat or drink the morning of your TAVR.     Medication instructions:  - You make take your morning medications with sips of water.   - Hold your Furosemide the morning of your TAVR.  - Please hold all vitamins and supplements the morning of your procedure.  - You will need to take 325mg of aspirin the morning of your TAVR.    - COVID-19 restrictions: You will need a COVID test prior to procedure, this is scheduled on 09/30/2022. Please do your best to quarantine/isolate between this test and your procedure day. On the day of the procedure, you may have one visitor in the pre-operative area. Patient and visitor must wear face masks. Two visitors may see you when you get to your room in the [facility] (2nd floor of [facility]).    - You will stay overnight on Tuesday night. We will monitor you overnight for signs of bleeding, stroke, as well as need for pacemaker. On Wednesday morning, you will have an echo of your new valve and work with  cardiac rehab.     Please call with any other questions, concerns, or any changes in your health: [phone].    Concetta Segura RN  Structural Heart Coordinator  [facility]            1. Severe symptomatic aortic stenosis    Plan for TAVR      2. Ascending aortic aneurysm    Stable    Follow with echo      3. Hypertension    Controlled    Continue atenolol             Thank you for allowing me to participate in this delightful patient's care.      This note was completed in part using Dragon voice recognition software. Although reviewed after completion, some word and grammatical errors may occur.    Carley Rodriguez, APRN CNP, APRN, CNP           Data:   All laboratory data reviewed      Total time spent today was 32 mins, reviewing labs, testing, notes, documenting notes, and seeing patient.          Constitutional:  cooperative, alert and oriented, well developed, well nourished, in no acute distress  overweight    Skin:  warm and dry to the touch         Head:  normocephalic       Eyes:  pupils equal and round       ENT:  no pallor or cyanosis       Neck:  no stiffness       Respiratory:  clear to auscultation; normal symmetry        Cardiac: regular rate and rhythm; normal S1 and S2                 pulses full and equal , murmur 3/6    GI:  abdomen soft, nondistended     Extremities and Muscular Skeletal:   Bilateral lower extremity edema 1+ pitting    Neurological:  affect appropriate; no gross motor deficits       Psych:  Alert and Oriented x 3 , appropriate affact

## 2022-09-30 ENCOUNTER — LAB (OUTPATIENT)
Dept: LAB | Facility: CLINIC | Age: 72
End: 2022-09-30
Attending: INTERNAL MEDICINE
Payer: COMMERCIAL

## 2022-09-30 DIAGNOSIS — I35.0 AORTIC VALVE STENOSIS, UNSPECIFIED ETIOLOGY: ICD-10-CM

## 2022-09-30 DIAGNOSIS — I35.0 AORTIC VALVE STENOSIS, UNSPECIFIED ETIOLOGY: Primary | ICD-10-CM

## 2022-09-30 LAB — SARS-COV-2 RNA RESP QL NAA+PROBE: NEGATIVE

## 2022-09-30 PROCEDURE — U0005 INFEC AGEN DETEC AMPLI PROBE: HCPCS | Performed by: INTERNAL MEDICINE

## 2022-09-30 PROCEDURE — U0003 INFECTIOUS AGENT DETECTION BY NUCLEIC ACID (DNA OR RNA); SEVERE ACUTE RESPIRATORY SYNDROME CORONAVIRUS 2 (SARS-COV-2) (CORONAVIRUS DISEASE [COVID-19]), AMPLIFIED PROBE TECHNIQUE, MAKING USE OF HIGH THROUGHPUT TECHNOLOGIES AS DESCRIBED BY CMS-2020-01-R: HCPCS | Performed by: INTERNAL MEDICINE

## 2022-09-30 RX ORDER — LIDOCAINE 40 MG/G
CREAM TOPICAL
Status: CANCELLED | OUTPATIENT
Start: 2022-09-30

## 2022-09-30 RX ORDER — CLINDAMYCIN PHOSPHATE 900 MG/50ML
900 INJECTION, SOLUTION INTRAVENOUS
Status: CANCELLED | OUTPATIENT
Start: 2022-09-30

## 2022-09-30 RX ORDER — ASPIRIN 325 MG
325 TABLET ORAL ONCE
Status: CANCELLED | OUTPATIENT
Start: 2022-09-30

## 2022-09-30 RX ORDER — SODIUM CHLORIDE 9 MG/ML
INJECTION, SOLUTION INTRAVENOUS CONTINUOUS
Status: CANCELLED | OUTPATIENT
Start: 2022-09-30

## 2022-10-04 ENCOUNTER — ANESTHESIA (OUTPATIENT)
Dept: CARDIOLOGY | Facility: CLINIC | Age: 72
DRG: 267 | End: 2022-10-04
Payer: COMMERCIAL

## 2022-10-04 ENCOUNTER — HOSPITAL ENCOUNTER (OUTPATIENT)
Dept: CARDIOLOGY | Facility: CLINIC | Age: 72
Discharge: HOME | DRG: 267 | End: 2022-10-04
Attending: INTERNAL MEDICINE | Admitting: INTERNAL MEDICINE
Payer: COMMERCIAL

## 2022-10-04 ENCOUNTER — ANESTHESIA EVENT (OUTPATIENT)
Dept: CARDIOLOGY | Facility: CLINIC | Age: 72
DRG: 267 | End: 2022-10-04
Payer: COMMERCIAL

## 2022-10-04 ENCOUNTER — PATIENT OUTREACH (OUTPATIENT)
Dept: GERIATRIC MEDICINE | Facility: CLINIC | Age: 72
End: 2022-10-04

## 2022-10-04 ENCOUNTER — HOSPITAL ENCOUNTER (INPATIENT)
Facility: CLINIC | Age: 72
LOS: 2 days | Discharge: HOME | DRG: 267 | End: 2022-10-06
Attending: INTERNAL MEDICINE | Admitting: SURGERY
Payer: COMMERCIAL

## 2022-10-04 DIAGNOSIS — I35.0 AORTIC VALVE STENOSIS, UNSPECIFIED ETIOLOGY: ICD-10-CM

## 2022-10-04 DIAGNOSIS — Z95.2 S/P TAVR (TRANSCATHETER AORTIC VALVE REPLACEMENT): ICD-10-CM

## 2022-10-04 DIAGNOSIS — I35.0 AORTIC STENOSIS, SEVERE: ICD-10-CM

## 2022-10-04 DIAGNOSIS — I44.7 LBBB (LEFT BUNDLE BRANCH BLOCK): ICD-10-CM

## 2022-10-04 DIAGNOSIS — I35.0 AORTIC VALVE STENOSIS, UNSPECIFIED ETIOLOGY: Primary | ICD-10-CM

## 2022-10-04 DIAGNOSIS — Z95.2 S/P TAVR (TRANSCATHETER AORTIC VALVE REPLACEMENT): Primary | ICD-10-CM

## 2022-10-04 LAB
ABO/RH(D): NORMAL
ACT BLD: 139 SECONDS (ref 74–150)
ACT BLD: 148 SECONDS (ref 74–150)
ACT BLD: 369 SECONDS (ref 74–150)
ACT BLD: 390 SECONDS (ref 74–150)
ANTIBODY SCREEN: NEGATIVE
BLD PROD TYP BPU: NORMAL
BLD PROD TYP BPU: NORMAL
BLOOD COMPONENT TYPE: NORMAL
BLOOD COMPONENT TYPE: NORMAL
CODING SYSTEM: NORMAL
CODING SYSTEM: NORMAL
CROSSMATCH: NORMAL
CROSSMATCH: NORMAL
ISSUE DATE AND TIME: NORMAL
ISSUE DATE AND TIME: NORMAL
LVEF ECHO: NORMAL
POTASSIUM BLD-SCNC: 4.9 MMOL/L (ref 3.4–5.3)
SPECIMEN EXPIRATION DATE: NORMAL
UNIT ABO/RH: NORMAL
UNIT ABO/RH: NORMAL
UNIT NUMBER: NORMAL
UNIT NUMBER: NORMAL
UNIT STATUS: NORMAL
UNIT STATUS: NORMAL
UNIT TYPE ISBT: 5100
UNIT TYPE ISBT: 5100

## 2022-10-04 PROCEDURE — 85347 COAGULATION TIME ACTIVATED: CPT

## 2022-10-04 PROCEDURE — 99222 1ST HOSP IP/OBS MODERATE 55: CPT | Mod: AI | Performed by: INTERNAL MEDICINE

## 2022-10-04 PROCEDURE — 210N000002 HC R&B HEART CARE

## 2022-10-04 PROCEDURE — 93010 ELECTROCARDIOGRAM REPORT: CPT | Performed by: INTERNAL MEDICINE

## 2022-10-04 PROCEDURE — C1760 CLOSURE DEV, VASC: HCPCS | Performed by: INTERNAL MEDICINE

## 2022-10-04 PROCEDURE — 258N000003 HC RX IP 258 OP 636: Performed by: NURSE ANESTHETIST, CERTIFIED REGISTERED

## 2022-10-04 PROCEDURE — P9016 RBC LEUKOCYTES REDUCED: HCPCS | Performed by: INTERNAL MEDICINE

## 2022-10-04 PROCEDURE — 258N000003 HC RX IP 258 OP 636: Performed by: INTERNAL MEDICINE

## 2022-10-04 PROCEDURE — 272N000563 HC SHEATH CR14

## 2022-10-04 PROCEDURE — 93321 DOPPLER ECHO F-UP/LMTD STD: CPT | Mod: 26 | Performed by: INTERNAL MEDICINE

## 2022-10-04 PROCEDURE — C1769 GUIDE WIRE: HCPCS | Performed by: INTERNAL MEDICINE

## 2022-10-04 PROCEDURE — 93321 DOPPLER ECHO F-UP/LMTD STD: CPT

## 2022-10-04 PROCEDURE — 258N000003 HC RX IP 258 OP 636: Performed by: ANESTHESIOLOGY

## 2022-10-04 PROCEDURE — 250N000011 HC RX IP 250 OP 636: Performed by: ANESTHESIOLOGY

## 2022-10-04 PROCEDURE — 250N000009 HC RX 250: Performed by: INTERNAL MEDICINE

## 2022-10-04 PROCEDURE — C1730 CATH, EP, 19 OR FEW ELECT: HCPCS | Performed by: INTERNAL MEDICINE

## 2022-10-04 PROCEDURE — 250N000013 HC RX MED GY IP 250 OP 250 PS 637: Performed by: INTERNAL MEDICINE

## 2022-10-04 PROCEDURE — 93325 DOPPLER ECHO COLOR FLOW MAPG: CPT

## 2022-10-04 PROCEDURE — 86901 BLOOD TYPING SEROLOGIC RH(D): CPT | Performed by: INTERNAL MEDICINE

## 2022-10-04 PROCEDURE — 36415 COLL VENOUS BLD VENIPUNCTURE: CPT | Performed by: ANESTHESIOLOGY

## 2022-10-04 PROCEDURE — 33361 REPLACE AORTIC VALVE PERQ: CPT | Mod: 62 | Performed by: INTERNAL MEDICINE

## 2022-10-04 PROCEDURE — 86850 RBC ANTIBODY SCREEN: CPT | Performed by: INTERNAL MEDICINE

## 2022-10-04 PROCEDURE — 99207 PR APP CREDIT; MD BILLING SHARED VISIT: CPT | Performed by: NURSE PRACTITIONER

## 2022-10-04 PROCEDURE — C1894 INTRO/SHEATH, NON-LASER: HCPCS | Performed by: INTERNAL MEDICINE

## 2022-10-04 PROCEDURE — 370N000017 HC ANESTHESIA TECHNICAL FEE, PER MIN: Performed by: INTERNAL MEDICINE

## 2022-10-04 PROCEDURE — 250N000011 HC RX IP 250 OP 636: Performed by: INTERNAL MEDICINE

## 2022-10-04 PROCEDURE — 93005 ELECTROCARDIOGRAM TRACING: CPT

## 2022-10-04 PROCEDURE — 86923 COMPATIBILITY TEST ELECTRIC: CPT | Performed by: INTERNAL MEDICINE

## 2022-10-04 PROCEDURE — 250N000009 HC RX 250: Performed by: NURSE ANESTHETIST, CERTIFIED REGISTERED

## 2022-10-04 PROCEDURE — 272N000001 HC OR GENERAL SUPPLY STERILE: Performed by: INTERNAL MEDICINE

## 2022-10-04 PROCEDURE — 272N000595 HC VALVE (TAVR): Performed by: INTERNAL MEDICINE

## 2022-10-04 PROCEDURE — 93325 DOPPLER ECHO COLOR FLOW MAPG: CPT | Mod: 26 | Performed by: INTERNAL MEDICINE

## 2022-10-04 PROCEDURE — 02RF3JZ REPLACEMENT OF AORTIC VALVE WITH SYNTHETIC SUBSTITUTE, PERCUTANEOUS APPROACH: ICD-10-PCS | Performed by: INTERNAL MEDICINE

## 2022-10-04 PROCEDURE — 84132 ASSAY OF SERUM POTASSIUM: CPT | Performed by: ANESTHESIOLOGY

## 2022-10-04 PROCEDURE — 33361 REPLACE AORTIC VALVE PERQ: CPT | Mod: 62 | Performed by: SURGERY

## 2022-10-04 PROCEDURE — 33361 REPLACE AORTIC VALVE PERQ: CPT | Performed by: INTERNAL MEDICINE

## 2022-10-04 PROCEDURE — 93308 TTE F-UP OR LMTD: CPT | Mod: 26 | Performed by: INTERNAL MEDICINE

## 2022-10-04 PROCEDURE — C1725 CATH, TRANSLUMIN NON-LASER: HCPCS | Performed by: INTERNAL MEDICINE

## 2022-10-04 DEVICE — DEVICE CLOSURE VASCULAR MANTA 18FR 2115: Type: IMPLANTABLE DEVICE | Status: FUNCTIONAL

## 2022-10-04 RX ORDER — HALOPERIDOL 5 MG/ML
2.5-5 INJECTION INTRAMUSCULAR EVERY 6 HOURS PRN
Status: CANCELLED | OUTPATIENT
Start: 2022-10-04

## 2022-10-04 RX ORDER — MULTIPLE VITAMINS W/ MINERALS TAB 9MG-400MCG
1 TAB ORAL DAILY
Status: CANCELLED | OUTPATIENT
Start: 2022-10-04

## 2022-10-04 RX ORDER — BRIMONIDINE TARTRATE 2 MG/ML
1 SOLUTION/ DROPS OPHTHALMIC 2 TIMES DAILY
Status: DISCONTINUED | OUTPATIENT
Start: 2022-10-04 | End: 2022-10-06 | Stop reason: HOSPADM

## 2022-10-04 RX ORDER — CLINDAMYCIN PHOSPHATE 900 MG/50ML
900 INJECTION, SOLUTION INTRAVENOUS
Status: COMPLETED | OUTPATIENT
Start: 2022-10-04 | End: 2022-10-04

## 2022-10-04 RX ORDER — OXYCODONE HYDROCHLORIDE 5 MG/1
5 TABLET ORAL EVERY 4 HOURS PRN
Status: DISCONTINUED | OUTPATIENT
Start: 2022-10-04 | End: 2022-10-04 | Stop reason: HOSPADM

## 2022-10-04 RX ORDER — ASPIRIN 81 MG/1
81 TABLET ORAL DAILY
Status: DISCONTINUED | OUTPATIENT
Start: 2022-10-05 | End: 2022-10-06 | Stop reason: HOSPADM

## 2022-10-04 RX ORDER — PRAVASTATIN SODIUM 20 MG
20 TABLET ORAL AT BEDTIME
Refills: 3 | Status: DISCONTINUED | OUTPATIENT
Start: 2022-10-04 | End: 2022-10-06 | Stop reason: HOSPADM

## 2022-10-04 RX ORDER — SODIUM CHLORIDE, SODIUM LACTATE, POTASSIUM CHLORIDE, CALCIUM CHLORIDE 600; 310; 30; 20 MG/100ML; MG/100ML; MG/100ML; MG/100ML
INJECTION, SOLUTION INTRAVENOUS CONTINUOUS
Status: DISCONTINUED | OUTPATIENT
Start: 2022-10-04 | End: 2022-10-04 | Stop reason: HOSPADM

## 2022-10-04 RX ORDER — PROPOFOL 10 MG/ML
INJECTION, EMULSION INTRAVENOUS PRN
Status: DISCONTINUED | OUTPATIENT
Start: 2022-10-04 | End: 2022-10-04

## 2022-10-04 RX ORDER — ALBUTEROL SULFATE 90 UG/1
2 AEROSOL, METERED RESPIRATORY (INHALATION) EVERY 4 HOURS PRN
Status: DISCONTINUED | OUTPATIENT
Start: 2022-10-04 | End: 2022-10-06 | Stop reason: HOSPADM

## 2022-10-04 RX ORDER — HYDROMORPHONE HYDROCHLORIDE 1 MG/ML
0.5 INJECTION, SOLUTION INTRAMUSCULAR; INTRAVENOUS; SUBCUTANEOUS EVERY 5 MIN PRN
Status: DISCONTINUED | OUTPATIENT
Start: 2022-10-04 | End: 2022-10-04 | Stop reason: HOSPADM

## 2022-10-04 RX ORDER — ATENOLOL 50 MG/1
50 TABLET ORAL DAILY
Status: DISCONTINUED | OUTPATIENT
Start: 2022-10-05 | End: 2022-10-05

## 2022-10-04 RX ORDER — DORZOLAMIDE HYDROCHLORIDE AND TIMOLOL MALEATE 20; 5 MG/ML; MG/ML
1 SOLUTION/ DROPS OPHTHALMIC 2 TIMES DAILY
Status: DISCONTINUED | OUTPATIENT
Start: 2022-10-04 | End: 2022-10-06 | Stop reason: HOSPADM

## 2022-10-04 RX ORDER — ONDANSETRON 2 MG/ML
INJECTION INTRAMUSCULAR; INTRAVENOUS PRN
Status: DISCONTINUED | OUTPATIENT
Start: 2022-10-04 | End: 2022-10-04

## 2022-10-04 RX ORDER — OLANZAPINE 5 MG/1
5-10 TABLET, ORALLY DISINTEGRATING ORAL EVERY 6 HOURS PRN
Status: CANCELLED | OUTPATIENT
Start: 2022-10-04

## 2022-10-04 RX ORDER — ONDANSETRON 2 MG/ML
4 INJECTION INTRAMUSCULAR; INTRAVENOUS EVERY 30 MIN PRN
Status: DISCONTINUED | OUTPATIENT
Start: 2022-10-04 | End: 2022-10-04 | Stop reason: HOSPADM

## 2022-10-04 RX ORDER — IPRATROPIUM BROMIDE AND ALBUTEROL SULFATE 2.5; .5 MG/3ML; MG/3ML
1 SOLUTION RESPIRATORY (INHALATION) EVERY 6 HOURS PRN
Status: DISCONTINUED | OUTPATIENT
Start: 2022-10-04 | End: 2022-10-06 | Stop reason: HOSPADM

## 2022-10-04 RX ORDER — LORAZEPAM 2 MG/ML
1-2 INJECTION INTRAMUSCULAR EVERY 30 MIN PRN
Status: CANCELLED | OUTPATIENT
Start: 2022-10-04

## 2022-10-04 RX ORDER — LORAZEPAM 1 MG/1
1-2 TABLET ORAL EVERY 30 MIN PRN
Status: CANCELLED | OUTPATIENT
Start: 2022-10-04

## 2022-10-04 RX ORDER — FENTANYL CITRATE 50 UG/ML
25 INJECTION, SOLUTION INTRAMUSCULAR; INTRAVENOUS EVERY 5 MIN PRN
Status: DISCONTINUED | OUTPATIENT
Start: 2022-10-04 | End: 2022-10-04 | Stop reason: HOSPADM

## 2022-10-04 RX ORDER — LATANOPROST 50 UG/ML
1 SOLUTION/ DROPS OPHTHALMIC AT BEDTIME
Status: DISCONTINUED | OUTPATIENT
Start: 2022-10-04 | End: 2022-10-06 | Stop reason: HOSPADM

## 2022-10-04 RX ORDER — DEXMEDETOMIDINE HYDROCHLORIDE 4 UG/ML
INJECTION, SOLUTION INTRAVENOUS PRN
Status: DISCONTINUED | OUTPATIENT
Start: 2022-10-04 | End: 2022-10-04

## 2022-10-04 RX ORDER — SODIUM CHLORIDE 9 MG/ML
INJECTION, SOLUTION INTRAVENOUS CONTINUOUS
Status: DISCONTINUED | OUTPATIENT
Start: 2022-10-04 | End: 2022-10-04 | Stop reason: HOSPADM

## 2022-10-04 RX ORDER — ONDANSETRON 4 MG/1
4 TABLET, ORALLY DISINTEGRATING ORAL EVERY 30 MIN PRN
Status: DISCONTINUED | OUTPATIENT
Start: 2022-10-04 | End: 2022-10-04 | Stop reason: HOSPADM

## 2022-10-04 RX ORDER — HEPARIN SODIUM 1000 [USP'U]/ML
INJECTION, SOLUTION INTRAVENOUS; SUBCUTANEOUS PRN
Status: DISCONTINUED | OUTPATIENT
Start: 2022-10-04 | End: 2022-10-04

## 2022-10-04 RX ORDER — ACETAMINOPHEN 325 MG/1
975 TABLET ORAL
Status: ON HOLD | COMMUNITY
End: 2023-10-27

## 2022-10-04 RX ORDER — LIDOCAINE 40 MG/G
CREAM TOPICAL
Status: DISCONTINUED | OUTPATIENT
Start: 2022-10-04 | End: 2022-10-04 | Stop reason: HOSPADM

## 2022-10-04 RX ORDER — FLUMAZENIL 0.1 MG/ML
0.2 INJECTION, SOLUTION INTRAVENOUS
Status: CANCELLED | OUTPATIENT
Start: 2022-10-04

## 2022-10-04 RX ORDER — FUROSEMIDE 20 MG
20 TABLET ORAL DAILY
Status: DISCONTINUED | OUTPATIENT
Start: 2022-10-05 | End: 2022-10-06 | Stop reason: HOSPADM

## 2022-10-04 RX ORDER — ASPIRIN 81 MG/1
324 TABLET ORAL ONCE
Status: ON HOLD | COMMUNITY
End: 2022-10-04

## 2022-10-04 RX ORDER — IOPAMIDOL 755 MG/ML
INJECTION, SOLUTION INTRAVASCULAR
Status: DISCONTINUED | OUTPATIENT
Start: 2022-10-04 | End: 2022-10-04 | Stop reason: HOSPADM

## 2022-10-04 RX ORDER — HYDRALAZINE HYDROCHLORIDE 20 MG/ML
10 INJECTION INTRAMUSCULAR; INTRAVENOUS
Status: DISCONTINUED | OUTPATIENT
Start: 2022-10-04 | End: 2022-10-06 | Stop reason: HOSPADM

## 2022-10-04 RX ORDER — ACETAMINOPHEN 325 MG/1
650 TABLET ORAL EVERY 4 HOURS PRN
Status: DISCONTINUED | OUTPATIENT
Start: 2022-10-04 | End: 2022-10-06 | Stop reason: HOSPADM

## 2022-10-04 RX ORDER — NITROGLYCERIN 0.4 MG/1
0.4 TABLET SUBLINGUAL EVERY 5 MIN PRN
Status: DISCONTINUED | OUTPATIENT
Start: 2022-10-04 | End: 2022-10-06 | Stop reason: HOSPADM

## 2022-10-04 RX ORDER — FOLIC ACID 1 MG/1
1 TABLET ORAL DAILY
Status: CANCELLED | OUTPATIENT
Start: 2022-10-04

## 2022-10-04 RX ORDER — ASPIRIN 325 MG
325 TABLET ORAL ONCE
Status: DISCONTINUED | OUTPATIENT
Start: 2022-10-04 | End: 2022-10-04 | Stop reason: HOSPADM

## 2022-10-04 RX ADMIN — Medication 4 MCG: at 12:00

## 2022-10-04 RX ADMIN — DORZOLAMIDE HYDROCHLORIDE AND TIMOLOL MALEATE 1 DROP: 20; 5 SOLUTION/ DROPS OPHTHALMIC at 20:50

## 2022-10-04 RX ADMIN — LATANOPROST 1 DROP: 50 SOLUTION/ DROPS OPHTHALMIC at 20:49

## 2022-10-04 RX ADMIN — SODIUM CHLORIDE: 9 INJECTION, SOLUTION INTRAVENOUS at 08:47

## 2022-10-04 RX ADMIN — Medication 4 MCG: at 10:31

## 2022-10-04 RX ADMIN — SODIUM CHLORIDE, POTASSIUM CHLORIDE, SODIUM LACTATE AND CALCIUM CHLORIDE: 600; 310; 30; 20 INJECTION, SOLUTION INTRAVENOUS at 13:19

## 2022-10-04 RX ADMIN — PROPOFOL 140 MG: 10 INJECTION, EMULSION INTRAVENOUS at 11:52

## 2022-10-04 RX ADMIN — ONDANSETRON 4 MG: 2 INJECTION INTRAMUSCULAR; INTRAVENOUS at 11:55

## 2022-10-04 RX ADMIN — Medication 4 MCG: at 10:41

## 2022-10-04 RX ADMIN — BRIMONIDINE TARTRATE 1 DROP: 2 SOLUTION/ DROPS OPHTHALMIC at 20:50

## 2022-10-04 RX ADMIN — HEPARIN SODIUM 23000 UNITS: 1000 INJECTION INTRAVENOUS; SUBCUTANEOUS at 11:11

## 2022-10-04 RX ADMIN — MIDAZOLAM 0.5 MG: 1 INJECTION INTRAMUSCULAR; INTRAVENOUS at 10:41

## 2022-10-04 RX ADMIN — CLINDAMYCIN PHOSPHATE 900 MG: 900 INJECTION, SOLUTION INTRAVENOUS at 08:54

## 2022-10-04 RX ADMIN — Medication 8 MCG: at 10:21

## 2022-10-04 RX ADMIN — MIDAZOLAM 0.5 MG: 1 INJECTION INTRAMUSCULAR; INTRAVENOUS at 10:31

## 2022-10-04 RX ADMIN — Medication 8 MCG: at 11:09

## 2022-10-04 RX ADMIN — MIDAZOLAM 0.5 MG: 1 INJECTION INTRAMUSCULAR; INTRAVENOUS at 10:48

## 2022-10-04 RX ADMIN — PRAVASTATIN SODIUM 20 MG: 20 TABLET ORAL at 20:49

## 2022-10-04 RX ADMIN — DEXMEDETOMIDINE HYDROCHLORIDE 0.3 MCG/KG/HR: 100 INJECTION, SOLUTION INTRAVENOUS at 10:21

## 2022-10-04 RX ADMIN — Medication 8 MCG: at 11:47

## 2022-10-04 RX ADMIN — MIDAZOLAM 0.5 MG: 1 INJECTION INTRAMUSCULAR; INTRAVENOUS at 11:03

## 2022-10-04 ASSESSMENT — COPD QUESTIONNAIRES: COPD: 0

## 2022-10-04 ASSESSMENT — ACTIVITIES OF DAILY LIVING (ADL)
ADLS_ACUITY_SCORE: 18

## 2022-10-04 ASSESSMENT — LIFESTYLE VARIABLES: TOBACCO_USE: 0

## 2022-10-04 NOTE — ANESTHESIA POSTPROCEDURE EVALUATION
Patient: Laila Perez    Procedure: Procedure(s):  Transcatheter Aortic Valve Replacement-Femoral Approach       Anesthesia Type:  MAC    Note:  Disposition: Inpatient   Postop Pain Control: Uneventful            Sign Out: Well controlled pain   PONV:    Neuro/Psych: Uneventful            Sign Out: Acceptable/Baseline neuro status   Airway/Respiratory: Uneventful            Sign Out: Acceptable/Baseline resp. status   CV/Hemodynamics: Uneventful            Sign Out: Acceptable CV status   Other NRE: NONE   DID A NON-ROUTINE EVENT OCCUR? No           Last vitals:  Vitals Value Taken Time   BP 96/53 10/04/22 1300   Temp 36.6  C (97.9  F) 10/04/22 1300   Pulse 65 10/04/22 1307   Resp 10 10/04/22 1307   SpO2 94 % 10/04/22 1307   Vitals shown include unvalidated device data.    Electronically Signed By: Sea Desai MD  October 4, 2022  1:08 PM

## 2022-10-04 NOTE — PROGRESS NOTES
TRANSITIONS OF CARE (FRED) LOG   FRED tasks should be completed by the CC within one (1) business day of notification of each transition. Follow up contact with member is required after return to their usual care setting.  Note:  If CC finds out about the transitions fifteen (15) days or more after the member has returned to their usual care setting, no FRED log is needed. However, the CC should check in with the member to discuss the transition process, any changes needed to the care plan and document it in a case note.    Member Name:  Laila Perez [facility] Member ID[facility] [id number]   Product: [facility] Care Coordinator Contact:  Chanel Laura RN Agency/County/Care System: [facility]   Transition Communication Actions from Care Management Contact   Transition #1   Notification Date: 10/4/22 Transition Date:   10/4/22 Transition From: Home     Is this the member s usual care setting?               yes Transition To: [facility]   Transition Type:  Planned  Reason for Admission/Comments:  Valve replacement surgery  Contact member/responsible party to offer assistance with transition Date completed: Spoke with group home was a planned event.     Notes from conversation with the member/responsible party, provider, discharging and receiving facility (as applicable): CC contacted Hospital /discharge planner  (CC left message for  for Name and Phone Number) and reviewed community POC. CC requested to be notified of concerns, care conference dates and discharge planning. CC received VM from [facility] who stated member is going to discharge back to her group home today ([phone] [place])   CC reached out to CC has previously spoke to group home staff regarding planned surgery regarding transition and offered support as needed.  Reviewed and update care plan as needed.  Notified community service providers and placed services None on  hold as needed.  Transition log initiated.   PCP notified of hospitalization via EMR.       Shared CC contact info, care plan/services with receiving setting--Date completed: 10/5/22  Name & Title of receiving setting contact: [facility]   Notified PCP of transition--Date completed:  10/4/22     via  EMR  Name of PCP: Delmis Simons     Transition #2   Notification Date: 10/6/22 Transition Date:  10/6/22 Transition From: Hospital, [facility]     Is this the member s usual care setting?               no Transition To: Home   Transition Type:  Planned  Reason for Admission/Comments:  Discharge from hospital after heart valve replacement surgery.  Contact member/responsible party to offer assistance with transition Date completed: 10/7/22    Notes from conversation with the member/responsible party, provider, discharging and receiving facility (as applicable): CC contacted member and reviewed discharge summary. CC spoke to member who stated she was feeling really good and glad she had the procedure completed. She did not have questions on her discharge summary and she stated her PCP called her and set up the follow up appointment however she could not remember what the date of the appointment is. She had her Atenolol discontinued and is now taking lisinopril and reports she understand the medication change and had no questions about her medication. She left the hospital with a holster monitor and has no questions regarding the monitor.   Member has a follow-up appointment with PCP in 7 days: Yes: scheduled on Unsure of date of follow up appointment   Member has had a change in condition: No  Home visit needed: No  Care plan reviewed and updated.  The following home based services None were resumed.  New referrals placed: No  Transition log completed.   PCP notified of transition back to home via EMR.       Shared CC contact info, care plan/services with  receiving setting--Date completed: 10/7/22   Name & Title of receiving setting contact: Member   Notified PCP of transition--Date completed:  10/7/22     via  EMR  Name of PCP: Delmis Simons               *RETURN TO USUAL CARE SETTING: *Complete tasks below when the member is discharging TO their usual care setting within one (1) business day of notification..      For situations where the Care Coordinator is notified of the discharge prior to the date of discharge, the Care Coordinator must follow up with the member or designated representative to confirm that discharge actually occurred and discuss required FRED tasks as outlined in the FRED Instructions.  (This includes situations where it may be a  new  usual care setting for the member. (i.e., a community member who decides upon permanent nursing home placement following hospitalization and rehab).    Discuss with Member/Responsible Party:    Check  Yes  - if the member, family member and/or SNF/facility staff manages the following:    If  No  provide explanation in the comments section.          Date completed: 10/7/22 Communicated with member or their designated representative about the following:  care transition process; about changes to the member s health status; plan of care updates; education about transitions and how to prevent unplanned transitions/readmissions    Four Pillars for Optimal Transition:    Check  Yes  - if the member, family member and/or SNF/facility staff manages the following:    If  No  provide explanation in the comments section.          [x]  Yes     []  No Does the member have a follow-up appointment scheduled with primary care or specialist? (Mental health hospitalizations--the appt. should be w/in 7 days)              For mental health hospitalizations:  []  Yes     [x]  No     Does the member have a follow-up appointment scheduled with a mental health practitioner within 7 days of discharge?  [x]  Yes     []  No     Has  a medication review been completed with member? If no, refer to PCP, home care nurse, MTM, pharmacist  [x]  Yes     []  No     Can the member manage their medications or is there a system in place to manage medications (e.g. home care set-up)?         [x]  Yes     []  No     Can the member verbalize warning signs and symptoms to watch for and how to respond?  [x]  Yes     []  No     Does the member have a copy of and understand their discharge instructions?  If no, assist to obtain copy of discharge instructions, review discharge instructions, and assist to contact PCP to discuss questions about their recent hospitalization.  [x]  Yes     []  No     Does the member have adequate food, housing and transportation?  If no, add goal and discuss additional supports available to the member                                                                                                                                                                                 [x]  Yes     []  No     Is the member safe in their home?  If no, document needs and support provided                                                                                                                                                                          []  Yes     [x]  No     Are there any concerns of vulnerability, abuse, or neglect?  If yes, document concerns and actions taken by Care Coordinator as a mandated                                                                                                                                                                              [x]  Yes     []  No     Does the member use a Personal Health Care Record?  Check  Yes  if visit summary, discharge summary, and/or healthcare summary are being used as a PHR.                                                                                                                                                                                  [x]  Yes      []  No     Have you reviewed the discharge summary with the member? If  No  provide explanation in comments.  [x]  Yes     []  No     Have you updated the member s care plan/support plan? Add new diagnosis, medications, treatments, goals & interventions, as applicable. If No, provide explanation in comments.    Comments:           Notes from conversation with the member/responsible party, provider, discharging and receiving facility (as applicable): NA

## 2022-10-04 NOTE — PLAN OF CARE
Neuro: A/O x4, neuro's intact  CV/Rhythm: SR w/ 1AVB and BBB  Resp/02: LS clear on RA  GI/Diet: Regular diet, last BM 10-4  : voids in urinal  Skin/Incisions/Sites: pale skin, R/L groin sites, dried drainage unchanged, palpable pulses  Pulses/CMS: +2 radial/pedal  Edema: +1-2 LE  Activity/Falls Risk: off bedrest until 1815, sitting at side of bed for dinner  Lines/Drains/IVs: SL   Test/Procedures: TAVR 10-4  VS/Pain: VSS, pt denies any c/o of pain  DC Plan: home when stable

## 2022-10-04 NOTE — ANESTHESIA PREPROCEDURE EVALUATION
Anesthesia Pre-Procedure Evaluation    Patient: Laila Perez   MRN: [id number] : 1950        Procedure : Procedure(s):  Transcatheter Aortic Valve Replacement-Femoral Approach          Past Medical History:   Diagnosis Date     Alcoholism (H)      DDD (degenerative disc disease), cervical      Gender dysphoria in adult      HLD (hyperlipidemia)      HTN (hypertension)      large compression fracture 2006    Worked up for neoplasm, none found.       OA (osteoarthritis of spine)      Obese      Unspecified internal derangement of knee     CHRONIC KNEE PAIN,LEG,NECK AND HEAD INJURIES      Past Surgical History:   Procedure Laterality Date     COLONOSCOPY       CV CORONARY ANGIOGRAM N/A 2022    Procedure: Coronary Angiogram;  Surgeon: Santy Clayton MD;  Location:  [facility]     CV PCI N/A 2022    Procedure: Percutaneous Coronary Intervention;  Surgeon: Santy Clayton MD;  Location:  [facility]     JOINT REPLACEMTN, KNEE RT/LT      left     ORCHIECTOMY SCROTAL Bilateral 2019    Procedure: Bilatera Scrotal Orchiectomy;  Surgeon: Abhilash Weston MD;  [facility]      Allergies   Allergen Reactions     External Allergen Needs Reconciliation - See Comment      Please reconcile the Patient's allergy reported as ESTERSPENICILLIN and update accordingly           Zosyn [Penicillins]      unsure of reaction, allergy is listed on his med sheet     Heparin Rash     he lives in a assisted living program and is not sure what type of allergies he really has. He quit drinking      Social History     Tobacco Use     Smoking status: Never Smoker     Smokeless tobacco: Never Used   Substance Use Topics     Alcohol use: No     Comment: Quit 3/24/05      Wt Readings from Last 1 Encounters:   22 (!) 156.9 kg (345 lb 12.8 oz)        Anesthesia Evaluation            ROS/MED HX  ENT/Pulmonary: Comment: psoriasis    (+) ANGELLA risk factors,  hypertension, obese,  (-) tobacco use, asthma, COPD and sleep apnea   Neurologic:  - neg neurologic ROS     Cardiovascular:     (+) Dyslipidemia hypertension-----valvular problems/murmurs type: AS Previous cardiac testing   Echo: Date: 2022 Results:  Interpretation Summary     Normal biventricular size and function. Left ventricular ejection fraction of  60-65%. No regional wall motion abnormalities noted.  Severe aortic stenosis with mean AoV pressure gradient of 41 mmHg and  calculated aortic valve area of 0.9 cm^2. Peak aortic velocities are 4.1  m/sec.  Mild aortic root dilatation. The ascending aorta is mildly dilated. (4.3 and  4.4 cms)  No significant change compared to prior echoes in AV stenosis. Interval  increase in size of aortic root which might be 2/2 inter-study variability.    Stress Test: Date: Results:    ECG Reviewed: Date: Results:    Cath: Date: Results:      METS/Exercise Tolerance:     Hematologic:       Musculoskeletal:   (+) arthritis,     GI/Hepatic:    (-) GERD   Renal/Genitourinary:       Endo:     (+) Obesity,     Psychiatric/Substance Use:     (+) alcohol abuse     Infectious Disease:       Malignancy:       Other:            Physical Exam    Airway        Mallampati: III   TM distance: > 3 FB   Neck ROM: full   Mouth opening: > 3 cm    Respiratory Devices and Support         Dental  no notable dental history     (+) upper dentures and lower dentures      Cardiovascular          Rhythm and rate: regular   (+) murmur       Pulmonary   pulmonary exam normal                OUTSIDE LABS:  CBC:   Lab Results   Component Value Date    WBC 8.2 09/29/2022    WBC 7.9 09/08/2022    HGB 14.3 09/29/2022    HGB 13.9 09/08/2022    HCT 44.9 09/29/2022    HCT 42.8 09/08/2022     (L) 09/29/2022     (L) 09/08/2022     BMP:   Lab Results   Component Value Date     09/29/2022     09/08/2022    POTASSIUM 4.9 10/04/2022    POTASSIUM 4.7 09/29/2022    CHLORIDE 104 09/29/2022     CHLORIDE 102 09/08/2022    CO2 29 09/29/2022    CO2 31 09/08/2022    BUN 19.0 09/29/2022    BUN 26 09/08/2022    CR 1.11 09/29/2022    CR 1.05 09/08/2022    GLC 84 09/29/2022     (H) 09/08/2022     COAGS:   Lab Results   Component Value Date    PTT 30 09/08/2022    INR 1.15 09/29/2022     POC: No results found for: BGM, HCG, HCGS  HEPATIC:   Lab Results   Component Value Date    ALBUMIN 4.2 09/29/2022    PROTTOTAL 7.7 09/29/2022    ALT 33 09/29/2022    AST 34 09/29/2022    ALKPHOS 87 09/29/2022    BILITOTAL 0.4 09/29/2022     OTHER:   Lab Results   Component Value Date    A1C 4.8 04/26/2016    GOPAL 9.7 09/29/2022    TSH 1.44 05/02/2017    CRP 18.1 (H) 04/25/2014    SED 9 04/25/2014       Anesthesia Plan    ASA Status:  3   NPO Status:  NPO Appropriate    Anesthesia Type: MAC.     - Reason for MAC: straight local not clinically adequate   Induction: Intravenous, Propofol.           Consents    Anesthesia Plan(s) and associated risks, benefits, and realistic alternatives discussed. Questions answered and patient/representative(s) expressed understanding.    - Discussed:     - Discussed with:  Patient      - Extended Intubation/Ventilatory Support Discussed: No.      - Patient is DNR/DNI Status: No    Use of blood products discussed: Yes.     - Discussed with: Patient.     - Consented: consented to blood products            Reason for refusal: other.     Postoperative Care    Pain management: Multi-modal analgesia.   PONV prophylaxis: Ondansetron (or other 5HT-3), Dexamethasone or Solumedrol     Comments:    Other Comments: Patient is counseled on the anesthesia plan and relevant anesthesia procedures including all risks and benefits. All patient questions were answered.             Sea Desai MD

## 2022-10-04 NOTE — OR NURSING
CAUTERY PAD PLACED TO LEFT POSTERIOR THIGH, SKIN IS PINK, WARM AND DRY.  CAUTERY PAD LOT:[id number], CAUTERY SETTINGS, CUT:30 AND COA.  INITIAL SOFT COUNT PERFORMED PER ARACELI HUBER RN AND ST JEFFY. FINAL SOFT COUNT NOT PERFORMED DUE TO PROCEDURE REMAINED PERCUTANEOUS, DR. ROBERTS AWARE.  PATIENT PREPPED WITH CHLOREPREP FROM CHIN TO KNEES PER ARACELI HUBER RN.

## 2022-10-04 NOTE — ANESTHESIA CARE TRANSFER NOTE
Patient: Laila Perez    Procedure: Procedure(s):  Transcatheter Aortic Valve Replacement-Femoral Approach       Diagnosis: valvular disease  Diagnosis Additional Information: No value filed.    Anesthesia Type:   MAC     Note:    Oropharynx: oropharynx clear of all foreign objects and spontaneously breathing  Level of Consciousness: awake  Oxygen Supplementation: face mask  Level of Supplemental Oxygen (L/min / FiO2): 6  Independent Airway: airway patency satisfactory and stable  Dentition: dentition unchanged  Vital Signs Stable: post-procedure vital signs reviewed and stable  Report to RN Given: handoff report given  Patient transferred to: PACU    Handoff Report: Identifed the Patient, Identified the Reponsible Provider, Reviewed the pertinent medical history, Discussed the surgical course, Reviewed Intra-OP anesthesia mangement and issues during anesthesia, Set expectations for post-procedure period and Allowed opportunity for questions and acknowledgement of understanding      Electronically Signed By: JERRICA Zhou CRNA  October 4, 2022  12:35 PM

## 2022-10-04 NOTE — PROGRESS NOTES
PTA medications completed by Medication Scribe day of surgery     Medication history sources: Patient, Surescripts and H&P  In the past week, patient estimated taking medication this percent of the time: Greater than 90%  Adherence assessment: N/A Not Observed    Significant changes made to the medication list:  None      Additional medication history information:   Patient brought own home meds: Alphagan Eye Solution, Cosopt Eye Solution, Latanoprost Eye Solution, Timolol Eye Solution, Alphagan-P Eye Solution    Medication reconciliation completed by provider prior to medication history? No    Time spent in this activity: 35 minutes    The information provided in this note is only as accurate as the sources available at the time of update(s)    Prior to Admission medications    Medication Sig Last Dose Taking? Auth Provider Long Term End Date   albuterol (PROAIR HFA/PROVENTIL HFA/VENTOLIN HFA) 108 (90 Base) MCG/ACT inhaler Inhale 2 puffs into the lungs every 6 hours  at prn Yes Delmis Simons APRN CNP Yes    aspirin 81 MG EC tablet Take 324 mg by mouth once (4 x 81 mg = 324 mg) 10/4/2022 at am Yes Reported, Patient     atenolol (TENORMIN) 50 MG tablet Take 1 tablet (50 mg) by mouth daily 10/2/2022 at am Yes Delmis Simons APRN CNP Yes    brimonidine (ALPHAGAN) 0.2 % ophthalmic solution Place 1 drop into both eyes every 8 hours 10/3/2022 at pm Yes Reported, Patient     brimonidine (ALPHAGAN-P) 0.15 % ophthalmic solution Place 1 drop into both eyes every 8 hours 10/3/2022 at pm Yes Reported, Patient     dorzolamide-timolol (COSOPT) 2-0.5 % ophthalmic solution Place 1 drop into both eyes 2 times daily 10/3/2022 at pm Yes Reported, Patient     estradiol (VIVELLE-DOT) 0.05 MG/24HR bi-weekly patch Place 1 patch onto the skin twice a week 10/3/2022 at am Yes Jluis Mane MD Yes    furosemide (LASIX) 20 MG tablet Take 1 tablet (20 mg) by mouth daily 10/2/2022 at am Yes Delmis Simons APRN  CNP Yes    ipratropium - albuterol 0.5 mg/2.5 mg/3 mL (DUONEB) 0.5-2.5 (3) MG/3ML neb solution Take 1 vial (3 mLs) by nebulization every 6 hours as needed for shortness of breath / dyspnea or wheezing  at prn Yes Delmis Simons APRN CNP Yes    latanoprost (XALATAN) 0.005 % ophthalmic solution Place 1 drop into both eyes At Bedtime 10/3/2022 at pm Yes Reported, Patient Yes    lovastatin (MEVACOR) 20 MG tablet TAKE ONE TABLET BY MOUTH ONCE A DAY AT BEDTIME 10/2/2022 at pm Yes Delmis Simons APRN CNP Yes    Multiple Vitamin (DAILY-EVANGELISTA) TABS Take 1 tablet by mouth daily 10/2/2022 at am Yes Reported, Patient     timolol maleate (TIMOPTIC) 0.5 % ophthalmic solution Place 1 drop into both eyes 2 times daily 10/3/2022 at pm Yes Reported, Patient       Medication history completed by:    Kaleb Martines CPhT  [facility]

## 2022-10-04 NOTE — H&P
"[facility]    History and Physical - Hospitalist Service       Date of Admission:  10/4/2022    Assessment & Plan      Laila Perez is a 72 year old adult admitted on 10/4/2022 for planned TAVR. She has a PMH significant for alcoholism, M to F transgender, severe symptomatic aortic stenosis, ascending aortic aneurysm and hypertension    Severe symptomatic aortic stenosis  Hypertension  Ascending aortic aneurysm  S/P TAVR  - Plan per cardiology    History of Alcoholism   - No ETOH for 1 month.   - Former ETOH  Abuse with treatment in 2010. Now uses very occasionally.     Severe Obesity  - Estimated body mass index is 48.23 kg/m  as calculated from the following:  Height as of 9/8/22: 1.803 m (5' 11\").  Weight as of 9/29/22: 156.9 kg (345 lb 12.8 oz).    M to F transgender  - Noted     Diet: NPO per Anesthesia Guidelines for Procedure/Surgery Except for: Meds    DVT Prophylaxis: Defer to primary service  Bae Catheter: Not present  Central Lines: None  Cardiac Monitoring: None  Code Status:  Full Code    Disposition Plan      Expected Discharge Date: 10/07/2022              The patient's care was discussed with Dr. Willam BARNARD NP  Hospitalist Service  [facility]  Securely message with the Vocera Web Console (learn more here)  Text page via [employer] Paging/Directory         ______________________________________________________________________    Chief Complaint   Planned TAVR    History is obtained from the patient and electronic health record    History of Present Illness   Laila Perez is a 72 year old adult who has a PMH significant for alcoholism, M to F transgender, severe symptomatic aortic stenosis, ascending aortic aneurysm and hypertension. She is admitted today for TAVR. She was see after her TAVR.  She denies chest pain, shortness of breath, palpitations.  She states she is feeling good.  She does note that she quit drinking " alcohol in 2010.  Since going through treatment in 2010, she occasionally has 1 alcoholic beverage.  She notes that her last alcoholic beverage was was approximately 1 month ago.    Review of Systems    The 10 point Review of Systems is negative other than noted in the HPI or here.     Past Medical History    I have reviewed this patient's medical history and updated it with pertinent information if needed.   Past Medical History:   Diagnosis Date     Alcoholism (H)      DDD (degenerative disc disease), cervical      Gender dysphoria in adult      HLD (hyperlipidemia)      HTN (hypertension)      large compression fracture 03/13/2006    Worked up for neoplasm, none found.       OA (osteoarthritis of spine)      Obese      Unspecified internal derangement of knee     CHRONIC KNEE PAIN,LEG,NECK AND HEAD INJURIES       Past Surgical History   I have reviewed this patient's surgical history and updated it with pertinent information if needed.  Past Surgical History:   Procedure Laterality Date     COLONOSCOPY  2007     CV CORONARY ANGIOGRAM N/A 9/8/2022    Procedure: Coronary Angiogram;  Surgeon: Santy Clayton MD;  Location: [facility] CARDIAC CATH LAB     CV PCI N/A 9/8/2022    Procedure: Percutaneous Coronary Intervention;  Surgeon: Santy Clayton MD;  Location: [facility] CARDIAC CATH LAB     JOINT REPLACEMTN, KNEE RT/LT  2006    left     ORCHIECTOMY SCROTAL Bilateral 5/14/2019    Procedure: Bilatera Scrotal Orchiectomy;  Surgeon: Abhilash Weston MD;  Location:  OR       Social History   I have reviewed this patient's social history and updated it with pertinent information if needed.  Social History     Tobacco Use     Smoking status: Never Smoker     Smokeless tobacco: Never Used   Vaping Use     Vaping Use: Never used   Substance Use Topics     Alcohol use: No     Comment: Quit 3/24/05     Drug use: No       Family History   I have reviewed this patient's family history and updated it with  pertinent information if needed.  Family History   Problem Relation Age of Onset     Diabetes Mother      Cancer Father         lung cancer heavy smoker     Emphysema Father      Allergies Father         Enviromental     Diabetes Maternal Grandmother      Other - See Comments Other         FHx of Glaucoma       Prior to Admission Medications   Prior to Admission Medications   Prescriptions Last Dose Informant Patient Reported? Taking?   Multiple Vitamin (DAILY-EVANGELISTA) TABS 10/2/2022 at am Self Yes Yes   Sig: Take 1 tablet by mouth daily   albuterol (PROAIR HFA/PROVENTIL HFA/VENTOLIN HFA) 108 (90 Base) MCG/ACT inhaler  at prn Self No Yes   Sig: Inhale 2 puffs into the lungs every 6 hours   aspirin 81 MG EC tablet 10/4/2022 at am Self Yes Yes   Sig: Take 324 mg by mouth once (4 x 81 mg = 324 mg)   atenolol (TENORMIN) 50 MG tablet 10/2/2022 at am Self No Yes   Sig: Take 1 tablet (50 mg) by mouth daily   brimonidine (ALPHAGAN) 0.2 % ophthalmic solution 10/3/2022 at pm Self Yes Yes   Sig: Place 1 drop into both eyes every 8 hours   brimonidine (ALPHAGAN-P) 0.15 % ophthalmic solution 10/3/2022 at pm Self Yes Yes   Sig: Place 1 drop into both eyes every 8 hours   dorzolamide-timolol (COSOPT) 2-0.5 % ophthalmic solution 10/3/2022 at pm Self Yes Yes   Sig: Place 1 drop into both eyes 2 times daily   estradiol (VIVELLE-DOT) 0.05 MG/24HR bi-weekly patch 10/3/2022 at am Self No Yes   Sig: Place 1 patch onto the skin twice a week   furosemide (LASIX) 20 MG tablet 10/2/2022 at am Self No Yes   Sig: Take 1 tablet (20 mg) by mouth daily   ipratropium - albuterol 0.5 mg/2.5 mg/3 mL (DUONEB) 0.5-2.5 (3) MG/3ML neb solution  at prn Self No Yes   Sig: Take 1 vial (3 mLs) by nebulization every 6 hours as needed for shortness of breath / dyspnea or wheezing   latanoprost (XALATAN) 0.005 % ophthalmic solution 10/3/2022 at pm Self Yes Yes   Sig: Place 1 drop into both eyes At Bedtime   lovastatin (MEVACOR) 20 MG tablet 10/2/2022 at pm Self  No Yes   Sig: TAKE ONE TABLET BY MOUTH ONCE A DAY AT BEDTIME   timolol maleate (TIMOPTIC) 0.5 % ophthalmic solution 10/3/2022 at pm Self Yes Yes   Sig: Place 1 drop into both eyes 2 times daily      Facility-Administered Medications Last Administration Doses Remaining   betamethasone acet & sod phos (CELESTONE) injection 6 mg 1/3/2022 11:03 AM    betamethasone acet & sod phos (CELESTONE) injection 6 mg 4/4/2022  9:55 AM    betamethasone acet & sod phos (CELESTONE) injection 6 mg 8/8/2022 10:36 AM    ropivacaine (NAROPIN) injection 3 mL 1/3/2022 11:03 AM    ropivacaine (NAROPIN) injection 4 mL 4/4/2022  9:55 AM    ropivacaine (NAROPIN) injection 4 mL 8/8/2022 10:36 AM         Allergies   Allergies   Allergen Reactions     External Allergen Needs Reconciliation - See Comment      Please reconcile the Patient's allergy reported as ESTERSPENICILLIN and update accordingly           Zosyn [Penicillins]      unsure of reaction, allergy is listed on his med sheet     Heparin Rash     he lives in a assisted living program and is not sure what type of allergies he really has. He quit drinking       Physical Exam   Vital Signs: Temp: 97.9  F (36.6  C) Temp src: Temporal BP: 97/74 Pulse: 64   Resp: 14 SpO2: 93 % O2 Device: Nasal cannula Oxygen Delivery: 2 LPM  Weight: 345 lbs 9.6 oz    Constitutional: alert and no distress  Head: Normocephalic. Atraumatic   Neck: Neck supple.  ENT: Trachea is midline  Cardiovascular: negative, PMI normal. RRR. No murmurs, clicks gallops or rub  Respiratory: negative. Good diaphragmatic excursion. Lungs clear  Gastrointestinal: Abdomen soft, non-tender. BS normal.   : Deferred  Musculoskeletal: Moves extremities equal. Non-pitting edema in lower extremities.   Skin: no suspicious lesions or rashes  Neurologic: CN 2-12 grossly intact.   Psychiatric: mentation normal. Affect normal. A/O x 3  Hematologic/Lymphatic/Immunologic: Normal cervical lymph nodes      Data   Data reviewed today: I  reviewed all medications, new labs and imaging results over the last 24 hours.     Recent Labs   Lab 10/04/22  0827 09/29/22  1421   WBC  --  8.2   HGB  --  14.3   MCV  --  95   PLT  --  136*   INR  --  1.15   NA  --  144   POTASSIUM 4.9 4.7   CHLORIDE  --  104   CO2  --  29   BUN  --  19.0   CR  --  1.11   ANIONGAP  --  11   GOPAL  --  9.7   GLC  --  84   ALBUMIN  --  4.2   PROTTOTAL  --  7.7   BILITOTAL  --  0.4   ALKPHOS  --  87   ALT  --  33   AST  --  34     No results found for this or any previous visit (from the past 24 hour(s)).

## 2022-10-04 NOTE — PHARMACY-ADMISSION MEDICATION HISTORY
Pharmacy Medication History  Admission medication history interview status for the 10/4/2022  admission is complete. See EPIC admission navigator for prior to admission medications     Location of Interview: Patient room  Medication history sources: Patient and Surescripts    Significant changes made to the medication list:    In the past week, patient estimated taking medication this percent of the time: greater than 90%    Additional medication history information:     Medication reconciliation completed by provider prior to medication history? No    Time spent in this activity: 12 min    Prior to Admission medications    Medication Sig Last Dose Taking? Auth Provider Long Term End Date   acetaminophen (TYLENOL) 325 MG tablet Take 975 mg by mouth nightly as needed for mild pain  Yes Unknown, Entered By History     albuterol (PROAIR HFA/PROVENTIL HFA/VENTOLIN HFA) 108 (90 Base) MCG/ACT inhaler Inhale 2 puffs into the lungs every 6 hours  at prn Yes Delmis Simons APRN CNP Yes    atenolol (TENORMIN) 50 MG tablet Take 1 tablet (50 mg) by mouth daily 10/3/2022 at pm Yes Delmis iSmons APRN CNP Yes    brimonidine (ALPHAGAN) 0.2 % ophthalmic solution Place 1 drop into both eyes 2 times daily 10/4/2022 at am Yes Reported, Patient     dorzolamide-timolol (COSOPT) 2-0.5 % ophthalmic solution Place 1 drop into both eyes 2 times daily 10/4/2022 at am Yes Reported, Patient     estradiol (VIVELLE-DOT) 0.05 MG/24HR bi-weekly patch Place 1 patch onto the skin twice a week 10/3/2022 at am Yes Jluis Mane MD Yes    furosemide (LASIX) 20 MG tablet Take 1 tablet (20 mg) by mouth daily Past Week at am Yes Delmis Simons APRN CNP Yes    ipratropium - albuterol 0.5 mg/2.5 mg/3 mL (DUONEB) 0.5-2.5 (3) MG/3ML neb solution Take 1 vial (3 mLs) by nebulization every 6 hours as needed for shortness of breath / dyspnea or wheezing  at prn Yes Delmis Simons APRN CNP Yes    latanoprost (XALATAN) 0.005  % ophthalmic solution Place 1 drop into both eyes At Bedtime 10/3/2022 at pm Yes Reported, Patient Yes    lovastatin (MEVACOR) 20 MG tablet TAKE ONE TABLET BY MOUTH ONCE A DAY AT BEDTIME 10/3/2022 at pm Yes Delmis Simons, JERRICA CNP Yes    Multiple Vitamin (DAILY-EVANGELISTA) TABS Take 1 tablet by mouth daily 10/3/2022 at pm Yes Reported, Patient                  The information provided in this note is only as accurate as the sources available at the time of update(s)

## 2022-10-04 NOTE — OR NURSING
Spoke with Dr Desai, patient stable and ok to transfer.  BPs slightly soft 90's/60's patient asymptomatic.  Ok to transfer.  No new orders.

## 2022-10-04 NOTE — INTERVAL H&P NOTE
"I have reviewed the surgical (or preoperative) H&P that is linked to this encounter, and examined the patient. There are no significant changes    Clinical Conditions Present on Arrival:  Clinically Significant Risk Factors Present on Admission                   # Severe Obesity: Estimated body mass index is 48.23 kg/m  as calculated from the following:    Height as of 9/8/22: 1.803 m (5' 11\").    Weight as of 9/29/22: 156.9 kg (345 lb 12.8 oz).       "

## 2022-10-04 NOTE — PROGRESS NOTES
[facility] Care Coordination Contact    [facility]  Ambulatory Care Coordination to Inpatient Care Management   Hand-In Communication    Date:  October 4, 2022  Name: Laila Perez is enrolled in [facility] Care Coordination program and I am the Lead Care Coordinator.  CC Contact Information:.   Payor Source: Payor: [facility] / Plan: [facility] DUAL / Product Type: HMO /   Current services in place:     Please see the CC Snaphot and Care Management Flowsheets for specific  details of this Laila Perez care plan.   Additional details/specific concerns r/t this admission:    No additional concerns at this time      I will follow this admission in Epic. Please feel free to contact me with questions or for further collaboration in discharge planning.  Chanel Laura RN  [facility]  [phone]

## 2022-10-05 ENCOUNTER — APPOINTMENT (OUTPATIENT)
Dept: PHYSICAL THERAPY | Facility: CLINIC | Age: 72
DRG: 267 | End: 2022-10-05
Attending: NURSE PRACTITIONER
Payer: COMMERCIAL

## 2022-10-05 ENCOUNTER — APPOINTMENT (OUTPATIENT)
Dept: CARDIOLOGY | Facility: CLINIC | Age: 72
DRG: 267 | End: 2022-10-05
Attending: NURSE PRACTITIONER
Payer: COMMERCIAL

## 2022-10-05 ENCOUNTER — APPOINTMENT (OUTPATIENT)
Dept: PHYSICAL THERAPY | Facility: CLINIC | Age: 72
DRG: 267 | End: 2022-10-05
Attending: INTERNAL MEDICINE
Payer: COMMERCIAL

## 2022-10-05 LAB
ANION GAP SERPL CALCULATED.3IONS-SCNC: 2 MMOL/L (ref 3–14)
BUN SERPL-MCNC: 18 MG/DL (ref 7–30)
CALCIUM SERPL-MCNC: 8.5 MG/DL (ref 8.5–10.1)
CHLORIDE BLD-SCNC: 106 MMOL/L (ref 94–109)
CO2 SERPL-SCNC: 29 MMOL/L (ref 20–32)
CREAT SERPL-MCNC: 0.85 MG/DL (ref 0.52–1.25)
ERYTHROCYTE [DISTWIDTH] IN BLOOD BY AUTOMATED COUNT: 13.2 % (ref 10–15)
GFR SERPL CREATININE-BSD FRML MDRD: >90 ML/MIN/1.73M2
GLUCOSE BLD-MCNC: 112 MG/DL (ref 70–99)
HCT VFR BLD AUTO: 40.7 % (ref 35–53)
HGB BLD-MCNC: 13 G/DL (ref 11.7–17.7)
LVEF ECHO: NORMAL
MAGNESIUM SERPL-MCNC: 1.9 MG/DL (ref 1.6–2.3)
MCH RBC QN AUTO: 30.2 PG (ref 26.5–33)
MCHC RBC AUTO-ENTMCNC: 31.9 G/DL (ref 31.5–36.5)
MCV RBC AUTO: 94 FL (ref 78–100)
PHOSPHATE SERPL-MCNC: 3.4 MG/DL (ref 2.5–4.5)
PLATELET # BLD AUTO: 101 10E3/UL (ref 150–450)
POTASSIUM BLD-SCNC: 4.5 MMOL/L (ref 3.4–5.3)
RBC # BLD AUTO: 4.31 10E6/UL (ref 3.8–5.9)
SODIUM SERPL-SCNC: 137 MMOL/L (ref 133–144)
WBC # BLD AUTO: 6.5 10E3/UL (ref 4–11)

## 2022-10-05 PROCEDURE — 250N000013 HC RX MED GY IP 250 OP 250 PS 637: Performed by: NURSE PRACTITIONER

## 2022-10-05 PROCEDURE — 99207 PR SC NO CHARGE VISIT: CPT | Performed by: INTERNAL MEDICINE

## 2022-10-05 PROCEDURE — 93321 DOPPLER ECHO F-UP/LMTD STD: CPT

## 2022-10-05 PROCEDURE — 93005 ELECTROCARDIOGRAM TRACING: CPT

## 2022-10-05 PROCEDURE — 999N000208 ECHOCARDIOGRAM LIMITED

## 2022-10-05 PROCEDURE — 93325 DOPPLER ECHO COLOR FLOW MAPG: CPT | Mod: 26 | Performed by: INTERNAL MEDICINE

## 2022-10-05 PROCEDURE — 36415 COLL VENOUS BLD VENIPUNCTURE: CPT | Performed by: NURSE PRACTITIONER

## 2022-10-05 PROCEDURE — 97161 PT EVAL LOW COMPLEX 20 MIN: CPT | Mod: GP

## 2022-10-05 PROCEDURE — 97530 THERAPEUTIC ACTIVITIES: CPT | Mod: GP

## 2022-10-05 PROCEDURE — 93321 DOPPLER ECHO F-UP/LMTD STD: CPT | Mod: 26 | Performed by: INTERNAL MEDICINE

## 2022-10-05 PROCEDURE — 210N000002 HC R&B HEART CARE

## 2022-10-05 PROCEDURE — 255N000002 HC RX 255 OP 636: Performed by: INTERNAL MEDICINE

## 2022-10-05 PROCEDURE — 93308 TTE F-UP OR LMTD: CPT | Mod: 26 | Performed by: INTERNAL MEDICINE

## 2022-10-05 PROCEDURE — 85027 COMPLETE CBC AUTOMATED: CPT | Performed by: NURSE PRACTITIONER

## 2022-10-05 PROCEDURE — 84100 ASSAY OF PHOSPHORUS: CPT | Performed by: NURSE PRACTITIONER

## 2022-10-05 PROCEDURE — 93010 ELECTROCARDIOGRAM REPORT: CPT | Performed by: INTERNAL MEDICINE

## 2022-10-05 PROCEDURE — 97110 THERAPEUTIC EXERCISES: CPT | Mod: GP

## 2022-10-05 PROCEDURE — 99232 SBSQ HOSP IP/OBS MODERATE 35: CPT | Mod: 25 | Performed by: INTERNAL MEDICINE

## 2022-10-05 PROCEDURE — 99233 SBSQ HOSP IP/OBS HIGH 50: CPT | Performed by: INTERNAL MEDICINE

## 2022-10-05 PROCEDURE — 82310 ASSAY OF CALCIUM: CPT | Performed by: NURSE PRACTITIONER

## 2022-10-05 PROCEDURE — 83735 ASSAY OF MAGNESIUM: CPT | Performed by: NURSE PRACTITIONER

## 2022-10-05 RX ORDER — LISINOPRIL 5 MG/1
5 TABLET ORAL DAILY
Status: DISCONTINUED | OUTPATIENT
Start: 2022-10-06 | End: 2022-10-06 | Stop reason: HOSPADM

## 2022-10-05 RX ADMIN — BRIMONIDINE TARTRATE 1 DROP: 2 SOLUTION/ DROPS OPHTHALMIC at 20:12

## 2022-10-05 RX ADMIN — ATENOLOL 50 MG: 50 TABLET ORAL at 08:05

## 2022-10-05 RX ADMIN — FUROSEMIDE 20 MG: 20 TABLET ORAL at 08:06

## 2022-10-05 RX ADMIN — BRIMONIDINE TARTRATE 1 DROP: 2 SOLUTION/ DROPS OPHTHALMIC at 08:07

## 2022-10-05 RX ADMIN — ASPIRIN 81 MG: 81 TABLET, COATED ORAL at 08:06

## 2022-10-05 RX ADMIN — DORZOLAMIDE HYDROCHLORIDE AND TIMOLOL MALEATE 1 DROP: 20; 5 SOLUTION/ DROPS OPHTHALMIC at 08:07

## 2022-10-05 RX ADMIN — HUMAN ALBUMIN MICROSPHERES AND PERFLUTREN 9 ML: 10; .22 INJECTION, SOLUTION INTRAVENOUS at 08:56

## 2022-10-05 RX ADMIN — DORZOLAMIDE HYDROCHLORIDE AND TIMOLOL MALEATE 1 DROP: 20; 5 SOLUTION/ DROPS OPHTHALMIC at 20:05

## 2022-10-05 RX ADMIN — ACETAMINOPHEN 650 MG: 325 TABLET, FILM COATED ORAL at 01:08

## 2022-10-05 ASSESSMENT — ACTIVITIES OF DAILY LIVING (ADL)
ADLS_ACUITY_SCORE: 18

## 2022-10-05 NOTE — PROGRESS NOTES
"[facility]    Medicine Progress Note - Hospitalist Service    Date of Admission:  10/4/2022    Assessment & Plan        Laila Perez is a 72 year old adult admitted on 10/4/2022 for planned TAVR. She has a PMH significant for alcoholism, M to F transgender, severe symptomatic aortic stenosis, ascending aortic aneurysm and hypertension     Severe symptomatic aortic stenosis  Hypertension  Ascending aortic aneurysm  S/P TAVR 10/04  -Appreciate cardiology following patient.  Doing well.  Developed LBBB on telemetry, so we will monitor another night    History of Alcoholism   - No ETOH for 1 month.   - Former ETOH  Abuse with treatment in 2010. Now uses very occasionally.      Severe Obesity  - Estimated body mass index is 48.23 kg/m  as calculated from the following:  Height as of 9/8/22: 1.803 m (5' 11\").  Weight as of 9/29/22: 156.9 kg (345 lb 12.8 oz).     Thrombocytopenia: Chronic  - monitor    M to F transgender  - Noted     Diet: Regular Diet Adult    DVT Prophylaxis: Ambulate every shift  Bae Catheter: Not present  Central Lines: None  Cardiac Monitoring: ACTIVE order. Indication: Transcatheter structural interventions (24 hours)  Code Status: Full Code      Disposition Plan      Expected Discharge Date: 10/05/2022                The patient's care was discussed with the Bedside Nurse and Patient.    Jorge Hoang MD, MD  Hospitalist Service  [facility]  Securely message with the Vocera Web Console (learn more here)  Text page via [employer] Paging/Directory     \"This dictation was performed with voice recognition software and may contain errors,  omissions and inadvertent word substitution.\"    Clinically Significant Risk Factors Present on Admission               # Severe Obesity: Estimated body mass index is 47.73 kg/m  as calculated from the following:    Height as of this encounter: 1.803 m (5' 11\").    Weight as of this encounter: 155.2 kg (342 lb 3.2 " oz).    ______________________________________________________________________    Interval History   Tolerated procedure well.  Feels a bit better today  Denies any chest pain/palpitations  Does not feel well enough to be discharged today    4 point ROS done and negative unless mentioned     Data reviewed today: I reviewed all medications, new labs and imaging results over the last 24 hours. I personally reviewed the Echo image(s) showing as below.    Physical Exam     Data    BMPRecent Labs   Lab 10/05/22  0558 10/04/22  0827 22  1421     --  144   POTASSIUM 4.5 4.9 4.7   CHLORIDE 106  --  104   GOPAL 8.5  --  9.7   CO2 29  --  29   BUN 18  --  19.0   CR 0.85  --  1.11   *  --  84     CBC  Recent Labs   Lab 10/05/22  0558 22  1421   WBC 6.5 8.2   RBC 4.31 4.73   HGB 13.0 14.3   HCT 40.7 44.9   MCV 94 95   MCH 30.2 30.2   MCHC 31.9 31.8   RDW 13.2 13.3   * 136*     INR  Recent Labs   Lab 22  1421   INR 1.15     LFTs  Recent Labs   Lab 22  1421   ALKPHOS 87   AST 34   ALT 33   BILITOTAL 0.4   PROTTOTAL 7.7   ALBUMIN 4.2      PANCNo lab results found in last 7 days.    Recent Results (from the past 24 hour(s))   Echo Limited   Result Value    LVEF  60-65%    Narrative    [id number]  [id number]  [id number]  [id number]^MICHAEL^JERMAINE^DANNY     [facility]  Echocardiography Laboratory  [facility] [place] [address]     Name: DANYELLE AMEZQUITA  MRN: [id number]  : 1950  Study Date: 10/05/2022 08:22 AM  Age: 72 yrs  Gender: Male  Patient Location: [facility]  Reason For Study: Aortic Valve Replacement  Ordering Physician: JERMAINE RODRIGUEZ  Referring Physician: PRASANTH ROMERO  Performed By: Salas Castelan     BSA: 2.7 m2  Height: 71 in  Weight: 345 lb  HR: 68  BP: 142/80 mmHg  ______________________________________________________________________________  Procedure  Limited Portable Echo Adult. Optison (NDC #4396-7308) given  intravenously.  ______________________________________________________________________________  Interpretation Summary     There is a 34 mm Medtronic TAVR valve in the aortic postion  The gradient is normal for this prosthetic aortic valve.  The gradients are higher than at implantation across the TAVR valve.  No aortic regurgitation is present.  The visual ejection fraction is 60-65%.  Left ventricular systolic function is normal.  The study was technically difficult.  ______________________________________________________________________________  Left Ventricle  The visual ejection fraction is 60-65%. Left ventricular systolic function is  normal. Septal motion is consistent with conduction abnormality.     Right Ventricle  The right ventricle is normal in size and function.     Mitral Valve  There is moderate mitral annular calcification. There is trace mitral  regurgitation.     Tricuspid Valve  There is trace tricuspid regurgitation. Right ventricular systolic pressure  could not be approximated due to inadequate tricuspid regurgitation.     Aortic Valve  No aortic regurgitation is present. The peak AoV pressure gradient is 24.0  mmHg. The mean AoV pressure gradient is 14.1 mmHg. The gradients are higher  than at implantation across the TAVR valve. There is a 34 mm Medtronic TAVR  valve in the aortic postion. The gradient is normal for this prosthetic aortic  valve.     Pulmonic Valve  The pulmonic valve is not well visualized.     Vessels  Moderate aortic root dilatation. The ascending aorta is Moderately dilated.     Pericardium  There is no pericardial effusion.     Rhythm  Sinus rhythm was noted.     ______________________________________________________________________________  MMode/2D Measurements & Calculations  IVSd: 1.1 cm  LVIDd: 5.3 cm  LVIDs: 3.7 cm  LVPWd: 1.5 cm  FS: 29.0 %  LV mass(C)d: 282.6 grams  LV mass(C)dI: 106.3 grams/m2     Ao root diam: 4.4 cm  LA dimension: 4.6 cm  asc Aorta Diam: 4.4  cm  LA/Ao: 1.1  LVOT diam: 2.4 cm  LVOT area: 4.6 cm2  RWT: 0.57     Doppler Measurements & Calculations  Ao V2 max: 247.7 cm/sec  Ao max P.0 mmHg  Ao V2 mean: 177.9 cm/sec  Ao mean P.1 mmHg  Ao V2 VTI: 55.7 cm  ARISTEO(I,D): 2.0 cm2  ARISTEO(V,D): 2.1 cm2  LV V1 max P.1 mmHg  LV V1 max: 112.5 cm/sec  LV V1 VTI: 24.2 cm  SV(LVOT): 111.4 ml  SI(LVOT): 41.9 ml/m2  AV Anthony Ratio (DI): 0.45  ARISTEO Index (cm2/m2): 0.75     ______________________________________________________________________________  Report approved by: Abiel Amaro 10/05/2022 09:46 AM

## 2022-10-05 NOTE — PROGRESS NOTES
Writer spoke with Akiko,  at patient's group home regarding discharge planning.  Per Akiko, patient does live independently in her own home but does have PCA services for 8 hrs per day/3 days per week.  Staff assist with driving her to appointments, social outings and for companionship.  Patient works outside of the home 1 day per week and manages her own finances and medications.  She is independent at baseline with mobility. Akiko would like new medications filled at our pharmacy and to be notified as soon as possible tomorrow regarding discharge, as she is about 1.5 hrs away.  Akiko also requesting appointments be scheduled for Mondays Or Thursdays (when patient has PCA available to take her).  Writer left voice message for TAVR .  Will await call back.    Gladis Yang RN  Care Coordinator  [facility]  [phone] (text or call)

## 2022-10-05 NOTE — DISCHARGE INSTRUCTIONS
"1.  Continue aspirin 81 mg daily.  2.  EP consulted due to new LBBB and first degree AVB. \" had an extensive discussion with patient.  I favor staying in the hospital for another 24 hours for additional monitoring.  However, patient informed that he needs to go home.  He understands the risks involved with progression of conduction disease.  I recommend the following:  -Please monitor at discharge.  -Avoid any AV ash agents.  \"     3.  30 day MCOT at discharge.   4.  Continue lisinopril. No AV ash agents.  5.  Outpatient cardiac rehab.  6.  Lifelong antibiotic prophylaxis prior to any dental procedure.  7.  Follow up in TAVR in clinic on 10/13/22 at 11:10 am.       TAVR Discharge Instructions  You have just had your aortic valve replaced with a new biological tissue valve. You should feel better after your surgery, but complete recovery may take several weeks. Please follow these instructions carefully and please call your valve coordinator with any questions or concerns.      CONTACT INFORMATION:   [facility]:  [phone] (7 days a week)  Scheduling and general questions - Francia ([phone])  Valve Coordinators - Concetta RN ([phone]), Mona RN ([phone]), and Darby RN ([phone])    AFTER YOU GO HOME:  Drink extra fluids for 2 days.  You may resume your normal diet.  Do not smoke.  Do not drink alcohol.  Relax and take it easy.  Do not make any important or legal decisions.    FOR 1 WEEK AFTER TAVR:  Do not drive or operate machines at home or at work. No driving until you return for your 1 week follow-up appointment. You and the provider will discuss this at the appointment.   Refrain from sexual intercourse for 1 week.  You may shower the day after your procedure. Do NOT take a bath, or use a hot tub or pool for at least 1 week. Do NOT scrub the site. Do not use lotion or powder near the puncture site.  Do not lift more than 10 pounds.   Do not do any heavy activity " such as exercise, lifting, or straining.  No housework, yard work, or any activities that make you sweat.    CARE OF WRIST AND GROIN SITES:  For 2 days, when you cough, sneeze, laugh or move your bowels, hold your hand over the puncture site and press firmly on/above the site.  Remove the bandage after 24 hours. If there is minor oozing, apply another bandage and remove it after 12 hours.  It is normal to have bruising or pea size lump at the site.  If you have a wrist site puncture: Do not use your hand or arm to support your weight (such as rising from a chair) or bend your wrist (such as lifting a garage door) for 1 week.  No stooping or squatting for 1 week.     BLEEDING:  If you start bleeding from the site in your wrist:  Sit down and press firmly on/above the site with your fingers for 10 minutes.   Once bleeding stops, keep your arm still for 2 hours.   Call [facility] or valve coordinator as soon as you can.  If you start bleeding from the site in your groin:  Lie down flat and press firmly on/above the site for 10 minutes.  Once bleeding stops lay flat for 2 hours.   Call [facility] or valve coordinator as soon as you can.  Call 911 right away if you have heavy bleeding or bleeding that does not stop.    MEDICINES:  You may be started on an anti-platelet medication, such as Plavix or aspirin. Please call the [facility] with any questions regarding this medication.  If you have stopped any medicines, check with your provider about when to restart them.  You will require lifetime prophylactic antibiotics for dental cleanings and dental work after your TAVR, please inquire with the Heart Clinic prior to your scheduled cleanings.     FOLLOW-UP APPOINTMENTS:  Follow-up with a Structural Heart advanced practice provider (NP or PA) at [facility] in 7-10 days after your procedure.  You will also follow-up at [facility] 1 month after your procedure which will include a visit with an advanced practice provider an echocardiogram (echo), an electrocardiogram (ECG), and lab work. This follow-up should be scheduled for you prior to you leaving the hospital.  Cardiac Rehab will contact you for follow up care. You can enroll at a site convenient to you.  We will have you see your primary cardiologist about 6 months after your TAVR.  We will see you 1 year after your TAVR with a visit with an advanced practice provider (NP or PA) an echocardiogram (echo), electrocardiogram (ECG), and lab work.     CALL THE CLINIC IF:   You have increased pain or a large or growing hard lump around the site.  The site is red, swollen, hot, or tender.  Blood or fluid is draining from the site.  You have chills or a fever greater than 101 F (38 C).  Your arm or leg feels numb, cool, or changes color.  You have hives, a rash, or unusual itching.  New pain in the back or belly that you cannot control with Tylenol.  Any questions or concerns.    OTHER INSTRUCTIONS:  You will receive a card with your new valve information in the mail, directly from the . Retain this card with your health care records.    DENTAL WORK:  You must have antibiotics before all dental work to prevent endocarditis, or an infection on your new heart valve. Please call the valve coordinators to get a prescription for this. Please do not schedule any dental cleanings for at least 3-6 months after your TAVR.

## 2022-10-05 NOTE — PLAN OF CARE
Goal Outcome Evaluation:    Plan of Care Reviewed With: patient     Overall Patient Progress: improving     Neuro:intact  CV/Rhythm:SR  Resp/02:RA  GI/Diet:regular  :voiding  Skin/Incisions/Sites:intact   Pulses/CMS:intact  Edema:trace BLE  Activity/Falls Risk:fall risk, cane  Lines/Drains/IVs:PIV, B groin sites, L angioseal  Labs/BGM:-  Test/Procedures:TAVR today  VS/Pain:stable, denies pain  DC Plan:10/7 per note  Other:-

## 2022-10-05 NOTE — PROGRESS NOTES
[facility]  Structural Heart Progress Note     Date of Service: 10/05/22     [facility]  Laila Perez is a 72 year old female with severe aortic stenosis who is now s/p TAVR on 10/4/2022. Procedure went well without any conduction or hemodynamic instability. Other past medical history is notable for chronic diastolic heart failure, hypertension, and ascending aortic aneurysm measuring 4.4 cm.     Assessment:   1) Severe aortic stenosis s/p TAVR with 34 mm Medtronic Evolut valve   - via RCFA, no vascular complications  - EKG shows new LBBB   - Neuro's intact   - Cr 0.85, hgb 13  - Post-procedure echo pending   - Tolerating cardiac rehab  - On aspirin 81 mg daily for antiplatelet therapy    2) Chronic diastolic heart failure, NYHA class I  - NTpBNP 326, LVEF 60-65%  - Compensated on exam, on lasix 20 mg daily     3) Hypertension   - well-controlled on atenolol 50 mg daily     4) AAA measuring 4.4 cm    Plan:  1. Antiplatelet therapy with aspirin 81 mg daily.   2. 30-day MCOT due to new LBBB.  3. Outpatient cardiac rehab.  4. Lifelong antibiotic prophylaxis prior to any dental procedure.   5. Recommend monitoring another 24 hours for advanced AV block, likely discharge tmrw.   6. Follow-up with structural clinic in 1 week       Alia Angulo DNP, APRN, CNP  Page: [phone]    Interval History: No acute events overnight. Denies CP, SOB, syncope or near syncope, or palpitations. New LBBB on tele. Hemodynamically stable. Groin sites intact bilaterally without bleeding or bruit. Neuros intact. Tolerating activity without symptoms.       Physical Exam   Temp: 98.7  F (37.1  C) Temp src: Oral BP: (!) 142/80 Pulse: 70   Resp: 18 SpO2: 96 % O2 Device: None (Room air) Oxygen Delivery: 2 LPM  Vitals:    10/04/22 0900 10/05/22 0419   Weight: (!) 156.8 kg (345 lb 9.6 oz) (!) 155.2 kg (342 lb 3.2 oz)     Vital Signs with Ranges  Temp:  [96.6  F (35.9  C)-98.7  F (37.1  C)] 98.7  F (37.1  C)  Pulse:   [56-76] 70  Resp:  [12-23] 18  BP: ()/(50-96) 140/75  SpO2:  [91 %-100 %] 96 %  I/O last 3 completed shifts:  In: 1930 [P.O.:1030; I.V.:900]  Out: 675 [Urine:675]    Constitutional: awake  Eyes: pupils equal, round and reactive to light  Respiratory: CTA bilaterally   Cardiovascular: normal apical pulses , normal S1 and S2 and no edema, grade I MALLIKA   GI: soft  Skin: no bruising or bleeding, groin sites intact bilaterally without bleeding or bruit   Musculoskeletal: no lower extremity pitting edema present  Neurologic: Mental Status Exam:  Level of Alertness:   awake  Orientation:   person, place, time  Neuropsychiatric: General: normal, calm and normal eye contact  Affect: normal and pleasant    Medications       aspirin  81 mg Oral Daily     atenolol  50 mg Oral Daily     brimonidine  1 drop Both Eyes BID     dorzolamide-timolol  1 drop Both Eyes BID     furosemide  20 mg Oral Daily     latanoprost  1 drop Both Eyes At Bedtime     pravastatin  20 mg Oral At Bedtime       Data   Results for orders placed or performed during the hospital encounter of 10/04/22 (from the past 24 hour(s))   Activated clotting time celite, POCT   Result Value Ref Range    Activated Clotting Time (Celite) POCT 148 74 - 150 seconds   Activated clotting time celite, POCT   Result Value Ref Range    Activated Clotting Time (Celite) POCT 390 (H) 74 - 150 seconds   Activated clotting time celite, POCT   Result Value Ref Range    Activated Clotting Time (Celite) POCT 369 (H) 74 - 150 seconds   Activated clotting time celite, POCT   Result Value Ref Range    Activated Clotting Time (Celite) POCT 139 74 - 150 seconds   Cardiac Catheterization    Narrative    1. Lifestyle-limiting severe aortic stenosis.  2. Successful transfemoral transcatheter aortic valve replacement with a   34 mm Medtronic Evolut FX valve.  3. Temporary pacemaker insertion.  4. Aortic balloon valvuloplasty with a Z-Med 23 mm x 4cm balloon.  5. Right and left common  femoral arteriotomies closed with a closure   devices.   EKG 12-lead, tracing only   Result Value Ref Range    Systolic Blood Pressure  mmHg    Diastolic Blood Pressure  mmHg    Ventricular Rate 63 BPM    Atrial Rate 63 BPM    ND Interval 214 ms    QRS Duration 154 ms     ms    QTc 499 ms    P Axis 61 degrees    R AXIS -86 degrees    T Axis 74 degrees    Interpretation ECG       Sinus rhythm with 1st degree A-V block  Left axis deviation  Non-specific intra-ventricular conduction block  Abnormal ECG  When compared with ECG of 08-SEP-2022 08:11,  ND interval has increased  Questionable change in QRS duration     CBC with platelets   Result Value Ref Range    WBC Count 6.5 4.0 - 11.0 10e3/uL    RBC Count 4.31 3.80 - 5.90 10e6/uL    Hemoglobin 13.0 11.7 - 17.7 g/dL    Hematocrit 40.7 35.0 - 53.0 %    MCV 94 78 - 100 fL    MCH 30.2 26.5 - 33.0 pg    MCHC 31.9 31.5 - 36.5 g/dL    RDW 13.2 10.0 - 15.0 %    Platelet Count 101 (L) 150 - 450 10e3/uL    Narrative    The sex of this patient cannot be reliably determined based on discrepancies in demographics (legal sex, sex assigned at birth, gender identity).  Both male and female reference ranges are provided where applicable.  Careful evaluation of the patient s results as compared to the gender specific reference intervals is required in this setting.    Basic metabolic panel   Result Value Ref Range    Sodium 137 133 - 144 mmol/L    Potassium 4.5 3.4 - 5.3 mmol/L    Chloride 106 94 - 109 mmol/L    Carbon Dioxide (CO2) 29 20 - 32 mmol/L    Anion Gap 2 (L) 3 - 14 mmol/L    Urea Nitrogen 18 7 - 30 mg/dL    Creatinine 0.85 0.52 - 1.25 mg/dL    Calcium 8.5 8.5 - 10.1 mg/dL    Glucose 112 (H) 70 - 99 mg/dL    GFR Estimate >90 >60 mL/min/1.73m2    Narrative    The sex of this patient cannot be reliably determined based on discrepancies in demographics (legal sex, sex assigned at birth, gender identity).  Both male and female reference ranges are provided where applicable.   Careful evaluation of the patient s results as compared to the gender specific reference intervals is required in this setting.    Magnesium   Result Value Ref Range    Magnesium 1.9 1.6 - 2.3 mg/dL   Phosphorus   Result Value Ref Range    Phosphorus 3.4 2.5 - 4.5 mg/dL   EKG 12-lead, tracing only   Result Value Ref Range    Systolic Blood Pressure  mmHg    Diastolic Blood Pressure  mmHg    Ventricular Rate 67 BPM    Atrial Rate 67 BPM    WV Interval 208 ms    QRS Duration 150 ms     ms    QTc 475 ms    P Axis 46 degrees    R AXIS -31 degrees    T Axis 97 degrees    Interpretation ECG       Sinus rhythm  Left axis deviation  Left bundle branch block  Abnormal ECG  When compared with ECG of 04-OCT-2022 13:14, (unconfirmed)  No significant change was found

## 2022-10-05 NOTE — PLAN OF CARE
Pt stable over night post TAVR.  VSS. Neuro's WNL's, bilateral groin sites c/d/I.  Tele:SR BBB.  Tylenol given for c/o neck pain which is chronic in nature.  She is up to the BR with SBA and a cane.  Possible discharge today.

## 2022-10-05 NOTE — PLAN OF CARE
Laila is alert, oriented, pleasant and cooperative. She denies discomfort, nausea, dyspnea. Bilateral groin sites intact, slightly ecchymotic. Interdry (wicking fabric) placed between abdominal/groin skin folds. Up independently, walked in the hallways x 5.  Plan to discharge to group home tomorrow with ride from Akiko, her  at the group [place]. She will follow up in [place].     Tele: SR with BBB and slight AVB. ELZBIETA Rojo updated with finding of new AVB: atenolol dc'd and lisinopril added for tomorrow.  Plan for monitor at discharge.

## 2022-10-05 NOTE — PROGRESS NOTES
Brief Cardiology Note  Pt: Laila Perez    1950      Given new LBBB and 1st degree AVB, recommend discontinuing atenolol and starting lisinopril 5 mg daily for blood pressure control. Discharge home with 30-day event monitor.     Alia Angulo CNP  3:00 PM 10/5/2022   [facility]  Pager: [phone]

## 2022-10-05 NOTE — OP NOTE
Procedure Date: 10/04/2022    PREOPERATIVE DIAGNOSIS:  Severe symptomatic aortic stenosis.    POSTOPERATIVE DIAGNOSIS:  Severe symptomatic aortic stenosis.    SURGEON:  Virginia Gong MD    INTERVENTIONAL CARDIOLOGIST: Young Ramires MD    ASSISTANT:  Dr. Jeovany Rivera.    NAME OF OPERATION:  Transcatheter aortic valve replacement (TAVR) with a 34 mm Medtronic Evolut FX valve, percutaneous right transfemoral approach.    ANESTHESIA:  Monitored anesthesia care.    INDICATIONS FOR PROCEDURE:  Ms. Perez is a 72-year-old female with severe symptomatic aortic stenosis, who was deemed an appropriate TAVR candidate.  She was taken to the hybrid room today for transcatheter aortic valve replacement via a right transfemoral percutaneous approach.    DESCRIPTION OF PROCEDURE:  Please refer to Interventional Cardiology notes for details of the procedure.  In brief, access was obtained in the right and left common femoral arteries and left common femoral vein.  The right common femoral artery used for valve delivery was premeasured using the MANTA device.  Temporary pacemaker was positioned in the right ventricle via left common femoral vein.  Access was eventually obtained in the left ventricle and aortic balloon valvuloplasty was successfully completed using the Z Med 23 mm balloon during rapid pacing.  Subsequently to 34 mm Evolut effects valve was positioned across the native aortic valve with a depth of approximately 3 mm and using angiography and TTE, we confirmed optimal valve position.  Subsequently, the heart was rapid pacing at 100 beats per minute and the valve was implanted.  Subsequent TTE demonstrated no paravalvular leak.  Protamine was administered and the valve delivery system was removed and right common femoral arteriotomy was successfully closed using a MANTA device.  Completion iliofemoral angiogram demonstrated no contrast, extravasation or stenosis.  Temporary pacer was also removed.  There were no  intraoperative complications.    Virginia Gong MD        D: 10/04/2022   T: 10/05/2022   MT: [id number]    Name:     DANYELLE AMEZQUITA  MRN:      [id number]        Account:        [id number]   :      1950           Procedure Date: 10/04/2022     Document: [id number]

## 2022-10-06 ENCOUNTER — APPOINTMENT (OUTPATIENT)
Dept: CARDIOLOGY | Facility: CLINIC | Age: 72
DRG: 267 | End: 2022-10-06
Attending: NURSE PRACTITIONER
Payer: COMMERCIAL

## 2022-10-06 ENCOUNTER — APPOINTMENT (OUTPATIENT)
Dept: PHYSICAL THERAPY | Facility: CLINIC | Age: 72
DRG: 267 | End: 2022-10-06
Attending: INTERNAL MEDICINE
Payer: COMMERCIAL

## 2022-10-06 VITALS
OXYGEN SATURATION: 96 % | TEMPERATURE: 97.7 F | DIASTOLIC BLOOD PRESSURE: 89 MMHG | HEART RATE: 62 BPM | HEIGHT: 71 IN | SYSTOLIC BLOOD PRESSURE: 146 MMHG | WEIGHT: 293 LBS | BODY MASS INDEX: 41.02 KG/M2 | RESPIRATION RATE: 18 BRPM

## 2022-10-06 LAB
ANION GAP SERPL CALCULATED.3IONS-SCNC: 3 MMOL/L (ref 3–14)
BUN SERPL-MCNC: 16 MG/DL (ref 7–30)
CALCIUM SERPL-MCNC: 9.2 MG/DL (ref 8.5–10.1)
CHLORIDE BLD-SCNC: 105 MMOL/L (ref 94–109)
CO2 SERPL-SCNC: 29 MMOL/L (ref 20–32)
CREAT SERPL-MCNC: 0.92 MG/DL (ref 0.52–1.25)
ERYTHROCYTE [DISTWIDTH] IN BLOOD BY AUTOMATED COUNT: 13.1 % (ref 10–15)
GFR SERPL CREATININE-BSD FRML MDRD: 88 ML/MIN/1.73M2
GLUCOSE BLD-MCNC: 138 MG/DL (ref 70–99)
HCT VFR BLD AUTO: 43.7 % (ref 35–53)
HGB BLD-MCNC: 13.8 G/DL (ref 11.7–17.7)
MAGNESIUM SERPL-MCNC: 2 MG/DL (ref 1.6–2.3)
MCH RBC QN AUTO: 30 PG (ref 26.5–33)
MCHC RBC AUTO-ENTMCNC: 31.6 G/DL (ref 31.5–36.5)
MCV RBC AUTO: 95 FL (ref 78–100)
PHOSPHATE SERPL-MCNC: 2.8 MG/DL (ref 2.5–4.5)
PLATELET # BLD AUTO: 99 10E3/UL (ref 150–450)
POTASSIUM BLD-SCNC: 4.4 MMOL/L (ref 3.4–5.3)
RBC # BLD AUTO: 4.6 10E6/UL (ref 3.8–5.9)
SODIUM SERPL-SCNC: 137 MMOL/L (ref 133–144)
WBC # BLD AUTO: 7 10E3/UL (ref 4–11)

## 2022-10-06 PROCEDURE — 250N000013 HC RX MED GY IP 250 OP 250 PS 637: Performed by: NURSE PRACTITIONER

## 2022-10-06 PROCEDURE — 99232 SBSQ HOSP IP/OBS MODERATE 35: CPT | Performed by: INTERNAL MEDICINE

## 2022-10-06 PROCEDURE — 99222 1ST HOSP IP/OBS MODERATE 55: CPT | Mod: 25 | Performed by: INTERNAL MEDICINE

## 2022-10-06 PROCEDURE — 99239 HOSP IP/OBS DSCHRG MGMT >30: CPT | Performed by: INTERNAL MEDICINE

## 2022-10-06 PROCEDURE — 97110 THERAPEUTIC EXERCISES: CPT | Mod: GP

## 2022-10-06 PROCEDURE — 999N000096 CARDIAC MOBILE TELEMETRY MONITOR

## 2022-10-06 PROCEDURE — 93228 REMOTE 30 DAY ECG REV/REPORT: CPT | Performed by: INTERNAL MEDICINE

## 2022-10-06 PROCEDURE — 83735 ASSAY OF MAGNESIUM: CPT | Performed by: NURSE PRACTITIONER

## 2022-10-06 PROCEDURE — 93010 ELECTROCARDIOGRAM REPORT: CPT | Performed by: INTERNAL MEDICINE

## 2022-10-06 PROCEDURE — 85027 COMPLETE CBC AUTOMATED: CPT | Performed by: NURSE PRACTITIONER

## 2022-10-06 PROCEDURE — 93005 ELECTROCARDIOGRAM TRACING: CPT

## 2022-10-06 PROCEDURE — 84100 ASSAY OF PHOSPHORUS: CPT | Performed by: NURSE PRACTITIONER

## 2022-10-06 PROCEDURE — 97530 THERAPEUTIC ACTIVITIES: CPT | Mod: GP

## 2022-10-06 PROCEDURE — 36415 COLL VENOUS BLD VENIPUNCTURE: CPT | Performed by: NURSE PRACTITIONER

## 2022-10-06 PROCEDURE — 80048 BASIC METABOLIC PNL TOTAL CA: CPT | Performed by: NURSE PRACTITIONER

## 2022-10-06 RX ORDER — NITROGLYCERIN 0.4 MG/1
TABLET SUBLINGUAL
Qty: 30 TABLET | Refills: 1 | Status: SHIPPED | OUTPATIENT
Start: 2022-10-06 | End: 2022-10-13

## 2022-10-06 RX ORDER — LISINOPRIL 5 MG/1
5 TABLET ORAL DAILY
Qty: 30 TABLET | Refills: 3 | Status: SHIPPED | OUTPATIENT
Start: 2022-10-06 | End: 2022-10-13

## 2022-10-06 RX ADMIN — ASPIRIN 81 MG: 81 TABLET, COATED ORAL at 08:36

## 2022-10-06 RX ADMIN — FUROSEMIDE 20 MG: 20 TABLET ORAL at 08:36

## 2022-10-06 RX ADMIN — LISINOPRIL 5 MG: 5 TABLET ORAL at 08:36

## 2022-10-06 RX ADMIN — BRIMONIDINE TARTRATE 1 DROP: 2 SOLUTION/ DROPS OPHTHALMIC at 08:39

## 2022-10-06 RX ADMIN — DORZOLAMIDE HYDROCHLORIDE AND TIMOLOL MALEATE 1 DROP: 20; 5 SOLUTION/ DROPS OPHTHALMIC at 08:38

## 2022-10-06 ASSESSMENT — ACTIVITIES OF DAILY LIVING (ADL)
ADLS_ACUITY_SCORE: 18

## 2022-10-06 NOTE — PLAN OF CARE
A&OX4, RA, denies pain, VSS. Tele- SB/SR, left and right groin ecchymotic.  Up independently.No events overnight.

## 2022-10-06 NOTE — PLAN OF CARE
Physical Therapy Discharge Summary    Reason for therapy discharge:    Discharged to home with outpatient therapy.    Progress towards therapy goal(s). [facility] for goal details.  Goals met    Therapy recommendation(s):    Continued therapy is recommended.  Rationale/Recommendations:  For further cardiac education and endurance training.

## 2022-10-06 NOTE — PROGRESS NOTES
[facility]  Cardiology Progress Note    Date of Service (when I saw the patient): 10/06/2022  Summary:Laila Perez is a 72 year old female with severe aortic stenosis who is now s/p TAVR on 10/4/2022. Procedure went well without any conduction or hemodynamic instability. Other past medical history is notable for chronic diastolic heart failure, hypertension, and ascending aortic aneurysm measuring 4.4 cm.   Interval History   She is feeling great this morning. She has been walking the halls frequently and walked stairs with cardiac rehab without any issues. No dizziness or lightheadedness. EKG with LBBB and first degree AVB.  Assessment & Plan   1) Severe aortic stenosis s/p TAVR with 34 mm Medtronic Evolut valve   - via RCFA, no vascular complications  - Neuro's intact   - Cr 0.85, hgb 13  - Post-procedure echo showed normal LV function, mean AoV graidne of 14.1 mmHg (post procedure gradient was 5 mmHg)  - Tolerating cardiac rehab  - On aspirin 81 mg daily for antiplatelet therapy    2) New LBBB with first degree AVB after TAVR.  ms today  -  EP consult today.   -  Did receive dose of atenolol on 10/5     3) Chronic diastolic heart failure   - NTpBNP 326, LVEF 60-65%  - Compensated on exam, on lasix 20 mg daily      4) Hypertension   - On atenolol 50 mg daily, stopped due to new LBBB and first degree AVB.  -  Started on lisinopril 5 mg daily.    5) AAA measuring 4.4 cm     Plan:  1.  Continue aspirin 81 mg daily.  2.  EP consulted today due to new LBBB and first degree AVB.  3.  30 day MCOT at discharge.   4.  Continue lisinopril. No AV ash agents.  5.  Outpatient cardiac rehab.  6.  Lifelong antibiotic prophylaxis prior to any dental procedure.  7.  Follow up in TAVR in clinic on 10/13/22 at 11:10 am.     Anju Rosado PA-C    Physical Exam   Temp: 97.7  F (36.5  C) Temp src: Oral BP: (!) 146/89 (Post CR) Pulse: 62   Resp: 18 SpO2: 96 % O2 Device: None (Room air)     Vitals:    10/04/22 0900 10/05/22 0419 10/06/22 0534   Weight: (!) 156.8 kg (345 lb 9.6 oz) (!) 155.2 kg (342 lb 3.2 oz) (!) 154.2 kg (340 lb)     Vital Signs with Ranges  Temp:  [97.4  F (36.3  C)-98.3  F (36.8  C)] 97.7  F (36.5  C)  Pulse:  [61-81] 62  Resp:  [18] 18  BP: (129-156)/(75-89) 146/89  SpO2:  [95 %-97 %] 96 %  10/01 0700 - 10/06 0659  In: 2180 [P.O.:1280; I.V.:900]  Out: 675 [Urine:675]  Net: 1505  Constitutional: NAD.   Respiratory: CTAB.   Cardiovascular: RRR, s1s2, soft systolic murmur  GI: soft, BS+  Skin: warm, no rashes  Musculoskeletal: Moving all extremities. No edema.  Neurologic: Alert, oriented x 3  Neuropsychiatric: Normal affect   Data   Results for orders placed or performed during the hospital encounter of 10/04/22 (from the past 24 hour(s))   CBC with platelets   Result Value Ref Range    WBC Count 7.0 4.0 - 11.0 10e3/uL    RBC Count 4.60 3.80 - 5.90 10e6/uL    Hemoglobin 13.8 11.7 - 17.7 g/dL    Hematocrit 43.7 35.0 - 53.0 %    MCV 95 78 - 100 fL    MCH 30.0 26.5 - 33.0 pg    MCHC 31.6 31.5 - 36.5 g/dL    RDW 13.1 10.0 - 15.0 %    Platelet Count 99 (L) 150 - 450 10e3/uL    Narrative    The sex of this patient cannot be reliably determined based on discrepancies in demographics (legal sex, sex assigned at birth, gender identity).  Both male and female reference ranges are provided where applicable.  Careful evaluation of the patient s results as compared to the gender specific reference intervals is required in this setting.    Basic metabolic panel   Result Value Ref Range    Sodium 137 133 - 144 mmol/L    Potassium 4.4 3.4 - 5.3 mmol/L    Chloride 105 94 - 109 mmol/L    Carbon Dioxide (CO2) 29 20 - 32 mmol/L    Anion Gap 3 3 - 14 mmol/L    Urea Nitrogen 16 7 - 30 mg/dL    Creatinine 0.92 0.52 - 1.25 mg/dL    Calcium 9.2 8.5 - 10.1 mg/dL    Glucose 138 (H) 70 - 99 mg/dL    GFR Estimate 88 >60 mL/min/1.73m2    Narrative    The sex of this patient cannot be reliably determined based  on discrepancies in demographics (legal sex, sex assigned at birth, gender identity).  Both male and female reference ranges are provided where applicable.  Careful evaluation of the patient s results as compared to the gender specific reference intervals is required in this setting.    Magnesium   Result Value Ref Range    Magnesium 2.0 1.6 - 2.3 mg/dL   Phosphorus   Result Value Ref Range    Phosphorus 2.8 2.5 - 4.5 mg/dL   EKG 12-lead, tracing only   Result Value Ref Range    Systolic Blood Pressure  mmHg    Diastolic Blood Pressure  mmHg    Ventricular Rate 59 BPM    Atrial Rate 59 BPM    KS Interval 318 ms    QRS Duration 154 ms     ms    QTc 463 ms    P Axis 47 degrees    R AXIS -67 degrees    T Axis 72 degrees    Interpretation ECG       Sinus bradycardia with 1st degree A-V block  Left axis deviation  Left bundle branch block  Abnormal ECG  When compared with ECG of 05-OCT-2022 07:12, (unconfirmed)  KS interval has increased            Tele SB/SR with LBBB, no significant pauses or advance AVB    Medications       aspirin  81 mg Oral Daily     brimonidine  1 drop Both Eyes BID     dorzolamide-timolol  1 drop Both Eyes BID     furosemide  20 mg Oral Daily     latanoprost  1 drop Both Eyes At Bedtime     lisinopril  5 mg Oral Daily     pravastatin  20 mg Oral At Bedtime

## 2022-10-06 NOTE — CONSULTS
"Electrophysiology/ Cardiology- Consult Note         H&P and Plan:     Reason for consult: New LBBB pattern after TAVR.      History of present illness: Patient is a pleasant 72-year old transgender/lady with a history of hypertension, hyperlipidemia, morbid obsetiy, ascending aortic aneurysm and severe aortic stenosis, who underwent TAVR procedure yesterday, and was found to have a new LBBB pattern on EKG.      Today, patient is doing well.  Has no complaints or injuries visit.  He denies any sense of chest pain, shortness of breath, lightheadedness, near-syncope or syncope.      Previous EKG (prior to the TAVR procedure) was within normal limits.  After TAVR, patient developed new LBBB pattern with first-degree AV block.  Interestingly, AR was around 210 ms suggestive, which has prolonged to 320 ms today.      Previous studies:  -Echo (10/5/2022):  Medtronic TAVR valve (34 mm) in the aortic postion with normal gradients across the valve.  EF of 60 to 65%.    Plan:  1.  New LBBB pattern with first-degree AV block after TAVR procedure.  AR slightly prolonged today.  However, patient took atenolol yesterday.  Telemetry did not show any significant pauses or other forms of AV block.    I had an extensive discussion with patient.  I favor staying in the hospital for another 24 hours for additional monitoring.  However, patient informed that he needs to go home.  He understands the risks involved with progression of conduction disease.  I recommend the following:  -Please monitor at discharge.  -Avoid any AV ash agents.      We will sign off.    Moody Boston MD    Physical Exam:  Vitals: BP (!) 146/89 (BP Location: Left arm)   Pulse 62   Temp 97.7  F (36.5  C) (Oral)   Resp 18   Ht 1.803 m (5' 11\")   Wt (!) 154.2 kg (340 lb)   SpO2 96%   BMI 47.42 kg/m      No intake or output data in the 24 hours ending 10/06/22 0931  Vitals:    10/04/22 0900 10/05/22 0419 10/06/22 0534   Weight: (!) 156.8 kg (345 lb 9.6 oz) " (!) 155.2 kg (342 lb 3.2 oz) (!) 154.2 kg (340 lb)       Constitutional:  AAO x3.  Pt is in NAD.  HEAD: Normocephalic.  SKIN: Skin normal color, texture and turgor with no lesions or eruptions.  Eyes: PERRL, EOMI.  ENT:  Supple, normal JVP. No lymphadenopathy or thyroid enlargement.  Chest:  CTAB.  Cardiac:   RRR, normal  S1 and S2.  No murmurs rubs or gallop.    Abdomen:  Normal BS.  Soft, non-tender and non-distended.  No rebound or guarding.    Extremities:  Pedious pulses palpable B/L.  No LE edema noticed.   Neurological: Strength and sensation grossly symmetric and intact throughout.         Review of Systems:  Complete review of system is otherwise negative with the exception of what was described above.     CURRENT MEDICATIONS:    aspirin  81 mg Oral Daily     brimonidine  1 drop Both Eyes BID     dorzolamide-timolol  1 drop Both Eyes BID     furosemide  20 mg Oral Daily     latanoprost  1 drop Both Eyes At Bedtime     lisinopril  5 mg Oral Daily     pravastatin  20 mg Oral At Bedtime     PRN Meds: acetaminophen, albuterol, HOLD MEDICATION, hydrALAZINE, ipratropium - albuterol 0.5 mg/2.5 mg/3 mL, nitroGLYcerin    ALLERGIES     Allergies   Allergen Reactions     External Allergen Needs Reconciliation - See Comment      Please reconcile the Patient's allergy reported as ESTERSPENICILLIN and update accordingly           Zosyn [Penicillins]      unsure of reaction, allergy is listed on his med sheet     Heparin Rash     he lives in a assisted living program and is not sure what type of allergies he really has. He quit drinking       PAST MEDICAL HISTORY:  Past Medical History:   Diagnosis Date     Alcoholism (H)      DDD (degenerative disc disease), cervical      Gender dysphoria in adult      HLD (hyperlipidemia)      HTN (hypertension)      large compression fracture 03/13/2006    Worked up for neoplasm, none found.       OA (osteoarthritis of spine)      Obese      Unspecified internal derangement of knee      CHRONIC KNEE PAIN,LEG,NECK AND HEAD INJURIES       PAST SURGICAL HISTORY:  Past Surgical History:   Procedure Laterality Date     COLONOSCOPY  2007     CV CORONARY ANGIOGRAM N/A 9/8/2022    Procedure: Coronary Angiogram;  Surgeon: Santy Clayton MD;  Location:  [facility]     CV PCI N/A 9/8/2022    Procedure: Percutaneous Coronary Intervention;  Surgeon: Santy Clayton MD;  Location:  [facility]     CV TRANSCATHETER AORTIC VALVE REPLACEMENT-FEMORAL APPROACH N/A 10/4/2022    Procedure: Transcatheter Aortic Valve Replacement-Femoral Approach;  Surgeon: Young Ramires MD;  Location:  [facility]ARD[facility]     JOINT REPLACEMTN, KNEE RT/LT  2006    left     ORCHIECTOMY SCROTAL Bilateral 5/14/2019    Procedure: Bilatera Scrotal Orchiectomy;  Surgeon: Abhilash Weston MD;  Location:  OR       FAMILY HISTORY:  The patient's family history was reviewed and is non-contributory for heart disease.    SOCIAL HISTORY:  Social History     Socioeconomic History     Marital status:      Spouse name: None     Number of children: None     Years of education: None     Highest education level: None   Occupational History     Occupation: RETIRED   Tobacco Use     Smoking status: Never Smoker     Smokeless tobacco: Never Used   Vaping Use     Vaping Use: Never used   Substance and Sexual Activity     Alcohol use: No     Comment: Quit 3/24/05     Drug use: No     Sexual activity: Not Currently   Social History Narrative    August 11, 2022    ENVIRONMENTAL HISTORY: [place] is heated with a forced air. They do have central air conditioning. The patient's bedroom is furnished with hard edna in bedroom and fabric window coverings.  No pets. There is no history of cockroach or mice infestation. There is/are 0 smokers in the house.  The house does not have a basement.      Social Determinants of Health     Financial  Resource Strain: Medium Risk     Difficulty of Paying Living Expenses: Somewhat hard   Food Insecurity: No Food Insecurity     Worried About Running Out of Food in the Last Year: Never true     Ran Out of Food in the Last Year: Never true   Transportation Needs: No Transportation Needs     Lack of Transportation (Medical): No     Lack of Transportation (Non-Medical): No   Physical Activity: Sufficiently Active     Days of Exercise per Week: 7 days     Minutes of Exercise per Session: 130 min   Stress: No Stress Concern Present     Feeling of Stress : Only a little   Social Connections: Socially Isolated     Frequency of Communication with Friends and Family: More than three times a week     Frequency of Social Gatherings with Friends and Family: Once a week     Attends Baptism Services: Never     Active Member of Clubs or Organizations: No     Attends Club or Organization Meetings: Never     Marital Status:    Intimate Partner Violence: Not At Risk     Fear of Current or Ex-Partner: No     Emotionally Abused: No     Physically Abused: No     Sexually Abused: No   Housing Stability: Low Risk      Unable to Pay for Housing in the Last Year: No     Number of Places Lived in the Last Year: 1     Unstable Housing in the Last Year: No         Recent Lab Results:  Recent Labs   Lab 10/06/22  0559 10/05/22  0558 10/04/22  0827 09/29/22  1421   WBC 7.0 6.5  --  8.2   HGB 13.8 13.0  --  14.3   MCV 95 94  --  95   PLT 99* 101*  --  136*   INR  --   --   --  1.15    137  --  144   POTASSIUM 4.4 4.5 4.9 4.7   CHLORIDE 105 106  --  104   CO2 29 29  --  29   BUN 16 18  --  19.0   CR 0.92 0.85  --  1.11   ANIONGAP 3 2*  --  11   GOPAL 9.2 8.5  --  9.7   * 112*  --  84   ALBUMIN  --   --   --  4.2   PROTTOTAL  --   --   --  7.7   BILITOTAL  --   --   --  0.4   ALKPHOS  --   --   --  87   ALT  --   --   --  33   AST  --   --   --  34

## 2022-10-06 NOTE — PLAN OF CARE
Jameson is discharging to her group home [facility] after TAVR. Groin sites bruised, stable. Interdry wicking fabric placed under pannus. Encouraged use of anti-fungal powder once puncture sites are fully healed.    She's walking independently in the hallways, slightly dyspneic with exertion. Reports she was able to lay flat in bed to sleep for the first time in years. She's pleased with her progress.    She will discharge with a 30 day home monitor. Follow up scheduled in [place].

## 2022-10-06 NOTE — DISCHARGE SUMMARY
[facility]  Hospitalist Discharge Summary      Date of Admission:  10/4/2022  Date of Discharge:  10/6/2022  Discharging Provider: Jorge Hoang MD, MD  Discharge Service: Hospitalist Service    Discharge Diagnoses     Severe symptomatic aortic stenosis S/P TAVR 10/04  Hypertension  Ascending aortic aneurysm  New LBBB    History of Alcoholism     Severe Obesity    Thrombocytopenia: Chronic    M to F transgender    Follow-ups Needed After Discharge   Follow-up Appointments     Follow-up and recommended labs and tests       Follow up with primary care provider, Delmis Simons, within 7   days for hospital follow- up.        1. Follow-up with structural clinic in 1 week         {  Unresulted Labs Ordered in the Past 30 Days of this Admission     Date and Time Order Name Status Description    10/4/2022  8:15 AM Prepare red blood cells (unit) Preliminary     10/4/2022  8:15 AM Prepare red blood cells (unit) Preliminary         Discharge Disposition   Discharged to home  Condition at discharge: Stable    Hospital Course   Laila Perez is a 72 year old adult admitted on 10/4/2022 for planned TAVR. She has a PMH significant for alcoholism, M to F transgender, severe symptomatic aortic stenosis, ascending aortic aneurysm and hypertension     Severe symptomatic aortic stenosis  Hypertension  Ascending aortic aneurysm  S/P TAVR 10/04  -She was monitored postprocedure and remained well.  Had standard echo which was stable.   As she  Developed postprocedure LBBB,  she was evaluated by electrophysiology and her atenolol was discontinued and she was started on lisinopril.  She was offered continued monitoring in the hospital but she wanted to leave, so she was discharged with a Holter monitor and advised to follow-up with cardiology as outpatient.       History of Alcoholism   - No ETOH for 1 month.   - Former ETOH  Abuse with treatment in 2010. Now uses very occasionally.      Severe  "Obesity  - Estimated body mass index is 48.23 kg/m  as calculated from the following:  Height as of 9/8/22: 1.803 m (5' 11\").  Weight as of 9/29/22: 156.9 kg (345 lb 12.8 oz).     Thrombocytopenia: Chronic  - monitored     M to F transgender  - Noted     Consultations This Hospital Stay   CARDIAC REHAB IP CONSULT  HOSPITALIST IP CONSULT  PHARMACY IP CONSULT    Code Status   Full Code    Time Spent on this Encounter   I, Jorge Hoang MD, MD, personally saw the patient today and spent greater than 30 minutes discharging this patient.       Jorge Hoang MD, MD  [facility] CORONARY CARE UNIT  [address]  [facility] [address]  Phone: [phone]  ______________________________________________________________________    Physical Exam   BP (!) 146/89 (BP Location: Left arm)   Pulse 62   Temp 97.7  F (36.5  C) (Oral)   Resp 18   Ht 1.803 m (5' 11\")   Wt (!) 154.2 kg (340 lb)   SpO2 96%   BMI 47.42 kg/m    Gen- pleasant   HEENT- NAD, SAM  Neck- supple, no JVD elevation, no thyromegaly  CVS- I+II+ soft ESM   RS- CTAB  Abdo- soft, no tenderness . No g/r/r  Ext- no edema   CNS- no gross focal deficit    Primary Care Physician   Delmis Simons    Discharge Orders      CARDIAC REHAB REFERRAL      Follow-up and recommended labs and tests     Follow up with primary care provider, Delmis Simons, within 7 days for hospital follow- up.        1. Follow-up with structural clinic in 1 week     Activity    Your activity upon discharge: activity as tolerated     Reason for your hospital stay    Laila Perez is a 72 year old adult admitted on 10/4/2022 for planned TAVR. She has a PMH significant for alcoholism, M to F transgender, severe symptomatic aortic stenosis, ascending aortic aneurysm and hypertension     Severe symptomatic aortic stenosis  Hypertension  Ascending aortic aneurysm  S/P TAVR 10/04  Did well post procedure .  Developed LBBB on telemetry, so monitored " another night and discharged with 24 hr holter     Adult Cardiac Event Monitor     Diet    Follow this diet upon discharge: Orders Placed This Encounter      Regular Diet Adult       Significant Results and Procedures   Results for orders placed or performed during the hospital encounter of 10/04/22   Echo Limited     Value    LVEF  60-65%    Narrative    [id number]  [id number]  [id number]  [id number]^MICHAEL^JERMAINE^DANNY     [facility]  Echocardiography Laboratory  [address] [place] [address]     Name: DANYELLE AMEZQUITA  MRN: [id number]  : 1950  Study Date: 10/05/2022 08:22 AM  Age: 72 yrs  Gender: Male  Patient Location: [facility]  Reason For Study: Aortic Valve Replacement  Ordering Physician: JERMAINE RODRIGUEZ  Referring Physician: PRASANTH ROMERO  Performed By: Salas Castelan     BSA: 2.7 m2  Height: 71 in  Weight: 345 lb  HR: 68  BP: 142/80 mmHg  ______________________________________________________________________________  Procedure  Limited Portable Echo Adult. Optison (NDC #5276-3800) given intravenously.  ______________________________________________________________________________  Interpretation Summary     There is a 34 mm Medtronic TAVR valve in the aortic postion  The gradient is normal for this prosthetic aortic valve.  The gradients are higher than at implantation across the TAVR valve.  No aortic regurgitation is present.  The visual ejection fraction is 60-65%.  Left ventricular systolic function is normal.  The study was technically difficult.  ______________________________________________________________________________  Left Ventricle  The visual ejection fraction is 60-65%. Left ventricular systolic function is  normal. Septal motion is consistent with conduction abnormality.     Right Ventricle  The right ventricle is normal in size and function.     Mitral Valve  There is moderate mitral annular calcification. There is trace mitral  regurgitation.     Tricuspid  Valve  There is trace tricuspid regurgitation. Right ventricular systolic pressure  could not be approximated due to inadequate tricuspid regurgitation.     Aortic Valve  No aortic regurgitation is present. The peak AoV pressure gradient is 24.0  mmHg. The mean AoV pressure gradient is 14.1 mmHg. The gradients are higher  than at implantation across the TAVR valve. There is a 34 mm Medtronic TAVR  valve in the aortic postion. The gradient is normal for this prosthetic aortic  valve.     Pulmonic Valve  The pulmonic valve is not well visualized.     Vessels  Moderate aortic root dilatation. The ascending aorta is Moderately dilated.     Pericardium  There is no pericardial effusion.     Rhythm  Sinus rhythm was noted.     ______________________________________________________________________________  MMode/2D Measurements & Calculations  IVSd: 1.1 cm  LVIDd: 5.3 cm  LVIDs: 3.7 cm  LVPWd: 1.5 cm  FS: 29.0 %  LV mass(C)d: 282.6 grams  LV mass(C)dI: 106.3 grams/m2     Ao root diam: 4.4 cm  LA dimension: 4.6 cm  asc Aorta Diam: 4.4 cm  LA/Ao: 1.1  LVOT diam: 2.4 cm  LVOT area: 4.6 cm2  RWT: 0.57     Doppler Measurements & Calculations  Ao V2 max: 247.7 cm/sec  Ao max P.0 mmHg  Ao V2 mean: 177.9 cm/sec  Ao mean P.1 mmHg  Ao V2 VTI: 55.7 cm  ARISTEO(I,D): 2.0 cm2  ARISTEO(V,D): 2.1 cm2  LV V1 max P.1 mmHg  LV V1 max: 112.5 cm/sec  LV V1 VTI: 24.2 cm  SV(LVOT): 111.4 ml  SI(LVOT): 41.9 ml/m2  AV Anthony Ratio (DI): 0.45  ARSITEO Index (cm2/m2): 0.75     ______________________________________________________________________________  Report approved by: Abiel Amaro 10/05/2022 09:46 AM         Cardiac Catheterization    Narrative    1. Lifestyle-limiting severe aortic stenosis.  2. Successful transfemoral transcatheter aortic valve replacement with a   34 mm Medtronic Evolut FX valve.  3. Temporary pacemaker insertion.  4. Aortic balloon valvuloplasty with a Z-Med 23 mm x 4cm balloon.  5. Right and left common  femoral arteriotomies closed with a closure   devices.       Discharge Medications   Current Discharge Medication List      START taking these medications    Details   aspirin (ASA) 81 MG EC tablet Take 1 tablet (81 mg) by mouth daily  Qty: 30 tablet, Refills: 11    Associated Diagnoses: Aortic stenosis, severe      lisinopril (ZESTRIL) 5 MG tablet Take 1 tablet (5 mg) by mouth daily  Qty: 30 tablet, Refills: 3    Associated Diagnoses: Aortic stenosis, severe      nitroGLYcerin (NITROSTAT) 0.4 MG sublingual tablet For chest pain place 1 tablet under the tongue every 5 minutes for 3 doses. If symptoms persist 5 minutes after 1st dose call 911.  Qty: 30 tablet, Refills: 1    Associated Diagnoses: Aortic stenosis, severe         CONTINUE these medications which have NOT CHANGED    Details   acetaminophen (TYLENOL) 325 MG tablet Take 975 mg by mouth nightly as needed for mild pain      albuterol (PROAIR HFA/PROVENTIL HFA/VENTOLIN HFA) 108 (90 Base) MCG/ACT inhaler Inhale 2 puffs into the lungs every 6 hours  Qty: 18 g, Refills: 0    Comments: Pharmacy may dispense brand covered by insurance (Proair, or proventil or ventolin or generic albuterol inhaler)  Associated Diagnoses: Wheezing      brimonidine (ALPHAGAN) 0.2 % ophthalmic solution Place 1 drop into both eyes 2 times daily      dorzolamide-timolol (COSOPT) 2-0.5 % ophthalmic solution Place 1 drop into both eyes 2 times daily      estradiol (VIVELLE-DOT) 0.05 MG/24HR bi-weekly patch Place 1 patch onto the skin twice a week  Qty: 24 patch, Refills: 1    Associated Diagnoses: 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      furosemide (LASIX) 20 MG tablet Take 1 tablet (20 mg) by mouth daily  Qty: 90 tablet, Refills: 1    Associated Diagnoses: Dyspnea on exertion; Pedal edema      ipratropium - albuterol 0.5 mg/2.5 mg/3 mL (DUONEB) 0.5-2.5 (3) MG/3ML neb solution Take 1 vial (3 mLs) by nebulization every 6 hours as needed for shortness of breath / dyspnea or  wheezing  Qty: 90 mL, Refills: 4    Associated Diagnoses: Dyspnea on exertion      latanoprost (XALATAN) 0.005 % ophthalmic solution Place 1 drop into both eyes At Bedtime      lovastatin (MEVACOR) 20 MG tablet TAKE ONE TABLET BY MOUTH ONCE A DAY AT BEDTIME  Qty: 90 tablet, Refills: 3    Associated Diagnoses: Hyperlipidemia LDL goal <100      Multiple Vitamin (DAILY-EVANGELISTA) TABS Take 1 tablet by mouth daily  Refills: 11         STOP taking these medications       atenolol (TENORMIN) 50 MG tablet Comments:   Reason for Stopping:             Allergies   Allergies   Allergen Reactions     External Allergen Needs Reconciliation - See Comment      Please reconcile the Patient's allergy reported as ESTERSPENICILLIN and update accordingly           Zosyn [Penicillins]      unsure of reaction, allergy is listed on his med sheet     Heparin Rash     he lives in a assisted living program and is not sure what type of allergies he really has. He quit drinking

## 2022-10-07 LAB
ATRIAL RATE - MUSE: 59 BPM
ATRIAL RATE - MUSE: 63 BPM
ATRIAL RATE - MUSE: 67 BPM
DIASTOLIC BLOOD PRESSURE - MUSE: NORMAL MMHG
INTERPRETATION ECG - MUSE: NORMAL
P AXIS - MUSE: 46 DEGREES
P AXIS - MUSE: 47 DEGREES
P AXIS - MUSE: 61 DEGREES
PR INTERVAL - MUSE: 208 MS
PR INTERVAL - MUSE: 214 MS
PR INTERVAL - MUSE: 318 MS
QRS DURATION - MUSE: 150 MS
QRS DURATION - MUSE: 154 MS
QRS DURATION - MUSE: 154 MS
QT - MUSE: 450 MS
QT - MUSE: 468 MS
QT - MUSE: 488 MS
QTC - MUSE: 463 MS
QTC - MUSE: 475 MS
QTC - MUSE: 499 MS
R AXIS - MUSE: -31 DEGREES
R AXIS - MUSE: -67 DEGREES
R AXIS - MUSE: -86 DEGREES
SYSTOLIC BLOOD PRESSURE - MUSE: NORMAL MMHG
T AXIS - MUSE: 72 DEGREES
T AXIS - MUSE: 74 DEGREES
T AXIS - MUSE: 97 DEGREES
VENTRICULAR RATE- MUSE: 59 BPM
VENTRICULAR RATE- MUSE: 63 BPM
VENTRICULAR RATE- MUSE: 67 BPM

## 2022-10-13 ENCOUNTER — OFFICE VISIT (OUTPATIENT)
Dept: CARDIOLOGY | Facility: CLINIC | Age: 72
End: 2022-10-13
Payer: COMMERCIAL

## 2022-10-13 ENCOUNTER — OFFICE VISIT (OUTPATIENT)
Dept: FAMILY MEDICINE | Facility: CLINIC | Age: 72
End: 2022-10-13
Payer: COMMERCIAL

## 2022-10-13 VITALS
HEIGHT: 71 IN | RESPIRATION RATE: 14 BRPM | WEIGHT: 293 LBS | DIASTOLIC BLOOD PRESSURE: 86 MMHG | OXYGEN SATURATION: 93 % | TEMPERATURE: 98.2 F | SYSTOLIC BLOOD PRESSURE: 142 MMHG | HEART RATE: 101 BPM | BODY MASS INDEX: 41.02 KG/M2

## 2022-10-13 VITALS
OXYGEN SATURATION: 93 % | DIASTOLIC BLOOD PRESSURE: 67 MMHG | WEIGHT: 293 LBS | SYSTOLIC BLOOD PRESSURE: 122 MMHG | HEART RATE: 94 BPM | BODY MASS INDEX: 47.14 KG/M2

## 2022-10-13 DIAGNOSIS — Z12.11 SCREEN FOR COLON CANCER: ICD-10-CM

## 2022-10-13 DIAGNOSIS — Z95.2 S/P TAVR (TRANSCATHETER AORTIC VALVE REPLACEMENT): ICD-10-CM

## 2022-10-13 DIAGNOSIS — I10 BENIGN ESSENTIAL HYPERTENSION: ICD-10-CM

## 2022-10-13 DIAGNOSIS — I71.20 THORACIC AORTIC ANEURYSM WITHOUT RUPTURE, UNSPECIFIED PART (H): ICD-10-CM

## 2022-10-13 DIAGNOSIS — I35.0 AORTIC VALVE STENOSIS, UNSPECIFIED ETIOLOGY: Primary | ICD-10-CM

## 2022-10-13 DIAGNOSIS — I35.0 AORTIC STENOSIS, SEVERE: ICD-10-CM

## 2022-10-13 DIAGNOSIS — Z09 HOSPITAL DISCHARGE FOLLOW-UP: Primary | ICD-10-CM

## 2022-10-13 PROCEDURE — 99213 OFFICE O/P EST LOW 20 MIN: CPT | Performed by: NURSE PRACTITIONER

## 2022-10-13 PROCEDURE — 99215 OFFICE O/P EST HI 40 MIN: CPT | Performed by: NURSE PRACTITIONER

## 2022-10-13 RX ORDER — ESTRADIOL 0.05 MG/D
1 PATCH, EXTENDED RELEASE TRANSDERMAL
Qty: 24 PATCH | Refills: 1 | Status: CANCELLED | COMMUNITY
Start: 2022-10-13

## 2022-10-13 RX ORDER — NITROGLYCERIN 0.4 MG/1
TABLET SUBLINGUAL
Qty: 30 TABLET | Refills: 1 | Status: SHIPPED | OUTPATIENT
Start: 2022-10-13 | End: 2024-03-26

## 2022-10-13 RX ORDER — LISINOPRIL 5 MG/1
5 TABLET ORAL DAILY
Qty: 90 TABLET | Refills: 1 | Status: SHIPPED | OUTPATIENT
Start: 2022-10-13 | End: 2023-03-23

## 2022-10-13 ASSESSMENT — PATIENT HEALTH QUESTIONNAIRE - PHQ9: SUM OF ALL RESPONSES TO PHQ QUESTIONS 1-9: 5

## 2022-10-13 NOTE — PATIENT INSTRUCTIONS
Medication Changes:  None     Recommendations:  Call your dentist to let them know that you have a new heart valve and require antibiotics prior to dental cleanings and procedures, going forward. Ask if they prefer to prescribe the antibiotics. If they would like us to prescribe, please call my nurse. Antibiotics are to be taken 1 hour prior to your dental appt.     Follow-up:  See Carley RUFF for cardiology follow up at [facility]: 11/17/22 with testing prior.   Call 6 months prior, to schedule.     Cardiology Scheduling~[phone]  Cardiology Clinic RN~[phone] (Karlene RN, Sujey RN, Gayathri RN)

## 2022-10-13 NOTE — PROGRESS NOTES
[facility] HEART CARE  TAVR Clinic Visit      Cardiology Clinic Progress Note  Laila Perez MRN# [id number]   YOB: 1950 Age: 72 year old      Primary Cardiologist:   Dr. Cerda          History of Presenting Illness:      Laila Perez is a pleasant 72 year old patient with a past cardiac history significant for   1. Severe symptomatic aortic stenosis    TAVR 10/2022 34 mm Medtronic evolute valve  2. Ascending aortic aneurysm    Ascending aorta 4.4 cm 2021 and 2022  3. Hypertension  Past medical history significant for obesity, transgender identifying as female.    Patient was seen by Dr. Ramires in August 2022.  She was noted to have progressive, debilitating dyspnea on exertion over the prior 6 months.  Echocardiogram showed severe aortic stenosis with mean gradient of 41 mmHg and ARISTEO of 0.9 cm , ascending aorta moderately dilated 4.4 cm similar to prior MRA from 2021. Preop coronary angiogram showing normal coronary arteries.    She underwent elective TAVR on 10/4/2022 with 34 mm Medtronic evolute valve.  She had no significant postop complications.  Post procedure echocardiogram showed normal EF, mean gradient 14 mmHg.  EKG post TAVR showed new LBBB with first-degree AV block and she was discharged with an event monitor.  Atenolol was changed to lisinopril.  Discharge summary reviewed today.    Patient presents today, with her  Akiko, for 1 week TAVR follow-up.  She denies any side effects after starting lisinopril.  She has had some positional lightheadedness that resolves within a few seconds.  Blood pressure is well controlled.  She has already noted some improvement in her dyspnea on exertion.  She has not set up cardiac rehab yet as she was planning to have knee surgery.  She does not have an orthopedic appointment for another month so I have asked her to check back in with cardiac rehab.  SBE prophylaxis has been discussed.  Her right femoral access site is clean dry,  without bleeding, hematoma, or bruit.  The puncture site has not completely healed yet but is without signs of infection.  Left femoral access site is without bleeding, hematoma, or bruit.  She continues with bilateral lower extremity edema 1+ pitting which has been stable.  She has been wearing her MCOT monitor with no alerts.  She has had trouble getting the battery percentage to show.  Our cardiac technician checked the device and asked her to charge it and it should then connect appropriately.  Patient reports no chest pain, PND, orthopnea, presyncope, syncope, heart racing, or palpitations.          Labs:  LIPID RESULTS:  Lab Results   Component Value Date    CHOL 133 05/05/2022    CHOL 142 05/13/2021    HDL 60 05/05/2022    HDL 60 05/13/2021    LDL 46 05/05/2022    LDL 50 05/13/2021    TRIG 136 05/05/2022    TRIG 159 (H) 05/13/2021    CHOLHDLRATIO 3.0 04/25/2014       LIVER ENZYME RESULTS:  Lab Results   Component Value Date    AST 34 09/29/2022    AST 13 05/13/2021    ALT 33 09/29/2022    ALT 18 05/13/2021       CBC RESULTS:  Lab Results   Component Value Date    WBC 7.0 10/06/2022    WBC 6.0 05/13/2021    RBC 4.60 10/06/2022    RBC 4.55 05/13/2021    HGB 13.8 10/06/2022    HGB 13.7 05/13/2021    HCT 43.7 10/06/2022    HCT 41.8 05/13/2021    MCV 95 10/06/2022    MCV 92 05/13/2021    MCH 30.0 10/06/2022    MCH 30.1 05/13/2021    MCHC 31.6 10/06/2022    MCHC 32.8 05/13/2021    RDW 13.1 10/06/2022    RDW 13.1 05/13/2021    PLT 99 (L) 10/06/2022     (L) 05/13/2021       BMP RESULTS:  Lab Results   Component Value Date     10/06/2022     05/13/2021    POTASSIUM 4.4 10/06/2022    POTASSIUM 5.6 (H) 05/13/2021    CHLORIDE 105 10/06/2022    CHLORIDE 107 05/13/2021    CO2 29 10/06/2022    CO2 30 05/13/2021    ANIONGAP 3 10/06/2022    ANIONGAP 1 (L) 05/13/2021     (H) 10/06/2022    GLC 92 05/13/2021    BUN 16 10/06/2022    BUN 16 05/13/2021    CR 0.92 10/06/2022    CR 0.97 05/13/2021     GFRESTIMATED 88 10/06/2022    GFRESTIMATED >60 08/25/2022    GFRESTIMATED 78 05/13/2021    GFRESTBLACK >90 05/13/2021    GOPAL 9.2 10/06/2022    GOPAL 9.3 05/13/2021        A1C RESULTS:  Lab Results   Component Value Date    A1C 4.8 04/26/2016       INR RESULTS:  Lab Results   Component Value Date    INR 1.15 09/29/2022    INR 1.14 09/08/2022    INR 0.98 10/25/2005       Results reviewed today.       Current Cardiac Medications   Aspirin 81 mg daily  Lasix 20 mg daily  Lisinopril 5 mg daily  Lovastatin 20 mg daily  Nitroglycerin as needed                   Assessment and Plan:       Plan    Patient Instructions   Medication Changes:  4. None     Recommendations:  1. Call your dentist to let them know that you have a new heart valve and require antibiotics prior to dental cleanings and procedures, going forward. Ask if they prefer to prescribe the antibiotics. If they would like us to prescribe, please call my nurse. Antibiotics are to be taken 1 hour prior to your dental appt.     Follow-up:  1. See Carley RUFF for cardiology follow up at [facility]: 11/17/22 with testing prior.   Call 6 months prior, to schedule.     Cardiology Scheduling~[phone]  Cardiology Clinic RN~[phone] (Karlene RN, Sujye RN, Gayathri RN)          1. Severe symptomatic aortic stenosis  Status post TAVR 34 mm Medtronic evolute valve   Prior symptoms of MARCELINO are improving post procedure, current NYHA class II   Echo POD1 14 mmHg and no AR  Continue MAPT ×6 months, uninterrupted, with aspirin until April 2023   Lifelong SBE prophylaxis for dental procedures/ cleanings (amoxicillin 2G or clindamycin 600 mg 60 mins prior)      2. Ascending aortic aneurysm    Stable    Follow with echo      3. Hypertension    Controlled    Continue atenolol             Thank you for allowing me to participate in this delightful patient's care.      This note was completed in part using Dragon voice recognition software. Although reviewed after completion, some  word and grammatical errors may occur.    Carley Rodriguez, JERRICA CNP, APRN, CNP           Data:   All laboratory data reviewed      Total time spent today was 41 mins, reviewing labs, testing, notes, documenting notes, and seeing patient.          Constitutional:  cooperative, alert and oriented, well developed, well nourished, in no acute distress  overweight    Skin:  warm and dry to the touch         Head:  normocephalic       Eyes:  pupils equal and round       ENT:  no pallor or cyanosis       Neck:  no stiffness       Respiratory:  clear to auscultation; normal symmetry        Cardiac: regular rate and rhythm; normal S1 and S2                 pulses full and equal , murmur 3/6    GI:  abdomen soft, nondistended     Extremities and Muscular Skeletal:   Bilateral lower extremity edema 1+ pitting    Neurological:  affect appropriate; no gross motor deficits       Psych:  Alert and Oriented x 3 , appropriate affact

## 2022-10-13 NOTE — PROGRESS NOTES
Assessment & Plan     Hospital discharge follow-up  Improved     S/P TAVR (transcatheter aortic valve replacement)  Follow up with Cardiology later today as previously directed.     Aortic stenosis, severe  Resolved with TAVR  Wt Readings from Last 5 Encounters:   10/13/22 (!) 153.3 kg (338 lb)   10/13/22 (!) 152.4 kg (336 lb)   10/06/22 (!) 154.2 kg (340 lb)   09/29/22 (!) 156.9 kg (345 lb 12.8 oz)   09/08/22 (!) 156.1 kg (344 lb 3.2 oz)   weight improved.   Meds refilled today   - aspirin (ASA) 81 MG EC tablet  Dispense: 30 tablet; Refill: 11  - lisinopril (ZESTRIL) 5 MG tablet  Dispense: 90 tablet; Refill: 1  - nitroGLYcerin (NITROSTAT) 0.4 MG sublingual tablet  Dispense: 30 tablet; Refill: 1    Screen for colon cancer  Due.. colonoscopy declined. FIT card in agreement  Sent home with them today   - Fecal colorectal cancer screen FIT - Future (S+30)      Call or return to the clinic with any worsening of symptoms or no resolution. Patient/Parent verbalized understanding and is in agreement. Medication side effects reviewed.   Current Outpatient Medications   Medication Sig Dispense Refill     aspirin (ASA) 81 MG EC tablet Take 1 tablet (81 mg) by mouth daily 30 tablet 11     brimonidine (ALPHAGAN) 0.2 % ophthalmic solution Place 1 drop into both eyes 2 times daily       dorzolamide-timolol (COSOPT) 2-0.5 % ophthalmic solution Place 1 drop into both eyes 2 times daily       furosemide (LASIX) 20 MG tablet Take 1 tablet (20 mg) by mouth daily 90 tablet 1     ipratropium - albuterol 0.5 mg/2.5 mg/3 mL (DUONEB) 0.5-2.5 (3) MG/3ML neb solution Take 1 vial (3 mLs) by nebulization every 6 hours as needed for shortness of breath / dyspnea or wheezing 90 mL 4     latanoprost (XALATAN) 0.005 % ophthalmic solution Place 1 drop into both eyes At Bedtime       lisinopril (ZESTRIL) 5 MG tablet Take 1 tablet (5 mg) by mouth daily 90 tablet 1     lovastatin (MEVACOR) 20 MG tablet TAKE ONE TABLET BY MOUTH ONCE A DAY AT BEDTIME  (Patient taking differently: Take 20 mg by mouth At Bedtime) 90 tablet 3     Multiple Vitamin (DAILY-EVANGELISTA) TABS Take 1 tablet by mouth daily  11     nitroGLYcerin (NITROSTAT) 0.4 MG sublingual tablet For chest pain place 1 tablet under the tongue every 5 minutes for 3 doses. If symptoms persist 5 minutes after 1st dose call 911. 30 tablet 1     acetaminophen (TYLENOL) 325 MG tablet Take 975 mg by mouth nightly as needed for mild pain       albuterol (PROAIR HFA/PROVENTIL HFA/VENTOLIN HFA) 108 (90 Base) MCG/ACT inhaler Inhale 2 puffs into the lungs every 6 hours 18 g 0     estradiol (VIVELLE-DOT) 0.05 MG/24HR bi-weekly patch Place 1 patch onto the skin twice a week 24 patch 1     Chart documentation with Dragon Voice recognition Software. Although reviewed after completion, some words and grammatical errors may remain.    JERRICA Salcedo CNP  [facility]    Juana Ulloa is a 72 year old, presenting for the following health issues:  HOSP D/C      [facility]       Hospital Follow-up Visit:    Hospital/Nursing Home/IP Rehab Facility: [facility]  Date of Admission: 10/04/2022  Date of Discharge: 10/06/2022  Reason(s) for Admission: Severe symptomatic aortic stenosis S/P TAVR 10/04    Was your hospitalization related to COVID-19? No   Problems taking medications regularly:  None  Medication changes since discharge: None  Problems adhering to non-medication therapy:  None    Summary of hospitalization:  [facility] discharge summary reviewed  Diagnostic Tests/Treatments reviewed.  Follow up needed: Cardiology   Other Healthcare Providers Involved in Patient s Care:         Homecare  Update since discharge: improved.   6}  Post Medication Reconciliation Status: Discharge medications reconciled and changed, see notes/orders      Plan of care communicated with patient          Review of Systems   Constitutional, HEENT, cardiovascular, pulmonary,  "GI, , musculoskeletal, neuro, skin, endocrine and psych systems are negative, except as otherwise noted.      Objective    BP (!) 142/86   Pulse 101   Temp 98.2  F (36.8  C) (Tympanic)   Resp 14   Ht 1.803 m (5' 11\")   Wt (!) 152.4 kg (336 lb)   SpO2 93%   BMI 46.86 kg/m    Body mass index is 46.86 kg/m .  Physical Exam   GENERAL: healthy, alert and no distress  EYES: Eyes grossly normal to inspection, PERRL and conjunctivae and sclerae normal  HENT: ear canals and TM's normal, nose and mouth without ulcers or lesions  NECK: no adenopathy, no asymmetry, masses, or scars and thyroid normal to palpation  RESP: lungs clear to auscultation - no rales, rhonchi or wheezes  CV: regular rates and rhythm, grade 3/6  peripheral pulses strong and 1+ LE bilateral peripheral edema device in place left anterior chest wall.   ABDOMEN: soft, nontender, no hepatosplenomegaly, no masses and bowel sounds normal  SKIN: no suspicious lesions or rashes groin sites without hematoma, bruit or redness   NEURO: Normal strength and tone, mentation intact and speech normal  PSYCH: mentation appears normal, affect normal/bright    Admission on 10/04/2022, Discharged on 10/06/2022   Component Date Value Ref Range Status     Potassium 10/04/2022 4.9  3.4 - 5.3 mmol/L Final     ABO/RH(D) 10/04/2022 O POS   Final     Antibody Screen 10/04/2022 Negative  Negative Final     SPECIMEN EXPIRATION DATE 10/04/2022 [id number]   Final     Blood Component Type 10/04/2022 Red Blood Cells   Preliminary     Product Code 10/04/2022 [id number]   Preliminary     Unit Status 10/04/2022 Returned   Preliminary     Unit Number 10/04/2022 [id number]   Preliminary     CROSSMATCH 10/04/2022 Compatible   Preliminary     CODING SYSTEM 10/04/2022 [id number]   Preliminary     ISSUE DATE AND TIME 10/04/2022 [id number]   Preliminary     UNIT ABO/RH 10/04/2022 O+   Preliminary     UNIT TYPE ISBT 10/04/2022 5100   Preliminary     Blood Component Type 10/04/2022 " Red Blood Cells   Preliminary     Product Code 10/04/2022 [id number]   Preliminary     Unit Status 10/04/2022 Returned   Preliminary     Unit Number 10/04/2022 [id number]   Preliminary     CROSSMATCH 10/04/2022 Compatible   Preliminary     CODING SYSTEM 10/04/2022 [id number]   Preliminary     ISSUE DATE AND TIME 10/04/2022 [id number]   Preliminary     UNIT ABO/RH 10/04/2022 O+   Preliminary     UNIT TYPE ISBT 10/04/2022 5100   Preliminary     Ventricular Rate 10/04/2022 63  BPM Final     Atrial Rate 10/04/2022 63  BPM Final     NH Interval 10/04/2022 214  ms Final     QRS Duration 10/04/2022 154  ms Final     QT 10/04/2022 488  ms Final     QTc 10/04/2022 499  ms Final     P Axis 10/04/2022 61  degrees Final     R AXIS 10/04/2022 -86  degrees Final     T Axis 10/04/2022 74  degrees Final     Interpretation ECG 10/04/2022    Final                    Value:Sinus rhythm with 1st degree A-V block  Left bundle branch block  Abnormal ECG  When compared with ECG of 08-SEP-2022 08:11,  Left bundle branch block has developed  Confirmed by MD CANDY, LALI ([id number]),  RONNY MICHEL ([id number]) on 10/7/2022 3:13:11 PM       Activated Clotting Time (Celite) P* 10/04/2022 148  74 - 150 seconds Final     Activated Clotting Time (Celite) P* 10/04/2022 390 (H)  74 - 150 seconds Final     Activated Clotting Time (Celite) P* 10/04/2022 369 (H)  74 - 150 seconds Final     Activated Clotting Time (Celite) P* 10/04/2022 139  74 - 150 seconds Final     WBC Count 10/05/2022 6.5  4.0 - 11.0 10e3/uL Final     RBC Count 10/05/2022 4.31  3.80 - 5.90 10e6/uL Final    Sex Specific Reference Ranges:    Female  3.80-5.20  10e6/uL  Male    4.40-5.90  10e6/uL         Hemoglobin 10/05/2022 13.0  11.7 - 17.7 g/dL Final    Sex Specific Reference Ranges:    Female  11.7-15.7  g/dL  Male    13.3-17.7   g/dL       Hematocrit 10/05/2022 40.7  35.0 - 53.0 % Final    Sex Specific Reference Ranges:     Female  35.0-47.0 %   Male      40.0-53.0 %       MCV  10/05/2022 94  78 - 100 fL Final     MCH 10/05/2022 30.2  26.5 - 33.0 pg Final     MCHC 10/05/2022 31.9  31.5 - 36.5 g/dL Final     RDW 10/05/2022 13.2  10.0 - 15.0 % Final     Platelet Count 10/05/2022 101 (L)  150 - 450 10e3/uL Final     Sodium 10/05/2022 137  133 - 144 mmol/L Final     Potassium 10/05/2022 4.5  3.4 - 5.3 mmol/L Final     Chloride 10/05/2022 106  94 - 109 mmol/L Final    0-20 years:       Female:  mmol/L  Male:    mmol/L       20 years and older:   Female:  mmol/L   Male:    mmol/L       Carbon Dioxide (CO2) 10/05/2022 29  20 - 32 mmol/L Final     Anion Gap 10/05/2022 2 (L)  3 - 14 mmol/L Final     Urea Nitrogen 10/05/2022 18  7 - 30 mg/dL Final    Female  0 to 15 days           3-23  15 days to 1 year      3-17  1 to 10 years          9-22  10 to 19 years         7-19  19 years and older     7-30    Male  0 to 15 days           3-23  15 days to 1 year      3-17  1 to 10 years          9-22  10 to 19 years         7-21  19 years and older     7-30     Creatinine 10/05/2022 0.85  0.52 - 1.25 mg/dL Final    20 y and older Female 0.52-1.04 mg/dL  20 y and older Male 0.66-1.25 mg/dL    Varies with the amount of muscle mass present.    GICH  Female: 0.6-1.2 mg/dL  Male: 0.7-1.3 mg/dL       Calcium 10/05/2022 8.5  8.5 - 10.1 mg/dL Final     Glucose 10/05/2022 112 (H)  70 - 99 mg/dL Final     GFR Estimate 10/05/2022 >90  >60 mL/min/1.73m2 Final    GFR not calculated when sex unspecified or nonbinary.  Effective December 21, 2021 eGFRcr in adults is calculated using the 2021 CKD-EPI creatinine equation which includes age and gender ( et al., NEJM, DOI: 10.1056/AZGLsy6304079)     Magnesium 10/05/2022 1.9  1.6 - 2.3 mg/dL Final     Phosphorus 10/05/2022 3.4  2.5 - 4.5 mg/dL Final     Ventricular Rate 10/05/2022 67  BPM Final     Atrial Rate 10/05/2022 67  BPM Final     LA Interval 10/05/2022 208  ms Final     QRS Duration 10/05/2022 150  ms Final     QT 10/05/2022 450  ms Final      QTc 10/05/2022 475  ms Final     P Axis 10/05/2022 46  degrees Final     R AXIS 10/05/2022 -31  degrees Final     T Axis 10/05/2022 97  degrees Final     Interpretation ECG 10/05/2022    Final                    Value:Sinus rhythm  Left axis deviation  Left bundle branch block  Abnormal ECG  When compared with ECG of 04-OCT-2022 13:14, (unconfirmed)  No significant change was found  Confirmed by MD CANDY, LALI ([id number]),  Abelino Smith ([id number]) on 10/7/2022 8:58:00 AM       LVEF  10/05/2022 60-65%   Final     WBC Count 10/06/2022 7.0  4.0 - 11.0 10e3/uL Final     RBC Count 10/06/2022 4.60  3.80 - 5.90 10e6/uL Final    Sex Specific Reference Ranges:    Female  3.80-5.20  10e6/uL  Male    4.40-5.90  10e6/uL         Hemoglobin 10/06/2022 13.8  11.7 - 17.7 g/dL Final    Sex Specific Reference Ranges:    Female  11.7-15.7  g/dL  Male    13.3-17.7   g/dL       Hematocrit 10/06/2022 43.7  35.0 - 53.0 % Final    Sex Specific Reference Ranges:     Female  35.0-47.0 %   Male      40.0-53.0 %       MCV 10/06/2022 95  78 - 100 fL Final     MCH 10/06/2022 30.0  26.5 - 33.0 pg Final     MCHC 10/06/2022 31.6  31.5 - 36.5 g/dL Final     RDW 10/06/2022 13.1  10.0 - 15.0 % Final     Platelet Count 10/06/2022 99 (L)  150 - 450 10e3/uL Final     Sodium 10/06/2022 137  133 - 144 mmol/L Final     Potassium 10/06/2022 4.4  3.4 - 5.3 mmol/L Final     Chloride 10/06/2022 105  94 - 109 mmol/L Final    0-20 years:       Female:  mmol/L  Male:    mmol/L       20 years and older:   Female:  mmol/L   Male:    mmol/L       Carbon Dioxide (CO2) 10/06/2022 29  20 - 32 mmol/L Final     Anion Gap 10/06/2022 3  3 - 14 mmol/L Final     Urea Nitrogen 10/06/2022 16  7 - 30 mg/dL Final    Female  0 to 15 days           3-23  15 days to 1 year      3-17  1 to 10 years          9-22  10 to 19 years         7-19  19 years and older     7-30    Male  0 to 15 days           3-23  15 days to 1 year      3-17  1 to 10 years           9-22  10 to 19 years         7-21  19 years and older     7-30     Creatinine 10/06/2022 0.92  0.52 - 1.25 mg/dL Final    20 y and older Female 0.52-1.04 mg/dL  20 y and older Male 0.66-1.25 mg/dL    Varies with the amount of muscle mass present.    GICH  Female: 0.6-1.2 mg/dL  Male: 0.7-1.3 mg/dL       Calcium 10/06/2022 9.2  8.5 - 10.1 mg/dL Final     Glucose 10/06/2022 138 (H)  70 - 99 mg/dL Final     GFR Estimate 10/06/2022 88  >60 mL/min/1.73m2 Final    GFR not calculated when sex unspecified or nonbinary.  Effective December 21, 2021 eGFRcr in adults is calculated using the 2021 CKD-EPI creatinine equation which includes age and gender (Keila et al., NE, DOI: 10.1056/RUQWir1339071)     Magnesium 10/06/2022 2.0  1.6 - 2.3 mg/dL Final     Phosphorus 10/06/2022 2.8  2.5 - 4.5 mg/dL Final     Ventricular Rate 10/06/2022 59  BPM Final     Atrial Rate 10/06/2022 59  BPM Final     WA Interval 10/06/2022 318  ms Final     QRS Duration 10/06/2022 154  ms Final     QT 10/06/2022 468  ms Final     QTc 10/06/2022 463  ms Final     P Axis 10/06/2022 47  degrees Final     R AXIS 10/06/2022 -67  degrees Final     T Axis 10/06/2022 72  degrees Final     Interpretation ECG 10/06/2022    Final                    Value:Sinus bradycardia with 1st degree A-V block  Left axis deviation  Left bundle branch block  Abnormal ECG  When compared with ECG of 05-OCT-2022 07:12, (unconfirmed)  WA interval has increased  Confirmed by MD CANDY, DEMOS ([id number]),  Abelino Smith ([id number]) on 10/7/2022 10:47:06 AM

## 2022-10-13 NOTE — LETTER
10/13/2022    Delmis Simons, APRN CNP  [address] [place] [id number]    RE: Laila Perez       Dear Colleague,     I had the pleasure of seeing Laila Perez in the [facility].        [facility]  TAVR Clinic Visit      Cardiology Clinic Progress Note  Laila Perez MRN# [id number]   YOB: 1950 Age: 72 year old      Primary Cardiologist:   Dr. Cerda          History of Presenting Illness:      Laila Perez is a pleasant 72 year old patient with a past cardiac history significant for   1. Severe symptomatic aortic stenosis    TAVR 10/2022 34 mm Medtronic evolute valve  2. Ascending aortic aneurysm    Ascending aorta 4.4 cm 2021 and 2022  3. Hypertension  Past medical history significant for obesity, transgender identifying as female.    Patient was seen by Dr. Ramires in August 2022.  She was noted to have progressive, debilitating dyspnea on exertion over the prior 6 months.  Echocardiogram showed severe aortic stenosis with mean gradient of 41 mmHg and ARISTEO of 0.9 cm , ascending aorta moderately dilated 4.4 cm similar to prior MRA from 2021. Preop coronary angiogram showing normal coronary arteries.    She underwent elective TAVR on 10/4/2022 with 34 mm Medtronic evolute valve.  She had no significant postop complications.  Post procedure echocardiogram showed normal EF, mean gradient 14 mmHg.  EKG post TAVR showed new LBBB with first-degree AV block and she was discharged with an event monitor.  Atenolol was changed to lisinopril.  Discharge summary reviewed today.    Patient presents today, with her  Akiko, for 1 week TAVR follow-up.  She denies any side effects after starting lisinopril.  She has had some positional lightheadedness that resolves within a few seconds.  Blood pressure is well controlled.  She has already noted some improvement in her dyspnea on exertion.  She has not set up cardiac rehab yet as she was planning to  have knee surgery.  She does not have an orthopedic appointment for another month so I have asked her to check back in with cardiac rehab.  SBE prophylaxis has been discussed.  Her right femoral access site is clean dry, without bleeding, hematoma, or bruit.  The puncture site has not completely healed yet but is without signs of infection.  Left femoral access site is without bleeding, hematoma, or bruit.  She continues with bilateral lower extremity edema 1+ pitting which has been stable.  She has been wearing her MCOT monitor with no alerts.  She has had trouble getting the battery percentage to show.  Our cardiac technician checked the device and asked her to charge it and it should then connect appropriately.  Patient reports no chest pain, PND, orthopnea, presyncope, syncope, heart racing, or palpitations.          Labs:  LIPID RESULTS:  Lab Results   Component Value Date    CHOL 133 05/05/2022    CHOL 142 05/13/2021    HDL 60 05/05/2022    HDL 60 05/13/2021    LDL 46 05/05/2022    LDL 50 05/13/2021    TRIG 136 05/05/2022    TRIG 159 (H) 05/13/2021    CHOLHDLRATIO 3.0 04/25/2014       LIVER ENZYME RESULTS:  Lab Results   Component Value Date    AST 34 09/29/2022    AST 13 05/13/2021    ALT 33 09/29/2022    ALT 18 05/13/2021       CBC RESULTS:  Lab Results   Component Value Date    WBC 7.0 10/06/2022    WBC 6.0 05/13/2021    RBC 4.60 10/06/2022    RBC 4.55 05/13/2021    HGB 13.8 10/06/2022    HGB 13.7 05/13/2021    HCT 43.7 10/06/2022    HCT 41.8 05/13/2021    MCV 95 10/06/2022    MCV 92 05/13/2021    MCH 30.0 10/06/2022    MCH 30.1 05/13/2021    MCHC 31.6 10/06/2022    MCHC 32.8 05/13/2021    RDW 13.1 10/06/2022    RDW 13.1 05/13/2021    PLT 99 (L) 10/06/2022     (L) 05/13/2021       BMP RESULTS:  Lab Results   Component Value Date     10/06/2022     05/13/2021    POTASSIUM 4.4 10/06/2022    POTASSIUM 5.6 (H) 05/13/2021    CHLORIDE 105 10/06/2022    CHLORIDE 107 05/13/2021    CO2 29  10/06/2022    CO2 30 05/13/2021    ANIONGAP 3 10/06/2022    ANIONGAP 1 (L) 05/13/2021     (H) 10/06/2022    GLC 92 05/13/2021    BUN 16 10/06/2022    BUN 16 05/13/2021    CR 0.92 10/06/2022    CR 0.97 05/13/2021    GFRESTIMATED 88 10/06/2022    GFRESTIMATED >60 08/25/2022    GFRESTIMATED 78 05/13/2021    GFRESTBLACK >90 05/13/2021    GOPAL 9.2 10/06/2022    GOPAL 9.3 05/13/2021        A1C RESULTS:  Lab Results   Component Value Date    A1C 4.8 04/26/2016       INR RESULTS:  Lab Results   Component Value Date    INR 1.15 09/29/2022    INR 1.14 09/08/2022    INR 0.98 10/25/2005       Results reviewed today.       Current Cardiac Medications   Aspirin 81 mg daily  Lasix 20 mg daily  Lisinopril 5 mg daily  Lovastatin 20 mg daily  Nitroglycerin as needed                   Assessment and Plan:       Plan    Patient Instructions   Medication Changes:  4. None     Recommendations:  1. Call your dentist to let them know that you have a new heart valve and require antibiotics prior to dental cleanings and procedures, going forward. Ask if they prefer to prescribe the antibiotics. If they would like us to prescribe, please call my nurse. Antibiotics are to be taken 1 hour prior to your dental appt.     Follow-up:  1. See Carley RUFF for cardiology follow up at [facility]: 11/17/22 with testing prior.   Call 6 months prior, to schedule.     Cardiology Scheduling~[phone]  Cardiology Clinic RN~[phone] (Karlene RN, Sujey RN, Gayathri RN)          1. Severe symptomatic aortic stenosis  Status post TAVR 34 mm Medtronic evolute valve   Prior symptoms of MARCELINO are improving post procedure, current NYHA class II   Echo POD1 14 mmHg and no AR  Continue MAPT ×6 months, uninterrupted, with aspirin until April 2023   Lifelong SBE prophylaxis for dental procedures/ cleanings (amoxicillin 2G or clindamycin 600 mg 60 mins prior)      2. Ascending aortic aneurysm    Stable    Follow with  echo      3. Hypertension    Controlled    Continue atenolol             Thank you for allowing me to participate in this delightful patient's care.      This note was completed in part using Dragon voice recognition software. Although reviewed after completion, some word and grammatical errors may occur.    JERRICA Marley CNP, APRN, CNP           Data:   All laboratory data reviewed      Total time spent today was 41 mins, reviewing labs, testing, notes, documenting notes, and seeing patient.          Constitutional:  cooperative, alert and oriented, well developed, well nourished, in no acute distress  overweight    Skin:  warm and dry to the touch         Head:  normocephalic       Eyes:  pupils equal and round       ENT:  no pallor or cyanosis       Neck:  no stiffness       Respiratory:  clear to auscultation; normal symmetry        Cardiac: regular rate and rhythm; normal S1 and S2                 pulses full and equal , murmur 3/6    GI:  abdomen soft, nondistended     Extremities and Muscular Skeletal:   Bilateral lower extremity edema 1+ pitting    Neurological:  affect appropriate; no gross motor deficits       Psych:  Alert and Oriented x 3 , appropriate affact        Thank you for allowing me to participate in the care of your patient.      Sincerely,     JERRICA Marley CNP     [facility]  cc:   No referring provider defined for this encounter.

## 2022-10-13 NOTE — NURSING NOTE
"Chief Complaint   Patient presents with     HOSP D/C       Initial There were no vitals taken for this visit. Estimated body mass index is 47.42 kg/m  as calculated from the following:    Height as of 10/4/22: 1.803 m (5' 11\").    Weight as of 10/6/22: 154.2 kg (340 lb).    Patient presents to the clinic using [facility]     Is there anyone who you would like to be able to receive your results? No  If yes have patient fill out YASIR    Patient Quality Outreach    Patient is due for the following:   Colon Cancer Screening    Next Steps:   No follow up needed at this time.    Type of outreach:    Phone, spoke to patient/parent. will give fit test today       Questions for provider review:    None     Shakila Weiner CMA          "

## 2022-10-18 ENCOUNTER — TRANSFERRED RECORDS (OUTPATIENT)
Dept: HEALTH INFORMATION MANAGEMENT | Facility: CLINIC | Age: 72
End: 2022-10-18

## 2022-10-25 DIAGNOSIS — Z95.2 S/P TAVR (TRANSCATHETER AORTIC VALVE REPLACEMENT): Primary | ICD-10-CM

## 2022-11-10 ENCOUNTER — HOSPITAL ENCOUNTER (OUTPATIENT)
Dept: CARDIAC REHAB | Facility: CLINIC | Age: 72
Discharge: HOME | End: 2022-11-10
Attending: NURSE PRACTITIONER
Payer: COMMERCIAL

## 2022-11-10 DIAGNOSIS — Z95.2 S/P TAVR (TRANSCATHETER AORTIC VALVE REPLACEMENT): ICD-10-CM

## 2022-11-10 PROCEDURE — 93797 PHYS/QHP OP CAR RHAB WO ECG: CPT | Mod: XU

## 2022-11-10 PROCEDURE — 93798 PHYS/QHP OP CAR RHAB W/ECG: CPT

## 2022-11-14 ENCOUNTER — OFFICE VISIT (OUTPATIENT)
Dept: FAMILY MEDICINE | Facility: CLINIC | Age: 72
End: 2022-11-14
Payer: COMMERCIAL

## 2022-11-14 VITALS
TEMPERATURE: 98.3 F | HEIGHT: 71 IN | HEART RATE: 79 BPM | BODY MASS INDEX: 41.02 KG/M2 | WEIGHT: 293 LBS | SYSTOLIC BLOOD PRESSURE: 156 MMHG | DIASTOLIC BLOOD PRESSURE: 91 MMHG

## 2022-11-14 DIAGNOSIS — F64.0 TRANSGENDER PERSON ON HORMONE THERAPY: Primary | ICD-10-CM

## 2022-11-14 DIAGNOSIS — I10 BENIGN ESSENTIAL HYPERTENSION: ICD-10-CM

## 2022-11-14 DIAGNOSIS — Z79.899 TRANSGENDER PERSON ON HORMONE THERAPY: Primary | ICD-10-CM

## 2022-11-14 DIAGNOSIS — Z95.2 S/P TAVR (TRANSCATHETER AORTIC VALVE REPLACEMENT): ICD-10-CM

## 2022-11-14 PROCEDURE — 99214 OFFICE O/P EST MOD 30 MIN: CPT | Performed by: FAMILY MEDICINE

## 2022-11-14 RX ORDER — ESTRADIOL 0.05 MG/D
1 PATCH, EXTENDED RELEASE TRANSDERMAL
Qty: 24 PATCH | Refills: 1 | Status: SHIPPED | OUTPATIENT
Start: 2022-11-14 | End: 2023-01-10

## 2022-11-14 ASSESSMENT — ENCOUNTER SYMPTOMS
FEVER: 0
SHORTNESS OF BREATH: 1
CHILLS: 0

## 2022-11-14 NOTE — PROGRESS NOTES
BOO Ulloa is a 72 year old individual that uses pronouns She/Her/Hers/Herself that presents today for follow up of:  feminizing hormone therapy.   Alone or accompanied by: accompanied today by  Gender identity: female  Started Hormone  therapy  2016  Continues on Vivelle dot 0.05 mg mg patch 2x/wk  Any special concerns today?   Had TAVR surgery 10/2022 of aortic valve mechanical valve  Feeling better, swelling improved, doing cardiac rehab  10/13/2022 cardiology note reviewed--normal EF on recent echocardiogram    On hormones?  YES +++ Shot day of the week? Not applicable-taking pills/patch/gel      Due for labs?  No      +++ Refills of meds needed?  Yes  Gender related body changes since last visit: stable    Breakthrough bleeding? Does Not Apply    New health concerns since last visit:  ---see above    Past Surgical History:   Procedure Laterality Date     COLONOSCOPY  2007     CV CORONARY ANGIOGRAM N/A 9/8/2022    Procedure: Coronary Angiogram;  Surgeon: Santy Clatyon MD;  Location: [facility] CARDIAC CATH LAB     CV PCI N/A 9/8/2022    Procedure: Percutaneous Coronary Intervention;  Surgeon: Santy Clayton MD;  Location: [facility] CARDIAC CATH LAB     CV TRANSCATHETER AORTIC VALVE REPLACEMENT-FEMORAL APPROACH N/A 10/4/2022    Procedure: Transcatheter Aortic Valve Replacement-Femoral Approach;  Surgeon: Young Ramires MD;  Location: [facility] CARDIAC CATH LAB     JOINT REPLACEMTN, KNEE RT/LT  2006    left     ORCHIECTOMY SCROTAL Bilateral 5/14/2019    Procedure: Bilatera Scrotal Orchiectomy;  Surgeon: Abhilash Weston MD;  Location:  OR       Patient Active Problem List   Diagnosis     Gouty arthropathy     Mental or behavioral problem     Generalized osteoarthrosis, unspecified site     Disorder of bone and cartilage     Mixed hyperlipidemia     Benign essential hypertension     OA (osteoarthritis) of knee     DDD (degenerative disc disease), lumbar     Varicose veins of legs      Psoriasis     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     Family history of diabetes mellitus in first degree relative     Mitral valve disorder     Aortic valve stenosis, unspecified etiology     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     Obesity, Class III, BMI 40-49.9 (morbid obesity) (H)     Multiple gastric ulcers     Episodic tension-type headache, not intractable     Alcohol dependence in remission (H)     Aortic aneurysm without rupture, unspecified portion of aorta (H)     Status post coronary angiogram     Shortness of breath      Thoracic aortic aneurysm without rupture     Aortic stenosis, severe       Current Outpatient Medications   Medication Sig Dispense Refill     acetaminophen (TYLENOL) 325 MG tablet Take 975 mg by mouth nightly as needed for mild pain       albuterol (PROAIR HFA/PROVENTIL HFA/VENTOLIN HFA) 108 (90 Base) MCG/ACT inhaler Inhale 2 puffs into the lungs every 6 hours 18 g 0     aspirin (ASA) 81 MG EC tablet Take 1 tablet (81 mg) by mouth daily 30 tablet 11     brimonidine (ALPHAGAN) 0.2 % ophthalmic solution Place 1 drop into both eyes 2 times daily       dorzolamide-timolol (COSOPT) 2-0.5 % ophthalmic solution Place 1 drop into both eyes 2 times daily       estradiol (VIVELLE-DOT) 0.05 MG/24HR bi-weekly patch Place 1 patch onto the skin twice a week 24 patch 1     furosemide (LASIX) 20 MG tablet Take 1 tablet (20 mg) by mouth daily 90 tablet 1     ipratropium - albuterol 0.5 mg/2.5 mg/3 mL (DUONEB) 0.5-2.5 (3) MG/3ML neb solution Take 1 vial (3 mLs) by nebulization every 6 hours as needed for shortness of breath / dyspnea or wheezing 90 mL 4     latanoprost (XALATAN) 0.005 % ophthalmic solution Place 1 drop into both eyes At Bedtime       lisinopril (ZESTRIL) 5 MG tablet Take 1 tablet (5 mg) by mouth daily 90 tablet 1     lovastatin (MEVACOR) 20 MG tablet TAKE ONE TABLET BY MOUTH ONCE A DAY AT BEDTIME (Patient taking differently: Take 20 mg by mouth At Bedtime) 90 tablet 3      Multiple Vitamin (DAILY-EVANGELISTA) TABS Take 1 tablet by mouth daily  11     nitroGLYcerin (NITROSTAT) 0.4 MG sublingual tablet For chest pain place 1 tablet under the tongue every 5 minutes for 3 doses. If symptoms persist 5 minutes after 1st dose call 911. 30 tablet 1       History   Smoking Status     Never   Smokeless Tobacco     Never          Allergies   Allergen Reactions     External Allergen Needs Reconciliation - See Comment      Please reconcile the Patient's allergy reported as ESTERSPENICILLIN and update accordingly           Zosyn [Penicillins]      unsure of reaction, allergy is listed on his med sheet     Heparin Rash     he lives in a assisted living program and is not sure what type of allergies he really has. He quit drinking       Health Maintenance Due   Topic Date Due     MENTAL HEALTH TX PLAN  08/15/2021         Problem, Medication and Allergy Lists were reviewed and are current..         Review of Systems:   Review of Systems   Constitutional: Negative for chills and fever.   Respiratory: Positive for shortness of breath.    Cardiovascular: Positive for leg swelling. Negative for chest pain.   Dyspnea improved, leg swelling both improved from last visit           Labs:   Results from last visit:  Admission on 10/04/2022, Discharged on 10/06/2022   Component Date Value Ref Range Status     Potassium 10/04/2022 4.9  3.4 - 5.3 mmol/L Final     ABO/RH(D) 10/04/2022 O POS   Final     Antibody Screen 10/04/2022 Negative  Negative Final     SPECIMEN EXPIRATION DATE 10/04/2022 [id number]   Final     Blood Component Type 10/04/2022 Red Blood Cells   Preliminary     Product Code 10/04/2022 [id number]   Preliminary     Unit Status 10/04/2022 Returned   Preliminary     Unit Number 10/04/2022 [id number]   Preliminary     CROSSMATCH 10/04/2022 Compatible   Preliminary     CODING SYSTEM 10/04/2022 [id number]   Preliminary     ISSUE DATE AND TIME 10/04/2022 [id number]   Preliminary     UNIT ABO/RH  10/04/2022 O+   Preliminary     UNIT TYPE ISBT 10/04/2022 5100   Preliminary     Blood Component Type 10/04/2022 Red Blood Cells   Preliminary     Product Code 10/04/2022 [id number]   Preliminary     Unit Status 10/04/2022 Returned   Preliminary     Unit Number 10/04/2022 [id number]   Preliminary     CROSSMATCH 10/04/2022 Compatible   Preliminary     CODING SYSTEM 10/04/2022 [id number]   Preliminary     ISSUE DATE AND TIME 10/04/2022 [id number]   Preliminary     UNIT ABO/RH 10/04/2022 O+   Preliminary     UNIT TYPE ISBT 10/04/2022 5100   Preliminary     Ventricular Rate 10/04/2022 63  BPM Final     Atrial Rate 10/04/2022 63  BPM Final     MS Interval 10/04/2022 214  ms Final     QRS Duration 10/04/2022 154  ms Final     QT 10/04/2022 488  ms Final     QTc 10/04/2022 499  ms Final     P Axis 10/04/2022 61  degrees Final     R AXIS 10/04/2022 -86  degrees Final     T Axis 10/04/2022 74  degrees Final     Interpretation ECG 10/04/2022    Final                    Value:Sinus rhythm with 1st degree A-V block  Left bundle branch block  Abnormal ECG  When compared with ECG of 08-SEP-2022 08:11,  Left bundle branch block has developed  Confirmed by MD CANDY, EUFEMIAOS ([id number]),  RONNY IMCHEL ([id number]) on 10/7/2022 3:13:11 PM       Activated Clotting Time (Celite) P* 10/04/2022 148  74 - 150 seconds Final     Activated Clotting Time (Celite) P* 10/04/2022 390 (H)  74 - 150 seconds Final     Activated Clotting Time (Celite) P* 10/04/2022 369 (H)  74 - 150 seconds Final     Activated Clotting Time (Celite) P* 10/04/2022 139  74 - 150 seconds Final     WBC Count 10/05/2022 6.5  4.0 - 11.0 10e3/uL Final     RBC Count 10/05/2022 4.31  3.80 - 5.90 10e6/uL Final    Sex Specific Reference Ranges:    Female  3.80-5.20  10e6/uL  Male    4.40-5.90  10e6/uL         Hemoglobin 10/05/2022 13.0  11.7 - 17.7 g/dL Final    Sex Specific Reference Ranges:    Female  11.7-15.7  g/dL  Male    13.3-17.7   g/dL       Hematocrit 10/05/2022 40.7   35.0 - 53.0 % Final    Sex Specific Reference Ranges:     Female  35.0-47.0 %   Male      40.0-53.0 %       MCV 10/05/2022 94  78 - 100 fL Final     MCH 10/05/2022 30.2  26.5 - 33.0 pg Final     MCHC 10/05/2022 31.9  31.5 - 36.5 g/dL Final     RDW 10/05/2022 13.2  10.0 - 15.0 % Final     Platelet Count 10/05/2022 101 (L)  150 - 450 10e3/uL Final     Sodium 10/05/2022 137  133 - 144 mmol/L Final     Potassium 10/05/2022 4.5  3.4 - 5.3 mmol/L Final     Chloride 10/05/2022 106  94 - 109 mmol/L Final    0-20 years:       Female:  mmol/L  Male:    mmol/L       20 years and older:   Female:  mmol/L   Male:    mmol/L       Carbon Dioxide (CO2) 10/05/2022 29  20 - 32 mmol/L Final     Anion Gap 10/05/2022 2 (L)  3 - 14 mmol/L Final     Urea Nitrogen 10/05/2022 18  7 - 30 mg/dL Final    Female  0 to 15 days           3-23  15 days to 1 year      3-17  1 to 10 years          9-22  10 to 19 years         7-19  19 years and older     7-30    Male  0 to 15 days           3-23  15 days to 1 year      3-17  1 to 10 years          9-22  10 to 19 years         7-21  19 years and older     7-30     Creatinine 10/05/2022 0.85  0.52 - 1.25 mg/dL Final    20 y and older Female 0.52-1.04 mg/dL  20 y and older Male 0.66-1.25 mg/dL    Varies with the amount of muscle mass present.    GICH  Female: 0.6-1.2 mg/dL  Male: 0.7-1.3 mg/dL       Calcium 10/05/2022 8.5  8.5 - 10.1 mg/dL Final     Glucose 10/05/2022 112 (H)  70 - 99 mg/dL Final     GFR Estimate 10/05/2022 >90  >60 mL/min/1.73m2 Final    GFR not calculated when sex unspecified or nonbinary.  Effective December 21, 2021 eGFRcr in adults is calculated using the 2021 CKD-EPI creatinine equation which includes age and gender (Keila et al., NEJM, DOI: 10.1056/JUDIbv3435155)     Magnesium 10/05/2022 1.9  1.6 - 2.3 mg/dL Final     Phosphorus 10/05/2022 3.4  2.5 - 4.5 mg/dL Final     Ventricular Rate 10/05/2022 67  BPM Final     Atrial Rate 10/05/2022 67  BPM Final      [place] Interval 10/05/2022 208  ms Final     QRS Duration 10/05/2022 150  ms Final     QT 10/05/2022 450  ms Final     QTc 10/05/2022 475  ms Final     P Axis 10/05/2022 46  degrees Final     R AXIS 10/05/2022 -31  degrees Final     T Axis 10/05/2022 97  degrees Final     Interpretation ECG 10/05/2022    Final                    Value:Sinus rhythm  Left axis deviation  Left bundle branch block  Abnormal ECG  When compared with ECG of 04-OCT-2022 13:14, (unconfirmed)  No significant change was found  Confirmed by MD CANDY, LALI ([id number]),  Abelino Smith ([id number]) on 10/7/2022 8:58:00 AM       LVEF  10/05/2022 60-65%   Final     WBC Count 10/06/2022 7.0  4.0 - 11.0 10e3/uL Final     RBC Count 10/06/2022 4.60  3.80 - 5.90 10e6/uL Final    Sex Specific Reference Ranges:    Female  3.80-5.20  10e6/uL  Male    4.40-5.90  10e6/uL         Hemoglobin 10/06/2022 13.8  11.7 - 17.7 g/dL Final    Sex Specific Reference Ranges:    Female  11.7-15.7  g/dL  Male    13.3-17.7   g/dL       Hematocrit 10/06/2022 43.7  35.0 - 53.0 % Final    Sex Specific Reference Ranges:     Female  35.0-47.0 %   Male      40.0-53.0 %       MCV 10/06/2022 95  78 - 100 fL Final     MCH 10/06/2022 30.0  26.5 - 33.0 pg Final     MCHC 10/06/2022 31.6  31.5 - 36.5 g/dL Final     RDW 10/06/2022 13.1  10.0 - 15.0 % Final     Platelet Count 10/06/2022 99 (L)  150 - 450 10e3/uL Final     Sodium 10/06/2022 137  133 - 144 mmol/L Final     Potassium 10/06/2022 4.4  3.4 - 5.3 mmol/L Final     Chloride 10/06/2022 105  94 - 109 mmol/L Final    0-20 years:       Female:  mmol/L  Male:    mmol/L       20 years and older:   Female:  mmol/L   Male:    mmol/L       Carbon Dioxide (CO2) 10/06/2022 29  20 - 32 mmol/L Final     Anion Gap 10/06/2022 3  3 - 14 mmol/L Final     Urea Nitrogen 10/06/2022 16  7 - 30 mg/dL Final    Female  0 to 15 days           3-23  15 days to 1 year      3-17  1 to 10 years          9-22  10 to 19 years         7-19  19  "years and older     7-30    Male  0 to 15 days           3-23  15 days to 1 year      3-17  1 to 10 years          9-22  10 to 19 years         7-21  19 years and older     7-30     Creatinine 10/06/2022 0.92  0.52 - 1.25 mg/dL Final    20 y and older Female 0.52-1.04 mg/dL  20 y and older Male 0.66-1.25 mg/dL    Varies with the amount of muscle mass present.    GICH  Female: 0.6-1.2 mg/dL  Male: 0.7-1.3 mg/dL       Calcium 10/06/2022 9.2  8.5 - 10.1 mg/dL Final     Glucose 10/06/2022 138 (H)  70 - 99 mg/dL Final     GFR Estimate 10/06/2022 88  >60 mL/min/1.73m2 Final    GFR not calculated when sex unspecified or nonbinary.  Effective December 21, 2021 eGFRcr in adults is calculated using the 2021 CKD-EPI creatinine equation which includes age and gender (Keila et al., NE, DOI: 10.1056/TIHEqz0251327)     Magnesium 10/06/2022 2.0  1.6 - 2.3 mg/dL Final     Phosphorus 10/06/2022 2.8  2.5 - 4.5 mg/dL Final     Ventricular Rate 10/06/2022 59  BPM Final     Atrial Rate 10/06/2022 59  BPM Final     SC Interval 10/06/2022 318  ms Final     QRS Duration 10/06/2022 154  ms Final     QT 10/06/2022 468  ms Final     QTc 10/06/2022 463  ms Final     P Axis 10/06/2022 47  degrees Final     R AXIS 10/06/2022 -67  degrees Final     T Axis 10/06/2022 72  degrees Final     Interpretation ECG 10/06/2022    Final                    Value:Sinus bradycardia with 1st degree A-V block  Left axis deviation  Left bundle branch block  Abnormal ECG  When compared with ECG of 05-OCT-2022 07:12, (unconfirmed)  SC interval has increased  Confirmed by MD CANDY, LALI ([id number]),  Abelino Smith ([id number]) on 10/7/2022 10:47:06 AM             EXAM:  Blood pressure (!) 156/91, pulse 79, temperature 98.3  F (36.8  C), height 1.803 m (5' 11\"), weight (!) 153.8 kg (339 lb).  Constitutional: healthy, alert and no distress   Cardiovascular: negative, PMI normal. No lifts, heaves, or thrills. RRR. No murmurs, clicks gallops or rub  Respiratory: " negative, Percussion normal. Good diaphragmatic excursion. Lungs clear   Ext:pedal edema  1+ on L 2-2+ on R    Note /67 at cardiology visit 10/13/2022  Assessment and Plan   1. 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 S/p aortic valve replacement, aortic stenois  3. HTN  Stable on current dose estradiol replacement  Her goal remains  minimal depth vaginoplasty.  Overall health much improved since valve replacement  Discussed next steps for this--recommend consider consult with Dr. Muro after cardiac rehab complete.   Continue with PCP and cardiology    Follow-up 6 months    .        Results by mychart  Questions were elicited and answered.     Jluis Mane MD

## 2022-11-16 DIAGNOSIS — Z79.899 TRANSGENDER PERSON ON HORMONE THERAPY: ICD-10-CM

## 2022-11-16 DIAGNOSIS — F64.0 TRANSGENDER PERSON ON HORMONE THERAPY: ICD-10-CM

## 2022-11-16 RX ORDER — ESTRADIOL 0.05 MG/D
1 PATCH, EXTENDED RELEASE TRANSDERMAL
Qty: 24 PATCH | Refills: 1 | OUTPATIENT
Start: 2022-11-17

## 2022-11-16 NOTE — TELEPHONE ENCOUNTER
Requested Medication: Estradiol 0.05 mg bi weekly patch     Dose: 0.05mg (1 patch )  Quantity: 24 patches  Refills: 0    Place one patch onto the skin twice a week.    Last seen at [facility]: 11/14/2022  Next Appointment with Provider:  Visit date not found    CHARLES [place]

## 2022-11-17 ENCOUNTER — HOSPITAL ENCOUNTER (OUTPATIENT)
Dept: CARDIAC REHAB | Facility: CLINIC | Age: 72
Discharge: HOME | End: 2022-11-17
Attending: NURSE PRACTITIONER
Payer: COMMERCIAL

## 2022-11-17 ENCOUNTER — MEDICAL CORRESPONDENCE (OUTPATIENT)
Dept: HEALTH INFORMATION MANAGEMENT | Facility: CLINIC | Age: 72
End: 2022-11-17

## 2022-11-17 ENCOUNTER — HOSPITAL ENCOUNTER (OUTPATIENT)
Dept: CARDIOLOGY | Facility: CLINIC | Age: 72
Discharge: HOME | End: 2022-11-17
Attending: INTERNAL MEDICINE
Payer: COMMERCIAL

## 2022-11-17 ENCOUNTER — LAB (OUTPATIENT)
Dept: LAB | Facility: CLINIC | Age: 72
End: 2022-11-17
Attending: INTERNAL MEDICINE
Payer: COMMERCIAL

## 2022-11-17 DIAGNOSIS — Z95.2 S/P TAVR (TRANSCATHETER AORTIC VALVE REPLACEMENT): ICD-10-CM

## 2022-11-17 DIAGNOSIS — I35.0 AORTIC VALVE STENOSIS, UNSPECIFIED ETIOLOGY: ICD-10-CM

## 2022-11-17 LAB
ANION GAP SERPL CALCULATED.3IONS-SCNC: 8 MMOL/L (ref 7–15)
BUN SERPL-MCNC: 18.7 MG/DL (ref 8–23)
CALCIUM SERPL-MCNC: 9.9 MG/DL (ref 8.8–10.2)
CHLORIDE SERPL-SCNC: 102 MMOL/L (ref 98–107)
CREAT SERPL-MCNC: 0.98 MG/DL (ref 0.51–1.17)
DEPRECATED HCO3 PLAS-SCNC: 27 MMOL/L (ref 22–29)
ERYTHROCYTE [DISTWIDTH] IN BLOOD BY AUTOMATED COUNT: 13 % (ref 10–15)
GFR SERPL CREATININE-BSD FRML MDRD: 82 ML/MIN/1.73M2
GLUCOSE SERPL-MCNC: 101 MG/DL (ref 70–99)
HCT VFR BLD AUTO: 44.4 % (ref 35–53)
HGB BLD-MCNC: 14.5 G/DL (ref 11.7–17.7)
LVEF ECHO: NORMAL
MCH RBC QN AUTO: 29.4 PG (ref 26.5–33)
MCHC RBC AUTO-ENTMCNC: 32.7 G/DL (ref 31.5–36.5)
MCV RBC AUTO: 90 FL (ref 78–100)
PLATELET # BLD AUTO: 99 10E3/UL (ref 150–450)
POTASSIUM SERPL-SCNC: 4.9 MMOL/L (ref 3.4–5.3)
RBC # BLD AUTO: 4.93 10E6/UL (ref 3.8–5.9)
SODIUM SERPL-SCNC: 137 MMOL/L (ref 136–145)
WBC # BLD AUTO: 9.6 10E3/UL (ref 4–11)

## 2022-11-17 PROCEDURE — 80048 BASIC METABOLIC PNL TOTAL CA: CPT | Performed by: INTERNAL MEDICINE

## 2022-11-17 PROCEDURE — 93005 ELECTROCARDIOGRAM TRACING: CPT

## 2022-11-17 PROCEDURE — 93306 TTE W/DOPPLER COMPLETE: CPT | Mod: 26 | Performed by: INTERNAL MEDICINE

## 2022-11-17 PROCEDURE — 999N000208 ECHOCARDIOGRAM COMPLETE

## 2022-11-17 PROCEDURE — 255N000002 HC RX 255 OP 636: Performed by: INTERNAL MEDICINE

## 2022-11-17 PROCEDURE — 93798 PHYS/QHP OP CAR RHAB W/ECG: CPT

## 2022-11-17 PROCEDURE — 36415 COLL VENOUS BLD VENIPUNCTURE: CPT | Performed by: INTERNAL MEDICINE

## 2022-11-17 PROCEDURE — 85027 COMPLETE CBC AUTOMATED: CPT | Performed by: INTERNAL MEDICINE

## 2022-11-17 PROCEDURE — 93000 ELECTROCARDIOGRAM COMPLETE: CPT | Performed by: INTERNAL MEDICINE

## 2022-11-17 RX ADMIN — HUMAN ALBUMIN MICROSPHERES AND PERFLUTREN 2 ML: 10; .22 INJECTION, SOLUTION INTRAVENOUS at 10:11

## 2022-11-21 ENCOUNTER — OFFICE VISIT (OUTPATIENT)
Dept: ORTHOPEDICS | Facility: CLINIC | Age: 72
End: 2022-11-21
Payer: COMMERCIAL

## 2022-11-21 VITALS — HEIGHT: 71 IN | BODY MASS INDEX: 47.28 KG/M2

## 2022-11-21 DIAGNOSIS — M17.11 PRIMARY OSTEOARTHRITIS OF RIGHT KNEE: Primary | ICD-10-CM

## 2022-11-21 PROCEDURE — 99213 OFFICE O/P EST LOW 20 MIN: CPT | Mod: 25 | Performed by: FAMILY MEDICINE

## 2022-11-21 PROCEDURE — 20611 DRAIN/INJ JOINT/BURSA W/US: CPT | Mod: RT | Performed by: FAMILY MEDICINE

## 2022-11-21 RX ORDER — BETAMETHASONE SODIUM PHOSPHATE AND BETAMETHASONE ACETATE 3; 3 MG/ML; MG/ML
6 INJECTION, SUSPENSION INTRA-ARTICULAR; INTRALESIONAL; INTRAMUSCULAR; SOFT TISSUE
Status: DISCONTINUED | OUTPATIENT
Start: 2022-11-21 | End: 2023-04-17

## 2022-11-21 RX ORDER — ROPIVACAINE HYDROCHLORIDE 5 MG/ML
4 INJECTION, SOLUTION EPIDURAL; INFILTRATION; PERINEURAL
Status: DISCONTINUED | OUTPATIENT
Start: 2022-11-21 | End: 2023-04-17

## 2022-11-21 RX ADMIN — ROPIVACAINE HYDROCHLORIDE 4 ML: 5 INJECTION, SOLUTION EPIDURAL; INFILTRATION; PERINEURAL at 10:17

## 2022-11-21 RX ADMIN — BETAMETHASONE SODIUM PHOSPHATE AND BETAMETHASONE ACETATE 6 MG: 3; 3 INJECTION, SUSPENSION INTRA-ARTICULAR; INTRALESIONAL; INTRAMUSCULAR; SOFT TISSUE at 10:17

## 2022-11-21 ASSESSMENT — PAIN SCALES - GENERAL: PAINLEVEL: MILD PAIN (2)

## 2022-11-21 NOTE — LETTER
11/21/2022         RE: Laila Perez  [address] [address] [place] [id number]        Dear Colleague,    Thank you for referring your patient, Laila Perez, to the [facility] [place]. Please see a copy of my visit note below.    ASSESSMENT & PLAN    Laila was seen today for follow up.    Diagnoses and all orders for this visit:    Primary osteoarthritis of right knee  -     Large Joint Injection/Arthocentesis: R knee joint        # Right knee osteoarthritis: Long-standing condition for patient with last visit on 8/8/22. She did get significant improvement of right knee pain after steroid injection then. Symptoms have returned she does have pain over the joint lines bilaterally. Reviewed previous x-rays showing significant arthritis in her knees. Patient has lost 17+ lbs after heart procedure.  She is interested in consideration of a possible knee replacement but wants to lose more weight. Given this will treat as below and follow-up as needed.      # Morbid Obesity: Counseled patient that weight loss could help her heart health as well as her knee pain. She is losing weight by yourself that has improved after heart procedure. Given this will have her continue to lose weight on her own and follow-up as needed.     Image Findings: reviewed previous x-rays showing arthritis in the knee  Treatment: Activities as tolerated, can continue home exercises, referral to orthopedic surgery place today  Medications/Injections: Limited tylenol/ibuprofen for pain for 1-2 weeks, repeat left knee steroid injection  Follow-up: In 3 months if symptoms do not improve, sooner if worsening  Can consider gel injection if steroid injection does not help    -----    SUBJECTIVE:  Laila Perez is a 72 year old adult who is seen in follow-up for right knee pain. They were last seen 8/8/2022 and right knee steroid injection was performed.  The patient is seen with their staff.    Since their  "last visit reports waxing and wanning right knee pain.  They indicate that their current pain level is 2/10. They have tried steroid injections 8/8/22, 4/4/22 and 1/3/22, voltaren, Tylenol, cane.        Patient's past medical, surgical, social, and family histories were reviewed today and no changes are noted.    REVIEW OF SYSTEMS:  Constitutional: NEGATIVE for fever, chills, change in weight  Skin: NEGATIVE for worrisome rashes, moles or lesions  GI/: NEGATIVE for bowel or bladder changes  Neuro: NEGATIVE for weakness, dizziness or paresthesias    OBJECTIVE:  Ht 1.803 m (5' 11\")   BMI 47.28 kg/m     General: healthy, alert and in no distress  HEENT: no scleral icterus or conjunctival erythema  Skin: no suspicious lesions or rash. No jaundice.  CV: regular rhythm by palpation, no pedal edema  Resp: normal respiratory effort without conversational dyspnea   Psych: normal mood and affect  Gait: normal steady gait with appropriate coordination and balance  Neuro: normal light touch sensory exam of the extremities.    MSK:    RIGHT KNEE  Inspection:    Normal alignment; no edema, erythema, or ecchymosis present  Palpation:    Tender about the medial joint line. Remainder of bony and ligamentous landmarks are nontender.    No effusion is present    Patellofemoral crepitus is Present  Range of Motion:     00 extension to 1350 flexion  Strength:    Quadriceps 5/5, hamstrings 5/5, gastrocsoleus 5/5 and tibialis anterior 5/5    Extensor mechanism intact  Special Tests:    Positive: Patellar grind    Negative: MCL/valgus stress (0 & 30 deg), LCL/varus stress (0 & 30 deg), Lachman's, anterior drawer, posterior drawer      Independent visualization of the below image:  RIGHT KNEE THREE VIEWS    12/9/2021 11:55 AM      HISTORY:  Primary osteoarthritis of right knee.     COMPARISON: Radiographs from 1/30/2020.                                                                      IMPRESSION: Mild-to-moderate medial compartment " narrowing. Small  medial and patellofemoral compartment osteophytes. Tiny lateral  compartment osteophytes. 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 There is no evidence of fracture,  effusion or calcified intra-articular body. The medial compartment  degenerative changes have slightly progressed.      MD Hema AVILA MD, [facility]    Disclaimer: This note consists of symbols derived from keyboarding, dictation and/or voice recognition software. As a result, there may be errors in the script that have gone undetected. Please consider this when interpreting information found in this chart.    Large Joint Injection/Arthocentesis: R knee joint    Date/Time: 11/21/2022 10:17 AM  Performed by: Hema Duncan MD  Authorized by: Hema Duncan MD     Indications:  Pain and osteoarthritis  Needle Size:  25 G  Guidance: ultrasound    Approach:  Superolateral  Location:  Knee      Medications:  6 mg betamethasone acet & sod phos 6 (3-3) MG/ML; 4 mL ropivacaine 5 MG/ML  Outcome:  Tolerated well, no immediate complications  Consent Given by:  Patient  Timeout: timeout called immediately prior to procedure    Prep: patient was prepped and draped in usual sterile fashion     Ultrasound images of procedure were permanently stored.     Patient reported significant improvement of pain after the numbing portion right knee joint aspiration/steroid injection.  Ultrasound guided images were permanently stored.   Aftercare instructions given to patient.  Plan to follow-up as discussed above.     Hema Duncan MD [facility]              Again, thank you for allowing me to participate in the care of your patient.        Sincerely,        Hema Duncan MD

## 2022-11-21 NOTE — PATIENT INSTRUCTIONS
# Right knee osteoarthritis: Long-standing condition for patient with last visit on 8/8/22. She did get significant improvement of right knee pain after steroid injection then. Symptoms have returned she does have pain over the joint lines bilaterally. Reviewed previous x-rays showing significant arthritis in her knees. Patient has lost 17+ lbs after heart procedure.  She is interested in consideration of a possible knee replacement but wants to lose more weight. Given this will treat as below and follow-up as needed.      # Morbid Obesity: Counseled patient that weight loss could help her heart health as well as her knee pain. She is losing weight by yourself that has improved after heart procedure. Given this will have her continue to lose weight on her own and follow-up as needed.     Image Findings: reviewed previous x-rays showing arthritis in the knee  Treatment: Activities as tolerated, can continue home exercises, referral to orthopedic surgery place today  Medications/Injections: Limited tylenol/ibuprofen for pain for 1-2 weeks, repeat left knee steroid injection  Follow-up: In 3 months if symptoms do not improve, sooner if worsening  Can consider gel injection if steroid injection does not help    It was great seeing you again today!    Hema Duncan    [facility] Injection Discharge Instructions    Procedure: right knee joint steroid injection    You may shower, however avoid swimming, tub baths or hot tubs for 24 hours following your procedure  You may have a mild to moderate increase in pain for several days following the injection.  It may take up to 14 days for the steroid medication to start working although you may feel the effect as early as a few days after the procedure.  You may use ice packs for 10-15 minutes, 3 to 4 times a day at the injection site for comfort  You may use anti-inflammatory medications (such as Ibuprofen or Aleve or Advil) or Tylenol for pain control if necessary  If you were  fasting, you may resume your normal diet and medications after the procedure  If you have diabetes, check your blood sugar more frequently than usual as your blood sugar may be higher than normal for 10-14 days following a steroid injection. Contact your doctor who manages your diabetes if your blood sugar is higher than usual    If you experience any of the following, call [facility] @ [phone] or [phone]  -Fever over 100 degree F  -Swelling, bleeding, redness, drainage, warmth at the injection site  - New or worsening pain

## 2022-11-21 NOTE — PROGRESS NOTES
ASSESSMENT & PLAN    Laila was seen today for follow up.    Diagnoses and all orders for this visit:    Primary osteoarthritis of right knee  -     Large Joint Injection/Arthocentesis: R knee joint        # Right knee osteoarthritis: Long-standing condition for patient with last visit on 8/8/22. She did get significant improvement of right knee pain after steroid injection then. Symptoms have returned she does have pain over the joint lines bilaterally. Reviewed previous x-rays showing significant arthritis in her knees. Patient has lost 17+ lbs after heart procedure.  She is interested in consideration of a possible knee replacement but wants to lose more weight. Given this will treat as below and follow-up as needed.      # Morbid Obesity: Counseled patient that weight loss could help her heart health as well as her knee pain. She is losing weight by yourself that has improved after heart procedure. Given this will have her continue to lose weight on her own and follow-up as needed.     Image Findings: reviewed previous x-rays showing arthritis in the knee  Treatment: Activities as tolerated, can continue home exercises, referral to orthopedic surgery place today  Medications/Injections: Limited tylenol/ibuprofen for pain for 1-2 weeks, repeat left knee steroid injection  Follow-up: In 3 months if symptoms do not improve, sooner if worsening  Can consider gel injection if steroid injection does not help    -----    SUBJECTIVE:  Laila Perez is a 72 year old adult who is seen in follow-up for right knee pain. They were last seen 8/8/2022 and right knee steroid injection was performed.  The patient is seen with their staff.    Since their last visit reports waxing and wanning right knee pain.  They indicate that their current pain level is 2/10. They have tried steroid injections 8/8/22, 4/4/22 and 1/3/22, voltaren, Tylenol, cane.        Patient's past medical, surgical, social, and family histories were  "reviewed today and no changes are noted.    REVIEW OF SYSTEMS:  Constitutional: NEGATIVE for fever, chills, change in weight  Skin: NEGATIVE for worrisome rashes, moles or lesions  GI/: NEGATIVE for bowel or bladder changes  Neuro: NEGATIVE for weakness, dizziness or paresthesias    OBJECTIVE:  Ht 1.803 m (5' 11\")   BMI 47.28 kg/m     General: healthy, alert and in no distress  HEENT: no scleral icterus or conjunctival erythema  Skin: no suspicious lesions or rash. No jaundice.  CV: regular rhythm by palpation, no pedal edema  Resp: normal respiratory effort without conversational dyspnea   Psych: normal mood and affect  Gait: normal steady gait with appropriate coordination and balance  Neuro: normal light touch sensory exam of the extremities.    MSK:    RIGHT KNEE  Inspection:    Normal alignment; no edema, erythema, or ecchymosis present  Palpation:    Tender about the medial joint line. Remainder of bony and ligamentous landmarks are nontender.    No effusion is present    Patellofemoral crepitus is Present  Range of Motion:     00 extension to 1350 flexion  Strength:    Quadriceps 5/5, hamstrings 5/5, gastrocsoleus 5/5 and tibialis anterior 5/5    Extensor mechanism intact  Special Tests:    Positive: Patellar grind    Negative: MCL/valgus stress (0 & 30 deg), LCL/varus stress (0 & 30 deg), Lachman's, anterior drawer, posterior drawer      Independent visualization of the below image:  RIGHT KNEE THREE VIEWS    12/9/2021 11:55 AM      HISTORY:  Primary osteoarthritis of right knee.     COMPARISON: Radiographs from 1/30/2020.                                                                      IMPRESSION: Mild-to-moderate medial compartment narrowing. Small  medial and patellofemoral compartment osteophytes. Tiny lateral  compartment osteophytes. 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 There is no evidence of fracture,  effusion or calcified intra-articular body. The medial " compartment  degenerative changes have slightly progressed.      MD Hema AVILA MD, [facility]    Disclaimer: This note consists of symbols derived from keyboarding, dictation and/or voice recognition software. As a result, there may be errors in the script that have gone undetected. Please consider this when interpreting information found in this chart.    Large Joint Injection/Arthocentesis: R knee joint    Date/Time: 11/21/2022 10:17 AM  Performed by: Hema Duncan MD  Authorized by: Hema Duncan MD     Indications:  Pain and osteoarthritis  Needle Size:  25 G  Guidance: ultrasound    Approach:  Superolateral  Location:  Knee      Medications:  6 mg betamethasone acet & sod phos 6 (3-3) MG/ML; 4 mL ropivacaine 5 MG/ML  Outcome:  Tolerated well, no immediate complications  Consent Given by:  Patient  Timeout: timeout called immediately prior to procedure    Prep: patient was prepped and draped in usual sterile fashion     Ultrasound images of procedure were permanently stored.     Patient reported significant improvement of pain after the numbing portion right knee joint aspiration/steroid injection.  Ultrasound guided images were permanently stored.   Aftercare instructions given to patient.  Plan to follow-up as discussed above.     Hema Duncan MD [facility]

## 2022-11-28 ENCOUNTER — HOSPITAL ENCOUNTER (OUTPATIENT)
Dept: CARDIAC REHAB | Facility: CLINIC | Age: 72
Discharge: HOME | End: 2022-11-28
Attending: NURSE PRACTITIONER
Payer: COMMERCIAL

## 2022-11-28 ENCOUNTER — ALLIED HEALTH/NURSE VISIT (OUTPATIENT)
Dept: FAMILY MEDICINE | Facility: CLINIC | Age: 72
End: 2022-11-28
Payer: COMMERCIAL

## 2022-11-28 DIAGNOSIS — Z23 COVID-19 VACCINE ADMINISTERED: Primary | ICD-10-CM

## 2022-11-28 PROCEDURE — 99207 PR NO CHARGE NURSE ONLY: CPT

## 2022-11-28 PROCEDURE — 93798 PHYS/QHP OP CAR RHAB W/ECG: CPT

## 2022-11-28 PROCEDURE — 91312 COVID-19 VACCINE BIVALENT BOOSTER 12+ (PFIZER): CPT

## 2022-11-28 PROCEDURE — 0124A COVID-19 VACCINE BIVALENT BOOSTER 12+ (PFIZER): CPT

## 2022-12-01 ENCOUNTER — HOSPITAL ENCOUNTER (OUTPATIENT)
Dept: CARDIAC REHAB | Facility: CLINIC | Age: 72
Discharge: HOME | End: 2022-12-01
Attending: NURSE PRACTITIONER
Payer: COMMERCIAL

## 2022-12-01 PROCEDURE — 93798 PHYS/QHP OP CAR RHAB W/ECG: CPT

## 2022-12-05 ENCOUNTER — HOSPITAL ENCOUNTER (OUTPATIENT)
Dept: CARDIAC REHAB | Facility: CLINIC | Age: 72
Discharge: HOME | End: 2022-12-05
Attending: NURSE PRACTITIONER
Payer: COMMERCIAL

## 2022-12-05 PROCEDURE — 93798 PHYS/QHP OP CAR RHAB W/ECG: CPT

## 2022-12-08 ENCOUNTER — HOSPITAL ENCOUNTER (OUTPATIENT)
Dept: CARDIAC REHAB | Facility: CLINIC | Age: 72
Discharge: HOME | End: 2022-12-08
Attending: NURSE PRACTITIONER
Payer: COMMERCIAL

## 2022-12-08 PROCEDURE — 93798 PHYS/QHP OP CAR RHAB W/ECG: CPT

## 2022-12-09 NOTE — NURSING NOTE
Chart reviewed with patient and updated.    Alexadnra Long,CMA     Date of last visit:  11/9/2022  Date of next visit:  Visit date not found    Requested Prescriptions     Pending Prescriptions Disp Refills    LORazepam (ATIVAN) 1 MG tablet 30 tablet 0     Sig: Take 1 tablet by mouth daily as needed for Anxiety for up to 30 days.

## 2022-12-12 ENCOUNTER — HOSPITAL ENCOUNTER (OUTPATIENT)
Dept: CARDIAC REHAB | Facility: CLINIC | Age: 72
Discharge: HOME | End: 2022-12-12
Attending: NURSE PRACTITIONER
Payer: COMMERCIAL

## 2022-12-12 PROCEDURE — 93798 PHYS/QHP OP CAR RHAB W/ECG: CPT | Performed by: CLINICAL EXERCISE PHYSIOLOGIST

## 2022-12-15 ENCOUNTER — TELEPHONE (OUTPATIENT)
Dept: CARDIOLOGY | Facility: CLINIC | Age: 72
End: 2022-12-15

## 2022-12-15 NOTE — TELEPHONE ENCOUNTER
Pt has already been rescheduled. She was contacted at 8am this morning and rescheduled for next week.     Sujey Sullivan RN

## 2022-12-15 NOTE — TELEPHONE ENCOUNTER
[facility] Call Center    Phone Message    May a detailed message be left on voicemail: yes     Reason for Call: Other: Pt would like to reschedule her TAVR appt today     Action Taken: Message routed to:  [facility] Cardio    Travel Screening: Not Applicable

## 2022-12-19 ENCOUNTER — HOSPITAL ENCOUNTER (OUTPATIENT)
Dept: CARDIAC REHAB | Facility: CLINIC | Age: 72
Discharge: HOME | End: 2022-12-19
Attending: NURSE PRACTITIONER
Payer: COMMERCIAL

## 2022-12-19 PROCEDURE — 93798 PHYS/QHP OP CAR RHAB W/ECG: CPT

## 2022-12-21 NOTE — PROGRESS NOTES
Laila is a 72 year old who is being evaluated via a billable video visit.      Send text to [phone]    How would you like to obtain your AVS? Mail a copy  If the video visit is dropped, please call [phone] for a phone visit.    Will anyone else be joining your video visit? No        Video-Visit Details    Type of service:  Video Visit   Video Start Time: 12:55 PM  Video End Time:    Originating Location (pt. Location): Home    Distant Location (provider location):  Off-site  Platform used for Video Visit: Unable to complete video visit   Telephone visit: 16 minutes            [facility]  TAVR Clinic Visit      Cardiology Clinic Progress Note  Laila Perez MRN# [id number]   YOB: 1950 Age: 72 year old      Primary Cardiologist:   Dr. Cerda          History of Presenting Illness:      Laila Perez is a pleasant 72 year old patient with a past cardiac history significant for   1. Severe symptomatic aortic stenosis    TAVR 10/2022 34 mm Medtronic evolute valve  2. Ascending aortic aneurysm    Ascending aorta 4.4 cm 2021 and 2022  3. Hypertension  Past medical history significant for obesity, transgender identifying as female.    Patient was seen by Dr. Ramires in August 2022.  She was noted to have progressive, debilitating dyspnea on exertion over the prior 6 months.  Echocardiogram showed severe aortic stenosis with mean gradient of 41 mmHg and ARISTEO of 0.9 cm , ascending aorta moderately dilated 4.4 cm similar to prior MRA from 2021. Preop coronary angiogram showing normal coronary arteries.    She underwent elective TAVR on 10/4/2022 with 34 mm Medtronic evolute valve.  She had no significant postop complications.  Post procedure echocardiogram showed normal EF, mean gradient 14 mmHg.  EKG post TAVR showed new LBBB with first-degree AV block and she was discharged with an event monitor.  Atenolol was changed to lisinopril.     Patient was seen by me in October 2022 for 1  week TAVR follow-up.  She noted positional lightheadedness that resolves within a few seconds.  She was already already noticing some improvement in her dyspnea on exertion.  She had not started cardiac rehab.  She was discussing needing to have knee surgery.  She continued with bilateral lower extremity edema 1+ pitting that was stable.     Patient presents today, with her  Akiko, for 1 month TAVR follow-up.  Discharge event monitor October 2022 showed first-degree AV block with slowest heart rate 59 bpm, 1% PACs, 3% PVCs.  I later received a note from cardiac rehab in November 2022 noting that the patient had 2 episodes of 5 beat VT, which we will continue to monitor. BMP 11/17/2022 showing normal renal function and electrolytes.  CBC stable with platelets 99.  EKG 11/17/2022 showing sinus rhythm with IVCD ventricular rate 70 bpm.  Echocardiogram 11/17/2022 showing normal EF 55 to 60%, TAVR valve with mean gradient 9 mmHg which is improved from previous gradient of 14, stable ascending aorta moderately dilated 4.4 cm.  Results reviewed today.    Since her surgery, she has noted more energy, improving dyspnea on exertion and reports that her lower extremity edema has resolved.  She has been participating in cardiac rehab without difficulty.  She denies any heart racing.  Blood pressures at cardiac rehab have been well controlled.  She was thinking of having knee surgery this spring but is now considering waiting until after summer. Patient reports no chest pain, PND, orthopnea, presyncope, syncope, heart racing, or palpitations.    KCCQ  11/21/22  Q1A 3  Q1B 3  Q1C 6  Q2 4  Q3 3  Q4 3  Q5 5  Q6 4  Q7 2  Q8A 4  Q8B 3  Q8C 6        Labs:  LIPID RESULTS:  Lab Results   Component Value Date    CHOL 133 05/05/2022    CHOL 142 05/13/2021    HDL 60 05/05/2022    HDL 60 05/13/2021    LDL 46 05/05/2022    LDL 50 05/13/2021    TRIG 136 05/05/2022    TRIG 159 (H) 05/13/2021    CHOLHDLRATIO 3.0 04/25/2014       LIVER  ENZYME RESULTS:  Lab Results   Component Value Date    AST 34 09/29/2022    AST 13 05/13/2021    ALT 33 09/29/2022    ALT 18 05/13/2021       CBC RESULTS:  Lab Results   Component Value Date    WBC 9.6 11/17/2022    WBC 6.0 05/13/2021    RBC 4.93 11/17/2022    RBC 4.55 05/13/2021    HGB 14.5 11/17/2022    HGB 13.7 05/13/2021    HCT 44.4 11/17/2022    HCT 41.8 05/13/2021    MCV 90 11/17/2022    MCV 92 05/13/2021    MCH 29.4 11/17/2022    MCH 30.1 05/13/2021    MCHC 32.7 11/17/2022    MCHC 32.8 05/13/2021    RDW 13.0 11/17/2022    RDW 13.1 05/13/2021    PLT 99 (L) 11/17/2022     (L) 05/13/2021       BMP RESULTS:  Lab Results   Component Value Date     11/17/2022     05/13/2021    POTASSIUM 4.9 11/17/2022    POTASSIUM 4.4 10/06/2022    POTASSIUM 5.6 (H) 05/13/2021    CHLORIDE 102 11/17/2022    CHLORIDE 105 10/06/2022    CHLORIDE 107 05/13/2021    CO2 27 11/17/2022    CO2 29 10/06/2022    CO2 30 05/13/2021    ANIONGAP 8 11/17/2022    ANIONGAP 3 10/06/2022    ANIONGAP 1 (L) 05/13/2021     (H) 11/17/2022     (H) 10/06/2022    GLC 92 05/13/2021    BUN 18.7 11/17/2022    BUN 16 10/06/2022    BUN 16 05/13/2021    CR 0.98 11/17/2022    CR 0.97 05/13/2021    GFRESTIMATED 82 11/17/2022    GFRESTIMATED >60 08/25/2022    GFRESTIMATED 78 05/13/2021    GFRESTBLACK >90 05/13/2021    GOPAL 9.9 11/17/2022    GOPAL 9.3 05/13/2021        A1C RESULTS:  Lab Results   Component Value Date    A1C 4.8 04/26/2016       INR RESULTS:  Lab Results   Component Value Date    INR 1.15 09/29/2022    INR 1.14 09/08/2022    INR 0.98 10/25/2005       Results reviewed today.       Current Cardiac Medications   Aspirin 81 mg daily  Lasix 20 mg daily  Lisinopril 5 mg daily  Lovastatin 20 mg daily  Nitroglycerin as needed                   Assessment and Plan:       Plan    Patient Instructions   Medication Changes:  4. None     Recommendations:  1. Check blood pressure at least 1 hour after medications. Call the clinic if your  blood pressure is consistently greater than 130/80.      Follow-up:  1. Call to schedule with ELZBIETA Howe for cardiology follow up at [facility]: April 2023.  6-month TAVR follow-up  Call 6 months prior, to schedule.     Cardiology Scheduling~[phone]  Cardiology Clinic RN~[phone] (Karlene RN, Sujey RN, Gayathri RN)                  1. Severe symptomatic aortic stenosis  Status post TAVR 34 mm Medtronic evolute valve 10/2022  Prior symptoms of MARCELINO are improving post procedure, current NYHA class II   Echo POD1 14 mmHg and no AR  Echo 1 mo MG 9 mmHg, no AR  Continue MAPT ×6 months, uninterrupted, with aspirin until April 2023   Lifelong SBE prophylaxis for dental procedures/ cleanings (amoxicillin 2G or clindamycin 600 mg 60 mins prior)      2. Ascending aortic aneurysm    Stable    Follow with echo      3. Hypertension    Controlled     Continue atenolol      4.  NSVT     2 runs of nonsustained VT lasting 5 beats, at cardiac rehab 11/2022    Continue to monitor at cardiac rehab    Consider restarting metoprolol, if needed (would then reassess for any significant bradycardia with Holter monitor, given history of new LBBB post TAVR)         Thank you for allowing me to participate in this delightful patient's care.      This note was completed in part using Dragon voice recognition software. Although reviewed after completion, some word and grammatical errors may occur.    Carley Rodriguez, JERRICA CNP, APRN, CNP           Data:   All laboratory data reviewed      Total time spent today was 52 mins, reviewing labs, testing, notes, documenting notes, and seeing patient.

## 2022-12-22 ENCOUNTER — VIRTUAL VISIT (OUTPATIENT)
Dept: CARDIOLOGY | Facility: CLINIC | Age: 72
End: 2022-12-22
Attending: INTERNAL MEDICINE
Payer: COMMERCIAL

## 2022-12-22 DIAGNOSIS — I71.21 ANEURYSM OF ASCENDING AORTA WITHOUT RUPTURE (H): ICD-10-CM

## 2022-12-22 DIAGNOSIS — Z95.2 S/P TAVR (TRANSCATHETER AORTIC VALVE REPLACEMENT): Primary | ICD-10-CM

## 2022-12-22 DIAGNOSIS — I47.29 NSVT (NONSUSTAINED VENTRICULAR TACHYCARDIA) (H): ICD-10-CM

## 2022-12-22 DIAGNOSIS — I10 BENIGN ESSENTIAL HYPERTENSION: ICD-10-CM

## 2022-12-22 DIAGNOSIS — I35.0 AORTIC VALVE STENOSIS, UNSPECIFIED ETIOLOGY: ICD-10-CM

## 2022-12-22 PROCEDURE — 99215 OFFICE O/P EST HI 40 MIN: CPT | Mod: 95 | Performed by: NURSE PRACTITIONER

## 2022-12-22 NOTE — PATIENT INSTRUCTIONS
Medication Changes:  None     Recommendations:  Check blood pressure at least 1 hour after medications. Call the clinic if your blood pressure is consistently greater than 130/80.      Follow-up:  Call to schedule with ELZBIETA Howe for cardiology follow up at [facility]: April 2023.  6-month TAVR follow-up  Call 6 months prior, to schedule.     Cardiology Scheduling~[phone]  Cardiology Clinic RN~[phone] (Karlene RN, Sujey RN, Gayathri RN)

## 2022-12-22 NOTE — LETTER
12/22/2022    Delmis Simons, APRN CNP  [address] [place] [id number]    RE: Laila Perez       Dear Colleague,     I had the pleasure of seeing Laila Perez in the [facility].  Laila is a 72 year old who is being evaluated via a billable video visit.      Send text to [phone]    How would you like to obtain your AVS? Mail a copy  If the video visit is dropped, please call [phone] for a phone visit.    Will anyone else be joining your video visit? No        Video-Visit Details    Type of service:  Video Visit   Video Start Time: 12:55 PM  Video End Time:    Originating Location (pt. Location): Home    Distant Location (provider location):  Off-site  Platform used for Video Visit: Unable to complete video visit   Telephone visit: 16 minutes            [facility]  [facility]R Clinic Visit      Cardiology Clinic Progress Note  Laila Perez MRN# [id number]   YOB: 1950 Age: 72 year old      Primary Cardiologist:   Dr. Cerda          History of Presenting Illness:      Laila Perez is a pleasant 72 year old patient with a past cardiac history significant for   1. Severe symptomatic aortic stenosis    TAVR 10/2022 34 mm Medtronic evolute valve  2. Ascending aortic aneurysm    Ascending aorta 4.4 cm 2021 and 2022  3. Hypertension  Past medical history significant for obesity, transgender identifying as female.    Patient was seen by Dr. Ramires in August 2022.  She was noted to have progressive, debilitating dyspnea on exertion over the prior 6 months.  Echocardiogram showed severe aortic stenosis with mean gradient of 41 mmHg and ARISTEO of 0.9 cm , ascending aorta moderately dilated 4.4 cm similar to prior MRA from 2021. Preop coronary angiogram showing normal coronary arteries.    She underwent elective TAVR on 10/4/2022 with 34 mm Medtronic evolute valve.  She had no significant postop complications.  Post procedure echocardiogram showed  normal EF, mean gradient 14 mmHg.  EKG post TAVR showed new LBBB with first-degree AV block and she was discharged with an event monitor.  Atenolol was changed to lisinopril.     Patient was seen by me in October 2022 for 1 week TAVR follow-up.  She noted positional lightheadedness that resolves within a few seconds.  She was already already noticing some improvement in her dyspnea on exertion.  She had not started cardiac rehab.  She was discussing needing to have knee surgery.  She continued with bilateral lower extremity edema 1+ pitting that was stable.     Patient presents today, with her  Akiko, for 1 month TAVR follow-up.  Discharge event monitor October 2022 showed first-degree AV block with slowest heart rate 59 bpm, 1% PACs, 3% PVCs.  I later received a note from cardiac rehab in November 2022 noting that the patient had 2 episodes of 5 beat VT, which we will continue to monitor. BMP 11/17/2022 showing normal renal function and electrolytes.  CBC stable with platelets 99.  EKG 11/17/2022 showing sinus rhythm with IVCD ventricular rate 70 bpm.  Echocardiogram 11/17/2022 showing normal EF 55 to 60%, TAVR valve with mean gradient 9 mmHg which is improved from previous gradient of 14, stable ascending aorta moderately dilated 4.4 cm.  Results reviewed today.    Since her surgery, she has noted more energy, improving dyspnea on exertion and reports that her lower extremity edema has resolved.  She has been participating in cardiac rehab without difficulty.  She denies any heart racing.  Blood pressures at cardiac rehab have been well controlled.  She was thinking of having knee surgery this spring but is now considering waiting until after summer. Patient reports no chest pain, PND, orthopnea, presyncope, syncope, heart racing, or palpitations.    KCCQ  11/21/22  Q1A 3  Q1B 3  Q1C 6  Q2 4  Q3 3  Q4 3  Q5 5  Q6 4  Q7 2  Q8A 4  Q8B 3  Q8C 6        Labs:  LIPID RESULTS:  Lab Results   Component Value Date     CHOL 133 05/05/2022    CHOL 142 05/13/2021    HDL 60 05/05/2022    HDL 60 05/13/2021    LDL 46 05/05/2022    LDL 50 05/13/2021    TRIG 136 05/05/2022    TRIG 159 (H) 05/13/2021    CHOLHDLRATIO 3.0 04/25/2014       LIVER ENZYME RESULTS:  Lab Results   Component Value Date    AST 34 09/29/2022    AST 13 05/13/2021    ALT 33 09/29/2022    ALT 18 05/13/2021       CBC RESULTS:  Lab Results   Component Value Date    WBC 9.6 11/17/2022    WBC 6.0 05/13/2021    RBC 4.93 11/17/2022    RBC 4.55 05/13/2021    HGB 14.5 11/17/2022    HGB 13.7 05/13/2021    HCT 44.4 11/17/2022    HCT 41.8 05/13/2021    MCV 90 11/17/2022    MCV 92 05/13/2021    MCH 29.4 11/17/2022    MCH 30.1 05/13/2021    MCHC 32.7 11/17/2022    MCHC 32.8 05/13/2021    RDW 13.0 11/17/2022    RDW 13.1 05/13/2021    PLT 99 (L) 11/17/2022     (L) 05/13/2021       BMP RESULTS:  Lab Results   Component Value Date     11/17/2022     05/13/2021    POTASSIUM 4.9 11/17/2022    POTASSIUM 4.4 10/06/2022    POTASSIUM 5.6 (H) 05/13/2021    CHLORIDE 102 11/17/2022    CHLORIDE 105 10/06/2022    CHLORIDE 107 05/13/2021    CO2 27 11/17/2022    CO2 29 10/06/2022    CO2 30 05/13/2021    ANIONGAP 8 11/17/2022    ANIONGAP 3 10/06/2022    ANIONGAP 1 (L) 05/13/2021     (H) 11/17/2022     (H) 10/06/2022    GLC 92 05/13/2021    BUN 18.7 11/17/2022    BUN 16 10/06/2022    BUN 16 05/13/2021    CR 0.98 11/17/2022    CR 0.97 05/13/2021    GFRESTIMATED 82 11/17/2022    GFRESTIMATED >60 08/25/2022    GFRESTIMATED 78 05/13/2021    GFRESTBLACK >90 05/13/2021    GOPAL 9.9 11/17/2022    GOPAL 9.3 05/13/2021        A1C RESULTS:  Lab Results   Component Value Date    A1C 4.8 04/26/2016       INR RESULTS:  Lab Results   Component Value Date    INR 1.15 09/29/2022    INR 1.14 09/08/2022    INR 0.98 10/25/2005       Results reviewed today.       Current Cardiac Medications   Aspirin 81 mg daily  Lasix 20 mg daily  Lisinopril 5 mg daily  Lovastatin 20 mg  daily  Nitroglycerin as needed                   Assessment and Plan:       Plan    Patient Instructions   Medication Changes:  4. None     Recommendations:  1. Check blood pressure at least 1 hour after medications. Call the clinic if your blood pressure is consistently greater than 130/80.      Follow-up:  1. Call to schedule with ELZBIETA Howe for cardiology follow up at [facility]: April 2023.  6-month TAVR follow-up  Call 6 months prior, to schedule.     Cardiology Scheduling~[phone]  Cardiology Clinic RN~[phone] (Karlene RN, Sujey RN, Gayathri RN)                  1. Severe symptomatic aortic stenosis  Status post TAVR 34 mm Medtronic evolute valve 10/2022  Prior symptoms of MARCELINO are improving post procedure, current NYHA class II   Echo POD1 14 mmHg and no AR  Echo 1 mo MG 9 mmHg, no AR  Continue MAPT ×6 months, uninterrupted, with aspirin until April 2023   Lifelong SBE prophylaxis for dental procedures/ cleanings (amoxicillin 2G or clindamycin 600 mg 60 mins prior)      2. Ascending aortic aneurysm    Stable    Follow with echo      3. Hypertension    Controlled     Continue atenolol      4.  NSVT     2 runs of nonsustained VT lasting 5 beats, at cardiac rehab 11/2022    Continue to monitor at cardiac rehab    Consider restarting metoprolol, if needed (would then reassess for any significant bradycardia with Holter monitor, given history of new LBBB post TAVR)         Thank you for allowing me to participate in this delightful patient's care.      This note was completed in part using Dragon voice recognition software. Although reviewed after completion, some word and grammatical errors may occur.    Carley Rodriguez, APRN CNP, APRN, CNP           Data:   All laboratory data reviewed      Total time spent today was 52 mins, reviewing labs, testing, notes, documenting notes, and seeing patient.       Thank you for allowing me to participate in the care of your patient.      Sincerely,      JERRICA Marley CNP     [facility]  cc:   Young Ramires MD  [address]  [place] [id number]

## 2022-12-26 ENCOUNTER — HOSPITAL ENCOUNTER (OUTPATIENT)
Dept: CARDIAC REHAB | Facility: CLINIC | Age: 72
Discharge: HOME | End: 2022-12-26
Attending: NURSE PRACTITIONER
Payer: COMMERCIAL

## 2022-12-26 PROCEDURE — 93798 PHYS/QHP OP CAR RHAB W/ECG: CPT

## 2023-01-09 ENCOUNTER — HOSPITAL ENCOUNTER (OUTPATIENT)
Dept: CARDIAC REHAB | Facility: CLINIC | Age: 73
Discharge: HOME | End: 2023-01-09
Attending: NURSE PRACTITIONER
Payer: COMMERCIAL

## 2023-01-09 PROCEDURE — 93798 PHYS/QHP OP CAR RHAB W/ECG: CPT

## 2023-01-10 DIAGNOSIS — F64.0 TRANSGENDER PERSON ON HORMONE THERAPY: ICD-10-CM

## 2023-01-10 DIAGNOSIS — Z79.899 TRANSGENDER PERSON ON HORMONE THERAPY: ICD-10-CM

## 2023-01-10 RX ORDER — ESTRADIOL 0.05 MG/D
1 PATCH, EXTENDED RELEASE TRANSDERMAL
Qty: 24 PATCH | Refills: 1 | Status: SHIPPED | OUTPATIENT
Start: 2023-01-12 | End: 2023-05-11

## 2023-01-10 NOTE — TELEPHONE ENCOUNTER
Requested Medication: Estradiol Patch  Dose: 0.05mg  Quantity: 24  Refills: 1    Apply 1 patch twice weekly    Last seen at [facility]: 11/14/22 - 6 mo  Next Appointment with Provider:  Visit date not found    Alexandra Long CMA

## 2023-01-12 ENCOUNTER — OFFICE VISIT (OUTPATIENT)
Dept: ALLERGY | Facility: CLINIC | Age: 73
End: 2023-01-12
Payer: COMMERCIAL

## 2023-01-12 ENCOUNTER — HOSPITAL ENCOUNTER (OUTPATIENT)
Dept: CARDIAC REHAB | Facility: CLINIC | Age: 73
Discharge: HOME | End: 2023-01-12
Attending: NURSE PRACTITIONER
Payer: COMMERCIAL

## 2023-01-12 VITALS
DIASTOLIC BLOOD PRESSURE: 88 MMHG | RESPIRATION RATE: 16 BRPM | SYSTOLIC BLOOD PRESSURE: 155 MMHG | OXYGEN SATURATION: 96 % | TEMPERATURE: 97.8 F | HEART RATE: 83 BPM

## 2023-01-12 DIAGNOSIS — Z88.0 H/O ALLERGY TO PENICILLIN: Primary | ICD-10-CM

## 2023-01-12 PROCEDURE — 93798 PHYS/QHP OP CAR RHAB W/ECG: CPT | Performed by: REHABILITATION PRACTITIONER

## 2023-01-12 PROCEDURE — 95018 ALL TSTG PERQ&IQ DRUGS/BIOL: CPT | Performed by: ALLERGY & IMMUNOLOGY

## 2023-01-12 PROCEDURE — 95076 INGEST CHALLENGE INI 120 MIN: CPT | Performed by: ALLERGY & IMMUNOLOGY

## 2023-01-12 PROCEDURE — 99207 PR DROP WITH A PROCEDURE: CPT | Performed by: ALLERGY & IMMUNOLOGY

## 2023-01-12 ASSESSMENT — ENCOUNTER SYMPTOMS
SHORTNESS OF BREATH: 0
EYE ITCHING: 0
EYE REDNESS: 0
EYE DISCHARGE: 0
SINUS PAIN: 0
VOMITING: 0
NAUSEA: 0
RHINORRHEA: 0
SINUS PRESSURE: 0
DIARRHEA: 0
WHEEZING: 0
COUGH: 0
ABDOMINAL PAIN: 0
ACTIVITY CHANGE: 0

## 2023-01-12 NOTE — LETTER
1/12/2023         RE: Laila Perez  [id number] [address] [address]        Dear Colleague,    Thank you for referring your patient, Laila Perez, to the [facility]. Please see a copy of my visit note below.    SUBJECTIVE:                                                                 Laila Perez is a 72 year old adult presenting today to our Allergy Clinic at  [facility] for penicillin testing.     No baseline urticaria, angioedema, vomiting, nausea, diarrhea, wheezing, or  rhinoconjunctivitis symptoms.          Patient Active Problem List   Diagnosis     Gouty arthropathy     Mental or behavioral problem     Generalized osteoarthrosis, unspecified site     Disorder of bone and cartilage     Mixed hyperlipidemia     Benign essential hypertension     OA (osteoarthritis) of knee     DDD (degenerative disc disease), lumbar     Varicose veins of legs     Psoriasis     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     Family history of diabetes mellitus in first degree relative     Mitral valve disorder     Aortic valve stenosis, unspecified etiology     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     Obesity, Class III, BMI 40-49.9 (morbid obesity) (H)     Multiple gastric ulcers     Episodic tension-type headache, not intractable     Alcohol dependence in remission (H)     Aortic aneurysm without rupture, unspecified portion of aorta (H)     Status post coronary angiogram     Shortness of breath      Thoracic aortic aneurysm without rupture     Aortic stenosis, severe     NSVT (nonsustained ventricular tachycardia)     S/P TAVR (transcatheter aortic valve replacement)     Aneurysm of ascending aorta without rupture       Past Medical History:   Diagnosis Date     Alcoholism (H)      DDD (degenerative disc disease), cervical      Gender dysphoria in adult      HLD (hyperlipidemia)      HTN (hypertension)      large compression fracture 03/13/2006     Worked up for neoplasm, none found.       OA (osteoarthritis of spine)      Obese      Unspecified internal derangement of knee     CHRONIC KNEE PAIN,LEG,NECK AND HEAD INJURIES      Problem (# of Occurrences) Relation (Name,Age of Onset)    Cancer (1) Father: lung cancer heavy smoker    Diabetes (2) Mother, Maternal Grandmother    Allergies (1) Father: Enviromental    Other - See Comments (1) Other (Paternal side): FHx of Glaucoma    Emphysema (1) Father        Past Surgical History:   Procedure Laterality Date     COLONOSCOPY  2007     CV CORONARY ANGIOGRAM N/A 9/8/2022    Procedure: Coronary Angiogram;  Surgeon: Santy Clayton MD;  Location:  HEART CARDIAC CATH LAB     CV PCI N/A 9/8/2022    Procedure: Percutaneous Coronary Intervention;  Surgeon: Santy Clayton MD;  Location:  HEART CARDIAC CATH LAB     CV TRANSCATHETER AORTIC VALVE REPLACEMENT-FEMORAL APPROACH N/A 10/4/2022    Procedure: Transcatheter Aortic Valve Replacement-Femoral Approach;  Surgeon: Young Ramires MD;  Location:  HEART CARDIAC CATH LAB     JOINT REPLACEMTN, KNEE RT/LT  2006    left     ORCHIECTOMY SCROTAL Bilateral 5/14/2019    Procedure: Bilatera Scrotal Orchiectomy;  Surgeon: Abhilash Weston MD;  Location:  OR     Social History     Socioeconomic History     Marital status:    Occupational History     Occupation: RETIRED   Tobacco Use     Smoking status: Never     Smokeless tobacco: Never   Vaping Use     Vaping Use: Never used   Substance and Sexual Activity     Alcohol use: No     Comment: Quit 3/24/05     Drug use: No     Sexual activity: Not Currently   Social History Narrative    August 11, 2022    ENVIRONMENTAL HISTORY: [place] is heated with a forced air. They do have central air conditioning. The patient's bedroom is furnished with hard edna in bedroom and fabric window coverings.  No pets. There is no history of cockroach or  mice infestation. There is/are 0 smokers in the house.  The house does not have a basement.      Social Determinants of Health     Financial Resource Strain: Medium Risk     Difficulty of Paying Living Expenses: Somewhat hard   Food Insecurity: No Food Insecurity     Worried About Running Out of Food in the Last Year: Never true     Ran Out of Food in the Last Year: Never true   Transportation Needs: No Transportation Needs     Lack of Transportation (Medical): No     Lack of Transportation (Non-Medical): No   Physical Activity: Sufficiently Active     Days of Exercise per Week: 7 days     Minutes of Exercise per Session: 130 min   Stress: No Stress Concern Present     Feeling of Stress : Only a little   Social Connections: Socially Isolated     Frequency of Communication with Friends and Family: More than three times a week     Frequency of Social Gatherings with Friends and Family: Once a week     Attends Latter day Services: Never     Active Member of Clubs or Organizations: No     Attends Club or Organization Meetings: Never     Marital Status:    Intimate Partner Violence: Not At Risk     Fear of Current or Ex-Partner: No     Emotionally Abused: No     Physically Abused: No     Sexually Abused: No   Housing Stability: Low Risk      Unable to Pay for Housing in the Last Year: No     Number of Places Lived in the Last Year: 1     Unstable Housing in the Last Year: No           Review of Systems   Constitutional: Negative for activity change.   HENT: Negative for congestion, postnasal drip, rhinorrhea, sinus pressure, sinus pain and sneezing.    Eyes: Negative for discharge, redness and itching.   Respiratory: Negative for cough, shortness of breath and wheezing.    Gastrointestinal: Negative for abdominal pain, diarrhea, nausea and vomiting.   Skin: Negative for rash.           Current Outpatient Medications:      acetaminophen (TYLENOL) 325 MG tablet, Take 975 mg by mouth nightly as needed for mild pain,  Disp: , Rfl:      albuterol (PROAIR HFA/PROVENTIL HFA/VENTOLIN HFA) 108 (90 Base) MCG/ACT inhaler, Inhale 2 puffs into the lungs every 6 hours, Disp: 18 g, Rfl: 0     aspirin (ASA) 81 MG EC tablet, Take 1 tablet (81 mg) by mouth daily, Disp: 30 tablet, Rfl: 11     brimonidine (ALPHAGAN) 0.2 % ophthalmic solution, Place 1 drop into both eyes 2 times daily, Disp: , Rfl:      dorzolamide-timolol (COSOPT) 2-0.5 % ophthalmic solution, Place 1 drop into both eyes 2 times daily, Disp: , Rfl:      estradiol (VIVELLE-DOT) 0.05 MG/24HR bi-weekly patch, Place 1 patch onto the skin twice a week, Disp: 24 patch, Rfl: 1     latanoprost (XALATAN) 0.005 % ophthalmic solution, Place 1 drop into both eyes At Bedtime, Disp: , Rfl:      lisinopril (ZESTRIL) 5 MG tablet, Take 1 tablet (5 mg) by mouth daily, Disp: 90 tablet, Rfl: 1     lovastatin (MEVACOR) 20 MG tablet, TAKE ONE TABLET BY MOUTH ONCE A DAY AT BEDTIME (Patient taking differently: Take 20 mg by mouth At Bedtime), Disp: 90 tablet, Rfl: 3     Multiple Vitamin (DAILY-EVANGELISTA) TABS, Take 1 tablet by mouth daily, Disp: , Rfl: 11     furosemide (LASIX) 20 MG tablet, Take 1 tablet (20 mg) by mouth daily (Patient not taking: Reported on 12/21/2022), Disp: 90 tablet, Rfl: 1     ipratropium - albuterol 0.5 mg/2.5 mg/3 mL (DUONEB) 0.5-2.5 (3) MG/3ML neb solution, Take 1 vial (3 mLs) by nebulization every 6 hours as needed for shortness of breath / dyspnea or wheezing (Patient not taking: Reported on 1/12/2023), Disp: 90 mL, Rfl: 4     nitroGLYcerin (NITROSTAT) 0.4 MG sublingual tablet, For chest pain place 1 tablet under the tongue every 5 minutes for 3 doses. If symptoms persist 5 minutes after 1st dose call 911. (Patient not taking: Reported on 1/12/2023), Disp: 30 tablet, Rfl: 1    Current Facility-Administered Medications:      betamethasone acet & sod phos (CELESTONE) injection 6 mg, 6 mg, , , Hema Duncan MD, 6 mg at 11/21/22 1017     ropivacaine (NAROPIN) injection 4 mL, 4  mL, , , Hema Duncan MD, 4 mL at 11/21/22 1017  Immunization History   Administered Date(s) Administered     COVID-19 Vaccine 12+ (Pfizer) 01/22/2021, 02/12/2021, 10/25/2021     COVID-19 Vaccine Bivalent Booster 12+ (Pfizer) 11/28/2022     DTaP, Unspecified 05/21/2018     Influenza Vaccine 65+ (Fluzone HD) 12/09/2021     Mantoux Tuberculin Skin Test 11/15/2007, 10/30/2008, 10/23/2009     Pneumo Conj 13-V (2010&after) 04/26/2016, 05/21/2018     TDAP Vaccine (Adacel) 10/30/2008     Tdap (Adacel,Boostrix) 05/21/2018     Tdap (Adult) Unspecified Formulation 1950     Allergies   Allergen Reactions     External Allergen Needs Reconciliation - See Comment      Please reconcile the Patient's allergy reported as ESTERSPENICILLIN and update accordingly           Zosyn [Penicillins]      unsure of reaction, allergy is listed on his med sheet     Heparin Rash     he lives in a assisted living program and is not sure what type of allergies he really has. He quit drinking     OBJECTIVE:                                                                 BP (!) 155/88   Pulse 83   Temp 97.8  F (36.6  C) (Tympanic)   Resp 16   SpO2 96%         Physical Exam  Vitals and nursing note reviewed.   Constitutional:       General: She is not in acute distress.     Appearance: She is not diaphoretic.   HENT:      Head: Normocephalic and atraumatic.      Right Ear: Tympanic membrane, ear canal and external ear normal.      Left Ear: Tympanic membrane, ear canal and external ear normal.      Nose: No mucosal edema or rhinorrhea.   Eyes:      General:         Right eye: No discharge.         Left eye: No discharge.      Conjunctiva/sclera: Conjunctivae normal.   Cardiovascular:      Rate and Rhythm: Normal rate and regular rhythm.      Heart sounds: Normal heart sounds. No murmur heard.  Pulmonary:      Effort: Pulmonary effort is normal. No respiratory distress.      Breath sounds: Normal breath sounds. No wheezing or rales.    Neurological:      Mental Status: She is alert and oriented to person, place, and time.   Psychiatric:         Mood and Affect: Mood normal.         Behavior: Behavior normal.           WORKUP:     Instructions:  1. In quick sequence, apply prick skin tests with penicillin reagents, plus positive and negative controls.  2. If prick test is negative or equivocal, apply Penicillin intradermal tests (2-3mm blebs) in duplicate along with diluent or saline control  3. Read intradermal tests at time of placement and after 15 minutes  4. Read skin prick testing 15 minutes after placement  Product Lot #/Exp. Date Time Placed Skin Prick  W (mm) F (mm) Time Placed Intradermal #1  W (mm) F (mm) Intradermal #2  W (mm) F (mm)  Results  +/-       Time Read      Pre-Pen  0.02 ml of 6X10-5 molar [id number]  7/31/2023 1325 0 0   1348 4 5 5 6         1403 2 3 2 2    Penicillin G  10,000 units/ ml [id number]  8/31/2023 1325 0 0 1348 5 8 4 6         1403 3 4 4 5    Diluent/Control [id number]  4/29/2024 1325 0 0 1348 4 4          1403 0 0     Histamine [id number]  3/23/2024 1325 8 12       Criteria:  1. Positive prick skin test:  Induration >3mm greater than control.  2. Positive ID skin test:  Significant increase in size of original bleb with wheal diameter 3mm or larger than control.  3. Negative ID skin test:  No increase in size of original bleb and no reaction greater than control site.  4. Equivocal ID skin test: Wheal only slightly larger than initial injection bleb and control site, with or without erythematous flare OR duplicates are discordant.  5. Control site: If wheal >2-3mm after 15 min, repeat skin test to look for dermatographism.  Oral Challenge  Amoxicillin  250mg chewable tablet  or  250mg/5mL oral suspension Lot #/Exp. Dose Time of Ingestion Time of Vitals BP Pulse  (bpm) Signs/Symptoms Result  +/-     Dose (125mg)  Wait 30 minutes [id number]  8/31/2023 1.   Tablet  2. 2.5mL 1408 1438 145/87 76 -    Full dose (375mg)  Wait 60  minutes [id number]  8/31/2023 1.  1   Tablets  2.  7.5mL 1438     1508    1538 149/90    147/88 75    73 -    -          After explaining advantages and disadvantages, including the risks of oral challenge, and signing the consent, we proceeded with testing.      Percutaneous skin puncture testing for Pre-Pen and pen G was negative with appropriate responses to positive and negative controls. Intradermal testing for Pre-Pen and pen G, in duplicates, was also negative.     The patient tolerated graded Amoxicillin oral challenge. she  was monitored for 1 hour after last dose.        Challenge START TIME: 1408     END TIME: 1538    Total duration of the procedure: 1 hour and 30 min     ASSESSMENT/PLAN:    H/O allergy to penicillin    The patient tolerated the graded amoxicillin oral challenge without a problem.  Skin test results and oral challenge suggest against current IgE-mediated sensitivity to penicillin.  - I suggest the patient call us back or send us a BitX message in seven days to ensure that she does not get any delayed symptoms.  If asymptomatic, I will remove penicillin from the allergy list.    - [place] ALLG TEST PERQ & IC DRUG/BIOL IMMED REACT W/ I&R  - [place] INGESTION CHALLENGE TEST INITIAL 120 MINUTES         Thank you for allowing us to participate in the care of this patient. Please feel free to contact us if there are any questions or concerns about the patient.    Disclaimer: This note consists of symbols derived from keyboarding, dictation and/or voice recognition software. As a result, there may be errors in the script that have gone undetected. Please consider this when interpreting information found in this chart.    Dom Gipson MD, FAAAAI, FACAAI  Allergy and Asthma     [facility]           Again, thank you for allowing me to participate in the care of your patient.        Sincerely,        Dom Gipson MD

## 2023-01-12 NOTE — PROGRESS NOTES
SUBJECTIVE:                                                                 Laila Perez is a 72 year old adult presenting today to our Allergy Clinic at  [facility] for penicillin testing.     No baseline urticaria, angioedema, vomiting, nausea, diarrhea, wheezing, or  rhinoconjunctivitis symptoms.          Patient Active Problem List   Diagnosis     Gouty arthropathy     Mental or behavioral problem     Generalized osteoarthrosis, unspecified site     Disorder of bone and cartilage     Mixed hyperlipidemia     Benign essential hypertension     OA (osteoarthritis) of knee     DDD (degenerative disc disease), lumbar     Varicose veins of legs     Psoriasis     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     Family history of diabetes mellitus in first degree relative     Mitral valve disorder     Aortic valve stenosis, unspecified etiology     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     Obesity, Class III, BMI 40-49.9 (morbid obesity) (H)     Multiple gastric ulcers     Episodic tension-type headache, not intractable     Alcohol dependence in remission (H)     Aortic aneurysm without rupture, unspecified portion of aorta (H)     Status post coronary angiogram     Shortness of breath      Thoracic aortic aneurysm without rupture     Aortic stenosis, severe     NSVT (nonsustained ventricular tachycardia)     S/P TAVR (transcatheter aortic valve replacement)     Aneurysm of ascending aorta without rupture       Past Medical History:   Diagnosis Date     Alcoholism (H)      DDD (degenerative disc disease), cervical      Gender dysphoria in adult      HLD (hyperlipidemia)      HTN (hypertension)      large compression fracture 03/13/2006    Worked up for neoplasm, none found.       OA (osteoarthritis of spine)      Obese      Unspecified internal derangement of knee     CHRONIC KNEE PAIN,LEG,NECK AND HEAD INJURIES      Problem (# of Occurrences) Relation (Name,Age of Onset)    Cancer (1) Father:  lung cancer heavy smoker    Diabetes (2) Mother, Maternal Grandmother    Allergies (1) Father: Enviromental    Other - See Comments (1) Other (Paternal side): FHx of Glaucoma    Emphysema (1) Father        Past Surgical History:   Procedure Laterality Date     COLONOSCOPY  2007     CV CORONARY ANGIOGRAM N/A 9/8/2022    Procedure: Coronary Angiogram;  Surgeon: Santy Clayton MD;  Location:  HEART CARDIAC CATH LAB     CV PCI N/A 9/8/2022    Procedure: Percutaneous Coronary Intervention;  Surgeon: Santy Clayton MD;  Location:  HEART CARDI[facility] CATH LAB     CV TRANSCATHETER AORTIC VALVE REPLACEMENT-FEMORAL APPROACH N/A 10/4/2022    Procedure: Transcatheter Aortic Valve Replacement-Femoral Approach;  Surgeon: Young Ramires MD;  Location:  HEART CARDIAC CATH LAB     JOINT REPLACEMTN, KNEE RT/LT  2006    left     ORCHIECTOMY SCROTAL Bilateral 5/14/2019    Procedure: Bilatera Scrotal Orchiectomy;  Surgeon: Abhilash Weston MD;  Location:  OR     Social History     Socioeconomic History     Marital status:    Occupational History     Occupation: RETIRED   Tobacco Use     Smoking status: Never     Smokeless tobacco: Never   Vaping Use     Vaping Use: Never used   Substance and Sexual Activity     Alcohol use: No     Comment: Quit 3/24/05     Drug use: No     Sexual activity: Not Currently   Social History Narrative    August 11, 2022    ENVIRONMENTAL HISTORY: [place] The [place] is heated with a forced air. They do have central air conditioning. The patient's bedroom is furnished with hard edna in bedroom and fabric window coverings.  No pets. There is no history of cockroach or mice infestation. There is/are 0 smokers in the house.  The house does not have a basement.      Social Determinants of Health     Financial Resource Strain: Medium Risk     Difficulty of Paying Living Expenses: Somewhat hard   Food Insecurity: No Food Insecurity      Worried About Running Out of Food in the Last Year: Never true     Ran Out of Food in the Last Year: Never true   Transportation Needs: No Transportation Needs     Lack of Transportation (Medical): No     Lack of Transportation (Non-Medical): No   Physical Activity: Sufficiently Active     Days of Exercise per Week: 7 days     Minutes of Exercise per Session: 130 min   Stress: No Stress Concern Present     Feeling of Stress : Only a little   Social Connections: Socially Isolated     Frequency of Communication with Friends and Family: More than three times a week     Frequency of Social Gatherings with Friends and Family: Once a week     Attends Anglican Services: Never     Active Member of Clubs or Organizations: No     Attends Club or Organization Meetings: Never     Marital Status:    Intimate Partner Violence: Not At Risk     Fear of Current or Ex-Partner: No     Emotionally Abused: No     Physically Abused: No     Sexually Abused: No   Housing Stability: Low Risk      Unable to Pay for Housing in the Last Year: No     Number of Places Lived in the Last Year: 1     Unstable Housing in the Last Year: No           Review of Systems   Constitutional: Negative for activity change.   HENT: Negative for congestion, postnasal drip, rhinorrhea, sinus pressure, sinus pain and sneezing.    Eyes: Negative for discharge, redness and itching.   Respiratory: Negative for cough, shortness of breath and wheezing.    Gastrointestinal: Negative for abdominal pain, diarrhea, nausea and vomiting.   Skin: Negative for rash.           Current Outpatient Medications:      acetaminophen (TYLENOL) 325 MG tablet, Take 975 mg by mouth nightly as needed for mild pain, Disp: , Rfl:      albuterol (PROAIR HFA/PROVENTIL HFA/VENTOLIN HFA) 108 (90 Base) MCG/ACT inhaler, Inhale 2 puffs into the lungs every 6 hours, Disp: 18 g, Rfl: 0     aspirin (ASA) 81 MG EC tablet, Take 1 tablet (81 mg) by mouth daily, Disp: 30 tablet, Rfl: 11      brimonidine (ALPHAGAN) 0.2 % ophthalmic solution, Place 1 drop into both eyes 2 times daily, Disp: , Rfl:      dorzolamide-timolol (COSOPT) 2-0.5 % ophthalmic solution, Place 1 drop into both eyes 2 times daily, Disp: , Rfl:      estradiol (VIVELLE-DOT) 0.05 MG/24HR bi-weekly patch, Place 1 patch onto the skin twice a week, Disp: 24 patch, Rfl: 1     latanoprost (XALATAN) 0.005 % ophthalmic solution, Place 1 drop into both eyes At Bedtime, Disp: , Rfl:      lisinopril (ZESTRIL) 5 MG tablet, Take 1 tablet (5 mg) by mouth daily, Disp: 90 tablet, Rfl: 1     lovastatin (MEVACOR) 20 MG tablet, TAKE ONE TABLET BY MOUTH ONCE A DAY AT BEDTIME (Patient taking differently: Take 20 mg by mouth At Bedtime), Disp: 90 tablet, Rfl: 3     Multiple Vitamin (DAILY-EVANGELISTA) TABS, Take 1 tablet by mouth daily, Disp: , Rfl: 11     furosemide (LASIX) 20 MG tablet, Take 1 tablet (20 mg) by mouth daily (Patient not taking: Reported on 12/21/2022), Disp: 90 tablet, Rfl: 1     ipratropium - albuterol 0.5 mg/2.5 mg/3 mL (DUONEB) 0.5-2.5 (3) MG/3ML neb solution, Take 1 vial (3 mLs) by nebulization every 6 hours as needed for shortness of breath / dyspnea or wheezing (Patient not taking: Reported on 1/12/2023), Disp: 90 mL, Rfl: 4     nitroGLYcerin (NITROSTAT) 0.4 MG sublingual tablet, For chest pain place 1 tablet under the tongue every 5 minutes for 3 doses. If symptoms persist 5 minutes after 1st dose call 911. (Patient not taking: Reported on 1/12/2023), Disp: 30 tablet, Rfl: 1    Current Facility-Administered Medications:      betamethasone acet & sod phos (CELESTONE) injection 6 mg, 6 mg, , , Hema Duncan MD, 6 mg at 11/21/22 1017     ropivacaine (NAROPIN) injection 4 mL, 4 mL, , , Hema Duncan MD, 4 mL at 11/21/22 1017  Immunization History   Administered Date(s) Administered     COVID-19 Vaccine 12+ (Pfizer) 01/22/2021, 02/12/2021, 10/25/2021     COVID-19 Vaccine Bivalent Booster 12+ (Pfizer) 11/28/2022     DTaP, Unspecified  05/21/2018     Influenza Vaccine 65+ (Fluzone HD) 12/09/2021     Mantoux Tuberculin Skin Test 11/15/2007, 10/30/2008, 10/23/2009     Pneumo Conj 13-V (2010&after) 04/26/2016, 05/21/2018     TDAP Vaccine (Adacel) 10/30/2008     Tdap (Adacel,Boostrix) 05/21/2018     Tdap (Adult) Unspecified Formulation 1950     Allergies   Allergen Reactions     External Allergen Needs Reconciliation - See Comment      Please reconcile the Patient's allergy reported as ESTERSPENICILLIN and update accordingly           Zosyn [Penicillins]      unsure of reaction, allergy is listed on his med sheet     Heparin Rash     he lives in a assisted living program and is not sure what type of allergies he really has. He quit drinking     OBJECTIVE:                                                                 BP (!) 155/88   Pulse 83   Temp 97.8  F (36.6  C) (Tympanic)   Resp 16   SpO2 96%         Physical Exam  Vitals and nursing note reviewed.   Constitutional:       General: She is not in acute distress.     Appearance: She is not diaphoretic.   HENT:      Head: Normocephalic and atraumatic.      Right Ear: Tympanic membrane, ear canal and external ear normal.      Left Ear: Tympanic membrane, ear canal and external ear normal.      Nose: No mucosal edema or rhinorrhea.   Eyes:      General:         Right eye: No discharge.         Left eye: No discharge.      Conjunctiva/sclera: Conjunctivae normal.   Cardiovascular:      Rate and Rhythm: Normal rate and regular rhythm.      Heart sounds: Normal heart sounds. No murmur heard.  Pulmonary:      Effort: Pulmonary effort is normal. No respiratory distress.      Breath sounds: Normal breath sounds. No wheezing or rales.   Neurological:      Mental Status: She is alert and oriented to person, place, and time.   Psychiatric:         Mood and Affect: Mood normal.         Behavior: Behavior normal.           WORKUP:     Instructions:  1. In quick sequence, apply prick skin tests with  penicillin reagents, plus positive and negative controls.  2. If prick test is negative or equivocal, apply Penicillin intradermal tests (2-3mm blebs) in duplicate along with diluent or saline control  3. Read intradermal tests at time of placement and after 15 minutes  4. Read skin prick testing 15 minutes after placement  Product Lot #/Exp. Date Time Placed Skin Prick  W (mm) F (mm) Time Placed Intradermal #1  W (mm) F (mm) Intradermal #2  W (mm) F (mm)  Results  +/-       Time Read      Pre-Pen  0.02 ml of 6X10-5 molar [id number]  7/31/2023 1325 0 0   1348 4 5 5 6         1403 2 3 2 2    Penicillin G  10,000 units/ ml [id number]  8/31/2023 1325 0 0 1348 5 8 4 6         1403 3 4 4 5    Diluent/Control [id number]  4/29/2024 1325 0 0 1348 4 4          1403 0 0     Histamine [id number]  3/23/2024 1325 8 12       Criteria:  1. Positive prick skin test:  Induration >3mm greater than control.  2. Positive ID skin test:  Significant increase in size of original bleb with wheal diameter 3mm or larger than control.  3. Negative ID skin test:  No increase in size of original bleb and no reaction greater than control site.  4. Equivocal ID skin test: Wheal only slightly larger than initial injection bleb and control site, with or without erythematous flare OR duplicates are discordant.  5. Control site: If wheal >2-3mm after 15 min, repeat skin test to look for dermatographism.  Oral Challenge  Amoxicillin  250mg chewable tablet  or  250mg/5mL oral suspension Lot #/Exp. Dose Time of Ingestion Time of Vitals BP Pulse  (bpm) Signs/Symptoms Result  +/-     Dose (125mg)  Wait 30 minutes [id number]  8/31/2023 1.   Tablet  2. 2.5mL 1408 1438 145/87 76 -    Full dose (375mg)  Wait 60 minutes [id number]  8/31/2023 1.  1   Tablets  2.  7.5mL 1438     1508    1538 149/90    147/88 75    73 -    -          After explaining advantages and disadvantages, including the risks of oral challenge, and signing the consent, we proceeded with testing.       Percutaneous skin puncture testing for Pre-Pen and pen G was negative with appropriate responses to positive and negative controls. Intradermal testing for Pre-Pen and pen G, in duplicates, was also negative.     The patient tolerated graded Amoxicillin oral challenge. she  was monitored for 1 hour after last dose.        Challenge START TIME: 1408     END TIME: 1538    Total duration of the procedure: 1 hour and 30 min     ASSESSMENT/PLAN:    H/O allergy to penicillin    The patient tolerated the graded amoxicillin oral challenge without a problem.  Skin test results and oral challenge suggest against current IgE-mediated sensitivity to penicillin.  - I suggest the patient call us back or send us a "[employer] message in seven days to ensure that she does not get any delayed symptoms.  If asymptomatic, I will remove penicillin from the allergy list.    - [place] ALLG TEST PERQ & IC DRUG/BIOL IMMED REACT W/ I&R  - [place] INGESTION CHALLENGE TEST INITIAL 120 MINUTES         Thank you for allowing us to participate in the care of this patient. Please feel free to contact us if there are any questions or concerns about the patient.    Disclaimer: This note consists of symbols derived from keyboarding, dictation and/or voice recognition software. As a result, there may be errors in the script that have gone undetected. Please consider this when interpreting information found in this chart.    Dom Gipson MD, FAAAAI, FACAAI  Allergy and Asthma     [facility]

## 2023-01-16 ENCOUNTER — HOSPITAL ENCOUNTER (OUTPATIENT)
Dept: CARDIAC REHAB | Facility: CLINIC | Age: 73
Discharge: HOME | End: 2023-01-16
Attending: NURSE PRACTITIONER
Payer: COMMERCIAL

## 2023-01-16 PROCEDURE — 93798 PHYS/QHP OP CAR RHAB W/ECG: CPT

## 2023-01-19 ENCOUNTER — TELEPHONE (OUTPATIENT)
Dept: ALLERGY | Facility: CLINIC | Age: 73
End: 2023-01-19
Payer: COMMERCIAL

## 2023-01-19 NOTE — TELEPHONE ENCOUNTER
Patient calling asking for care team to send dictation/copy that states she is not allergic to penicillin for her records. Home address confirmed.    Lokesh Torres  [facility]

## 2023-01-19 NOTE — TELEPHONE ENCOUNTER
Reason for Call:  Other call back, if needed    Detailed comments: Patient calling stating she was in to see Dr. Gipson on 01/12/23. Patient states she was to reach out to Dr. Gipson and let him know how things are going. Patient states things are going well (after test) and patient asking Dr. Gipson to reach out if needed. Please advise.     Phone Number Patient can be reached at: Other phone number:  Please call patient's cell Ph:  [phone]    Best Time: any    Can we leave a detailed message on this number? Not Applicable    Call taken on 1/19/2023 at 8:50 AM by Lokesh Torres

## 2023-01-19 NOTE — TELEPHONE ENCOUNTER
Thank you for letting us know.  I will remove penicillin from the allergy list.      Dom Gipson MD

## 2023-01-19 NOTE — LETTER
January 19, 2023      Laila Perez  [id number] [place] [id number]        Dear Laila,     We have updated your chart to reflect that you have passed the amoxicillin challenge on  1/12/2023. It suggests that you are not allergic to penicillins.     Please contact our office with any questions.       Sincerely,        Dom Gipsno MD/DG [facility] Staff

## 2023-01-23 ENCOUNTER — HOSPITAL ENCOUNTER (OUTPATIENT)
Dept: CARDIAC REHAB | Facility: CLINIC | Age: 73
Discharge: HOME | End: 2023-01-23
Attending: NURSE PRACTITIONER
Payer: COMMERCIAL

## 2023-01-23 PROCEDURE — 93798 PHYS/QHP OP CAR RHAB W/ECG: CPT

## 2023-01-26 ENCOUNTER — HOSPITAL ENCOUNTER (OUTPATIENT)
Dept: CARDIAC REHAB | Facility: CLINIC | Age: 73
Discharge: HOME | End: 2023-01-26
Attending: NURSE PRACTITIONER
Payer: COMMERCIAL

## 2023-01-26 PROCEDURE — 93798 PHYS/QHP OP CAR RHAB W/ECG: CPT

## 2023-01-30 ENCOUNTER — HOSPITAL ENCOUNTER (OUTPATIENT)
Dept: CARDIAC REHAB | Facility: CLINIC | Age: 73
Discharge: HOME | End: 2023-01-30
Attending: NURSE PRACTITIONER
Payer: COMMERCIAL

## 2023-01-30 PROCEDURE — 93798 PHYS/QHP OP CAR RHAB W/ECG: CPT

## 2023-02-02 ENCOUNTER — HOSPITAL ENCOUNTER (OUTPATIENT)
Dept: CARDIAC REHAB | Facility: CLINIC | Age: 73
Discharge: HOME | End: 2023-02-02
Attending: NURSE PRACTITIONER
Payer: COMMERCIAL

## 2023-02-02 PROCEDURE — 93798 PHYS/QHP OP CAR RHAB W/ECG: CPT

## 2023-02-06 ENCOUNTER — HOSPITAL ENCOUNTER (OUTPATIENT)
Dept: CARDIAC REHAB | Facility: CLINIC | Age: 73
Discharge: HOME | End: 2023-02-06
Attending: NURSE PRACTITIONER
Payer: COMMERCIAL

## 2023-02-06 PROCEDURE — 93798 PHYS/QHP OP CAR RHAB W/ECG: CPT

## 2023-02-09 ENCOUNTER — HOSPITAL ENCOUNTER (OUTPATIENT)
Dept: CARDIAC REHAB | Facility: CLINIC | Age: 73
Discharge: HOME | End: 2023-02-09
Attending: INTERNAL MEDICINE
Payer: COMMERCIAL

## 2023-02-09 PROCEDURE — 93798 PHYS/QHP OP CAR RHAB W/ECG: CPT

## 2023-02-13 ENCOUNTER — HOSPITAL ENCOUNTER (OUTPATIENT)
Dept: CARDIAC REHAB | Facility: CLINIC | Age: 73
Discharge: HOME | End: 2023-02-13
Attending: INTERNAL MEDICINE
Payer: COMMERCIAL

## 2023-02-13 PROCEDURE — 93798 PHYS/QHP OP CAR RHAB W/ECG: CPT

## 2023-02-16 ENCOUNTER — HOSPITAL ENCOUNTER (OUTPATIENT)
Dept: CARDIAC REHAB | Facility: CLINIC | Age: 73
Discharge: HOME | End: 2023-02-16
Attending: INTERNAL MEDICINE
Payer: COMMERCIAL

## 2023-02-16 PROCEDURE — 93798 PHYS/QHP OP CAR RHAB W/ECG: CPT

## 2023-02-20 ENCOUNTER — PATIENT OUTREACH (OUTPATIENT)
Dept: GERIATRIC MEDICINE | Facility: CLINIC | Age: 73
End: 2023-02-20
Payer: COMMERCIAL

## 2023-02-20 ENCOUNTER — HOSPITAL ENCOUNTER (OUTPATIENT)
Dept: CARDIAC REHAB | Facility: CLINIC | Age: 73
Discharge: HOME | End: 2023-02-20
Attending: INTERNAL MEDICINE
Payer: COMMERCIAL

## 2023-02-20 PROCEDURE — 93798 PHYS/QHP OP CAR RHAB W/ECG: CPT

## 2023-02-20 NOTE — PROGRESS NOTES
Encounter opened due to Regulatory [employer] Update to close FVP Program.    Yolanda Humphrey  Care Management Specialist   [facility]   [phone]

## 2023-02-20 NOTE — PROGRESS NOTES
Encounter opened due to Regulatory [employer] Update to open FVP Program.    Yolanda Humphrey  Care Management Specialist   [facility]   [phone]

## 2023-03-02 ENCOUNTER — HOSPITAL ENCOUNTER (OUTPATIENT)
Dept: CARDIAC REHAB | Facility: CLINIC | Age: 73
Discharge: HOME | End: 2023-03-02
Attending: INTERNAL MEDICINE
Payer: COMMERCIAL

## 2023-03-02 PROCEDURE — 93798 PHYS/QHP OP CAR RHAB W/ECG: CPT

## 2023-03-09 ENCOUNTER — PATIENT OUTREACH (OUTPATIENT)
Dept: GERIATRIC MEDICINE | Facility: CLINIC | Age: 73
End: 2023-03-09
Payer: COMMERCIAL

## 2023-03-09 NOTE — PROGRESS NOTES
[facility] Care Coordination Contact    Called member to schedule annual HRA home visit. HRA has been scheduled for 3/14/23 at 2:30 PM at member's home.     CC left VM for JUANITA CRAWFORD last week requesting a copy of member's assessment. JUANITA CRAWFORD emailed CC asking for confirmation email.     Chanel Laura RN  [facility]  [phone]

## 2023-03-13 ENCOUNTER — HOSPITAL ENCOUNTER (OUTPATIENT)
Dept: CARDIAC REHAB | Facility: CLINIC | Age: 73
Discharge: HOME | End: 2023-03-13
Attending: INTERNAL MEDICINE
Payer: COMMERCIAL

## 2023-03-13 PROCEDURE — 93798 PHYS/QHP OP CAR RHAB W/ECG: CPT

## 2023-03-14 ENCOUNTER — PATIENT OUTREACH (OUTPATIENT)
Dept: GERIATRIC MEDICINE | Facility: CLINIC | Age: 73
End: 2023-03-14
Payer: COMMERCIAL

## 2023-03-14 ASSESSMENT — ACTIVITIES OF DAILY LIVING (ADL): DEPENDENT_IADLS:: SHOPPING;TRANSPORTATION

## 2023-03-14 NOTE — Clinical Note
I saw Laila on 3.15.23 for her HRA. She is doing well and I have no concerns at this time. She is currently using pads for incontinence and I was wondering if I can get an order for pads? She uses a #2 pad and one per day. I can order them however I don't know if you need to see her to talk about incontinence so she can have a diagnosis of incontinence in her chart. I believe she reported she has an appointment with you soon. Thank you for your time. Chanel Laura RN.

## 2023-03-15 SDOH — ECONOMIC STABILITY: FOOD INSECURITY: WITHIN THE PAST 12 MONTHS, THE FOOD YOU BOUGHT JUST DIDN'T LAST AND YOU DIDN'T HAVE MONEY TO GET MORE.: NEVER TRUE

## 2023-03-15 SDOH — ECONOMIC STABILITY: FOOD INSECURITY: WITHIN THE PAST 12 MONTHS, YOU WORRIED THAT YOUR FOOD WOULD RUN OUT BEFORE YOU GOT MONEY TO BUY MORE.: NEVER TRUE

## 2023-03-15 SDOH — HEALTH STABILITY: PHYSICAL HEALTH: ON AVERAGE, HOW MANY MINUTES DO YOU ENGAGE IN EXERCISE AT THIS LEVEL?: 0 MIN

## 2023-03-15 SDOH — ECONOMIC STABILITY: INCOME INSECURITY: IN THE LAST 12 MONTHS, WAS THERE A TIME WHEN YOU WERE NOT ABLE TO PAY THE MORTGAGE OR RENT ON TIME?: NO

## 2023-03-15 ASSESSMENT — SOCIAL DETERMINANTS OF HEALTH (SDOH)
HOW OFTEN DO YOU GET TOGETHER WITH FRIENDS OR RELATIVES?: ONCE A WEEK
HOW OFTEN DO YOU ATTEND CHURCH OR RELIGIOUS SERVICES?: NEVER
IN A TYPICAL WEEK, HOW MANY TIMES DO YOU TALK ON THE PHONE WITH FAMILY, FRIENDS, OR NEIGHBORS?: TWICE A WEEK
DO YOU BELONG TO ANY CLUBS OR ORGANIZATIONS SUCH AS CHURCH GROUPS UNIONS, FRATERNAL OR ATHLETIC GROUPS, OR SCHOOL GROUPS?: NO
WITHIN THE LAST YEAR, HAVE YOU BEEN AFRAID OF YOUR PARTNER OR EX-PARTNER?: NO
WITHIN THE LAST YEAR, HAVE YOU BEEN HUMILIATED OR EMOTIONALLY ABUSED IN OTHER WAYS BY YOUR PARTNER OR EX-PARTNER?: NO
WITHIN THE LAST YEAR, HAVE YOU BEEN KICKED, HIT, SLAPPED, OR OTHERWISE PHYSICALLY HURT BY YOUR PARTNER OR EX-PARTNER?: NO
HOW OFTEN DO YOU ATTENT MEETINGS OF THE CLUB OR ORGANIZATION YOU BELONG TO?: NEVER
HOW HARD IS IT FOR YOU TO PAY FOR THE VERY BASICS LIKE FOOD, HOUSING, MEDICAL CARE, AND HEATING?: NOT VERY HARD
WITHIN THE LAST YEAR, HAVE TO BEEN RAPED OR FORCED TO HAVE ANY KIND OF SEXUAL ACTIVITY BY YOUR PARTNER OR EX-PARTNER?: NO

## 2023-03-15 ASSESSMENT — LIFESTYLE VARIABLES
HOW OFTEN DO YOU HAVE SIX OR MORE DRINKS ON ONE OCCASION: NEVER
HOW OFTEN DO YOU HAVE A DRINK CONTAINING ALCOHOL: MONTHLY OR LESS
HOW MANY STANDARD DRINKS CONTAINING ALCOHOL DO YOU HAVE ON A TYPICAL DAY: PATIENT DOES NOT DRINK
SKIP TO QUESTIONS 9-10: 1
AUDIT-C TOTAL SCORE: 1

## 2023-03-15 ASSESSMENT — PATIENT HEALTH QUESTIONNAIRE - PHQ9: SUM OF ALL RESPONSES TO PHQ QUESTIONS 1-9: 0

## 2023-03-15 NOTE — PROGRESS NOTES
[facility] Care Coordination Contact  [facility] CCDB Assessment   (for members on other waivers)    Home visit for Health Risk Assessment with Laila Morris Ana completed on March 14, 2023    Type of residence:: Apartment  Current living arrangement:: I live alone     Assessment completed with:: Patient    Current Care Plan  Member is a recipient of the CADI Waiver program.  Member currently receiving the following home care services:     Member currently receiving the following community resources: Transportation Services, Other (see comment)    Medication Review  Medication reconciliation completed in Epic: Yes  Medication set-up & administration: Independent and sets up on own daily.  Self-administers medications.  Medication Risk Assessment Medication (1 or more, place referral to MTM): N/A: No risk factors identified  MTM Referral Placed: No: No risk factors idenified    Mental/Behavioral Health   Depression Screening:    PHQ-9 Total Score: 0  Mental health DX:: Yes        Falls Assessment: No falls reported.      ADL/IADL Dependencies:   Dependent ADLs:: Independent  Dependent IADLs:: Shopping, Transportation    [facility] Plan sponsored benefits: Shared information re: Silver Sneakers/gym memberships, ASA, Calcium +D.    PCA Assessment completed at visit: Not Applicable      Care Plan & Recommendations: Member would like to receive monthly incontinence supplies. PCP needs to see member before ordering product.     CADI Care Plan/ISP requested from [facility] . Second request for CP from [facility] CM on 3.15.23. Second request on 3.23.23.    Follow-Up Plan: Member informed of future contact, plan to f/u with member with a 6 month telephone assessment.  Contact information shared with member and family, encouraged member to call with any questions or concerns at any time.    [facility] care continuum providers: Please see Snapshot and Care Management Flowsheets for Specific details of care  plan.    This CC note routed to PCP.     Chanel Laura RN  [facility]  [phone]

## 2023-03-23 ENCOUNTER — OFFICE VISIT (OUTPATIENT)
Dept: FAMILY MEDICINE | Facility: CLINIC | Age: 73
End: 2023-03-23
Payer: COMMERCIAL

## 2023-03-23 ENCOUNTER — PATIENT OUTREACH (OUTPATIENT)
Dept: GERIATRIC MEDICINE | Facility: CLINIC | Age: 73
End: 2023-03-23

## 2023-03-23 VITALS
DIASTOLIC BLOOD PRESSURE: 80 MMHG | HEART RATE: 64 BPM | BODY MASS INDEX: 41.02 KG/M2 | OXYGEN SATURATION: 99 % | WEIGHT: 293 LBS | TEMPERATURE: 97.3 F | SYSTOLIC BLOOD PRESSURE: 135 MMHG | HEIGHT: 71 IN | RESPIRATION RATE: 14 BRPM

## 2023-03-23 DIAGNOSIS — N39.43 POST-VOID DRIBBLING: ICD-10-CM

## 2023-03-23 DIAGNOSIS — I35.0 AORTIC STENOSIS, SEVERE: Primary | ICD-10-CM

## 2023-03-23 DIAGNOSIS — Z12.11 SCREEN FOR COLON CANCER: ICD-10-CM

## 2023-03-23 DIAGNOSIS — R06.09 DYSPNEA ON EXERTION: ICD-10-CM

## 2023-03-23 DIAGNOSIS — I71.21 ANEURYSM OF ASCENDING AORTA WITHOUT RUPTURE (H): ICD-10-CM

## 2023-03-23 DIAGNOSIS — L98.9 SORE ON LEG: ICD-10-CM

## 2023-03-23 DIAGNOSIS — D69.6 THROMBOCYTOPENIA (H): ICD-10-CM

## 2023-03-23 DIAGNOSIS — I10 HTN, GOAL BELOW 140/90: ICD-10-CM

## 2023-03-23 DIAGNOSIS — E66.01 MORBID (SEVERE) OBESITY DUE TO EXCESS CALORIES (H): ICD-10-CM

## 2023-03-23 DIAGNOSIS — F10.21 ALCOHOL DEPENDENCE IN REMISSION (H): ICD-10-CM

## 2023-03-23 DIAGNOSIS — R60.0 PEDAL EDEMA: ICD-10-CM

## 2023-03-23 DIAGNOSIS — E78.5 HYPERLIPIDEMIA LDL GOAL <100: ICD-10-CM

## 2023-03-23 DIAGNOSIS — Z95.2 S/P TAVR (TRANSCATHETER AORTIC VALVE REPLACEMENT): ICD-10-CM

## 2023-03-23 DIAGNOSIS — I87.2 VENOUS (PERIPHERAL) INSUFFICIENCY: ICD-10-CM

## 2023-03-23 PROBLEM — I47.29 NSVT (NONSUSTAINED VENTRICULAR TACHYCARDIA) (H): Status: RESOLVED | Noted: 2022-12-22 | Resolved: 2023-03-23

## 2023-03-23 PROCEDURE — 99215 OFFICE O/P EST HI 40 MIN: CPT | Performed by: NURSE PRACTITIONER

## 2023-03-23 RX ORDER — LISINOPRIL 5 MG/1
5 TABLET ORAL DAILY
Qty: 90 TABLET | Refills: 1 | Status: SHIPPED | OUTPATIENT
Start: 2023-03-23 | End: 2023-09-20

## 2023-03-23 RX ORDER — MUPIROCIN 20 MG/G
OINTMENT TOPICAL 3 TIMES DAILY
Qty: 30 G | Refills: 1 | Status: SHIPPED | OUTPATIENT
Start: 2023-03-23 | End: 2023-05-09

## 2023-03-23 RX ORDER — ACETAMINOPHEN 325 MG/1
1050 TABLET ORAL
COMMUNITY
Start: 2023-03-23 | End: 2023-04-17

## 2023-03-23 ASSESSMENT — ANXIETY QUESTIONNAIRES
GAD7 TOTAL SCORE: 0
7. FEELING AFRAID AS IF SOMETHING AWFUL MIGHT HAPPEN: NOT AT ALL
6. BECOMING EASILY ANNOYED OR IRRITABLE: NOT AT ALL
1. FEELING NERVOUS, ANXIOUS, OR ON EDGE: NOT AT ALL
4. TROUBLE RELAXING: NOT AT ALL
3. WORRYING TOO MUCH ABOUT DIFFERENT THINGS: NOT AT ALL
IF YOU CHECKED OFF ANY PROBLEMS ON THIS QUESTIONNAIRE, HOW DIFFICULT HAVE THESE PROBLEMS MADE IT FOR YOU TO DO YOUR WORK, TAKE CARE OF THINGS AT HOME, OR GET ALONG WITH OTHER PEOPLE: NOT DIFFICULT AT ALL
2. NOT BEING ABLE TO STOP OR CONTROL WORRYING: NOT AT ALL
GAD7 TOTAL SCORE: 0
5. BEING SO RESTLESS THAT IT IS HARD TO SIT STILL: NOT AT ALL

## 2023-03-23 ASSESSMENT — PAIN SCALES - GENERAL: PAINLEVEL: MODERATE PAIN (4)

## 2023-03-23 NOTE — PROGRESS NOTES
[facility] Care Coordination Contact      CC received order from PCP for member's incontinence product. CC emailed order to [facility] for processing.    Chanel Laura RN  [facility]  [phone]

## 2023-03-23 NOTE — PROGRESS NOTES
Assessment & Plan     Aortic stenosis, severe  S/P TAVR (transcatheter aortic valve replacement)  - lisinopril (ZESTRIL) 5 MG tablet  Dispense: 90 tablet; Refill: 1  Let dentist to let know that you have a heart valve and require antibiotics prior to dental cleanings and procedures.  Antibiotics are to be taken 1 hour prior to your dental appt.     See Carley RUFF for cardiology follow up at [facility] as planned        Continue MAPT ×6 months, uninterrupted, with aspirin until April 2023     Lifelong SBE prophylaxis for dental procedures/ cleanings (amoxicillin 2G or clindamycin 600 mg 60 mins prior)      Ascending aortic aneurysm    Stable    Follow with echo yearly             Dyspnea on exertion   Improved status post aortic valve replacement    Pedal edema  Venous (peripheral) insufficiency  Sore on leg  Left anterior shin  Wash daily with antibacterial soap and water  Apply Bactroban 3 times daily until healed  - mupirocin (BACTROBAN) 2 % external ointment  Dispense: 30 g; Refill: 1  Wound clinic consultation if no resolution in the next 2 weeks  Compression socks on in the morning off in the evening recommended    HTN, goal below 140/90  Controlled continue current medication  Labs today for monitoring  - CBC with platelets and differential  - TSH with free T4 reflex  - Lipid panel reflex to direct LDL Fasting  - Comprehensive metabolic panel (BMP + Alb, Alk Phos, ALT, AST, Total. Bili, TP)    Hyperlipidemia LDL goal <100  Continue lovastatin  LDL Cholesterol Calculated   Date Value Ref Range Status   05/05/2022 46 <=100 mg/dL Final   05/13/2021 50 <100 mg/dL Final     Comment:     Desirable:       <100 mg/dl     Future lab orders placed to have done in May prior to his yearly physical  - CBC with platelets and differential  - TSH with free T4 reflex  - Lipid panel reflex to direct LDL Fasting  - Comprehensive metabolic panel (BMP + Alb, Alk Phos, ALT, AST, Total. Bili, TP)    Post-void dribbling  #4  "pad daily as needed for urine incontinence  DME order completed today to be sent to care coordinator.     Screen for colon cancer  Colonoscopy declined  Will have Cologuard sent he is in agreement  - COLOGUAZAK([employer])    Thrombocytopenia (H)  Follow-up labs today    Alcohol dependence in remission (H)  Ongoing abstinence from alcohol encouraged    Aneurysm of ascending aorta without rupture  Ultrasound yearly    Morbid (severe) obesity due to excess calories (H)  Chronic and ongoing.  Continue work on increasing physical activity and improving nutrition  Increase protein.  Increase water.  Decrease carbohydrate consumption recommended  Consider weight management consultation.  Not interested at this time     BMI:   Estimated body mass index is 47.14 kg/m  as calculated from the following:    Height as of this encounter: 1.803 m (5' 11\").    Weight as of this encounter: 153.3 kg (338 lb).   Weight management plan: See above    Work on weight loss  Regular exercise  Follow-up with cardiology as planned  Recheck here in clinic in 3 months  Call or return to the clinic with any worsening of symptoms or no resolution. Patient/Parent verbalized understanding and is in agreement. Medication side effects reviewed.   Current Outpatient Medications   Medication Sig Dispense Refill     acetaminophen (TYLENOL) 325 MG tablet Take 3 tablets (975 mg) by mouth nightly as needed for mild pain       acetaminophen (TYLENOL) 325 MG tablet Take 975 mg by mouth nightly as needed for mild pain       albuterol (PROAIR HFA/PROVENTIL HFA/VENTOLIN HFA) 108 (90 Base) MCG/ACT inhaler Inhale 2 puffs into the lungs every 6 hours 18 g 0     aspirin (ASA) 81 MG EC tablet Take 1 tablet (81 mg) by mouth daily 30 tablet 11     brimonidine (ALPHAGAN) 0.2 % ophthalmic solution Place 1 drop into both eyes 2 times daily       dorzolamide-timolol (COSOPT) 2-0.5 % ophthalmic solution Place 1 drop into both eyes 2 times daily       estradiol " (VIVELLE-DOT) 0.05 MG/24HR bi-weekly patch Place 1 patch onto the skin twice a week 24 patch 1     ipratropium - albuterol 0.5 mg/2.5 mg/3 mL (DUONEB) 0.5-2.5 (3) MG/3ML neb solution Take 1 vial (3 mLs) by nebulization every 6 hours as needed for shortness of breath / dyspnea or wheezing 90 mL 4     latanoprost (XALATAN) 0.005 % ophthalmic solution Place 1 drop into both eyes At Bedtime       lisinopril (ZESTRIL) 5 MG tablet Take 1 tablet (5 mg) by mouth daily 90 tablet 1     lovastatin (MEVACOR) 20 MG tablet TAKE ONE TABLET BY MOUTH ONCE A DAY AT BEDTIME (Patient taking differently: Take 20 mg by mouth At Bedtime) 90 tablet 3     Multiple Vitamin (DAILY-EVANGELISTA) TABS Take 1 tablet by mouth daily  11     mupirocin (BACTROBAN) 2 % external ointment Apply topically 3 times daily 30 g 1     nitroGLYcerin (NITROSTAT) 0.4 MG sublingual tablet For chest pain place 1 tablet under the tongue every 5 minutes for 3 doses. If symptoms persist 5 minutes after 1st dose call 911. 30 tablet 1     Chart documentation with Dragon Voice recognition Software. Although reviewed after completion, some words and grammatical errors may remain.  40 minutes spent today with patient on her chart review, education, exam and documentation  See Patient Instructions    JERRICA Salcedo CNP  [facility]    Juana Ulloa is a 73 year old, presenting for the following health issues:  Incontinence, Neck Pain, and Leg Pain (Left lower leg wound )    Additional Questions 3/23/2023   Roomed by Shakila   Accompanied by Donna GALLEGOS     Forms 3/23/2023   Any forms needing to be completed Yes     HPI   Cardiology   1. Severe symptomatic aortic stenosis    TAVR 10/2022 34 mm Medtronic evolute valve  2. Ascending aortic aneurysm    Ascending aorta 4.4 cm 2021 and 2022  3. Hypertension    Forms needed for phase yearly physical  Lives independently with PCA services    Genitourinary   Onset/Duration: few months ago  "  Description:   Dysuria (painful urination): No}  Hematuria (blood in urine): No  Frequency: YES  Waking at night to urinate: YES  Hesitancy (delay in urine): YES  Retention (unable to empty): YES  Decrease in urinary flow: YES  Incontinence: YES  Progression of Symptoms:  worsening  Accompanying Signs & Symptoms:  Fever: No    Incontinence supplies needed   Dribbling a bit  Pad size #4 changing them daily      Neck Pain   Ongoing   Getting worse   Change in pillow .. chair electric recliner chair.helps when in pain   Neck pillow and recline helps.   Tylenol when working at phase will take 3 at bedtime as needed      Leg wound   Left leg wound after hitting on a trailer hitch  Has venous stasis  Has had veins stripped x 1 in the past   2 years     Taking all meds at nighttime not daytime    No ETOH x 2 weeks    Wt Readings from Last 5 Encounters:   03/23/23 (!) 153.3 kg (338 lb)   11/14/22 (!) 153.8 kg (339 lb)   10/13/22 (!) 153.3 kg (338 lb)   10/13/22 (!) 152.4 kg (336 lb)   10/06/22 (!) 154.2 kg (340 lb)               Review of Systems   Constitutional, HEENT, cardiovascular, pulmonary, GI, , musculoskeletal, neuro, skin, endocrine and psych systems are negative, except as otherwise noted.      Objective    /80   Pulse 64   Temp 97.3  F (36.3  C) (Tympanic)   Resp 14   Ht 1.803 m (5' 11\")   Wt (!) 153.3 kg (338 lb)   SpO2 99%   BMI 47.14 kg/m    Body mass index is 47.14 kg/m .  Physical Exam   GENERAL: healthy, alert and no distress  EYES: Eyes grossly normal to inspection, PERRL and conjunctivae and sclerae normal  HENT: ear canals and TM's normal, nose and mouth without ulcers or lesions  NECK: no adenopathy, no asymmetry, masses, or scars and thyroid normal to palpation  RESP: lungs clear to auscultation - no rales, rhonchi or wheezes  CV: regular rates and rhythm, normal S1 S2, no S3 or S4, peripheral pulses strong and 1 + bilateral lower extremity pitting edema     ABDOMEN: soft, nontender, " no hepatosplenomegaly, no masses and bowel sounds normal  MS: no gross musculoskeletal defects noted, no edema  SKIN: no suspicious lesions or rashes  NEURO: Normal strength and tone, mentation intact and speech normal  PSYCH: mentation appears normal, affect normal/bright    Lab on 11/17/2022   Component Date Value Ref Range Status     WBC Count 11/17/2022 9.6  4.0 - 11.0 10e3/uL Final     RBC Count 11/17/2022 4.93  3.80 - 5.90 10e6/uL Final     Hemoglobin 11/17/2022 14.5  11.7 - 17.7 g/dL Final     Hematocrit 11/17/2022 44.4  35.0 - 53.0 % Final     MCV 11/17/2022 90  78 - 100 fL Final     MCH 11/17/2022 29.4  26.5 - 33.0 pg Final     MCHC 11/17/2022 32.7  31.5 - 36.5 g/dL Final     RDW 11/17/2022 13.0  10.0 - 15.0 % Final     Platelet Count 11/17/2022 99 (L)  150 - 450 10e3/uL Final     Sodium 11/17/2022 137  136 - 145 mmol/L Final     Potassium 11/17/2022 4.9  3.4 - 5.3 mmol/L Final     Chloride 11/17/2022 102  98 - 107 mmol/L Final     Carbon Dioxide (CO2) 11/17/2022 27  22 - 29 mmol/L Final     Anion Gap 11/17/2022 8  7 - 15 mmol/L Final     Urea Nitrogen 11/17/2022 18.7  8.0 - 23.0 mg/dL Final     Creatinine 11/17/2022 0.98  0.51 - 1.17 mg/dL Final    Male and Female  0-2 Months    0.31-0.88 mg/dL  2-12 Months   0.16-0.39 mg/dL  1-2 Years     0.18-0.35 mg/dL  3-4 Years     0.26-0.42 mg/dL  5-6 Years     0.29-0.47 mg/dL  7-8 Years     0.34-0.53 mg/dL  9-10 Years    0.33-0.64 mg/dL  11-12 Years   0.44-0.68 mg/dL  13-14 Years   0.46-0.77 mg/dL    Female  15 Years and older  0.51-0.95 mg/dL    Male  15 Years and older  0.67-1.17 mg/dL         Calcium 11/17/2022 9.9  8.8 - 10.2 mg/dL Final     Glucose 11/17/2022 101 (H)  70 - 99 mg/dL Final     GFR Estimate 11/17/2022 82  >60 mL/min/1.73m2 Final    GFR not calculated when sex unspecified or nonbinary.  Effective December 21, 2021 eGFRcr in adults is calculated using the 2021 CKD-EPI creatinine equation which includes age and gender (Keila et al., NEJM, DOI:  10.1056/SYNUhn6436411)       ----- Service Performed and Documented by Resident or Fellow ------

## 2023-03-27 ENCOUNTER — PATIENT OUTREACH (OUTPATIENT)
Dept: GERIATRIC MEDICINE | Facility: CLINIC | Age: 73
End: 2023-03-27
Payer: COMMERCIAL

## 2023-03-27 ENCOUNTER — HOSPITAL ENCOUNTER (OUTPATIENT)
Dept: CARDIAC REHAB | Facility: CLINIC | Age: 73
Discharge: HOME | End: 2023-03-27
Attending: INTERNAL MEDICINE
Payer: COMMERCIAL

## 2023-03-27 ENCOUNTER — TELEPHONE (OUTPATIENT)
Dept: FAMILY MEDICINE | Facility: CLINIC | Age: 73
End: 2023-03-27
Payer: COMMERCIAL

## 2023-03-27 PROCEDURE — 93798 PHYS/QHP OP CAR RHAB W/ECG: CPT

## 2023-03-27 NOTE — TELEPHONE ENCOUNTER
Order :  Incontinence Supplies Order  Where does pt want these sent to?  [facility]

## 2023-03-27 NOTE — PROGRESS NOTES
[facility] Care Coordination Contact    Received after visit chart from care coordinator.  Completed following tasks: Mailed copy of care plan to client and Mailed Safe Medication Disposal    and Provider Signature - No POC Shared:  Member indicates that they do not want their POC shared with any EW providers.     Sofia Fish  Care Management Specialist  [facility]  [phone]

## 2023-03-27 NOTE — LETTER
March 27, 2023      DANYELLE MONICA ALIRIO  [address] [address] [place] [id number]      Dear Danyelle:    At [facility], we re dedicated to improving your health and wellness. Enclosed is the Care Plan developed with you on 3/14/2023. Please review the Care Plan carefully.    As a reminder, during your visit we talked about:    Ways to manage your physical and mental health    Using health care to maintain and improve your health     Your preventive care needs     Remember to contact your care coordinator if you:    Are hospitalized, or plan to be hospitalized     Have a fall      Have a change in your physical or mental health    Need help finding support or services    If you have questions, or don t agree with your Care Plan, call me at [phone]. You can also call me if your needs change. TTY users, call the [place] [employer] at (594) or 1-953.140.2596 (rbkqku-hg-hlirjv relay service).    Sincerely,       Chanel Laura RN  [phone]  [email or web address] ([facility]) is a health plan that contracts with both Medicare and the [place] Medical Assistance (Medicaid) program to provide benefits of both programs to enrollees. Enrollment in [facility] depends on contract renewal.    [id number] accepted    [id number] (07/2022)

## 2023-04-03 ENCOUNTER — HOSPITAL ENCOUNTER (OUTPATIENT)
Dept: CARDIAC REHAB | Facility: CLINIC | Age: 73
Discharge: HOME | End: 2023-04-03
Attending: INTERNAL MEDICINE
Payer: COMMERCIAL

## 2023-04-03 PROCEDURE — 93798 PHYS/QHP OP CAR RHAB W/ECG: CPT

## 2023-04-05 NOTE — PROGRESS NOTES
Delivered per [facility]. CC notified.     Yolanda Humphrey  Care Management Specialist   [facility]   [phone]

## 2023-04-06 ENCOUNTER — HOSPITAL ENCOUNTER (OUTPATIENT)
Dept: CARDIOLOGY | Facility: CLINIC | Age: 73
Discharge: HOME | End: 2023-04-06
Attending: INTERNAL MEDICINE | Admitting: INTERNAL MEDICINE
Payer: COMMERCIAL

## 2023-04-06 DIAGNOSIS — I35.0 AORTIC VALVE STENOSIS, UNSPECIFIED ETIOLOGY: ICD-10-CM

## 2023-04-06 DIAGNOSIS — Z95.2 S/P TAVR (TRANSCATHETER AORTIC VALVE REPLACEMENT): ICD-10-CM

## 2023-04-06 LAB — LVEF ECHO: NORMAL

## 2023-04-06 PROCEDURE — 93306 TTE W/DOPPLER COMPLETE: CPT | Mod: 26 | Performed by: INTERNAL MEDICINE

## 2023-04-06 PROCEDURE — 255N000002 HC RX 255 OP 636: Performed by: INTERNAL MEDICINE

## 2023-04-06 PROCEDURE — 999N000208 ECHOCARDIOGRAM COMPLETE

## 2023-04-06 RX ADMIN — HUMAN ALBUMIN MICROSPHERES AND PERFLUTREN 2 ML: 10; .22 INJECTION, SOLUTION INTRAVENOUS at 11:26

## 2023-04-13 ENCOUNTER — OFFICE VISIT (OUTPATIENT)
Dept: CARDIOLOGY | Facility: CLINIC | Age: 73
End: 2023-04-13
Payer: COMMERCIAL

## 2023-04-13 VITALS
DIASTOLIC BLOOD PRESSURE: 76 MMHG | WEIGHT: 293 LBS | OXYGEN SATURATION: 96 % | HEART RATE: 57 BPM | BODY MASS INDEX: 47.98 KG/M2 | SYSTOLIC BLOOD PRESSURE: 132 MMHG

## 2023-04-13 DIAGNOSIS — I35.0 AORTIC VALVE STENOSIS, UNSPECIFIED ETIOLOGY: ICD-10-CM

## 2023-04-13 DIAGNOSIS — I71.20 THORACIC AORTIC ANEURYSM WITHOUT RUPTURE, UNSPECIFIED PART (H): ICD-10-CM

## 2023-04-13 DIAGNOSIS — I47.29 NSVT (NONSUSTAINED VENTRICULAR TACHYCARDIA) (H): ICD-10-CM

## 2023-04-13 DIAGNOSIS — Z95.2 S/P TAVR (TRANSCATHETER AORTIC VALVE REPLACEMENT): Primary | ICD-10-CM

## 2023-04-13 DIAGNOSIS — I10 BENIGN ESSENTIAL HYPERTENSION: ICD-10-CM

## 2023-04-13 DIAGNOSIS — R06.09 DYSPNEA ON EXERTION: ICD-10-CM

## 2023-04-13 PROCEDURE — 99214 OFFICE O/P EST MOD 30 MIN: CPT | Performed by: NURSE PRACTITIONER

## 2023-04-13 NOTE — PATIENT INSTRUCTIONS
Medication Changes:  STOP aspirin    Recommendations:  Check blood pressure at least 1 hour after medications. Call the clinic if your blood pressure is consistently greater than 130/80.    Ok to have eye surgery for glaucoma and cataract, from cardiac perspective. Let us know if you need a copy of our note from today sent anywhere.     Follow-up:  Echo, EKG, nonfasting lab (BMP, CBC) in October 2023  See ELZBIETA Howe for cardiology follow up at [facility]: October 2023  For 1 year TAVR follow-up  Call 6 months prior, to schedule.     Cardiology Scheduling~[phone]  Cardiology Clinic RN~[phone] (Karlene RN, Sujey RN, Gayathri RN)

## 2023-04-13 NOTE — LETTER
4/13/2023    Demlis Simons, APRN CNP  [address] [facility] [address]    RE: Laila Perez       Dear Colleague,     I had the pleasure of seeing Laila Perez in the [facility].  Cardiology Clinic Progress Note  Laila Perez MRN# [id number]   YOB: 1950 Age: 73 year old      Primary Cardiologist:   Dr. Cerda          History of Presenting Illness:      Laila Perez is a pleasant 73 year old patient with a past cardiac history significant for   Severe symptomatic aortic stenosis  TAVR 10/2022 34 mm Medtronic evolute valve  Post op MG 14 mmHg  1 mo MG  9 mmHg   Ascending aortic aneurysm  Ascending aorta 4.4 cm 2021 and 11/2022  Hypertension  Past medical history significant for obesity, transgender identifying as female.      Patient was seen by Dr. Ramires in August 2022.  She was noted to have progressive, debilitating dyspnea on exertion over the prior 6 months.  Echocardiogram showed severe aortic stenosis with mean gradient of 41 mmHg and ARISTEO of 0.9 cm , ascending aorta moderately dilated 4.4 cm similar to prior MRA from 2021. Preop coronary angiogram showing normal coronary arteries.     She underwent elective TAVR on 10/4/2022 with 34 mm Medtronic evolute valve.  She had no significant postop complications.  Post procedure echocardiogram showed normal EF, mean gradient 14 mmHg.  EKG post TAVR showed new LBBB with first-degree AV block and she was discharged with an event monitor.  Atenolol was changed to lisinopril.      At 1 month TAVR follow-up her TAVR gradient was normal and ascending aorta dilatation was stable.  Her prior event monitor showed no significant concerns.  However cardiac rehab noted 2 episodes of 5 beat VT. She reported having more energy and continued to have improvement in dyspnea on exertion.  She was participating in cardiac rehab and planning to have knee surgery after summer 2023.    Pt presents today for 6-month TAVR  follow-up.  She recently completed cardiac rehab.  Her dyspnea on exertion is back to baseline.  Blood pressures have been controlled.  She had no further reports of VT at cardiac rehab.  She decided to purchase FittingRoom EKG and will check this if she is having any heart racing or palpitations.  She has completed aspirin therapy for 6 months post TAVR so this will be discontinued.  She plans to have upcoming glaucoma and cataract surgery.  We discussed that this is considered low risk surgery with cardiac death or MI less than 1%.  She continues with bilateral lower extremity edema but notes this is significantly improved. Patient reports no chest pain, PND, orthopnea, presyncope, syncope, heart racing, or palpitations.    Labs:  LIPID RESULTS:  Lab Results   Component Value Date    CHOL 133 05/05/2022    CHOL 142 05/13/2021    HDL 60 05/05/2022    HDL 60 05/13/2021    LDL 46 05/05/2022    LDL 50 05/13/2021    TRIG 136 05/05/2022    TRIG 159 (H) 05/13/2021    CHOLHDLRATIO 3.0 04/25/2014       LIVER ENZYME RESULTS:  Lab Results   Component Value Date    AST 34 09/29/2022    AST 13 05/13/2021    ALT 33 09/29/2022    ALT 18 05/13/2021       CBC RESULTS:  Lab Results   Component Value Date    WBC 9.6 11/17/2022    WBC 6.0 05/13/2021    RBC 4.93 11/17/2022    RBC 4.55 05/13/2021    HGB 14.5 11/17/2022    HGB 13.7 05/13/2021    HCT 44.4 11/17/2022    HCT 41.8 05/13/2021    MCV 90 11/17/2022    MCV 92 05/13/2021    MCH 29.4 11/17/2022    MCH 30.1 05/13/2021    MCHC 32.7 11/17/2022    MCHC 32.8 05/13/2021    RDW 13.0 11/17/2022    RDW 13.1 05/13/2021    PLT 99 (L) 11/17/2022     (L) 05/13/2021       BMP RESULTS:  Lab Results   Component Value Date     11/17/2022     05/13/2021    POTASSIUM 4.9 11/17/2022    POTASSIUM 4.4 10/06/2022    POTASSIUM 5.6 (H) 05/13/2021    CHLORIDE 102 11/17/2022    CHLORIDE 105 10/06/2022    CHLORIDE 107 05/13/2021    CO2 27 11/17/2022    CO2 29 10/06/2022    CO2 30  05/13/2021    ANIONGAP 8 11/17/2022    ANIONGAP 3 10/06/2022    ANIONGAP 1 (L) 05/13/2021     (H) 11/17/2022     (H) 10/06/2022    GLC 92 05/13/2021    BUN 18.7 11/17/2022    BUN 16 10/06/2022    BUN 16 05/13/2021    CR 0.98 11/17/2022    CR 0.97 05/13/2021    GFRESTIMATED 82 11/17/2022    GFRESTIMATED >60 08/25/2022    GFRESTIMATED 78 05/13/2021    GFRESTBLACK >90 05/13/2021    GOPAL 9.9 11/17/2022    GOPAL 9.3 05/13/2021        A1C RESULTS:  Lab Results   Component Value Date    A1C 4.8 04/26/2016       INR RESULTS:  Lab Results   Component Value Date    INR 1.15 09/29/2022    INR 1.14 09/08/2022    INR 0.98 10/25/2005       Results reviewed today.       Current Cardiac Medications     Current Outpatient Medications   Medication Sig Dispense Refill    acetaminophen (TYLENOL) 325 MG tablet Take 3 tablets (975 mg) by mouth nightly as needed for mild pain      acetaminophen (TYLENOL) 325 MG tablet Take 975 mg by mouth nightly as needed for mild pain      albuterol (PROAIR HFA/PROVENTIL HFA/VENTOLIN HFA) 108 (90 Base) MCG/ACT inhaler Inhale 2 puffs into the lungs every 6 hours 18 g 0    brimonidine (ALPHAGAN) 0.2 % ophthalmic solution Place 1 drop into both eyes 2 times daily      dorzolamide-timolol (COSOPT) 2-0.5 % ophthalmic solution Place 1 drop into both eyes 2 times daily      estradiol (VIVELLE-DOT) 0.05 MG/24HR bi-weekly patch Place 1 patch onto the skin twice a week 24 patch 1    ipratropium - albuterol 0.5 mg/2.5 mg/3 mL (DUONEB) 0.5-2.5 (3) MG/3ML neb solution Take 1 vial (3 mLs) by nebulization every 6 hours as needed for shortness of breath / dyspnea or wheezing 90 mL 4    latanoprost (XALATAN) 0.005 % ophthalmic solution Place 1 drop into both eyes At Bedtime      lisinopril (ZESTRIL) 5 MG tablet Take 1 tablet (5 mg) by mouth daily 90 tablet 1    lovastatin (MEVACOR) 20 MG tablet TAKE ONE TABLET BY MOUTH ONCE A DAY AT BEDTIME (Patient taking differently: Take 20 mg by mouth At Bedtime) 90  tablet 3    Multiple Vitamin (DAILY-EVANGELISTA) TABS Take 1 tablet by mouth daily  11    mupirocin (BACTROBAN) 2 % external ointment Apply topically 3 times daily 30 g 1    nitroGLYcerin (NITROSTAT) 0.4 MG sublingual tablet For chest pain place 1 tablet under the tongue every 5 minutes for 3 doses. If symptoms persist 5 minutes after 1st dose call 911. 30 tablet 1                      Assessment and Plan:       Plan    Patient Instructions   Medication Changes:  STOP aspirin    Recommendations:  Check blood pressure at least 1 hour after medications. Call the clinic if your blood pressure is consistently greater than 130/80.    Ok to have eye surgery for glaucoma and cataract, from cardiac perspective. Let us know if you need a copy of our note from today sent anywhere.     Follow-up:  Echo, EKG, nonfasting lab (BMP, CBC) in October 2023  See ELZBIETA Howe for cardiology follow up at [facility]: October 2023  For 1 year TAVR follow-up  Call 6 months prior, to schedule.     Cardiology Scheduling~[phone]  Cardiology Clinic RN~[phone] (Karlene RN, Sujey RN, Gayathri RN)                Severe symptomatic aortic stenosis  Status post TAVR 34 mm Medtronic evolute valve 10/2022  Prior symptoms of MARCELINO are improved post procedure, current NYHA class II   Lifelong SBE prophylaxis for dental procedures/ cleanings (amoxicillin 2G or clindamycin 600 mg 60 mins prior)        Ascending aortic aneurysm  Stable  Follow with echo        Hypertension  Controlled   Continue lisinopril        4.  NSVT   2 runs of nonsustained VT lasting 5 beats, at cardiac rehab 11/2022  Continue to monitor with Mitesh   Consider restarting metoprolol, if needed (would then reassess for any significant bradycardia with Holter monitor, given history of new LBBB post TAVR)                Thank you for allowing me to participate in this delightful patient's care.      This note was completed in part using Dragon voice recognition software. Although  reviewed after completion, some word and grammatical errors may occur.    JERRICA Marley CNP, APRN, CNP           Data:   All laboratory data reviewed         Constitutional:  cooperative, alert and oriented, well developed, well nourished, in no acute distress  overweight      Skin:  warm and dry to the touch         Head:  normocephalic       Eyes:  pupils equal and round       ENT:  no pallor or cyanosis       Neck:  no stiffness       Respiratory:  clear to auscultation; normal symmetry        Cardiac: regular rate and rhythm; normal S1 and S2                 pulses full and equal     GI:  abdomen soft, nondistended     Extremities and Muscular Skeletal:   bilateral lower extremity edema left greater than right 1+ pitting       Neurological:  affect appropriate; no gross motor deficits       Psych:  Alert and Oriented x 3 , appropriate affact    Thank you for allowing me to participate in the care of your patient.      Sincerely,     JERRICA Marley CNP     [facility]  cc:   No referring provider defined for this encounter.

## 2023-04-13 NOTE — PROGRESS NOTES
Cardiology Clinic Progress Note  Laila Perez MRN# [id number]   YOB: 1950 Age: 73 year old      Primary Cardiologist:   Dr. Cerda          History of Presenting Illness:      Laila Perez is a pleasant 73 year old patient with a past cardiac history significant for   1. Severe symptomatic aortic stenosis    TAVR 10/2022 34 mm Medtronic evolute valve    Post op MG 14 mmHg    1 mo MG  9 mmHg  2.  Ascending aortic aneurysm    Ascending aorta 4.4 cm 2021 and 11/2022  3. Hypertension  Past medical history significant for obesity, transgender identifying as female.      Patient was seen by Dr. Ramires in August 2022.  She was noted to have progressive, debilitating dyspnea on exertion over the prior 6 months.  Echocardiogram showed severe aortic stenosis with mean gradient of 41 mmHg and ARISTEO of 0.9 cm , ascending aorta moderately dilated 4.4 cm similar to prior MRA from 2021. Preop coronary angiogram showing normal coronary arteries.     She underwent elective TAVR on 10/4/2022 with 34 mm Medtronic evolute valve.  She had no significant postop complications.  Post procedure echocardiogram showed normal EF, mean gradient 14 mmHg.  EKG post TAVR showed new LBBB with first-degree AV block and she was discharged with an event monitor.  Atenolol was changed to lisinopril.      At 1 month TAVR follow-up her TAVR gradient was normal and ascending aorta dilatation was stable.  Her prior event monitor showed no significant concerns.  However cardiac rehab noted 2 episodes of 5 beat VT. She reported having more energy and continued to have improvement in dyspnea on exertion.  She was participating in cardiac rehab and planning to have knee surgery after summer 2023.    Pt presents today for 6-month TAVR follow-up.  She recently completed cardiac rehab.  Her dyspnea on exertion is back to baseline.  Blood pressures have been controlled.  She had no further reports of VT at cardiac rehab.  She decided to  purchase [employer] mobile EKG and will check this if she is having any heart racing or palpitations.  She has completed aspirin therapy for 6 months post TAVR so this will be discontinued.  She plans to have upcoming glaucoma and cataract surgery.  We discussed that this is considered low risk surgery with cardiac death or MI less than 1%.  She continues with bilateral lower extremity edema but notes this is significantly improved. Patient reports no chest pain, PND, orthopnea, presyncope, syncope, heart racing, or palpitations.    Labs:  LIPID RESULTS:  Lab Results   Component Value Date    CHOL 133 05/05/2022    CHOL 142 05/13/2021    HDL 60 05/05/2022    HDL 60 05/13/2021    LDL 46 05/05/2022    LDL 50 05/13/2021    TRIG 136 05/05/2022    TRIG 159 (H) 05/13/2021    CHOLHDLRATIO 3.0 04/25/2014       LIVER ENZYME RESULTS:  Lab Results   Component Value Date    AST 34 09/29/2022    AST 13 05/13/2021    ALT 33 09/29/2022    ALT 18 05/13/2021       CBC RESULTS:  Lab Results   Component Value Date    WBC 9.6 11/17/2022    WBC 6.0 05/13/2021    RBC 4.93 11/17/2022    RBC 4.55 05/13/2021    HGB 14.5 11/17/2022    HGB 13.7 05/13/2021    HCT 44.4 11/17/2022    HCT 41.8 05/13/2021    MCV 90 11/17/2022    MCV 92 05/13/2021    MCH 29.4 11/17/2022    MCH 30.1 05/13/2021    MCHC 32.7 11/17/2022    MCHC 32.8 05/13/2021    RDW 13.0 11/17/2022    RDW 13.1 05/13/2021    PLT 99 (L) 11/17/2022     (L) 05/13/2021       BMP RESULTS:  Lab Results   Component Value Date     11/17/2022     05/13/2021    POTASSIUM 4.9 11/17/2022    POTASSIUM 4.4 10/06/2022    POTASSIUM 5.6 (H) 05/13/2021    CHLORIDE 102 11/17/2022    CHLORIDE 105 10/06/2022    CHLORIDE 107 05/13/2021    CO2 27 11/17/2022    CO2 29 10/06/2022    CO2 30 05/13/2021    ANIONGAP 8 11/17/2022    ANIONGAP 3 10/06/2022    ANIONGAP 1 (L) 05/13/2021     (H) 11/17/2022     (H) 10/06/2022    GLC 92 05/13/2021    BUN 18.7 11/17/2022    BUN 16 10/06/2022     BUN 16 05/13/2021    CR 0.98 11/17/2022    CR 0.97 05/13/2021    GFRESTIMATED 82 11/17/2022    GFRESTIMATED >60 08/25/2022    GFRESTIMATED 78 05/13/2021    GFRESTBLACK >90 05/13/2021    GOPAL 9.9 11/17/2022    GOPAL 9.3 05/13/2021        A1C RESULTS:  Lab Results   Component Value Date    A1C 4.8 04/26/2016       INR RESULTS:  Lab Results   Component Value Date    INR 1.15 09/29/2022    INR 1.14 09/08/2022    INR 0.98 10/25/2005       Results reviewed today.       Current Cardiac Medications     Current Outpatient Medications   Medication Sig Dispense Refill     acetaminophen (TYLENOL) 325 MG tablet Take 3 tablets (975 mg) by mouth nightly as needed for mild pain       acetaminophen (TYLENOL) 325 MG tablet Take 975 mg by mouth nightly as needed for mild pain       albuterol (PROAIR HFA/PROVENTIL HFA/VENTOLIN HFA) 108 (90 Base) MCG/ACT inhaler Inhale 2 puffs into the lungs every 6 hours 18 g 0     brimonidine (ALPHAGAN) 0.2 % ophthalmic solution Place 1 drop into both eyes 2 times daily       dorzolamide-timolol (COSOPT) 2-0.5 % ophthalmic solution Place 1 drop into both eyes 2 times daily       estradiol (VIVELLE-DOT) 0.05 MG/24HR bi-weekly patch Place 1 patch onto the skin twice a week 24 patch 1     ipratropium - albuterol 0.5 mg/2.5 mg/3 mL (DUONEB) 0.5-2.5 (3) MG/3ML neb solution Take 1 vial (3 mLs) by nebulization every 6 hours as needed for shortness of breath / dyspnea or wheezing 90 mL 4     latanoprost (XALATAN) 0.005 % ophthalmic solution Place 1 drop into both eyes At Bedtime       lisinopril (ZESTRIL) 5 MG tablet Take 1 tablet (5 mg) by mouth daily 90 tablet 1     lovastatin (MEVACOR) 20 MG tablet TAKE ONE TABLET BY MOUTH ONCE A DAY AT BEDTIME (Patient taking differently: Take 20 mg by mouth At Bedtime) 90 tablet 3     Multiple Vitamin (DAILY-EVANGELISTA) TABS Take 1 tablet by mouth daily  11     mupirocin (BACTROBAN) 2 % external ointment Apply topically 3 times daily 30 g 1     nitroGLYcerin (NITROSTAT) 0.4 MG  sublingual tablet For chest pain place 1 tablet under the tongue every 5 minutes for 3 doses. If symptoms persist 5 minutes after 1st dose call 911. 30 tablet 1                      Assessment and Plan:       Plan    Patient Instructions   Medication Changes:  1. STOP aspirin    Recommendations:  1. Check blood pressure at least 1 hour after medications. Call the clinic if your blood pressure is consistently greater than 130/80.    2. Ok to have eye surgery for glaucoma and cataract, from cardiac perspective. Let us know if you need a copy of our note from today sent anywhere.     Follow-up:  1. Echo, EKG, nonfasting lab (BMP, CBC) in October 2023  2. See ELZBIETA Howe for cardiology follow up at [facility]: October 2023  For 1 year TAVR follow-up  Call 6 months prior, to schedule.     Cardiology Scheduling~[phone]  Cardiology Clinic RN~[phone] (Karlene RN, Sujey RN, Gayathri RN)                1. Severe symptomatic aortic stenosis    Status post TAVR 34 mm Medtronic evolute valve 10/2022    Prior symptoms of MARCELINO are improved post procedure, current NYHA class II     Lifelong SBE prophylaxis for dental procedures/ cleanings (amoxicillin 2G or clindamycin 600 mg 60 mins prior)        2. Ascending aortic aneurysm    Stable    Follow with echo        3. Hypertension    Controlled     Continue lisinopril        4.  NSVT     2 runs of nonsustained VT lasting 5 beats, at cardiac rehab 11/2022    Continue to monitor with Kardirusty     Consider restarting metoprolol, if needed (would then reassess for any significant bradycardia with Holter monitor, given history of new LBBB post TAVR)                Thank you for allowing me to participate in this delightful patient's care.      This note was completed in part using Dragon voice recognition software. Although reviewed after completion, some word and grammatical errors may occur.    Carley Rodriguez, APRN CNP, APRN, CNP           Data:   All laboratory  data reviewed         Constitutional:  cooperative, alert and oriented, well developed, well nourished, in no acute distress  overweight      Skin:  warm and dry to the touch         Head:  normocephalic       Eyes:  pupils equal and round       ENT:  no pallor or cyanosis       Neck:  no stiffness       Respiratory:  clear to auscultation; normal symmetry        Cardiac: regular rate and rhythm; normal S1 and S2                 pulses full and equal     GI:  abdomen soft, nondistended     Extremities and Muscular Skeletal:   bilateral lower extremity edema left greater than right 1+ pitting       Neurological:  affect appropriate; no gross motor deficits       Psych:  Alert and Oriented x 3 , appropriate affact

## 2023-04-17 ENCOUNTER — OFFICE VISIT (OUTPATIENT)
Dept: FAMILY MEDICINE | Facility: CLINIC | Age: 73
End: 2023-04-17
Payer: COMMERCIAL

## 2023-04-17 VITALS
BODY MASS INDEX: 41.95 KG/M2 | DIASTOLIC BLOOD PRESSURE: 86 MMHG | HEIGHT: 70 IN | WEIGHT: 293 LBS | SYSTOLIC BLOOD PRESSURE: 163 MMHG | HEART RATE: 79 BPM

## 2023-04-17 DIAGNOSIS — Z79.899 TRANSGENDER PERSON ON HORMONE THERAPY: Primary | ICD-10-CM

## 2023-04-17 DIAGNOSIS — I10 BENIGN ESSENTIAL HYPERTENSION: ICD-10-CM

## 2023-04-17 DIAGNOSIS — F64.0 TRANSGENDER PERSON ON HORMONE THERAPY: Primary | ICD-10-CM

## 2023-04-17 DIAGNOSIS — Z95.2 S/P TAVR (TRANSCATHETER AORTIC VALVE REPLACEMENT): ICD-10-CM

## 2023-04-17 PROCEDURE — 99214 OFFICE O/P EST MOD 30 MIN: CPT | Performed by: FAMILY MEDICINE

## 2023-04-17 ASSESSMENT — ENCOUNTER SYMPTOMS: SHORTNESS OF BREATH: 0

## 2023-04-17 NOTE — PROGRESS NOTES
BOO Ulloa is a 73 year old individual that uses pronouns She/Her/Hers/Herself that presents today for follow up of:  feminizing hormone therapy.   Alone or accompanied by: accompanied today by  Gender identity: female  Started Hormone  therapy 2016  Continues on TD estradiol  0.05 mg patch 2x/wk  Any special concerns today?    1. Has cataract and glaucoma surgery upcoming for R eye, has been ok'd for surgery by cardiology  2. Saw cardiology recently, note reviewed, completed cardiac rehab, doing well. Has home phone heart monitor  3. Has knee replacement surgery scheduled for fall 2023    On hormones?  YES +++ Shot day of the week? Not applicable-taking pills/patch/gel      Due for labs?  No      +++ Refills of meds needed?  Yes  Gender related body changes since last visit: stable    Breakthrough bleeding? Does Not Apply    New health concerns since last visit:  ---none, [facility] 45 minutes, edema down    Past Surgical History:   Procedure Laterality Date     COLONOSCOPY  2007     CV CORONARY ANGIOGRAM N/A 9/8/2022    Procedure: Coronary Angiogram;  Surgeon: Santy Clayton MD;  Location: [facility] CARDIAC CATH LAB     CV PCI N/A 9/8/2022    Procedure: Percutaneous Coronary Intervention;  Surgeon: Santy Clayton MD;  Location: [facility] CARDIAC CATH LAB     CV TRANSCATHETER AORTIC VALVE REPLACEMENT-FEMORAL APPROACH N/A 10/4/2022    Procedure: Transcatheter Aortic Valve Replacement-Femoral Approach;  Surgeon: Young Ramires MD;  Location: [facility] CARDIAC CATH LAB     JOINT REPLACEMTN, KNEE RT/LT  2006    left     ORCHIECTOMY SCROTAL Bilateral 5/14/2019    Procedure: Bilatera Scrotal Orchiectomy;  Surgeon: Abhilash Weston MD;  Location:  OR       Patient Active Problem List   Diagnosis     Gouty arthropathy     Mental or behavioral problem     Generalized osteoarthrosis, unspecified site     Disorder of bone and cartilage     Mixed hyperlipidemia     Benign  essential hypertension     OA (osteoarthritis) of knee     DDD (degenerative disc disease), lumbar     Varicose veins of legs     Psoriasis     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     Family history of diabetes mellitus in first degree relative     Mitral valve disorder     Aortic valve stenosis, unspecified etiology     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     Obesity, Class III, BMI 40-49.9 (morbid obesity) (H)     Multiple gastric ulcers     Episodic tension-type headache, not intractable     Alcohol dependence in remission (H)     Aortic aneurysm without rupture, unspecified portion of aorta (H)     Status post coronary angiogram     Shortness of breath      Thoracic aortic aneurysm without rupture     Aortic stenosis, severe     S/P TAVR (transcatheter aortic valve replacement)     Aneurysm of ascending aorta without rupture (H)     Thrombocytopenia (H)       Current Outpatient Medications   Medication Sig Dispense Refill     acetaminophen (TYLENOL) 325 MG tablet Take 3 tablets (975 mg) by mouth nightly as needed for mild pain       acetaminophen (TYLENOL) 325 MG tablet Take 975 mg by mouth nightly as needed for mild pain       albuterol (PROAIR HFA/PROVENTIL HFA/VENTOLIN HFA) 108 (90 Base) MCG/ACT inhaler Inhale 2 puffs into the lungs every 6 hours 18 g 0     brimonidine (ALPHAGAN) 0.2 % ophthalmic solution Place 1 drop into both eyes 2 times daily       dorzolamide-timolol (COSOPT) 2-0.5 % ophthalmic solution Place 1 drop into both eyes 2 times daily       estradiol (VIVELLE-DOT) 0.05 MG/24HR bi-weekly patch Place 1 patch onto the skin twice a week 24 patch 1     ipratropium - albuterol 0.5 mg/2.5 mg/3 mL (DUONEB) 0.5-2.5 (3) MG/3ML neb solution Take 1 vial (3 mLs) by nebulization every 6 hours as needed for shortness of breath / dyspnea or wheezing 90 mL 4     latanoprost (XALATAN) 0.005 % ophthalmic solution Place 1 drop into both eyes At Bedtime       lisinopril (ZESTRIL) 5 MG tablet Take 1 tablet (5 mg)  "by mouth daily 90 tablet 1     lovastatin (MEVACOR) 20 MG tablet TAKE ONE TABLET BY MOUTH ONCE A DAY AT BEDTIME (Patient taking differently: Take 20 mg by mouth At Bedtime) 90 tablet 3     Multiple Vitamin (DAILY-EVANGELISTA) TABS Take 1 tablet by mouth daily  11     mupirocin (BACTROBAN) 2 % external ointment Apply topically 3 times daily 30 g 1     nitroGLYcerin (NITROSTAT) 0.4 MG sublingual tablet For chest pain place 1 tablet under the tongue every 5 minutes for 3 doses. If symptoms persist 5 minutes after 1st dose call 911. 30 tablet 1       History   Smoking Status     Never   Smokeless Tobacco     Never          Allergies   Allergen Reactions     External Allergen Needs Reconciliation - See Comment      Please reconcile the Patient's allergy reported as ESTERSPENICILLIN and update accordingly           Heparin Rash     he lives in a assisted living program and is not sure what type of allergies he really has. He quit drinking       Health Maintenance Due   Topic Date Due     MENTAL HEALTH TX PLAN  08/15/2021         Problem, Medication and Allergy Lists were reviewed and are current..         Review of Systems:   Review of Systems   Respiratory: Negative for shortness of breath.    Cardiovascular: Negative for chest pain.              Labs:   Results from last visit:  Hospital Outpatient Visit on 04/06/2023   Component Date Value Ref Range Status     LVEF  04/06/2023 60-65%   Final         EXAM:  Blood pressure (!) 163/86, pulse 79, height 1.778 m (5' 10\"), weight (!) 155.1 kg (342 lb).  Body mass index is 49.07 kg/m .    Constitutional: healthy, alert and no distress   Cardiovascular: negative, PMI normal. No lifts, heaves, or thrills. RRR. No murmurs, clicks gallops or rub  Respiratory: negative, Percussion normal. Good diaphragmatic excursion. Lungs clear   Ext R 1-2+ edema 2-3+ edema above ankle  Assessment and Plan   1. Transgender person on hormone therapy s/p gender affirming orchiectomy  2. Aortic stenosis " s/p TAVR  3. HTN  Cardiology note 4/13/2023 reviewed by me  Stable on current dose estradiol   Improved clinical and cardiac function s/p TAVR and rehab--bp at that visit /76  Continue with current medical team   Call when due for refills    Follow-up 4-6 months        Results by tracet  Questions were elicited and answered.     Jluis Mane MD

## 2023-04-20 ENCOUNTER — PATIENT OUTREACH (OUTPATIENT)
Dept: CARE COORDINATION | Facility: CLINIC | Age: 73
End: 2023-04-20
Payer: COMMERCIAL

## 2023-04-23 ENCOUNTER — LAB (OUTPATIENT)
Dept: FAMILY MEDICINE | Facility: CLINIC | Age: 73
End: 2023-04-23
Payer: COMMERCIAL

## 2023-04-23 DIAGNOSIS — Z12.11 SCREEN FOR COLON CANCER: ICD-10-CM

## 2023-04-24 ENCOUNTER — OFFICE VISIT (OUTPATIENT)
Dept: ORTHOPEDICS | Facility: CLINIC | Age: 73
End: 2023-04-24
Payer: COMMERCIAL

## 2023-04-24 VITALS — BODY MASS INDEX: 41.02 KG/M2 | HEIGHT: 71 IN | WEIGHT: 293 LBS

## 2023-04-24 DIAGNOSIS — M17.11 PRIMARY OSTEOARTHRITIS OF RIGHT KNEE: Primary | ICD-10-CM

## 2023-04-24 PROCEDURE — 20611 DRAIN/INJ JOINT/BURSA W/US: CPT | Mod: RT | Performed by: FAMILY MEDICINE

## 2023-04-24 RX ORDER — BETAMETHASONE SODIUM PHOSPHATE AND BETAMETHASONE ACETATE 3; 3 MG/ML; MG/ML
6 INJECTION, SUSPENSION INTRA-ARTICULAR; INTRALESIONAL; INTRAMUSCULAR; SOFT TISSUE
Status: DISCONTINUED | OUTPATIENT
Start: 2023-04-24 | End: 2023-10-27

## 2023-04-24 RX ORDER — ROPIVACAINE HYDROCHLORIDE 5 MG/ML
4 INJECTION, SOLUTION EPIDURAL; INFILTRATION; PERINEURAL
Status: DISCONTINUED | OUTPATIENT
Start: 2023-04-24 | End: 2023-10-27

## 2023-04-24 RX ADMIN — ROPIVACAINE HYDROCHLORIDE 4 ML: 5 INJECTION, SOLUTION EPIDURAL; INFILTRATION; PERINEURAL at 09:37

## 2023-04-24 RX ADMIN — BETAMETHASONE SODIUM PHOSPHATE AND BETAMETHASONE ACETATE 6 MG: 3; 3 INJECTION, SUSPENSION INTRA-ARTICULAR; INTRALESIONAL; INTRAMUSCULAR; SOFT TISSUE at 09:37

## 2023-04-24 ASSESSMENT — PAIN SCALES - GENERAL: PAINLEVEL: MILD PAIN (2)

## 2023-04-24 NOTE — PROGRESS NOTES
ASSESSMENT & PLAN    Laila was seen today for follow up.    Diagnoses and all orders for this visit:    Primary osteoarthritis of right knee  -     Large Joint Injection/Arthocentesis: R knee joint      # Right Knee Osteoarthritis: Long-standing condition for patient with last visit on 11/2/22. She did get significant improvement of right knee pain after steroid injection then. Symptoms have returned she does have pain over the joint lines bilaterally. Reviewed previous x-rays showing significant arthritis in her knees. Patient has lost 17+ lbs after heart procedure.  She graduated cardiac rehab. She is interested in consideration of a possible knee replacement but wants to lose more weight. Given this will treat as below and follow-up as needed.      # Morbid Obesity: Counseled patient that weight loss could help her heart health as well as her knee pain. She is losing weight by yourself that has improved after heart procedure. Given this will have her continue to lose weight on her own and follow-up as needed.     Image Findings: reviewed previous x-rays showing arthritis in the knee  Treatment: Activities as tolerated, can continue home exercises  Medications/Injections: Limited tylenol/ibuprofen for pain for 1-2 weeks, repeat left knee steroid injection  Follow-up: In 3 months if symptoms do not improve, sooner if worsening  Can consider gel injection if steroid injection does not help, can follow-up with ortho surgery as needed for consideration of a knee replacement   -----    SUBJECTIVE:  Laila Perez is a 73 year old adult who is seen in follow-up for right knee pain.They were last seen 11/21/2022 and right knee steroid injection was performed.  The patient is seen with their support staff.    Since their last visit reports returned right knee pain.  They indicate that their current pain level is 2/10. They have tried steroid injections 11/21/22, 8/8/22, 4/4/22 and 1/3/22, voltaren, Tylenol, cane.   "      Patient's past medical, surgical, social, and family histories were reviewed today and no changes are noted.    REVIEW OF SYSTEMS:  Constitutional: NEGATIVE for fever, chills, change in weight  Skin: NEGATIVE for worrisome rashes, moles or lesions  GI/: NEGATIVE for bowel or bladder changes  Neuro: NEGATIVE for weakness, dizziness or paresthesias    OBJECTIVE:  Ht 1.803 m (5' 11\")   Wt (!) 156 kg (344 lb)   BMI 47.98 kg/m     General: healthy, alert and in no distress  HEENT: no scleral icterus or conjunctival erythema  Skin: no suspicious lesions or rash. No jaundice.  CV: regular rhythm by palpation, no pedal edema  Resp: normal respiratory effort without conversational dyspnea   Psych: normal mood and affect  Gait: normal steady gait with appropriate coordination and balance  Neuro: normal light touch sensory exam of the extremities.    MSK:    RIGHT KNEE  Inspection:    Normal alignment; no edema, erythema, or ecchymosis present  Palpation:    Tender about the lateral joint line and medial joint line. Remainder of bony and ligamentous landmarks are nontender.    Unable to tell if knee effusion is present    Patellofemoral crepitus is Present  Range of Motion:     00 extension to 1350 flexion  Strength:    Quadriceps 5/5, hamstrings 5/5, gastrocsoleus 5/5 and tibialis anterior 5/5    Extensor mechanism intact  Special Tests:    Positive: none    Negative: Patellar grind, MCL/valgus stress (0 & 30 deg), LCL/varus stress (0 & 30 deg)      Independent visualization of the below image:    IMPRESSION: Mild-to-moderate medial compartment narrowing. Small  medial and patellofemoral compartment osteophytes. Tiny lateral  compartment osteophytes. 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 There is no evidence of fracture,  effusion or calcified intra-articular body. The medial compartment  degenerative changes have slightly progressed.      MD Hema AVILA " MD, [facility], [place]    Disclaimer: This note consists of symbols derived from keyboarding, dictation and/or voice recognition software. As a result, there may be errors in the script that have gone undetected. Please consider this when interpreting information found in this chart.    Large Joint Injection/Arthocentesis: R knee joint    Date/Time: 4/24/2023 9:37 AM    Performed by: Hema Duncan MD  Authorized by: Hema Duncan MD    Indications:  Pain and osteoarthritis  Needle Size:  25 G  Guidance: ultrasound    Approach:  Superolateral  Location:  Knee      Medications:  6 mg betamethasone acet & sod phos 6 (3-3) MG/ML; 4 mL ropivacaine 5 MG/ML  Outcome:  Tolerated well, no immediate complications  Procedure discussed: discussed risks, benefits, and alternatives    Consent Given by:  Patient  Timeout: timeout called immediately prior to procedure    Prep: patient was prepped and draped in usual sterile fashion     Ultrasound images of procedure were permanently stored.     Patient reported significant improvement of pain after the numbing portion right knee joint steroid injection.  Ultrasound guided images were permanently stored.   Aftercare instructions given to patient.  Plan to follow-up as discussed above.     Hema Duncan MD [facility], [place]

## 2023-04-24 NOTE — LETTER
4/24/2023         RE: Laila Perez  [address] [id number]        Dear Colleague,    Thank you for referring your patient, Laila Perez, to the [facility] [place]. Please see a copy of my visit note below.    ASSESSMENT & PLAN    Laila was seen today for follow up.    Diagnoses and all orders for this visit:    Primary osteoarthritis of right knee  -     Large Joint Injection/Arthocentesis: R knee joint      # Right Knee Osteoarthritis: Long-standing condition for patient with last visit on 11/2/22. She did get significant improvement of right knee pain after steroid injection then. Symptoms have returned she does have pain over the joint lines bilaterally. Reviewed previous x-rays showing significant arthritis in her knees. Patient has lost 17+ lbs after heart procedure.  She graduated cardiac rehab. She is interested in consideration of a possible knee replacement but wants to lose more weight. Given this will treat as below and follow-up as needed.      # Morbid Obesity: Counseled patient that weight loss could help her heart health as well as her knee pain. She is losing weight by yourself that has improved after heart procedure. Given this will have her continue to lose weight on her own and follow-up as needed.     Image Findings: reviewed previous x-rays showing arthritis in the knee  Treatment: Activities as tolerated, can continue home exercises  Medications/Injections: Limited tylenol/ibuprofen for pain for 1-2 weeks, repeat left knee steroid injection  Follow-up: In 3 months if symptoms do not improve, sooner if worsening  Can consider gel injection if steroid injection does not help, can follow-up with ortho surgery as needed for consideration of a knee replacement   -----    SUBJECTIVE:  Laila Perez is a 73 year old adult who is seen in follow-up for right knee pain.They were last seen 11/21/2022 and right knee steroid injection  "was performed.  The patient is seen with their support staff.    Since their last visit reports returned right knee pain.  They indicate that their current pain level is 2/10. They have tried steroid injections 11/21/22, 8/8/22, 4/4/22 and 1/3/22, voltaren, Tylenol, cane.        Patient's past medical, surgical, social, and family histories were reviewed today and no changes are noted.    REVIEW OF SYSTEMS:  Constitutional: NEGATIVE for fever, chills, change in weight  Skin: NEGATIVE for worrisome rashes, moles or lesions  GI/: NEGATIVE for bowel or bladder changes  Neuro: NEGATIVE for weakness, dizziness or paresthesias    OBJECTIVE:  Ht 1.803 m (5' 11\")   Wt (!) 156 kg (344 lb)   BMI 47.98 kg/m     General: healthy, alert and in no distress  HEENT: no scleral icterus or conjunctival erythema  Skin: no suspicious lesions or rash. No jaundice.  CV: regular rhythm by palpation, no pedal edema  Resp: normal respiratory effort without conversational dyspnea   Psych: normal mood and affect  Gait: normal steady gait with appropriate coordination and balance  Neuro: normal light touch sensory exam of the extremities.    MSK:    RIGHT KNEE  Inspection:    Normal alignment; no edema, erythema, or ecchymosis present  Palpation:    Tender about the lateral joint line and medial joint line. Remainder of bony and ligamentous landmarks are nontender.    Unable to tell if knee effusion is present    Patellofemoral crepitus is Present  Range of Motion:     00 extension to 1350 flexion  Strength:    Quadriceps 5/5, hamstrings 5/5, gastrocsoleus 5/5 and tibialis anterior 5/5    Extensor mechanism intact  Special Tests:    Positive: none    Negative: Patellar grind, MCL/valgus stress (0 & 30 deg), LCL/varus stress (0 & 30 deg)      Independent visualization of the below image:    IMPRESSION: Mild-to-moderate medial compartment narrowing. Small  medial and patellofemoral compartment osteophytes. Tiny lateral  compartment " osteophytes. 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 There is no evidence of fracture,  effusion or calcified intra-articular body. The medial compartment  degenerative changes have slightly progressed.      MD Hema AVILA MD, [facility]    Disclaimer: This note consists of symbols derived from keyboarding, dictation and/or voice recognition software. As a result, there may be errors in the script that have gone undetected. Please consider this when interpreting information found in this chart.    Large Joint Injection/Arthocentesis: R knee joint    Date/Time: 4/24/2023 9:37 AM    Performed by: Hema Duncan MD  Authorized by: Hema Duncan MD    Indications:  Pain and osteoarthritis  Needle Size:  25 G  Guidance: ultrasound    Approach:  Superolateral  Location:  Knee      Medications:  6 mg betamethasone acet & sod phos 6 (3-3) MG/ML; 4 mL ropivacaine 5 MG/ML  Outcome:  Tolerated well, no immediate complications  Procedure discussed: discussed risks, benefits, and alternatives    Consent Given by:  Patient  Timeout: timeout called immediately prior to procedure    Prep: patient was prepped and draped in usual sterile fashion     Ultrasound images of procedure were permanently stored.     Patient reported significant improvement of pain after the numbing portion right knee joint steroid injection.  Ultrasound guided images were permanently stored.   Aftercare instructions given to patient.  Plan to follow-up as discussed above.     Heam Duncan MD [facility]              Again, thank you for allowing me to participate in the care of your patient.        Sincerely,        Hema Duncan MD

## 2023-04-24 NOTE — PATIENT INSTRUCTIONS
# Right Knee Osteoarthritis: Long-standing condition for patient with last visit on 11/2/22. She did get significant improvement of right knee pain after steroid injection then. Symptoms have returned she does have pain over the joint lines bilaterally. Reviewed previous x-rays showing significant arthritis in her knees. Patient has lost 17+ lbs after heart procedure.  She graduated cardiac rehab. She is interested in consideration of a possible knee replacement but wants to lose more weight. Given this will treat as below and follow-up as needed.      # Morbid Obesity: Counseled patient that weight loss could help her heart health as well as her knee pain. She is losing weight by yourself that has improved after heart procedure. Given this will have her continue to lose weight on her own and follow-up as needed.     Image Findings: reviewed previous x-rays showing arthritis in the knee  Treatment: Activities as tolerated, can continue home exercises  Medications/Injections: Limited tylenol/ibuprofen for pain for 1-2 weeks, repeat left knee steroid injection  Follow-up: In 3 months if symptoms do not improve, sooner if worsening  Can consider gel injection if steroid injection does not help, can follow-up with ortho surgery as needed for consideration of a knee replacement     It was great seeing you again today!    Hema Duncan    [facility] Injection Discharge Instructions    Procedure: right knee joint aspiration/steroid injection     You may shower, however avoid swimming, tub baths or hot tubs for 24 hours following your procedure  You may have a mild to moderate increase in pain for several days following the injection.  It may take up to 14 days for the steroid medication to start working although you may feel the effect as early as a few days after the procedure.  You may use ice packs for 10-15 minutes, 3 to 4 times a day at the injection site for comfort  You may use anti-inflammatory medications (such as  Ibuprofen or Aleve or Advil) or Tylenol for pain control if necessary  If you were fasting, you may resume your normal diet and medications after the procedure  If you have diabetes, check your blood sugar more frequently than usual as your blood sugar may be higher than normal for 10-14 days following a steroid injection. Contact your doctor who manages your diabetes if your blood sugar is higher than usual    If you experience any of the following, call [facility] @ [phone] or [phone]  -Fever over 100 degree F  -Swelling, bleeding, redness, drainage, warmth at the injection site  - New or worsening pain

## 2023-05-09 ENCOUNTER — OFFICE VISIT (OUTPATIENT)
Dept: FAMILY MEDICINE | Facility: CLINIC | Age: 73
End: 2023-05-09
Payer: COMMERCIAL

## 2023-05-09 VITALS
TEMPERATURE: 97.3 F | BODY MASS INDEX: 41.02 KG/M2 | RESPIRATION RATE: 14 BRPM | SYSTOLIC BLOOD PRESSURE: 138 MMHG | HEIGHT: 71 IN | OXYGEN SATURATION: 95 % | HEART RATE: 61 BPM | WEIGHT: 293 LBS | DIASTOLIC BLOOD PRESSURE: 82 MMHG

## 2023-05-09 DIAGNOSIS — Z78.9 MALE-TO-FEMALE TRANSGENDER PERSON: ICD-10-CM

## 2023-05-09 DIAGNOSIS — F10.21 ALCOHOL DEPENDENCE IN REMISSION (H): ICD-10-CM

## 2023-05-09 DIAGNOSIS — E66.01 OBESITY, CLASS III, BMI 40-49.9 (MORBID OBESITY) (H): ICD-10-CM

## 2023-05-09 DIAGNOSIS — Z95.2 S/P TAVR (TRANSCATHETER AORTIC VALVE REPLACEMENT): ICD-10-CM

## 2023-05-09 DIAGNOSIS — D69.6 THROMBOCYTOPENIA (H): ICD-10-CM

## 2023-05-09 DIAGNOSIS — L98.9 SORE ON LEG: ICD-10-CM

## 2023-05-09 DIAGNOSIS — Z23 NEED FOR SHINGLES VACCINE: ICD-10-CM

## 2023-05-09 DIAGNOSIS — M17.0 PRIMARY OSTEOARTHRITIS OF BOTH KNEES: ICD-10-CM

## 2023-05-09 DIAGNOSIS — E78.2 MIXED HYPERLIPIDEMIA: ICD-10-CM

## 2023-05-09 DIAGNOSIS — E66.01 MORBID (SEVERE) OBESITY DUE TO EXCESS CALORIES (H): ICD-10-CM

## 2023-05-09 DIAGNOSIS — E78.5 HYPERLIPIDEMIA LDL GOAL <100: ICD-10-CM

## 2023-05-09 DIAGNOSIS — Z00.00 ENCOUNTER FOR MEDICARE ANNUAL WELLNESS EXAM: Primary | ICD-10-CM

## 2023-05-09 DIAGNOSIS — I10 HTN, GOAL BELOW 140/90: ICD-10-CM

## 2023-05-09 LAB
ALBUMIN SERPL BCG-MCNC: 3.9 G/DL (ref 3.5–5.2)
ALP SERPL-CCNC: 94 U/L (ref 35–129)
ALT SERPL W P-5'-P-CCNC: 40 U/L (ref 10–50)
ANION GAP SERPL CALCULATED.3IONS-SCNC: 4 MMOL/L (ref 7–15)
AST SERPL W P-5'-P-CCNC: 38 U/L (ref 10–50)
BASOPHILS # BLD AUTO: 0 10E3/UL (ref 0–0.2)
BASOPHILS NFR BLD AUTO: 1 %
BILIRUB SERPL-MCNC: 0.5 MG/DL
BUN SERPL-MCNC: 15.8 MG/DL (ref 8–23)
CALCIUM SERPL-MCNC: 9.5 MG/DL (ref 8.8–10.2)
CHLORIDE SERPL-SCNC: 103 MMOL/L (ref 98–107)
CHOLEST SERPL-MCNC: 126 MG/DL
CREAT SERPL-MCNC: 0.95 MG/DL (ref 0.51–1.17)
DEPRECATED HCO3 PLAS-SCNC: 31 MMOL/L (ref 22–29)
EOSINOPHIL # BLD AUTO: 0.4 10E3/UL (ref 0–0.7)
EOSINOPHIL NFR BLD AUTO: 7 %
ERYTHROCYTE [DISTWIDTH] IN BLOOD BY AUTOMATED COUNT: 12.9 % (ref 10–15)
GFR SERPL CREATININE-BSD FRML MDRD: 63 ML/MIN/1.73M2
GLUCOSE SERPL-MCNC: 99 MG/DL (ref 70–99)
HCT VFR BLD AUTO: 42.5 % (ref 35–53)
HDLC SERPL-MCNC: 68 MG/DL
HGB BLD-MCNC: 13.7 G/DL (ref 11.7–17.7)
IMM GRANULOCYTES # BLD: 0 10E3/UL
IMM GRANULOCYTES NFR BLD: 0 %
LDLC SERPL CALC-MCNC: 33 MG/DL
LYMPHOCYTES # BLD AUTO: 1.1 10E3/UL (ref 0.8–5.3)
LYMPHOCYTES NFR BLD AUTO: 21 %
MCH RBC QN AUTO: 30.9 PG (ref 26.5–33)
MCHC RBC AUTO-ENTMCNC: 32.2 G/DL (ref 31.5–36.5)
MCV RBC AUTO: 96 FL (ref 78–100)
MONOCYTES # BLD AUTO: 0.5 10E3/UL (ref 0–1.3)
MONOCYTES NFR BLD AUTO: 8 %
NEUTROPHILS # BLD AUTO: 3.4 10E3/UL (ref 1.6–8.3)
NEUTROPHILS NFR BLD AUTO: 63 %
NONHDLC SERPL-MCNC: 58 MG/DL
PLATELET # BLD AUTO: 101 10E3/UL (ref 150–450)
POTASSIUM SERPL-SCNC: 5 MMOL/L (ref 3.4–5.3)
PROT SERPL-MCNC: 7 G/DL (ref 6.4–8.3)
RBC # BLD AUTO: 4.44 10E6/UL (ref 3.8–5.9)
SODIUM SERPL-SCNC: 138 MMOL/L (ref 136–145)
TRIGL SERPL-MCNC: 123 MG/DL
TSH SERPL DL<=0.005 MIU/L-ACNC: 1.42 UIU/ML (ref 0.3–4.2)
WBC # BLD AUTO: 5.4 10E3/UL (ref 4–11)

## 2023-05-09 PROCEDURE — 99214 OFFICE O/P EST MOD 30 MIN: CPT | Mod: 25 | Performed by: NURSE PRACTITIONER

## 2023-05-09 PROCEDURE — 90732 PPSV23 VACC 2 YRS+ SUBQ/IM: CPT | Performed by: NURSE PRACTITIONER

## 2023-05-09 PROCEDURE — 80061 LIPID PANEL: CPT | Performed by: NURSE PRACTITIONER

## 2023-05-09 PROCEDURE — G0009 ADMIN PNEUMOCOCCAL VACCINE: HCPCS | Performed by: NURSE PRACTITIONER

## 2023-05-09 PROCEDURE — 36415 COLL VENOUS BLD VENIPUNCTURE: CPT | Performed by: NURSE PRACTITIONER

## 2023-05-09 PROCEDURE — 80050 GENERAL HEALTH PANEL: CPT | Performed by: NURSE PRACTITIONER

## 2023-05-09 PROCEDURE — G0439 PPPS, SUBSEQ VISIT: HCPCS | Performed by: NURSE PRACTITIONER

## 2023-05-09 RX ORDER — MUPIROCIN 20 MG/G
OINTMENT TOPICAL 3 TIMES DAILY
Qty: 30 G | Refills: 1 | Status: ON HOLD | OUTPATIENT
Start: 2023-05-09 | End: 2023-10-26

## 2023-05-09 ASSESSMENT — PAIN SCALES - GENERAL: PAINLEVEL: MILD PAIN (2)

## 2023-05-09 ASSESSMENT — ACTIVITIES OF DAILY LIVING (ADL): CURRENT_FUNCTION: NO ASSISTANCE NEEDED

## 2023-05-09 NOTE — LETTER
May 10, 2023      Laila Perez  [address] [address] [place] [id number]        Dear ,    We are writing to inform you of your test results.    Labs are stable   Normal blood sugar.  Normal cholesterol levels   Normal thyroid function   Normal kidney function.  Normal liver function.   Platelets remain low   Stable over the last several months     Resulted Orders   Comprehensive metabolic panel (BMP + Alb, Alk Phos, ALT, AST, Total. Bili, TP)   Result Value Ref Range    Sodium 138 136 - 145 mmol/L    Potassium 5.0 3.4 - 5.3 mmol/L    Chloride 103 98 - 107 mmol/L    Carbon Dioxide (CO2) 31 (H) 22 - 29 mmol/L    Anion Gap 4 (L) 7 - 15 mmol/L    Urea Nitrogen 15.8 8.0 - 23.0 mg/dL    Creatinine 0.95 0.51 - 1.17 mg/dL      Comment:      Male and Female  0-2 Months    0.31-0.88 mg/dL  2-12 Months   0.16-0.39 mg/dL  1-2 Years     0.18-0.35 mg/dL  3-4 Years     0.26-0.42 mg/dL  5-6 Years     0.29-0.47 mg/dL  7-8 Years     0.34-0.53 mg/dL  9-10 Years    0.33-0.64 mg/dL  11-12 Years   0.44-0.68 mg/dL  13-14 Years   0.46-0.77 mg/dL    Female  15 Years and older  0.51-0.95 mg/dL    Male  15 Years and older  0.67-1.17 mg/dL        Calcium 9.5 8.8 - 10.2 mg/dL    Glucose 99 70 - 99 mg/dL    Alkaline Phosphatase 94 35 - 129 U/L      Comment:      Female:    0-15 days     U/L  15d-1 year   122-469 U/L  1-10 years   142-335 U/L  10-13 years  129-417 U/L  13-15 years   U/L  15-17 years   U/L  17-19 years  45-87 U/L  19 years and older   U/L      Male:  0-15 days     U/L  15d-1 year   122-469 U/L  1-10 years   142-335 U/L  10-13 years  129-417 U/L  13-15 years  116-468 U/L  15-17 years   U/L  17-19 years   U/L  19 years and older   U/L      AST 38 10 - 50 U/L      Comment:      Female   All ages 10-35 U/L     Male   All ages 10-50 U/L        ALT 40 10 - 50 U/L      Comment:      Female   All ages 10-35 U/L     Male   All ages 10-50 U/L        Protein Total 7.0 6.4 - 8.3 g/dL     Albumin 3.9 3.5 - 5.2 g/dL    Bilirubin Total 0.5 <=1.2 mg/dL    GFR Estimate 63 >60 mL/min/1.73m2      Comment:      GFR not calculated when sex unspecified or nonbinary.  eGFR calculated using 2021 CKD-EPI equation.    Narrative    The sex of this patient cannot be reliably determined based on discrepancies in demographics (legal sex, sex assigned at birth, gender identity).  Both male and female reference ranges are provided where applicable.  Careful evaluation of the patient s results as compared to the gender specific reference intervals is required in this setting.    Lipid panel reflex to direct LDL Fasting   Result Value Ref Range    Cholesterol 126 <200 mg/dL    Triglycerides 123 <150 mg/dL    Direct Measure HDL 68 >=40 mg/dL    LDL Cholesterol Calculated 33 <=100 mg/dL    Non HDL Cholesterol 58 <130 mg/dL    Narrative    Cholesterol  Desirable:  <200 mg/dL    Triglycerides  Normal:  Less than 150 mg/dL  Borderline High:  150-199 mg/dL  High:  200-499 mg/dL  Very High:  Greater than or equal to 500 mg/dL    Direct Measure HDL  Female:  Greater than or equal to 50 mg/dL   Male:  Greater than or equal to 40 mg/dL    LDL Cholesterol  Desirable:  <100mg/dL  Above Desirable:  100-129 mg/dL   Borderline High:  130-159 mg/dL   High:  160-189 mg/dL   Very High:  >= 190 mg/dL    Non HDL Cholesterol  Desirable:  130 mg/dL  Above Desirable:  130-159 mg/dL  Borderline High:  160-189 mg/dL  High:  190-219 mg/dL  Very High:  Greater than or equal to 220 mg/dL   TSH with free T4 reflex   Result Value Ref Range    TSH 1.42 0.30 - 4.20 uIU/mL   CBC with platelets and differential   Result Value Ref Range    WBC Count 5.4 4.0 - 11.0 10e3/uL    RBC Count 4.44 3.80 - 5.90 10e6/uL      Comment:      Reference Range:                                                     Female 3.80-5.20 10e6/uL                                      Male 4.40-5.90 10e6u/L    Hemoglobin 13.7 11.7 - 17.7 g/dL      Comment:      Reference Range:                                                      Female 11.7-15.7 g/dL                                      Male 13.3-17.7 g/dL    Hematocrit 42.5 35.0 - 53.0 %      Comment:      Reference Range:                                                     Female 35.0-47.0 %                                            Male 40.0-54.0 %    MCV 96 78 - 100 fL    MCH 30.9 26.5 - 33.0 pg    MCHC 32.2 31.5 - 36.5 g/dL    RDW 12.9 10.0 - 15.0 %    Platelet Count 101 (L) 150 - 450 10e3/uL    % Neutrophils 63 %    % Lymphocytes 21 %    % Monocytes 8 %    % Eosinophils 7 %    % Basophils 1 %    % Immature Granulocytes 0 %    Absolute Neutrophils 3.4 1.6 - 8.3 10e3/uL    Absolute Lymphocytes 1.1 0.8 - 5.3 10e3/uL    Absolute Monocytes 0.5 0.0 - 1.3 10e3/uL    Absolute Eosinophils 0.4 0.0 - 0.7 10e3/uL    Absolute Basophils 0.0 0.0 - 0.2 10e3/uL    Absolute Immature Granulocytes 0.0 <=0.4 10e3/uL    Narrative    The sex of this patient cannot be reliably determined based on discrepancies in demographics (legal sex, sex assigned at birth, gender identity).  Both male and female reference ranges are provided where applicable.  Careful evaluation of the patient s results as compared to the gender specific reference intervals is required in this setting.        If you have any questions or concerns, please call the clinic at the number listed above.       Sincerely,      JERRICA Salcedo CNP

## 2023-05-09 NOTE — PROGRESS NOTES
"SUBJECTIVE:   Laila is a 73 year old who presents for Preventive Visit.      5/9/2023    10:23 AM   Additional Questions   Roomed by Shakila     Patient has been advised of split billing requirements and indicates understanding: Yes  Are you in the first 12 months of your Medicare coverage?  No    Healthy Habits:    In general, how would you rate your overall health?  Good    Frequency of exercise:  2-3 days/week    Duration of exercise:  30-45 minutes    Do you usually eat at least 4 servings of fruit and vegetables a day, include whole grains    & fiber and avoid regularly eating high fat or \"junk\" foods?  Yes    Taking medications regularly:  Yes    Barriers to taking medications:  None    Medication side effects:  None    Ability to successfully perform activities of daily living:  No assistance needed    Home Safety:  No safety concerns identified    Hearing Impairment:  No hearing concerns    In the past 6 months, have you been bothered by leaking of urine? Yes    In general, how would you rate your overall mental or emotional health?  Good      PHQ-2 Total Score:    Additional concerns today:  No      Have you ever done Advance Care Planning? (For example, a Health Directive, POLST, or a discussion with a medical provider or your loved ones about your wishes): Yes, patient states has an Advance Care Planning document and will bring a copy to the clinic.       Fall risk  Fallen 2 or more times in the past year?: No  Any fall with injury in the past year?: No  click delete button to remove this line now  Cognitive Screening   1) Repeat 3 items (Leader, Season, Table)    2) Clock draw: NORMAL  3) 3 item recall: Recalls 3 objects  Results: 3 items recalled: COGNITIVE IMPAIRMENT LESS LIKELY    Mini-CogTM Copyright PRATEEK Barraza. Licensed by the author for use in [facility]; reprinted with permission (alexey@.[facility]). All rights reserved.      Do you have sleep apnea, excessive snoring or daytime drowsiness?: " no    Reviewed and updated as needed this visit by clinical staff   Tobacco  Allergies  Meds            Going to the gym 2 times a week for an hour at the [facility]   Feeling good.   Legs are getting better. More movement.   Past medical history significant for obesity, transgender identifying as female    1. Has cataract and glaucoma surgery upcoming for R eye, has been ok'd for surgery by cardiology scheduled in June 2. Saw cardiology recently, note reviewed, completed cardiac rehab, doing well. Has home phone heart monitor TAVR replaced in October  SOBOE better. Less swelling in the LE     3. Has knee replacement surgery scheduled for fall 2023  Using a walking stick     incontinece pads one a day usually leaking with the diuretic   Some leakage other times     No asthma for years. Outgrew the asthma per patient report  No symptoms     SBE done by patient  Mammogram declined   Did the cologuard testing    Reviewed and updated as needed this visit by Provider                 Social History     Tobacco Use     Smoking status: Never     Smokeless tobacco: Never   Vaping Use     Vaping status: Never Used   Substance Use Topics     Alcohol use: No     Comment: Quit 3/24/05              View : No data to display.              Do you have a current opioid prescription? No  Do you use any other controlled substances or medications that are not prescribed by a provider? Alcohol              Current providers sharing in care for this patient include:   Patient Care Team:  Delmis Simons APRN CNP as PCP - General (Family Practice)  Delmis Simons APRN CNP as Referring Physician (Family Practice)  Abhilash Weston MD as MD (Urology)  Vandana Bragg, RN as Specialty Care Coordinator (Urology)  Tunde Garcia as Specialty Care Coordinator (Plastic Surgery)  Bloch, Lauren Turner, [facility] as Pharmacist (Pharmacist)  Hema Duncan MD as Assigned Musculoskeletal Provider  Delmis Simons APRN CNP as  Assigned PCP  Dom Gipson MD as Assigned Allergy Provider  Chanel Laura RN as Lead Care Coordinator  Carley Childs APRN CNP as Assigned Heart and Vascular Provider    The following health maintenance items are reviewed in Epic and correct as of today:  Health Maintenance   Topic Date Due     ZOSTER IMMUNIZATION (1 of 2) Never done     Pneumococcal Vaccine: 65+ Years (2 - PPSV23 if available, else PCV20) 07/16/2018     MENTAL HEALTH TX PLAN  08/15/2021     COLORECTAL CANCER SCREENING  06/16/2022     INFLUENZA VACCINE (1) 09/01/2022     MEDICARE ANNUAL WELLNESS VISIT  05/05/2023     PHQ-9  03/14/2024     DESTINY ASSESSMENT  03/23/2024     ANNUAL REVIEW OF HM ORDERS  03/23/2024     FALL RISK ASSESSMENT  05/09/2024     LIPID  05/05/2027     ADVANCE CARE PLANNING  05/05/2027     DTAP/TDAP/TD IMMUNIZATION (4 - Td or Tdap) 05/21/2028     DEXA  05/17/2031     HEPATITIS C SCREENING  Completed     DEPRESSION ACTION PLAN  Completed     AORTIC ANEURYSM SCREENING (SYSTEM ASSIGNED)  Completed     COVID-19 Vaccine  Completed     IPV IMMUNIZATION  Aged Out     MENINGITIS IMMUNIZATION  Aged Out     Labs reviewed in EPIC  BP Readings from Last 3 Encounters:   05/25/23 (!) 146/78   05/09/23 138/82   04/17/23 (!) 163/86    Wt Readings from Last 3 Encounters:   05/25/23 (!) 152.9 kg (337 lb)   05/09/23 (!) 156.9 kg (346 lb)   04/24/23 (!) 156 kg (344 lb)                  Patient Active Problem List   Diagnosis     Gouty arthropathy     Mental or behavioral problem     Generalized osteoarthrosis, unspecified site     Disorder of bone and cartilage     Mixed hyperlipidemia     Benign essential hypertension     OA (osteoarthritis) of knee     DDD (degenerative disc disease), lumbar     Varicose veins of legs     Psoriasis     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     Family history of diabetes mellitus in first degree relative     Mitral valve disorder     Aortic valve stenosis, unspecified etiology     Gender dysphoria in  adolescent and adult     Obesity, Class III, BMI 40-49.9 (morbid obesity) (H)     Multiple gastric ulcers     Episodic tension-type headache, not intractable     Alcohol dependence in remission (H)     Aortic aneurysm without rupture, unspecified portion of aorta (H)     Status post coronary angiogram     Shortness of breath      Thoracic aortic aneurysm without rupture     Aortic stenosis, severe     S/P TAVR (transcatheter aortic valve replacement)     Aneurysm of ascending aorta without rupture (H)     Thrombocytopenia (H)     Past Surgical History:   Procedure Laterality Date     COLONOSCOPY  2007     CV CORONARY ANGIOGRAM N/A 9/8/2022    Procedure: Coronary Angiogram;  Surgeon: Santy Clayton MD;  Location: [facility] CARDIAC CATH LAB     CV PCI N/A 9/8/2022    Procedure: Percutaneous Coronary Intervention;  Surgeon: Santy Clayton MD;  Location: [facility] CARDIAC CATH LAB     CV TRANSCATHETER AORTIC VALVE REPLACEMENT-FEMORAL APPROACH N/A 10/4/2022    Procedure: Transcatheter Aortic Valve Replacement-Femoral Approach;  Surgeon: Young Ramires MD;  Location:  HEART CARDIAC CATH LAB     JOINT REPLACEMTN, KNEE RT/LT  2006    left     ORCHIECTOMY SCROTAL Bilateral 5/14/2019    Procedure: Bilatera Scrotal Orchiectomy;  Surgeon: Abhilash Weston MD;  Location:  OR       Social History     Tobacco Use     Smoking status: Never     Smokeless tobacco: Never   Vaping Use     Vaping status: Never Used   Substance Use Topics     Alcohol use: No     Comment: Quit 3/24/05     Family History   Problem Relation Age of Onset     Diabetes Mother      Cancer Father         lung cancer heavy smoker     Emphysema Father      Allergies Father         Enviromental     Diabetes Maternal Grandmother      Other - See Comments Other         FHx of Glaucoma         Current Outpatient Medications   Medication Sig Dispense Refill     acetaminophen (TYLENOL) 325 MG tablet Take 975 mg by mouth nightly as needed for mild  pain       albuterol (PROAIR HFA/PROVENTIL HFA/VENTOLIN HFA) 108 (90 Base) MCG/ACT inhaler Inhale 2 puffs into the lungs every 6 hours 18 g 0     brimonidine (ALPHAGAN) 0.2 % ophthalmic solution Place 1 drop into both eyes 2 times daily       dorzolamide-timolol (COSOPT) 2-0.5 % ophthalmic solution Place 1 drop into both eyes 2 times daily       latanoprost (XALATAN) 0.005 % ophthalmic solution Place 1 drop into both eyes At Bedtime       lisinopril (ZESTRIL) 5 MG tablet Take 1 tablet (5 mg) by mouth daily 90 tablet 1     lovastatin (MEVACOR) 20 MG tablet TAKE ONE TABLET BY MOUTH ONCE A DAY AT BEDTIME (Patient taking differently: Take 20 mg by mouth At Bedtime) 90 tablet 3     Multiple Vitamin (DAILY-EVANGELISTA) TABS Take 1 tablet by mouth daily  11     mupirocin (BACTROBAN) 2 % external ointment Apply topically 3 times daily 30 g 1     nitroGLYcerin (NITROSTAT) 0.4 MG sublingual tablet For chest pain place 1 tablet under the tongue every 5 minutes for 3 doses. If symptoms persist 5 minutes after 1st dose call 911. 30 tablet 1     benzonatate (TESSALON) 200 MG capsule Take 1 capsule (200 mg) by mouth 3 times daily as needed for cough 30 capsule 0     estradiol (VIVELLE-DOT) 0.05 MG/24HR bi-weekly patch Place 1 patch onto the skin twice a week 24 patch 1     ipratropium - albuterol 0.5 mg/2.5 mg/3 mL (DUONEB) 0.5-2.5 (3) MG/3ML neb solution Take 1 vial (3 mLs) by nebulization every 6 hours as needed for shortness of breath or wheezing 90 mL 4     Allergies   Allergen Reactions     External Allergen Needs Reconciliation - See Comment      Please reconcile the Patient's allergy reported as ESTERSPENICILLIN and update accordingly           Heparin Rash     he lives in a assisted living program and is not sure what type of allergies he really has. He quit drinking     Mammogram Screening: Patient declined        Review of Systems  Constitutional, HEENT, cardiovascular, pulmonary, GI, , musculoskeletal, neuro, skin,  "endocrine and psych systems are negative, except as otherwise noted.    OBJECTIVE:   /82   Pulse 61   Temp 97.3  F (36.3  C) (Tympanic)   Resp 14   Ht 1.803 m (5' 11\")   Wt (!) 156.9 kg (346 lb)   SpO2 95%   BMI 48.26 kg/m   Estimated body mass index is 48.26 kg/m  as calculated from the following:    Height as of this encounter: 1.803 m (5' 11\").    Weight as of this encounter: 156.9 kg (346 lb).  Physical Exam  GENERAL: healthy, alert and no distress  EYES: Eyes grossly normal to inspection, PERRL and conjunctivae and sclerae normal  HENT: ear canals and TM's normal, nose and mouth without ulcers or lesions  NECK: no adenopathy, no asymmetry, masses, or scars and thyroid normal to palpation  RESP: lungs clear to auscultation - no rales, rhonchi or wheezes  CV: regular rate and rhythm, normal S1 S2, no S3 or S4, no murmur, click or rub, no peripheral edema and peripheral pulses strong  ABDOMEN: soft, nontender, no hepatosplenomegaly, no masses and bowel sounds normal  MS: varicose veins bilaterally, lower extremity edema to lower extremities bilaterally and peripheral pulses normal  SKIN: no suspicious lesions or rashes  NEURO: Normal strength and tone, mentation intact and speech normal  PSYCH: mentation appears normal, affect normal/bright    Diagnostic Test Results:  Labs reviewed in Epic  Results for orders placed or performed in visit on 05/09/23   Comprehensive metabolic panel (BMP + Alb, Alk Phos, ALT, AST, Total. Bili, TP)     Status: Abnormal   Result Value Ref Range    Sodium 138 136 - 145 mmol/L    Potassium 5.0 3.4 - 5.3 mmol/L    Chloride 103 98 - 107 mmol/L    Carbon Dioxide (CO2) 31 (H) 22 - 29 mmol/L    Anion Gap 4 (L) 7 - 15 mmol/L    Urea Nitrogen 15.8 8.0 - 23.0 mg/dL    Creatinine 0.95 0.51 - 1.17 mg/dL    Calcium 9.5 8.8 - 10.2 mg/dL    Glucose 99 70 - 99 mg/dL    Alkaline Phosphatase 94 35 - 129 U/L    AST 38 10 - 50 U/L    ALT 40 10 - 50 U/L    Protein Total 7.0 6.4 - 8.3 g/dL    " Albumin 3.9 3.5 - 5.2 g/dL    Bilirubin Total 0.5 <=1.2 mg/dL    GFR Estimate 63 >60 mL/min/1.73m2    Narrative    The sex of this patient cannot be reliably determined based on discrepancies in demographics (legal sex, sex assigned at birth, gender identity).  Both male and female reference ranges are provided where applicable.  Careful evaluation of the patient s results as compared to the gender specific reference intervals is required in this setting.    Lipid panel reflex to direct LDL Fasting     Status: Normal   Result Value Ref Range    Cholesterol 126 <200 mg/dL    Triglycerides 123 <150 mg/dL    Direct Measure HDL 68 >=40 mg/dL    LDL Cholesterol Calculated 33 <=100 mg/dL    Non HDL Cholesterol 58 <130 mg/dL    Narrative    Cholesterol  Desirable:  <200 mg/dL    Triglycerides  Normal:  Less than 150 mg/dL  Borderline High:  150-199 mg/dL  High:  200-499 mg/dL  Very High:  Greater than or equal to 500 mg/dL    Direct Measure HDL  Female:  Greater than or equal to 50 mg/dL   Male:  Greater than or equal to 40 mg/dL    LDL Cholesterol  Desirable:  <100mg/dL  Above Desirable:  100-129 mg/dL   Borderline High:  130-159 mg/dL   High:  160-189 mg/dL   Very High:  >= 190 mg/dL    Non HDL Cholesterol  Desirable:  130 mg/dL  Above Desirable:  130-159 mg/dL  Borderline High:  160-189 mg/dL  High:  190-219 mg/dL  Very High:  Greater than or equal to 220 mg/dL   TSH with free T4 reflex     Status: Normal   Result Value Ref Range    TSH 1.42 0.30 - 4.20 uIU/mL   CBC with platelets and differential     Status: Abnormal   Result Value Ref Range    WBC Count 5.4 4.0 - 11.0 10e3/uL    RBC Count 4.44 3.80 - 5.90 10e6/uL    Hemoglobin 13.7 11.7 - 17.7 g/dL    Hematocrit 42.5 35.0 - 53.0 %    MCV 96 78 - 100 fL    MCH 30.9 26.5 - 33.0 pg    MCHC 32.2 31.5 - 36.5 g/dL    RDW 12.9 10.0 - 15.0 %    Platelet Count 101 (L) 150 - 450 10e3/uL    % Neutrophils 63 %    % Lymphocytes 21 %    % Monocytes 8 %    % Eosinophils 7 %    %  Basophils 1 %    % Immature Granulocytes 0 %    Absolute Neutrophils 3.4 1.6 - 8.3 10e3/uL    Absolute Lymphocytes 1.1 0.8 - 5.3 10e3/uL    Absolute Monocytes 0.5 0.0 - 1.3 10e3/uL    Absolute Eosinophils 0.4 0.0 - 0.7 10e3/uL    Absolute Basophils 0.0 0.0 - 0.2 10e3/uL    Absolute Immature Granulocytes 0.0 <=0.4 10e3/uL    Narrative    The sex of this patient cannot be reliably determined based on discrepancies in demographics (legal sex, sex assigned at birth, gender identity).  Both male and female reference ranges are provided where applicable.  Careful evaluation of the patient s results as compared to the gender specific reference intervals is required in this setting.    CBC with platelets and differential     Status: Abnormal    Narrative    The following orders were created for panel order CBC with platelets and differential.  Procedure                               Abnormality         Status                     ---------                               -----------         ------                     CBC with platelets and d...[[id number]]  Abnormal            Final result                 Please view results for these tests on the individual orders.       ASSESSMENT / PLAN:   Laila was seen today for physical.    Diagnoses and all orders for this visit:  Encounter for Medicare annual wellness exam  -     PRIMARY CARE FOLLOW-UP SCHEDULING; Future  Obesity, Class III, BMI 40-49.9 (morbid obesity) (H)  Increase physical activity.  Improve nutrition.      Sore on leg  Continue as needed  -     mupirocin (BACTROBAN) 2 % external ointment; Apply topically 3 times daily    HTN, goal below 140/90  Blood pressure elevated today.  Take medications as directed  Labs today for monitoring  Continue lisinopril  Increased dose to 10 mg daily if blood pressure consistently greater than 140/90  -     Comprehensive metabolic panel (BMP + Alb, Alk Phos, ALT, AST, Total. Bili, TP)  -     Lipid panel reflex to direct LDL  Fasting  -     TSH with free T4 reflex  -     CBC with platelets and differential    Hyperlipidemia LDL goal <100  Continue lovastatin 20 mg at at bedtime  -     Comprehensive metabolic panel (BMP + Alb, Alk Phos, ALT, AST, Total. Bili, TP)  -     Lipid panel reflex to direct LDL Fasting  -     TSH with free T4 reflex  -     CBC with platelets and differential        Need for shingles vaccine  To get through the pharmacy      S/P TAVR (transcatheter aortic valve replacement)  Follow-up with cardiology as planned    Primary osteoarthritis of both knees  Rest ice compress elevate  Tylenol Extra Strength up to 3500 mg daily  Voltaren gel as needed    Thrombocytopenia (H)  Labs done for monitoring we will contact with results as they become available    Alcohol dependence in remission (H)  Continue to abstain from alcohol    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 Mane as planned        Other orders  -     PPSV23, IM/SUBQ (2+ YRS) - [id number]        Patient has been advised of split billing requirements and indicates understanding: Yes      COUNSELING:  Reviewed preventive health counseling, as reflected in patient instructions        She reports that she has never smoked. She has never used smokeless tobacco.      Appropriate preventive services were discussed with this patient, including applicable screening as appropriate for cardiovascular disease, diabetes, osteopenia/osteoporosis, and glaucoma.  As appropriate for age/gender, discussed screening for colorectal cancer, prostate cancer, breast cancer, and cervical cancer. Checklist reviewing preventive services available has been given to the patient.    Reviewed patients plan of care and provided an AVS. The Basic Care Plan (routine screening as documented in Health Maintenance) for Laila meets the Care Plan requirement. This Care Plan has been established and reviewed with the Patient.    Call or return to the clinic with any worsening of  symptoms or no resolution. Patient/Parent verbalized understanding and is in agreement. Medication side effects reviewed.   Current Outpatient Medications   Medication Sig Dispense Refill     acetaminophen (TYLENOL) 325 MG tablet Take 975 mg by mouth nightly as needed for mild pain       albuterol (PROAIR HFA/PROVENTIL HFA/VENTOLIN HFA) 108 (90 Base) MCG/ACT inhaler Inhale 2 puffs into the lungs every 6 hours 18 g 0     brimonidine (ALPHAGAN) 0.2 % ophthalmic solution Place 1 drop into both eyes 2 times daily       dorzolamide-timolol (COSOPT) 2-0.5 % ophthalmic solution Place 1 drop into both eyes 2 times daily       latanoprost (XALATAN) 0.005 % ophthalmic solution Place 1 drop into both eyes At Bedtime       lisinopril (ZESTRIL) 5 MG tablet Take 1 tablet (5 mg) by mouth daily 90 tablet 1     lovastatin (MEVACOR) 20 MG tablet TAKE ONE TABLET BY MOUTH ONCE A DAY AT BEDTIME (Patient taking differently: Take 20 mg by mouth At Bedtime) 90 tablet 3     Multiple Vitamin (DAILY-EVANGELISTA) TABS Take 1 tablet by mouth daily  11     mupirocin (BACTROBAN) 2 % external ointment Apply topically 3 times daily 30 g 1     nitroGLYcerin (NITROSTAT) 0.4 MG sublingual tablet For chest pain place 1 tablet under the tongue every 5 minutes for 3 doses. If symptoms persist 5 minutes after 1st dose call 911. 30 tablet 1     benzonatate (TESSALON) 200 MG capsule Take 1 capsule (200 mg) by mouth 3 times daily as needed for cough 30 capsule 0     estradiol (VIVELLE-DOT) 0.05 MG/24HR bi-weekly patch Place 1 patch onto the skin twice a week 24 patch 1     ipratropium - albuterol 0.5 mg/2.5 mg/3 mL (DUONEB) 0.5-2.5 (3) MG/3ML neb solution Take 1 vial (3 mLs) by nebulization every 6 hours as needed for shortness of breath or wheezing 90 mL 4     Chart documentation with Dragon Voice recognition Software. Although reviewed after completion, some words and grammatical errors may remain.    JERRICA Salcedo [facility]  [place]    Identified Health Risks:    I have reviewed Opioid Use Disorder and Substance Use Disorder risk factors and made any needed referrals.       Information on urinary incontinence and treatment options given to patient.  Information on urinary incontinence and treatment options given to patient.

## 2023-05-11 DIAGNOSIS — F64.0 TRANSGENDER PERSON ON HORMONE THERAPY: ICD-10-CM

## 2023-05-11 DIAGNOSIS — Z79.899 TRANSGENDER PERSON ON HORMONE THERAPY: ICD-10-CM

## 2023-05-11 RX ORDER — ESTRADIOL 0.05 MG/D
1 PATCH, EXTENDED RELEASE TRANSDERMAL
Qty: 24 PATCH | Refills: 1 | Status: SHIPPED | OUTPATIENT
Start: 2023-05-11 | End: 2023-08-21 | Stop reason: DRUGHIGH

## 2023-05-11 NOTE — TELEPHONE ENCOUNTER
Requested Medication: Estradiol 0.05 mg Patch  Dose: 0.05mg  Quantity: 24  Refills: 1    Apply 1 0.05 mg patch twice weekly    Last seen at [facility]: 4/17/2023 - follow up 4/6 mo  Next Appointment with Provider:  Visit date not found    Alexandra Long CMA

## 2023-05-12 LAB — NONINV COLON CA DNA+OCC BLD SCRN STL QL: NEGATIVE

## 2023-05-19 ENCOUNTER — PATIENT OUTREACH (OUTPATIENT)
Dept: GERIATRIC MEDICINE | Facility: CLINIC | Age: 73
End: 2023-05-19
Payer: COMMERCIAL

## 2023-05-19 NOTE — LETTER
May 19, 2023    DANYELLE MONICA AMEZQUITA  [address] [place] [id number]      Dear Danyelle:    As a member of [facility] ([facility]) ([facility]ospitals in [place]), you are provided a care coordinator. I will be your new care coordinator as of 6/1/2023. I will be calling you soon to see how you are doing and determine your needs.    If you have any questions, please feel free to call me at [phone]. If you reach my voice mail, please leave a message and your phone number. If you are hearing impaired, please call the [place] Relay at [phone] or [phone] ([email or web address] relay service).    I look forward to speaking with you soon.    Sincerely,    Harriet Eddy RN    E-mail: [email or web address]  Phone: [phone]      [facility] is a health plan that contracts with both Medicare and the [place] Medical Assistance (Medicaid) program to provide benefits of both programs to enrollees. Enrollment in [facility] depends on contract renewal.      [facility]  [id number] DHS Approved ([id number])  [id number] (11/18)

## 2023-05-19 NOTE — PROGRESS NOTES
[facility] Care Coordination Contact    Internal CC change effective 6/1/2023.  Mailed member CC Change letter.  Additional tasks to be completed by CMS include: update database & EPIC, enter CC Change in MMIS, and move member files on Q drive.    Margaret Montoya  Case Management Specialist  [facility]  [phone]

## 2023-05-25 ENCOUNTER — OFFICE VISIT (OUTPATIENT)
Dept: FAMILY MEDICINE | Facility: CLINIC | Age: 73
End: 2023-05-25
Payer: COMMERCIAL

## 2023-05-25 ENCOUNTER — ANCILLARY PROCEDURE (OUTPATIENT)
Dept: GENERAL RADIOLOGY | Facility: CLINIC | Age: 73
End: 2023-05-25
Attending: NURSE PRACTITIONER
Payer: COMMERCIAL

## 2023-05-25 VITALS
WEIGHT: 293 LBS | OXYGEN SATURATION: 89 % | TEMPERATURE: 99 F | SYSTOLIC BLOOD PRESSURE: 146 MMHG | HEART RATE: 90 BPM | RESPIRATION RATE: 14 BRPM | BODY MASS INDEX: 47 KG/M2 | DIASTOLIC BLOOD PRESSURE: 78 MMHG

## 2023-05-25 DIAGNOSIS — R06.02 SOBOE (SHORTNESS OF BREATH ON EXERTION): Primary | ICD-10-CM

## 2023-05-25 DIAGNOSIS — R06.09 DYSPNEA ON EXERTION: ICD-10-CM

## 2023-05-25 DIAGNOSIS — R19.7 DIARRHEA, UNSPECIFIED TYPE: ICD-10-CM

## 2023-05-25 DIAGNOSIS — J20.9 ACUTE BRONCHITIS, UNSPECIFIED ORGANISM: ICD-10-CM

## 2023-05-25 DIAGNOSIS — R05.1 ACUTE COUGH: ICD-10-CM

## 2023-05-25 DIAGNOSIS — R06.02 SOBOE (SHORTNESS OF BREATH ON EXERTION): ICD-10-CM

## 2023-05-25 DIAGNOSIS — E66.01 OBESITY, CLASS III, BMI 40-49.9 (MORBID OBESITY) (H): ICD-10-CM

## 2023-05-25 LAB
ALBUMIN SERPL BCG-MCNC: 3.9 G/DL (ref 3.5–5.2)
ALP SERPL-CCNC: 82 U/L (ref 35–129)
ALT SERPL W P-5'-P-CCNC: 51 U/L (ref 10–50)
ANION GAP SERPL CALCULATED.3IONS-SCNC: 7 MMOL/L (ref 7–15)
AST SERPL W P-5'-P-CCNC: 60 U/L (ref 10–50)
BASOPHILS # BLD AUTO: 0 10E3/UL (ref 0–0.2)
BASOPHILS NFR BLD AUTO: 1 %
BILIRUB SERPL-MCNC: 0.3 MG/DL
BUN SERPL-MCNC: 17.4 MG/DL (ref 8–23)
CALCIUM SERPL-MCNC: 9.5 MG/DL (ref 8.8–10.2)
CHLORIDE SERPL-SCNC: 104 MMOL/L (ref 98–107)
CREAT SERPL-MCNC: 1 MG/DL (ref 0.51–1.17)
DEPRECATED HCO3 PLAS-SCNC: 30 MMOL/L (ref 22–29)
EOSINOPHIL # BLD AUTO: 0.2 10E3/UL (ref 0–0.7)
EOSINOPHIL NFR BLD AUTO: 6 %
ERYTHROCYTE [DISTWIDTH] IN BLOOD BY AUTOMATED COUNT: 12.8 % (ref 10–15)
GFR SERPL CREATININE-BSD FRML MDRD: 59 ML/MIN/1.73M2
GLUCOSE SERPL-MCNC: 119 MG/DL (ref 70–99)
HCT VFR BLD AUTO: 45 % (ref 35–53)
HGB BLD-MCNC: 14.6 G/DL (ref 11.7–17.7)
IMM GRANULOCYTES # BLD: 0 10E3/UL
IMM GRANULOCYTES NFR BLD: 0 %
LYMPHOCYTES # BLD AUTO: 1 10E3/UL (ref 0.8–5.3)
LYMPHOCYTES NFR BLD AUTO: 25 %
MCH RBC QN AUTO: 30.5 PG (ref 26.5–33)
MCHC RBC AUTO-ENTMCNC: 32.4 G/DL (ref 31.5–36.5)
MCV RBC AUTO: 94 FL (ref 78–100)
MONOCYTES # BLD AUTO: 0.4 10E3/UL (ref 0–1.3)
MONOCYTES NFR BLD AUTO: 10 %
NEUTROPHILS # BLD AUTO: 2.4 10E3/UL (ref 1.6–8.3)
NEUTROPHILS NFR BLD AUTO: 59 %
PLATELET # BLD AUTO: 103 10E3/UL (ref 150–450)
POTASSIUM SERPL-SCNC: 4.6 MMOL/L (ref 3.4–5.3)
PROT SERPL-MCNC: 7.5 G/DL (ref 6.4–8.3)
RBC # BLD AUTO: 4.78 10E6/UL (ref 3.8–5.9)
SODIUM SERPL-SCNC: 141 MMOL/L (ref 136–145)
WBC # BLD AUTO: 4.1 10E3/UL (ref 4–11)

## 2023-05-25 PROCEDURE — 87635 SARS-COV-2 COVID-19 AMP PRB: CPT | Performed by: NURSE PRACTITIONER

## 2023-05-25 PROCEDURE — 93000 ELECTROCARDIOGRAM COMPLETE: CPT | Performed by: NURSE PRACTITIONER

## 2023-05-25 PROCEDURE — 36415 COLL VENOUS BLD VENIPUNCTURE: CPT | Performed by: NURSE PRACTITIONER

## 2023-05-25 PROCEDURE — 71046 X-RAY EXAM CHEST 2 VIEWS: CPT | Mod: TC | Performed by: RADIOLOGY

## 2023-05-25 PROCEDURE — 85025 COMPLETE CBC W/AUTO DIFF WBC: CPT | Performed by: NURSE PRACTITIONER

## 2023-05-25 PROCEDURE — 99214 OFFICE O/P EST MOD 30 MIN: CPT | Performed by: NURSE PRACTITIONER

## 2023-05-25 PROCEDURE — 80053 COMPREHEN METABOLIC PANEL: CPT | Performed by: NURSE PRACTITIONER

## 2023-05-25 RX ORDER — PREDNISONE 20 MG/1
40 TABLET ORAL DAILY
Qty: 10 TABLET | Refills: 0 | Status: SHIPPED | OUTPATIENT
Start: 2023-05-25 | End: 2023-05-30

## 2023-05-25 RX ORDER — BENZONATATE 200 MG/1
200 CAPSULE ORAL 3 TIMES DAILY PRN
Qty: 30 CAPSULE | Refills: 0 | Status: SHIPPED | OUTPATIENT
Start: 2023-05-25 | End: 2023-06-15

## 2023-05-25 RX ORDER — IPRATROPIUM BROMIDE AND ALBUTEROL SULFATE 2.5; .5 MG/3ML; MG/3ML
1 SOLUTION RESPIRATORY (INHALATION) EVERY 6 HOURS PRN
Qty: 90 ML | Refills: 4 | Status: ON HOLD | OUTPATIENT
Start: 2023-05-25 | End: 2023-10-26

## 2023-05-25 RX ORDER — AZITHROMYCIN 250 MG/1
TABLET, FILM COATED ORAL
Qty: 6 TABLET | Refills: 0 | Status: SHIPPED | OUTPATIENT
Start: 2023-05-25 | End: 2023-05-30

## 2023-05-25 NOTE — PROGRESS NOTES
Assessment & Plan     SOBOE (shortness of breath on exertion)  Dyspnea on exertion - XR Chest 2 Views  - EKG 12-lead complete w/read - Clinics  - CBC with platelets and differential  - Comprehensive metabolic panel (BMP + Alb, Alk Phos, ALT, AST, Total. Bili, TP)  - Symptomatic COVID-19 Virus (Coronavirus) by PCR Nose    Diarrhea, unspecified type  Symptomatic care strategies reviewed     Obesity, Class III, BMI 40-49.9 (morbid obesity)     Acute bronchitis, unspecified organism    - predniSONE (DELTASONE) 20 MG tablet  Dispense: 10 tablet; Refill: 0  - azithromycin (ZITHROMAX) 250 MG tablet  Dispense: 6 tablet; Refill: 0    Acute cough    - ipratropium - albuterol 0.5 mg/2.5 mg/3 mL (DUONEB) 0.5-2.5 (3) MG/3ML neb solution  Dispense: 90 mL; Refill: 4  - benzonatate (TESSALON) 200 MG capsule  Dispense: 30 capsule; Refill: 0      Follow up next week for preop clearance    Call or return to the clinic with any worsening of symptoms or no resolution. Patient/Parent verbalized understanding and is in agreement. Medication side effects reviewed.   Current Outpatient Medications   Medication Sig Dispense Refill     azithromycin (ZITHROMAX) 250 MG tablet Take 2 tablets (500 mg) by mouth daily for 1 day, THEN 1 tablet (250 mg) daily for 4 days. 6 tablet 0     benzonatate (TESSALON) 200 MG capsule Take 1 capsule (200 mg) by mouth 3 times daily as needed for cough 30 capsule 0     ipratropium - albuterol 0.5 mg/2.5 mg/3 mL (DUONEB) 0.5-2.5 (3) MG/3ML neb solution Take 1 vial (3 mLs) by nebulization every 6 hours as needed for shortness of breath or wheezing 90 mL 4     predniSONE (DELTASONE) 20 MG tablet Take 2 tablets (40 mg) by mouth daily for 5 days 10 tablet 0     acetaminophen (TYLENOL) 325 MG tablet Take 975 mg by mouth nightly as needed for mild pain       albuterol (PROAIR HFA/PROVENTIL HFA/VENTOLIN HFA) 108 (90 Base) MCG/ACT inhaler Inhale 2 puffs into the lungs every 6 hours 18 g 0     brimonidine (ALPHAGAN) 0.2 %  ophthalmic solution Place 1 drop into both eyes 2 times daily       dorzolamide-timolol (COSOPT) 2-0.5 % ophthalmic solution Place 1 drop into both eyes 2 times daily       estradiol (VIVELLE-DOT) 0.05 MG/24HR bi-weekly patch Place 1 patch onto the skin twice a week 24 patch 1     latanoprost (XALATAN) 0.005 % ophthalmic solution Place 1 drop into both eyes At Bedtime       lisinopril (ZESTRIL) 5 MG tablet Take 1 tablet (5 mg) by mouth daily 90 tablet 1     lovastatin (MEVACOR) 20 MG tablet TAKE ONE TABLET BY MOUTH ONCE A DAY AT BEDTIME (Patient taking differently: Take 20 mg by mouth At Bedtime) 90 tablet 3     Multiple Vitamin (DAILY-EVANGELISTA) TABS Take 1 tablet by mouth daily  11     mupirocin (BACTROBAN) 2 % external ointment Apply topically 3 times daily 30 g 1     nitroGLYcerin (NITROSTAT) 0.4 MG sublingual tablet For chest pain place 1 tablet under the tongue every 5 minutes for 3 doses. If symptoms persist 5 minutes after 1st dose call 911. 30 tablet 1     Chart documentation with Dragon Voice recognition Software. Although reviewed after completion, some words and grammatical errors may remain.      JERRICA Salcedo CNP  [facility]    Juana Ulloa is a 73 year old, presenting for the following health issues:  Pre Op Exam and Cough (Started Friday )        5/25/2023     9:06 AM   Additional Questions   Roomed by Shakila HADDAD     Acute Illness  Acute illness concerns: Saturday   Onset/Duration: cough and sob fever   Symptoms:  Fever: YES- 99  Chills/Sweats: YES  Headache (location?): YES  Sinus Pressure: No  Conjunctivitis:  No  Ear Pain: no  Rhinorrhea: No  Congestion: YES  Sore Throat: No  Cough: YES-non-productive  Wheeze: YES  Decreased Appetite: YES  Nausea: YES  Vomiting: No  Diarrhea: YES  Dysuria/Freq.: No  Dysuria or Hematuria: No  Fatigue/Achiness: YES  Sick/Strep Exposure: No  Therapies tried and outcome: Candice helps     Nebulizer this AM at 730    Review  of Systems   Constitutional, HEENT, cardiovascular, pulmonary, GI, , musculoskeletal, neuro, skin, endocrine and psych systems are negative, except as otherwise noted.      Objective    BP (!) 146/78   Pulse 90   Temp 99  F (37.2  C) (Tympanic)   Resp 14   Wt (!) 152.9 kg (337 lb)   SpO2 (!) 89%   BMI 47.00 kg/m    Body mass index is 47 kg/m .  Physical Exam   GENERAL: alert, no distress and pale  EYES: Eyes grossly normal to inspection, PERRL and conjunctivae and sclerae normal  HENT: ear canals and TM's normal, nose and mouth without ulcers or lesions  NECK: no adenopathy, no asymmetry, masses, or scars and thyroid normal to palpation  RESP: rhonchi throughout  CV: regular rate and rhythm, normal S1 S2, no S3 or S4, no murmur, click or rub, no peripheral edema and peripheral pulses strong  ABDOMEN: soft, nontender, no hepatosplenomegaly, no masses and bowel sounds normal  MS: no gross musculoskeletal defects noted, no edema  SKIN: no suspicious lesions or rashes  NEURO: Normal strength and tone, mentation intact and speech normal  PSYCH: mentation appears normal, affect normal/bright    Office Visit on 05/09/2023   Component Date Value Ref Range Status     Sodium 05/09/2023 138  136 - 145 mmol/L Final     Potassium 05/09/2023 5.0  3.4 - 5.3 mmol/L Final     Chloride 05/09/2023 103  98 - 107 mmol/L Final     Carbon Dioxide (CO2) 05/09/2023 31 (H)  22 - 29 mmol/L Final     Anion Gap 05/09/2023 4 (L)  7 - 15 mmol/L Final     Urea Nitrogen 05/09/2023 15.8  8.0 - 23.0 mg/dL Final     Creatinine 05/09/2023 0.95  0.51 - 1.17 mg/dL Final    Male and Female  0-2 Months    0.31-0.88 mg/dL  2-12 Months   0.16-0.39 mg/dL  1-2 Years     0.18-0.35 mg/dL  3-4 Years     0.26-0.42 mg/dL  5-6 Years     0.29-0.47 mg/dL  7-8 Years     0.34-0.53 mg/dL  9-10 Years    0.33-0.64 mg/dL  11-12 Years   0.44-0.68 mg/dL  13-14 Years   0.46-0.77 mg/dL    Female  15 Years and older  0.51-0.95 mg/dL    Male  15 Years and older  0.67-1.17  mg/dL         Calcium 05/09/2023 9.5  8.8 - 10.2 mg/dL Final     Glucose 05/09/2023 99  70 - 99 mg/dL Final     Alkaline Phosphatase 05/09/2023 94  35 - 129 U/L Final    Female:    0-15 days     U/L  15d-1 year   122-469 U/L  1-10 years   142-335 U/L  10-13 years  129-417 U/L  13-15 years   U/L  15-17 years   U/L  17-19 years  45-87 U/L  19 years and older   U/L      Male:  0-15 days     U/L  15d-1 year   122-469 U/L  1-10 years   142-335 U/L  10-13 years  129-417 U/L  13-15 years  116-468 U/L  15-17 years   U/L  17-19 years   U/L  19 years and older   U/L       AST 05/09/2023 38  10 - 50 U/L Final    Female   All ages 10-35 U/L     Male   All ages 10-50 U/L         ALT 05/09/2023 40  10 - 50 U/L Final    Female   All ages 10-35 U/L     Male   All ages 10-50 U/L         Protein Total 05/09/2023 7.0  6.4 - 8.3 g/dL Final     Albumin 05/09/2023 3.9  3.5 - 5.2 g/dL Final     Bilirubin Total 05/09/2023 0.5  <=1.2 mg/dL Final     GFR Estimate 05/09/2023 63  >60 mL/min/1.73m2 Final    GFR not calculated when sex unspecified or nonbinary.  eGFR calculated using 2021 CKD-EPI equation.     Cholesterol 05/09/2023 126  <200 mg/dL Final     Triglycerides 05/09/2023 123  <150 mg/dL Final     Direct Measure HDL 05/09/2023 68  >=40 mg/dL Final     LDL Cholesterol Calculated 05/09/2023 33  <=100 mg/dL Final     Non HDL Cholesterol 05/09/2023 58  <130 mg/dL Final     TSH 05/09/2023 1.42  0.30 - 4.20 uIU/mL Final     WBC Count 05/09/2023 5.4  4.0 - 11.0 10e3/uL Final     RBC Count 05/09/2023 4.44  3.80 - 5.90 10e6/uL Final    Reference Range:                                                     Female 3.80-5.20 10e6/uL                                      Male 4.40-5.90 10e6u/L     Hemoglobin 05/09/2023 13.7  11.7 - 17.7 g/dL Final    Reference Range:                                                     Female 11.7-15.7 g/dL                                      Male 13.3-17.7 g/dL      Hematocrit 05/09/2023 42.5  35.0 - 53.0 % Final    Reference Range:                                                     Female 35.0-47.0 %                                            Male 40.0-54.0 %     MCV 05/09/2023 96  78 - 100 fL Final     MCH 05/09/2023 30.9  26.5 - 33.0 pg Final     MCHC 05/09/2023 32.2  31.5 - 36.5 g/dL Final     RDW 05/09/2023 12.9  10.0 - 15.0 % Final     Platelet Count 05/09/2023 101 (L)  150 - 450 10e3/uL Final     % Neutrophils 05/09/2023 63  % Final     % Lymphocytes 05/09/2023 21  % Final     % Monocytes 05/09/2023 8  % Final     % Eosinophils 05/09/2023 7  % Final     % Basophils 05/09/2023 1  % Final     % Immature Granulocytes 05/09/2023 0  % Final     Absolute Neutrophils 05/09/2023 3.4  1.6 - 8.3 10e3/uL Final     Absolute Lymphocytes 05/09/2023 1.1  0.8 - 5.3 10e3/uL Final     Absolute Monocytes 05/09/2023 0.5  0.0 - 1.3 10e3/uL Final     Absolute Eosinophils 05/09/2023 0.4  0.0 - 0.7 10e3/uL Final     Absolute Basophils 05/09/2023 0.0  0.0 - 0.2 10e3/uL Final     Absolute Immature Granulocytes 05/09/2023 0.0  <=0.4 10e3/uL Final     Results for orders placed or performed in visit on 05/25/23   XR Chest 2 Views     Status: None    Narrative    XR CHEST 2 VIEWS 5/25/2023 9:59 AM    HISTORY: SOBOE (shortness of breath on exertion)    COMPARISON: 05/05/2022      Impression    IMPRESSION: Heart is normal in size. Trace left effusion with  atelectasis. Lungs are clear. Old right rib fractures.    SYLVIA MCCONNELL MD         SYSTEM ID:  [id number]   Results for orders placed or performed in visit on 05/25/23   Symptomatic COVID-19 Virus (Coronavirus) by PCR Nose     Status: Normal    Specimen: Nose; Swab   Result Value Ref Range    SARS CoV2 PCR Negative Negative    Narrative    Testing was performed using the lm SARS-CoV-2 assay on the lm  6800 System. This test should be ordered for the detection of  SARS-CoV-2 in individuals who meet SARS-CoV-2 clinical and/or  epidemiological  criteria. Test performance is unknown in asymptomatic  patients. This test is for in vitro diagnostic use under the FDA EUA  for laboratories certified under CLIA to perform high and/or moderate  complexity testing. This test has not been FDA cleared or approved. A  negative result does not rule out the presence of PCR inhibitors in  the specimen or target RNA in concentration below the limit of  detection for the assay. The possibility of a false negative should  be considered if the patient's recent exposure or clinical  presentation suggests COVID-19. This test was validated by the [facility]. This  laboratory is certified under the Clinical Laboratory Improvement  Amendments of 1988 (CLIA-88) as qualified to perform high and/or  moderate complexity laboratory testing.   Comprehensive metabolic panel (BMP + Alb, Alk Phos, ALT, AST, Total. Bili, TP)     Status: Abnormal   Result Value Ref Range    Sodium 141 136 - 145 mmol/L    Potassium 4.6 3.4 - 5.3 mmol/L    Chloride 104 98 - 107 mmol/L    Carbon Dioxide (CO2) 30 (H) 22 - 29 mmol/L    Anion Gap 7 7 - 15 mmol/L    Urea Nitrogen 17.4 8.0 - 23.0 mg/dL    Creatinine 1.00 0.51 - 1.17 mg/dL    Calcium 9.5 8.8 - 10.2 mg/dL    Glucose 119 (H) 70 - 99 mg/dL    Alkaline Phosphatase 82 35 - 129 U/L    AST 60 (H) 10 - 50 U/L    ALT 51 (H) 10 - 50 U/L    Protein Total 7.5 6.4 - 8.3 g/dL    Albumin 3.9 3.5 - 5.2 g/dL    Bilirubin Total 0.3 <=1.2 mg/dL    GFR Estimate 59 (L) >60 mL/min/1.73m2    Narrative    The sex of this patient cannot be reliably determined based on discrepancies in demographics (legal sex, sex assigned at birth, gender identity).  Both male and female reference ranges are provided where applicable.  Careful evaluation of the patient s results as compared to the gender specific reference intervals is required in this setting.    CBC with platelets and differential     Status: Abnormal   Result Value Ref  Range    WBC Count 4.1 4.0 - 11.0 10e3/uL    RBC Count 4.78 3.80 - 5.90 10e6/uL    Hemoglobin 14.6 11.7 - 17.7 g/dL    Hematocrit 45.0 35.0 - 53.0 %    MCV 94 78 - 100 fL    MCH 30.5 26.5 - 33.0 pg    MCHC 32.4 31.5 - 36.5 g/dL    RDW 12.8 10.0 - 15.0 %    Platelet Count 103 (L) 150 - 450 10e3/uL    % Neutrophils 59 %    % Lymphocytes 25 %    % Monocytes 10 %    % Eosinophils 6 %    % Basophils 1 %    % Immature Granulocytes 0 %    Absolute Neutrophils 2.4 1.6 - 8.3 10e3/uL    Absolute Lymphocytes 1.0 0.8 - 5.3 10e3/uL    Absolute Monocytes 0.4 0.0 - 1.3 10e3/uL    Absolute Eosinophils 0.2 0.0 - 0.7 10e3/uL    Absolute Basophils 0.0 0.0 - 0.2 10e3/uL    Absolute Immature Granulocytes 0.0 <=0.4 10e3/uL    Narrative    The sex of this patient cannot be reliably determined based on discrepancies in demographics (legal sex, sex assigned at birth, gender identity).  Both male and female reference ranges are provided where applicable.  Careful evaluation of the patient s results as compared to the gender specific reference intervals is required in this setting.    CBC with platelets and differential     Status: Abnormal    Narrative    The following orders were created for panel order CBC with platelets and differential.  Procedure                               Abnormality         Status                     ---------                               -----------         ------                     CBC with platelets and d...[[id number]]  Abnormal            Final result                 Please view results for these tests on the individual orders.

## 2023-05-26 LAB — SARS-COV-2 RNA RESP QL NAA+PROBE: NEGATIVE

## 2023-06-01 NOTE — PATIENT INSTRUCTIONS
Patient Education   Personalized Prevention Plan  You are due for the preventive services outlined below.  Your care team is available to assist you in scheduling these services.  If you have already completed any of these items, please share that information with your care team to update in your medical record.  Health Maintenance Due   Topic Date Due     Zoster (Shingles) Vaccine (1 of 2) Never done     COVID-19 Vaccine (5 - Pfizer series) 03/28/2023       Urinary Incontinence (Male)  We understand that gender is a spectrum. We may use gendered terms to talk about anatomy and health risk. Please use this sheet in a way that works best for you and your provider as you talk about your care.     Urinary incontinence means not being able to control the release of urine from the bladder.   Causes  Common causes of urinary incontinence in men include:    Infection    Certain medicines    Aging    Poor pelvic muscle tone    Bladder spasms    Obesity    Enlarged prostate    Trouble urinating and fully emptying the bladder (urinary retention)  Other things that can cause incontinence are:     Nervous system diseases    Diabetes    Sleep apnea    Urinary tract infections    Prostate surgery    Pelvic injury  Constipation and smoking have also been identified as risk factors.   Symptoms    Urge incontinence (overactive bladder). This is a sudden urge to urinate. It occurs even though there may not be much urine in the bladder. The need to urinate often during the night is common. It's due to overactive bladder muscles.    Stress incontinence. This is urine leakage that you can't control. It can occur with sneezing, coughing, and other actions that put stress on the bladder.    Treatment  Treatment depends on what is causing the condition. Bladder infections are treated with antibiotics. Urinary retention is treated with a bladder catheter.   Home care  Follow these guidelines when caring for yourself at home:    Don't have  any foods and drinks that may irritate the bladder. This includes:  ? Chocolate  ? Alcohol  ? Caffeine  ? Carbonated drinks  ? Acidic fruits and juices    Limit fluids to 6 to 8 cups a day.    Lose weight if you are overweight. This may reduce your symptoms.    If advised, do regular pelvic muscle-strengthening exercises such as Kegel exercises.    If needed, wear absorbent pads to catch urine. Change the pads often. This is for good hygiene and to prevent skin and bladder infections.    Bathe daily for good hygiene.    If an antibiotic was prescribed to treat a bladder infection, take it until it's finished. Keep taking it even if you are feeling better. This is to make sure your infection has cleared.    If a catheter was left in place, keep bacteria from getting into the collection bag. Don't disconnect the catheter from the collection bag.    Use a leg band to secure the catheter drainage tube, so it does not pull on the catheter. Drain the collection bag when it becomes full. To do this, use the drain spout at the bottom of the bag. Don't disconnect the bag from the catheter.    Don't pull on or try to remove a catheter. The catheter must be removed by a healthcare provider.    If you smoke, stop. Ask your provider for help if you can't do this on your own.  Follow-up care  Follow up with your healthcare provider, or as advised.  When to get medical advice  Call your healthcare provider right away if any of these occur:     Fever over 100.4 F (38 C), or as directed by your provider    Bladder pain or fullness    Belly swelling, nausea, or vomiting    Back pain    Weakness, dizziness, or fainting    If a catheter was left in place, return if:  ? The catheter falls out  ? The catheter stops draining for 6 hours  ? Your urine gets cloudy or smells bad  [employer] last reviewed this educational content on 6/1/2022 2000-2022 [employer]. All rights reserved. This information is not intended as a  substitute for professional medical care. Always follow your healthcare professional's instructions.          Urinary Incontinence, Female (Adult)   Urinary incontinence means loss of bladder control. This problem affects many women, especially as they get older. If you have incontinence, you may be embarrassed to ask for help. But know that this problem can be treated.   Types of Incontinence  There are different types of incontinence. Two of the main types are described here. You can have more than one type.     Stress incontinence. With this type, urine leaks when pressure (stress) is put on the bladder. This may happen when you cough, sneeze, or laugh. Stress incontinence most often occurs because the pelvic floor muscles that support the bladder and urethra are weak. This can happen after pregnancy and vaginal childbirth or a hysterectomy. It can also be due to excess body weight or hormone changes.    Urge incontinence (also called overactive bladder). With this type, a sudden urge to urinate is felt often. This may happen even though there may not be much urine in the bladder. The need to urinate often during the night is common. Urge incontinence most often occurs because of bladder spasms. This may be due to bladder irritation or infection. Damage to bladder nerves or pelvic muscles, constipation, and certain medicines can also lead to urge incontinence.  Treatment depends on the cause. Further evaluation is needed to find the type you have. This will likely include an exam and certain tests. Based on the results, you and your healthcare provider can then plan treatment. Until a diagnosis is made, the home care tips below can help ease symptoms.   Home care    Do pelvic floor muscle exercises, if they are prescribed. The pelvic floor muscles help support the bladder and urethra. Many women find that their symptoms improve when doing special exercises that strengthen these muscles. To do the exercises,  contract the muscles you would use to stop your stream of urine. But do this when you re not urinating. Hold for 10 seconds, then relax. Repeat 10 to 20 times in a row, at least 3 times a day. Your healthcare provider may give you other instructions for how to do the exercises and how often.    Keep a bladder diary. This helps track how often and how much you urinate over a set period of time. Bring this diary with you to your next visit with the provider. The information can help your provider learn more about your bladder problem.    Lose weight, if advised to by your provider. Extra weight puts pressure on the bladder. Your provider can help you create a weight-loss plan that s right for you. This may include exercising more and making certain diet changes.    Don't have foods and drinks that may irritate the bladder. These can include alcohol and caffeinated drinks.    Quit smoking. Smoking and other tobacco use can lead to a long-term (chronic) cough that strains the pelvic floor muscles. Smoking may also damage the bladder and urethra. Talk with your provider about treatments or methods you can use to quit smoking.    If drinking large amounts of fluid makes you have symptoms, you may be advised to limit your fluid intake. You may also be advised to drink most of your fluids during the day and to limit fluids at night.    If you re worried about urine leakage or accidents, you may wear absorbent pads to catch urine. Change the pads often. This helps reduce discomfort. It may also reduce the risk of skin or bladder infections.    Follow-up care  Follow up with your healthcare provider, or as directed. It may take some to find the right treatment for your problem. But healthy lifestyle changes can be made right away. These include such things as exercising on a regular basis, eating a healthy diet, losing weight (if needed), and quitting smoking. Your treatment plan may include special therapies or medicines.  Certain procedures or surgery may also be options. Talk about any questions you have with your provider.   When to seek medical advice  Call the healthcare provider right away if any of these occur:    Fever of 100.4 F (38 C) or higher, or as directed by your provider    Bladder pain or fullness    Belly swelling    Nausea or vomiting    Back pain    Weakness, dizziness, or fainting  [employer] last reviewed this educational content on 1/1/2020 2000-2022 [employer]. All rights reserved. This information is not intended as a substitute for professional medical care. Always follow your healthcare professional's instructions.

## 2023-06-14 ENCOUNTER — TELEPHONE (OUTPATIENT)
Dept: FAMILY MEDICINE | Facility: CLINIC | Age: 73
End: 2023-06-14
Payer: COMMERCIAL

## 2023-06-14 NOTE — TELEPHONE ENCOUNTER
Patient Quality Outreach    Patient is due for the following:   Hypertension -  BP check    Next Steps:   Patient has upcoming appointment, these items will be addressed at that time.    Type of outreach:    Phone, spoke to patient/parent. Patient/parent will call back to schedule.      Questions for provider review:    None           Shakila Weiner, [facility]

## 2023-06-15 ENCOUNTER — OFFICE VISIT (OUTPATIENT)
Dept: FAMILY MEDICINE | Facility: CLINIC | Age: 73
End: 2023-06-15
Payer: COMMERCIAL

## 2023-06-15 VITALS
HEART RATE: 80 BPM | SYSTOLIC BLOOD PRESSURE: 138 MMHG | OXYGEN SATURATION: 94 % | BODY MASS INDEX: 41.02 KG/M2 | WEIGHT: 293 LBS | HEIGHT: 71 IN | TEMPERATURE: 98.1 F | RESPIRATION RATE: 16 BRPM | DIASTOLIC BLOOD PRESSURE: 76 MMHG

## 2023-06-15 DIAGNOSIS — H25.9 SENILE CATARACT OF RIGHT EYE, UNSPECIFIED AGE-RELATED CATARACT TYPE: ICD-10-CM

## 2023-06-15 DIAGNOSIS — Z01.818 PRE-OP EVALUATION: Primary | ICD-10-CM

## 2023-06-15 DIAGNOSIS — H54.61 DECREASED VISION OF RIGHT EYE: ICD-10-CM

## 2023-06-15 PROCEDURE — 99214 OFFICE O/P EST MOD 30 MIN: CPT | Performed by: NURSE PRACTITIONER

## 2023-06-15 ASSESSMENT — PAIN SCALES - GENERAL: PAINLEVEL: NO PAIN (0)

## 2023-06-15 NOTE — PROGRESS NOTES
[facility]  [address] [place] [address]  Phone: [phone]  Fax: [phone]  Primary Provider: Delmis Simons  Pre-op Performing Provider: DELMIS SIMONS      PREOPERATIVE EVALUATION:  Today's date: 6/15/2023    Laila Perez is a 73 year old adult who presents for a preoperative evaluation.      6/15/2023     9:43 AM   Additional Questions   Roomed by SMA Tico   Accompanied by Caregiver - Akiko Chambers 6/15/2023   Any forms needing to be completed Yes     Surgical Information:  Surgery/Procedure: Cataract Surgery & Capsular Opacification  Surgery Location: [facility]  Surgeon: Maira  Surgery Date: 6/20/2023   Time of Surgery: TBD  Where patient plans to recover: At home alone  Fax number for surgical facility: Has paperwork with to fill out    FAX NUMBER [phone]    Assessment & Plan     The proposed surgical procedure is considered LOW risk.    Pre-op evaluation  Decreased vision of right eye  Senile cataract of right eye, unspecified age-related cataract type           Implanted Device:   - Type of device: TAVR 10/2022 34 mm Medtronic evolute valve Patient advised to bring device information on day of surgery.       - No identified additional risk factors other than previously addressed    Antiplatelet or Anticoagulation Medication Instructions:   - Patient is on no antiplatelet or anticoagulation medications.    Additional Medication Instructions:  Patient is to take all scheduled medications on the day of surgery    RECOMMENDATION:  APPROVAL GIVEN to proceed with proposed procedure, without further diagnostic evaluation.    Call or return to the clinic with any worsening of symptoms or no resolution. Patient/Parent verbalized understanding and is in agreement. Medication side effects reviewed.   Current Outpatient Medications   Medication Sig Dispense Refill     acetaminophen (TYLENOL) 325 MG tablet Take  975 mg by mouth nightly as needed for mild pain       albuterol (PROAIR HFA/PROVENTIL HFA/VENTOLIN HFA) 108 (90 Base) MCG/ACT inhaler Inhale 2 puffs into the lungs every 6 hours 18 g 0     brimonidine (ALPHAGAN) 0.2 % ophthalmic solution Place 1 drop into both eyes 2 times daily       dorzolamide-timolol (COSOPT) 2-0.5 % ophthalmic solution Place 1 drop into both eyes 2 times daily       estradiol (VIVELLE-DOT) 0.05 MG/24HR bi-weekly patch Place 1 patch onto the skin twice a week 24 patch 1     ipratropium - albuterol 0.5 mg/2.5 mg/3 mL (DUONEB) 0.5-2.5 (3) MG/3ML neb solution Take 1 vial (3 mLs) by nebulization every 6 hours as needed for shortness of breath or wheezing 90 mL 4     latanoprost (XALATAN) 0.005 % ophthalmic solution Place 1 drop into both eyes At Bedtime       lisinopril (ZESTRIL) 5 MG tablet Take 1 tablet (5 mg) by mouth daily 90 tablet 1     lovastatin (MEVACOR) 20 MG tablet TAKE ONE TABLET BY MOUTH ONCE A DAY AT BEDTIME (Patient taking differently: Take 20 mg by mouth At Bedtime) 90 tablet 3     Multiple Vitamin (DAILY-EVANGELISTA) TABS Take 1 tablet by mouth daily  11     mupirocin (BACTROBAN) 2 % external ointment Apply topically 3 times daily 30 g 1     nitroGLYcerin (NITROSTAT) 0.4 MG sublingual tablet For chest pain place 1 tablet under the tongue every 5 minutes for 3 doses. If symptoms persist 5 minutes after 1st dose call 911. 30 tablet 1     Delmis Simons MSN,FNP-[facility]  [address] [place] [id number] [phone]          Subjective       HPI related to upcoming procedure: decreased vision right eye           6/15/2023     9:43 AM   Preop Questions   1. Have you ever had a heart attack or stroke? No   2. Have you ever had surgery on your heart or blood vessels, such as a stent placement, a coronary artery bypass, or surgery on an artery in your head, neck, heart, or legs? YES   3. Do you have chest pain with activity? No   4. Do you have a  history of  heart failure? No   5. Do you currently have a cold, bronchitis or symptoms of other infection? No   6. Do you have a cough, shortness of breath, or wheezing? YES   7. Do you or anyone in your family have previous history of blood clots? No   8. Do you or does anyone in your family have a serious bleeding problem such as prolonged bleeding following surgeries or cuts? No   9. Have you ever had problems with anemia or been told to take iron pills? No   10. Have you had any abnormal blood loss such as black, tarry or bloody stools, or abnormal vaginal bleeding? No   11. Have you ever had a blood transfusion? YES   11a. Have you ever had a transfusion reaction? No   12. Are you willing to have a blood transfusion if it is medically needed before, during, or after your surgery? Yes   13. Have you or any of your relatives ever had problems with anesthesia? No   14. Do you have sleep apnea, excessive snoring or daytime drowsiness? YES   14a. Do you have a CPAP machine? No   15. Do you have any artifical heart valves or other implanted medical devices like a pacemaker, defibrillator, or continuous glucose monitor? YES    15a. What type of device do you have? Artiffical heart valve   15b. Name of the clinic that manages your device:  [facility]   16. Do you have artificial joints? No   17. Are you allergic to latex? No       Health Care Directive:  Patient has a Health Care Directive on file      Preoperative Review of :   reviewed - no record of controlled substances prescribed.      Status of Chronic Conditions:  See problem list for active medical problems.  Problems all longstanding and stable, except as noted/documented.  See ROS for pertinent symptoms related to these conditions.    HYPERTENSION - Patient has longstanding history of HTN , currently denies any symptoms referable to elevated blood pressure. Specifically denies chest pain, palpitations, dyspnea, orthopnea, PND or peripheral edema.  Blood pressure readings have been in normal range. Current medication regimen is as listed below. Patient denies any side effects of medication.       Review of Systems  Constitutional, neuro, ENT, endocrine, pulmonary, cardiac, gastrointestinal, genitourinary, musculoskeletal, integument and psychiatric systems are negative, except as otherwise noted.    Patient Active Problem List    Diagnosis Date Noted     Thrombocytopenia (H) 03/23/2023     Priority: Medium     S/P TAVR (transcatheter aortic valve replacement) 12/22/2022     Priority: Medium     Aneurysm of ascending aorta without rupture (H) 12/22/2022     Priority: Medium     Aortic stenosis, severe 10/04/2022     Priority: Medium     Shortness of breath  09/29/2022     Priority: Medium     Thoracic aortic aneurysm without rupture 09/29/2022     Priority: Medium     Status post coronary angiogram 09/08/2022     Priority: Medium     Alcohol dependence in remission (H) 03/18/2022     Priority: Medium     Aortic aneurysm without rupture, unspecified portion of aorta (H) 03/18/2022     Priority: Medium     Episodic tension-type headache, not intractable 06/11/2020     Priority: Medium     Multiple gastric ulcers 01/20/2020     Priority: Medium     Obesity, Class III, BMI 40-49.9 (morbid obesity) (H) 08/06/2018     Priority: Medium     Will continue phentermine 15mg daily  Will start topiramate, 25mg and increasing to 75mg daily  Aug 2020: has been very physically active, weight down 20 pounds from April, now 282. Says have low energy during the day, so wants to eat more sugar.   Sleep: in bed at 8pm, falls asleep by 8:30pm, wakes at 4am-5pm. Naps from 3pm-3:30pm. Wakes for bathroom 3-4 times, falls asleep right after. Not sure if she snores.  Phentermine: takes 15mg every other day, and topiramate 75mg every evening. GFR was 60 in 3/2020. Has no history of kidney stones.   - will continue phentermine 15mg and topiramate 75mg daily  - home BP measures have been  normal per patient  - goal for gender surgery is 250pounds  Nov 2020: weighs 267 today, goal is 250. Is very happy about this progress. Is exercising more and eating juan carlos when she gets hungry. Has stopped topiramate and phentermine because she wants to make this last push on her own without medications. She felt that they were making her have to urinate more often than before. This may be due to ketosis diuresis, but it is fine if she wants to be off of medications.   - supported her goals and healhty changes  - will hold phentermine and topiramate        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 12/08/2016     Priority: Medium     Aortic valve stenosis, unspecified etiology 11/17/2016     Priority: Medium     Mitral valve disorder 05/26/2016     Priority: Medium     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 04/26/2016     Priority: Medium     Family history of diabetes mellitus in first degree relative 04/26/2016     Priority: Medium     Psoriasis 01/30/2013     Priority: Medium     Left calf       Varicose veins of legs 09/05/2012     Priority: Medium     DDD (degenerative disc disease), lumbar 04/08/2011     Priority: Medium     FINDINGS: Since April 13, 2006, severe compression fractures again  identified at the L1 vertebral body. Interval increase surrounding  osteophyte formation and decreased disc space height surrounding the  L1 vertebrae, though compression fracture was identified on previous  exam. Mild diffuse osteophyte formation throughout the visualized  thoracic and lumbar spine. Multilevel degenerative disc disease also  noted, most notable at L5-S1. No acute osseous abnormality.       OA (osteoarthritis) of knee 11/13/2008     Priority: Medium     Benign essential hypertension 10/30/2008     Priority: Medium     Mixed hyperlipidemia 10/09/2006     Priority: Medium     Gouty arthropathy 10/02/2006     Priority: Medium     Problem list name updated by automated process. Provider to review and confirm  Imo  Update utility       Mental or behavioral problem 10/02/2006     Priority: Medium     Followed by Nacho Prather, psychiatry in past- he has retired. Pt stable on fluoxetine, seroquel. Also sees counselor regular. Has had some issues in past OCD.  Problem list name updated by automated process. Provider to review       Generalized osteoarthrosis, unspecified site 10/02/2006     Priority: Medium     Disorder of bone and cartilage 10/02/2006     Priority: Medium     See DEXA in EPIC  Problem list name updated by automated process. Provider to review        Past Medical History:   Diagnosis Date     Alcoholism (H)      DDD (degenerative disc disease), cervical      Gender dysphoria in adult      HLD (hyperlipidemia)      HTN (hypertension)      large compression fracture 03/13/2006    Worked up for neoplasm, none found.       OA (osteoarthritis of spine)      Obese      Unspecified internal derangement of knee     CHRONIC KNEE PAIN,LEG,NECK AND HEAD INJURIES     Past Surgical History:   Procedure Laterality Date     COLONOSCOPY  2007     CV CORONARY ANGIOGRAM N/A 9/8/2022    Procedure: Coronary Angiogram;  Surgeon: Santy Clayton MD;  Location: [facility] CARDIAC CATH LAB     CV PCI N/A 9/8/2022    Procedure: Percutaneous Coronary Intervention;  Surgeon: Santy Clayton MD;  Location: [facility] CARDIAC CATH LAB     CV TRANSCATHETER AORTIC VALVE REPLACEMENT-FEMORAL APPROACH N/A 10/4/2022    Procedure: Transcatheter Aortic Valve Replacement-Femoral Approach;  Surgeon: Young Ramires MD;  Location: [facility] CARDIAC CATH LAB     JOINT REPLACEMTN, KNEE RT/LT  2006    left     ORCHIECTOMY SCROTAL Bilateral 5/14/2019    Procedure: Bilatera Scrotal Orchiectomy;  Surgeon: Abhilash Weston MD;  Location:  OR     Current Outpatient Medications   Medication Sig Dispense Refill     acetaminophen (TYLENOL) 325 MG tablet Take 975 mg by mouth nightly as needed for mild pain       albuterol (PROAIR HFA/PROVENTIL  HFA/VENTOLIN HFA) 108 (90 Base) MCG/ACT inhaler Inhale 2 puffs into the lungs every 6 hours 18 g 0     brimonidine (ALPHAGAN) 0.2 % ophthalmic solution Place 1 drop into both eyes 2 times daily       dorzolamide-timolol (COSOPT) 2-0.5 % ophthalmic solution Place 1 drop into both eyes 2 times daily       estradiol (VIVELLE-DOT) 0.05 MG/24HR bi-weekly patch Place 1 patch onto the skin twice a week 24 patch 1     ipratropium - albuterol 0.5 mg/2.5 mg/3 mL (DUONEB) 0.5-2.5 (3) MG/3ML neb solution Take 1 vial (3 mLs) by nebulization every 6 hours as needed for shortness of breath or wheezing 90 mL 4     latanoprost (XALATAN) 0.005 % ophthalmic solution Place 1 drop into both eyes At Bedtime       lisinopril (ZESTRIL) 5 MG tablet Take 1 tablet (5 mg) by mouth daily 90 tablet 1     lovastatin (MEVACOR) 20 MG tablet TAKE ONE TABLET BY MOUTH ONCE A DAY AT BEDTIME (Patient taking differently: Take 20 mg by mouth At Bedtime) 90 tablet 3     Multiple Vitamin (DAILY-EVANGELISTA) TABS Take 1 tablet by mouth daily  11     mupirocin (BACTROBAN) 2 % external ointment Apply topically 3 times daily 30 g 1     nitroGLYcerin (NITROSTAT) 0.4 MG sublingual tablet For chest pain place 1 tablet under the tongue every 5 minutes for 3 doses. If symptoms persist 5 minutes after 1st dose call 911. 30 tablet 1     benzonatate (TESSALON) 200 MG capsule Take 1 capsule (200 mg) by mouth 3 times daily as needed for cough (Patient not taking: Reported on 6/15/2023) 30 capsule 0       Allergies   Allergen Reactions     External Allergen Needs Reconciliation - See Comment      Please reconcile the Patient's allergy reported as ESTERSPENICILLIN and update accordingly           Heparin Rash     he lives in a assisted living program and is not sure what type of allergies he really has. He quit drinking        Social History     Tobacco Use     Smoking status: Never     Passive exposure: Never     Smokeless tobacco: Never   Vaping Use     Vaping status: Never  "Used     Passive vaping exposure: Yes   Substance Use Topics     Alcohol use: No     Comment: Quit 3/24/05       History   Drug Use No         Objective     /76 (BP Location: Right arm, Patient Position: Sitting, Cuff Size: Adult Large)   Pulse 80   Temp 98.1  F (36.7  C) (Tympanic)   Resp 16   Ht 1.803 m (5' 11\")   Wt (!) 155.6 kg (343 lb)   SpO2 94%   BMI 47.84 kg/m      Physical Exam  GENERAL APPEARANCE: healthy, alert and no distress  HENT: ear canals and TM's normal and nose and mouth without ulcers or lesions  RESP: lungs clear to auscultation - no rales, rhonchi or wheezes  CV: regular rate and rhythm, normal S1 S2, no S3 or S4 and no murmur, click or rub   ABDOMEN: soft, nontender, no HSM or masses and bowel sounds normal  NEURO: Normal strength and tone, sensory exam grossly normal, mentation intact and speech normal    Recent Labs   Lab Test 05/25/23  0952 05/09/23  1119 10/04/22  0827 09/29/22  1421 09/08/22  0801   HGB 14.6 13.7   < > 14.3 13.9   * 101*   < > 136* 121*   INR  --   --   --  1.15 1.14    138   < > 144 135   POTASSIUM 4.6 5.0   < > 4.7 4.4   CR 1.00 0.95   < > 1.11 1.05    < > = values in this interval not displayed.        Diagnostics:  Recent Results (from the past 720 hour(s))   Symptomatic COVID-19 Virus (Coronavirus) by PCR Nose    Collection Time: 05/25/23  9:49 AM    Specimen: Nose; Swab   Result Value Ref Range    SARS CoV2 PCR Negative Negative   Comprehensive metabolic panel (BMP + Alb, Alk Phos, ALT, AST, Total. Bili, TP)    Collection Time: 05/25/23  9:52 AM   Result Value Ref Range    Sodium 141 136 - 145 mmol/L    Potassium 4.6 3.4 - 5.3 mmol/L    Chloride 104 98 - 107 mmol/L    Carbon Dioxide (CO2) 30 (H) 22 - 29 mmol/L    Anion Gap 7 7 - 15 mmol/L    Urea Nitrogen 17.4 8.0 - 23.0 mg/dL    Creatinine 1.00 0.51 - 1.17 mg/dL    Calcium 9.5 8.8 - 10.2 mg/dL    Glucose 119 (H) 70 - 99 mg/dL    Alkaline Phosphatase 82 35 - 129 U/L    AST 60 (H) 10 - 50 " U/L    ALT 51 (H) 10 - 50 U/L    Protein Total 7.5 6.4 - 8.3 g/dL    Albumin 3.9 3.5 - 5.2 g/dL    Bilirubin Total 0.3 <=1.2 mg/dL    GFR Estimate 59 (L) >60 mL/min/1.73m2   CBC with platelets and differential    Collection Time: 05/25/23  9:52 AM   Result Value Ref Range    WBC Count 4.1 4.0 - 11.0 10e3/uL    RBC Count 4.78 3.80 - 5.90 10e6/uL    Hemoglobin 14.6 11.7 - 17.7 g/dL    Hematocrit 45.0 35.0 - 53.0 %    MCV 94 78 - 100 fL    MCH 30.5 26.5 - 33.0 pg    MCHC 32.4 31.5 - 36.5 g/dL    RDW 12.8 10.0 - 15.0 %    Platelet Count 103 (L) 150 - 450 10e3/uL    % Neutrophils 59 %    % Lymphocytes 25 %    % Monocytes 10 %    % Eosinophils 6 %    % Basophils 1 %    % Immature Granulocytes 0 %    Absolute Neutrophils 2.4 1.6 - 8.3 10e3/uL    Absolute Lymphocytes 1.0 0.8 - 5.3 10e3/uL    Absolute Monocytes 0.4 0.0 - 1.3 10e3/uL    Absolute Eosinophils 0.2 0.0 - 0.7 10e3/uL    Absolute Basophils 0.0 0.0 - 0.2 10e3/uL    Absolute Immature Granulocytes 0.0 <=0.4 10e3/uL      No EKG this visit, completed in the last 90 days.    Revised Cardiac Risk Index (RCRI):  The patient has the following serious cardiovascular risks for perioperative complications:   - No serious cardiac risks = 0 points              Signed Electronically by: JERRICA Salcedo CNP  Copy of this evaluation report is provided to requesting physician.

## 2023-07-12 NOTE — TELEPHONE ENCOUNTER
PREVISIT INFORMATION                                                    Liala Perez scheduled for future visit at [facility] specialty clinics.    Patient is scheduled to see Dr. Weston on 1/10/18  Reason for visit: orchiectomy consult   Referring provider: N/A  Has patient seen previous specialist? No  Medical Records:  Available in chart.  Patient was previously seen at a [place] or [facility] facility.     REVIEW                                                      New patient packet mailed to patient: Yes  Medication reconciliation complete: No      PLAN/FOLLOW-UP NEEDED                                                      Patient Reminders Given:    Informed patient to bring an updated list of allergies, medications, pharmacy details and insurance information. Directed patient to come to the 2nd floor, check-in D for their appointment. Informed patient to call back if appointment needs to be cancelled or rescheduled at [phone].    Reminded patient to bring any outside records regarding this appointment or have them faxed to clinic at [phone].    Reminded patient to complete and bring in urology questionnaire. Also for patient to please come with a full bladder and to ask , if early to get staff member for sample.      
4 = No assist / stand by assistance

## 2023-07-21 DIAGNOSIS — E78.5 HYPERLIPIDEMIA LDL GOAL <100: ICD-10-CM

## 2023-07-21 RX ORDER — LOVASTATIN 20 MG
TABLET ORAL
Qty: 90 TABLET | Refills: 2 | Status: SHIPPED | OUTPATIENT
Start: 2023-07-21 | End: 2024-02-02

## 2023-08-03 ENCOUNTER — TRANSFERRED RECORDS (OUTPATIENT)
Dept: HEALTH INFORMATION MANAGEMENT | Facility: CLINIC | Age: 73
End: 2023-08-03

## 2023-08-03 ENCOUNTER — MEDICAL CORRESPONDENCE (OUTPATIENT)
Dept: HEALTH INFORMATION MANAGEMENT | Facility: CLINIC | Age: 73
End: 2023-08-03
Payer: COMMERCIAL

## 2023-08-17 ENCOUNTER — TELEPHONE (OUTPATIENT)
Dept: OTHER | Facility: CLINIC | Age: 73
End: 2023-08-17
Payer: COMMERCIAL

## 2023-08-17 ENCOUNTER — TRANSCRIBE ORDERS (OUTPATIENT)
Dept: OTHER | Age: 73
End: 2023-08-17

## 2023-08-17 DIAGNOSIS — I89.0 LYMPHEDEMA OF BOTH LOWER EXTREMITIES: Primary | ICD-10-CM

## 2023-08-17 NOTE — TELEPHONE ENCOUNTER
Pt resides in [place], routing to [facility] for coordination of referral.   No progress notes received.   SADIQ HaqN, RN  [facility]  Office:  [phone] Fax: [phone]

## 2023-08-17 NOTE — TELEPHONE ENCOUNTER
[facility]    Who is the name of the provider?:  n/a  What is the location you see this provider at/preferred location?: n/a  Person calling / Facility: Laila  Phone number:  [phone]  Nurse call back needed:  unknown    Reason for call:    Pt is wanting to get scheduled in regards to lymphedema and [facility] has a referral. Pt lives in [place] and prefers the closest clinic with vascular for lymphedema. Pt wants to be seen before October.    Pharmacy location:  n/a  Outside Imaging: n/a  Can we leave a detailed message on this number?  yes

## 2023-08-21 ENCOUNTER — OFFICE VISIT (OUTPATIENT)
Dept: FAMILY MEDICINE | Facility: CLINIC | Age: 73
End: 2023-08-21
Payer: COMMERCIAL

## 2023-08-21 VITALS — HEART RATE: 67 BPM | SYSTOLIC BLOOD PRESSURE: 136 MMHG | DIASTOLIC BLOOD PRESSURE: 84 MMHG

## 2023-08-21 DIAGNOSIS — M17.9 OSTEOARTHRITIS OF KNEE, UNSPECIFIED LATERALITY, UNSPECIFIED OSTEOARTHRITIS TYPE: ICD-10-CM

## 2023-08-21 DIAGNOSIS — F64.0 TRANSGENDER PERSON ON HORMONE THERAPY: ICD-10-CM

## 2023-08-21 DIAGNOSIS — Z79.899 TRANSGENDER PERSON ON HORMONE THERAPY: ICD-10-CM

## 2023-08-21 DIAGNOSIS — I87.2 VENOUS STASIS DERMATITIS, UNSPECIFIED LATERALITY: Primary | ICD-10-CM

## 2023-08-21 PROCEDURE — 99214 OFFICE O/P EST MOD 30 MIN: CPT | Performed by: FAMILY MEDICINE

## 2023-08-21 RX ORDER — ESTRADIOL 0.03 MG/D
1 FILM, EXTENDED RELEASE TRANSDERMAL
Qty: 24 PATCH | Refills: 1 | Status: SHIPPED | OUTPATIENT
Start: 2023-08-21 | End: 2024-02-02

## 2023-08-21 ASSESSMENT — ENCOUNTER SYMPTOMS: SHORTNESS OF BREATH: 0

## 2023-08-21 NOTE — PROGRESS NOTES
"     BOO Ulloa is a 73 year old individual that uses pronouns She/Her/Hers/Herself that presents today for follow up of:  feminizing hormone therapy.   Alone or accompanied by: accompanied today by  Gender identity: female  Started Hormone  therapy  2016  Continues on TD estradiol  0.05 mg patch 2x/wk  Any special concerns today?   Had cataract surgery, which went well  Has knee replacement surgery in October  States that when has \"too much estrogen\" passes too much gas, so stops patch for a week and feels better. Happens every 2-3 months.      On hormones?  YES +++ Shot day of the week? Not applicable-taking pills/patch/gel      Due for labs?  No      +++ Refills of meds needed?  Yes  Gender related body changes since last visit: stable    Breakthrough bleeding? Does Not Apply    New health concerns since last visit:  ---    Past Surgical History:   Procedure Laterality Date    COLONOSCOPY  2007    CV CORONARY ANGIOGRAM N/A 9/8/2022    Procedure: Coronary Angiogram;  Surgeon: Santy Clayton MD;  Location: [facility] CARDIAC CATH LAB    CV PCI N/A 9/8/2022    Procedure: Percutaneous Coronary Intervention;  Surgeon: Santy Clayton MD;  Location: [facility] CARDIAC CATH LAB    CV TRANSCATHETER AORTIC VALVE REPLACEMENT-FEMORAL APPROACH N/A 10/4/2022    Procedure: Transcatheter Aortic Valve Replacement-Femoral Approach;  Surgeon: Young Ramires MD;  Location: [facility] CARDIAC CATH LAB    JOINT REPLACEMTN, KNEE RT/LT  2006    left    ORCHIECTOMY SCROTAL Bilateral 5/14/2019    Procedure: Bilatera Scrotal Orchiectomy;  Surgeon: Abhilash Weston MD;  Location:  OR       Patient Active Problem List   Diagnosis    Gouty arthropathy    Mental or behavioral problem    Generalized osteoarthrosis, unspecified site    Disorder of bone and cartilage    Mixed hyperlipidemia    Benign essential hypertension    OA (osteoarthritis) of knee    DDD (degenerative disc disease), lumbar    Varicose veins of legs "    Psoriasis    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    Family history of diabetes mellitus in first degree relative    Mitral valve disorder    Aortic valve stenosis, unspecified etiology    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    Obesity, Class III, BMI 40-49.9 (morbid obesity) (H)    Multiple gastric ulcers    Episodic tension-type headache, not intractable    Alcohol dependence in remission (H)    Aortic aneurysm without rupture, unspecified portion of aorta (H)    Status post coronary angiogram    Shortness of breath     Thoracic aortic aneurysm without rupture    Aortic stenosis, severe    S/P TAVR (transcatheter aortic valve replacement)    Aneurysm of ascending aorta without rupture (H)    Thrombocytopenia (H)       Current Outpatient Medications   Medication Sig Dispense Refill    acetaminophen (TYLENOL) 325 MG tablet Take 975 mg by mouth nightly as needed for mild pain      albuterol (PROAIR HFA/PROVENTIL HFA/VENTOLIN HFA) 108 (90 Base) MCG/ACT inhaler Inhale 2 puffs into the lungs every 6 hours 18 g 0    brimonidine (ALPHAGAN) 0.2 % ophthalmic solution Place 1 drop into both eyes 2 times daily      dorzolamide-timolol (COSOPT) 2-0.5 % ophthalmic solution Place 1 drop into both eyes 2 times daily      estradiol (VIVELLE-DOT) 0.05 MG/24HR bi-weekly patch Place 1 patch onto the skin twice a week 24 patch 1    ipratropium - albuterol 0.5 mg/2.5 mg/3 mL (DUONEB) 0.5-2.5 (3) MG/3ML neb solution Take 1 vial (3 mLs) by nebulization every 6 hours as needed for shortness of breath or wheezing 90 mL 4    latanoprost (XALATAN) 0.005 % ophthalmic solution Place 1 drop into both eyes At Bedtime      lisinopril (ZESTRIL) 5 MG tablet Take 1 tablet (5 mg) by mouth daily 90 tablet 1    lovastatin (MEVACOR) 20 MG tablet TAKE ONE TABLET BY MOUTH EVERY NIGHT AT BEDTIME 90 tablet 2    Multiple Vitamin (DAILY-EVANGELISTA) TABS Take 1 tablet by mouth daily  11    mupirocin (BACTROBAN) 2 % external ointment Apply topically 3 times  daily 30 g 1    nitroGLYcerin (NITROSTAT) 0.4 MG sublingual tablet For chest pain place 1 tablet under the tongue every 5 minutes for 3 doses. If symptoms persist 5 minutes after 1st dose call 911. 30 tablet 1       History   Smoking Status    Never   Smokeless Tobacco    Never          Allergies   Allergen Reactions    External Allergen Needs Reconciliation - See Comment      Please reconcile the Patient's allergy reported as ESTERSPENICILLIN and update accordingly          Heparin Rash     he lives in a assisted living program and is not sure what type of allergies he really has. He quit drinking       There are no preventive care reminders to display for this patient.      Problem, Medication and Allergy Lists were reviewed and are current..         Review of Systems:   Review of Systems   Respiratory:  Negative for shortness of breath.    Cardiovascular:  Negative for chest pain.              Labs:   Results from last visit:  Office Visit on 05/25/2023   Component Date Value Ref Range Status    SARS CoV2 PCR 05/25/2023 Negative  Negative Final    NEGATIVE: SARS-CoV-2 (COVID-19) RNA not detected, presumed negative.    Sodium 05/25/2023 141  136 - 145 mmol/L Final    Potassium 05/25/2023 4.6  3.4 - 5.3 mmol/L Final    Chloride 05/25/2023 104  98 - 107 mmol/L Final    Carbon Dioxide (CO2) 05/25/2023 30 (H)  22 - 29 mmol/L Final    Anion Gap 05/25/2023 7  7 - 15 mmol/L Final    Urea Nitrogen 05/25/2023 17.4  8.0 - 23.0 mg/dL Final    Creatinine 05/25/2023 1.00  0.51 - 1.17 mg/dL Final    Male and Female  0-2 Months    0.31-0.88 mg/dL  2-12 Months   0.16-0.39 mg/dL  1-2 Years     0.18-0.35 mg/dL  3-4 Years     0.26-0.42 mg/dL  5-6 Years     0.29-0.47 mg/dL  7-8 Years     0.34-0.53 mg/dL  9-10 Years    0.33-0.64 mg/dL  11-12 Years   0.44-0.68 mg/dL  13-14 Years   0.46-0.77 mg/dL    Female  15 Years and older  0.51-0.95 mg/dL    Male  15 Years and older  0.67-1.17 mg/dL        Calcium 05/25/2023 9.5  8.8 - 10.2 mg/dL  Final    Glucose 05/25/2023 119 (H)  70 - 99 mg/dL Final    Alkaline Phosphatase 05/25/2023 82  35 - 129 U/L Final    Female:    0-15 days     U/L  15d-1 year   122-469 U/L  1-10 years   142-335 U/L  10-13 years  129-417 U/L  13-15 years   U/L  15-17 years   U/L  17-19 years  45-87 U/L  19 years and older   U/L      Male:  0-15 days     U/L  15d-1 year   122-469 U/L  1-10 years   142-335 U/L  10-13 years  129-417 U/L  13-15 years  116-468 U/L  15-17 years   U/L  17-19 years   U/L  19 years and older   U/L      AST 05/25/2023 60 (H)  10 - 50 U/L Final    Female   All ages 10-35 U/L     Male   All ages 10-50 U/L        ALT 05/25/2023 51 (H)  10 - 50 U/L Final    Female   All ages 10-35 U/L     Male   All ages 10-50 U/L        Protein Total 05/25/2023 7.5  6.4 - 8.3 g/dL Final    Albumin 05/25/2023 3.9  3.5 - 5.2 g/dL Final    Bilirubin Total 05/25/2023 0.3  <=1.2 mg/dL Final    GFR Estimate 05/25/2023 59 (L)  >60 mL/min/1.73m2 Final    GFR not calculated when sex unspecified or nonbinary.  eGFR calculated using 2021 CKD-EPI equation.    WBC Count 05/25/2023 4.1  4.0 - 11.0 10e3/uL Final    RBC Count 05/25/2023 4.78  3.80 - 5.90 10e6/uL Final    Reference Range:                                                     Female 3.80-5.20 10e6/uL                                      Male 4.40-5.90 10e6u/L    Hemoglobin 05/25/2023 14.6  11.7 - 17.7 g/dL Final    Reference Range:                                                     Female 11.7-15.7 g/dL                                      Male 13.3-17.7 g/dL    Hematocrit 05/25/2023 45.0  35.0 - 53.0 % Final    Reference Range:                                                     Female 35.0-47.0 %                                            Male 40.0-54.0 %    MCV 05/25/2023 94  78 - 100 fL Final    MCH 05/25/2023 30.5  26.5 - 33.0 pg Final    MCHC 05/25/2023 32.4  31.5 - 36.5 g/dL Final    RDW 05/25/2023 12.8  10.0 - 15.0 % Final     Platelet Count 05/25/2023 103 (L)  150 - 450 10e3/uL Final    % Neutrophils 05/25/2023 59  % Final    % Lymphocytes 05/25/2023 25  % Final    % Monocytes 05/25/2023 10  % Final    % Eosinophils 05/25/2023 6  % Final    % Basophils 05/25/2023 1  % Final    % Immature Granulocytes 05/25/2023 0  % Final    Absolute Neutrophils 05/25/2023 2.4  1.6 - 8.3 10e3/uL Final    Absolute Lymphocytes 05/25/2023 1.0  0.8 - 5.3 10e3/uL Final    Absolute Monocytes 05/25/2023 0.4  0.0 - 1.3 10e3/uL Final    Absolute Eosinophils 05/25/2023 0.2  0.0 - 0.7 10e3/uL Final    Absolute Basophils 05/25/2023 0.0  0.0 - 0.2 10e3/uL Final    Absolute Immature Granulocytes 05/25/2023 0.0  <=0.4 10e3/uL Final         EXAM:  Blood pressure 136/84, pulse 67.    Constitutional: healthy, alert, and no distress   Cardiovascular: negative, PMI normal. No lifts, heaves, or thrills. RRR. No murmurs, clicks gallops or rub  Respiratory: negative, Percussion normal. Good diaphragmatic excursion. Lungs clear   Ext: 2-3+ pitting edema bilaterally at calves above ankles; L anterior calf with healing superficial laceration; patch dusky erythema consistent with venous stasis  Assessment and Plan   Transgender person on hormone therapy s/p orchiectomy  Venous statis dermatitis  3. OA  Discussed lowering dose of estradiol given her concern about flatulence, age and CVD risk factor  Reduce  TD estradiol to 0.025 mg 2x/wk  Reviewed chart, note that PT referral sent for lymphedema of both legs, and  Has appointment with dermatology for skin  rash above, which is chronic, consistent with venous stasis dermatitis  Discussed compression  stockings to both legs  Continue with orthodics for OA  Follow-up 6 months. Can be virtual        Results by [email or web address]  Questions were elicited and answered.     Jluis Mane MD

## 2023-08-29 ENCOUNTER — PATIENT OUTREACH (OUTPATIENT)
Dept: GERIATRIC MEDICINE | Facility: CLINIC | Age: 73
End: 2023-08-29
Payer: COMMERCIAL

## 2023-08-29 NOTE — PROGRESS NOTES
[facility] Care Coordination Contact      [facility] Six-Month Telephone Assessment    6 month telephone assessment completed on 8/29/23.    ER visits: No  Hospitalizations: No  TCU stays: No  Significant health status changes: none  Falls/Injuries: No  ADL/IADL changes: No  Changes in services: No    Caregiver Assessment follow up:  spoke with Akiko    Goals: See POC in chart for goal progress documentation.      Will see member in 6 months for an annual health risk assessment.   Encouraged member to call CC with any questions or concerns in the meantime.       Harriet Eddy RN  [facility]  [phone]

## 2023-09-11 ENCOUNTER — THERAPY VISIT (OUTPATIENT)
Dept: PHYSICAL THERAPY | Facility: CLINIC | Age: 73
End: 2023-09-11
Attending: NURSE PRACTITIONER
Payer: COMMERCIAL

## 2023-09-11 DIAGNOSIS — I89.0 LYMPHEDEMA: Primary | ICD-10-CM

## 2023-09-11 PROCEDURE — 97140 MANUAL THERAPY 1/> REGIONS: CPT | Mod: GP | Performed by: PHYSICAL THERAPIST

## 2023-09-11 PROCEDURE — 97161 PT EVAL LOW COMPLEX 20 MIN: CPT | Mod: GP | Performed by: PHYSICAL THERAPIST

## 2023-09-11 NOTE — PROGRESS NOTES
PHYSICAL THERAPY EVALUATION  Type of Visit: Evaluation    See electronic medical record for Abuse and Falls Screening details.    Subjective       Presenting condition or subjective complaint: BLE edema but greatest concern is for LLE per pt  Date of onset: 08/17/23 (date of referral)    Relevant medical history:     Dates & types of surgery: L-TKA ~14 years ago; upcoming R-TKA on 10/26/23    Prior therapy history for the same diagnosis, illness or injury: No      Prior Level of Function  Transfers: Assistive equipment  Ambulation: Assistive equipment; SEC     Living Environment  Social support: Alone   Type of home: Apartment/condo   Equipment owned: Straight Cane   Patient has EL (community living options) 2days/week for 8 hours each day on Monday and Thursday, but pt/staff report mostly just for driving and to appts but pt is pretty independent    Employment: No      Patient goals for therapy: need to get the swelling under control before my surgery on 10/26    Pain assessment: Pain present R knee 3-4/10, pain always present (getting TKA on 10/26)     Objective       EDEMA EVALUATION  Additional history:  Body part affected by edema: BLE edema; LLE is pt's main concern due to swelling and redness  Distance able to walk: pt reports no limitations  Time able to stand: pt reports no limitations  Sensation problems in hands/feet: No pt denies numbness/tingling  Edema etiology: TKA of L-knee ~14  years ago;  and R-TKA is upcoming on 10/26/23; venous insufficiency of BLEs; PMH: TAVR 10/2022, long standing history of HTN (Bp readings have been in normal range), obesity, varicose veins of legs    FUNCTIONAL SCALES  LLIS = 17    Cognitive Status Examination  Orientation: Oriented to person, place and time   Level of Consciousness: Alert  Follows Commands and Answers Questions: 100% of the time  Personal Safety and Judgement: Intact  Memory: Intact    EDEMA  Skin Condition:  BLE skin intact with 2 healing scabs on L  anterior shin from pt scratching; patch of dark hemosiderian staining on LLE anteiror shin with dry skin; RLE with 1+ pitting foot to knee and LLE with 2+ pitting foot to knee, skin is harder and tighter on LLE vs RLE  Scar: Yes; L-knee   Capillary Refill: Symmetrical  Dorsal Pedal Pulse: Symmetrical  Stemmer Sign: -  Ulceration: No    GIRTH MEASUREMENTS: Refer to separate girth measurement flowsheet.     VOLUME LE  Right LE (mL) 2689.21    Left LE (mL) 2928.26    LE Volume Comparison LLE volume greater than RLE volume   % Difference 8.9%   Head/Neck Volume     Trunk Volume    Genital Volume       RANGE OF MOTION: LE ROM WNL  STRENGTH: LE Strength WFL  POSTURE:  forward head  PALPATION: pt denies hypersensitivities   ACTIVITIES OF DAILY LIVING: independent per pt report  GAIT/LOCOMOTION: mod-I due to using SEC  BALANCE: WNL  SENSATION: LE Sensation WNL  VASCULAR: Vascular concerns - venous insufficiency on LLE  COORDINATION: WFL  MUSCLE TONE: WFL    Assessment & Plan   CLINICAL IMPRESSIONS  Medical Diagnosis: Lymphedema of both lower extremities    Treatment Diagnosis: BLE secondary lympehdema / phlebolymphedema   Impression/Assessment: Patient is a 73 year old adult with  complaints of LLE edema and skin concerns.  The following significant findings have been identified: Pain and Edema. These impairments interfere with their ability to perform self care tasks, recreational activities, and household chores as compared to previous level of function.     Clinical Decision Making (Complexity):  Clinical Presentation: Stable/Uncomplicated  Clinical Presentation Rationale: based on medical and personal factors listed in PT evaluation  Clinical Decision Making (Complexity): Low complexity    PLAN OF CARE  Treatment Interventions:  Interventions: Manual Therapy, Self-Care/Home Management, compression garment fitting and training     Long Term Goals     PT Goal 1  Goal Identifier: stg  Goal Description: pt to have around  the clock tolerance to LLE GCB for edema reduction response  Rationale: to maximize safety and independence with self cares  PT Goal 2  Goal Identifier: ltg  Goal Description: once appropriate, pt and/or caregiver to be independent with donning, doffing and care of compression garments for longterm edema management for maintenance  Rationale: to maximize safety and independence with self cares  PT Goal 3  Goal Identifier: ltg  Goal Description: pt to be independent with longterm LLE edema management via HEP, elevation, skin cares and compression garment wear/use  Rationale: to maximize safety and independence with self cares  PT Goal 4  Goal Identifier: ltg  Goal Description: pt to have at least 3 point improvement on LLIS due to decreased edema and associated symptoms in LLE  Rationale: to maximize safety and independence with performance of ADLs and functional tasks      Frequency of Treatment: 2x/week  Duration of Treatment: 12 weeks    Recommended Referrals to Other Professionals:  n/a  Education Assessment:   Learner/Method: Patient;Caregiver;Listening;No Barriers to Learning    Risks and benefits of evaluation/treatment have been explained.   Patient/Family/caregiver agrees with Plan of Care.     Evaluation Time:     PT Eval, Low Complexity Minutes (53398): 10     Signing Clinician: Julieta Staley, PT, DPT, CLT      [facility]                                                                                   OUTPATIENT PHYSICAL THERAPY      PLAN OF TREATMENT FOR OUTPATIENT REHABILITATION   Patient's Last Name, First Name, Laila Huerta YOB: 1950   Provider's Name   [facility]   Medical Record No.  [id number]     Onset Date: 08/17/23 (date of referral)  Start of Care Date:       Medical Diagnosis:  Lymphedema of both lower extremities      PT Treatment Diagnosis:  BLE secondary lympehdema / phlebolymphedema Plan of  Treatment  Frequency/Duration: 2x/week/ 12 weeks    Certification date from 09/11/23 to 12/10/23         See note for plan of treatment details and functional goals     Julieta Staley, PT, DPT, CLT                         I CERTIFY THE NEED FOR THESE SERVICES FURNISHED UNDER        THIS PLAN OF TREATMENT AND WHILE UNDER MY CARE     (Physician attestation of this document indicates review and certification of the therapy plan).                Referring Provider:  Dr. Palmer Lew MD      Initial Assessment  See Epic Evaluation-

## 2023-09-14 ENCOUNTER — THERAPY VISIT (OUTPATIENT)
Dept: PHYSICAL THERAPY | Facility: CLINIC | Age: 73
End: 2023-09-14
Attending: NURSE PRACTITIONER
Payer: COMMERCIAL

## 2023-09-14 DIAGNOSIS — I89.0 LYMPHEDEMA: Primary | ICD-10-CM

## 2023-09-14 PROCEDURE — 97140 MANUAL THERAPY 1/> REGIONS: CPT | Mod: GP | Performed by: PHYSICAL THERAPIST

## 2023-09-18 ENCOUNTER — THERAPY VISIT (OUTPATIENT)
Dept: PHYSICAL THERAPY | Facility: CLINIC | Age: 73
End: 2023-09-18
Attending: NURSE PRACTITIONER
Payer: COMMERCIAL

## 2023-09-18 DIAGNOSIS — I89.0 LYMPHEDEMA: Primary | ICD-10-CM

## 2023-09-18 PROCEDURE — 97140 MANUAL THERAPY 1/> REGIONS: CPT | Mod: GP | Performed by: REHABILITATION PRACTITIONER

## 2023-09-20 DIAGNOSIS — I35.0 AORTIC STENOSIS, SEVERE: ICD-10-CM

## 2023-09-20 RX ORDER — LISINOPRIL 5 MG/1
5 TABLET ORAL DAILY
Qty: 90 TABLET | Refills: 0 | Status: SHIPPED | OUTPATIENT
Start: 2023-09-20 | End: 2023-12-27

## 2023-09-21 ENCOUNTER — THERAPY VISIT (OUTPATIENT)
Dept: PHYSICAL THERAPY | Facility: CLINIC | Age: 73
End: 2023-09-21
Attending: NURSE PRACTITIONER
Payer: COMMERCIAL

## 2023-09-21 DIAGNOSIS — I89.0 LYMPHEDEMA: Primary | ICD-10-CM

## 2023-09-21 PROCEDURE — 97140 MANUAL THERAPY 1/> REGIONS: CPT | Mod: GP | Performed by: REHABILITATION PRACTITIONER

## 2023-09-25 ENCOUNTER — THERAPY VISIT (OUTPATIENT)
Dept: PHYSICAL THERAPY | Facility: CLINIC | Age: 73
End: 2023-09-25
Attending: NURSE PRACTITIONER
Payer: COMMERCIAL

## 2023-09-25 DIAGNOSIS — I89.0 LYMPHEDEMA: Primary | ICD-10-CM

## 2023-09-25 PROCEDURE — 97140 MANUAL THERAPY 1/> REGIONS: CPT | Mod: GP | Performed by: REHABILITATION PRACTITIONER

## 2023-09-28 ENCOUNTER — THERAPY VISIT (OUTPATIENT)
Dept: PHYSICAL THERAPY | Facility: CLINIC | Age: 73
End: 2023-09-28
Attending: NURSE PRACTITIONER
Payer: COMMERCIAL

## 2023-09-28 DIAGNOSIS — I89.0 LYMPHEDEMA: Primary | ICD-10-CM

## 2023-09-28 PROCEDURE — 97140 MANUAL THERAPY 1/> REGIONS: CPT | Mod: GP | Performed by: PHYSICAL THERAPIST

## 2023-10-02 ENCOUNTER — THERAPY VISIT (OUTPATIENT)
Dept: PHYSICAL THERAPY | Facility: CLINIC | Age: 73
End: 2023-10-02
Attending: NURSE PRACTITIONER
Payer: COMMERCIAL

## 2023-10-02 DIAGNOSIS — I89.0 LYMPHEDEMA: Primary | ICD-10-CM

## 2023-10-02 PROCEDURE — 97140 MANUAL THERAPY 1/> REGIONS: CPT | Mod: GP | Performed by: PHYSICAL THERAPIST

## 2023-10-05 ENCOUNTER — THERAPY VISIT (OUTPATIENT)
Dept: PHYSICAL THERAPY | Facility: CLINIC | Age: 73
End: 2023-10-05
Attending: NURSE PRACTITIONER
Payer: COMMERCIAL

## 2023-10-05 DIAGNOSIS — I89.0 LYMPHEDEMA: Primary | ICD-10-CM

## 2023-10-05 PROCEDURE — 97140 MANUAL THERAPY 1/> REGIONS: CPT | Mod: GP | Performed by: PHYSICAL THERAPIST

## 2023-10-09 ENCOUNTER — LAB (OUTPATIENT)
Dept: LAB | Facility: CLINIC | Age: 73
End: 2023-10-09
Payer: COMMERCIAL

## 2023-10-09 ENCOUNTER — HOSPITAL ENCOUNTER (OUTPATIENT)
Dept: CARDIOLOGY | Facility: CLINIC | Age: 73
Discharge: HOME | End: 2023-10-09
Attending: NURSE PRACTITIONER
Payer: COMMERCIAL

## 2023-10-09 ENCOUNTER — THERAPY VISIT (OUTPATIENT)
Dept: PHYSICAL THERAPY | Facility: CLINIC | Age: 73
End: 2023-10-09
Attending: ORTHOPAEDIC SURGERY
Payer: COMMERCIAL

## 2023-10-09 DIAGNOSIS — I35.0 AORTIC VALVE STENOSIS, UNSPECIFIED ETIOLOGY: ICD-10-CM

## 2023-10-09 DIAGNOSIS — I89.0 LYMPHEDEMA: Primary | ICD-10-CM

## 2023-10-09 DIAGNOSIS — Z95.2 S/P TAVR (TRANSCATHETER AORTIC VALVE REPLACEMENT): ICD-10-CM

## 2023-10-09 LAB
ANION GAP SERPL CALCULATED.3IONS-SCNC: 9 MMOL/L (ref 7–15)
BUN SERPL-MCNC: 16 MG/DL (ref 8–23)
CALCIUM SERPL-MCNC: 10.1 MG/DL (ref 8.8–10.2)
CHLORIDE SERPL-SCNC: 101 MMOL/L (ref 98–107)
CREAT SERPL-MCNC: 0.99 MG/DL (ref 0.51–1.17)
DEPRECATED HCO3 PLAS-SCNC: 29 MMOL/L (ref 22–29)
EGFRCR SERPLBLD CKD-EPI 2021: 60 ML/MIN/1.73M2
ERYTHROCYTE [DISTWIDTH] IN BLOOD BY AUTOMATED COUNT: 12.7 % (ref 10–15)
GLUCOSE SERPL-MCNC: 98 MG/DL (ref 70–99)
HCT VFR BLD AUTO: 45.3 % (ref 35–53)
HGB BLD-MCNC: 14.6 G/DL (ref 11.7–17.7)
LVEF ECHO: NORMAL
MCH RBC QN AUTO: 30.5 PG (ref 26.5–33)
MCHC RBC AUTO-ENTMCNC: 32.2 G/DL (ref 31.5–36.5)
MCV RBC AUTO: 95 FL (ref 78–100)
PLATELET # BLD AUTO: 113 10E3/UL (ref 150–450)
POTASSIUM SERPL-SCNC: 4.7 MMOL/L (ref 3.4–5.3)
RBC # BLD AUTO: 4.78 10E6/UL (ref 3.8–5.9)
SODIUM SERPL-SCNC: 139 MMOL/L (ref 135–145)
WBC # BLD AUTO: 6.6 10E3/UL (ref 4–11)

## 2023-10-09 PROCEDURE — 85027 COMPLETE CBC AUTOMATED: CPT | Performed by: INTERNAL MEDICINE

## 2023-10-09 PROCEDURE — 97140 MANUAL THERAPY 1/> REGIONS: CPT | Mod: GP | Performed by: REHABILITATION PRACTITIONER

## 2023-10-09 PROCEDURE — 255N000002 HC RX 255 OP 636: Performed by: NURSE PRACTITIONER

## 2023-10-09 PROCEDURE — 36415 COLL VENOUS BLD VENIPUNCTURE: CPT | Performed by: INTERNAL MEDICINE

## 2023-10-09 PROCEDURE — 80048 BASIC METABOLIC PNL TOTAL CA: CPT | Performed by: INTERNAL MEDICINE

## 2023-10-09 PROCEDURE — 93306 TTE W/DOPPLER COMPLETE: CPT | Mod: 26 | Performed by: INTERNAL MEDICINE

## 2023-10-09 PROCEDURE — 999N000208 ECHOCARDIOGRAM COMPLETE

## 2023-10-09 RX ADMIN — HUMAN ALBUMIN MICROSPHERES AND PERFLUTREN 2 ML: 10; .22 INJECTION, SOLUTION INTRAVENOUS at 10:32

## 2023-10-12 ENCOUNTER — OFFICE VISIT (OUTPATIENT)
Dept: FAMILY MEDICINE | Facility: CLINIC | Age: 73
End: 2023-10-12
Payer: COMMERCIAL

## 2023-10-12 ENCOUNTER — THERAPY VISIT (OUTPATIENT)
Dept: PHYSICAL THERAPY | Facility: CLINIC | Age: 73
End: 2023-10-12
Attending: ORTHOPAEDIC SURGERY
Payer: COMMERCIAL

## 2023-10-12 ENCOUNTER — OFFICE VISIT (OUTPATIENT)
Dept: CARDIOLOGY | Facility: CLINIC | Age: 73
End: 2023-10-12
Attending: NURSE PRACTITIONER
Payer: COMMERCIAL

## 2023-10-12 VITALS
OXYGEN SATURATION: 95 % | HEART RATE: 69 BPM | DIASTOLIC BLOOD PRESSURE: 78 MMHG | SYSTOLIC BLOOD PRESSURE: 126 MMHG | BODY MASS INDEX: 46.86 KG/M2 | WEIGHT: 293 LBS

## 2023-10-12 VITALS
WEIGHT: 293 LBS | DIASTOLIC BLOOD PRESSURE: 78 MMHG | OXYGEN SATURATION: 99 % | RESPIRATION RATE: 14 BRPM | HEIGHT: 71 IN | HEART RATE: 69 BPM | SYSTOLIC BLOOD PRESSURE: 126 MMHG | BODY MASS INDEX: 41.02 KG/M2 | TEMPERATURE: 98.3 F

## 2023-10-12 DIAGNOSIS — M25.561 CHRONIC PAIN OF RIGHT KNEE: ICD-10-CM

## 2023-10-12 DIAGNOSIS — I71.21 ANEURYSM OF ASCENDING AORTA WITHOUT RUPTURE (H): ICD-10-CM

## 2023-10-12 DIAGNOSIS — Z01.818 PRE-OP EXAM: Primary | ICD-10-CM

## 2023-10-12 DIAGNOSIS — I35.0 AORTIC VALVE STENOSIS, UNSPECIFIED ETIOLOGY: ICD-10-CM

## 2023-10-12 DIAGNOSIS — I89.0 LYMPHEDEMA: Primary | ICD-10-CM

## 2023-10-12 DIAGNOSIS — G89.29 CHRONIC PAIN OF RIGHT KNEE: ICD-10-CM

## 2023-10-12 DIAGNOSIS — I10 BENIGN ESSENTIAL HYPERTENSION: ICD-10-CM

## 2023-10-12 DIAGNOSIS — Z28.21 COVID-19 VACCINE DOSE DECLINED: ICD-10-CM

## 2023-10-12 DIAGNOSIS — E78.5 HYPERLIPIDEMIA LDL GOAL <100: ICD-10-CM

## 2023-10-12 DIAGNOSIS — Z95.2 S/P TAVR (TRANSCATHETER AORTIC VALVE REPLACEMENT): Primary | ICD-10-CM

## 2023-10-12 PROCEDURE — 99214 OFFICE O/P EST MOD 30 MIN: CPT | Performed by: NURSE PRACTITIONER

## 2023-10-12 PROCEDURE — 97140 MANUAL THERAPY 1/> REGIONS: CPT | Mod: GP | Performed by: PHYSICAL THERAPIST

## 2023-10-12 PROCEDURE — 93000 ELECTROCARDIOGRAM COMPLETE: CPT | Performed by: NURSE PRACTITIONER

## 2023-10-12 RX ORDER — RESPIRATORY SYNCYTIAL VIRUS VACCINE 120MCG/0.5
0.5 KIT INTRAMUSCULAR ONCE
Qty: 1 EACH | Refills: 0 | Status: CANCELLED | OUTPATIENT
Start: 2023-10-12 | End: 2023-10-12

## 2023-10-12 ASSESSMENT — PAIN SCALES - GENERAL: PAINLEVEL: NO PAIN (0)

## 2023-10-12 NOTE — PROGRESS NOTES
Cardiology Clinic Progress Note  Laila Perez MRN# [id number]   YOB: 1950 Age: 73 year old      Primary Cardiologist:   Dr. Cerda       Patient presents today for 1 year TAVR follow-up and preop cardiac clearance          History of Presenting Illness:      Laila Perez is a pleasant 73 year old patient with a past cardiac history significant for   Severe symptomatic aortic stenosis  TAVR 10/2022 34 mm Medtronic evolute valve  Post op MG 14 mmHg  1 mo MG  9 mmHg  1 yr MG 9 mmHg, trace PVL   Ascending aortic aneurysm  Ascending aorta 4.4 cm 2021, 11/2022, 2023  Hypertension  Past medical history significant for obesity, transgender identifying as female.        Patient was seen by Dr. Ramires in August 2022.  She was noted to have progressive, debilitating dyspnea on exertion over the prior 6 months.  Echocardiogram showed severe aortic stenosis with mean gradient of 41 mmHg and ARISTEO of 0.9 cm , ascending aorta moderately dilated 4.4 cm similar to prior MRA from 2021. Preop coronary angiogram showing normal coronary arteries.     She underwent elective TAVR on 10/4/2022 with 34 mm Medtronic evolute valve.  She had no significant postop complications.  Post procedure echocardiogram showed normal EF, mean gradient 14 mmHg.  EKG post TAVR showed new LBBB with first-degree AV block and she was discharged with an event monitor and Atenolol discontinued.  She had no significant findings on event monitor but had 2 runs of VT lasting 5 beats, at cardiac rehab.  Post TAVR she had improved energy and dyspnea on exertion.  She has monitored her VT with [employer] mobile EKG.     Most recent lipid profile, BMP, CBC, ALT reviewed today.  Echo October 2023 showing normal EF, TAVR valve with mean gradient 9 mmHg, trace PVL, ascending aorta 4.4 cm and per the reader there were no significant changes.  EKG today, personally reviewed by me, shows sinus rhythm with LBBB ventricular rate 70 bpm.  Results reviewed  today.     Blood pressure well controlled.  Weight is down 8 pounds in the last 6 months.  Her continues with stable dyspnea on exertion. Lower extremity edema is significantly improved and she has been using compression wraps.  She denies any heart failure symptoms. Patient reports no chest pain, PND, orthopnea, presyncope, syncope, edema, heart racing, or palpitations.    The patient is scheduled to undergo TKA on 10/26/23 which is of intermediate cardiac risk of MI or cardiac event 1-5%.  Her TAVR valve is functioning well and has no concerns for heart failure.  Blood pressures are well controlled.  Cardiac medications have been optimized to reduce her risk from a cardiac standpoint.                     Assessment and Plan:       Plan  Patient Instructions   Medication Changes:  None     Recommendations:  Check blood pressure at least 1 hour after medications. Call the clinic if your blood pressure is consistently greater than 130/80.     Follow-up:  Cardiology follow up at [facility]: Dr. Cerda in 6 months with fasting labs prior    Cardiology Scheduling~[phone]  Cardiology Clinic RN~[phone] (Karlene RN, Sujey RN, Fina RN)               S/P TAVR (transcatheter aortic valve replacement)  Aortic valve stenosis, unspecified etiology  Benign essential hypertension  Aneurysm of ascending aorta without rupture (H24)  Hyperlipidemia LDL goal <100      Severe symptomatic aortic stenosis  Status post TAVR 34 mm Medtronic evolute valve 10/2022  Prior symptoms of MARCELINO are improved post procedure, current NYHA class I-II  Lifelong SBE prophylaxis for dental procedures/ cleanings (amoxicillin 2G or clindamycin 600 mg 60 mins prior)  Follow with echo        Ascending aortic aneurysm  Stable  Follow with echo       Hypertension  Controlled   Continue lisinopril        NSVT   2 runs of nonsustained VT lasting 5 beats, at cardiac rehab 11/2022  Continue to monitor with Mitesh   Consider restarting metoprolol, if  needed (would then reassess for any significant bradycardia with Holter monitor, given history of new LBBB post TAVR)         Respiratory:  clear to auscultation; normal symmetry        Cardiac: regular rate and rhythm     GI:  nondistended     Extremities and Muscular Skeletal:   no edema            Thank you for allowing me to participate in this delightful patient's care.      This note was completed in part using Dragon voice recognition software. Although reviewed after completion, some word and grammatical errors may occur.    Carley Rodriguez, APRN CNP

## 2023-10-12 NOTE — PATIENT INSTRUCTIONS
Medication Changes:  None     Recommendations:  Check blood pressure at least 1 hour after medications. Call the clinic if your blood pressure is consistently greater than 130/80.     Follow-up:  Cardiology follow up at [facility]: Dr. Cerda in 6 months with fasting labs prior    Cardiology Scheduling~[phone]  Cardiology Clinic RN~[phone] (Karlene RN, Sujey RN, Fina RN)

## 2023-10-12 NOTE — H&P (VIEW-ONLY)
[facility]  [address] [place] [id number]  Phone: [phone]  Fax: [phone]  Primary Provider: Delmis Simons  Pre-op Performing Provider: DELMIS SIMONS      PREOPERATIVE EVALUATION:  Today's date: 10/12/2023    Laila is a 73 year old adult who presents for a preoperative evaluation.      10/12/2023     1:21 PM   Additional Questions   Roomed by Shakila   Accompanied by Akiko- staff        Surgical Information:  Surgery/Procedure: Total knee arthroplasty  Surgery Location: [facility]   Surgeon: Vipin  Surgery Date: 10/26/2023  Time of Surgery: tbd   Where patient plans to recover: At home with family  Fax number for surgical facility: Note does not need to be faxed, will be available electronically in Epic.    Assessment & Plan     The proposed surgical procedure is considered INTERMEDIATE risk.    Pre-op exam    Chronic pain of right knee    COVID-19 vaccine dose declined       Implanted Device:  TAVR 10/2022 34 mm Medtronic evolute valve      - No identified additional risk factors other than previously addressed    Antiplatelet or Anticoagulation Medication Instructions:   - Patient is on no antiplatelet or anticoagulation medications.    Additional Medication Instructions:   - ACE/ARB: HOLD on day of surgery (minimum 11 hours for general anesthesia).   - Statins: Continue taking on the day of surgery.     RECOMMENDATION:  APPROVAL GIVEN to proceed with proposed procedure, without further diagnostic evaluation.    Call or return to the clinic with any worsening of symptoms or no resolution. Patient/Parent verbalized understanding and is in agreement. Medication side effects reviewed.   Current Outpatient Medications   Medication Sig Dispense Refill    acetaminophen (TYLENOL) 325 MG tablet Take 975 mg by mouth nightly as needed for mild pain      albuterol (PROAIR HFA/PROVENTIL HFA/VENTOLIN HFA) 108 (90 Base) MCG/ACT inhaler Inhale 2 puffs into the  lungs every 6 hours 18 g 0    brimonidine (ALPHAGAN) 0.2 % ophthalmic solution Place 1 drop into both eyes 2 times daily      dorzolamide-timolol (COSOPT) 2-0.5 % ophthalmic solution Place 1 drop into both eyes 2 times daily      estradiol (VIVELLE-DOT) 0.025 MG/24HR bi-weekly patch Place 1 patch onto the skin twice a week 24 patch 1    ipratropium - albuterol 0.5 mg/2.5 mg/3 mL (DUONEB) 0.5-2.5 (3) MG/3ML neb solution Take 1 vial (3 mLs) by nebulization every 6 hours as needed for shortness of breath or wheezing 90 mL 4    latanoprost (XALATAN) 0.005 % ophthalmic solution Place 1 drop into both eyes At Bedtime      lisinopril (ZESTRIL) 5 MG tablet TAKE 1 TABLET (5 MG) BY MOUTH DAILY 90 tablet 0    lovastatin (MEVACOR) 20 MG tablet TAKE ONE TABLET BY MOUTH EVERY NIGHT AT BEDTIME 90 tablet 2    Multiple Vitamin (DAILY-EVANGELISTA) TABS Take 1 tablet by mouth daily  11    mupirocin (BACTROBAN) 2 % external ointment Apply topically 3 times daily 30 g 1    nitroGLYcerin (NITROSTAT) 0.4 MG sublingual tablet For chest pain place 1 tablet under the tongue every 5 minutes for 3 doses. If symptoms persist 5 minutes after 1st dose call 911. 30 tablet 1     Chart documentation with Dragon Voice recognition Software. Although reviewed after completion, some words and grammatical errors may remain.  Delmis Simons MSN,FNP-[address] [address] [phone]        Subjective       HPI related to upcoming procedure: Right knee pain chronic and ongoing     Needs a shower chair and cold packs   Has a hand held sprayer and grab bar in the shower   Ice Pack (3-Piece Set) - Reusable Hot and Cold Therapy Gel Wrap Support Injury Recovery, Alleviate Joint and Muscle Pain - Rotator Cuff, Knees, Back & More (3 Piece Set - Classic)    [facility]   Knee surgery total right scheduled  No recent inhaler use.   No recent chest pain or shortness of breath    Per Cardiology visit on  10/12/2023 with JERRICA Marley CNP - The patient is scheduled to undergo TKA on 10/26/23 which is of intermediate cardiac risk of MI or cardiac event 1-5%.  Her TAVR valve is functioning well and has no concerns for heart failure.  Blood pressures are well controlled.  Cardiac medications have been optimized to reduce her risk from a cardiac standpoint.     S/P TAVR (transcatheter aortic valve replacement) 10/2022  Aortic valve stenosis    Lifelong SBE prophylaxis for dental procedures/ cleanings (amoxicillin 2G or clindamycin 600 mg 60 mins prior)       6/15/2023     9:43 AM   Preop Questions   1. Have you ever had a heart attack or stroke? No   2. Have you ever had surgery on your heart or blood vessels, such as a stent placement, a coronary artery bypass, or surgery on an artery in your head, neck, heart, or legs? YES -    3. Do you have chest pain with activity? No   4. Do you have a history of  heart failure? No   5. Do you currently have a cold, bronchitis or symptoms of other infection? No   6. Do you have a cough, shortness of breath, or wheezing? No    7. Do you or anyone in your family have previous history of blood clots? No   8. Do you or does anyone in your family have a serious bleeding problem such as prolonged bleeding following surgeries or cuts? No   9. Have you ever had problems with anemia or been told to take iron pills? No   10. Have you had any abnormal blood loss such as black, tarry or bloody stools, or abnormal vaginal bleeding? No   11. Have you ever had a blood transfusion? YES -    11a. Have you ever had a transfusion reaction? No   12. Are you willing to have a blood transfusion if it is medically needed before, during, or after your surgery? Yes   13. Have you or any of your relatives ever had problems with anesthesia? No   14. Do you have sleep apnea, excessive snoring or daytime drowsiness? YES -    14a. Do you have a CPAP machine? No   15. Do you have any  artifical heart valves or other implanted medical devices like a pacemaker, defibrillator, or continuous glucose monitor? YES - Valve   TAVR 10/2022 34 mm Medtronic evolute valve    15a. What type of device do you have? Artifical heart valve   15b. Name of the clinic that manages your device:  [facility]   16. Do you have artificial joints? No   17. Are you allergic to latex? No     Health Care Directive:  Patient has a Health Care Directive on file      Preoperative Review of :   reviewed - no record of controlled substances prescribed.       has a past medical history of Alcoholism (H), DDD (degenerative disc disease), cervical, Gender dysphoria in adult, HLD (hyperlipidemia), HTN (hypertension), large compression fracture (03/13/2006), OA (osteoarthritis of spine), Obese, and Unspecified internal derangement of knee.  Past Surgical History:   Procedure Laterality Date    COLONOSCOPY 2007    CV CORONARY ANGIOGRAM N/A 9/8/2022    Procedure: Coronary Angiogram;  Surgeon: Santy Clayton MD;  Location: [facility] CARDIAC CATH LAB    CV PCI N/A 9/8/2022    Procedure: Percutaneous Coronary Intervention;  Surgeon: Santy Clayton MD;  Location: [facility] CARDIAC CATH LAB    CV TRANSCATHETER AORTIC VALVE REPLACEMENT-FEMORAL APPROACH N/A 10/4/2022    Procedure: Transcatheter Aortic Valve Replacement-Femoral Approach;  Surgeon: Young Ramires MD;  Location: [facility] CARDIAC CATH LAB    JOINT REPLACEMTN, KNEE RT/LT  2006    left    ORCHIECTOMY SCROTAL Bilateral 5/14/2019    Procedure: Bilatera Scrotal Orchiectomy;  Surgeon: Abhilash Weston MD;  Location: [facility] OR        Allergies   Allergen Reactions    External Allergen Needs Reconciliation - See Comment      Please reconcile the Patient's allergy reported as ESTERSPENICILLIN and update accordingly          Heparin Rash     he lives in a assisted living program and is not sure what type of allergies he really has. 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  Uses pronouns She/Her/Hers/Herself that is on therapy for feminizing hormone therapy with Dr Mane last seen 8/22/2023  Gender identity: female  Started Hormone  therapy  2016  Continues on TD estradiol  0.05 mg patch 2x/wk  Patient Active Problem List    Diagnosis Date Noted    Lymphedema 09/11/2023     Priority: Medium    Thrombocytopenia (H24) 03/23/2023     Priority: Medium    S/P TAVR (transcatheter aortic valve replacement) 12/22/2022     Priority: Medium    Aneurysm of ascending aorta without rupture (H24) 12/22/2022     Priority: Medium    Aortic stenosis, severe 10/04/2022     Priority: Medium    Shortness of breath  09/29/2022     Priority: Medium    Thoracic aortic aneurysm without rupture 09/29/2022     Priority: Medium    Status post coronary angiogram 09/08/2022     Priority: Medium    Alcohol dependence in remission (H) 03/18/2022     Priority: Medium    Aortic aneurysm without rupture, unspecified portion of aorta (H24) 03/18/2022     Priority: Medium    Episodic tension-type headache, not intractable 06/11/2020     Priority: Medium    Multiple gastric ulcers 01/20/2020     Priority: Medium    Obesity, Class III, BMI 40-49.9 (morbid obesity) (H) 08/06/2018     Priority: Medium     Will continue phentermine 15mg daily  Will start topiramate, 25mg and increasing to 75mg daily  Aug 2020: has been very physically active, weight down 20 pounds from April, now 282. Says have low energy during the day, so wants to eat more sugar.   Sleep: in bed at 8pm, falls asleep by 8:30pm, wakes at 4am-5pm. Naps from 3pm-3:30pm. Wakes for bathroom 3-4 times, falls asleep right after. Not sure if she snores.  Phentermine: takes 15mg every other day, and  topiramate 75mg every evening. GFR was 60 in 3/2020. Has no history of kidney stones.   - will continue phentermine 15mg and topiramate 75mg daily  - home BP measures have been normal per patient  - goal for gender surgery is 250pounds  Nov 2020: weighs 267 today, goal is 250. Is very happy about this progress. Is exercising more and eating juan carlos when she gets hungry. Has stopped topiramate and phentermine because she wants to make this last push on her own without medications. She felt that they were making her have to urinate more often than before. This may be due to ketosis diuresis, but it is fine if she wants to be off of medications.   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 Interval increase surrounding  osteophyte formation and decreased disc space height surrounding the  L1 vertebrae, though compression fracture was identified on previous  exam. Mild diffuse osteophyte formation throughout the visualized  thoracic and lumbar spine. Multilevel degenerative disc disease also  noted, most notable at L5-S1. No acute osseous abnormality.      OA (osteoarthritis) of knee 11/13/2008     Priority: Medium    Benign essential hypertension 10/30/2008     Priority: Medium    Mixed hyperlipidemia 10/09/2006      Priority: Medium    Gouty arthropathy 10/02/2006     Priority: Medium     Problem list name updated by automated process. Provider to review and confirm  [employer] Update utility      Mental or behavioral problem 10/02/2006     Priority: Medium     Followed by Nacho Prather, psychiatry in past- he has retired. Pt stable on fluoxetine, seroquel. Also sees counselor regular. Has had some issues in past OCD.  Problem list name updated by automated process. Provider to review      Generalized osteoarthrosis, unspecified site 10/02/2006     Priority: Medium    Disorder of bone and cartilage 10/02/2006     Priority: Medium     See DEXA in EPIC  Problem list name updated by automated process. Provider to review        Past Medical History:   Diagnosis Date    Alcoholism (H)     DDD (degenerative disc disease), cervical     Gender dysphoria in adult     HLD (hyperlipidemia)     HTN (hypertension)     large compression fracture 03/13/2006    Worked up for neoplasm, none found.      OA (osteoarthritis of spine)     Obese     Unspecified internal derangement of knee     CHRONIC KNEE PAIN,LEG,NECK AND HEAD INJURIES     Past Surgical History:   Procedure Laterality Date    COLONOSCOPY  2007    CV CORONARY ANGIOGRAM N/A 9/8/2022    Procedure: Coronary Angiogram;  Surgeon: Santy Clayton MD;  Location: [facility] CARDIAC CATH LAB    CV PCI N/A 9/8/2022    Procedure: Percutaneous Coronary Intervention;  Surgeon: Santy Clayton MD;  Location: [facility] CARDIAC CATH LAB    CV TRANSCATHETER AORTIC VALVE REPLACEMENT-FEMORAL APPROACH N/A 10/4/2022    Procedure: Transcatheter Aortic Valve Replacement-Femoral Approach;  Surgeon: Young Ramires MD;  [facility]    JOINT REPLACEMTN, KNEE RT/LT  2006    left    ORCHIECTOMY SCROTAL Bilateral 5/14/2019    Procedure: Bilatera Scrotal Orchiectomy;  Surgeon: Abhilash Weston MD;  Location: [facility] OR     Current Outpatient Medications   Medication Sig Dispense  Refill    acetaminophen (TYLENOL) 325 MG tablet Take 975 mg by mouth nightly as needed for mild pain      albuterol (PROAIR HFA/PROVENTIL HFA/VENTOLIN HFA) 108 (90 Base) MCG/ACT inhaler Inhale 2 puffs into the lungs every 6 hours 18 g 0    brimonidine (ALPHAGAN) 0.2 % ophthalmic solution Place 1 drop into both eyes 2 times daily      dorzolamide-timolol (COSOPT) 2-0.5 % ophthalmic solution Place 1 drop into both eyes 2 times daily      estradiol (VIVELLE-DOT) 0.025 MG/24HR bi-weekly patch Place 1 patch onto the skin twice a week 24 patch 1    ipratropium - albuterol 0.5 mg/2.5 mg/3 mL (DUONEB) 0.5-2.5 (3) MG/3ML neb solution Take 1 vial (3 mLs) by nebulization every 6 hours as needed for shortness of breath or wheezing 90 mL 4    latanoprost (XALATAN) 0.005 % ophthalmic solution Place 1 drop into both eyes At Bedtime      lisinopril (ZESTRIL) 5 MG tablet TAKE 1 TABLET (5 MG) BY MOUTH DAILY 90 tablet 0    lovastatin (MEVACOR) 20 MG tablet TAKE ONE TABLET BY MOUTH EVERY NIGHT AT BEDTIME 90 tablet 2    Multiple Vitamin (DAILY-EVANGELISTA) TABS Take 1 tablet by mouth daily  11    mupirocin (BACTROBAN) 2 % external ointment Apply topically 3 times daily 30 g 1    nitroGLYcerin (NITROSTAT) 0.4 MG sublingual tablet For chest pain place 1 tablet under the tongue every 5 minutes for 3 doses. If symptoms persist 5 minutes after 1st dose call 916. 30 tablet 1       Allergies   Allergen Reactions    External Allergen Needs Reconciliation - See Comment      Please reconcile the Patient's allergy reported as ESTERSPENICILLIN and update accordingly          Heparin Rash     he lives in a assisted living program and is not sure what type of allergies he really has. He quit drinking        Social History     Tobacco Use    Smoking status: Never     Passive exposure: Never    Smokeless tobacco: Never   Substance Use Topics    Alcohol use: No     Comment: Quit 3/24/05       History   Drug Use No         Objective     /78   Pulse 69   " Temp 98.3  F (36.8  C) (Tympanic)   Resp 14   Ht 1.803 m (5' 11\")   Wt (!) 152.4 kg (336 lb)   SpO2 99%   BMI 46.86 kg/m      Physical Exam    GENERAL APPEARANCE: healthy, alert and no distress     EYES: EOMI, PERRL     HENT: ear canals and TM's normal and nose and mouth without ulcers or lesions     NECK: no adenopathy, no asymmetry, masses, or scars and thyroid normal to palpation     RESP: lungs clear to auscultation - no rales, rhonchi or wheezes     CV: regular rates and rhythm and peripheral pulses strong     ABDOMEN:  soft, nontender, no HSM or masses and bowel sounds normal     MS: extremities normal grossly normal limited range of motion and swelling to the right knee      SKIN: no suspicious lesions or rashes     NEURO: Normal strength and tone, sensory exam grossly normal, mentation intact and speech normal     PSYCH: mentation appears normal. and affect normal/bright     LYMPHATICS: No cervical adenopathy    Recent Labs   Lab Test 10/09/23  0921 05/25/23  0952 10/04/22  0827 09/29/22  1421 09/08/22  0801   HGB 14.6 14.6   < > 14.3 13.9   * 103*   < > 136* 121*   INR  --   --   --  1.15 1.14    141   < > 144 135   POTASSIUM 4.7 4.6   < > 4.7 4.4   CR 0.99 1.00   < > 1.11 1.05    < > = values in this interval not displayed.        Diagnostics:  Recent Results (from the past 720 hour(s))   Basic metabolic panel    Collection Time: 10/09/23  9:21 AM   Result Value Ref Range    Sodium 139 135 - 145 mmol/L    Potassium 4.7 3.4 - 5.3 mmol/L    Chloride 101 98 - 107 mmol/L    Carbon Dioxide (CO2) 29 22 - 29 mmol/L    Anion Gap 9 7 - 15 mmol/L    Urea Nitrogen 16.0 8.0 - 23.0 mg/dL    Creatinine 0.99 0.51 - 1.17 mg/dL    GFR Estimate 60 (L) >60 mL/min/1.73m2    Calcium 10.1 8.8 - 10.2 mg/dL    Glucose 98 70 - 99 mg/dL   CBC with platelets    Collection Time: 10/09/23  9:21 AM   Result Value Ref Range    WBC Count 6.6 4.0 - 11.0 10e3/uL    RBC Count 4.78 3.80 - 5.90 10e6/uL    Hemoglobin 14.6 " 11.7 - 17.7 g/dL    Hematocrit 45.3 35.0 - 53.0 %    MCV 95 78 - 100 fL    MCH 30.5 26.5 - 33.0 pg    MCHC 32.2 31.5 - 36.5 g/dL    RDW 12.7 10.0 - 15.0 %    Platelet Count 113 (L) 150 - 450 10e3/uL   Echocardiogram Complete    Collection Time: 10/09/23 10:31 AM   Result Value Ref Range    LVEF  55-60%       No EKG this visit, completed in the last 90 days.           Signed Electronically by: JERRICA Salcedo CNP  Copy of this evaluation report is provided to requesting physician.

## 2023-10-12 NOTE — PROGRESS NOTES
[facility]  [address] [place] [id number]  Phone: [phone]  Fax: [phone]  Primary Provider: Delmis Simons  Pre-op Performing Provider: DELMIS SIMONS      PREOPERATIVE EVALUATION:  Today's date: 10/12/2023    Laila is a 73 year old adult who presents for a preoperative evaluation.      10/12/2023     1:21 PM   Additional Questions   Roomed by Shakila   Accompanied by Akiko- staff        Surgical Information:  Surgery/Procedure: Total knee arthroplasty  Surgery Location: [facility]   Surgeon: Vipin  Surgery Date: 10/26/2023  Time of Surgery: tbd   Where patient plans to recover: At home with family  Fax number for surgical facility: Note does not need to be faxed, will be available electronically in Epic.    Assessment & Plan     The proposed surgical procedure is considered INTERMEDIATE risk.    Pre-op exam    Chronic pain of right knee    COVID-19 vaccine dose declined       Implanted Device:  TAVR 10/2022 34 mm Medtronic evolute valve      - No identified additional risk factors other than previously addressed    Antiplatelet or Anticoagulation Medication Instructions:   - Patient is on no antiplatelet or anticoagulation medications.    Additional Medication Instructions:   - ACE/ARB: HOLD on day of surgery (minimum 11 hours for general anesthesia).   - Statins: Continue taking on the day of surgery.     RECOMMENDATION:  APPROVAL GIVEN to proceed with proposed procedure, without further diagnostic evaluation.    Call or return to the clinic with any worsening of symptoms or no resolution. Patient/Parent verbalized understanding and is in agreement. Medication side effects reviewed.   Current Outpatient Medications   Medication Sig Dispense Refill    acetaminophen (TYLENOL) 325 MG tablet Take 975 mg by mouth nightly as needed for mild pain      albuterol (PROAIR HFA/PROVENTIL HFA/VENTOLIN HFA) 108 (90 Base) MCG/ACT inhaler Inhale 2 puffs into the  lungs every 6 hours 18 g 0    brimonidine (ALPHAGAN) 0.2 % ophthalmic solution Place 1 drop into both eyes 2 times daily      dorzolamide-timolol (COSOPT) 2-0.5 % ophthalmic solution Place 1 drop into both eyes 2 times daily      estradiol (VIVELLE-DOT) 0.025 MG/24HR bi-weekly patch Place 1 patch onto the skin twice a week 24 patch 1    ipratropium - albuterol 0.5 mg/2.5 mg/3 mL (DUONEB) 0.5-2.5 (3) MG/3ML neb solution Take 1 vial (3 mLs) by nebulization every 6 hours as needed for shortness of breath or wheezing 90 mL 4    latanoprost (XALATAN) 0.005 % ophthalmic solution Place 1 drop into both eyes At Bedtime      lisinopril (ZESTRIL) 5 MG tablet TAKE 1 TABLET (5 MG) BY MOUTH DAILY 90 tablet 0    lovastatin (MEVACOR) 20 MG tablet TAKE ONE TABLET BY MOUTH EVERY NIGHT AT BEDTIME 90 tablet 2    Multiple Vitamin (DAILY-EVANGELISTA) TABS Take 1 tablet by mouth daily  11    mupirocin (BACTROBAN) 2 % external ointment Apply topically 3 times daily 30 g 1    nitroGLYcerin (NITROSTAT) 0.4 MG sublingual tablet For chest pain place 1 tablet under the tongue every 5 minutes for 3 doses. If symptoms persist 5 minutes after 1st dose call 911. 30 tablet 1     Chart documentation with Dragon Voice recognition Software. Although reviewed after completion, some words and grammatical errors may remain.  Delmis Simons MSN,FNP-[address] [address] [phone]        Subjective       HPI related to upcoming procedure: Right knee pain chronic and ongoing     Needs a shower chair and cold packs   Has a hand held sprayer and grab bar in the shower   Ice Pack (3-Piece Set) - Reusable Hot and Cold Therapy Gel Wrap Support Injury Recovery, Alleviate Joint and Muscle Pain - Rotator Cuff, Knees, Back & More (3 Piece Set - Classic)    [facility]   Knee surgery total right scheduled  No recent inhaler use.   No recent chest pain or shortness of breath    Per Cardiology visit on  10/12/2023 with JERRICA Marley CNP - The patient is scheduled to undergo TKA on 10/26/23 which is of intermediate cardiac risk of MI or cardiac event 1-5%.  Her TAVR valve is functioning well and has no concerns for heart failure.  Blood pressures are well controlled.  Cardiac medications have been optimized to reduce her risk from a cardiac standpoint.     S/P TAVR (transcatheter aortic valve replacement) 10/2022  Aortic valve stenosis    Lifelong SBE prophylaxis for dental procedures/ cleanings (amoxicillin 2G or clindamycin 600 mg 60 mins prior)       6/15/2023     9:43 AM   Preop Questions   1. Have you ever had a heart attack or stroke? No   2. Have you ever had surgery on your heart or blood vessels, such as a stent placement, a coronary artery bypass, or surgery on an artery in your head, neck, heart, or legs? YES -    3. Do you have chest pain with activity? No   4. Do you have a history of  heart failure? No   5. Do you currently have a cold, bronchitis or symptoms of other infection? No   6. Do you have a cough, shortness of breath, or wheezing? No    7. Do you or anyone in your family have previous history of blood clots? No   8. Do you or does anyone in your family have a serious bleeding problem such as prolonged bleeding following surgeries or cuts? No   9. Have you ever had problems with anemia or been told to take iron pills? No   10. Have you had any abnormal blood loss such as black, tarry or bloody stools, or abnormal vaginal bleeding? No   11. Have you ever had a blood transfusion? YES -    11a. Have you ever had a transfusion reaction? No   12. Are you willing to have a blood transfusion if it is medically needed before, during, or after your surgery? Yes   13. Have you or any of your relatives ever had problems with anesthesia? No   14. Do you have sleep apnea, excessive snoring or daytime drowsiness? YES -    14a. Do you have a CPAP machine? No   15. Do you have any  artifical heart valves or other implanted medical devices like a pacemaker, defibrillator, or continuous glucose monitor? YES - Valve   TAVR 10/2022 34 mm Medtronic evolute valve    15a. What type of device do you have? Artifical heart valve   15b. Name of the clinic that manages your device:  [facility]   16. Do you have artificial joints? No   17. Are you allergic to latex? No     Health Care Directive:  Patient has a Health Care Directive on file      Preoperative Review of :   reviewed - no record of controlled substances prescribed.       has a past medical history of Alcoholism (H), DDD (degenerative disc disease), cervical, Gender dysphoria in adult, HLD (hyperlipidemia), HTN (hypertension), large compression fracture (03/13/2006), OA (osteoarthritis of spine), Obese, and Unspecified internal derangement of knee.  Past Surgical History:   Procedure Laterality Date    COLONOSCOPY 2007    CV CORONARY ANGIOGRAM N/A 9/8/2022    Procedure: Coronary Angiogram;  Surgeon: Santy Clayton MD;  Location: [facility] CARDIAC CATH LAB    CV PCI N/A 9/8/2022    Procedure: Percutaneous Coronary Intervention;  Surgeon: Santy Clayton MD;  Location: [facility] CARDIAC CATH LAB    CV TRANSCATHETER AORTIC VALVE REPLACEMENT-FEMORAL APPROACH N/A 10/4/2022    Procedure: Transcatheter Aortic Valve Replacement-Femoral Approach;  Surgeon: Young Ramires MD;  Location: [facility] CARDIAC CATH LAB    JOINT REPLACEMTN, KNEE RT/LT  2006    left    ORCHIECTOMY SCROTAL Bilateral 5/14/2019    Procedure: Bilatera Scrotal Orchiectomy;  Surgeon: Abhilash Weston MD;  [facility]        Allergies   Allergen Reactions    External Allergen Needs Reconciliation - See Comment      Please reconcile the Patient's allergy reported as ESTERSPENICILLIN and update accordingly          Heparin Rash     he lives in a assisted living program and is not sure what type of allergies he really has. 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  Uses pronouns She/Her/Hers/Herself that is on therapy for feminizing hormone therapy with Dr Mane last seen 8/22/2023  Gender identity: female  Started Hormone  therapy  2016  Continues on TD estradiol  0.05 mg patch 2x/wk  Patient Active Problem List    Diagnosis Date Noted    Lymphedema 09/11/2023     Priority: Medium    Thrombocytopenia (H24) 03/23/2023     Priority: Medium    S/P TAVR (transcatheter aortic valve replacement) 12/22/2022     Priority: Medium    Aneurysm of ascending aorta without rupture (H24) 12/22/2022     Priority: Medium    Aortic stenosis, severe 10/04/2022     Priority: Medium    Shortness of breath  09/29/2022     Priority: Medium    Thoracic aortic aneurysm without rupture 09/29/2022     Priority: Medium    Status post coronary angiogram 09/08/2022     Priority: Medium    Alcohol dependence in remission (H) 03/18/2022     Priority: Medium    Aortic aneurysm without rupture, unspecified portion of aorta (H24) 03/18/2022     Priority: Medium    Episodic tension-type headache, not intractable 06/11/2020     Priority: Medium    Multiple gastric ulcers 01/20/2020     Priority: Medium    Obesity, Class III, BMI 40-49.9 (morbid obesity) (H) 08/06/2018     Priority: Medium     Will continue phentermine 15mg daily  Will start topiramate, 25mg and increasing to 75mg daily  Aug 2020: has been very physically active, weight down 20 pounds from April, now 282. Says have low energy during the day, so wants to eat more sugar.   Sleep: in bed at 8pm, falls asleep by 8:30pm, wakes at 4am-5pm. Naps from 3pm-3:30pm. Wakes for bathroom 3-4 times, falls asleep right after. Not sure if she snores.  Phentermine: takes 15mg every other day, and  topiramate 75mg every evening. GFR was 60 in 3/2020. Has no history of kidney stones.   - will continue phentermine 15mg and topiramate 75mg daily  - home BP measures have been normal per patient  - goal for gender surgery is 250pounds  Nov 2020: weighs 267 today, goal is 250. Is very happy about this progress. Is exercising more and eating juan carlos when she gets hungry. Has stopped topiramate and phentermine because she wants to make this last push on her own without medications. She felt that they were making her have to urinate more often than before. This may be due to ketosis diuresis, but it is fine if she wants to be off of medications.   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 Interval increase surrounding  osteophyte formation and decreased disc space height surrounding the  L1 vertebrae, though compression fracture was identified on previous  exam. Mild diffuse osteophyte formation throughout the visualized  thoracic and lumbar spine. Multilevel degenerative disc disease also  noted, most notable at L5-S1. No acute osseous abnormality.      OA (osteoarthritis) of knee 11/13/2008     Priority: Medium    Benign essential hypertension 10/30/2008     Priority: Medium    Mixed hyperlipidemia 10/09/2006      Priority: Medium    Gouty arthropathy 10/02/2006     Priority: Medium     Problem list name updated by automated process. Provider to review and confirm  [employer] Update utility      Mental or behavioral problem 10/02/2006     Priority: Medium     Followed by Nacho Prather, psychiatry in past- he has retired. Pt stable on fluoxetine, seroquel. Also sees counselor regular. Has had some issues in past OCD.  Problem list name updated by automated process. Provider to review      Generalized osteoarthrosis, unspecified site 10/02/2006     Priority: Medium    Disorder of bone and cartilage 10/02/2006     Priority: Medium     See DEXA in EPIC  Problem list name updated by automated process. Provider to review        Past Medical History:   Diagnosis Date    Alcoholism (H)     DDD (degenerative disc disease), cervical     Gender dysphoria in adult     HLD (hyperlipidemia)     HTN (hypertension)     large compression fracture 03/13/2006    Worked up for neoplasm, none found.      OA (osteoarthritis of spine)     Obese     Unspecified internal derangement of knee     CHRONIC KNEE PAIN,LEG,NECK AND HEAD INJURIES     Past Surgical History:   Procedure Laterality Date    COLONOSCOPY  2007    CV CORONARY ANGIOGRAM N/A 9/8/2022    Procedure: Coronary Angiogram;  Surgeon: Santy Clayton MD;  Location: [facility] CARDIAC CATH LAB    CV PCI N/A 9/8/2022    Procedure: Percutaneous Coronary Intervention;  Surgeon: Santy Clayton MD;  Location: [facility] CARDIAC CATH LAB    CV TRANSCATHETER AORTIC VALVE REPLACEMENT-FEMORAL APPROACH N/A 10/4/2022    Procedure: Transcatheter Aortic Valve Replacement-Femoral Approach;  Surgeon: Young Ramires MD;  [facility]    JOINT REPLACEMTN, KNEE RT/LT  2006    left    ORCHIECTOMY SCROTAL Bilateral 5/14/2019    Procedure: Bilatera Scrotal Orchiectomy;  Surgeon: Abhilash Weston MD;  [facility] OR     Current Outpatient Medications   Medication Sig Dispense  Refill    acetaminophen (TYLENOL) 325 MG tablet Take 975 mg by mouth nightly as needed for mild pain      albuterol (PROAIR HFA/PROVENTIL HFA/VENTOLIN HFA) 108 (90 Base) MCG/ACT inhaler Inhale 2 puffs into the lungs every 6 hours 18 g 0    brimonidine (ALPHAGAN) 0.2 % ophthalmic solution Place 1 drop into both eyes 2 times daily      dorzolamide-timolol (COSOPT) 2-0.5 % ophthalmic solution Place 1 drop into both eyes 2 times daily      estradiol (VIVELLE-DOT) 0.025 MG/24HR bi-weekly patch Place 1 patch onto the skin twice a week 24 patch 1    ipratropium - albuterol 0.5 mg/2.5 mg/3 mL (DUONEB) 0.5-2.5 (3) MG/3ML neb solution Take 1 vial (3 mLs) by nebulization every 6 hours as needed for shortness of breath or wheezing 90 mL 4    latanoprost (XALATAN) 0.005 % ophthalmic solution Place 1 drop into both eyes At Bedtime      lisinopril (ZESTRIL) 5 MG tablet TAKE 1 TABLET (5 MG) BY MOUTH DAILY 90 tablet 0    lovastatin (MEVACOR) 20 MG tablet TAKE ONE TABLET BY MOUTH EVERY NIGHT AT BEDTIME 90 tablet 2    Multiple Vitamin (DAILY-EVANGELISTA) TABS Take 1 tablet by mouth daily  11    mupirocin (BACTROBAN) 2 % external ointment Apply topically 3 times daily 30 g 1    nitroGLYcerin (NITROSTAT) 0.4 MG sublingual tablet For chest pain place 1 tablet under the tongue every 5 minutes for 3 doses. If symptoms persist 5 minutes after 1st dose call 912. 30 tablet 1       Allergies   Allergen Reactions    External Allergen Needs Reconciliation - See Comment      Please reconcile the Patient's allergy reported as ESTERSPENICILLIN and update accordingly          Heparin Rash     he lives in a assisted living program and is not sure what type of allergies he really has. He quit drinking        Social History     Tobacco Use    Smoking status: Never     Passive exposure: Never    Smokeless tobacco: Never   Substance Use Topics    Alcohol use: No     Comment: Quit 3/24/05       History   Drug Use No         Objective     /78   Pulse 69   " Temp 98.3  F (36.8  C) (Tympanic)   Resp 14   Ht 1.803 m (5' 11\")   Wt (!) 152.4 kg (336 lb)   SpO2 99%   BMI 46.86 kg/m      Physical Exam    GENERAL APPEARANCE: healthy, alert and no distress     EYES: EOMI, PERRL     HENT: ear canals and TM's normal and nose and mouth without ulcers or lesions     NECK: no adenopathy, no asymmetry, masses, or scars and thyroid normal to palpation     RESP: lungs clear to auscultation - no rales, rhonchi or wheezes     CV: regular rates and rhythm and peripheral pulses strong     ABDOMEN:  soft, nontender, no HSM or masses and bowel sounds normal     MS: extremities normal grossly normal limited range of motion and swelling to the right knee      SKIN: no suspicious lesions or rashes     NEURO: Normal strength and tone, sensory exam grossly normal, mentation intact and speech normal     PSYCH: mentation appears normal. and affect normal/bright     LYMPHATICS: No cervical adenopathy    Recent Labs   Lab Test 10/09/23  0921 05/25/23  0952 10/04/22  0827 09/29/22  1421 09/08/22  0801   HGB 14.6 14.6   < > 14.3 13.9   * 103*   < > 136* 121*   INR  --   --   --  1.15 1.14    141   < > 144 135   POTASSIUM 4.7 4.6   < > 4.7 4.4   CR 0.99 1.00   < > 1.11 1.05    < > = values in this interval not displayed.        Diagnostics:  Recent Results (from the past 720 hour(s))   Basic metabolic panel    Collection Time: 10/09/23  9:21 AM   Result Value Ref Range    Sodium 139 135 - 145 mmol/L    Potassium 4.7 3.4 - 5.3 mmol/L    Chloride 101 98 - 107 mmol/L    Carbon Dioxide (CO2) 29 22 - 29 mmol/L    Anion Gap 9 7 - 15 mmol/L    Urea Nitrogen 16.0 8.0 - 23.0 mg/dL    Creatinine 0.99 0.51 - 1.17 mg/dL    GFR Estimate 60 (L) >60 mL/min/1.73m2    Calcium 10.1 8.8 - 10.2 mg/dL    Glucose 98 70 - 99 mg/dL   CBC with platelets    Collection Time: 10/09/23  9:21 AM   Result Value Ref Range    WBC Count 6.6 4.0 - 11.0 10e3/uL    RBC Count 4.78 3.80 - 5.90 10e6/uL    Hemoglobin 14.6 " 11.7 - 17.7 g/dL    Hematocrit 45.3 35.0 - 53.0 %    MCV 95 78 - 100 fL    MCH 30.5 26.5 - 33.0 pg    MCHC 32.2 31.5 - 36.5 g/dL    RDW 12.7 10.0 - 15.0 %    Platelet Count 113 (L) 150 - 450 10e3/uL   Echocardiogram Complete    Collection Time: 10/09/23 10:31 AM   Result Value Ref Range    LVEF  55-60%       No EKG this visit, completed in the last 90 days.           Signed Electronically by: JERRICA Salcedo CNP  Copy of this evaluation report is provided to requesting physician.

## 2023-10-12 NOTE — LETTER
10/12/2023    Delmis Raqueldeanna Simons, APRN CNP  [address] [facility] [address]    RE: Laila Perez       Dear Colleague,     I had the pleasure of seeing Laila Perez in the [facility].  Cardiology Clinic Progress Note  Laila Perez MRN# [id number]   YOB: 1950 Age: 73 year old      Primary Cardiologist:   Dr. Cerda       Patient presents today for 1 year TAVR follow-up and preop cardiac clearance          History of Presenting Illness:      Laila Perez is a pleasant 73 year old patient with a past cardiac history significant for   Severe symptomatic aortic stenosis  TAVR 10/2022 34 mm Medtronic evolute valve  Post op MG 14 mmHg  1 mo MG  9 mmHg  1 yr MG 9 mmHg, trace PVL   Ascending aortic aneurysm  Ascending aorta 4.4 cm 2021, 11/2022, 2023  Hypertension  Past medical history significant for obesity, transgender identifying as female.        Patient was seen by Dr. Ramires in August 2022.  She was noted to have progressive, debilitating dyspnea on exertion over the prior 6 months.  Echocardiogram showed severe aortic stenosis with mean gradient of 41 mmHg and ARISTEO of 0.9 cm , ascending aorta moderately dilated 4.4 cm similar to prior MRA from 2021. Preop coronary angiogram showing normal coronary arteries.     She underwent elective TAVR on 10/4/2022 with 34 mm Medtronic evolute valve.  She had no significant postop complications.  Post procedure echocardiogram showed normal EF, mean gradient 14 mmHg.  EKG post TAVR showed new LBBB with first-degree AV block and she was discharged with an event monitor and Atenolol discontinued.  She had no significant findings on event monitor but had 2 runs of VT lasting 5 beats, at cardiac rehab.  Post TAVR she had improved energy and dyspnea on exertion.  She has monitored her VT with [employer] mobile EKG.     Most recent lipid profile, BMP, CBC, ALT reviewed today.  Echo October 2023 showing normal EF, TAVR valve with  mean gradient 9 mmHg, trace PVL, ascending aorta 4.4 cm and per the reader there were no significant changes.  EKG today, personally reviewed by me, shows sinus rhythm with LBBB ventricular rate 70 bpm.  Results reviewed today.     Blood pressure well controlled.  Weight is down 8 pounds in the last 6 months.  Her continues with stable dyspnea on exertion. Lower extremity edema is significantly improved and she has been using compression wraps.  She denies any heart failure symptoms. Patient reports no chest pain, PND, orthopnea, presyncope, syncope, edema, heart racing, or palpitations.    The patient is scheduled to undergo TKA on 10/26/23 which is of intermediate cardiac risk of MI or cardiac event 1-5%.  Her TAVR valve is functioning well and has no concerns for heart failure.  Blood pressures are well controlled.  Cardiac medications have been optimized to reduce her risk from a cardiac standpoint.                     Assessment and Plan:       Plan  Patient Instructions   Medication Changes:  None     Recommendations:  Check blood pressure at least 1 hour after medications. Call the clinic if your blood pressure is consistently greater than 130/80.     Follow-up:  Cardiology follow up at [facility]: Dr. Cerda in 6 months with fasting labs prior    Cardiology Scheduling~[phone]  Cardiology Clinic RN~[phone] (Karlene RN, Sujey RN, Fina RN)               S/P TAVR (transcatheter aortic valve replacement)  Aortic valve stenosis, unspecified etiology  Benign essential hypertension  Aneurysm of ascending aorta without rupture (H24)  Hyperlipidemia LDL goal <100      Severe symptomatic aortic stenosis  Status post TAVR 34 mm Medtronic evolute valve 10/2022  Prior symptoms of MARCELINO are improved post procedure, current NYHA class I-II  Lifelong SBE prophylaxis for dental procedures/ cleanings (amoxicillin 2G or clindamycin 600 mg 60 mins prior)  Follow with echo        Ascending aortic  aneurysm  Stable  Follow with echo       Hypertension  Controlled   Continue lisinopril        NSVT   2 runs of nonsustained VT lasting 5 beats, at cardiac rehab 11/2022  Continue to monitor with Mitesh   Consider restarting metoprolol, if needed (would then reassess for any significant bradycardia with Holter monitor, given history of new LBBB post TAVR)         Respiratory:  clear to auscultation; normal symmetry        Cardiac: regular rate and rhythm     GI:  nondistended     Extremities and Muscular Skeletal:   no edema            Thank you for allowing me to participate in this delightful patient's care.      This note was completed in part using Dragon voice recognition software. Although reviewed after completion, some word and grammatical errors may occur.    JERRICA Marley CNP                [facility]  Q1A 3  Q1B 2  Q1C 1  Q2 2  Q3 3  Q4 2  Q5 3  Q6 3  Q7 3  Q8A 2  Q8B 3  Q8C 3      Thank you for allowing me to participate in the care of your patient.      Sincerely,     JERRICA Marley CNP     [facility]  cc:   JERRICA Gallo CNP  [address] [place] [address]

## 2023-10-16 ENCOUNTER — THERAPY VISIT (OUTPATIENT)
Dept: PHYSICAL THERAPY | Facility: CLINIC | Age: 73
End: 2023-10-16
Attending: ORTHOPAEDIC SURGERY
Payer: COMMERCIAL

## 2023-10-16 DIAGNOSIS — I89.0 LYMPHEDEMA: Primary | ICD-10-CM

## 2023-10-16 PROCEDURE — 97140 MANUAL THERAPY 1/> REGIONS: CPT | Mod: GP | Performed by: PHYSICAL THERAPIST

## 2023-10-17 ENCOUNTER — TELEPHONE (OUTPATIENT)
Dept: FAMILY MEDICINE | Facility: CLINIC | Age: 73
End: 2023-10-17
Payer: COMMERCIAL

## 2023-10-17 PROCEDURE — 99207 PR NON-BILLABLE SERV PER CHARTING: CPT | Performed by: NURSE PRACTITIONER

## 2023-10-17 NOTE — TELEPHONE ENCOUNTER
[facility]. Patient saw cardiology on 10/12 and had a pre op on 110/12 as well. Needing Delmis Simons to sign off on pre op notes ASAP.

## 2023-10-17 NOTE — TELEPHONE ENCOUNTER
Surgery is scheduled for the 26th.   Cardiology note reviewed  Delmis Simons MSN,FNP-BC  [facility]  [address] [place] [address] [phone]

## 2023-10-19 ENCOUNTER — TELEPHONE (OUTPATIENT)
Dept: FAMILY MEDICINE | Facility: CLINIC | Age: 73
End: 2023-10-19
Payer: COMMERCIAL

## 2023-10-19 DIAGNOSIS — M25.561 CHRONIC PAIN OF RIGHT KNEE: Primary | ICD-10-CM

## 2023-10-19 DIAGNOSIS — G89.29 CHRONIC PAIN OF RIGHT KNEE: Primary | ICD-10-CM

## 2023-10-19 NOTE — TELEPHONE ENCOUNTER
The patient called to request a DME for shower chair and ice pack. The patient did discuss this at the pre-op visit.  It was noted in the note from that visit.   [facility].    DME ordered.       Thank you    Delmis MCBRIDE RN

## 2023-10-23 ENCOUNTER — THERAPY VISIT (OUTPATIENT)
Dept: PHYSICAL THERAPY | Facility: CLINIC | Age: 73
End: 2023-10-23
Attending: ORTHOPAEDIC SURGERY
Payer: COMMERCIAL

## 2023-10-23 DIAGNOSIS — I89.0 LYMPHEDEMA: Primary | ICD-10-CM

## 2023-10-23 PROCEDURE — 97140 MANUAL THERAPY 1/> REGIONS: CPT | Mod: GP | Performed by: REHABILITATION PRACTITIONER

## 2023-10-24 ENCOUNTER — ANESTHESIA EVENT (OUTPATIENT)
Dept: SURGERY | Facility: CLINIC | Age: 73
End: 2023-10-24
Payer: COMMERCIAL

## 2023-10-24 NOTE — PROGRESS NOTES
PLAN  Continue therapy per current plan of care.    Beginning/End Dates of Progress Note Reporting Period:  09/11/23 to 10/12/2023    Referring Provider:  Palmer Lew MD           10/12/23 0500   Appointment Info   Signing clinician's name / credentials Julieta Staley, PT, DPT, CLT   Visits Used 9 BLE/[facility]/Palmer Lew([facility])   Medical Diagnosis Lymphedema of both lower extremities   PT Tx Diagnosis BLE secondary lympehdema / phlebolymphedema   Other pertinent information R-TKA scheduled for 10/26 with Dr. Palmer Lew ([facility]); pt will come Mon/Thurs due to those are the days she has rides   Progress Note/Certification   Onset of illness/injury or Date of Surgery 08/17/23  (date of referral)   Therapy Frequency 2x/week   Predicted Duration 12 weeks   Certification date from 09/11/23   Certification date to 12/10/23   Progress Note Due Date 10/11/23   Progress Note Completed Date 09/11/23   GOALS   PT Goals 2;3;4   PT Goal 1   Goal Identifier stg   Goal Description pt to have around the clock tolerance to BLE GCB for edema reduction response   Rationale to maximize safety and independence with self cares   Target Date 09/28/23   Date Met 09/25/23   PT Goal 2   Goal Identifier ltg   Goal Description once appropriate, pt and/or caregiver to be independent with donning, doffing and care of compression garments for BLEs for longterm edema management for maintenance   Rationale to maximize safety and independence with self cares   Target Date 12/13/23   Date Met 10/12/23   PT Goal 3   Goal Identifier ltg   Goal Description pt to be independent with longterm BLE edema management via HEP, elevation, skin cares and compression garment wear/use   Rationale to maximize safety and independence with self cares   Target Date 12/13/23   PT Goal 4   Goal Identifier ltg   Goal Description pt to have at least 3 point improvement on LLIS due to decreased edema and associated symptoms in BLE   Rationale to maximize  safety and independence with performance of ADLs and functional tasks   Target Date 12/13/23   Subjective Report   Subjective Report these wraps are great, I think I did a good job but I want you to tell me how I did   Objective Measures   Objective Measures Objective Measure 1   Objective Measure 1   Objective Measure girth (first set of measurements post GCB)   Details RLE +8.5% and LLE +3.2%   Manual Therapy   Manual Therapy: Mobilization, MFR, MLD, friction massage minutes (08057) 25   Manual Therapy 1 - Details pt into clinic wearing BLE velcro wraps, therapist assessed pt's donning technique and noted to be donned well with exception of encouraging pt to apply with slight more tension which pt reports she'll be able to do; therapist removed garments and took BLE girth measurements, educated pt and caregiver about increase on both which is expected/usually seen right after being done with GCB and will monitor legs closely; therapist re-donned garments to demo proper tension and pt reports comfort; education to re-tighten garments mid-day if loosening and importance of lotion on BLEs at nighttime when garments off; pt following wear schedule of getting them on within 30min of waking and off no more then 30min prior to bedtime; pt pat return on Monday for re-assessment and if measurements stable/decreased at that time will have pt do a 1 week follow-up which is one day prior to pts R-TKA   Skilled Intervention CDT partial; education on garment donning and skin cares   Patient Response/Progress pt pleased w/velcro wraps   Education   Learner/Method Patient;Caregiver;Listening;Demonstration   Plan   Home program compression garments during daytime, no night compression, skin care/lotion at night when garmnets off   Plan for next session BLE girth, modify as needed, likle cancel thursdays and have pt return on Mon 10/23 (one day prior to TKA)   Total Session Time   Timed Code Treatment Minutes 25   Total Treatment  Time (sum of timed and untimed services) 25

## 2023-10-24 NOTE — ANESTHESIA PREPROCEDURE EVALUATION
Anesthesia Pre-Procedure Evaluation    Patient: Laila Perez   MRN: [id number] : 1950        Procedure : Procedure(s):  Total Knee Arthroplasty          Past Medical History:   Diagnosis Date     Alcoholism (H)      DDD (degenerative disc disease), cervical      Gender dysphoria in adult      HLD (hyperlipidemia)      HTN (hypertension)      large compression fracture 2006    Worked up for neoplasm, none found.       OA (osteoarthritis of spine)      Obese      Unspecified internal derangement of knee     CHRONIC KNEE PAIN,LEG,NECK AND HEAD INJURIES      Past Surgical History:   Procedure Laterality Date     COLONOSCOPY       CV CORONARY ANGIOGRAM N/A 2022    Procedure: Coronary Angiogram;  Surgeon: Santy Clayton MD;  Location: [facility] CARDIAC CATH LAB     CV PCI N/A 2022    Procedure: Percutaneous Coronary Intervention;  Surgeon: Santy Clayton MD;  Location: [facility] CARDIAC CATH LAB     CV TRANSCATHETER AORTIC VALVE REPLACEMENT-FEMORAL APPROACH N/A 10/4/2022    Procedure: Transcatheter Aortic Valve Replacement-Femoral Approach;  Surgeon: Young Ramires MD;  Location: [facility] CARDIAC CATH LAB     JOINT REPLACEMTN, KNEE RT/LT      left     ORCHIECTOMY SCROTAL Bilateral 2019    Procedure: Bilatera Scrotal Orchiectomy;  Surgeon: Abhilash Weston MD;  Location: [facility]      Allergies   Allergen Reactions     External Allergen Needs Reconciliation - See Comment      Please reconcile the Patient's allergy reported as ESTERSPENICILLIN and update accordingly           Heparin Rash     he lives in a assisted living program and is not sure what type of allergies he really has. He quit drinking      Social History     Tobacco Use     Smoking status: Never     Passive exposure: Never     Smokeless tobacco: Never   Substance Use Topics     Alcohol use: No     Comment: Quit 3/24/05      Wt Readings from Last 1 Encounters:   10/12/23 (!) 152.4 kg (336 lb)         Anesthesia Evaluation   Pt has had prior anesthetic. Type: General, MAC and Regional.        ROS/MED HX  ENT/Pulmonary:     (+)     ANGELLA risk factors,  hypertension, obese,                               Neurologic:  - neg neurologic ROS     Cardiovascular: Comment:   Hx aortic valve stenosis, severe, hx of TAVR    AAA without rupture in hx    10/23 echo  1. Left ventricular systolic function is normal. The visual ejection fraction  is 55-60%.  2. Septal motion is consistent with conduction abnormality.  3. The right ventricle is normal in structure, function and size.  4. S/P 34 mm Medtronic TAVR valve; mean gradient 9 mmHg, peak velocity 2.1  m/sec. Trace perivalvular aortc regurgitation.  5. The ascending aorta is mildly dilated, 4.4 cm.  6. In comparison with the prior study, there has been no significant change.      (+) Dyslipidemia hypertension- -  CAD -  CABG-date: TAVR 2022. -           MARCELINO.                valvular problems/murmurs type: MR and AS          METS/Exercise Tolerance: 3 - Able to walk 1-2 blocks without stopping    Hematologic:  - neg hematologic  ROS     Musculoskeletal:   (+)  arthritis,             GI/Hepatic:  - neg GI/hepatic ROS     Renal/Genitourinary:  - neg Renal ROS     Endo:     (+)               Obesity (Morbid),       Psychiatric/Substance Use: Comment: Gender dysphoria      (+) psychiatric history  alcohol abuse      Infectious Disease:  - neg infectious disease ROS     Malignancy:  - neg malignancy ROS     Other:  - neg other ROS          Physical Exam    Airway  airway exam normal      Mallampati: II   TM distance: > 3 FB   Neck ROM: full   Mouth opening: > 3 cm    Respiratory Devices and Support         Dental       (+) Minor Abnormalities - some fillings, tiny chips      Cardiovascular   cardiovascular exam normal          Pulmonary   pulmonary exam normal        breath sounds clear to auscultation       OUTSIDE LABS:  CBC:   Lab Results   Component Value Date    WBC 6.6  "10/09/2023    WBC 4.1 05/25/2023    HGB 14.6 10/09/2023    HGB 14.6 05/25/2023    HCT 45.3 10/09/2023    HCT 45.0 05/25/2023     (L) 10/09/2023     (L) 05/25/2023     BMP:   Lab Results   Component Value Date     10/09/2023     05/25/2023    POTASSIUM 4.7 10/09/2023    POTASSIUM 4.6 05/25/2023    CHLORIDE 101 10/09/2023    CHLORIDE 104 05/25/2023    CO2 29 10/09/2023    CO2 30 (H) 05/25/2023    BUN 16.0 10/09/2023    BUN 17.4 05/25/2023    CR 0.99 10/09/2023    CR 1.00 05/25/2023    GLC 98 10/09/2023     (H) 05/25/2023     COAGS:   Lab Results   Component Value Date    PTT 30 09/08/2022    INR 1.15 09/29/2022     POC: No results found for: \"BGM\", \"HCG\", \"HCGS\"  HEPATIC:   Lab Results   Component Value Date    ALBUMIN 3.9 05/25/2023    PROTTOTAL 7.5 05/25/2023    ALT 51 (H) 05/25/2023    AST 60 (H) 05/25/2023    ALKPHOS 82 05/25/2023    BILITOTAL 0.3 05/25/2023     OTHER:   Lab Results   Component Value Date    A1C 4.8 04/26/2016    GOPAL 10.1 10/09/2023    PHOS 2.8 10/06/2022    MAG 2.0 10/06/2022    TSH 1.42 05/09/2023    CRP 18.1 (H) 04/25/2014    SED 9 04/25/2014       Anesthesia Plan    ASA Status:  4    NPO Status:  NPO Appropriate    Anesthesia Type: Spinal.      Maintenance: TIVA.        Consents    Anesthesia Plan(s) and associated risks, benefits, and realistic alternatives discussed. Questions answered and patient/representative(s) expressed understanding.     - Discussed:     - Discussed with:  Patient            Postoperative Care    Pain management: Peripheral nerve block (Single Shot), Multi-modal analgesia.   PONV prophylaxis: Ondansetron (or other 5HT-3), Dexamethasone or Solumedrol     Comments:                JERRICA Carvajal CRNA  "

## 2023-10-26 ENCOUNTER — PATIENT OUTREACH (OUTPATIENT)
Dept: GERIATRIC MEDICINE | Facility: CLINIC | Age: 73
End: 2023-10-26
Payer: COMMERCIAL

## 2023-10-26 ENCOUNTER — ANESTHESIA (OUTPATIENT)
Dept: SURGERY | Facility: CLINIC | Age: 73
End: 2023-10-26
Payer: COMMERCIAL

## 2023-10-26 ENCOUNTER — HOSPITAL ENCOUNTER (OUTPATIENT)
Facility: CLINIC | Age: 73
Discharge: HOME | End: 2023-10-27
Attending: ORTHOPAEDIC SURGERY | Admitting: ORTHOPAEDIC SURGERY
Payer: COMMERCIAL

## 2023-10-26 ENCOUNTER — APPOINTMENT (OUTPATIENT)
Dept: GENERAL RADIOLOGY | Facility: CLINIC | Age: 73
End: 2023-10-26
Attending: ORTHOPAEDIC SURGERY
Payer: COMMERCIAL

## 2023-10-26 ENCOUNTER — ANCILLARY PROCEDURE (OUTPATIENT)
Dept: ULTRASOUND IMAGING | Facility: CLINIC | Age: 73
End: 2023-10-26
Payer: COMMERCIAL

## 2023-10-26 DIAGNOSIS — Z96.651 STATUS POST RIGHT KNEE REPLACEMENT: Primary | ICD-10-CM

## 2023-10-26 PROBLEM — Z96.659 S/P KNEE REPLACEMENT: Status: ACTIVE | Noted: 2023-10-26

## 2023-10-26 LAB — GLUCOSE BLDC GLUCOMTR-MCNC: 101 MG/DL (ref 70–99)

## 2023-10-26 PROCEDURE — 258N000003 HC RX IP 258 OP 636: Performed by: NURSE ANESTHETIST, CERTIFIED REGISTERED

## 2023-10-26 PROCEDURE — 360N000077 HC SURGERY LEVEL 4, PER MIN: Performed by: ORTHOPAEDIC SURGERY

## 2023-10-26 PROCEDURE — 258N000003 HC RX IP 258 OP 636: Performed by: ORTHOPAEDIC SURGERY

## 2023-10-26 PROCEDURE — 250N000011 HC RX IP 250 OP 636: Mod: JZ | Performed by: NURSE ANESTHETIST, CERTIFIED REGISTERED

## 2023-10-26 PROCEDURE — 710N000009 HC RECOVERY PHASE 1, LEVEL 1, PER MIN: Performed by: ORTHOPAEDIC SURGERY

## 2023-10-26 PROCEDURE — 258N000003 HC RX IP 258 OP 636

## 2023-10-26 PROCEDURE — 250N000009 HC RX 250: Performed by: NURSE ANESTHETIST, CERTIFIED REGISTERED

## 2023-10-26 PROCEDURE — 250N000013 HC RX MED GY IP 250 OP 250 PS 637

## 2023-10-26 PROCEDURE — 999N000141 HC STATISTIC PRE-PROCEDURE NURSING ASSESSMENT: Performed by: ORTHOPAEDIC SURGERY

## 2023-10-26 PROCEDURE — 250N000011 HC RX IP 250 OP 636

## 2023-10-26 PROCEDURE — 250N000013 HC RX MED GY IP 250 OP 250 PS 637: Performed by: ORTHOPAEDIC SURGERY

## 2023-10-26 PROCEDURE — 250N000009 HC RX 250: Performed by: ORTHOPAEDIC SURGERY

## 2023-10-26 PROCEDURE — C1776 JOINT DEVICE (IMPLANTABLE): HCPCS | Performed by: ORTHOPAEDIC SURGERY

## 2023-10-26 PROCEDURE — 272N000001 HC OR GENERAL SUPPLY STERILE: Performed by: ORTHOPAEDIC SURGERY

## 2023-10-26 PROCEDURE — 370N000017 HC ANESTHESIA TECHNICAL FEE, PER MIN: Performed by: ORTHOPAEDIC SURGERY

## 2023-10-26 PROCEDURE — 250N000011 HC RX IP 250 OP 636: Mod: JZ | Performed by: ORTHOPAEDIC SURGERY

## 2023-10-26 PROCEDURE — 82962 GLUCOSE BLOOD TEST: CPT

## 2023-10-26 PROCEDURE — C1713 ANCHOR/SCREW BN/BN,TIS/BN: HCPCS | Performed by: ORTHOPAEDIC SURGERY

## 2023-10-26 PROCEDURE — 999N000065 XR KNEE PORT RIGHT 1/2 VIEWS: Mod: RT

## 2023-10-26 DEVICE — ATTUNE PATELLA MEDIALIZED DOME 38MM CEMENTED AOX
Type: IMPLANTABLE DEVICE | Site: KNEE | Status: FUNCTIONAL
Brand: ATTUNE

## 2023-10-26 DEVICE — ATTUNE KNEE SYSTEM TIBIAL BASE FIXED BEARING SIZE 7 CEMENTED
Type: IMPLANTABLE DEVICE | Site: KNEE | Status: FUNCTIONAL
Brand: ATTUNE

## 2023-10-26 DEVICE — ATTUNE KNEE SYSTEM TIBIAL INSERT FIXED BEARING POSTERIOR STABILIZED 8 7MM AOX
Type: IMPLANTABLE DEVICE | Site: KNEE | Status: FUNCTIONAL
Brand: ATTUNE

## 2023-10-26 DEVICE — BONE CEMENT RADIOPAQUE SIMPLEX HV FULL DOSE 6194-1-001: Type: IMPLANTABLE DEVICE | Site: KNEE | Status: FUNCTIONAL

## 2023-10-26 DEVICE — ATTUNE KNEE SYSTEM FEMORAL POSTERIOR STABILIZED SIZE 8 RIGHT CEMENTED
Type: IMPLANTABLE DEVICE | Site: KNEE | Status: FUNCTIONAL
Brand: ATTUNE

## 2023-10-26 RX ORDER — KETOROLAC TROMETHAMINE 15 MG/ML
15 INJECTION, SOLUTION INTRAMUSCULAR; INTRAVENOUS EVERY 6 HOURS
Status: COMPLETED | OUTPATIENT
Start: 2023-10-26 | End: 2023-10-27

## 2023-10-26 RX ORDER — PRAVASTATIN SODIUM 20 MG
20 TABLET ORAL EVERY EVENING
Status: DISCONTINUED | OUTPATIENT
Start: 2023-10-26 | End: 2023-10-27 | Stop reason: HOSPADM

## 2023-10-26 RX ORDER — DEXAMETHASONE SODIUM PHOSPHATE 10 MG/ML
INJECTION, SOLUTION INTRAMUSCULAR; INTRAVENOUS
Status: DISCONTINUED | OUTPATIENT
Start: 2023-10-26 | End: 2023-10-26

## 2023-10-26 RX ORDER — BUPIVACAINE HYDROCHLORIDE 7.5 MG/ML
INJECTION, SOLUTION INTRASPINAL
Status: COMPLETED | OUTPATIENT
Start: 2023-10-26 | End: 2023-10-26

## 2023-10-26 RX ORDER — BRIMONIDINE TARTRATE 2 MG/ML
1 SOLUTION/ DROPS OPHTHALMIC 2 TIMES DAILY
Status: DISCONTINUED | OUTPATIENT
Start: 2023-10-26 | End: 2023-10-27 | Stop reason: HOSPADM

## 2023-10-26 RX ORDER — ONDANSETRON 4 MG/1
4 TABLET, ORALLY DISINTEGRATING ORAL EVERY 6 HOURS PRN
Status: DISCONTINUED | OUTPATIENT
Start: 2023-10-26 | End: 2023-10-27 | Stop reason: HOSPADM

## 2023-10-26 RX ORDER — ONDANSETRON 4 MG/1
4 TABLET, ORALLY DISINTEGRATING ORAL EVERY 30 MIN PRN
Status: DISCONTINUED | OUTPATIENT
Start: 2023-10-26 | End: 2023-10-26 | Stop reason: HOSPADM

## 2023-10-26 RX ORDER — LISINOPRIL 5 MG/1
5 TABLET ORAL EVERY EVENING
Status: DISCONTINUED | OUTPATIENT
Start: 2023-10-26 | End: 2023-10-27 | Stop reason: HOSPADM

## 2023-10-26 RX ORDER — ACETAMINOPHEN 325 MG/1
650 TABLET ORAL EVERY 4 HOURS PRN
Start: 2023-10-26

## 2023-10-26 RX ORDER — ALBUTEROL SULFATE 0.83 MG/ML
2.5 SOLUTION RESPIRATORY (INHALATION) EVERY 4 HOURS PRN
Status: DISCONTINUED | OUTPATIENT
Start: 2023-10-26 | End: 2023-10-26 | Stop reason: HOSPADM

## 2023-10-26 RX ORDER — GABAPENTIN 100 MG/1
100 CAPSULE ORAL
Status: COMPLETED | OUTPATIENT
Start: 2023-10-26 | End: 2023-10-26

## 2023-10-26 RX ORDER — HYDROMORPHONE HCL IN WATER/PF 6 MG/30 ML
0.4 PATIENT CONTROLLED ANALGESIA SYRINGE INTRAVENOUS
Status: DISCONTINUED | OUTPATIENT
Start: 2023-10-26 | End: 2023-10-27 | Stop reason: HOSPADM

## 2023-10-26 RX ORDER — NALOXONE HYDROCHLORIDE 0.4 MG/ML
0.4 INJECTION, SOLUTION INTRAMUSCULAR; INTRAVENOUS; SUBCUTANEOUS
Status: DISCONTINUED | OUTPATIENT
Start: 2023-10-26 | End: 2023-10-27 | Stop reason: HOSPADM

## 2023-10-26 RX ORDER — ASPIRIN 325 MG
325 TABLET, DELAYED RELEASE (ENTERIC COATED) ORAL DAILY
Status: DISCONTINUED | OUTPATIENT
Start: 2023-10-27 | End: 2023-10-27 | Stop reason: HOSPADM

## 2023-10-26 RX ORDER — HYDROXYZINE HYDROCHLORIDE 25 MG/1
25 TABLET, FILM COATED ORAL EVERY 6 HOURS PRN
Status: DISCONTINUED | OUTPATIENT
Start: 2023-10-26 | End: 2023-10-27 | Stop reason: HOSPADM

## 2023-10-26 RX ORDER — SODIUM CHLORIDE, SODIUM LACTATE, POTASSIUM CHLORIDE, CALCIUM CHLORIDE 600; 310; 30; 20 MG/100ML; MG/100ML; MG/100ML; MG/100ML
INJECTION, SOLUTION INTRAVENOUS CONTINUOUS
Status: DISCONTINUED | OUTPATIENT
Start: 2023-10-26 | End: 2023-10-26 | Stop reason: HOSPADM

## 2023-10-26 RX ORDER — LATANOPROST 50 UG/ML
1 SOLUTION/ DROPS OPHTHALMIC AT BEDTIME
Status: DISCONTINUED | OUTPATIENT
Start: 2023-10-26 | End: 2023-10-27 | Stop reason: HOSPADM

## 2023-10-26 RX ORDER — DEXAMETHASONE SODIUM PHOSPHATE 4 MG/ML
INJECTION, SOLUTION INTRA-ARTICULAR; INTRALESIONAL; INTRAMUSCULAR; INTRAVENOUS; SOFT TISSUE PRN
Status: DISCONTINUED | OUTPATIENT
Start: 2023-10-26 | End: 2023-10-26

## 2023-10-26 RX ORDER — BISACODYL 10 MG
10 SUPPOSITORY, RECTAL RECTAL DAILY PRN
Status: DISCONTINUED | OUTPATIENT
Start: 2023-10-26 | End: 2023-10-27 | Stop reason: HOSPADM

## 2023-10-26 RX ORDER — LIDOCAINE 40 MG/G
CREAM TOPICAL
Status: DISCONTINUED | OUTPATIENT
Start: 2023-10-26 | End: 2023-10-27 | Stop reason: HOSPADM

## 2023-10-26 RX ORDER — SODIUM CHLORIDE, SODIUM LACTATE, POTASSIUM CHLORIDE, CALCIUM CHLORIDE 600; 310; 30; 20 MG/100ML; MG/100ML; MG/100ML; MG/100ML
INJECTION, SOLUTION INTRAVENOUS CONTINUOUS
Status: DISCONTINUED | OUTPATIENT
Start: 2023-10-26 | End: 2023-10-27 | Stop reason: HOSPADM

## 2023-10-26 RX ORDER — PROPOFOL 10 MG/ML
INJECTION, EMULSION INTRAVENOUS CONTINUOUS PRN
Status: DISCONTINUED | OUTPATIENT
Start: 2023-10-26 | End: 2023-10-26

## 2023-10-26 RX ORDER — ACETAMINOPHEN 325 MG/1
975 TABLET ORAL EVERY 8 HOURS
Qty: 27 TABLET | Refills: 0 | Status: DISCONTINUED | OUTPATIENT
Start: 2023-10-26 | End: 2023-10-27 | Stop reason: HOSPADM

## 2023-10-26 RX ORDER — PROCHLORPERAZINE MALEATE 5 MG
5 TABLET ORAL EVERY 6 HOURS PRN
Status: DISCONTINUED | OUTPATIENT
Start: 2023-10-26 | End: 2023-10-27 | Stop reason: HOSPADM

## 2023-10-26 RX ORDER — AMOXICILLIN 250 MG
1 CAPSULE ORAL 2 TIMES DAILY
Status: DISCONTINUED | OUTPATIENT
Start: 2023-10-26 | End: 2023-10-27 | Stop reason: HOSPADM

## 2023-10-26 RX ORDER — CEFAZOLIN SODIUM/WATER 3 G/30 ML
3 SYRINGE (ML) INTRAVENOUS SEE ADMIN INSTRUCTIONS
Status: DISCONTINUED | OUTPATIENT
Start: 2023-10-26 | End: 2023-10-26 | Stop reason: HOSPADM

## 2023-10-26 RX ORDER — FENTANYL CITRATE 0.05 MG/ML
INJECTION, SOLUTION INTRAMUSCULAR; INTRAVENOUS PRN
Status: DISCONTINUED | OUTPATIENT
Start: 2023-10-26 | End: 2023-10-26

## 2023-10-26 RX ORDER — DORZOLAMIDE HYDROCHLORIDE AND TIMOLOL MALEATE 20; 5 MG/ML; MG/ML
1 SOLUTION/ DROPS OPHTHALMIC 2 TIMES DAILY
Status: DISCONTINUED | OUTPATIENT
Start: 2023-10-26 | End: 2023-10-27 | Stop reason: HOSPADM

## 2023-10-26 RX ORDER — ONDANSETRON 2 MG/ML
4 INJECTION INTRAMUSCULAR; INTRAVENOUS EVERY 6 HOURS PRN
Status: DISCONTINUED | OUTPATIENT
Start: 2023-10-26 | End: 2023-10-27 | Stop reason: HOSPADM

## 2023-10-26 RX ORDER — LIDOCAINE 40 MG/G
CREAM TOPICAL
Status: DISCONTINUED | OUTPATIENT
Start: 2023-10-26 | End: 2023-10-26 | Stop reason: HOSPADM

## 2023-10-26 RX ORDER — NALOXONE HYDROCHLORIDE 0.4 MG/ML
0.2 INJECTION, SOLUTION INTRAMUSCULAR; INTRAVENOUS; SUBCUTANEOUS
Status: DISCONTINUED | OUTPATIENT
Start: 2023-10-26 | End: 2023-10-27 | Stop reason: HOSPADM

## 2023-10-26 RX ORDER — HYDROMORPHONE HCL IN WATER/PF 6 MG/30 ML
0.4 PATIENT CONTROLLED ANALGESIA SYRINGE INTRAVENOUS EVERY 5 MIN PRN
Status: DISCONTINUED | OUTPATIENT
Start: 2023-10-26 | End: 2023-10-26 | Stop reason: HOSPADM

## 2023-10-26 RX ORDER — ACETAMINOPHEN 325 MG/1
975 TABLET ORAL ONCE
Status: COMPLETED | OUTPATIENT
Start: 2023-10-26 | End: 2023-10-26

## 2023-10-26 RX ORDER — KETAMINE HYDROCHLORIDE 10 MG/ML
INJECTION INTRAMUSCULAR; INTRAVENOUS PRN
Status: DISCONTINUED | OUTPATIENT
Start: 2023-10-26 | End: 2023-10-26

## 2023-10-26 RX ORDER — CEFAZOLIN SODIUM/WATER 3 G/30 ML
3 SYRINGE (ML) INTRAVENOUS
Status: COMPLETED | OUTPATIENT
Start: 2023-10-26 | End: 2023-10-26

## 2023-10-26 RX ORDER — HYDROMORPHONE HCL IN WATER/PF 6 MG/30 ML
0.2 PATIENT CONTROLLED ANALGESIA SYRINGE INTRAVENOUS
Status: DISCONTINUED | OUTPATIENT
Start: 2023-10-26 | End: 2023-10-27 | Stop reason: HOSPADM

## 2023-10-26 RX ORDER — CEFAZOLIN SODIUM 2 G/100ML
2 INJECTION, SOLUTION INTRAVENOUS EVERY 8 HOURS
Qty: 200 ML | Refills: 0 | Status: COMPLETED | OUTPATIENT
Start: 2023-10-26 | End: 2023-10-27

## 2023-10-26 RX ORDER — OXYCODONE HYDROCHLORIDE 5 MG/1
10 TABLET ORAL EVERY 4 HOURS PRN
Status: DISCONTINUED | OUTPATIENT
Start: 2023-10-26 | End: 2023-10-27 | Stop reason: HOSPADM

## 2023-10-26 RX ORDER — ACETAMINOPHEN 325 MG/1
650 TABLET ORAL EVERY 4 HOURS PRN
Status: DISCONTINUED | OUTPATIENT
Start: 2023-10-29 | End: 2023-10-27 | Stop reason: HOSPADM

## 2023-10-26 RX ORDER — HYDROXYZINE HYDROCHLORIDE 25 MG/1
25 TABLET, FILM COATED ORAL EVERY 6 HOURS PRN
Qty: 30 TABLET | Refills: 0 | Status: SHIPPED | OUTPATIENT
Start: 2023-10-26 | End: 2024-04-11

## 2023-10-26 RX ORDER — BUPIVACAINE HYDROCHLORIDE 5 MG/ML
INJECTION, SOLUTION PERINEURAL PRN
Status: DISCONTINUED | OUTPATIENT
Start: 2023-10-26 | End: 2023-10-26 | Stop reason: HOSPADM

## 2023-10-26 RX ORDER — HYDROXYZINE HYDROCHLORIDE 10 MG/1
10 TABLET, FILM COATED ORAL EVERY 6 HOURS PRN
Status: DISCONTINUED | OUTPATIENT
Start: 2023-10-26 | End: 2023-10-26 | Stop reason: HOSPADM

## 2023-10-26 RX ORDER — MAGNESIUM SULFATE HEPTAHYDRATE 40 MG/ML
INJECTION, SOLUTION INTRAVENOUS PRN
Status: DISCONTINUED | OUTPATIENT
Start: 2023-10-26 | End: 2023-10-26

## 2023-10-26 RX ORDER — OXYCODONE HYDROCHLORIDE 5 MG/1
5-10 TABLET ORAL EVERY 4 HOURS PRN
Qty: 33 TABLET | Refills: 0 | Status: SHIPPED | OUTPATIENT
Start: 2023-10-26 | End: 2024-03-07

## 2023-10-26 RX ORDER — TRANEXAMIC ACID 650 MG/1
1950 TABLET ORAL ONCE
Status: COMPLETED | OUTPATIENT
Start: 2023-10-26 | End: 2023-10-26

## 2023-10-26 RX ORDER — ONDANSETRON 2 MG/ML
4 INJECTION INTRAMUSCULAR; INTRAVENOUS EVERY 30 MIN PRN
Status: DISCONTINUED | OUTPATIENT
Start: 2023-10-26 | End: 2023-10-26 | Stop reason: HOSPADM

## 2023-10-26 RX ORDER — ONDANSETRON 2 MG/ML
INJECTION INTRAMUSCULAR; INTRAVENOUS PRN
Status: DISCONTINUED | OUTPATIENT
Start: 2023-10-26 | End: 2023-10-26

## 2023-10-26 RX ORDER — GLYCOPYRROLATE 0.2 MG/ML
INJECTION, SOLUTION INTRAMUSCULAR; INTRAVENOUS PRN
Status: DISCONTINUED | OUTPATIENT
Start: 2023-10-26 | End: 2023-10-26

## 2023-10-26 RX ORDER — POLYETHYLENE GLYCOL 3350 17 G/17G
17 POWDER, FOR SOLUTION ORAL DAILY
Status: DISCONTINUED | OUTPATIENT
Start: 2023-10-27 | End: 2023-10-27 | Stop reason: HOSPADM

## 2023-10-26 RX ORDER — FENTANYL CITRATE 50 UG/ML
50 INJECTION, SOLUTION INTRAMUSCULAR; INTRAVENOUS EVERY 5 MIN PRN
Status: DISCONTINUED | OUTPATIENT
Start: 2023-10-26 | End: 2023-10-26 | Stop reason: HOSPADM

## 2023-10-26 RX ORDER — AMOXICILLIN 250 MG
1-2 CAPSULE ORAL 2 TIMES DAILY
Qty: 30 TABLET | Refills: 0 | Status: SHIPPED | OUTPATIENT
Start: 2023-10-26 | End: 2024-03-07

## 2023-10-26 RX ORDER — ROPIVACAINE HYDROCHLORIDE 5 MG/ML
INJECTION, SOLUTION EPIDURAL; INFILTRATION; PERINEURAL
Status: DISCONTINUED | OUTPATIENT
Start: 2023-10-26 | End: 2023-10-26

## 2023-10-26 RX ORDER — ASPIRIN 325 MG
325 TABLET, DELAYED RELEASE (ENTERIC COATED) ORAL DAILY
Qty: 30 TABLET | Refills: 0 | Status: SHIPPED | OUTPATIENT
Start: 2023-10-26 | End: 2024-04-11

## 2023-10-26 RX ORDER — OXYCODONE HYDROCHLORIDE 5 MG/1
5 TABLET ORAL EVERY 4 HOURS PRN
Status: DISCONTINUED | OUTPATIENT
Start: 2023-10-26 | End: 2023-10-27 | Stop reason: HOSPADM

## 2023-10-26 RX ADMIN — KETAMINE HYDROCHLORIDE 20 MG: 10 INJECTION INTRAMUSCULAR; INTRAVENOUS at 14:15

## 2023-10-26 RX ADMIN — ROPIVACAINE HYDROCHLORIDE 5 ML: 5 INJECTION, SOLUTION EPIDURAL; INFILTRATION; PERINEURAL at 16:08

## 2023-10-26 RX ADMIN — DEXAMETHASONE SODIUM PHOSPHATE 4 MG: 10 INJECTION, SOLUTION INTRAMUSCULAR; INTRAVENOUS at 16:05

## 2023-10-26 RX ADMIN — PHENYLEPHRINE HYDROCHLORIDE 100 MCG: 10 INJECTION INTRAVENOUS at 14:49

## 2023-10-26 RX ADMIN — ACETAMINOPHEN 975 MG: 325 TABLET, FILM COATED ORAL at 21:32

## 2023-10-26 RX ADMIN — BUPIVACAINE HYDROCHLORIDE IN DEXTROSE 2 ML: 7.5 INJECTION, SOLUTION SUBARACHNOID at 14:25

## 2023-10-26 RX ADMIN — ACETAMINOPHEN 975 MG: 325 TABLET, FILM COATED ORAL at 12:33

## 2023-10-26 RX ADMIN — PHENYLEPHRINE HYDROCHLORIDE 100 MCG: 10 INJECTION INTRAVENOUS at 14:59

## 2023-10-26 RX ADMIN — SODIUM CHLORIDE, POTASSIUM CHLORIDE, SODIUM LACTATE AND CALCIUM CHLORIDE: 600; 310; 30; 20 INJECTION, SOLUTION INTRAVENOUS at 20:43

## 2023-10-26 RX ADMIN — MAGNESIUM SULFATE HEPTAHYDRATE 2 G: 40 INJECTION, SOLUTION INTRAVENOUS at 15:09

## 2023-10-26 RX ADMIN — PROPOFOL 75 MCG/KG/MIN: 10 INJECTION, EMULSION INTRAVENOUS at 14:23

## 2023-10-26 RX ADMIN — LISINOPRIL 5 MG: 5 TABLET ORAL at 18:31

## 2023-10-26 RX ADMIN — SODIUM CHLORIDE, POTASSIUM CHLORIDE, SODIUM LACTATE AND CALCIUM CHLORIDE: 600; 310; 30; 20 INJECTION, SOLUTION INTRAVENOUS at 12:56

## 2023-10-26 RX ADMIN — TRANEXAMIC ACID 1950 MG: 650 TABLET ORAL at 12:33

## 2023-10-26 RX ADMIN — GABAPENTIN 100 MG: 100 CAPSULE ORAL at 12:33

## 2023-10-26 RX ADMIN — PRAVASTATIN SODIUM 20 MG: 20 TABLET ORAL at 18:32

## 2023-10-26 RX ADMIN — DEXAMETHASONE SODIUM PHOSPHATE 4 MG: 4 INJECTION, SOLUTION INTRA-ARTICULAR; INTRALESIONAL; INTRAMUSCULAR; INTRAVENOUS; SOFT TISSUE at 15:08

## 2023-10-26 RX ADMIN — Medication 3 G: at 14:10

## 2023-10-26 RX ADMIN — KETAMINE HYDROCHLORIDE 30 MG: 10 INJECTION INTRAMUSCULAR; INTRAVENOUS at 14:42

## 2023-10-26 RX ADMIN — GLYCOPYRROLATE 0.2 MG: 0.2 INJECTION, SOLUTION INTRAMUSCULAR; INTRAVENOUS at 14:42

## 2023-10-26 RX ADMIN — PHENYLEPHRINE HYDROCHLORIDE 100 MCG: 10 INJECTION INTRAVENOUS at 14:41

## 2023-10-26 RX ADMIN — SODIUM CHLORIDE, POTASSIUM CHLORIDE, SODIUM LACTATE AND CALCIUM CHLORIDE: 600; 310; 30; 20 INJECTION, SOLUTION INTRAVENOUS at 15:31

## 2023-10-26 RX ADMIN — PHENYLEPHRINE HYDROCHLORIDE 200 MCG: 10 INJECTION INTRAVENOUS at 15:27

## 2023-10-26 RX ADMIN — MIDAZOLAM 2 MG: 1 INJECTION INTRAMUSCULAR; INTRAVENOUS at 14:10

## 2023-10-26 RX ADMIN — FENTANYL CITRATE 100 MCG: 0.05 INJECTION, SOLUTION INTRAMUSCULAR; INTRAVENOUS at 14:24

## 2023-10-26 RX ADMIN — PHENYLEPHRINE HYDROCHLORIDE 200 MCG: 10 INJECTION INTRAVENOUS at 15:09

## 2023-10-26 RX ADMIN — SODIUM CHLORIDE, POTASSIUM CHLORIDE, SODIUM LACTATE AND CALCIUM CHLORIDE: 600; 310; 30; 20 INJECTION, SOLUTION INTRAVENOUS at 14:23

## 2023-10-26 RX ADMIN — SENNOSIDES AND DOCUSATE SODIUM 1 TABLET: 50; 8.6 TABLET ORAL at 21:32

## 2023-10-26 RX ADMIN — CEFAZOLIN SODIUM 2 G: 2 INJECTION, SOLUTION INTRAVENOUS at 23:03

## 2023-10-26 RX ADMIN — SODIUM CHLORIDE, POTASSIUM CHLORIDE, SODIUM LACTATE AND CALCIUM CHLORIDE: 600; 310; 30; 20 INJECTION, SOLUTION INTRAVENOUS at 18:31

## 2023-10-26 RX ADMIN — KETOROLAC TROMETHAMINE 15 MG: 15 INJECTION, SOLUTION INTRAMUSCULAR; INTRAVENOUS at 18:32

## 2023-10-26 RX ADMIN — ONDANSETRON 4 MG: 2 INJECTION INTRAMUSCULAR; INTRAVENOUS at 15:08

## 2023-10-26 ASSESSMENT — ACTIVITIES OF DAILY LIVING (ADL)
ADLS_ACUITY_SCORE: 24
ADLS_ACUITY_SCORE: 18
ADLS_ACUITY_SCORE: 24
ADLS_ACUITY_SCORE: 24
ADLS_ACUITY_SCORE: 18
ADLS_ACUITY_SCORE: 18

## 2023-10-26 NOTE — PROVIDER NOTIFICATION
10/26/23 1241   Discharge Planning   Patient/Family Anticipates Transition to assisted living   Concerns to be Addressed denies needs/concerns at this time   Living Arrangements   People in Home facility resident   Type of Residence Assisted Living   Is your private residence a single family home or apartment? Apartment   Number of Stairs, Within Home, Primary five   Stair Railings, Within Home, Primary railings safe and in good condition   Once home, are you able to live on one level? Yes   Which level? Upper Level   Which rooms are not on the main level? Bedroom;Bathroom;Living Room;Kitchen   Bathroom Shower/Tub Tub/Shower unit   Equipment Currently Used at Home walker, standard;cane, straight   Support System   Support Systems Home Care Staff   Do you have someone available to stay with you one or two nights once you are home? Yes   Medical Clearance   Date of Physical 10/12/23   Clinic Name [facility]   It is recommended that you call and check with any specialty providers before surgery to see if you need surgical clearance.  Do you see any specialty providers outside of your primary care provider? No   Blood   Known Bleeding Disorder or Coagulopathy No   Does the patient have any Confucianist/cultural preferences related to blood products? No   Education   Has the patient scheduled or completed pre-op total joint education, either in class or online, in the last 12 months? No   Patient attended total joint pre-op class/received pre-op teaching  online   Relationship/Living Environment   Name(s) of People in Home Staff   [R] Discharge Planning   Care Facility Name [facility]     Follow up days would work best for Mondays and Thursdays

## 2023-10-26 NOTE — ANESTHESIA PROCEDURE NOTES
Femoral (distal femoral, adductor canal) Procedure Note    Pre-Procedure   Staff -        CRNA: Cocnetta Braun APRN CRNA       Performed By: CRNA       Pre-Anesthestic Checklist: patient identified, IV checked, site marked, risks and benefits discussed, informed consent, monitors and equipment checked, pre-op evaluation, at physician/surgeon's request and post-op pain management  Timeout:       Correct Patient: Yes        Correct Procedure: Yes        Correct Site: Yes        Correct Position: Yes        Correct Laterality: Yes        Site Marked: Yes  Procedure Documentation  Procedure: Femoral (distal femoral, adductor canal)       Laterality: right       Patient Position: supine       Skin prep: Chloraprep       Needle Type: insulated       Needle Gauge: 22.        Needle Length (millimeters): 100        Ultrasound guided       1. Ultrasound was used to identify targeted nerve, plexus, vascular marker, or fascial plane and place a needle adjacent to it in real-time.       2. Ultrasound was used to visualize the spread of anesthetic in close proximity to the above referenced structure.       3. A permanent image is entered into the patient's record.       4. The visualized anatomic structures appeared normal.       5. There were no apparent abnormal pathologic findings.    Assessment/Narrative         The placement was negative for: blood aspirated, painful injection and site bleeding       Paresthesias: No.       Test dose of 5 mL lidocaine 2% w/ 1:200,000 epinephrine at 16:05 CDT.        .       Bolus given via needle. no blood aspirated via catheter.        Secured via.        Insertion/Infusion Method: Single Shot       Complications: none       Injection made incrementally with aspirations every 5 mL.    Medication(s) Administered   Ropivacaine 0.5% PF (Infiltration) - Infiltration   5 mL - 10/26/2023 4:08:00 PM  Dexamethasone 10 mg/mL PF (Perineural) - Perineural   4 mg - 10/26/2023 4:05:00 PM    FOR  "[facility] ONLY:   Pain Team Contact information: please page the Pain Team Via [employer]. Search \"Pain\". During daytime hours, please page the attending first. At night please page the resident first.      "

## 2023-10-26 NOTE — PROGRESS NOTES
[facility] Care Coordination Contact    [facility]  Ambulatory Care Coordination to Inpatient Care Management   Hand-In Communication    Date:  October 26, 2023  Name: Laila Perez is enrolled in [facility] Care Coordination program and I am the Lead Care Coordinator.  CC Contact Information:.   Payor Source: Payor: [facility] / Plan: [facility] DUAL / Product Type: HMO /   Current services in place:     Please see the CC Snaphot and Care Management Flowsheets for specific  details of this Laila Perez care plan.   Additional details/specific concerns r/t this admission: I am Laila's [facility] Care Coordinator. Also involved is:     Kimberly Roe ([facility])  [address]  [address]  (P) [phone]   (F) [phone]   [email or web address].     I will follow this admission in Epic. Please feel free to contact me with questions or for further collaboration in discharge planning.    Harriet Eddy RN  [facility]  [phone]

## 2023-10-26 NOTE — PROGRESS NOTES
Admitting nurse completed full skin assessment. Ton score and ton interventions. This writer agrees with initial skin assessment findings.

## 2023-10-26 NOTE — PROGRESS NOTES
TRANSITIONS OF CARE (FRED) LOG    FRED tasks should be completed by the CC within one (1) business day of notification of each transition. Follow up contact with member is required after return to their usual care setting.  Note:  If CC finds out about the transitions fifteen (15) days or more after the member has returned to their usual care setting, no FRED log is needed. However, the CC should check in with the member to discuss the transition process, any changes needed to the care plan and document it in a case note.     Member Name:  [facility]ail[facility] ID#:    [facility] Care Coordinator Contact:  Harriet Eddy RN Agency/County/Care System: [facility]   Transition Communication Actions from Care Management Contact   Transition #1   Notification Date: 10/26/23 Transition Date:   10/26/23 Transition From: Home     Is this the member s usual care setting?               yes Transition To: [facility]   Transition Type:  Planned    Documentation from conversation with the member/responsible party, provider, discharging and receiving facility:   Date: 10/26/23: Received notification of admission to hospital with dx of planned Total knee replacement. Laila told me about this upcoming surgery already. We discussed possible tcu or homecare at discharge.   CC contacted Hospital /discharge planner via the [employer] Transitional Care Hand-In Process, with community care plan included.  CC reached out to CADI waiver  Kimberly via [employer]  regarding transition and offered support as needed.  Reviewed and update care plan as needed.  Notified community service providers and placed services CADI waiver on hold as needed.  Transition log initiated.   PCP, Delmis Simons, notified of hospitalization via EMR. Harriet Eddy RN     Transition #2   Notification Date: 10/30/23 Transition Date:   10/27/23 Transition From:  [facility]     Is this the member s usual care setting?               no Transition To: Home   Transition Type:  Planned    Documentation from conversation with the member/responsible party, provider, discharging and receiving facility:   Date: 10/30/23: Received notification of transition to home.  CC contacted member and reviewed discharge summary.  Member has a follow-up appointment with PCP in 7 days: No: Offered Assistance with setting up a follow up appointment   Member has had a change in condition: Yes: Total knee replacement  Home visit needed: No  Care plan reviewed and updated.  The following home based services None were resumed.  New referrals placed: No  Transition log completed.   PCP, Delmis Simons, notified of transition back to home via EMR.                                           *RETURN TO USUAL CARE SETTING: *Complete tasks below when the member is discharging TO their usual care setting within one (1) business day of notification..      For situations where the Care Coordinator is notified of the discharge prior to the date of discharge, the Care Coordinator must follow up with the member or designated representative to confirm that discharge actually occurred and discuss required FRED tasks as outlined in the FRED Instructions.  (This includes situations where it may be a  new  usual care setting for the member. (i.e., a community member who decides upon permanent nursing home placement following hospitalization and rehab).    Discuss with Member/Responsible Party:    Check  Yes  - if the member, family member and/or SNF/facility staff manages the following:    If  No  provide explanation in the comments section.          Date completed: 10/30/23 Communicated with member or their designated representative about the following:  care transition process; about changes to the member s health status; plan of care updates; education about transitions  and how to prevent unplanned transitions/readmissions    Four Pillars for Optimal Transition:    Check  Yes  - if the member, family member and/or SNF/facility staff manages the following:    If  No  provide explanation in the comments section.          [x]  Yes     []  No Does the member have a follow-up appointment scheduled with primary care or specialist? (Mental health hospitalizations--the appt. should be w/in 7 days)              For mental health hospitalizations:  []  Yes     []  No     Does the member have a follow-up appointment scheduled with a mental health practitioner within 7 days of discharge?  [x]  Yes     []  No     Has a medication review been completed with member? If no, refer to PCP, home care nurse, MTM, pharmacist  [x]  Yes     []  No     Can the member manage their medications or is there a system in place to manage medications (e.g. home care set-up)?         [x]  Yes     []  No     Can the member verbalize warning signs and symptoms to watch for and how to respond?  []  Yes     []  No     Does the member have a copy of and understand their discharge instructions?  If no, assist to obtain copy of discharge instructions, review discharge instructions, and assist to contact PCP to discuss questions about their recent hospitalization.  [x]  Yes     []  No     Does the member have adequate food, housing and transportation?  If no, add goal and discuss additional supports available to the member                                                                                                                                                                                 [x]  Yes     []  No     Is the member safe in their home?  If no, document needs and support provided                                                                                                                                                                          []  Yes     [x]  No     Are there any concerns of  vulnerability, abuse, or neglect?  If yes, document concerns and actions taken by Care Coordinator as a mandated                                                                                                                                                                              [x]  Yes     []  No     Does the member use a Personal Health Care Record?  Check  Yes  if visit summary, discharge summary, and/or healthcare summary are being used as a PHR.                                                                                                                                                                                  [x]  Yes     []  No     Have you reviewed the discharge summary with the member? If  No  provide explanation in comments.  [x]  Yes     []  No     Have you updated the member s care plan/support plan? Add new diagnosis, medications, treatments, goals & interventions, as applicable. If No, provide explanation in comments.    Comments:           Notes from conversation with the member/responsible party, provider, discharging and receiving facility (as applicable): 10/30/23

## 2023-10-26 NOTE — ANESTHESIA POSTPROCEDURE EVALUATION
Patient: Laila Perez    Procedure: Procedure(s):  Total Knee Arthroplasty       Anesthesia Type:  Spinal    Note:  Disposition: Outpatient   Postop Pain Control:             Sign Out: Well controlled pain   PONV:    Neuro/Psych:             Sign Out: Acceptable/Baseline neuro status   Airway/Respiratory:             Sign Out: AIRWAY IN SITU/Resp. Support   CV/Hemodynamics:             Sign Out: Acceptable CV status   Other NRE: NONE   DID A NON-ROUTINE EVENT OCCUR? No         Last vitals:  Vitals Value Taken Time   /69 10/26/23 1609   Temp 37.6  C (99.6  F) 10/26/23 1600   Pulse 87 10/26/23 1613   Resp 20 10/26/23 1613   SpO2 97 % 10/26/23 1613   Vitals shown include unfiled device data.    Electronically Signed By: JERRICA López CRNA  October 26, 2023  4:14 PM

## 2023-10-26 NOTE — PROCEDURES
10/26/23    PREOPERATIVE DIAGNOSES: Right knee arthritis   POSTOPERATIVE DIAGNOSIS:  Right knee arthritis  PROCEDURE: Right total knee arthroplasty.   SURGEON: Palmer Lew MD   ASSISTANT: Mercy Arguello PA-C The presence of the PA was necessary for safe progression of the case.   ANESTHESIA: Spinal with MAC.   ESTIMATED BLOOD LOSS: 50mL.   TOURNIQUET TIME: 40 minutes at 250 mmHg.   COMPLICATIONS: None apparent.   DISPOSITION: Stable to PACU.    IMPLANTS USED: DePuy Attune size 8 posterior stabilized femoral component with a size 7 S+ tibial component, size 8 by 7mm posterior stabilized polyethylene and 38 mm patella.     INDICATIONS: Laila is a 73 year old female with a long history of right knee arthritis. Unfortunately, she failed conservative management including therapy and injections. She had a left knee replacement years ago and has recovered well.  She had some non healing sores on her left lower leg.  She was seen in the lymphedema clinic and ultimately these sores healed with the use of compressive wraps to minimize lower extremity swelling.  After discussing risks, benefits and surgery, she elected to proceed with a right total knee replacement understanding the risks of infection, damage to vessels and nerves, blood clots, stiffness, ongoing pain, need for revision surgery, need for transfusion.     PROCEDURE: Laila was brought to the preoperative holding area where the right knee was marked. Consent was reviewed. She then was transferred to the operating theater. After induction of successful spinal anesthesia, she was placed supine with a bump under the right hip.  A timeout was performed, verifying correct patient, surgery, location. She received preoperative antibiotics as well as transexemic acid. The right lower extremity was then prepped and draped in standard sterile fashion.     We exsanguinated the leg and inflated the tourniquet. We then made a longitudinal incision over the  anterior aspect of the knee. Dissection was carried down through skin and subcutaneous tissue. A medial parapatellar arthrotomy was made, exposing medial compartment arthritis.  Synovial tissue from the suprapatellar pouch excised. The anterior horn of the medial meniscus was released and a medial release was performed. Then, the remainder of the fat pad was excised. We turned our attention to the patella. Using a free hand technique, this was resected down to 12 mm and sized to a 38mm, then punched and a metal protector plate was placed. We then turned our attention to the femur. Preoperatively, there was a 2 degree flexion contracture.  Therefore, using an intramedullary alignment guide, 10 mm of distal femur was resected in 5 degrees of valgus.     We then turned our attention to the tibia.  An extramedullary alignment cut was used to resect 2mm off the low medial side, perpendicular to the mechanical axis.  We then turned our attention back to the distal femur and sized it to a size 8.  The epicondylar axis was established and we placed our 4-in-1 cutting block perpendicular to it, in 1 degrees of external rotation. With this in place, our anterior, posterior and chamfer distal femur cuts were made.  We then used a laminar  to open the joint in order to remove medial and lateral meniscus remnants as well as posterior osteophytes.  The jig was utilized to cut the distal femoral box for the PS component.  A variety of polyethylene were trialed, and we felt a 8mm was best, with range of motion from full extension to 135 of flexion, stability to varus and valgus stress, normal POLO test, and the patella tracked well.  After this, sized our tibial component to a 7.  We then drilled and punched our tibial component, lining it with the medial 1/3 of the tibial tubercle. The knee was thoroughly irrigated using pulse lavage. The final components were then cemented in place. All excess cement was removed and the  knee was placed into full extension while the cement was curing. Also, the wound was instilled with dilute Betadine solution. Once the cement had cured, we retrialed our components with a 8mm poly with findings as above. We then placed the final poly.  The tourniquet was deflated with hemostasis achieved.  The capsular layer was closed with #1 Stratafix, at which point there was 130 degrees of flexion with gravity.  Subcutaneous tissues with 2-0 Vicryl, skin with a 3-0 Monocryl. A sterile dressing was applied and the patient was awoken from anesthesia and transferred to PACU in stable condition.     POSTOPERATIVE PLAN:   1. Weightbearing as tolerated right lower extremity with PT for mobilization.   2. Deep venous thrombosis prophylaxis: Aspirin 325mg daily x 30 days   3. Perioperative antibiotics.   4. Follow up in 2 weeks for wound check.   5. Resume bilateral lower extremity lymphedema wraps    EDDIE ZALDIVAR MD

## 2023-10-26 NOTE — ANESTHESIA CARE TRANSFER NOTE
Patient: Laila Perez    Procedure: Procedure(s):  Total Knee Arthroplasty       Diagnosis: Arthritis of right knee [M17.11]  Diagnosis Additional Information: No value filed.    Anesthesia Type:   Spinal     Note:    Oropharynx: nasal airway in place  Level of Consciousness: drowsy  Oxygen Supplementation: room air    Independent Airway: airway patency satisfactory and stable  Dentition: dentition unchanged  Vital Signs Stable: post-procedure vital signs reviewed and stable  Report to RN Given: handoff report given  Patient transferred to: Phase II    Handoff Report: Identifed the Patient, Identified the Reponsible Provider, Reviewed the pertinent medical history, Discussed the surgical course, Reviewed Intra-OP anesthesia mangement and issues during anesthesia, Set expectations for post-procedure period and Allowed opportunity for questions and acknowledgement of understanding  Vitals:  Vitals Value Taken Time   BP 94/48 10/26/23 1600   Temp 37.6  C (99.6  F) 10/26/23 1600   Pulse 86 10/26/23 1610   Resp 21 10/26/23 1610   SpO2 97 % 10/26/23 1610   Vitals shown include unfiled device data.    Electronically Signed By: JERRICA López CRNA  October 26, 2023  4:11 PM

## 2023-10-26 NOTE — ANESTHESIA PROCEDURE NOTES
"Intrathecal injection Procedure Note    Pre-Procedure   Staff -        CRNA: Concetta Braun APRN CRNA       Performed By: CRNA       Pre-Anesthestic Checklist: patient identified, IV checked, risks and benefits discussed, informed consent, monitors and equipment checked, pre-op evaluation, at physician/surgeon's request and post-op pain management  Timeout:       Correct Patient: Yes        Correct Procedure: Yes        Correct Site: Yes        Correct Position: Yes   Procedure Documentation  Procedure: intrathecal injection       Diagnosis: TKA       Patient Position: sitting       Skin prep: Chloraprep       Insertion Site: L2-3. (midline approach).       Needle Gauge: 25.        Needle Length (Inches): 3.5        Spinal Needle Type: Katie tip       Introducer used       Introducer: 20 G       # of attempts: 1 and  # of redirects:  3    Assessment/Narrative         Paresthesias: Yes and Resolved.       Sensory Level: T10       CSF fluid: clear.    Medication(s) Administered   0.75% Hyperbaric Bupivacaine (Intrathecal) - Intrathecal   2 mL - 10/26/2023 2:25:00 PM    FOR [facility] ([facility] [place]) ONLY:   Pain Team Contact information: please page the Pain Team Via [employer]. Search \"Pain\". During daytime hours, please page the attending first. At night please page the resident first.      "

## 2023-10-26 NOTE — INTERVAL H&P NOTE
"The History and Physical has been reviewed, the patient has been examined and no changes have occurred in the patient's condition since the H & P was completed.        Clinical Conditions Present on Arrival:  Clinically Significant Risk Factors Present on Admission                 # Thrombocytopenia: Lowest platelets = 113 in last 30 days, will monitor for bleeding  # Severe Obesity: Estimated body mass index is 46.86 kg/m  as calculated from the following:    Height as of this encounter: 1.803 m (5' 11\").    Weight as of this encounter: 152.4 kg (336 lb).       "

## 2023-10-26 NOTE — MEDICATION SCRIBE - ADMISSION MEDICATION HISTORY
Medication Scribe Admission Medication History    Admission medication history is complete. The information provided in this note is only as accurate as the sources available at the time of the update.    Information Source(s): Patient via in-person    Pertinent Information: PTA med list reviewed with patient and home health nurse    Changes made to PTA medication list:  Added: None  Deleted: Mupirocin Ointment  Changed: None    Medication Affordability:  Not including over the counter (OTC) medications, was there a time in the past 3 months when you did not take your medications as prescribed because of cost?: No    Allergies reviewed with patient and updates made in EHR: yes    Medication History Completed By: Priyanka Dalton 10/26/2023 12:24 PM    Current Facility-Administered Medications for the 10/26/23 encounter (Hospital Encounter)   Medication    4 mL ropivacaine (NAROPIN) injection 5 mg/mL    betamethasone acet & sod phos (CELESTONE) injection 6 mg     PTA Med List   Medication Sig Last Dose    acetaminophen (TYLENOL) 325 MG tablet Take 975 mg by mouth nightly as needed for mild pain Past Week at prn    albuterol (PROAIR HFA/PROVENTIL HFA/VENTOLIN HFA) 108 (90 Base) MCG/ACT inhaler Inhale 2 puffs into the lungs every 6 hours Unknown at prn    brimonidine (ALPHAGAN) 0.2 % ophthalmic solution Place 1 drop into both eyes 2 times daily 10/25/2023 at hs    dorzolamide-timolol (COSOPT) 2-0.5 % ophthalmic solution Place 1 drop into both eyes 2 times daily 10/25/2023 at hs    ipratropium - albuterol 0.5 mg/2.5 mg/3 mL (DUONEB) 0.5-2.5 (3) MG/3ML neb solution Take 1 vial (3 mLs) by nebulization every 6 hours as needed for shortness of breath or wheezing Unknown at prn    latanoprost (XALATAN) 0.005 % ophthalmic solution Place 1 drop into both eyes At Bedtime 10/25/2023 at hs    lisinopril (ZESTRIL) 5 MG tablet TAKE 1 TABLET (5 MG) BY MOUTH DAILY 10/25/2023 at pm    lovastatin (MEVACOR) 20 MG tablet TAKE ONE TABLET  BY MOUTH EVERY NIGHT AT BEDTIME (Patient taking differently: Take 20 mg by mouth at bedtime TAKE ONE TABLET BY MOUTH EVERY NIGHT AT BEDTIME) 10/25/2023 at pm    nitroGLYcerin (NITROSTAT) 0.4 MG sublingual tablet For chest pain place 1 tablet under the tongue every 5 minutes for 3 doses. If symptoms persist 5 minutes after 1st dose call 911. Unknown at on hand

## 2023-10-27 ENCOUNTER — APPOINTMENT (OUTPATIENT)
Dept: PHYSICAL THERAPY | Facility: CLINIC | Age: 73
End: 2023-10-27
Attending: ORTHOPAEDIC SURGERY
Payer: COMMERCIAL

## 2023-10-27 VITALS
BODY MASS INDEX: 41.02 KG/M2 | SYSTOLIC BLOOD PRESSURE: 137 MMHG | OXYGEN SATURATION: 92 % | RESPIRATION RATE: 17 BRPM | HEART RATE: 74 BPM | WEIGHT: 293 LBS | TEMPERATURE: 98.3 F | DIASTOLIC BLOOD PRESSURE: 54 MMHG | HEIGHT: 71 IN

## 2023-10-27 LAB
FASTING STATUS PATIENT QL REPORTED: ABNORMAL
GLUCOSE BLDC GLUCOMTR-MCNC: 151 MG/DL (ref 70–99)
GLUCOSE SERPL-MCNC: 216 MG/DL (ref 70–99)
HBA1C MFR BLD: 5.2 %
HGB BLD-MCNC: 13.2 G/DL (ref 11.7–17.7)

## 2023-10-27 PROCEDURE — 85018 HEMOGLOBIN: CPT | Performed by: ORTHOPAEDIC SURGERY

## 2023-10-27 PROCEDURE — 250N000013 HC RX MED GY IP 250 OP 250 PS 637: Performed by: ORTHOPAEDIC SURGERY

## 2023-10-27 PROCEDURE — 250N000011 HC RX IP 250 OP 636: Mod: JZ | Performed by: ORTHOPAEDIC SURGERY

## 2023-10-27 PROCEDURE — 82962 GLUCOSE BLOOD TEST: CPT

## 2023-10-27 PROCEDURE — 97161 PT EVAL LOW COMPLEX 20 MIN: CPT | Mod: GP | Performed by: PHYSICAL MEDICINE & REHABILITATION

## 2023-10-27 PROCEDURE — 83036 HEMOGLOBIN GLYCOSYLATED A1C: CPT

## 2023-10-27 PROCEDURE — 97116 GAIT TRAINING THERAPY: CPT | Mod: GP | Performed by: PHYSICAL MEDICINE & REHABILITATION

## 2023-10-27 PROCEDURE — 36415 COLL VENOUS BLD VENIPUNCTURE: CPT | Performed by: ORTHOPAEDIC SURGERY

## 2023-10-27 PROCEDURE — 82947 ASSAY GLUCOSE BLOOD QUANT: CPT | Performed by: ORTHOPAEDIC SURGERY

## 2023-10-27 PROCEDURE — 97110 THERAPEUTIC EXERCISES: CPT | Mod: GP | Performed by: PHYSICAL MEDICINE & REHABILITATION

## 2023-10-27 PROCEDURE — 99204 OFFICE O/P NEW MOD 45 MIN: CPT

## 2023-10-27 RX ADMIN — OXYCODONE HYDROCHLORIDE 5 MG: 5 TABLET ORAL at 13:52

## 2023-10-27 RX ADMIN — KETOROLAC TROMETHAMINE 15 MG: 15 INJECTION, SOLUTION INTRAMUSCULAR; INTRAVENOUS at 05:56

## 2023-10-27 RX ADMIN — KETOROLAC TROMETHAMINE 15 MG: 15 INJECTION, SOLUTION INTRAMUSCULAR; INTRAVENOUS at 00:15

## 2023-10-27 RX ADMIN — ASPIRIN 325 MG: 325 TABLET, COATED ORAL at 09:05

## 2023-10-27 RX ADMIN — CEFAZOLIN SODIUM 2 G: 2 INJECTION, SOLUTION INTRAVENOUS at 05:56

## 2023-10-27 ASSESSMENT — ACTIVITIES OF DAILY LIVING (ADL)
ADLS_ACUITY_SCORE: 25
ADLS_ACUITY_SCORE: 23
ADLS_ACUITY_SCORE: 23
ADLS_ACUITY_SCORE: 25
ADLS_ACUITY_SCORE: 23

## 2023-10-27 NOTE — PROGRESS NOTES
[facility] Medicine Progress Note  Date of Service: 10/27/2023    Assessment & Plan   Laila Perez is a 73 year old adult who presented on 10/26/2023 for scheduled Procedure(s):  Total Knee Arthroplasty by Palmer Lew MD and is being followed by the hospital medicine service for co-management of acute and/or chronic perioperative medical problems.      S/p Procedure(s):  Total Knee Arthroplasty   1 Day Post-Op    Following plan per orthopedic surgery service;  -Pain control  -Wound cares  -Antibiotic prophylaxis  -Physical/occupational therapy  -DVT prophylaxis;  mg daily x 30 days    Elevated blood glucose    Blood glucose elevated low 200's. No prior diagnosis of diabetes and does not take any diabetes medications PTA. Last hemoglobin A1c 4.8% 04/2016. Will update hemoglobin A1c.  - Monitor blood sugars daily.    Hypertension    Chronic. Blood pressures reviewed, mild hypertension POD0 that has normalized morning of POD1. Managed PTA with lisinopril daily.   - Continue PTA lisinopril    Severe aortic stenosis s/p TAVR    Underwent TAVR . Follows with cardiology. Stable from review of last cardiology visit.   - Continue outpatient follow-up and monitoring with cardiology.    Ascending aortic aneurysm    Echocardiogram 08/2022 showing moderately dilated ascending aorta 4.4 cm which was stable from prior MRI in 2021. Follows with cardiology and stable from review of last visit.  - Continue outpatient follow-up and monitoring with cardiology.    DVT Prophylaxis: as per orthopedic surgery service -  mg daily  Code Status: Full Code    Lines: PIV   Bae catheter: None    Discussion: Medically, the patient appears optimized for discharge and does not have any barriers to discharge at this time.    Disposition: Anticipate discharge 10/27/23     Attestation:  I have discussed, or will be discussing, the patient with hospitalist physician,   VIANNEY Fairchild PA-C     Interval History   POD#1: Routine post-operative morning rounds. No overnight events. Appetite intact, tolerating PO without nausea or vomiting. Ambulating well, long distances, feeling steady on feet without lightheadedness or dizziness. Passing flatus without a bowel movement at this time. Urinating without difficulty. Having lingering paraesthesias in right foot. Denies fever, chills, chest pain, palpitations, shortness of breath, lightheadedness, nausea, vomiting, and abdominal pain.    Physical Exam   Temp:  [97.7  F (36.5  C)-99.6  F (37.6  C)] 98  F (36.7  C)  Pulse:  [74-98] 76  Resp:  [10-24] 18  BP: ()/(48-87) 138/64  SpO2:  [91 %-99 %] 93 %    Weights:   Vitals:    10/26/23 1204 10/26/23 1716   Weight: (!) 152.4 kg (336 lb) (!) 150.4 kg (331 lb 9.2 oz)    Body mass index is 46.24 kg/m .    General: Sitting comfortably upright in chair, alert, oriented, cooperative, no acute distress, non-toxic appearing.  CV: Regular rate and rhythm. Normal S1 and S2. Radial pulse regular and strong.  Respiratory: Lungs clear to auscultation bilaterally. No wheezes, rhonchi, or crackles. Normal respiratory effort on room air. Speaking in full sentences.  GI: Soft, obese, nondistended, nontender to palpation throughout. Normoactive bowel sounds.  Skin: Warm and dry.  Musculoskeletal: Surgical site exam deferred to orthopedic/surgery provider. Dorsiflexion and plantarflexion intact and symmetrical bilaterally. Lower extremity sensation intact and symmetrical bilaterally. Some paraesthesia to right foot. No calf pain, negative Cole's right side. Aquacell covering right knee with minimal drainage, dry, and intact.    Data   Recent Labs   Lab 10/27/23  0520 10/26/23  1257   HGB 13.2  --    * 101*     Recent Labs   Lab 10/27/23  0520 10/26/23  1257   * 101*      Unresulted Labs Ordered in the Past 30 Days of this Admission       No orders found for last 31 day(s).            Imaging  Recent Results (from the past 24 hour(s))   POC US GUIDANCE NEEDLE PLACEMENT    Impression    Duplicate order, see image   POC US GUIDANCE NEEDLE PLACEMENT    Impression    Nerve and structures intact, see image   XR Knee Port Right 1/2 Views    Narrative    KNEE ONE-TWO VIEWS RIGHT  10/26/2023 4:26 PM     HISTORY: Post-Op Total Knee  COMPARISON: 12/9/2021      Impression    IMPRESSION: Status post recent right total knee arthroplasty. No  immediate hardware complication. Expected postsurgical soft tissue  edema, subcutaneous emphysema, and gas-containing joint effusion. No  acute fracture or malalignment. Osteopenia. Atherosclerosis.     DEE LAMAS MD         SYSTEM ID:  [id number]      I reviewed all new labs and imaging results over the last 24 hours. I personally reviewed no images or EKG's today.    Medications    lactated ringers 75 mL/hr at 10/26/23 2043      acetaminophen  975 mg Oral Q8H    aspirin  325 mg Oral Daily    [Held by provider] brimonidine  1 drop Both Eyes BID    [Held by provider] dorzolamide-timolol  1 drop Both Eyes BID    ketorolac  15 mg Intravenous Q6H    [Held by provider] latanoprost  1 drop Both Eyes At Bedtime    lisinopril  5 mg Oral QPM    polyethylene glycol  17 g Oral Daily    pravastatin  20 mg Oral QPM    senna-docusate  1 tablet Oral BID    sodium chloride (PF)  3 mL Intracatheter Q8H     VIANNEY LACKEY PA-C

## 2023-10-27 NOTE — PLAN OF CARE
Problem: Adult Inpatient Plan of Care  Goal: Plan of Care Review  Description: The Plan of Care Review/Shift note should be completed every shift.  The Outcome Evaluation is a brief statement about your assessment that the patient is improving, declining, or no change.  This information will be displayed automatically on your shift  note.  Outcome: Progressing   Patient doing great, has not had much pain overnight, declined scheduled tylenol this am and has ambulated in room and voided.  Plan is for a walk this am and probable discharge today.    Ayanna Florence RN on 10/27/2023 at 5:26 AM

## 2023-10-27 NOTE — PLAN OF CARE
[facility] DISCHARGE NOTE    Patient discharged to home at 1315 via caregiver Akiko. Accompanied by other Akiko and staff. Discharge instructions reviewed with patient, opportunity offered to ask questions. Prescriptions sent to patients preferred pharmacy. All belongings sent with patient.    Natalee De Leon RN

## 2023-10-27 NOTE — CONSULTS
Care Management:    Received a referral for discharge planning.  The patient had a right TKA.    Chart reviewed and plan of care discussed in Interdisciplinary Rounds.  The patient lives independently in the community.  There are no discharge needs identified.    Plan:  Home with outpatient physical therapy.      Joan Greco RN, Care Coordinator [phone]

## 2023-10-27 NOTE — PROGRESS NOTES
10/27/23 1000   Appointment Info   Signing Clinician's Name / Credentials (PT) Arnulfo De Oliveira, PT   Quick Adds   Quick Adds Certification   Living Environment   People in Home alone   Current Living Arrangements other (see comments)  (duplex, EL community)   Number of Stairs, Within Home, Primary five   Stair Railings, Within Home, Primary railings on both sides of stairs;railings safe and in good condition   Living Environment Comments staff lives nearby, will be coming over to help with changing ice packs and other light chores   Self-Care   Equipment Currently Used at Home walker, standard;cane, straight   Fall history within last six months no   Activity/Exercise/Self-Care Comment IND with ADLs, uses SPC at baseline   General Information   Onset of Illness/Injury or Date of Surgery 10/26/23   Referring Physician Dr. Palmer Lew   Patient/Family Therapy Goals Statement (PT) to return home safely   Pertinent History of Current Problem (include personal factors and/or comorbidities that impact the POC) s/p R TKA on 10/26/2023   Existing Precautions/Restrictions no known precautions/restrictions   Weight-Bearing Status - RLE weight-bearing as tolerated   Cognition   Affect/Mental Status (Cognition) WFL   Orientation Status (Cognition) oriented x 4   Follows Commands (Cognition) WFL   Pain Assessment   Patient Currently in Pain Yes, see Vital Sign flowsheet   Integumentary/Edema   Integumentary/Edema no deficits were identifed   Range of Motion (ROM)   Range of Motion ROM deficits secondary to surgical procedure   Strength (Manual Muscle Testing)   Strength (Manual Muscle Testing) Able to perform R SLR;No deficits observed during functional mobility   Bed Mobility   Bed Mobility no deficits identified   Transfers   Transfers no deficits identified   Gait/Stairs (Locomotion)   [place] Level (Gait) supervision   Assistive Device (Gait) walker, 4-wheeled   Distance in Feet (Gait) 140   Pattern (Gait)  step-through   Deviations/Abnormal Patterns (Gait) antalgic;weight shifting decreased   Negotiation (Stairs) stairs independence;handrail location;number of steps;ascending technique;descending technique   [place] Level (Stairs) supervision   Handrail Location (Stairs) both sides   Number of Steps (Stairs) 6   Ascending Technique (Stairs) step-to-step   Descending Technique (Stairs) step-to-step   Clinical Impression   Criteria for Skilled Therapeutic Intervention Yes, treatment indicated   PT Diagnosis (PT) s/p R TKA   Influenced by the following impairments pain, weakness   Functional limitations due to impairments gait, stairs   Clinical Presentation (PT Evaluation Complexity) stable   Clinical Presentation Rationale clinical judgement   Clinical Decision Making (Complexity) low complexity   Planned Therapy Interventions (PT) balance training;gait training;home exercise program;ROM (range of motion);stair training;strengthening;stretching;progressive activity/exercise   Risk & Benefits of therapy have been explained evaluation/treatment results reviewed;care plan/treatment goals reviewed;risks/benefits reviewed;current/potential barriers reviewed;participants voiced agreement with care plan;participants included;patient   PT Total Evaluation Time   PT Eval, Low Complexity Minutes (78968) 10   Therapy Certification   Start of care date 10/27/23   Certification date from 10/27/23   Certification date to 11/03/23   Medical Diagnosis s/p R TKA   Physical Therapy Goals   PT Frequency One time eval and treatment only   PT Goals Transfers;Gait;Stairs   PT: Transfers Modified independent;Sit to/from stand;Bed to/from chair;Goal Met;Completed   PT: Gait Supervision/stand-by assist;Rolling walker;100 feet;Goal Met;Completed   PT: Stairs Supervision/stand-by assist;6 stairs;Rail on both sides;Goal Met;Completed   Interventions   Interventions Quick Adds Gait Training;Therapeutic Procedure   Therapeutic  Procedure/Exercise   Ther. Procedure: strength, endurance, ROM, flexibillity Minutes (06751) 11   Symptoms Noted During/After Treatment fatigue   Treatment Detail/Skilled Intervention Patient provided with and instructed in completion of a home program to improve ROM/strength. Exercises included: ankle pumps, SAQ/LAQ, quad/gluteal/HS sets, heel slides seated and supine, SLR, and sit to stands. Verbal/tactile cues given on appropriate form and pacing to maximize benefit.   Gait Training   Gait Training Minutes (52848) 12   Symptoms Noted During/After Treatment (Gait Training) fatigue;increased pain   Treatment Detail/Skilled Intervention Patient ambulates 140 feet using FWW with cues to increase step length, allow for WBAT, and keep forward gaze for safe navigation in hallways. Gait pattern is mild stop/go with FWW, reduced weight bearing, and slow due to pain. Stair training up/down 6 steps with cues for ascending/descending technique to modulate pain and improve stability. Educated patient on walking 3x/day when returning home to improve mobility and reduce muscular atrophy.   Distance in Feet 140   [place] Level (Gait Training) stand-by assist   PT Discharge Planning   PT Plan Discharge home today.   PT Discharge Recommendation (DC Rec) home with assist;home with outpatient physical therapy   PT Rationale for DC Rec Patient moving at baseline with pain well managed. Has staff to come and help her as needed and has planned OP PT scheduled for next week.   PT Brief overview of current status Mod I bed mob and transfers; gait SBA 4WW x140 feet; up/down 6 steps SBA   PT Equipment Needed at Discharge other (see comments)  (none needed)   Total Session Time   Timed Code Treatment Minutes 23   Total Session Time (sum of timed and untimed services) 33

## 2023-10-27 NOTE — PROGRESS NOTES
Patient is ready for discharge. Pain is managed. Discharge paperwork, instructions reviewed with patient. Caregiver Akiko called and she will be here to pick patient up around 1400.

## 2023-10-27 NOTE — PROGRESS NOTES
"[facility] Orthopaedics Progress Note      Post-operative Day: 1 Day Post-Op    Procedure(s):  Total Knee Arthroplasty, Right  Subjective:    Pt reports that she is doing well, pain is controlled. She is looking forward to working with PT today and has outpatient physical therapy already scheduled at [facility]. Her ride is coming around 1 today.     Chest pain, SOB:  No  Nausea, vomiting:  No  Lightheadedness, dizziness:  No  Neuro:  Patient denies new onset numbness or paresthesias      Objective:  Blood pressure 133/85, pulse 87, temperature 98  F (36.7  C), temperature source Oral, resp. rate 18, height 1.803 m (5' 11\"), weight (!) 150.4 kg (331 lb 9.2 oz), SpO2 90%.    Patient Vitals for the past 24 hrs:   BP Temp Temp src Pulse Resp SpO2 Height Weight   10/27/23 0813 133/85 -- -- 87 18 90 % -- --   10/27/23 0342 138/64 98  F (36.7  C) Oral 76 18 93 % -- --   10/26/23 2327 (!) 143/72 98.2  F (36.8  C) Oral 88 18 93 % -- --   10/26/23 2032 (!) 150/72 98.2  F (36.8  C) Oral 91 24 91 % -- --   10/26/23 2030 (!) 150/72 -- -- 91 -- -- -- --   10/26/23 1830 (!) 148/62 -- -- 82 10 99 % -- --   10/26/23 1731 135/57 -- -- 74 -- -- -- --   10/26/23 1730 135/57 -- -- 74 -- -- -- --   10/26/23 1716 132/61 97.7  F (36.5  C) Oral 76 19 91 % 1.803 m (5' 11\") (!) 150.4 kg (331 lb 9.2 oz)   10/26/23 1642 -- 98.2  F (36.8  C) Temporal 80 19 95 % -- --   10/26/23 1630 123/72 -- -- 80 21 93 % -- --   10/26/23 1615 128/74 -- -- 83 22 98 % -- --   10/26/23 1600 94/48 99.6  F (37.6  C) Temporal 98 19 95 % -- --   10/26/23 1555 -- 99.6  F (37.6  C) Temporal -- -- -- -- --   10/26/23 1204 (!) 164/87 97.7  F (36.5  C) Oral 82 16 93 % 1.803 m (5' 11\") (!) 152.4 kg (336 lb)       Wt Readings from Last 4 Encounters:   10/26/23 (!) 150.4 kg (331 lb 9.2 oz)   10/12/23 (!) 152.4 kg (336 lb)   10/12/23 (!) 152.4 kg (336 lb)   06/15/23 (!) 155.6 kg (343 lb)       Gen: A&O x 3. NAD. Appears comfortable, up to the recliner eating breakfast. "   Wound status: Aquacel dressing in place on the right knee, bilateral lymphedema wraps present.   Circulation, motion and sensation: Dorsiflexion/plantarflexion intact and equal bilaterally; distal lower extremity sensation is intact and equal bilaterally. Foot and toes are warm and well perfused.    Swelling: Moderate  Calf tenderness: calves are soft and non-tender bilaterally     Pertinent Labs   Lab Results: personally reviewed.     Recent Labs   Lab Test 10/27/23  0520 10/09/23  0921 05/25/23  0952 05/09/23  1119 10/05/22  0558 09/29/22  1421 09/08/22  0801   INR  --   --   --   --   --  1.15 1.14   WBC  --  6.6 4.1 5.4   < > 8.2 7.9   HGB 13.2 14.6 14.6 13.7   < > 14.3 13.9   HCT  --  45.3 45.0 42.5   < > 44.9 42.8   MCV  --  95 94 96   < > 95 92   PLT  --  113* 103* 101*   < > 136* 121*   NA  --  139 141 138   < > 144 135    < > = values in this interval not displayed.       Plan:   Continue current cares and rehabilitation.  Anticoagulation protocol:  mg daily  x 30  days  Pain medications:  oxycodone, toradol, tylenol, and vistaril  Weight bearing status:  WBAT  Disposition:  Plan for discharge to home with outpatient therapy when medically stable and pain is controlled, cleared by therapy. Later today.              Report completed by:  Emanuel Abbasi PA-C  Date: 10/27/2023  Time: 8:48 AM

## 2023-10-27 NOTE — PROGRESS NOTES
"[facility] [place]    Patient admitted to room 2313 at approximately 1710 via cart from surgery. Patient was accompanied by transport tech.     Verbal SBAR report received from PALMER Frank prior to patient arrival.     Patient trasferred to bed via air bernie. Patient alert and oriented X 4. The patient is not having any pain.  . Admission vital signs: Blood pressure (!) 143/72, pulse 88, temperature 98.2  F (36.8  C), temperature source Oral, resp. rate 18, height 1.803 m (5' 11\"), weight (!) 150.4 kg (331 lb 9.2 oz), SpO2 93%. Patient was oriented to plan of care, call light, bed controls, tv, telephone, bathroom, and visiting hours.     Risk Assessment    The following safety risks were identified during admission: fall. Yellow risk band applied: YES.     Skin Initial Assessment    This writer admitted this patient and completed a full skin assessment and Ton score in the Adult PCS flowsheet. Appropriate interventions initiated as needed.     Secondary skin check completed by PALMER James.      Education    Patient has a [place] to Observation order: No  Observation education completed and documented: N/A      Niurka Aguilar RN      "

## 2023-10-27 NOTE — PROGRESS NOTES
Patient vital signs are at baseline: Yes  Patient able to ambulate as they were prior to admission or with assist devices provided by therapies during their stay:  No,  Reason:  Patient does not have full feeling back in bilateral extremities; she stood at bedside with Ax2  Patient MUST void prior to discharge:  Yes  Patient able to tolerate oral intake:  Yes  Pain has adequate pain control using Oral analgesics:  Yes  Does patient have an identified :  Yes  Has goal D/C date and time been discussed with patient:  Yes

## 2023-10-27 NOTE — DISCHARGE SUMMARY
[facility] Discharge Summary                                  [facility], [place]     DANYELLE AMEZQUITA [id number]   Age: 73 year old  PCP: Delmis Simons, [phone] 1950     Date of Admission:  10/26/2023  Date of Discharge::  10/27/2023  2:00 PM  Discharge Physician:  Emanuel Abbasi PA-C    Code status:  Prior    Admission Information:  Admission Diagnosis:  Arthritis of right knee [M17.11]  S/P knee replacement [Z96.659]    Post-Operative Day: 1 Day Post-Op     Reason for admission:  The patient was admitted for the following:Procedure(s) (LRB):  Total Knee Arthroplasty, Right (Right)    Principal Problem:    S/P knee replacement      Allergies:  Heparin    Following the procedure noted above the patient was transferred to the post-op floor and started on:    Therapy:  physical therapy  Anticoagulation Plan:  mg daily  for 30 days  Pain Management: oxycodone, toradol, tylenol, and vistaril  Weight bearing status: Weight bearing as tolerated     The patient was followed and co-managed by the hospitalist service during the inpatient treatment course  Complications:  None  Consultations:  None     Pertinent Labs   Lab Results: personally reviewed.     Recent Labs   Lab Test 10/27/23  0520 10/09/23  0921 05/25/23  0952 05/09/23  1119 10/05/22  0558 09/29/22  1421 09/08/22  0801   INR  --   --   --   --   --  1.15 1.14   WBC  --  6.6 4.1 5.4   < > 8.2 7.9   HGB 13.2 14.6 14.6 13.7   < > 14.3 13.9   HCT  --  45.3 45.0 42.5   < > 44.9 42.8   MCV  --  95 94 96   < > 95 92   PLT  --  113* 103* 101*   < > 136* 121*   NA  --  139 141 138   < > 144 135    < > = values in this interval not displayed.          Discharge Information:  Condition at discharge: Stable  Discharge destination:  Discharged to home     Medications at discharge:  Discharge Medication List as of 10/27/2023 11:39 AM        START taking these medications    Details   aspirin (ASA) 325 MG EC tablet Take 1 tablet  (325 mg) by mouth daily, Disp-30 tablet, R-0, E-Prescribe      hydrOXYzine (ATARAX) 25 MG tablet Take 1 tablet (25 mg) by mouth every 6 hours as needed for itching or anxiety (with pain, moderate pain), Disp-30 tablet, R-0, E-Prescribe      oxyCODONE (ROXICODONE) 5 MG tablet Take 1-2 tablets (5-10 mg) by mouth every 4 hours as needed for moderate to severe pain, Disp-33 tablet, R-0, E-Prescribe      senna-docusate (SENOKOT-S/PERICOLACE) 8.6-50 MG tablet Take 1-2 tablets by mouth 2 times daily Take while on oral narcotics to prevent or treat constipation., Disp-30 tablet, R-0, E-PrescribeWhile taking narcotics           CONTINUE these medications which have CHANGED    Details   acetaminophen (TYLENOL) 325 MG tablet Take 2 tablets (650 mg) by mouth every 4 hours as needed for other (mild pain), No Print Out           CONTINUE these medications which have NOT CHANGED    Details   albuterol (PROAIR HFA/PROVENTIL HFA/VENTOLIN HFA) 108 (90 Base) MCG/ACT inhaler Inhale 2 puffs into the lungs every 6 hours, Disp-18 g, R-0, E-PrescribePharmacy may dispense brand covered by insurance (Proair, or proventil or ventolin or generic albuterol inhaler)      brimonidine (ALPHAGAN) 0.2 % ophthalmic solution Place 1 drop into both eyes 2 times daily, Historical      dorzolamide-timolol (COSOPT) 2-0.5 % ophthalmic solution Place 1 drop into both eyes 2 times daily, Historical      estradiol (VIVELLE-DOT) 0.025 MG/24HR bi-weekly patch Place 1 patch onto the skin twice a week, Disp-24 patch, R-1, E-Prescribe      latanoprost (XALATAN) 0.005 % ophthalmic solution Place 1 drop into both eyes At Bedtime, Historical      lisinopril (ZESTRIL) 5 MG tablet TAKE 1 TABLET (5 MG) BY MOUTH DAILY, Disp-90 tablet, R-0, E-Prescribe      lovastatin (MEVACOR) 20 MG tablet TAKE ONE TABLET BY MOUTH EVERY NIGHT AT BEDTIME, Disp-90 tablet, R-2, E-Prescribe      Multiple Vitamin (DAILY-EVANGELISTA) TABS Take 1 tablet by mouth daily, R-11, Historical       nitroGLYcerin (NITROSTAT) 0.4 MG sublingual tablet For chest pain place 1 tablet under the tongue every 5 minutes for 3 doses. If symptoms persist 5 minutes after 1st dose call 911., Disp-30 tablet, R-1, E-Prescribe                        Follow-Up Care:  Patient should be seen in the office in 14 days by the Orthopedic Surgeon/Physician Assistant.  Call [phone] for appointment or questions.    Emanuel Abbasi PA-C

## 2023-10-27 NOTE — PLAN OF CARE
Problem: Knee Arthroplasty  Goal: Optimal Coping  Outcome: Progressing     Problem: Knee Arthroplasty  Goal: Absence of Bleeding  Outcome: Progressing     Problem: Knee Arthroplasty  Goal: Optimal Functional Ability  Intervention: Promote Optimal Functional Status  Recent Flowsheet Documentation  Taken 10/27/2023 0830 by Natalee De Leon, RN  Assistive Device Utilized:   walker   gait belt  Activity Management: activity adjusted per tolerance   Goal Outcome Evaluation:  Patient reports no pain in right knee. Is receiving scheduled tylenol, ice to right knee. She lives in [place] and has help from the caregivers in the home. She reports that shedoesn't need PT/OT . Plan is to go home later this afternoon.

## 2023-11-08 ENCOUNTER — TRANSFERRED RECORDS (OUTPATIENT)
Dept: HEALTH INFORMATION MANAGEMENT | Facility: CLINIC | Age: 73
End: 2023-11-08

## 2023-11-09 ENCOUNTER — THERAPY VISIT (OUTPATIENT)
Dept: PHYSICAL THERAPY | Facility: CLINIC | Age: 73
End: 2023-11-09
Attending: ORTHOPAEDIC SURGERY
Payer: COMMERCIAL

## 2023-11-09 DIAGNOSIS — I89.0 LYMPHEDEMA: Primary | ICD-10-CM

## 2023-11-09 DIAGNOSIS — Z96.651 S/P TOTAL KNEE ARTHROPLASTY, RIGHT: Primary | ICD-10-CM

## 2023-11-09 PROCEDURE — 97140 MANUAL THERAPY 1/> REGIONS: CPT | Mod: GP | Performed by: PHYSICAL THERAPIST

## 2023-11-09 PROCEDURE — 97110 THERAPEUTIC EXERCISES: CPT | Mod: GP | Performed by: PHYSICAL THERAPIST

## 2023-11-09 PROCEDURE — 97161 PT EVAL LOW COMPLEX 20 MIN: CPT | Mod: GP | Performed by: PHYSICAL THERAPIST

## 2023-11-09 NOTE — PROGRESS NOTES
PHYSICAL THERAPY EVALUATION  Type of Visit: Evaluation    See electronic medical record for Abuse and Falls Screening details.    Subjective Pt had a R TKA done on 10/25/23. Since then pt reports doing exercises at home that were given to her in the hospital. She reports difficulty with standing for longer then 5 mins, lifting the leg such as getting into the car, and in the shower. Pt reports that they feel there pain is well managed and they are motivated to get better.       Presenting condition or subjective complaint:   r TKA   Date of onset: 10/25/23 (date of surgery)    Relevant medical history:   Bilateral lympedema, Gout,  mitral valve disorder, thoracic aortic aneurysm, L TKA  Dates & types of surgery:   L TKA 10/2002    Prior diagnostic imaging/testing results:     N/A  Prior therapy history for the same diagnosis, illness or injury:    N/A      Living Environment  Social support:   lives alone  Type of home:   single level  Stairs to enter the home:       5 to enter  Ramp:     Stairs inside the home:         Help at home:  help Mondays and Thursdays  Equipment owned:   canes and rolling walker    Employment:      Hobbies/Interests:  gardening, woodwork    Patient goals for therapy:  walk without assistive device, better standing tolerance, easier time getting in and out of van       Objective   KNEE EVALUATION  PAIN: Pain Level at Rest: 4/10  Pain Level with Use: 9/10  Pain Location: knee  Pain Quality: Sharp  Pain Frequency: intermittent  Pain is Exacerbated By: lifting, benidng,household tasks, riding in car  Pain is Relieved By: cold, heat, otc medications, and rest  INTEGUMENTARY (edema, incisions): WNL    GAIT:  Assistive Device(s): Walker (four wheeled)  Gait Deviations: Antalgic  Ghazal decreased, Wide base of support  BALANCE/PROPRIOCEPTION:  poor walking balance    ROM:   (Degrees) Left AROM Left PROM  Right AROM Right PROM   Knee Flexion   85    Knee Extension   4      STRENGTH:   Pain: - none  + mild ++ moderate +++ severe  Strength Scale: 0-5/5 Left Right   Knee Flexion 5 4+   Knee Extension 5 3+   Quad Set  3+     Stairs: step to pattern BUE support    FUNCTIONAL TESTS: Double Leg Squat: Increased trunk lean, Quadriceps avoidance squatting pattern, and Improper use of glutes/hips    Assessment & Plan   CLINICAL IMPRESSIONS  Medical Diagnosis: s/p right TKA    Treatment Diagnosis: S/p R TKA   Impression/Assessment: Patient is a 73 year old adult with Right knee pain and ROM complaints s/p R TKA.  The following significant findings have been identified: Pain, Decreased ROM/flexibility, Decreased strength, Impaired balance, Impaired gait, and Decreased activity tolerance. These impairments interfere with their ability to perform self care tasks, work tasks, recreational activities, household chores, driving , household mobility, and community mobility as compared to previous level of function.     Clinical Decision Making (Complexity):  Clinical Presentation: Stable/Uncomplicated  Clinical Presentation Rationale: based on medical and personal factors listed in PT evaluation  Clinical Decision Making (Complexity): Low complexity    PLAN OF CARE  Treatment Interventions:  Modalities: Cryotherapy, E-stim, Hot Pack  Interventions: Gait Training, Manual Therapy, Neuromuscular Re-education, Therapeutic Activity, Therapeutic Exercise, Self-Care/Home Management    Long Term Goals     PT Goal 1  Goal Identifier: 1. STG  Goal Description: Pt will demosntrate 120 degrees of knee flexion in order to improve gait mechaincs  Target Date: 12/07/23  PT Goal 2  Goal Identifier: 2. STG  Goal Description: Pt will demonstrate 5/5 knee extension in order to improve knee stability during stair navigation  Target Date: 12/07/23  PT Goal 3  Goal Identifier: 3. LTG  Goal Description: Pt will report that they are able to stand for 15 mins without an increase in pain in order to improve tolerance to preferred activities  Target  Date: 01/04/24  PT Goal 4  Goal Identifier: 4. LTG  Goal Description: Pt will demonstrate independence with HEP by performing exercises with proper form by discharge in order to continue strengtheing and progress outside of PT.  Target Date: 01/04/24      Frequency of Treatment: 2x/week  Duration of Treatment: 8 weeks    Education Assessment:   Learner/Method: Patient  Education Comments: Pt educated on role of PT, POC, and HEP    Risks and benefits of evaluation/treatment have been explained.   Patient/Family/caregiver agrees with Plan of Care.     Evaluation Time:     PT Eval, Low Complexity Minutes (06798): 20       Signing Clinician: Carolina Elena, SEVERIANO      [facility]                                                                                   OUTPATIENT PHYSICAL THERAPY      PLAN OF TREATMENT FOR OUTPATIENT REHABILITATION   Patient's Last Name, First Name, Laila Huetra YOB: 1950   Provider's Name   [facility]   Medical Record No.  [id number]     Onset Date: 10/25/23 (date of surgery)  Start of Care Date: 11/09/23     Medical Diagnosis:  s/p right TKA      PT Treatment Diagnosis:  S/p R TKA Plan of Treatment  Frequency/Duration: 2x/week/ 8 weeks    Certification date from 11/09/23 to 01/04/24         See note for plan of treatment details and functional goals     Carolina Elena, PT                         I CERTIFY THE NEED FOR THESE SERVICES FURNISHED UNDER        THIS PLAN OF TREATMENT AND WHILE UNDER MY CARE .             Physician Signature               Date    X_____________________________________________________                    Referring Provider:  Palmer Lew      Initial Assessment  See Epic Evaluation- Start of Care Date: 11/09/23

## 2023-11-13 ENCOUNTER — THERAPY VISIT (OUTPATIENT)
Dept: PHYSICAL THERAPY | Facility: CLINIC | Age: 73
End: 2023-11-13
Attending: ORTHOPAEDIC SURGERY
Payer: COMMERCIAL

## 2023-11-13 ENCOUNTER — TRANSCRIBE ORDERS (OUTPATIENT)
Dept: OTHER | Age: 73
End: 2023-11-13

## 2023-11-13 DIAGNOSIS — Z96.651 S/P TOTAL KNEE ARTHROPLASTY, RIGHT: Primary | ICD-10-CM

## 2023-11-13 PROCEDURE — 97110 THERAPEUTIC EXERCISES: CPT | Mod: GP | Performed by: PHYSICAL THERAPIST

## 2023-11-16 ENCOUNTER — THERAPY VISIT (OUTPATIENT)
Dept: PHYSICAL THERAPY | Facility: CLINIC | Age: 73
End: 2023-11-16
Attending: ORTHOPAEDIC SURGERY
Payer: COMMERCIAL

## 2023-11-16 DIAGNOSIS — Z96.651 S/P TOTAL KNEE ARTHROPLASTY, RIGHT: Primary | ICD-10-CM

## 2023-11-16 PROCEDURE — 97110 THERAPEUTIC EXERCISES: CPT | Mod: GP | Performed by: PHYSICAL THERAPIST

## 2023-11-20 ENCOUNTER — THERAPY VISIT (OUTPATIENT)
Dept: PHYSICAL THERAPY | Facility: CLINIC | Age: 73
End: 2023-11-20
Attending: ORTHOPAEDIC SURGERY
Payer: COMMERCIAL

## 2023-11-20 DIAGNOSIS — Z96.651 S/P TOTAL KNEE ARTHROPLASTY, RIGHT: Primary | ICD-10-CM

## 2023-11-20 PROCEDURE — 97110 THERAPEUTIC EXERCISES: CPT | Mod: GP | Performed by: PHYSICAL THERAPIST

## 2023-11-30 ENCOUNTER — THERAPY VISIT (OUTPATIENT)
Dept: PHYSICAL THERAPY | Facility: CLINIC | Age: 73
End: 2023-11-30
Attending: ORTHOPAEDIC SURGERY
Payer: COMMERCIAL

## 2023-11-30 DIAGNOSIS — Z96.651 S/P TOTAL KNEE ARTHROPLASTY, RIGHT: Primary | ICD-10-CM

## 2023-11-30 PROCEDURE — 97110 THERAPEUTIC EXERCISES: CPT | Mod: GP | Performed by: PHYSICAL THERAPIST

## 2023-12-04 ENCOUNTER — THERAPY VISIT (OUTPATIENT)
Dept: PHYSICAL THERAPY | Facility: CLINIC | Age: 73
End: 2023-12-04
Attending: ORTHOPAEDIC SURGERY
Payer: COMMERCIAL

## 2023-12-04 DIAGNOSIS — Z96.651 S/P TOTAL KNEE ARTHROPLASTY, RIGHT: Primary | ICD-10-CM

## 2023-12-04 PROCEDURE — 97110 THERAPEUTIC EXERCISES: CPT | Mod: GP | Performed by: PHYSICAL THERAPIST

## 2023-12-04 PROCEDURE — 97112 NEUROMUSCULAR REEDUCATION: CPT | Mod: GP | Performed by: PHYSICAL THERAPIST

## 2023-12-07 ENCOUNTER — THERAPY VISIT (OUTPATIENT)
Dept: PHYSICAL THERAPY | Facility: CLINIC | Age: 73
End: 2023-12-07
Attending: ORTHOPAEDIC SURGERY
Payer: COMMERCIAL

## 2023-12-07 DIAGNOSIS — I89.0 LYMPHEDEMA: Primary | ICD-10-CM

## 2023-12-07 DIAGNOSIS — Z96.651 S/P TOTAL KNEE ARTHROPLASTY, RIGHT: Primary | ICD-10-CM

## 2023-12-07 PROCEDURE — 97140 MANUAL THERAPY 1/> REGIONS: CPT | Mod: GP | Performed by: PHYSICAL THERAPIST

## 2023-12-07 PROCEDURE — 97110 THERAPEUTIC EXERCISES: CPT | Mod: GP | Performed by: PHYSICAL THERAPIST

## 2023-12-07 NOTE — PROGRESS NOTES
Lymphedema Therapy Certification    [facility]                                                                                   OUTPATIENT PHYSICAL THERAPY    PLAN OF TREATMENT FOR OUTPATIENT REHABILITATION   Patient's Last Name, First Name, Laila Huerta YOB: 1950   Provider's Name   [facility]   Medical Record No.  [id number]     Onset Date: 08/17/23 (date of referral)  Start of Care Date: 09/11/23     Medical Diagnosis:  Lymphedema of both lower extremities      PT Treatment Diagnosis:  BLE secondary lympehdema / phlebolymphedema Plan of Treatment  Frequency/Duration: every other week/ 4 weeks    Certification date from 12/07/23 to 01/04/24         See note for plan of treatment details and functional goals     Mary Seth, PT                         I CERTIFY THE NEED FOR THESE SERVICES FURNISHED UNDER        THIS PLAN OF TREATMENT AND WHILE UNDER MY CARE .             Physician Signature               Date    X_____________________________________________________                  Referring Provider:  Palmer Lew    Initial Assessment  See Epic Evaluation- Start of Care Date: 09/11/23 12/07/23 1135   Appointment Info   Signing clinician's name / credentials Mary Seth PT DPT CLT/Wendy Lynn SPT   Visits Used 13 BLE/[facility]/Palmer Lew([facility])   Medical Diagnosis Lymphedema of both lower extremities   PT Tx Diagnosis BLE secondary lympehdema / phlebolymphedema   Other pertinent information R-TKA scheduled for 10/26 with Dr. Palmer Lew ([facility]); pt will come Mon/Thurs due to those are the days she has rides   Quick Adds Certification;Student Supervision   Progress Note/Certification   Start of Care Date 09/11/23   Onset of illness/injury or Date of Surgery 08/17/23  (date of referral)   Therapy Frequency every other week   Predicted Duration 4 weeks   Certification date from 12/07/23    Certification date to 01/04/24   Progress Note Due Date 11/12/23   Progress Note Completed Date 10/12/23   Supervision   Student Supervision Direct supervision provided;Direct Patient Contact Provided;Therapy services provided with the co-signing licensed therapist guiding and directing the services, and providing the skilled judgement and assessment throughout the session   PT Assistant Visit Number 5   GOALS   PT Goals 2;3;4   PT Goal 1   Goal Identifier stg   Goal Description pt to have around the clock tolerance to BLE GCB for edema reduction response   Rationale to maximize safety and independence with self cares   Target Date 09/28/23   Date Met 09/25/23   PT Goal 2   Goal Identifier ltg   Goal Description once appropriate, pt and/or caregiver to be independent with donning, doffing and care of compression garments for BLEs for longterm edema management for maintenance   Rationale to maximize safety and independence with self cares   Target Date 12/13/23   Date Met 10/12/23   PT Goal 3   Goal Identifier ltg   Goal Description pt to be independent with longterm BLE edema management via HEP, elevation, skin cares and compression garment wear/use   Rationale to maximize safety and independence with self cares   Goal Progress Increased edema due to R TKA   Target Date 01/04/24   PT Goal 4   Goal Identifier ltg   Goal Description pt to have at least 3 point improvement on LLIS due to decreased edema and associated symptoms in BLE   Rationale to maximize safety and independence with performance of ADLs and functional tasks   Goal Progress Increased edema due to R TKA   Target Date 01/04/24   Subjective Report   Subjective Report Reports didn't wear garments yesterday.   Objective Measures   Objective Measures Objective Measure 1   Objective Measure 1   Objective Measure girth   Details since last measured on 11/9/23: RLE -14.4%% and LLE +11.2%   Treatment Interventions (PT)   Interventions Manual Therapy   Manual  Therapy   Manual Therapy: Mobilization, MFR, MLD, friction massage minutes ([id number]) 23   Manual Therapy 1 - Details pt into clinic wearing BLE velcro wraps, Pt reports improved ease with don/doffing garments now; therapist removed garments and BLE girth taken; Therapist donned black liner and velcro wraps for B LE and instructed pt on importance of wearing stockings daily due to increased edema on L LE.  Pt is to return to clinic in 2 weeks to assess L LE edema due to significant increase at todays session.   Skilled Intervention CDT partial; garment assessment; follow-up education/instruction   Patient Response/Progress Pt is to return to clinic in 2 weeks to assess L LE edema due to significant increase at todays session.   Education   Learner/Method Patient;Caregiver;Listening   Plan   Home program compression garments during daytime, no night compression, skin care/lotion at night when garmnets off   Updates to plan of care every other week   Plan for next session girth after surgery, modify as needed   Total Session Time   Timed Code Treatment Minutes 23   Total Treatment Time (sum of timed and untimed services) 23

## 2023-12-14 ENCOUNTER — TRANSFERRED RECORDS (OUTPATIENT)
Dept: HEALTH INFORMATION MANAGEMENT | Facility: CLINIC | Age: 73
End: 2023-12-14

## 2023-12-21 ENCOUNTER — THERAPY VISIT (OUTPATIENT)
Dept: PHYSICAL THERAPY | Facility: CLINIC | Age: 73
End: 2023-12-21
Attending: ORTHOPAEDIC SURGERY
Payer: COMMERCIAL

## 2023-12-21 DIAGNOSIS — Z96.651 S/P TOTAL KNEE ARTHROPLASTY, RIGHT: Primary | ICD-10-CM

## 2023-12-21 PROCEDURE — 97110 THERAPEUTIC EXERCISES: CPT | Mod: GP | Performed by: PHYSICAL THERAPIST

## 2023-12-21 NOTE — PROGRESS NOTES
Physical Therapy Discharge Note    DISCHARGE  Reason for Discharge: Patient has met all goals.  No further expectation of progress.  Patient chooses to discontinue therapy.    Equipment Issued: none    Discharge Plan: Patient to continue home program.    Referring Provider:  Palmer Lew       12/21/23 0500   Appointment Info   Signing clinician's name / credentials Mary Seth PT DPT CLT   Visits Used 8   Medical Diagnosis s/p right TKA   PT Tx Diagnosis S/p R TKA   Other pertinent information R-TKA scheduled for 10/26 with Dr. Palmer Lew ([facility]); pt will come Mon/Thurs due to those are the days she has rides   Quick Adds Certification;Student Supervision   Progress Note/Certification   Start of Care Date 11/09/23   Onset of illness/injury or Date of Surgery 10/25/23  (date of surgery)   Therapy Frequency 2x/week   Predicted Duration 8 weeks   Certification date from 11/09/23   Certification date to 01/04/24   Progress Note Due Date 01/04/24   Progress Note Completed Date 11/09/23   GOALS   PT Goals 2;3;4   PT Goal 1   Goal Identifier 1. STG   Goal Description Pt will demosntrate 120 degrees of knee flexion in order to improve gait mechaincs   Goal Progress 120   Target Date 12/07/23   Date Met 12/07/23   PT Goal 2   Goal Identifier 2. STG   Goal Description Pt will demonstrate 5/5 knee extension in order to improve knee stability during stair navigation   Target Date 12/07/23   Date Met 12/21/23   PT Goal 3   Goal Identifier 3. LTG   Goal Description Pt will report that they are able to stand for 15 mins without an increase in pain in order to improve tolerance to preferred activities   Goal Progress can stand over 15 min before needing to rest when cooking dinner   Target Date 01/04/24   Date Met 12/04/23   PT Goal 4   Goal Identifier 4. LTG   Goal Description Pt will demonstrate independence with HEP by performing exercises with proper form by discharge in order to continue strengtheing and progress  outside of PT.   Goal Progress completing HEP at home but still progressing in therapy with new exercises   Target Date 01/04/24   Date Met 12/04/23   Subjective Report   Subjective Report Reports knee is doing well   Objective Measures   Objective Measures Objective Measure 1   Objective Measure 1   Objective Measure ROM R knee   Details Flexion:120 Extension:0   Objective Measure 2   Objective Measure Strength   Details Knee extension 5/5   Objective Measure 3   Objective Measure palpation   Objective Measure 4   Objective Measure stair   Details able to navigate stairs with a step to pattern. difficulty bending leg going down the stairs   Objective Measure 5   Objective Measure Gait   Details can with no AD, decreased step length and mild antalgic gait favoring the R   Treatment Interventions (PT)   Interventions Manual Therapy;Neuromuscular Re-education   Therapeutic Procedure/Exercise   Therapeutic Procedures: strength, endurance, ROM, flexibillity minutes (22632) 23   Ther Proc 1 bike seat 7 x 3 mins for warm up   Ther Proc 1 - Details able to make full revolutions   Ther Proc 2 side stepping along counter top   Ther Proc 2 - Details 20 feet x 2 B with no UE support   Ther Proc 3 sit <> stand   Ther Proc 3 - Details 5 x 2 with no UE support (elevated surface to 21 and 18  inches)   PTRx Ther Proc 1 stair navigation x 2   PTRx Ther Proc 2 R step downs on 6 inch step x 5   Skilled Intervention progressed HEP to improve strength for tolerance to walking.   Patient Response/Progress Pt tolerated well requiring only 2 breaks today toward the end of session   Neuromuscular Re-education   Neuromuscular re-ed of mvmt, balance, coord, kinesthetic sense, posture, proprioception minutes (27835) 3   Neuromuscular Re-education Neuro Re-ed 2;Neuro Re-ed 3   Neuro Re-ed 1 semi tandem stance   Neuro Re-ed 1 - Details 2-3 x 15 sec   Education   Learner/Method Patient   Education Comments discharge   Plan   Home program  snapshot in ptrx   Plan for next session discharge   Total Session Time   Timed Code Treatment Minutes 26   Total Treatment Time (sum of timed and untimed services) 26

## 2023-12-26 DIAGNOSIS — I35.0 AORTIC STENOSIS, SEVERE: ICD-10-CM

## 2023-12-27 RX ORDER — LISINOPRIL 5 MG/1
5 TABLET ORAL DAILY
Qty: 90 TABLET | Refills: 0 | Status: SHIPPED | OUTPATIENT
Start: 2023-12-27 | End: 2024-02-02

## 2024-02-02 ENCOUNTER — VIRTUAL VISIT (OUTPATIENT)
Dept: FAMILY MEDICINE | Facility: CLINIC | Age: 74
End: 2024-02-02
Payer: COMMERCIAL

## 2024-02-02 DIAGNOSIS — Z79.899 TRANSGENDER PERSON ON HORMONE THERAPY: ICD-10-CM

## 2024-02-02 DIAGNOSIS — E78.5 HYPERLIPIDEMIA LDL GOAL <100: ICD-10-CM

## 2024-02-02 DIAGNOSIS — F64.0 TRANSGENDER PERSON ON HORMONE THERAPY: ICD-10-CM

## 2024-02-02 DIAGNOSIS — Z96.651 S/P TOTAL KNEE ARTHROPLASTY, RIGHT: Primary | ICD-10-CM

## 2024-02-02 DIAGNOSIS — I35.0 AORTIC STENOSIS, SEVERE: ICD-10-CM

## 2024-02-02 PROCEDURE — 99213 OFFICE O/P EST LOW 20 MIN: CPT | Mod: 93 | Performed by: FAMILY MEDICINE

## 2024-02-02 RX ORDER — LISINOPRIL 5 MG/1
5 TABLET ORAL DAILY
Qty: 90 TABLET | Refills: 0 | Status: SHIPPED | OUTPATIENT
Start: 2024-02-02 | End: 2024-03-07

## 2024-02-02 RX ORDER — ESTRADIOL 0.03 MG/D
1 FILM, EXTENDED RELEASE TRANSDERMAL
Qty: 24 PATCH | Refills: 1 | Status: SHIPPED | OUTPATIENT
Start: 2024-02-05 | End: 2024-09-23

## 2024-02-02 RX ORDER — LOVASTATIN 20 MG
TABLET ORAL
Qty: 90 TABLET | Refills: 2 | OUTPATIENT
Start: 2024-02-02

## 2024-02-02 RX ORDER — LISINOPRIL 5 MG/1
TABLET ORAL
Qty: 90 TABLET | Refills: 0 | OUTPATIENT
Start: 2024-02-02

## 2024-02-02 RX ORDER — LOVASTATIN 20 MG
TABLET ORAL
Qty: 90 TABLET | Refills: 0 | Status: SHIPPED | OUTPATIENT
Start: 2024-02-02 | End: 2024-03-07

## 2024-02-02 ASSESSMENT — ENCOUNTER SYMPTOMS: SHORTNESS OF BREATH: 1

## 2024-02-02 NOTE — NURSING NOTE
Is the patient currently in the state of [place]? YES    Visit mode:TELEPHONE    If the visit is dropped, the patient can be reconnected by: TELEPHONE VISIT: Text to cell phone:   Telephone Information:   Mobile [phone]       Will anyone else be joining the visit? No  (If patient encounters technical issues they should call [phone])    How would you like to obtain your AVS? Declined    Are changes needed to the allergy or medication list? Pt stated no changes to allergies and Pt stated no med changes    Rooming Documentation: Questionnaire(s) not done per department protocol.    Pt having technical issues joining video, provider approved for visit to be changed to telephone.    Reason for visit: SOCORRO Salazar, YAYOF

## 2024-02-02 NOTE — TELEPHONE ENCOUNTER
Pricila refill given x 1, patient has upcoming appointment with Delmis Simons NP on 3/2/24      Prescription approved per [facility] Refill Protocol.  Julie Behrendt RN

## 2024-02-02 NOTE — PROGRESS NOTES
Patient has given verbal consent for Telephone visit?  Yes         BOO Ulloa is a 73 year old individual that uses pronouns She/Her/Hers/Herself that presents today for follow up of:  feminizing hormone therapy.   Alone or accompanied by: accompanied today by      Gender identity: female  Started Hormone  therapy  2016  Continues on TD estradiol  0.025 mg patch 2x/wk  Any special concerns today?    Had knee surgery in 10/2023, discharged after 2 months; reviewed discharge summary  Going to , building more strength. Walked with cane from parking lot to clinic and then back; working a few days a week using walker    On hormones?  YES +++ Shot day of the week? N/a      Due for labs?  No      +++ Refills of meds needed?  Yes  Gender related body changes since last visit: stable    Breakthrough bleeding? Does Not Apply    New health concerns since last visit:  ---see above    Past Surgical History:   Procedure Laterality Date    ARTHROPLASTY KNEE Right 10/26/2023    Procedure: Total Knee Arthroplasty, Right;  Surgeon: Palmer Lew MD;  Location: [facility] OR    COLONOSCOPY  2007    CV CORONARY ANGIOGRAM N/A 9/8/2022    Procedure: Coronary Angiogram;  Surgeon: Santy Clayton MD;  Location: [facility] CARDIAC CATH LAB    CV PCI N/A 9/8/2022    Procedure: Percutaneous Coronary Intervention;  Surgeon: Santy Clayton MD;  Location: [facility] CARDIAC CATH LAB    CV TRANSCATHETER AORTIC VALVE REPLACEMENT-FEMORAL APPROACH N/A 10/4/2022    Procedure: Transcatheter Aortic Valve Replacement-Femoral Approach;  Surgeon: Young Ramires MD;  Location:  [facility] CARD[facility] C[facility] LAB    JOINT REPLACEMTN, KNEE RT/LT  2006    left    ORCHIECTOMY SCROTAL Bilateral 5/14/2019    Procedure: Bilatera Scrotal Orchiectomy;  Surgeon: Abhilash Weston MD;  Location:  OR       Patient Active Problem List   Diagnosis    Gouty arthropathy    Mental or behavioral problem    Generalized osteoarthrosis, unspecified site     Disorder of bone and cartilage    Mixed hyperlipidemia    Benign essential hypertension    OA (osteoarthritis) of knee    DDD (degenerative disc disease), lumbar    Varicose veins of legs    Psoriasis    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    Family history of diabetes mellitus in first degree relative    Mitral valve disorder    Aortic valve stenosis, unspecified etiology    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    Obesity, Class III, BMI 40-49.9 (morbid obesity) (H)    Multiple gastric ulcers    Episodic tension-type headache, not intractable    Alcohol dependence in remission (H)    Aortic aneurysm without rupture, unspecified portion of aorta (H24)    Status post coronary angiogram    Shortness of breath     Thoracic aortic aneurysm without rupture    Aortic stenosis, severe    S/P TAVR (transcatheter aortic valve replacement)    Aneurysm of ascending aorta without rupture (H24)    Thrombocytopenia (H24)    Lymphedema    S/P knee replacement    S/P total knee arthroplasty, right       Current Outpatient Medications   Medication Sig Dispense Refill    acetaminophen (TYLENOL) 325 MG tablet Take 2 tablets (650 mg) by mouth every 4 hours as needed for other (mild pain)      albuterol (PROAIR HFA/PROVENTIL HFA/VENTOLIN HFA) 108 (90 Base) MCG/ACT inhaler Inhale 2 puffs into the lungs every 6 hours 18 g 0    aspirin (ASA) 325 MG EC tablet Take 1 tablet (325 mg) by mouth daily 30 tablet 0    brimonidine (ALPHAGAN) 0.2 % ophthalmic solution Place 1 drop into both eyes 2 times daily      dorzolamide-timolol (COSOPT) 2-0.5 % ophthalmic solution Place 1 drop into both eyes 2 times daily      estradiol (VIVELLE-DOT) 0.025 MG/24HR bi-weekly patch Place 1 patch onto the skin twice a week 24 patch 1    hydrOXYzine (ATARAX) 25 MG tablet Take 1 tablet (25 mg) by mouth every 6 hours as needed for itching or anxiety (with pain, moderate pain) 30 tablet 0    latanoprost (XALATAN) 0.005 % ophthalmic solution Place 1 drop into both  eyes At Bedtime      lisinopril (ZESTRIL) 5 MG tablet TAKE 1 TABLET (5 MG) BY MOUTH DAILY 90 tablet 0    lovastatin (MEVACOR) 20 MG tablet TAKE ONE TABLET BY MOUTH EVERY NIGHT AT BEDTIME (Patient taking differently: Take 20 mg by mouth at bedtime TAKE ONE TABLET BY MOUTH EVERY NIGHT AT BEDTIME) 90 tablet 2    Multiple Vitamin (DAILY-EVANGELISTA) TABS Take 1 tablet by mouth daily  11    nitroGLYcerin (NITROSTAT) 0.4 MG sublingual tablet For chest pain place 1 tablet under the tongue every 5 minutes for 3 doses. If symptoms persist 5 minutes after 1st dose call 911. 30 tablet 1    oxyCODONE (ROXICODONE) 5 MG tablet Take 1-2 tablets (5-10 mg) by mouth every 4 hours as needed for moderate to severe pain 33 tablet 0    senna-docusate (SENOKOT-S/PERICOLACE) 8.6-50 MG tablet Take 1-2 tablets by mouth 2 times daily Take while on oral narcotics to prevent or treat constipation. 30 tablet 0       History   Smoking Status    Never   Smokeless Tobacco    Never          Allergies   Allergen Reactions    Heparin Rash     he lives in a assisted living program and is not sure what type of allergies he really has. He quit drinking       There are no preventive care reminders to display for this patient.      Problem, Medication and Allergy Lists were reviewed and are current..         Review of Systems:   Review of Systems   Respiratory:  Positive for shortness of breath.         Mild with exertion, unchanged   Cardiovascular:  Negative for chest pain.              Labs:   Results from last visit:  Admission on 10/26/2023, Discharged on 10/27/2023   Component Date Value Ref Range Status    GLUCOSE BY METER POCT 10/26/2023 101 (H)  70 - 99 mg/dL Final    Hemoglobin 10/27/2023 13.2  11.7 - 17.7 g/dL Final    Sex Specific Reference Ranges:    Female  11.7-15.7  g/dL  Male    13.3-17.7   g/dL      Glucose 10/27/2023 216 (H)  70 - 99 mg/dL Final    Patient Fasting > 8hrs? 10/27/2023 Unknown   Final    GLUCOSE BY METER POCT 10/27/2023 151 (H)   70 - 99 mg/dL Final    Hemoglobin A1C 10/27/2023 5.2  <5.7 % Final    Normal <5.7%   Prediabetes 5.7-6.4%    Diabetes 6.5% or higher     Note: Adopted from ADA consensus guidelines.     Has home heart monitor    Assessment and Plan   Transgender person on hormone therapy s/p gender affirming orchiectomy  S/p knee replacement  Stable on current dose estradiol     Follow-up in summer in person             Phone call duration: 10 minutes        [facility]  Questions were elicited and answered.     Jluis Mane MD

## 2024-02-02 NOTE — TELEPHONE ENCOUNTER
Overdue for needed care. Please call to schedule in clinic appointment follow up hypertension. Once appt is scheduled, route back to the pool.

## 2024-02-08 ENCOUNTER — PATIENT OUTREACH (OUTPATIENT)
Dept: GERIATRIC MEDICINE | Facility: CLINIC | Age: 74
End: 2024-02-08
Payer: COMMERCIAL

## 2024-02-08 NOTE — PROGRESS NOTES
[facility] Care Coordination Contact    I got a hold of Laila to schedule her annual HRA. She is sick and wants me to call her back in 1 week.     Harriet Eddy RN  [facility]  [phone]

## 2024-02-13 ENCOUNTER — PATIENT OUTREACH (OUTPATIENT)
Dept: GERIATRIC MEDICINE | Facility: CLINIC | Age: 74
End: 2024-02-13
Payer: COMMERCIAL

## 2024-02-13 NOTE — PROGRESS NOTES
[facility] Care Coordination Contact    Called member to schedule annual HRA home visit. HRA has been scheduled for 2/29/24 at 2pm.    Harriet Eddy RN  [facility]  [phone]

## 2024-02-29 ENCOUNTER — PATIENT OUTREACH (OUTPATIENT)
Dept: GERIATRIC MEDICINE | Facility: CLINIC | Age: 74
End: 2024-02-29
Payer: COMMERCIAL

## 2024-02-29 ASSESSMENT — ACTIVITIES OF DAILY LIVING (ADL): DEPENDENT_IADLS:: SHOPPING;TRANSPORTATION

## 2024-03-01 NOTE — PROGRESS NOTES
[facility] Care Coordination Contact  [facility] CCDB Assessment   (for members on other waivers)    Home visit for Health Risk Assessment with Laila Perez completed on February 29, 2024    Type of residence:: Group home  Current living arrangement:: I live alone     Assessment completed with:: Patient, Other (-amira)    Current Care Plan  Member is a recipient of the CADI Waiver program.  Member currently receiving the following home care services:     Member currently receiving the following community resources: Transportation Services, Other (see comment), [facility] Worker, [facility] Programs (cadi waiver)      Medication Review  Medication reconciliation completed in Epic: Yes  Medication set-up & administration: Independent-does not set up.  Self-administers medications.  Medication Risk Assessment Medication (1 or more, place referral to MTM): N/A: No risk factors identified  MTM Referral Placed: No: No risk factors idenified    Mental/Behavioral Health   Depression Screening:           Mental health DX:: Yes   Mental health DX how managed:: [facility]    Falls Assessment:   Fallen 2 or more times in the past year?: No   Any fall with injury in the past year?: No    ADL/IADL Dependencies:   Dependent ADLs:: Independent  Dependent IADLs:: Shopping, Transportation    [facility] Shared information regarding [employer]. Reviewed preventative health screening and health plan supplemental benefits/incentives. Reviewed medication disposal form.    PCA Assessment completed at visit: Not Applicable      Care Plan & Recommendations: Laila is doing well in her current group home setting on CADI waiver services. No new services or supplies needed at this time. We reviewed all [facility] supplemental benefits. She will call [facility] customer service for assistance to set up.     CADI Care Plan/ISP requested from [facility] .      Follow-Up Plan: Member informed of future contact, plan to f/u with member with a 6 month telephone assessment.  Contact information shared with member and family, encouraged member to call with any questions or concerns at any time.    [place] care continuum providers: Please see Snapshot and Care Management Flowsheets for Specific details of care plan.    This CC note routed to PCP, Delmis Simosn.     Harriet Edyd RN  [facility]  [phone]

## 2024-03-07 ENCOUNTER — PATIENT OUTREACH (OUTPATIENT)
Dept: GERIATRIC MEDICINE | Facility: CLINIC | Age: 74
End: 2024-03-07

## 2024-03-07 ENCOUNTER — OFFICE VISIT (OUTPATIENT)
Dept: FAMILY MEDICINE | Facility: CLINIC | Age: 74
End: 2024-03-07
Payer: COMMERCIAL

## 2024-03-07 VITALS
OXYGEN SATURATION: 96 % | RESPIRATION RATE: 14 BRPM | WEIGHT: 293 LBS | BODY MASS INDEX: 41.02 KG/M2 | SYSTOLIC BLOOD PRESSURE: 139 MMHG | TEMPERATURE: 97.3 F | DIASTOLIC BLOOD PRESSURE: 88 MMHG | HEART RATE: 63 BPM | HEIGHT: 71 IN

## 2024-03-07 DIAGNOSIS — E78.5 HYPERLIPIDEMIA LDL GOAL <100: ICD-10-CM

## 2024-03-07 DIAGNOSIS — I35.0 AORTIC STENOSIS, SEVERE: ICD-10-CM

## 2024-03-07 DIAGNOSIS — D69.6 THROMBOCYTOPENIA (H): ICD-10-CM

## 2024-03-07 DIAGNOSIS — J20.9 ACUTE BRONCHITIS WITH SYMPTOMS > 10 DAYS: ICD-10-CM

## 2024-03-07 DIAGNOSIS — F10.21 ALCOHOL DEPENDENCE IN REMISSION (H): ICD-10-CM

## 2024-03-07 DIAGNOSIS — I10 HTN, GOAL BELOW 140/90: Primary | ICD-10-CM

## 2024-03-07 DIAGNOSIS — E66.01 OBESITY, CLASS III, BMI 40-49.9 (MORBID OBESITY) (H): ICD-10-CM

## 2024-03-07 LAB
ALT SERPL W P-5'-P-CCNC: 29 U/L (ref 0–70)
CHOLEST SERPL-MCNC: 127 MG/DL
FASTING STATUS PATIENT QL REPORTED: YES
HDLC SERPL-MCNC: 64 MG/DL
LDLC SERPL CALC-MCNC: 43 MG/DL
NONHDLC SERPL-MCNC: 63 MG/DL
TRIGL SERPL-MCNC: 102 MG/DL

## 2024-03-07 PROCEDURE — 84460 ALANINE AMINO (ALT) (SGPT): CPT | Performed by: NURSE PRACTITIONER

## 2024-03-07 PROCEDURE — 99214 OFFICE O/P EST MOD 30 MIN: CPT | Performed by: NURSE PRACTITIONER

## 2024-03-07 PROCEDURE — 36415 COLL VENOUS BLD VENIPUNCTURE: CPT | Performed by: NURSE PRACTITIONER

## 2024-03-07 PROCEDURE — 80061 LIPID PANEL: CPT | Performed by: NURSE PRACTITIONER

## 2024-03-07 RX ORDER — LISINOPRIL 10 MG/1
10 TABLET ORAL DAILY
Qty: 90 TABLET | Refills: 3 | Status: SHIPPED | OUTPATIENT
Start: 2024-03-07

## 2024-03-07 RX ORDER — LISINOPRIL 5 MG/1
5 TABLET ORAL DAILY
Qty: 90 TABLET | Refills: 0 | Status: CANCELLED | OUTPATIENT
Start: 2024-03-07

## 2024-03-07 RX ORDER — LOVASTATIN 20 MG
TABLET ORAL
Qty: 90 TABLET | Refills: 3 | Status: SHIPPED | OUTPATIENT
Start: 2024-03-07

## 2024-03-07 RX ORDER — AZITHROMYCIN 250 MG/1
TABLET, FILM COATED ORAL
Qty: 6 TABLET | Refills: 0 | Status: SHIPPED | OUTPATIENT
Start: 2024-03-07 | End: 2024-03-12

## 2024-03-07 ASSESSMENT — PAIN SCALES - GENERAL: PAINLEVEL: NO PAIN (0)

## 2024-03-07 ASSESSMENT — PATIENT HEALTH QUESTIONNAIRE - PHQ9: SUM OF ALL RESPONSES TO PHQ QUESTIONS 1-9: 0

## 2024-03-07 NOTE — LETTER
"March 7, 2024      Laila Perez  [address] [address] [place] [id number]        Dear ,    We are writing to inform you of your test results.    Your test results fall within the expected range(s) or remain unchanged from previous results.  Please continue with current treatment plan. These results will be reviewed with you at your upcoming clinic appointment with Dr Cerda in April.     Resulted Orders   ALT   Result Value Ref Range    ALT 29 0 - 70 U/L      Comment:      Female   All ages       0-50 U/L     Male   0-20 Years     0-50 U/L  20-Unsp. Years 0-70 U/L        Narrative    The generation of reference intervals for this test is currently based on binary male or female sex. If the electronic health record information indicates another gender identity or if Legal Sex is recorded as \"Unknown\", both male and female reference intervals are provided where applicable, and should be considered according to the individual's appropriate clinical context.   Lipid panel reflex to direct LDL Fasting   Result Value Ref Range    Cholesterol 127 <200 mg/dL    Triglycerides 102 <150 mg/dL    Direct Measure HDL 64 >=40 mg/dL    LDL Cholesterol Calculated 43 <=100 mg/dL    Non HDL Cholesterol 63 <130 mg/dL    Patient Fasting > 8hrs? Yes     Narrative    Cholesterol  Desirable:  <200 mg/dL    Triglycerides  Normal:  Less than 150 mg/dL  Borderline High:  150-199 mg/dL  High:  200-499 mg/dL  Very High:  Greater than or equal to 500 mg/dL    Direct Measure HDL  Female:  Greater than or equal to 50 mg/dL   Male:  Greater than or equal to 40 mg/dL    LDL Cholesterol  Desirable:  <100mg/dL  Above Desirable:  100-129 mg/dL   Borderline High:  130-159 mg/dL   High:  160-189 mg/dL   Very High:  >= 190 mg/dL    Non HDL Cholesterol  Desirable:  130 mg/dL  Above Desirable:  130-159 mg/dL  Borderline High:  160-189 mg/dL  High:  190-219 mg/dL  Very High:  Greater than or equal to 220 mg/dL       If you have any questions or " concerns, please call the clinic at the number listed above.       Sincerely,      JERRICA Gallo CNP

## 2024-03-07 NOTE — PROGRESS NOTES
[facility] Care Coordination Contact    Received after visit chart from care coordinator.  Completed following tasks: Mailed copy of care plan/support plan to member, Mailed [place] Choices signature sheet pages 3-4, Mailed Safe Medication Disposal , and CC sent Support Plan to CCDB     Marnie Prather  Care Management Specialist  [facility]  [phone]

## 2024-03-07 NOTE — LETTER
March 7, 2024      LAILA AMEZQUITA  [address] [address] [place] [id number]      Dear Laila:    At [facility], we re dedicated to improving your health and wellness. Enclosed is the Care Plan developed with you on 2/29/2024. Please review the Care Plan carefully.    As a reminder, during your visit we talked about:  Ways to manage your physical and mental health  Using health care to maintain and improve your health   Your preventive care needs     Remember to contact your care coordinator if you:  Are hospitalized, or plan to be hospitalized   Have a fall    Have a change in your physical or mental health  Need help finding support or services    If you have questions, or don t agree with your Care Plan, call me at [phone]. You can also call me if your needs change. TTY users, call the [place] [employer] at (193) or 1-201.955.6579 (sukhyr-xn-uauseh relay service).    Sincerely,    Harriet Eddy RN    E-mail: [email or web address]  Phone: [phone]      [facility] ([facility]) is a health plan that contracts with both Medicare and the [place] Medical Assistance (Medicaid) program to provide benefits of both programs to enrollees. Enrollment in [facility] depends on contract renewal.    [id number] accepted    [id number] (07/2022)

## 2024-03-07 NOTE — PROGRESS NOTES
Assessment & Plan     HTN, goal below 140/90  Meeting goal continue  - lisinopril (ZESTRIL) 10 MG tablet  Dispense: 90 tablet; Refill: 3    Hyperlipidemia LDL goal <100  LDL Cholesterol Calculated   Date Value Ref Range Status   03/07/2024 43 <=100 mg/dL Final   05/13/2021 50 <100 mg/dL Final     Comment:     Desirable:       <100 mg/dl     Meeting goal continue  - lovastatin (MEVACOR) 20 MG tablet  Dispense: 90 tablet; Refill: 3  - ALT  - Lipid panel reflex to direct LDL Fasting    Aortic stenosis, severe resolved with TAVR   Follow-up with cardiology as planned  Severe symptomatic aortic stenosis  TAVR 10/2022 34 mm Medtronic evolute valve  Post op MG 14 mmHg  1 mo MG  9 mmHg  1 yr MG 9 mmHg, trace PVL   Ascending aortic aneurysm  Ascending aorta 4.4 cm 2021, 11/2022, 2023    Acute bronchitis with symptoms > 10 days  Symptomatic care strategies reviewed  Begin  - azithromycin (ZITHROMAX) 250 MG tablet  Dispense: 6 tablet; Refill: 0    Alcohol dependence-currently using alcohol (H)  Cessation encouraged    Thrombocytopenia (H24)  Labs for monitoring    Obesity, Class III, BMI 40-49.9 (morbid obesity) (H)  Continue to increase physical activity and improve nutrition  Consider weight management consultation    Call or return to the clinic with any worsening of symptoms or no resolution. Patient/Parent verbalized understanding and is in agreement. Medication side effects reviewed.   Current Outpatient Medications   Medication Sig Dispense Refill    acetaminophen (TYLENOL) 325 MG tablet Take 2 tablets (650 mg) by mouth every 4 hours as needed for other (mild pain)      albuterol (PROAIR HFA/PROVENTIL HFA/VENTOLIN HFA) 108 (90 Base) MCG/ACT inhaler Inhale 2 puffs into the lungs every 6 hours 18 g 0    aspirin (ASA) 325 MG EC tablet Take 1 tablet (325 mg) by mouth daily 30 tablet 0    brimonidine (ALPHAGAN) 0.2 % ophthalmic solution Place 1 drop into both eyes 2 times daily      dorzolamide-timolol (COSOPT) 2-0.5 %  ophthalmic solution Place 1 drop into both eyes 2 times daily      estradiol (VIVELLE-DOT) 0.025 MG/24HR bi-weekly patch Place 1 patch onto the skin twice a week 24 patch 1    latanoprost (XALATAN) 0.005 % ophthalmic solution Place 1 drop into both eyes At Bedtime      lisinopril (ZESTRIL) 10 MG tablet Take 1 tablet (10 mg) by mouth daily 90 tablet 3    lovastatin (MEVACOR) 20 MG tablet TAKE ONE TABLET BY MOUTH EVERY NIGHT AT BEDTIME 90 tablet 3    Multiple Vitamin (DAILY-EVANGELISTA) TABS Take 1 tablet by mouth daily  11    nitroGLYcerin (NITROSTAT) 0.4 MG sublingual tablet For chest pain place 1 tablet under the tongue every 5 minutes for 3 doses. If symptoms persist 5 minutes after 1st dose call 911. 30 tablet 1    hydrOXYzine (ATARAX) 25 MG tablet Take 1 tablet (25 mg) by mouth every 6 hours as needed for itching or anxiety (with pain, moderate pain) (Patient not taking: Reported on 3/7/2024) 30 tablet 0     Chart documentation with Dragon Voice recognition Software. Although reviewed after completion, some words and grammatical errors may remain.  Delmis Simons MSN,FNP-BC  [address] [address] [phone]          Juana Ulloa is a 73 year old, presenting for the following health issues:  Hypertension    HPI       Hypertension Follow-up    Do you check your blood pressure regularly outside of the clinic? Yes   Are you following a low salt diet? Yes  Are your blood pressures ever more than 140 on the top number (systolic) OR more   than 90 on the bottom number (diastolic), for example 140/90? 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           Review of Systems  Constitutional, neuro, ENT, endocrine, pulmonary, cardiac, gastrointestinal, genitourinary, musculoskeletal,  "integument and psychiatric systems are negative, except as otherwise noted.      Objective    /88   Pulse 63   Temp 97.3  F (36.3  C) (Tympanic)   Resp 14   Ht 1.803 m (5' 11\")   Wt (!) 153.8 kg (339 lb)   SpO2 96%   BMI 47.28 kg/m    Body mass index is 47.28 kg/m .  Physical Exam   GENERAL: alert and no distress  EYES: Eyes grossly normal to inspection, PERRL and conjunctivae and sclerae normal  HENT: ear canals and TM's normal, nose and mouth without ulcers or lesions  NECK: no adenopathy, no asymmetry, masses, or scars  RESP: lungs clear to auscultation - no rales, rhonchi or wheezes  CV: regular rates and rhythm, peripheral pulses strong, and no peripheral edema  ABDOMEN: soft, nontender, no hepatosplenomegaly, no masses and bowel sounds normal  MS: no gross musculoskeletal defects noted, no edema  SKIN: no suspicious lesions or rashes  NEURO: Normal strength and tone, mentation intact and speech normal  PSYCH: mentation appears normal, affect normal/bright    Admission on 10/26/2023, Discharged on 10/27/2023   Component Date Value Ref Range Status    GLUCOSE BY METER POCT 10/26/2023 101 (H)  70 - 99 mg/dL Final    Hemoglobin 10/27/2023 13.2  11.7 - 17.7 g/dL Final    Sex Specific Reference Ranges:    Female  11.7-15.7  g/dL  Male    13.3-17.7   g/dL      Glucose 10/27/2023 216 (H)  70 - 99 mg/dL Final    Patient Fasting > 8hrs? 10/27/2023 Unknown   Final    GLUCOSE BY METER POCT 10/27/2023 151 (H)  70 - 99 mg/dL Final    Hemoglobin A1C 10/27/2023 5.2  <5.7 % Final    Normal <5.7%   Prediabetes 5.7-6.4%    Diabetes 6.5% or higher     Note: Adopted from ADA consensus guidelines.           Signed Electronically by: JERRICA Salcedo CNP    "

## 2024-03-14 ENCOUNTER — TELEPHONE (OUTPATIENT)
Dept: FAMILY MEDICINE | Facility: CLINIC | Age: 74
End: 2024-03-14
Payer: COMMERCIAL

## 2024-03-14 PROBLEM — I71.9 AORTIC ANEURYSM WITHOUT RUPTURE, UNSPECIFIED PORTION OF AORTA (H): Status: RESOLVED | Noted: 2022-03-18 | Resolved: 2024-03-14

## 2024-03-14 PROBLEM — I71.21 ANEURYSM OF ASCENDING AORTA WITHOUT RUPTURE (H): Status: RESOLVED | Noted: 2022-12-22 | Resolved: 2024-03-14

## 2024-03-14 PROBLEM — I71.20 THORACIC AORTIC ANEURYSM WITHOUT RUPTURE (H): Status: RESOLVED | Noted: 2022-09-29 | Resolved: 2024-03-14

## 2024-03-14 NOTE — TELEPHONE ENCOUNTER
Order Request    Who is requesting: Akiko GALLEGOS  [phone]    Orders being requested: [employer] compression socks for the Velcro Wraps . Pt does not need the wraps just the socks.  Orthopedic store -  said she will see the order in [facility].  Akiko  requesting Fax order to [phone]    Where to send orders: Orthopedic store will order off Epic and Fax order to [facility]     Okay to leave a detailed message?: Yes at Home number on file [phone] (home)    [facility]

## 2024-03-26 DIAGNOSIS — I35.0 AORTIC STENOSIS, SEVERE: ICD-10-CM

## 2024-03-27 RX ORDER — NITROGLYCERIN 0.4 MG/1
TABLET SUBLINGUAL
Qty: 30 TABLET | Refills: 2 | Status: SHIPPED | OUTPATIENT
Start: 2024-03-27

## 2024-04-04 NOTE — PROGRESS NOTES
"Laila Guerra has an upcoming lab appointment with us. The appointment note says \"ALT/LIPID [place]\". She doesn't have any orders. Please advise.  Thank you,  [facility]  "

## 2024-04-04 NOTE — PROGRESS NOTES
"Charlie  Laila has an upcoming lab appointment with us.She doesn't have any orders.The appointment note says \"Naz\". I reached out to her and she doesn't need labs. I noticed she has an appointment with you the same day as the lab appointment. Does she need labs for you? Please advise.  Thank you,  [facility]  "

## 2024-04-08 NOTE — PROGRESS NOTES
We wanted fasting lipids prior to visit and she already had those done on 3/7/24. No further labs needed. This lab appointment can be cancelled.     Sujey Sullivan RN

## 2024-04-09 ENCOUNTER — PATIENT OUTREACH (OUTPATIENT)
Dept: CARE COORDINATION | Facility: CLINIC | Age: 74
End: 2024-04-09
Payer: COMMERCIAL

## 2024-04-11 ENCOUNTER — ALLIED HEALTH/NURSE VISIT (OUTPATIENT)
Dept: CARDIOLOGY | Facility: CLINIC | Age: 74
End: 2024-04-11
Attending: INTERNAL MEDICINE
Payer: COMMERCIAL

## 2024-04-11 ENCOUNTER — OFFICE VISIT (OUTPATIENT)
Dept: CARDIOLOGY | Facility: CLINIC | Age: 74
End: 2024-04-11
Attending: NURSE PRACTITIONER
Payer: COMMERCIAL

## 2024-04-11 VITALS
SYSTOLIC BLOOD PRESSURE: 152 MMHG | BODY MASS INDEX: 47.56 KG/M2 | OXYGEN SATURATION: 96 % | DIASTOLIC BLOOD PRESSURE: 93 MMHG | WEIGHT: 293 LBS | RESPIRATION RATE: 16 BRPM | HEART RATE: 74 BPM

## 2024-04-11 DIAGNOSIS — E78.5 HYPERLIPIDEMIA LDL GOAL <100: ICD-10-CM

## 2024-04-11 DIAGNOSIS — I35.0 AORTIC VALVE STENOSIS, UNSPECIFIED ETIOLOGY: ICD-10-CM

## 2024-04-11 DIAGNOSIS — Z95.2 S/P TAVR (TRANSCATHETER AORTIC VALVE REPLACEMENT): ICD-10-CM

## 2024-04-11 DIAGNOSIS — I10 BENIGN ESSENTIAL HYPERTENSION: ICD-10-CM

## 2024-04-11 DIAGNOSIS — I71.21 ANEURYSM OF ASCENDING AORTA WITHOUT RUPTURE (H): ICD-10-CM

## 2024-04-11 PROCEDURE — 99215 OFFICE O/P EST HI 40 MIN: CPT | Performed by: INTERNAL MEDICINE

## 2024-04-11 PROCEDURE — 93246 EXT ECG>7D<15D RECORDING: CPT | Performed by: INTERNAL MEDICINE

## 2024-04-11 NOTE — PROGRESS NOTES
HPI:     This is 74 year old female with PMH etoh use, aortic stenosis and aortic aneurysm here for follow up. She is s/p TAVR with 34 mm Medtronic evolute valve 10/2022, with mean gradient last documented at 9 mmHg, trace PVL. She had a recent episode 3-4 weeks ago where she had vision changes, funny feeling in her chest, and took SL nitroglycerin and it got better. No palpitations. She has an activity tracker and since has not had elevated heart rates.     BP poorly controlled so PCP increased her lisinopril. Has stable MARCELINO. Increasing her activity levels to the best of her ability but still cane dependent. Her imaging has confirmed stability in size of her aortic aneurysm.     Coronary angiogram in 2022 showed non obstructive coronary artery disease. She is now off aspirin altogether.      ASSESSMENT/PLAN:     1. Aortic stenosis s/p TAVR, mean gradient 9 mmhg  -echocardiogram given recent episode of ? TIA      2. Aortic aneurysm: measurement by MRA 4.4 cm.   -yearly surveillance, not candidate for open SAVR with root repair so will monitor over time   -blood pressure control, on ACEI. Consider beta blocker / ARB combination.     3. Suspected HFpEF from severe aortic stenosis: improved.   -compression wraps   -no longer on lasix   -suspect improvement s/p TAVR     4. LBBB: no syncope, cont to monitor    5. ? TIA:  -ziopatch today to evaluate for AFIB given recent vision changes and suspicion for TIA like symptoms. Low threshold to start aspirin / anticoagulation     Follow up with Carley in 2 months     High complexity visit     Shante Cerda MD MSC  [facility]         PAST MEDICAL HISTORY  Past Medical History:   Diagnosis Date    Alcoholism (H)     DDD (degenerative disc disease), cervical     Gender dysphoria in adult     HLD (hyperlipidemia)     HTN (hypertension)     large compression fracture 03/13/2006    Worked up for neoplasm, none found.      OA (osteoarthritis of spine)     Obese      Unspecified internal derangement of knee     CHRONIC KNEE PAIN,LEG,NECK AND HEAD INJURIES       CURRENT MEDICATIONS  Current Outpatient Medications   Medication Sig Dispense Refill    acetaminophen (TYLENOL) 325 MG tablet Take 2 tablets (650 mg) by mouth every 4 hours as needed for other (mild pain)      albuterol (PROAIR HFA/PROVENTIL HFA/VENTOLIN HFA) 108 (90 Base) MCG/ACT inhaler Inhale 2 puffs into the lungs every 6 hours 18 g 0    aspirin (ASA) 325 MG EC tablet Take 1 tablet (325 mg) by mouth daily 30 tablet 0    brimonidine (ALPHAGAN) 0.2 % ophthalmic solution Place 1 drop into both eyes 2 times daily      dorzolamide-timolol (COSOPT) 2-0.5 % ophthalmic solution Place 1 drop into both eyes 2 times daily      estradiol (VIVELLE-DOT) 0.025 MG/24HR bi-weekly patch Place 1 patch onto the skin twice a week 24 patch 1    hydrOXYzine (ATARAX) 25 MG tablet Take 1 tablet (25 mg) by mouth every 6 hours as needed for itching or anxiety (with pain, moderate pain) 30 tablet 0    latanoprost (XALATAN) 0.005 % ophthalmic solution Place 1 drop into both eyes At Bedtime      lisinopril (ZESTRIL) 10 MG tablet Take 1 tablet (10 mg) by mouth daily 90 tablet 3    lovastatin (MEVACOR) 20 MG tablet TAKE ONE TABLET BY MOUTH EVERY NIGHT AT BEDTIME 90 tablet 3    Multiple Vitamin (DAILY-EVANGELISTA) TABS Take 1 tablet by mouth daily  11    nitroGLYcerin (NITROSTAT) 0.4 MG sublingual tablet For chest pain place 1 tablet under the tongue every 5 minutes for 3 doses. If symptoms persist 5 minutes after 1st dose call 911. 30 tablet 2       PAST SURGICAL HISTORY:  Past Surgical History:   Procedure Laterality Date    ARTHROPLASTY KNEE Right 10/26/2023    Procedure: Total Knee Arthroplasty, Right;  Surgeon: Palmer Lew MD;  Location: [facility] OR    COLONOSCOPY  2007    CV CORONARY ANGIOGRAM N/A 9/8/2022    Procedure: Coronary Angiogram;  Surgeon: Santy Clayton MD;  Location: [facility] CARDIAC CATH LAB    CV PCI N/A 9/8/2022     Procedure: Percutaneous Coronary Intervention;  Surgeon: Santy Clayton MD;  Location: [facility] CARDIAC CATH LAB    CV TRANSCATHETER AORTIC VALVE REPLACEMENT-FEMORAL APPROACH N/A 10/4/2022    Procedure: Transcatheter Aortic Valve Replacement-Femoral Approach;  Surgeon: Young Ramires MD;  [facility] CARD[facility] C[facility] LAB    JOINT REPLACEMTN, KNEE RT/LT  2006    left    ORCHIECTOMY SCROTAL Bilateral 5/14/2019    Procedure: Bilatera Scrotal Orchiectomy;  Surgeon: Abhilash Weston MD;  Location: [facility] OR       ALLERGIES     Allergies   Allergen Reactions    Heparin Rash     he lives in a assisted living program and is not sure what type of allergies he really has. He quit drinking       FAMILY HISTORY  Family History   Problem Relation Age of Onset    Diabetes Mother     Cancer Father         lung cancer heavy smoker    Emphysema Father     Allergies Father         Enviromental    Diabetes Maternal Grandmother     Other - See Comments Other         FHx of Glaucoma         SOCIAL HISTORY  Social History     Socioeconomic History    Marital status:      Spouse name: Not on file    Number of children: Not on file    Years of education: Not on file    Highest education level: Not on file   Occupational History    Occupation: RETIRED   Tobacco Use    Smoking status: Never     Passive exposure: Never    Smokeless tobacco: Never   Vaping Use    Vaping status: Never Used   Substance and Sexual Activity    Alcohol use: No     Comment: Quit 3/24/05    Drug use: No    Sexual activity: Not Currently   Other Topics Concern    Parent/sibling w/ CABG, MI or angioplasty before 65F 55M? Not Asked   Social History Narrative    August 11, 2022    ENVIRONMENTAL HISTORY: [place] is heated with a forced air. They do have central air conditioning. The patient's bedroom is furnished with hard edna in bedroom and fabric window coverings.  No pets. There is  no history of cockroach or mice infestation. There is/are 0 smokers in the house.  The house does not have a basement.      Social Determinants of Health     Financial Resource Strain: Low Risk  (3/15/2023)    Overall Financial Resource Strain (CARDIA)     Difficulty of Paying Living Expenses: Not very hard   Food Insecurity: No Food Insecurity (3/15/2023)    Hunger Vital Sign     Worried About Running Out of Food in the Last Year: Never true     Ran Out of Food in the Last Year: Never true   Transportation Needs: No Transportation Needs (3/15/2023)    PRAPARE - Transportation     Lack of Transportation (Medical): No     Lack of Transportation (Non-Medical): No   Physical Activity: Unknown (3/15/2023)    Exercise Vital Sign     Days of Exercise per Week: Not on file     Minutes of Exercise per Session: 0 min   Recent Concern: Physical Activity - Inactive (3/15/2023)    Exercise Vital Sign     Days of Exercise per Week: 7 days     Minutes of Exercise per Session: 0 min   Stress: No Stress Concern Present (3/15/2023)    [employer] - Occupational Stress Questionnaire     Feeling of Stress : Only a little   Social Connections: Socially Isolated (3/15/2023)    Social Connection and Isolation Panel [NHANES]     Frequency of Communication with Friends and Family: Twice a week     Frequency of Social Gatherings with Friends and Family: Once a week     Attends Rastafari Services: Never     Active Member of Clubs or Organizations: No     Attends Club or Organization Meetings: Never     Marital Status:    Interpersonal Safety: Low Risk  (10/12/2023)    Interpersonal Safety     Do you feel physically and emotionally safe where you currently live?: Yes     Within the past 12 months, have you been hit, slapped, kicked or otherwise physically hurt by someone?: No     Within the past 12 months, have you been humiliated or emotionally abused in other ways by your partner or ex-partner?: No    Housing Stability: Low Risk  (3/15/2023)    Housing Stability Vital Sign     Unable to Pay for Housing in the Last Year: No     Number of Places Lived in the Last Year: 1     Unstable Housing in the Last Year: No       ROS:   Constitutional: No fever, chills, or sweats. No weight gain/loss   ENT: No visual disturbance, ear ache, epistaxis, sore throat  Allergies/Immunologic: Negative  Respiratory: No cough, hemoptysia  Cardiovascular: As per HPI  GI: No nausea, vomiting, hematemesis, melena, or hematochezia  : No urinary frequency, dysuria, or hematuria  Integument: Negative  Psychiatric: Negative  Neuro: Negative  Endocrinology: Negative   Musculoskeletal: Negative  Vascular: No walking impairment, claudication, ischemic rest pain or nonhealing wounds    EXAM:  BP (!) 152/93 (BP Location: Left arm, Patient Position: Sitting)   Pulse 74   Resp 16   Wt (!) 154.7 kg (341 lb)   SpO2 96%   BMI 47.56 kg/m    In general, the patient is a pleasant adult in no apparent distress.    HEENT: NC/AT.  PERRLA.  EOMI.  Sclerae white, not injected.  Nares clear.  Pharynx without erythema or exudate.  Dentition intact.    Neck: No adenopathy.  No thyromegaly. Carotids +2/2 bilaterally without bruits.  No jugular venous distension.   Heart: RRR. Normal S1, S2 splits physiologically. No murmur, rub, click, or gallop. The PMI is in the 5th ICS in the midclavicular line. There is no heave.    Lungs: CTA.  No ronchi, wheezes, rales.  No dullness to percussion.   Abdomen: Soft, nontender, nondistended. No organomegaly. No AAA.  No bruits.   Extremities: No clubbing, cyanosis, or edema.  No wounds. No varicose veins signs of chronic venous insufficiency.   Vascular: No bruits are noted.    Labs:  LIPID RESULTS:  Lab Results   Component Value Date    CHOL 127 03/07/2024    CHOL 142 05/13/2021    HDL 64 03/07/2024    HDL 60 05/13/2021    LDL 43 03/07/2024    LDL 50 05/13/2021    TRIG 102 03/07/2024    TRIG 159 (H) 05/13/2021     CHOLHDLRATIO 3.0 04/25/2014    NHDL 63 03/07/2024    NHDL 82 05/13/2021       LIVER ENZYME RESULTS:  Lab Results   Component Value Date    AST 60 (H) 05/25/2023    AST 13 05/13/2021    ALT 29 03/07/2024    ALT 18 05/13/2021       CBC RESULTS:  Lab Results   Component Value Date    WBC 6.6 10/09/2023    WBC 6.0 05/13/2021    RBC 4.78 10/09/2023    RBC 4.55 05/13/2021    HGB 13.2 10/27/2023    HGB 13.7 05/13/2021    HCT 45.3 10/09/2023    HCT 41.8 05/13/2021    MCV 95 10/09/2023    MCV 92 05/13/2021    MCH 30.5 10/09/2023    MCH 30.1 05/13/2021    MCHC 32.2 10/09/2023    MCHC 32.8 05/13/2021    RDW 12.7 10/09/2023    RDW 13.1 05/13/2021     (L) 10/09/2023     (L) 05/13/2021       BMP RESULTS:  Lab Results   Component Value Date     10/09/2023     05/13/2021    POTASSIUM 4.7 10/09/2023    POTASSIUM 4.4 10/06/2022    POTASSIUM 5.6 (H) 05/13/2021    CHLORIDE 101 10/09/2023    CHLORIDE 105 10/06/2022    CHLORIDE 107 05/13/2021    CO2 29 10/09/2023    CO2 29 10/06/2022    CO2 30 05/13/2021    ANIONGAP 9 10/09/2023    ANIONGAP 3 10/06/2022    ANIONGAP 1 (L) 05/13/2021     (H) 10/27/2023     (H) 10/27/2023     (H) 10/06/2022    GLC 92 05/13/2021    BUN 16.0 10/09/2023    BUN 16 10/06/2022    BUN 16 05/13/2021    CR 0.99 10/09/2023    CR 0.97 05/13/2021    GFRESTIMATED 60 (L) 10/09/2023    GFRESTIMATED >60 08/25/2022    GFRESTIMATED 78 05/13/2021    GFRESTBLACK >90 05/13/2021    GOPAL 10.1 10/09/2023    GOPAL 9.3 05/13/2021        A1C RESULTS:  Lab Results   Component Value Date    A1C 5.2 10/27/2023    A1C 4.8 04/26/2016

## 2024-04-11 NOTE — PROGRESS NOTES
Laila Perez arrived here on 4/11/2024 11:04 AM for 8-14 Days  Zio monitor placement per ordering provider Shante Cerda for the diagnosis S/P TVAR aortic valve replacement.  Patient s skin was prepped per protocol.  Zio monitor was placed.  Instructions were reviewed with and given to the patient.  Patient verbalized understanding of wear, troubleshooting and monitor return instructions.

## 2024-04-11 NOTE — LETTER
4/11/2024    Delmis García Ronak, APRN CNP  [address] [facility] [address]    RE: Laila Alexandra Ana       Dear Colleague,     I had the pleasure of seeing Laila Perez in the [facility].        HPI:     This is 74 year old female with PMH etoh use, aortic stenosis and aortic aneurysm here for follow up. She is s/p TAVR with 34 mm Medtronic evolute valve 10/2022, with mean gradient last documented at 9 mmHg, trace PVL. She had a recent episode 3-4 weeks ago where she had vision changes, funny feeling in her chest, and took SL nitroglycerin and it got better. No palpitations. She has an activity tracker and since has not had elevated heart rates.     BP poorly controlled so PCP increased her lisinopril. Has stable MARCELINO. Increasing her activity levels to the best of her ability but still cane dependent. Her imaging has confirmed stability in size of her aortic aneurysm.     Coronary angiogram in 2022 showed non obstructive coronary artery disease. She is now off aspirin altogether.      ASSESSMENT/PLAN:     1. Aortic stenosis s/p TAVR, mean gradient 9 mmhg  -echocardiogram given recent episode of ? TIA      2. Aortic aneurysm: measurement by MRA 4.4 cm.   -yearly surveillance, not candidate for open SAVR with root repair so will monitor over time   -blood pressure control, on ACEI. Consider beta blocker / ARB combination.     3. Suspected HFpEF from severe aortic stenosis: improved.   -compression wraps   -no longer on lasix   -suspect improvement s/p TAVR     4. LBBB: no syncope, cont to monitor    5. ? TIA:  -ziopatch today to evaluate for AFIB given recent vision changes and suspicion for TIA like symptoms. Low threshold to start aspirin / anticoagulation     Follow up with Carley in 2 months     High complexity visit     Shante Cerda MD MSC  [facility]         PAST MEDICAL HISTORY  Past Medical History:   Diagnosis Date    Alcoholism (H)     DDD (degenerative disc  disease), cervical     Gender dysphoria in adult     HLD (hyperlipidemia)     HTN (hypertension)     large compression fracture 03/13/2006    Worked up for neoplasm, none found.      OA (osteoarthritis of spine)     Obese     Unspecified internal derangement of knee     CHRONIC KNEE PAIN,LEG,NECK AND HEAD INJURIES       CURRENT MEDICATIONS  Current Outpatient Medications   Medication Sig Dispense Refill    acetaminophen (TYLENOL) 325 MG tablet Take 2 tablets (650 mg) by mouth every 4 hours as needed for other (mild pain)      albuterol (PROAIR HFA/PROVENTIL HFA/VENTOLIN HFA) 108 (90 Base) MCG/ACT inhaler Inhale 2 puffs into the lungs every 6 hours 18 g 0    aspirin (ASA) 325 MG EC tablet Take 1 tablet (325 mg) by mouth daily 30 tablet 0    brimonidine (ALPHAGAN) 0.2 % ophthalmic solution Place 1 drop into both eyes 2 times daily      dorzolamide-timolol (COSOPT) 2-0.5 % ophthalmic solution Place 1 drop into both eyes 2 times daily      estradiol (VIVELLE-DOT) 0.025 MG/24HR bi-weekly patch Place 1 patch onto the skin twice a week 24 patch 1    hydrOXYzine (ATARAX) 25 MG tablet Take 1 tablet (25 mg) by mouth every 6 hours as needed for itching or anxiety (with pain, moderate pain) 30 tablet 0    latanoprost (XALATAN) 0.005 % ophthalmic solution Place 1 drop into both eyes At Bedtime      lisinopril (ZESTRIL) 10 MG tablet Take 1 tablet (10 mg) by mouth daily 90 tablet 3    lovastatin (MEVACOR) 20 MG tablet TAKE ONE TABLET BY MOUTH EVERY NIGHT AT BEDTIME 90 tablet 3    Multiple Vitamin (DAILY-EVANGELISTA) TABS Take 1 tablet by mouth daily  11    nitroGLYcerin (NITROSTAT) 0.4 MG sublingual tablet For chest pain place 1 tablet under the tongue every 5 minutes for 3 doses. If symptoms persist 5 minutes after 1st dose call 911. 30 tablet 2       PAST SURGICAL HISTORY:  Past Surgical History:   Procedure Laterality Date    ARTHROPLASTY KNEE Right 10/26/2023    Procedure: Total Knee Arthroplasty, Right;  Surgeon: Palmer Lew  MD [place];  Location: [facility] OR    COLONOSCOPY  2007    CV CORONARY ANGIOGRAM N/A 9/8/2022    Procedure: Coronary Angiogram;  Surgeon: Santy Clayton MD;  Location:  HEART CARDIAC CATH LAB    CV PCI N/A 9/8/2022    Procedure: Percutaneous Coronary Intervention;  Surgeon: Santy Clayton MD;  Location: [facility] CARDIAC CATH LAB    CV TRANSCATHETER AORTIC VALVE REPLACEMENT-FEMORAL APPROACH N/A 10/4/2022    Procedure: Transcatheter Aortic Valve Replacement-Femoral Approach;  Surgeon: Young Ramires MD;  Location:  HEART CARDIAC CATH LAB    JOINT REPLACEMTN, KNEE RT/LT  2006    left    ORCHIECTOMY SCROTAL Bilateral 5/14/2019    Procedure: Bilatera Scrotal Orchiectomy;  Surgeon: Abhilash Weston MD;  [facility]       ALLERGIES     Allergies   Allergen Reactions    Heparin Rash     he lives in a assisted living program and is not sure what type of allergies he really has. He quit drinking       FAMILY HISTORY  Family History   Problem Relation Age of Onset    Diabetes Mother     Cancer Father         lung cancer heavy smoker    Emphysema Father     Allergies Father         Enviromental    Diabetes Maternal Grandmother     Other - See Comments Other         FHx of Glaucoma         SOCIAL HISTORY  Social History     Socioeconomic History    Marital status:      Spouse name: Not on file    Number of children: Not on file    Years of education: Not on file    Highest education level: Not on file   Occupational History    Occupation: RETIRED   Tobacco Use    Smoking status: Never     Passive exposure: Never    Smokeless tobacco: Never   Vaping Use    Vaping status: Never Used   Substance and Sexual Activity    Alcohol use: No     Comment: Quit 3/24/05    Drug use: No    Sexual activity: Not Currently   Other Topics Concern    Parent/sibling w/ CABG, MI or angioplasty before 65F 55M? Not Asked   Social History Narrative    August 11, 2022    ENVIRONMENTAL HISTORY: [place] The home is heated with a forced air. They do have central air conditioning. The patient's bedroom is furnished with hard edna in bedroom and fabric window coverings.  No pets. There is no history of cockroach or mice infestation. There is/are 0 smokers in the house.  The house does not have a basement.      Social Determinants of Health     Financial Resource Strain: Low Risk  (3/15/2023)    Overall Financial Resource Strain (CARDIA)     Difficulty of Paying Living Expenses: Not very hard   Food Insecurity: No Food Insecurity (3/15/2023)    Hunger Vital Sign     Worried About Running Out of Food in the Last Year: Never true     Ran Out of Food in the Last Year: Never true   Transportation Needs: No Transportation Needs (3/15/2023)    PRAPARE - Transportation     Lack of Transportation (Medical): No     Lack of Transportation (Non-Medical): No   Physical Activity: Unknown (3/15/2023)    Exercise Vital Sign     Days of Exercise per Week: Not on file     Minutes of Exercise per Session: 0 min   Recent Concern: Physical Activity - Inactive (3/15/2023)    Exercise Vital Sign     Days of Exercise per Week: 7 days     Minutes of Exercise per Session: 0 min   Stress: No Stress Concern Present (3/15/2023)    Citizen of [place] [employer] - Occupational Stress Questionnaire     Feeling of Stress : Only a little   Social Connections: Socially Isolated (3/15/2023)    Social Connection and Isolation Panel [NHANES]     Frequency of Communication with Friends and Family: Twice a week     Frequency of Social Gatherings with Friends and Family: Once a week     Attends Orthodoxy Services: Never     Active Member of Clubs or Organizations: No     Attends Club or Organization Meetings: Never     Marital Status:    Interpersonal Safety: Low Risk  (10/12/2023)    Interpersonal Safety     Do you feel physically and emotionally safe where you currently live?: Yes     Within the past  12 months, have you been hit, slapped, kicked or otherwise physically hurt by someone?: No     Within the past 12 months, have you been humiliated or emotionally abused in other ways by your partner or ex-partner?: No   Housing Stability: Low Risk  (3/15/2023)    Housing Stability Vital Sign     Unable to Pay for Housing in the Last Year: No     Number of Places Lived in the Last Year: 1     Unstable Housing in the Last Year: No       ROS:   Constitutional: No fever, chills, or sweats. No weight gain/loss   ENT: No visual disturbance, ear ache, epistaxis, sore throat  Allergies/Immunologic: Negative  Respiratory: No cough, hemoptysia  Cardiovascular: As per HPI  GI: No nausea, vomiting, hematemesis, melena, or hematochezia  : No urinary frequency, dysuria, or hematuria  Integument: Negative  Psychiatric: Negative  Neuro: Negative  Endocrinology: Negative   Musculoskeletal: Negative  Vascular: No walking impairment, claudication, ischemic rest pain or nonhealing wounds    EXAM:  BP (!) 152/93 (BP Location: Left arm, Patient Position: Sitting)   Pulse 74   Resp 16   Wt (!) 154.7 kg (341 lb)   SpO2 96%   BMI 47.56 kg/m    In general, the patient is a pleasant adult in no apparent distress.    HEENT: NC/AT.  PERRLA.  EOMI.  Sclerae white, not injected.  Nares clear.  Pharynx without erythema or exudate.  Dentition intact.    Neck: No adenopathy.  No thyromegaly. Carotids +2/2 bilaterally without bruits.  No jugular venous distension.   Heart: RRR. Normal S1, S2 splits physiologically. No murmur, rub, click, or gallop. The PMI is in the 5th ICS in the midclavicular line. There is no heave.    Lungs: CTA.  No ronchi, wheezes, rales.  No dullness to percussion.   Abdomen: Soft, nontender, nondistended. No organomegaly. No AAA.  No bruits.   Extremities: No clubbing, cyanosis, or edema.  No wounds. No varicose veins signs of chronic venous insufficiency.   Vascular: No bruits are noted.    Labs:  LIPID RESULTS:  Lab  Results   Component Value Date    CHOL 127 03/07/2024    CHOL 142 05/13/2021    HDL 64 03/07/2024    HDL 60 05/13/2021    LDL 43 03/07/2024    LDL 50 05/13/2021    TRIG 102 03/07/2024    TRIG 159 (H) 05/13/2021    CHOLHDLRATIO 3.0 04/25/2014    NHDL 63 03/07/2024    NHDL 82 05/13/2021       LIVER ENZYME RESULTS:  Lab Results   Component Value Date    AST 60 (H) 05/25/2023    AST 13 05/13/2021    ALT 29 03/07/2024    ALT 18 05/13/2021       CBC RESULTS:  Lab Results   Component Value Date    WBC 6.6 10/09/2023    WBC 6.0 05/13/2021    RBC 4.78 10/09/2023    RBC 4.55 05/13/2021    HGB 13.2 10/27/2023    HGB 13.7 05/13/2021    HCT 45.3 10/09/2023    HCT 41.8 05/13/2021    MCV 95 10/09/2023    MCV 92 05/13/2021    MCH 30.5 10/09/2023    MCH 30.1 05/13/2021    MCHC 32.2 10/09/2023    MCHC 32.8 05/13/2021    RDW 12.7 10/09/2023    RDW 13.1 05/13/2021     (L) 10/09/2023     (L) 05/13/2021       BMP RESULTS:  Lab Results   Component Value Date     10/09/2023     05/13/2021    POTASSIUM 4.7 10/09/2023    POTASSIUM 4.4 10/06/2022    POTASSIUM 5.6 (H) 05/13/2021    CHLORIDE 101 10/09/2023    CHLORIDE 105 10/06/2022    CHLORIDE 107 05/13/2021    CO2 29 10/09/2023    CO2 29 10/06/2022    CO2 30 05/13/2021    ANIONGAP 9 10/09/2023    ANIONGAP 3 10/06/2022    ANIONGAP 1 (L) 05/13/2021     (H) 10/27/2023     (H) 10/27/2023     (H) 10/06/2022    GLC 92 05/13/2021    BUN 16.0 10/09/2023    BUN 16 10/06/2022    BUN 16 05/13/2021    CR 0.99 10/09/2023    CR 0.97 05/13/2021    GFRESTIMATED 60 (L) 10/09/2023    GFRESTIMATED >60 08/25/2022    GFRESTIMATED 78 05/13/2021    GFRESTBLACK >90 05/13/2021    GOPAL 10.1 10/09/2023    GOPAL 9.3 05/13/2021        A1C RESULTS:  Lab Results   Component Value Date    A1C 5.2 10/27/2023    A1C 4.8 04/26/2016   Thank you for allowing me to participate in the care of your patient.      Sincerely,     Shante Cerda MD     [facility]  cc:   JERRICA Gallo CNP  [address] [place] [address]

## 2024-04-22 ENCOUNTER — HOSPITAL ENCOUNTER (OUTPATIENT)
Dept: CARDIOLOGY | Facility: CLINIC | Age: 74
Discharge: HOME | End: 2024-04-22
Attending: INTERNAL MEDICINE | Admitting: INTERNAL MEDICINE
Payer: COMMERCIAL

## 2024-04-22 DIAGNOSIS — Z95.2 S/P TAVR (TRANSCATHETER AORTIC VALVE REPLACEMENT): ICD-10-CM

## 2024-04-22 LAB — LVEF ECHO: NORMAL

## 2024-04-22 PROCEDURE — 93306 TTE W/DOPPLER COMPLETE: CPT | Mod: 26 | Performed by: INTERNAL MEDICINE

## 2024-04-22 PROCEDURE — C8929 TTE W OR WO FOL WCON,DOPPLER: HCPCS

## 2024-04-22 PROCEDURE — 255N000002 HC RX 255 OP 636: Performed by: INTERNAL MEDICINE

## 2024-04-22 RX ADMIN — HUMAN ALBUMIN MICROSPHERES AND PERFLUTREN 2 ML: 10; .22 INJECTION, SOLUTION INTRAVENOUS at 11:14

## 2024-04-23 ENCOUNTER — PATIENT OUTREACH (OUTPATIENT)
Dept: CARE COORDINATION | Facility: CLINIC | Age: 74
End: 2024-04-23
Payer: COMMERCIAL

## 2024-04-23 ENCOUNTER — TELEPHONE (OUTPATIENT)
Dept: CARDIOLOGY | Facility: CLINIC | Age: 74
End: 2024-04-23

## 2024-04-23 DIAGNOSIS — Z95.2 S/P TAVR (TRANSCATHETER AORTIC VALVE REPLACEMENT): Primary | ICD-10-CM

## 2024-04-23 PROCEDURE — 93227 XTRNL ECG REC<48 HR R&I: CPT | Performed by: INTERNAL MEDICINE

## 2024-04-23 NOTE — TELEPHONE ENCOUNTER
Routing zio patch to Dr. Cerda -     ? TIA:  -ziopatch today to evaluate for AFIB given recent vision changes and suspicion for TIA like symptoms. Low threshold to start aspirin / anticoagulation     Looks like monitor was only wore for 18 hours instead of 14 days. This is not adequate time to rule out A fib.     Called group home to find out if pt did not tolerate it, it fell off or if pt removed it. Will await a return call from group home.    Group home nurse stated pt skin blistered and turned red after less then 1 day and the monitor patch fell off. She still has scabs from the patch and is refusing to wear another one.     During the 18 hours she wore it No a fib was detected.     Recommendations?     Sujey Sullivan RN

## 2024-05-03 NOTE — TELEPHONE ENCOUNTER
Per Dr Cerda:   Ok. Thanks. Cannot exclude AF with 18 hours unfortunately. she can let us know if any high heart rates or palpitations come up or any TIA symptoms. Perhaps we recommend baby aspirin at least.  CF      for group home  Akiko to call back to discuss (C2C signed). Karlene Carpenter RN Cardiology May 3, 2024, 9:39 AM

## 2024-05-06 NOTE — TELEPHONE ENCOUNTER
Attempt #2,  to Akiko (Group home RN), no answer, left message to return call.     Sujey Sullivan, RN

## 2024-05-08 RX ORDER — ASPIRIN 81 MG/1
81 TABLET ORAL DAILY
Qty: 90 TABLET | Refills: 3 | Status: SHIPPED | OUTPATIENT
Start: 2024-05-08

## 2024-05-08 NOTE — TELEPHONE ENCOUNTER
Akiko called back and got our message. She asked that I send rx for ASA to [facility] pharmacy. She will call us back if pt is willing to try monitor again or has any further symptoms of high HR or palpitations.     Sujey Sullivan RN

## 2024-05-10 ENCOUNTER — TELEPHONE (OUTPATIENT)
Dept: FAMILY MEDICINE | Facility: CLINIC | Age: 74
End: 2024-05-10
Payer: COMMERCIAL

## 2024-05-10 NOTE — TELEPHONE ENCOUNTER
Patient calling to reschedule her annual physical with Delmis Simons. Warm transferred to central scheduling to assist.   Monika RAMIREZ RN     Reno called stating that he would like to see a doctor regarding implant of pacemaker or defibrillator. Can we send a referral for EP for him? Please call him back to advise. [phone]

## 2024-05-20 ENCOUNTER — OFFICE VISIT (OUTPATIENT)
Dept: FAMILY MEDICINE | Facility: CLINIC | Age: 74
End: 2024-05-20
Payer: COMMERCIAL

## 2024-05-20 VITALS
HEIGHT: 71 IN | WEIGHT: 293 LBS | SYSTOLIC BLOOD PRESSURE: 136 MMHG | DIASTOLIC BLOOD PRESSURE: 75 MMHG | HEART RATE: 65 BPM | BODY MASS INDEX: 41.02 KG/M2

## 2024-05-20 DIAGNOSIS — Z86.73 HISTORY OF TIA (TRANSIENT ISCHEMIC ATTACK) AND STROKE: ICD-10-CM

## 2024-05-20 DIAGNOSIS — I10 BENIGN ESSENTIAL HYPERTENSION: ICD-10-CM

## 2024-05-20 DIAGNOSIS — F64.0 TRANSGENDER PERSON ON HORMONE THERAPY: Primary | ICD-10-CM

## 2024-05-20 DIAGNOSIS — Z95.2 S/P TAVR (TRANSCATHETER AORTIC VALVE REPLACEMENT): ICD-10-CM

## 2024-05-20 DIAGNOSIS — Z79.899 TRANSGENDER PERSON ON HORMONE THERAPY: Primary | ICD-10-CM

## 2024-05-20 PROCEDURE — 99214 OFFICE O/P EST MOD 30 MIN: CPT | Performed by: FAMILY MEDICINE

## 2024-05-20 NOTE — PROGRESS NOTES
BOO Ulloa is a 74 year old individual that uses pronouns She/Her/Hers/Herself that presents today for follow up of:  feminizing hormone therapy.   Alone or accompanied by: accompanied today by  Gender identity: female  Started Hormone  therapy  2016  Continues on TD estradiol  0.025 mg patch 2x/wk  Any special concerns today?    Laila focused on doing minimal depth vaginoplasty this winter. Had initial consult with Dr. Holm year ago, but not since had cardiovacsucalr and knee surgery.  Does not currently have gender therapist.   Going for complete physical in July  Saw cardiology in 4/11/2024; note reviewed. Possible TIA earlier this year. She was given ziopatch but could not complete.test. Has another appointment coming up    Drinks beer 1/week, 2/sitting        On hormones?  YES +++ Shot day of the week? Not applicable-taking pills/patch/gel      Due for labs?  Yes      +++ Refills of meds needed?  Yes  Gender related body changes since last visit: stable    Breakthrough bleeding? Does Not Apply    New health concerns since last visit:  ---see above; walking with cane    Past Surgical History:   Procedure Laterality Date    ARTHROPLASTY KNEE Right 10/26/2023    Procedure: Total Knee Arthroplasty, Right;  Surgeon: Palmer Lew MD;  Location: [facility] OR    COLONOSCOPY  2007    CV CORONARY ANGIOGRAM N/A 9/8/2022    Procedure: Coronary Angiogram;  Surgeon: Santy Clayton MD;  Location: [facility] CARDIAC CATH LAB    CV PCI N/A 9/8/2022    Procedure: Percutaneous Coronary Intervention;  Surgeon: Santy Clayton MD;  Location: [facility] CARDIAC CATH LAB    CV TRANSCATHETER AORTIC VALVE REPLACEMENT-FEMORAL APPROACH N/A 10/4/2022    Procedure: Transcatheter Aortic Valve Replacement-Femoral Approach;  Surgeon: Young Ramires MD;  Location:  HEART CARDIAC CATH LAB    JOINT REPLACEMTN, KNEE RT/LT  2006    left    ORCHIECTOMY SCROTAL Bilateral 5/14/2019    Procedure: Bilatera Scrotal  Orchiectomy;  Surgeon: Abhilash Weston MD;  [facility]       Patient Active Problem List   Diagnosis    Gouty arthropathy    Mental or behavioral problem    Generalized osteoarthrosis, unspecified site    Disorder of bone and cartilage    Mixed hyperlipidemia    Benign essential hypertension    OA (osteoarthritis) of knee    DDD (degenerative disc disease), lumbar    Varicose veins of legs    Psoriasis    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    Family history of diabetes mellitus in first degree relative    Mitral valve disorder    Aortic valve stenosis, unspecified etiology    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    Obesity, Class III, BMI 40-49.9 (morbid obesity) (H)    Multiple gastric ulcers    Episodic tension-type headache, not intractable    Alcohol dependence in remission (H)    Status post coronary angiogram    Shortness of breath     Aortic stenosis, severe    S/P TAVR (transcatheter aortic valve replacement)    Thrombocytopenia (H24)    Lymphedema    S/P knee replacement    S/P total knee arthroplasty, right       Current Outpatient Medications   Medication Sig Dispense Refill    acetaminophen (TYLENOL) 325 MG tablet Take 2 tablets (650 mg) by mouth every 4 hours as needed for other (mild pain)      albuterol (PROAIR HFA/PROVENTIL HFA/VENTOLIN HFA) 108 (90 Base) MCG/ACT inhaler Inhale 2 puffs into the lungs every 6 hours 18 g 0    aspirin 81 MG EC tablet Take 1 tablet (81 mg) by mouth daily 90 tablet 3    brimonidine (ALPHAGAN) 0.2 % ophthalmic solution Place 1 drop into both eyes 2 times daily      dorzolamide-timolol (COSOPT) 2-0.5 % ophthalmic solution Place 1 drop into both eyes 2 times daily      estradiol (VIVELLE-DOT) 0.025 MG/24HR bi-weekly patch Place 1 patch onto the skin twice a week 24 patch 1    latanoprost (XALATAN) 0.005 % ophthalmic solution Place 1 drop into both eyes At Bedtime      lisinopril (ZESTRIL) 10 MG tablet Take 1 tablet (10 mg) by mouth daily 90 tablet 3    lovastatin  "(MEVACOR) 20 MG tablet TAKE ONE TABLET BY MOUTH EVERY NIGHT AT BEDTIME 90 tablet 3    Multiple Vitamin (DAILY-EVANGELISTA) TABS Take 1 tablet by mouth daily  11    nitroGLYcerin (NITROSTAT) 0.4 MG sublingual tablet For chest pain place 1 tablet under the tongue every 5 minutes for 3 doses. If symptoms persist 5 minutes after 1st dose call 911. 30 tablet 2       History   Smoking Status    Never   Smokeless Tobacco    Never          Allergies   Allergen Reactions    Heparin Rash     he lives in a assisted living program and is not sure what type of allergies he really has. He quit drinking       There are no preventive care reminders to display for this patient.      Problem, Medication and Allergy Lists were reviewed and are current..         Review of Systems:   Review of Systems           Labs:   Results from last visit:  Hospital Outpatient Visit on 04/22/2024   Component Date Value Ref Range Status    LVEF  04/22/2024 55-60%   Final         EXAM:  Blood pressure 136/75, pulse 65, height 1.803 m (5' 11\"), weight (!) 156.9 kg (346 lb).  Body mass index is 48.26 kg/m .    Vitals reviewed  Constitutional: healthy, alert, and no distress   Cardiovascular: negative, PMI normal. No lifts, heaves, or thrills. RRR. No murmurs, clicks gallops or rub  Respiratory: negative, Percussion normal. Good diaphragmatic excursion. Lungs clear     Assessment and Plan   Transgender person s/p gender affirming orchiectomy  Aortic stenosis S/p aortic valve replacement  HTN  History TIA  Stable on current estradiol hormone dose, with goal to preserve bone density and minimize vasomotor symptoms, support gender affirmation.  Counseled patient that she still has significant medical problems and that would need to be evaluated by Dr. Muro and her other medical providers, including cardiology to see if candidate for surgery.   She does live in a group setting and can get extra staff to assisst her post op.   Will refer to [facility] for consult to  " Bhaskar for minimal depth vaginopplasty to see if feasible.  Cell phone best for contact    Follow-up 6 months      Results by mychart  Questions were elicited and answered.     Jluis Mane MD

## 2024-05-27 NOTE — TELEPHONE ENCOUNTER
Sepsis Note   Sylvain Garcia 19 y.o. male MRN: [id number]  Unit/Bed#: ED-37 Encounter: [id number]       Initial Sepsis Screening       Row Name 05/26/24 2017                Is the patient's history suggestive of a new or worsening infection? Yes (Proceed)  -CL        Suspected source of infection soft tissue  -CL        Indicate SIRS criteria Hyperthemia > 38.3C (100.9F) OR Hypothermia <36C (96.8F);Tachycardia > 90 bpm;Leukocytosis (WBC > 73142 IJL) OR Leukopenia (WBC <4000 IJL) OR Bandemia (WBC >10% bands)  -CL        Are two or more of the above signs & symptoms of infection both present and new to the patient? Yes (Proceed)  -CL        Assess for evidence of organ dysfunction: Are any of the below criteria present within 6 hours of suspected infection and SIRS criteria that are NOT considered to be chronic conditions? --                  User Key  (r) = Recorded By, (t) = Taken By, (c) = Cosigned By      Initials Name Provider Type    CL Frank Camacho MD Physician                        There is no height or weight on file to calculate BMI.  Wt Readings from Last 1 Encounters:   05/13/24 81.6 kg (180 lb) (83%, Z= 0.95)*     * Growth percentiles are based on CDC (Boys, 2-20 Years) data.        Ideal body weight: 83.9 kg (185 lb 0.3 oz)     Prescription approved per [facility] Refill Protocol     Ira Garcia     RN MSN

## 2024-06-07 ENCOUNTER — TELEPHONE (OUTPATIENT)
Dept: PLASTIC SURGERY | Facility: CLINIC | Age: 74
End: 2024-06-07
Payer: COMMERCIAL

## 2024-06-07 NOTE — CONFIDENTIAL NOTE
"[facility] :  Care Coordination Note     SITUATION   Laila Perez (she/her) is a 74 year old adult who is receiving support for:  Care Team  .    BACKGROUND     Pt is scheduled for a minimal depth vaginoplasty consult with Dr. Muro on 9/17/24.     Pt gave verbal ok to call care coordinator Akiko Alba at [phone] who coordinates transportation. Aikko made the appt. Writer emailed pt appt info, per pt request.     Referral note from Dr. Mane: \"Complex 74 year old trans woman s/p orchiectomy, with aortic stensois s/p valve replacment, hypertension, arthritis with knee replacment, possible history TIA. Now interested in min depth vaginoplasty, would like to know if she could be a candidate, what would be involved. Likes in group home setting, so has supports. \"    ASSESSMENT     Surgery              CGC Assessment  [facility] ([facility]) Enrollment: (P) Enrolled  Patient has a therapist: (P) Yes  Letter of support #1: (P) Requested  Surgery being considered: (P) Yes  Vaginoplasty: (P) Yes      PLAN          Nursing Interventions:       Follow-up plan:  1. Obtain ALONZO Burroughs           "

## 2024-06-10 ENCOUNTER — TELEPHONE (OUTPATIENT)
Dept: PLASTIC SURGERY | Facility: CLINIC | Age: 74
End: 2024-06-10
Payer: COMMERCIAL

## 2024-06-10 NOTE — CONFIDENTIAL NOTE
Writer followed up on pt voicemail requesting email with appt info. Writer sent this on 6/7 but pt did not receive it. Pt requested writer call her  Akiko to request her email. Writer called and LVM for Akiko, and resent email with appt information.

## 2024-06-10 NOTE — TELEPHONE ENCOUNTER
FUTURE VISIT INFORMATION      FUTURE VISIT INFORMATION:  Date: 9/17/24  Time: 12:00pm  Location: [facility]  REFERRAL INFORMATION:  Reason for visit/diagnosis  minimal depth vaginoplast     RECORDS REQUESTED FROM:       Clinic name Comments Records Status Imaging Status   [facility] Bilatera Scrotal Orchiectomy  done 5/14/19 EPIC

## 2024-06-11 ENCOUNTER — TELEPHONE (OUTPATIENT)
Dept: PLASTIC SURGERY | Facility: CLINIC | Age: 74
End: 2024-06-11
Payer: COMMERCIAL

## 2024-06-11 NOTE — CONFIDENTIAL NOTE
Writer sent an email to pt's  Akiko at [email or web address], per pt request. Email contained consult information only.

## 2024-06-12 PROBLEM — I47.29 NSVT (NONSUSTAINED VENTRICULAR TACHYCARDIA) (H): Status: RESOLVED | Noted: 2022-12-22 | Resolved: 2024-06-12

## 2024-06-12 PROBLEM — I35.0 AORTIC STENOSIS, SEVERE: Status: RESOLVED | Noted: 2022-10-04 | Resolved: 2024-06-12

## 2024-06-12 PROBLEM — R06.02 SHORTNESS OF BREATH: Status: RESOLVED | Noted: 2022-09-29 | Resolved: 2024-06-12

## 2024-06-12 PROBLEM — Z95.2 S/P TAVR (TRANSCATHETER AORTIC VALVE REPLACEMENT): Status: ACTIVE | Noted: 2022-12-22

## 2024-06-12 PROBLEM — I47.29 NSVT (NONSUSTAINED VENTRICULAR TACHYCARDIA) (H): Status: ACTIVE | Noted: 2022-12-22

## 2024-06-12 PROBLEM — I71.21 ANEURYSM OF ASCENDING AORTA WITHOUT RUPTURE (H): Status: ACTIVE | Noted: 2022-12-22

## 2024-06-13 ENCOUNTER — OFFICE VISIT (OUTPATIENT)
Dept: CARDIOLOGY | Facility: CLINIC | Age: 74
End: 2024-06-13
Attending: INTERNAL MEDICINE
Payer: COMMERCIAL

## 2024-06-13 VITALS
HEIGHT: 71 IN | DIASTOLIC BLOOD PRESSURE: 79 MMHG | SYSTOLIC BLOOD PRESSURE: 128 MMHG | OXYGEN SATURATION: 95 % | WEIGHT: 293 LBS | BODY MASS INDEX: 41.02 KG/M2 | RESPIRATION RATE: 20 BRPM | HEART RATE: 97 BPM

## 2024-06-13 DIAGNOSIS — I71.21 ANEURYSM OF ASCENDING AORTA WITHOUT RUPTURE (H): ICD-10-CM

## 2024-06-13 DIAGNOSIS — Z13.6 ENCOUNTER FOR SCREENING FOR STENOSIS OF CAROTID ARTERY: ICD-10-CM

## 2024-06-13 DIAGNOSIS — R00.2 PALPITATIONS: ICD-10-CM

## 2024-06-13 DIAGNOSIS — H53.9 VISION CHANGES: ICD-10-CM

## 2024-06-13 DIAGNOSIS — Z95.2 S/P TAVR (TRANSCATHETER AORTIC VALVE REPLACEMENT): Primary | ICD-10-CM

## 2024-06-13 DIAGNOSIS — I10 BENIGN ESSENTIAL HYPERTENSION: ICD-10-CM

## 2024-06-13 PROCEDURE — 99214 OFFICE O/P EST MOD 30 MIN: CPT | Performed by: NURSE PRACTITIONER

## 2024-06-13 NOTE — PROGRESS NOTES
Cardiology Clinic Progress Note  Laila Perez MRN# [id number]   YOB: 1950 Age: 74 year old      Primary Cardiologist:   Dr. Cerda, testing follow-up          History of Presenting Illness:      Patient was seen by Dr. Cerda in April 2024.  Patient reported symptoms a month prior of vision changes and a funny feeling in her chest that improved with nitroglycerin.  Given concern for possible TIA Zio patch was recommended to monitor for atrial fibrillation.    Most recent lipid profile, BMP, ALT reviewed today. Echo April 2024 showing normal LVEF, TAVR with mean gradient 10 mmHg and no AR, ascending aorta 4.5 cm.  Compared to 2023 aorta was 4.4 cm.  Zio patch April 2024 showing sinus rhythm with no sustained arrhythmias or bradycardia, 1 run of SVT lasting 5 beats,  No atrial fibrillation.  Unfortunately, patient was only able to wear this for 18 hours due to rash.  This was reviewed by Dr. Cerda and since we are not able to exclude atrial fibrillation she recommended starting aspirin 81 mg daily and monitor for any tachycardia or palpitations.  Results reviewed today.    Started aspirin? Yes   Tachycardia or palpitations? None   BP?  Controlled  MARCELINO? Improving with exercise    Has been working out at the [facility] walking on the track for 6 laps    Patient reports no chest pain, PND, orthopnea, presyncope, syncope, edema, heart racing, or palpitations.                    Assessment and Plan:     Plan  Patient Instructions   Medication Changes:  None    Recommendations:  Check blood pressure at least 1 hour after medications. Call the clinic if your blood pressure is consistently greater than 130/80.     Follow-up:  Carotid ultrasound  Cardiology follow up at [facility]: ELZBIETA Howe in 6 months.     Cardiology Scheduling~[phone]  Cardiology Clinic RN~[phone] (Karlene RN, Sujey RN; Dana RN)          Problem List as of 6/13/2024 Reviewed: 3/7/2024  9:18 AM by Delmis Simons,  APRN CNP            Noted       Active Problems    1. Benign essential hypertension 10/30/2008     Last Assessment & Plan 6/13/2024 Office Visit Written 6/12/2024  5:38 PM by Carley Childs APRN CNP      Controlled  Continue current antihypertensives          Relevant Orders     Follow-Up with Cardiology    2. S/P TAVR (transcatheter aortic valve replacement) - Primary 12/22/2022     Overview Signed 6/12/2024  5:39 PM by Carley Childs APRN CNP      TAVR 10/2022 34 mm Medtronic evolute valve           Last Assessment & Plan 6/13/2024 Office Visit Edited 6/13/2024  7:51 AM by Carley Childs APRN CNP      Normal gradient 2024, no AR  Follow with echo  SBE prophylaxis          Relevant Orders     Follow-Up with Cardiology    3. Aneurysm of ascending aorta without rupture (H24) 12/22/2022     Overview Addendum 6/13/2024  7:52 AM by Carley Childs APRN CNP      Ascending aorta 4.4 cm 2021, 11/2022, 2023  Ascending aorta 4.5 cm 4/2024 echo          Last Assessment & Plan 6/13/2024 Office Visit Written 6/12/2024  5:38 PM by Carley Childs APRN CNP      Follow with echo  BP control          Relevant Orders     Follow-Up with Cardiology    4. Palpitations 6/13/2024     Overview Signed 6/13/2024  7:54 AM by Carley Childs APRN CNP      Early 2024 experienced palpitations associated with chest discomfort and vision changes-concern for TIA  Zio patch showed no atrial fibrillation but was only worn for 18 hours          Last Assessment & Plan 6/13/2024 Office Visit Written 6/13/2024  7:54 AM by Carley Childs APRN CNP      Monitor for tachycardia   continue aspirin          Relevant Orders     Follow-Up with Cardiology            Respiratory:  clear to auscultation; normal symmetry        Cardiac: regular rate and rhythm     GI:  abdomen nondistended     Extremities and Muscular Skeletal:   no edema                Thank you for allowing me to  participate in this delightful patient's care.   This note was completed in part using Dragon voice recognition software. Although reviewed after completion, some word and grammatical errors may occur.    JERRICA Marley CNP

## 2024-06-13 NOTE — PATIENT INSTRUCTIONS
Medication Changes:  None    Recommendations:  Check blood pressure at least 1 hour after medications. Call the clinic if your blood pressure is consistently greater than 130/80.     Follow-up:  Carotid ultrasound  Cardiology follow up at [facility]: ELZBIETA Howe in 6 months.     Cardiology Scheduling~[phone]  Cardiology Clinic RN~[phone] (Karlene RN, Sujey RN; Dana RN)

## 2024-06-13 NOTE — LETTER
6/13/2024    Delmischristel García Ronak, APRN CNP  [address] [facility] [address]    RE: Laila Perez       Dear Colleague,     I had the pleasure of seeing Laila Peerz in the [facility].  Cardiology Clinic Progress Note  Laila Perez MRN# [id number]   YOB: 1950 Age: 74 year old      Primary Cardiologist:   Dr. Cerda, testing follow-up          History of Presenting Illness:      Patient was seen by Dr. Cerda in April 2024.  Patient reported symptoms a month prior of vision changes and a funny feeling in her chest that improved with nitroglycerin.  Given concern for possible TIA Zio patch was recommended to monitor for atrial fibrillation.    Most recent lipid profile, BMP, ALT reviewed today. Echo April 2024 showing normal LVEF, TAVR with mean gradient 10 mmHg and no AR, ascending aorta 4.5 cm.  Compared to 2023 aorta was 4.4 cm.  Zio patch April 2024 showing sinus rhythm with no sustained arrhythmias or bradycardia, 1 run of SVT lasting 5 beats,  No atrial fibrillation.  Unfortunately, patient was only able to wear this for 18 hours due to rash.  This was reviewed by Dr. Cerda and since we are not able to exclude atrial fibrillation she recommended starting aspirin 81 mg daily and monitor for any tachycardia or palpitations.  Results reviewed today.    Started aspirin? Yes   Tachycardia or palpitations? None   BP?  Controlled  MARCELINO? Improving with exercise    Has been working out at the [facility] walking on the track for 6 laps    Patient reports no chest pain, PND, orthopnea, presyncope, syncope, edema, heart racing, or palpitations.                    Assessment and Plan:     Plan  Patient Instructions   Medication Changes:  None    Recommendations:  Check blood pressure at least 1 hour after medications. Call the clinic if your blood pressure is consistently greater than 130/80.     Follow-up:  Carotid ultrasound  Cardiology follow up at [facility]:  ELZBIETA Howe in 6 months.     Cardiology Scheduling~[phone]  Cardiology Clinic RN~[phone] (Karlene RN, Sujey RN; Dana RN)          Problem List as of 6/13/2024 Reviewed: 3/7/2024  9:18 AM by Delmis Simons APRN CNP            Noted       Active Problems    1. Benign essential hypertension 10/30/2008     Last Assessment & Plan 6/13/2024 Office Visit Written 6/12/2024  5:38 PM by Carley Childs APRN CNP      Controlled  Continue current antihypertensives          Relevant Orders     Follow-Up with Cardiology    2. S/P TAVR (transcatheter aortic valve replacement) - Primary 12/22/2022     Overview Signed 6/12/2024  5:39 PM by Carley Childs APRN CNP      TAVR 10/2022 34 mm Medtronic evolute valve           Last Assessment & Plan 6/13/2024 Office Visit Edited 6/13/2024  7:51 AM by Carley Childs APRN CNP      Normal gradient 2024, no AR  Follow with echo  SBE prophylaxis          Relevant Orders     Follow-Up with Cardiology    3. Aneurysm of ascending aorta without rupture (H24) 12/22/2022     Overview Addendum 6/13/2024  7:52 AM by Carley Childs APRN CNP      Ascending aorta 4.4 cm 2021, 11/2022, 2023  Ascending aorta 4.5 cm 4/2024 echo          Last Assessment & Plan 6/13/2024 Office Visit Written 6/12/2024  5:38 PM by Carley Childs APRN CNP      Follow with echo  BP control          Relevant Orders     Follow-Up with Cardiology    4. Palpitations 6/13/2024     Overview Signed 6/13/2024  7:54 AM by Carley Childs APRN CNP      Early 2024 experienced palpitations associated with chest discomfort and vision changes-concern for TIA  Zio patch showed no atrial fibrillation but was only worn for 18 hours          Last Assessment & Plan 6/13/2024 Office Visit Written 6/13/2024  7:54 AM by Carley Childs APRN CNP      Monitor for tachycardia   continue aspirin          Relevant Orders     Follow-Up with Cardiology             Respiratory:  clear to auscultation; normal symmetry        Cardiac: regular rate and rhythm     GI:  abdomen nondistended     Extremities and Muscular Skeletal:   no edema              Thank you for allowing me to participate in this delightful patient's care.   This note was completed in part using Dragon voice recognition software. Although reviewed after completion, some word and grammatical errors may occur.    JERRICA Marley CNP    Thank you for allowing me to participate in the care of your patient.      Sincerely,     JERRICA Marley CNP     [facility]  cc:   Shante Cerda MD  [address] [place] [id number]

## 2024-06-27 ENCOUNTER — HOSPITAL ENCOUNTER (OUTPATIENT)
Dept: ULTRASOUND IMAGING | Facility: CLINIC | Age: 74
Discharge: HOME | End: 2024-06-27
Attending: NURSE PRACTITIONER | Admitting: NURSE PRACTITIONER
Payer: COMMERCIAL

## 2024-06-27 DIAGNOSIS — H53.9 VISION CHANGES: ICD-10-CM

## 2024-06-27 DIAGNOSIS — Z13.6 ENCOUNTER FOR SCREENING FOR STENOSIS OF CAROTID ARTERY: ICD-10-CM

## 2024-06-27 PROCEDURE — 93880 EXTRACRANIAL BILAT STUDY: CPT

## 2024-08-05 ENCOUNTER — PATIENT OUTREACH (OUTPATIENT)
Dept: GERIATRIC MEDICINE | Facility: CLINIC | Age: 74
End: 2024-08-05
Payer: COMMERCIAL

## 2024-08-05 NOTE — PROGRESS NOTES
[facility] Care Coordination Contact      [facility] Six-Month Telephone Assessment    6 month telephone assessment completed on 8/5/24.    ER visits: No  Hospitalizations: No  TCU stays: No  Significant health status changes: none  Falls/Injuries: No  ADL/IADL changes: No  Changes in services: No    Caregiver Assessment follow up:  none    Goals: See POC in chart for goal progress documentation.  Sent Grand pad back.     Will see member in 6 months for an annual health risk assessment.   Encouraged member to call CC with any questions or concerns in the meantime.     Harrite Eddy RN  [facility]  [phone]

## 2024-09-06 ENCOUNTER — TELEPHONE (OUTPATIENT)
Dept: PLASTIC SURGERY | Facility: CLINIC | Age: 74
End: 2024-09-06
Payer: COMMERCIAL

## 2024-09-06 NOTE — CONFIDENTIAL NOTE
Writer called pt back re: YON requesting to cancel bottom surgery consult on 9/17/24. Laila stated she's still recovering from a leg surgery and wants to work on healing before moving forward with the process. Writer encouraged her to call back if she ever wants to reschedule.

## 2024-09-09 ENCOUNTER — OFFICE VISIT (OUTPATIENT)
Dept: FAMILY MEDICINE | Facility: CLINIC | Age: 74
End: 2024-09-09
Payer: COMMERCIAL

## 2024-09-09 VITALS
TEMPERATURE: 98.4 F | BODY MASS INDEX: 41.02 KG/M2 | SYSTOLIC BLOOD PRESSURE: 126 MMHG | HEIGHT: 71 IN | HEART RATE: 68 BPM | WEIGHT: 293 LBS | DIASTOLIC BLOOD PRESSURE: 80 MMHG | RESPIRATION RATE: 20 BRPM | OXYGEN SATURATION: 96 %

## 2024-09-09 DIAGNOSIS — Z00.00 ENCOUNTER FOR MEDICARE ANNUAL WELLNESS EXAM: Primary | ICD-10-CM

## 2024-09-09 DIAGNOSIS — D69.6 THROMBOCYTOPENIA (H): ICD-10-CM

## 2024-09-09 DIAGNOSIS — K25.9 MULTIPLE GASTRIC ULCERS: ICD-10-CM

## 2024-09-09 DIAGNOSIS — E66.01 OBESITY, CLASS III, BMI 40-49.9 (MORBID OBESITY) (H): ICD-10-CM

## 2024-09-09 DIAGNOSIS — G44.219 EPISODIC TENSION-TYPE HEADACHE, NOT INTRACTABLE: ICD-10-CM

## 2024-09-09 DIAGNOSIS — Z95.2 S/P TAVR (TRANSCATHETER AORTIC VALVE REPLACEMENT): ICD-10-CM

## 2024-09-09 DIAGNOSIS — F10.21 ALCOHOL DEPENDENCE IN REMISSION (H): ICD-10-CM

## 2024-09-09 DIAGNOSIS — I10 BENIGN ESSENTIAL HYPERTENSION: ICD-10-CM

## 2024-09-09 DIAGNOSIS — I71.21 ANEURYSM OF ASCENDING AORTA WITHOUT RUPTURE (H): ICD-10-CM

## 2024-09-09 LAB
ALBUMIN SERPL BCG-MCNC: 4 G/DL (ref 3.5–5.2)
ALP SERPL-CCNC: 110 U/L (ref 40–150)
ALT SERPL W P-5'-P-CCNC: 32 U/L (ref 0–70)
ANION GAP SERPL CALCULATED.3IONS-SCNC: 9 MMOL/L (ref 7–15)
AST SERPL W P-5'-P-CCNC: 36 U/L (ref 0–45)
BILIRUB SERPL-MCNC: 0.6 MG/DL
BUN SERPL-MCNC: 20.9 MG/DL (ref 8–23)
CALCIUM SERPL-MCNC: 9.7 MG/DL (ref 8.8–10.4)
CHLORIDE SERPL-SCNC: 101 MMOL/L (ref 98–107)
CREAT SERPL-MCNC: 0.99 MG/DL (ref 0.51–1.17)
EGFRCR SERPLBLD CKD-EPI 2021: 60 ML/MIN/1.73M2
ERYTHROCYTE [DISTWIDTH] IN BLOOD BY AUTOMATED COUNT: 13 % (ref 10–15)
GLUCOSE SERPL-MCNC: 94 MG/DL (ref 70–99)
HCO3 SERPL-SCNC: 29 MMOL/L (ref 22–29)
HCT VFR BLD AUTO: 43.9 % (ref 35–53)
HGB BLD-MCNC: 14.2 G/DL (ref 11.7–17.7)
MCH RBC QN AUTO: 30.4 PG (ref 26.5–33)
MCHC RBC AUTO-ENTMCNC: 32.3 G/DL (ref 31.5–36.5)
MCV RBC AUTO: 94 FL (ref 78–100)
PLATELET # BLD AUTO: 113 10E3/UL (ref 150–450)
POTASSIUM SERPL-SCNC: 4.5 MMOL/L (ref 3.4–5.3)
PROT SERPL-MCNC: 7.5 G/DL (ref 6.4–8.3)
RBC # BLD AUTO: 4.67 10E6/UL (ref 3.8–5.9)
SODIUM SERPL-SCNC: 139 MMOL/L (ref 135–145)
WBC # BLD AUTO: 7.5 10E3/UL (ref 4–11)

## 2024-09-09 PROCEDURE — G0439 PPPS, SUBSEQ VISIT: HCPCS | Performed by: FAMILY MEDICINE

## 2024-09-09 PROCEDURE — 99214 OFFICE O/P EST MOD 30 MIN: CPT | Mod: 25 | Performed by: FAMILY MEDICINE

## 2024-09-09 PROCEDURE — 80053 COMPREHEN METABOLIC PANEL: CPT | Performed by: FAMILY MEDICINE

## 2024-09-09 PROCEDURE — 36415 COLL VENOUS BLD VENIPUNCTURE: CPT | Performed by: FAMILY MEDICINE

## 2024-09-09 PROCEDURE — G0008 ADMIN INFLUENZA VIRUS VAC: HCPCS | Performed by: FAMILY MEDICINE

## 2024-09-09 PROCEDURE — 85027 COMPLETE CBC AUTOMATED: CPT | Performed by: FAMILY MEDICINE

## 2024-09-09 PROCEDURE — 90662 IIV NO PRSV INCREASED AG IM: CPT | Performed by: FAMILY MEDICINE

## 2024-09-09 ASSESSMENT — ANXIETY QUESTIONNAIRES
7. FEELING AFRAID AS IF SOMETHING AWFUL MIGHT HAPPEN: NOT AT ALL
4. TROUBLE RELAXING: NOT AT ALL
5. BEING SO RESTLESS THAT IT IS HARD TO SIT STILL: NOT AT ALL
2. NOT BEING ABLE TO STOP OR CONTROL WORRYING: NOT AT ALL
6. BECOMING EASILY ANNOYED OR IRRITABLE: NOT AT ALL
3. WORRYING TOO MUCH ABOUT DIFFERENT THINGS: NOT AT ALL
1. FEELING NERVOUS, ANXIOUS, OR ON EDGE: NOT AT ALL
GAD7 TOTAL SCORE: 0
GAD7 TOTAL SCORE: 0

## 2024-09-09 ASSESSMENT — PAIN SCALES - GENERAL: PAINLEVEL: MODERATE PAIN (5)

## 2024-09-09 NOTE — NURSING NOTE
"Chief Complaint   Patient presents with    Physical       Initial /80   Pulse 68   Temp 98.4  F (36.9  C) (Tympanic)   Resp 20   Ht 1.803 m (5' 11\")   Wt (!) 155.1 kg (342 lb)   SpO2 96%   BMI 47.70 kg/m   Estimated body mass index is 47.7 kg/m  as calculated from the following:    Height as of this encounter: 1.803 m (5' 11\").    Weight as of this encounter: 155.1 kg (342 lb).    Patient presents to the clinic using Cane    Is there anyone who you would like to be able to receive your results? No  If yes have patient fill out YASIR      "

## 2024-09-09 NOTE — PATIENT INSTRUCTIONS
Patient Education   Preventive Care Advice   This is general advice given by our system to help you stay healthy. However, your care team may have specific advice just for you. Please talk to your care team about your preventive care needs.  Nutrition  Eat 5 or more servings of fruits and vegetables each day.  Try wheat bread, brown rice and whole grain pasta (instead of white bread, rice, and pasta).  Get enough calcium and vitamin D. Check the label on foods and aim for 100% of the RDA (recommended daily allowance).  Lifestyle  Exercise at least 150 minutes each week  (30 minutes a day, 5 days a week).  Do muscle strengthening activities 2 days a week. These help control your weight and prevent disease.  No smoking.  Wear sunscreen to prevent skin cancer.  Have a dental exam and cleaning every 6 months.  Yearly exams  See your health care team every year to talk about:  Any changes in your health.  Any medicines your care team has prescribed.  Preventive care, family planning, and ways to prevent chronic diseases.  Shots (vaccines)   HPV shots (up to age 26), if you've never had them before.  Hepatitis B shots (up to age 59), if you've never had them before.  COVID-19 shot: Get this shot when it's due.  Flu shot: Get a flu shot every year.  Tetanus shot: Get a tetanus shot every 10 years.  Pneumococcal, hepatitis A, and RSV shots: Ask your care team if you need these based on your risk.  Shingles shot (for age 50 and up)  General health tests  Diabetes screening:  Starting at age 35, Get screened for diabetes at least every 3 years.  If you are younger than age 35, ask your care team if you should be screened for diabetes.  Cholesterol test: At age 39, start having a cholesterol test every 5 years, or more often if advised.  Bone density scan (DEXA): At age 50, ask your care team if you should have this scan for osteoporosis (brittle bones).  Hepatitis C: Get tested at least once in your life.  STIs (sexually  transmitted infections)  Before age 24: Ask your care team if you should be screened for STIs.  After age 24: Get screened for STIs if you're at risk. You are at risk for STIs (including HIV) if:  You are sexually active with more than one person.  You don't use condoms every time.  You or a partner was diagnosed with a sexually transmitted infection.  If you are at risk for HIV, ask about PrEP medicine to prevent HIV.  Get tested for HIV at least once in your life, whether you are at risk for HIV or not.  Cancer screening tests  Cervical cancer screening: If you have a cervix, begin getting regular cervical cancer screening tests starting at age 21.  Breast cancer scan (mammogram): If you've ever had breasts, begin having regular mammograms starting at age 40. This is a scan to check for breast cancer.  Colon cancer screening: It is important to start screening for colon cancer at age 45.  Have a colonoscopy test every 10 years (or more often if you're at risk) Or, ask your provider about stool tests like a FIT test every year or Cologuard test every 3 years.  To learn more about your testing options, visit:   .  For help making a decision, visit:   https://bit.ly/yl37794.  Prostate cancer screening test: If you have a prostate, ask your care team if a prostate cancer screening test (PSA) at age 55 is right for you.  Lung cancer screening: If you are a current or former smoker ages 50 to 80, ask your care team if ongoing lung cancer screenings are right for you.  For informational purposes only. Not to replace the advice of your health care provider. Copyright   2023 [employer]. All rights reserved. Clinically reviewed by the [facility] Transitions Program. [employer] 494599 - REV 01/24.

## 2024-09-09 NOTE — LETTER
"September 10, 2024      Laila Perez  [address] [address] [place] [id number]        Dear ,    We are writing to inform you of your test results.    CMP and cbc shows stable GFR (kidney numbers) and platelets     Resulted Orders   CBC with platelets   Result Value Ref Range    WBC Count 7.5 4.0 - 11.0 10e3/uL    RBC Count 4.67 3.80 - 5.90 10e6/uL      Comment:      Reference Range:                                                     Female 3.80-5.20 10e6/uL                                      Male 4.40-5.90 10e6u/L    Hemoglobin 14.2 11.7 - 17.7 g/dL      Comment:      Reference Range:                                                     Female 11.7-15.7 g/dL                                      Male 13.3-17.7 g/dL    Hematocrit 43.9 35.0 - 53.0 %      Comment:      Sex Specific Reference Ranges: Female  35.0-47.0 % Male  40.0-53.0 %    MCV 94 78 - 100 fL    MCH 30.4 26.5 - 33.0 pg    MCHC 32.3 31.5 - 36.5 g/dL    RDW 13.0 10.0 - 15.0 %    Platelet Count 113 (L) 150 - 450 10e3/uL    Narrative    Sex Specific Reference Ranges:     Female  35.0-47.0 %   Male      40.0-53.0 %  The generation of reference intervals for this test is currently based on binary male or female sex. If the electronic health record information indicates another gender identity or if Legal Sex is recorded as \"Unknown\", both male and female reference intervals are provided where applicable, and should be considered according to the individual's appropriate clinical context.   Comprehensive metabolic panel (BMP + Alb, Alk Phos, ALT, AST, Total. Bili, TP)   Result Value Ref Range    Sodium 139 135 - 145 mmol/L    Potassium 4.5 3.4 - 5.3 mmol/L    Carbon Dioxide (CO2) 29 22 - 29 mmol/L    Anion Gap 9 7 - 15 mmol/L    Urea Nitrogen 20.9 8.0 - 23.0 mg/dL    Creatinine 0.99 0.51 - 1.17 mg/dL      Comment:      Male and Female  0-2 Months    0.31-0.88 mg/dL  2-12 Months   0.16-0.39 mg/dL  1-2 Years     0.18-0.35 mg/dL  3-4 Years     " "0.26-0.42 mg/dL  5-6 Years     0.29-0.47 mg/dL  7-8 Years     0.34-0.53 mg/dL  9-10 Years    0.33-0.64 mg/dL  11-12 Years   0.44-0.68 mg/dL  13-14 Years   0.46-0.77 mg/dL    Female  15 Years and older  0.51-0.95 mg/dL    Male  15 Years and older  0.67-1.17 mg/dL        GFR Estimate 60 (L) >60 mL/min/1.73m2      Comment:      The generation of the estimated GFR is currently based on binary male or female sex. If the electronic health record information indicates another gender identity or if Legal Sex is recorded as \"Unknown\", GFR estimates are not automatically calculated, and application of GFR equations or a direct GFR measurement should be considered according to the individual's appropriate clinical context.  eGFR calculated using 2021 CKD-EPI equation.    Calcium 9.7 8.8 - 10.4 mg/dL      Comment:      Reference intervals for this test were updated on 7/16/2024 to reflect our healthy population more accurately. There may be differences in the flagging of prior results with similar values performed with this method. Those prior results can be interpreted in the context of the updated reference intervals.    Chloride 101 98 - 107 mmol/L    Glucose 94 70 - 99 mg/dL    Alkaline Phosphatase 110 40 - 150 U/L      Comment:      Female:    0-15 days     U/L  15d-1 year   122-469 U/L  1-10 years   142-335 U/L  10-13 years  129-417 U/L  13-15 years   U/L  15-17 years   U/L  17-19 years  45-87 U/L  19 years and older   U/L      Male:  0-15 days     U/L  15d-1 year   122-469 U/L  1-10 years   142-335 U/L  10-13 years  129-417 U/L  13-15 years  116-468 U/L  15-17 years   U/L  17-19 years   U/L  19 years and older   U/L      AST 36 0 - 45 U/L    ALT 32 0 - 70 U/L      Comment:      Female   All ages       0-50 U/L     Male   0-20 Years     0-50 U/L  20-Unsp. Years 0-70 U/L        Protein Total 7.5 6.4 - 8.3 g/dL    Albumin 4.0 3.5 - 5.2 g/dL    Bilirubin Total 0.6 <=1.2 mg/dL " "   Narrative    The generation of reference intervals for this test is currently based on binary male or female sex. If the electronic health record information indicates another gender identity or if Legal Sex is recorded as \"Unknown\", both male and female reference intervals are provided where applicable, and should be considered according to the individual's appropriate clinical context.       If you have any questions or concerns, please call the clinic at the number listed above.       Sincerely,      Kimberly Galo MD            "

## 2024-09-09 NOTE — LETTER
September 9, 2024      Laila Perez  [address] [address] [place] [phone]        To Whom It May Concern:    Laila Perez was seen in our clinic for an annual physical exam. She may return to her current workload without increased work restrictions.      Sincerely,        Kimberly Galo MD

## 2024-09-09 NOTE — PROGRESS NOTES
"Preventive Care Visit  [facility]  Kimberly Galo MD, Family Medicine  Sep 9, 2024      Assessment & Plan     Encounter for Medicare annual wellness exam    Episodic tension-type headache, not intractable  stable    Obesity, Class III, BMI 40-49.9 (morbid obesity) (H)  Discussed lifestyle changes    Aneurysm of ascending aorta without rupture (H24)  Follows with cardiology    Benign essential hypertension  stable  - Comprehensive metabolic panel (BMP + Alb, Alk Phos, ALT, AST, Total. Bili, TP); Future  - Comprehensive metabolic panel (BMP + Alb, Alk Phos, ALT, AST, Total. Bili, TP)    S/P TAVR (transcatheter aortic valve replacement)  Follows with cardiology  asymptomatic    Thrombocytopenia (H24)  Hx of gastric ulcers, alcohol use, excessive nsaid use  asymptomatic  - CBC with platelets; Future  - CBC with platelets    Alcohol dependence in remission (H)  In remission    Multiple gastric ulcers  stable            BMI  Estimated body mass index is 47.7 kg/m  as calculated from the following:    Height as of this encounter: 1.803 m (5' 11\").    Weight as of this encounter: 155.1 kg (342 lb).       Counseling  Appropriate preventive services were addressed with this patient via screening, questionnaire, or discussion as appropriate for fall prevention, nutrition, physical activity, Tobacco-use cessation, social engagement, weight loss and cognition.  Checklist reviewing preventive services available has been given to the patient.  Reviewed patient's diet, addressing concerns and/or questions.   The patient was instructed to see the dentist every 6 months.           Juana Ulloa is a 74 year old, presenting for the following:  Physical and Letter Request (Letter stating she was in and had wellness physical and doing okay.)        9/9/2024    10:01 AM   Additional Questions   Roomed by Keisha CONTE   Accompanied by self         Health Care Directive  Patient has a Health Care Directive on " file      HPI               No data to display                   No data to display                   No data to display                      No data to display                   No data to display                   No data to display                     No data to display                       Today's PHQ-2 Score:       3/7/2024     9:02 AM   PHQ-2 ( 1999 [employer])   Q1: Little interest or pleasure in doing things 0   Q2: Feeling down, depressed or hopeless 0   PHQ-2 Score 0          No data to display              Social History     Tobacco Use    Smoking status: Never     Passive exposure: Never    Smokeless tobacco: Never   Vaping Use    Vaping status: Never Used   Substance Use Topics    Alcohol use: No     Comment: Quit 3/24/05    Drug use: No              ASCVD Risk   The ASCVD Risk score (Dalrene GONZALEZ, et al., 2019) failed to calculate for the following reasons:    The valid total cholesterol range is 130 to 320 mg/dL            Reviewed and updated as needed this visit by Provider                      Current providers sharing in care for this patient include:  Patient Care Team:  Delmis Simons APRN CNP as PCP - General  Delmis Simons APRN CNP as Referring Physician (Family Practice)  Abhilash Weston MD as MD (Urology)  Vandana Bragg, RN as Specialty Care Coordinator (Urology)  Tunde Garcia as Specialty Care Coordinator (Plastic Surgery)  Bloch, Lauren Turner, [facility] as Pharmacist (Pharmacist)  Hema Duncan MD as Assigned Musculoskeletal Provider  Delmis Simons APRN CNP as Assigned PCP  Carley Childs APRN CNP as Assigned Heart and Vascular Provider  Harriet Eddy RN as Lead Care Coordinator (Primary Care - CC)    The following health maintenance items are reviewed in Epic and correct as of today:  Health Maintenance   Topic Date Due    ZOSTER IMMUNIZATION (1 of 2) Never done    RSV VACCINE (1 - 1-dose 60+ series) Never done    ANNUAL REVIEW OF HM ORDERS   "03/23/2024    INFLUENZA VACCINE (1) 09/01/2024    COVID-19 Vaccine (5 - 2023-24 season) 09/01/2024    LIPID  03/07/2025    PHQ-9  03/07/2025    MEDICARE ANNUAL WELLNESS VISIT  09/09/2025    DESTINY ASSESSMENT  09/09/2025    FALL RISK ASSESSMENT  09/09/2025    COLORECTAL CANCER SCREENING  05/04/2026    GLUCOSE  10/27/2026    DTAP/TDAP/TD IMMUNIZATION (4 - Td or Tdap) 05/21/2028    ADVANCE CARE PLANNING  05/31/2028    DEXA  05/17/2031    HEPATITIS C SCREENING  Completed    PHQ-2 (once per calendar year)  Completed    Pneumococcal Vaccine: 65+ Years  Completed    HPV IMMUNIZATION  Aged Out    MENINGITIS IMMUNIZATION  Aged Out    RSV MONOCLONAL ANTIBODY  Aged Out            Objective    Exam  /80   Pulse 68   Temp 98.4  F (36.9  C) (Tympanic)   Resp 20   Ht 1.803 m (5' 11\")   Wt (!) 155.1 kg (342 lb)   SpO2 96%   BMI 47.70 kg/m     Estimated body mass index is 47.7 kg/m  as calculated from the following:    Height as of this encounter: 1.803 m (5' 11\").    Weight as of this encounter: 155.1 kg (342 lb).    Physical Exam  GENERAL: alert and no distress  NECK: no adenopathy, no asymmetry, masses, or scars  RESP: lungs clear to auscultation - no rales, rhonchi or wheezes  CV: regular rate and rhythm, normal S1 S2, no S3 or S4, no murmur, click or rub, no peripheral edema  ABDOMEN: soft, nontender, no hepatosplenomegaly, no masses and bowel sounds normal  MS: no gross musculoskeletal defects noted, no edema         9/9/2024   Mini Cog   Clock Draw Score 2 Normal   3 Item Recall 2 objects recalled   Mini Cog Total Score 4                 Signed Electronically by: Kimberly Galo MD    "

## 2024-09-17 ENCOUNTER — PRE VISIT (OUTPATIENT)
Dept: UROLOGY | Facility: CLINIC | Age: 74
End: 2024-09-17

## 2024-09-23 ENCOUNTER — TELEPHONE (OUTPATIENT)
Dept: FAMILY MEDICINE | Facility: CLINIC | Age: 74
End: 2024-09-23
Payer: COMMERCIAL

## 2024-09-23 DIAGNOSIS — F64.0 TRANSGENDER PERSON ON HORMONE THERAPY: ICD-10-CM

## 2024-09-23 DIAGNOSIS — Z79.899 TRANSGENDER PERSON ON HORMONE THERAPY: ICD-10-CM

## 2024-09-23 RX ORDER — ESTRADIOL 0.03 MG/D
1 FILM, EXTENDED RELEASE TRANSDERMAL
Qty: 24 PATCH | Refills: 0 | Status: SHIPPED | OUTPATIENT
Start: 2024-09-23 | End: 2024-10-07

## 2024-09-23 NOTE — CONFIDENTIAL NOTE
Requested Medication:  Estradiol 0.025 MG Bi weekly Patch  Dose:   0.025 mg  Quantity:  24  Refills:  1    Apply 1 patch onto the skin twice weekly    Last seen at [facility]:  5/20/24 - 6 mo  Next Appointment with Provider: 10/7/24      Alexandra Long CMA

## 2024-10-07 ENCOUNTER — OFFICE VISIT (OUTPATIENT)
Dept: FAMILY MEDICINE | Facility: CLINIC | Age: 74
End: 2024-10-07
Payer: COMMERCIAL

## 2024-10-07 DIAGNOSIS — F64.0 TRANSGENDER PERSON ON HORMONE THERAPY: Primary | ICD-10-CM

## 2024-10-07 DIAGNOSIS — Z79.899 TRANSGENDER PERSON ON HORMONE THERAPY: Primary | ICD-10-CM

## 2024-10-07 DIAGNOSIS — Z86.73 HISTORY OF TIA (TRANSIENT ISCHEMIC ATTACK) AND STROKE: ICD-10-CM

## 2024-10-07 DIAGNOSIS — Z95.2 S/P TAVR (TRANSCATHETER AORTIC VALVE REPLACEMENT): ICD-10-CM

## 2024-10-07 PROCEDURE — 99214 OFFICE O/P EST MOD 30 MIN: CPT | Performed by: FAMILY MEDICINE

## 2024-10-07 RX ORDER — ESTRADIOL 0.03 MG/D
1 FILM, EXTENDED RELEASE TRANSDERMAL
Qty: 24 PATCH | Refills: 1 | Status: SHIPPED | OUTPATIENT
Start: 2024-10-07

## 2024-10-07 NOTE — PROGRESS NOTES
HOLA Ulloa is a 74 year old individual that uses pronouns She/Her/Hers/Herself that presents today for follow up of:  feminizing hormone therapy.   Alone or accompanied by: accompanied today by  Gender identity: female  Started Hormone  therapy  2016  Continues on TD estradiol  0.025 mg patch 2x/wk  Any special concerns today?    No new concerns, has follow up with Cardiology 6/13/2024, note reviewed  Still needs cane for stability      On hormones?  YES +++ Shot day of the week? Not applicable-taking pills/patch/gel      Due for labs?  No      +++ Refills of meds needed?  Yes  Gender related body changes since last visit: stable    Breakthrough bleeding? Does Not Apply    New health concerns since last visit:  ---none    Past Surgical History:   Procedure Laterality Date    ARTHROPLASTY KNEE Right 10/26/2023    Procedure: Total Knee Arthroplasty, Right;  Surgeon: Palmer Lew MD;  Location: [facility] OR    COLONOSCOPY  2007    CV CORONARY ANGIOGRAM N/A 9/8/2022    Procedure: Coronary Angiogram;  Surgeon: Santy Clayton MD;  Location: [facility] CARDIAC CATH LAB    CV PCI N/A 9/8/2022    Procedure: Percutaneous Coronary Intervention;  Surgeon: Santy Clayton MD;  Location: [facility] CARDIAC CATH LAB    CV TRANSCATHETER AORTIC VALVE REPLACEMENT-FEMORAL APPROACH N/A 10/4/2022    Procedure: Transcatheter Aortic Valve Replacement-Femoral Approach;  Surgeon: Young Ramires MD;  Location: [facility] CARDIAC CATH LAB    JOINT REPLACEMTN, KNEE RT/LT  2006    left    ORCHIECTOMY SCROTAL Bilateral 5/14/2019    Procedure: Bilatera Scrotal Orchiectomy;  Surgeon: Abhilash Weston MD;  Location:  OR       Patient Active Problem List   Diagnosis    Gouty arthropathy    Mental or behavioral problem    Generalized osteoarthrosis, unspecified site    Disorder of bone and cartilage    Benign essential hypertension    OA (osteoarthritis) of knee    DDD (degenerative disc disease), lumbar    Varicose  veins of legs    Psoriasis    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    Family history of diabetes mellitus in first degree relative    Mitral valve disorder    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    Obesity, Class III, BMI 40-49.9 (morbid obesity) (H)    Multiple gastric ulcers    Episodic tension-type headache, not intractable    Alcohol dependence in remission (H)    Status post coronary angiogram    S/P TAVR (transcatheter aortic valve replacement)    Aneurysm of ascending aorta without rupture (H)    Thrombocytopenia (H)    Lymphedema    S/P knee replacement    S/P total knee arthroplasty, right    Palpitations       Current Outpatient Medications   Medication Sig Dispense Refill    acetaminophen (TYLENOL) 325 MG tablet Take 2 tablets (650 mg) by mouth every 4 hours as needed for other (mild pain) (Patient taking differently: Take 650 mg by mouth every 4 hours as needed for other (mild pain) PRN)      albuterol (PROAIR HFA/PROVENTIL HFA/VENTOLIN HFA) 108 (90 Base) MCG/ACT inhaler Inhale 2 puffs into the lungs every 6 hours 18 g 0    aspirin 81 MG EC tablet Take 1 tablet (81 mg) by mouth daily (Patient not taking: Reported on 9/9/2024) 90 tablet 3    brimonidine (ALPHAGAN) 0.2 % ophthalmic solution Place 1 drop into both eyes 2 times daily      dorzolamide-timolol (COSOPT) 2-0.5 % ophthalmic solution Place 1 drop into both eyes 2 times daily      estradiol (VIVELLE-DOT) 0.025 MG/24HR bi-weekly patch Place 1 patch onto the skin twice a week. 24 patch 0    latanoprost (XALATAN) 0.005 % ophthalmic solution Place 1 drop into both eyes At Bedtime      lisinopril (ZESTRIL) 10 MG tablet Take 1 tablet (10 mg) by mouth daily 90 tablet 3    lovastatin (MEVACOR) 20 MG tablet TAKE ONE TABLET BY MOUTH EVERY NIGHT AT BEDTIME 90 tablet 3    Multiple Vitamin (DAILY-EVANGELISTA) TABS Take 1 tablet by mouth daily  11    nitroGLYcerin (NITROSTAT) 0.4 MG sublingual tablet For chest pain place 1 tablet under the tongue every 5 minutes for  3 doses. If symptoms persist 5 minutes after 1st dose call 911. 30 tablet 2       History   Smoking Status    Never   Smokeless Tobacco    Never          Allergies   Allergen Reactions    Heparin Rash     he lives in a assisted living program and is not sure what type of allergies he really has. He quit drinking       There are no preventive care reminders to display for this patient.      Problem, Medication and Allergy Lists were reviewed and are current..         Review of Systems:   Review of Systems  Dyspnea with walking moderate distance, recovers with rest, not new           Labs:   Results from last visit:  Office Visit on 09/09/2024   Component Date Value Ref Range Status    WBC Count 09/09/2024 7.5  4.0 - 11.0 10e3/uL Final    RBC Count 09/09/2024 4.67  3.80 - 5.90 10e6/uL Final    Reference Range:                                                     Female 3.80-5.20 10e6/uL                                      Male 4.40-5.90 10e6u/L    Hemoglobin 09/09/2024 14.2  11.7 - 17.7 g/dL Final    Reference Range:                                                     Female 11.7-15.7 g/dL                                      Male 13.3-17.7 g/dL    Hematocrit 09/09/2024 43.9  35.0 - 53.0 % Final    Sex Specific Reference Ranges: Female  35.0-47.0 % Male  40.0-53.0 %    MCV 09/09/2024 94  78 - 100 fL Final    MCH 09/09/2024 30.4  26.5 - 33.0 pg Final    MCHC 09/09/2024 32.3  31.5 - 36.5 g/dL Final    RDW 09/09/2024 13.0  10.0 - 15.0 % Final    Platelet Count 09/09/2024 113 (L)  150 - 450 10e3/uL Final    Sodium 09/09/2024 139  135 - 145 mmol/L Final    Potassium 09/09/2024 4.5  3.4 - 5.3 mmol/L Final    Carbon Dioxide (CO2) 09/09/2024 29  22 - 29 mmol/L Final    Anion Gap 09/09/2024 9  7 - 15 mmol/L Final    Urea Nitrogen 09/09/2024 20.9  8.0 - 23.0 mg/dL Final    Creatinine 09/09/2024 0.99  0.51 - 1.17 mg/dL Final    Male and Female  0-2 Months    0.31-0.88 mg/dL  2-12 Months   0.16-0.39 mg/dL  1-2 Years     0.18-0.35  "mg/dL  3-4 Years     0.26-0.42 mg/dL  5-6 Years     0.29-0.47 mg/dL  7-8 Years     0.34-0.53 mg/dL  9-10 Years    0.33-0.64 mg/dL  11-12 Years   0.44-0.68 mg/dL  13-14 Years   0.46-0.77 mg/dL    Female  15 Years and older  0.51-0.95 mg/dL    Male  15 Years and older  0.67-1.17 mg/dL        GFR Estimate 09/09/2024 60 (L)  >60 mL/min/1.73m2 Final    The generation of the estimated GFR is currently based on binary male or female sex. If the electronic health record information indicates another gender identity or if Legal Sex is recorded as \"Unknown\", GFR estimates are not automatically calculated, and application of GFR equations or a direct GFR measurement should be considered according to the individual's appropriate clinical context.  eGFR calculated using 2021 CKD-EPI equation.    Calcium 09/09/2024 9.7  8.8 - 10.4 mg/dL Final    Reference intervals for this test were updated on 7/16/2024 to reflect our healthy population more accurately. There may be differences in the flagging of prior results with similar values performed with this method. Those prior results can be interpreted in the context of the updated reference intervals.    Chloride 09/09/2024 101  98 - 107 mmol/L Final    Glucose 09/09/2024 94  70 - 99 mg/dL Final    Alkaline Phosphatase 09/09/2024 110  40 - 150 U/L Final    Female:    0-15 days     U/L  15d-1 year   122-469 U/L  1-10 years   142-335 U/L  10-13 years  129-417 U/L  13-15 years   U/L  15-17 years   U/L  17-19 years  45-87 U/L  19 years and older   U/L      Male:  0-15 days     U/L  15d-1 year   122-469 U/L  1-10 years   142-335 U/L  10-13 years  129-417 U/L  13-15 years  116-468 U/L  15-17 years   U/L  17-19 years   U/L  19 years and older   U/L      AST 09/09/2024 36  0 - 45 U/L Final    ALT 09/09/2024 32  0 - 70 U/L Final    Female   All ages       0-50 U/L     Male   0-20 Years     0-50 U/L  20-Unsp. Years 0-70 U/L        Protein Total " 09/09/2024 7.5  6.4 - 8.3 g/dL Final    Albumin 09/09/2024 4.0  3.5 - 5.2 g/dL Final    Bilirubin Total 09/09/2024 0.6  <=1.2 mg/dL Final         EXAM:  Blood pressure (!) 143/86, pulse 84, weight 147 kg (324 lb).  Body mass index is 45.19 kg/m .    Constitutional: healthy, alert, and no distress   Cardiovascular: negative, PMI normal. No lifts, heaves, or thrills. RRR. No murmurs, clicks gallops or rub  Respiratory: negative, Percussion normal. Good diaphragmatic excursion. 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     Jluis Mane MD

## 2024-10-08 VITALS
WEIGHT: 293 LBS | SYSTOLIC BLOOD PRESSURE: 143 MMHG | DIASTOLIC BLOOD PRESSURE: 86 MMHG | BODY MASS INDEX: 45.19 KG/M2 | HEART RATE: 84 BPM

## 2024-10-24 ENCOUNTER — IMMUNIZATION (OUTPATIENT)
Dept: FAMILY MEDICINE | Facility: CLINIC | Age: 74
End: 2024-10-24
Payer: COMMERCIAL

## 2024-10-24 DIAGNOSIS — Z23 HIGH PRIORITY FOR 2019-NCOV VACCINE: Primary | ICD-10-CM

## 2024-10-24 PROCEDURE — 91320 SARSCV2 VAC 30MCG TRS-SUC IM: CPT

## 2024-10-24 PROCEDURE — 90480 ADMN SARSCOV2 VAC 1/ONLY CMP: CPT

## 2024-12-19 ENCOUNTER — VIRTUAL VISIT (OUTPATIENT)
Dept: CARDIOLOGY | Facility: CLINIC | Age: 74
End: 2024-12-19
Attending: NURSE PRACTITIONER
Payer: COMMERCIAL

## 2024-12-19 DIAGNOSIS — I71.21 ANEURYSM OF ASCENDING AORTA WITHOUT RUPTURE (H): ICD-10-CM

## 2024-12-19 DIAGNOSIS — R00.2 PALPITATIONS: ICD-10-CM

## 2024-12-19 DIAGNOSIS — E78.5 HYPERLIPIDEMIA LDL GOAL <100: Primary | ICD-10-CM

## 2024-12-19 DIAGNOSIS — I10 BENIGN ESSENTIAL HYPERTENSION: ICD-10-CM

## 2024-12-19 DIAGNOSIS — Z95.2 S/P TAVR (TRANSCATHETER AORTIC VALVE REPLACEMENT): ICD-10-CM

## 2024-12-19 NOTE — PROGRESS NOTES
Laila is a 74 year old who is being evaluated via a billable video visit.    How would you like to obtain your AVS? Mail a copy  If the video visit is dropped, the invitation should be resent by: Send to e-mail at: [email or web address] or call [phone]  Will anyone else be joining your video visit? No    Video-Visit Details    Type of service:  Video Visit   8:54 AM  Video End Time:9:03 AM  Originating Location (pt. Location): Home    Distant Location (provider location):  Off-site  Platform used for Video Visit: [facility]    Cardiology Clinic Progress Note  Laila Perez MRN# [id number]   YOB: 1950 Age: 74 year old      Primary Cardiologist:   Dr. Cerda for 6-month follow-up           History of Presenting Illness:          Most recent, BMP, ALT reviewed today.  Carotid ultrasound June 2024 showing less than 50% stenosis bilaterally.  Results reviewed today    BP?  Controlled   Palpitations? None   Routine activity? Counting steps and trying to improve. Still using cane.     Patient reports no chest pain, shortness of breath, PND, orthopnea, presyncope, syncope, edema, heart racing, or palpitations.                    Assessment and Plan:     Plan  Patient Instructions   Medication Changes:  None    Recommendations:  Check blood pressure at least 1 hour after medications. Call the clinic if your blood pressure is consistently greater than 130/80.       Follow-up:  Cardiology follow up at [facility]: Dr. Gonsales in 6 months with echo and fasting lab prior.     Cardiology Scheduling~[phone]  Cardiology Clinic RN~[phone] (Karlene RN, Sujey RN; Dana RN)          Problem List as of 12/19/2024 Reviewed: 3/7/2024  9:18 AM by Delmis Simons APRN CNP            Noted       Active Problems    1. Hyperlipidemia LDL goal <100 - Primary 10/31/2010     Last Assessment & Plan 12/19/2024 Virtual Visit Written 12/19/2024  7:13 AM by Carley Childs APRN CNP      LDL at  goal  Continue statin         2. S/P TAVR (transcatheter aortic valve replacement) 12/22/2022     Overview Signed 6/12/2024  5:39 PM by Carley Childs APRN CNP      TAVR 10/2022 34 mm Medtronic evolute valve           Last Assessment & Plan 6/13/2024 Office Visit Edited 6/13/2024  7:51 AM by Carley Childs APRN CNP      Normal gradient 2024, no AR  Follow with echo  SBE prophylaxis         3. Aneurysm of ascending aorta without rupture (H) 12/22/2022     Overview Addendum 6/13/2024  7:52 AM by Carley Childs APRN CNP      Ascending aorta 4.4 cm 2021, 11/2022, 2023  Ascending aorta 4.5 cm 4/2024 echo          Last Assessment & Plan 6/13/2024 Office Visit Written 6/12/2024  5:38 PM by Carley Childs APRN CNP      Follow with echo  BP control         4. Palpitations 6/13/2024     Overview Signed 6/13/2024  7:54 AM by Carlye Childs APRN CNP      Early 2024 experienced palpitations associated with chest discomfort and vision changes-concern for TIA  Zio patch showed no atrial fibrillation but was only worn for 18 hours          Last Assessment & Plan 6/13/2024 Office Visit Written 6/13/2024  7:54 AM by Carley Childs APRN CNP      Monitor for tachycardia   continue aspirin         5. Benign essential hypertension 10/30/2008     Last Assessment & Plan 6/13/2024 Office Visit Written 6/12/2024  5:38 PM by Carley Childs APRN CNP      Controlled  Continue current antihypertensives                        Thank you for allowing me to participate in this delightful patient's care.   This note was completed in part using Dragon voice recognition software. Although reviewed after completion, some word and grammatical errors may occur.    JERRICA Marley CNP

## 2024-12-19 NOTE — PATIENT INSTRUCTIONS
Medication Changes:  None    Recommendations:  Check blood pressure at least 1 hour after medications. Call the clinic if your blood pressure is consistently greater than 120/80.       Follow-up:  Cardiology follow up at [facility]: Dr. Gonsales in 6 months with echo and fasting lab prior. Call next week to schedule.     Cardiology Scheduling~[phone]  Cardiology Clinic RN~[phone] (Karlene RN, Sujey RN; Dana RN)

## 2024-12-19 NOTE — LETTER
12/19/2024    JERRICA Johns CNP  [facility] [address] [facility] [address]    RE: Laila Perez       Dear Colleague,     I had the pleasure of seeing Laila Perez in the [facility].  Laila is a 74 year old who is being evaluated via a billable video visit.    How would you like to obtain your AVS? Mail a copy  If the video visit is dropped, the invitation should be resent by: Send to e-mail at: [email or web address] or call [phone]  Will anyone else be joining your video visit? No    Video-Visit Details    Type of service:  Video Visit   8:54 AM  Video End Time:9:03 AM  Originating Location (pt. Location): Home    Distant Location (provider location):  Off-site  Platform used for Video Visit: [facility]    Cardiology Clinic Progress Note  Laila Perez MRN# [id number]   YOB: 1950 Age: 74 year old      Primary Cardiologist:   Dr. Cerda for 6-month follow-up           History of Presenting Illness:          Most recent, BMP, ALT reviewed today.  Carotid ultrasound June 2024 showing less than 50% stenosis bilaterally.  Results reviewed today    BP?  Controlled   Palpitations? None   Routine activity? Counting steps and trying to improve. Still using cane.     Patient reports no chest pain, shortness of breath, PND, orthopnea, presyncope, syncope, edema, heart racing, or palpitations.                    Assessment and Plan:     Plan  Patient Instructions   Medication Changes:  None    Recommendations:  Check blood pressure at least 1 hour after medications. Call the clinic if your blood pressure is consistently greater than 130/80.       Follow-up:  Cardiology follow up at [facility]: Dr. Gonsales in 6 months with echo and fasting lab prior.     Cardiology Scheduling~[phone]  Cardiology Clinic RN~[phone] (Karlene RN, Sujey RN; Dana RN)          Problem List as of 12/19/2024 Reviewed: 3/7/2024  9:18 AM by Delmis Simons  JERRICA García CNP            Noted       Active Problems    1. Hyperlipidemia LDL goal <100 - Primary 10/31/2010     Last Assessment & Plan 12/19/2024 Virtual Visit Written 12/19/2024  7:13 AM by Carley Childs APRN CNP      LDL at goal  Continue statin         2. S/P TAVR (transcatheter aortic valve replacement) 12/22/2022     Overview Signed 6/12/2024  5:39 PM by Carley Childs APRN CNP      TAVR 10/2022 34 mm Medtronic evolute valve           Last Assessment & Plan 6/13/2024 Office Visit Edited 6/13/2024  7:51 AM by Carley Childs APRN CNP      Normal gradient 2024, no AR  Follow with echo  SBE prophylaxis         3. Aneurysm of ascending aorta without rupture (H) 12/22/2022     Overview Addendum 6/13/2024  7:52 AM by Carley Childs APRN CNP      Ascending aorta 4.4 cm 2021, 11/2022, 2023  Ascending aorta 4.5 cm 4/2024 echo          Last Assessment & Plan 6/13/2024 Office Visit Written 6/12/2024  5:38 PM by Carley Childs APRN CNP      Follow with echo  BP control         4. Palpitations 6/13/2024     Overview Signed 6/13/2024  7:54 AM by Carley Childs APRN CNP      Early 2024 experienced palpitations associated with chest discomfort and vision changes-concern for TIA  Zio patch showed no atrial fibrillation but was only worn for 18 hours          Last Assessment & Plan 6/13/2024 Office Visit Written 6/13/2024  7:54 AM by Carley Childs APRN CNP      Monitor for tachycardia   continue aspirin         5. Benign essential hypertension 10/30/2008     Last Assessment & Plan 6/13/2024 Office Visit Written 6/12/2024  5:38 PM by Carley Childs APRN CNP      Controlled  Continue current antihypertensives                        Thank you for allowing me to participate in this delightful patient's care.   This note was completed in part using Dragon voice recognition software. Although reviewed after completion, some word and grammatical  errors may occur.    JERRICA Marley CNP                  Thank you for allowing me to participate in the care of your patient.      Sincerely,     JERRICA Marley CNP     [facility]  cc:   JERRICA Gallo CNP  [address] [place] [address]

## 2024-12-26 ENCOUNTER — PATIENT OUTREACH (OUTPATIENT)
Dept: GERIATRIC MEDICINE | Facility: CLINIC | Age: 74
End: 2024-12-26
Payer: COMMERCIAL

## 2024-12-26 NOTE — PROGRESS NOTES
[facility] Care Coordination Contact    Called member to schedule annual HRA home visit. HRA has been scheduled for 1/21/25 at [facility].    Harriet Eddy RN  [facility]  [phone]

## 2025-01-21 ENCOUNTER — PATIENT OUTREACH (OUTPATIENT)
Dept: GERIATRIC MEDICINE | Facility: CLINIC | Age: 75
End: 2025-01-21
Payer: COMMERCIAL

## 2025-01-21 ASSESSMENT — ACTIVITIES OF DAILY LIVING (ADL): DEPENDENT_IADLS:: SHOPPING;TRANSPORTATION

## 2025-01-21 NOTE — PROGRESS NOTES
[facility] Care Coordination Contact  [facility] CCDB Assessment   (for members on other waivers)    Home visit for Health Risk Assessment with Laila Perez completed on January 21, 2025    Type of residence:: Private home - no stairs (rents from [facility]. has cadi waiver with [employer] services. works at [employer] Tues/Wed)  Current living arrangement:: I live alone     Assessment completed with:: Patient, Other (-Kimberly collier , [facility] team and m[place]choices )    Current Care Plan  Member is a recipient of the CADI Waiver program.  Member currently receiving the following home care services:     Member currently receiving the following community resources: Transportation Services, Other (see comment), County Worker, [facility] (cadi waiver-[facility])      Medication Review  Medication reconciliation completed in Epic: Yes  Medication set-up & administration: Independent-does not set up.  Self-administers medications.  Medication Risk Assessment Medication (1 or more, place referral to MTM): N/A: No risk factors identified  MTM Referral Placed: No: No risk factors idenified    Mental/Behavioral Health   Depression Screening:   PHQ-2 Total Score (Adult) - Positive if 3 or more points; Administer PHQ-9 if positive: 0       Mental health DX:: Yes   Mental health DX how managed:: [facility]    Falls Assessment:   Fallen 2 or more times in the past year?: No   Any fall with injury in the past year?: No    ADL/IADL Dependencies:   Dependent ADLs:: Independent  Dependent IADLs:: Shopping, Transportation    [facility]: Shared information regarding [employer]. Reviewed preventative health screening and health plan supplemental benefits/incentives. Reviewed medication disposal form and transition of care member handout.    CFSS Assessment completed at visit: Not Applicable      Care Plan & Recommendations: Laila  doing well with her current CADI waiver services. No changes at this time.     [facility]     [place] message sent to CADI  with notification of HRA being complete and HRA support plan in [place] for their review.    Follow-Up Plan: Member informed of future contact, plan to f/u with member with a 6 month telephone assessment.  Contact information shared with member and family, encouraged member to call with any questions or concerns at any time.    [place] care continuum providers: Please see Snapshot and Care Management Flowsheets for Specific details of care plan.    This CC note routed to PCP, Delmis Simons.     Harriet Eddy, RN  [facility]  [phone]

## 2025-01-22 ENCOUNTER — PATIENT OUTREACH (OUTPATIENT)
Dept: GERIATRIC MEDICINE | Facility: CLINIC | Age: 75
End: 2025-01-22
Payer: COMMERCIAL

## 2025-01-22 NOTE — LETTER
January 22, 2025       DANYELLE MONICA BAUDILIOVANDANA  [address] [place] [id number]      Dear Danyelle,    At [facility], we re dedicated to improving your health and wellness. Enclosed is the Support Plan developed with you on 1/21/2025. Please review the Support Plan carefully.    As a reminder, during your visit we talked about:   Ways to manage your physical and mental health   Using health care to maintain and improve your health    Your preventive care needs      Remember to contact your care coordinator if you:   Are hospitalized or plan to be hospitalized    Have a fall     Have a change in your physical or mental health   Need help finding support or services    If you have questions or don t agree with your Support Plan, call me at [phone]. You can also call me if your needs change. TTY users call the [place] Relay at 785 or 1-555.545.3071 (hwgzns-mq-qpwqvx relay service).    Sincerely,       Harriet Eddy RN    E-mail: [email or web address]  Phone: [phone]      [facility]                [id number] accepted     (06/2024)                [address] [place] [address]  [phone]  fax [phone]  [facility]

## 2025-01-22 NOTE — PROGRESS NOTES
[facility] Care Coordination Contact    Received after visit chart from care coordinator.  Completed following tasks: Mailed copy of support plan to member, Mailed [place] Cho[place] signature sheet pages 3-4, Mailed Safe Medication Disposal , and Mailed Transition of Care Member Handout    Marnie Prather  Care Management Specialist  [facility]  [phone]

## 2025-03-27 ENCOUNTER — ALLIED HEALTH/NURSE VISIT (OUTPATIENT)
Dept: FAMILY MEDICINE | Facility: CLINIC | Age: 75
End: 2025-03-27
Payer: COMMERCIAL

## 2025-03-27 DIAGNOSIS — I10 HTN, GOAL BELOW 140/90: ICD-10-CM

## 2025-03-27 DIAGNOSIS — E78.5 HYPERLIPIDEMIA LDL GOAL <100: ICD-10-CM

## 2025-03-27 DIAGNOSIS — I10 HTN, GOAL BELOW 140/90: Primary | ICD-10-CM

## 2025-03-27 RX ORDER — LISINOPRIL 10 MG/1
10 TABLET ORAL DAILY
Qty: 90 TABLET | Refills: 0 | Status: SHIPPED | OUTPATIENT
Start: 2025-03-27

## 2025-03-27 RX ORDER — LOVASTATIN 20 MG/1
TABLET ORAL
Qty: 90 TABLET | Refills: 0 | Status: SHIPPED | OUTPATIENT
Start: 2025-03-27

## 2025-03-27 NOTE — TELEPHONE ENCOUNTER
Patient is here as a walk in requesting refills.    Advised for patient to call 10 days ahead of time instead of when 3 days left. Patient voiced understanding.     Tawanda CONTE RN  [facility]

## 2025-03-27 NOTE — TELEPHONE ENCOUNTER
BP Readings from Last 3 Encounters:   09/09/24 126/80   06/13/24 128/79   04/11/24 (!) 152/93   Refilled x 90 days due for follow up HME   Labs and updated BP due  Thanks Delmis SMALLSP-BC

## 2025-03-27 NOTE — PROGRESS NOTES
Patient here as a walk in requesting refills of Lisinopril and Lovastatin.    Refill request was created and sent to refill team to review.    Patient will come back and  on 3/31 as she will be out after 3/31.     Advised for patient to call when 7-10 days left of medication to allow refill time for FP staff and pharmacy. Patient voiced understanding.    Tawanda CONTE RN  [facility]

## 2025-03-27 NOTE — TELEPHONE ENCOUNTER
Patient called back to the clinic.  140/80 HR 86    Patient is already scheduled of labs but there are no lab orders in besides what her cardiologist is ordering. Please add future lab orders..     Ayanna Aranda RN on 3/27/2025 at 1:31 PM

## 2025-03-27 NOTE — TELEPHONE ENCOUNTER
Patient called to request BP readings.    Patient will call back with BP readings as she is not home currently.    *Patient to be reminded of labs due as well. Can schedule for 3/31 as patient will be returning to clinic for medication refills.    Tawanda CONTE RN  [facility]

## 2025-04-03 ENCOUNTER — HOSPITAL ENCOUNTER (OUTPATIENT)
Dept: CARDIOLOGY | Facility: CLINIC | Age: 75
Discharge: HOME | End: 2025-04-03
Attending: NURSE PRACTITIONER
Payer: COMMERCIAL

## 2025-04-03 ENCOUNTER — LAB (OUTPATIENT)
Dept: LAB | Facility: CLINIC | Age: 75
End: 2025-04-03
Payer: COMMERCIAL

## 2025-04-03 DIAGNOSIS — I71.21 ANEURYSM OF ASCENDING AORTA WITHOUT RUPTURE: ICD-10-CM

## 2025-04-03 DIAGNOSIS — E78.5 HYPERLIPIDEMIA LDL GOAL <100: ICD-10-CM

## 2025-04-03 DIAGNOSIS — Z95.2 S/P TAVR (TRANSCATHETER AORTIC VALVE REPLACEMENT): ICD-10-CM

## 2025-04-03 LAB
ALT SERPL W P-5'-P-CCNC: 28 U/L (ref 0–70)
CHOLEST SERPL-MCNC: 130 MG/DL
FASTING STATUS PATIENT QL REPORTED: YES
HDLC SERPL-MCNC: 64 MG/DL
LDLC SERPL CALC-MCNC: 43 MG/DL
LVEF ECHO: NORMAL
NONHDLC SERPL-MCNC: 66 MG/DL
TRIGL SERPL-MCNC: 115 MG/DL

## 2025-04-03 PROCEDURE — 255N000002 HC RX 255 OP 636: Performed by: NURSE PRACTITIONER

## 2025-04-03 PROCEDURE — 999N000208 ECHOCARDIOGRAM COMPLETE

## 2025-04-03 PROCEDURE — C8929 TTE W OR WO FOL WCON,DOPPLER: HCPCS

## 2025-04-03 RX ADMIN — HUMAN ALBUMIN MICROSPHERES AND PERFLUTREN 2 ML: 10; .22 INJECTION, SOLUTION INTRAVENOUS at 10:34

## 2025-04-14 ENCOUNTER — OFFICE VISIT (OUTPATIENT)
Dept: FAMILY MEDICINE | Facility: CLINIC | Age: 75
End: 2025-04-14
Payer: COMMERCIAL

## 2025-04-14 VITALS
DIASTOLIC BLOOD PRESSURE: 81 MMHG | HEART RATE: 73 BPM | WEIGHT: 293 LBS | SYSTOLIC BLOOD PRESSURE: 150 MMHG | BODY MASS INDEX: 47.84 KG/M2

## 2025-04-14 DIAGNOSIS — F64.0 TRANSGENDER PERSON ON HORMONE THERAPY: Primary | ICD-10-CM

## 2025-04-14 DIAGNOSIS — I50.30 DIASTOLIC CONGESTIVE HEART FAILURE, UNSPECIFIED HF CHRONICITY (H): ICD-10-CM

## 2025-04-14 DIAGNOSIS — Z79.899 TRANSGENDER PERSON ON HORMONE THERAPY: Primary | ICD-10-CM

## 2025-04-14 PROCEDURE — 99214 OFFICE O/P EST MOD 30 MIN: CPT | Performed by: FAMILY MEDICINE

## 2025-04-14 PROCEDURE — 3079F DIAST BP 80-89 MM HG: CPT | Performed by: FAMILY MEDICINE

## 2025-04-14 PROCEDURE — 3077F SYST BP >= 140 MM HG: CPT | Performed by: FAMILY MEDICINE

## 2025-04-14 RX ORDER — ESTRADIOL 0.03 MG/D
1 FILM, EXTENDED RELEASE TRANSDERMAL
Qty: 24 PATCH | Refills: 1 | Status: SHIPPED | OUTPATIENT
Start: 2025-04-14

## 2025-04-14 NOTE — PROGRESS NOTES
BOO Ulloa is a 75 year old individual that uses pronouns She/Her/Hers/Herself that presents today for follow up of:  feminizing hormone therapy.   Alone or accompanied by: accompanied today by  Gender identity: female  Started Hormone  therapy  2016  Continues on TD estradiol  0.025 mg patch 2x/wk  Any special concerns today?    No concerns, doing well on current hormone dose  Follow up with cardiology on 12/19/2024, note reviewed. No changes on echocardiogram 4/3/2025      On hormones?  YES +++ Shot day of the week? Not applicable-taking pills/patch/gel      Due for labs?  No      +++ Refills of meds needed?  Yes  Gender related body changes since last visit: stable    Breakthrough bleeding? Does Not Apply    New health concerns since last visit:  --- no new concerns, using cane   --gardening, wood working.    Past Surgical History:   Procedure Laterality Date    ARTHROPLASTY KNEE Right 10/26/2023    Procedure: Total Knee Arthroplasty, Right;  Surgeon: Palmer Lew MD;  Location: [facility] OR    COLONOSCOPY  2007    CV CORONARY ANGIOGRAM N/A 9/8/2022    Procedure: Coronary Angiogram;  Surgeon: aSnty Clayton MD;  Location: [facility] CARDIAC CATH LAB    CV PCI N/A 9/8/2022    Procedure: Percutaneous Coronary Intervention;  Surgeon: Santy Clayton MD;  Location: [facility] CARDIAC CATH LAB    CV TRANSCATHETER AORTIC VALVE REPLACEMENT-FEMORAL APPROACH N/A 10/4/2022    Procedure: Transcatheter Aortic Valve Replacement-Femoral Approach;  Surgeon: Young Ramires MD;  Location: [facility] CARDIAC CATH LAB    JOINT REPLACEMTN, KNEE RT/LT  2006    left    ORCHIECTOMY SCROTAL Bilateral 5/14/2019    Procedure: Bilatera Scrotal Orchiectomy;  Surgeon: Abhilash Weston MD;  Location:  OR       Patient Active Problem List   Diagnosis    Gouty arthropathy    Mental or behavioral problem    Generalized osteoarthrosis, unspecified site    Disorder of bone and cartilage    Benign essential  hypertension    OA (osteoarthritis) of knee    Hyperlipidemia LDL goal <100    DDD (degenerative disc disease), lumbar    Varicose veins of legs    Psoriasis    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    Family history of diabetes mellitus in first degree relative    Mitral valve disorder    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    Obesity, Class III, BMI 40-49.9 (morbid obesity) (H)    Multiple gastric ulcers    Episodic tension-type headache, not intractable    Alcohol dependence in remission (H)    Status post coronary angiogram    S/P TAVR (transcatheter aortic valve replacement)    Aneurysm of ascending aorta without rupture    Thrombocytopenia    Lymphedema    S/P knee replacement    S/P total knee arthroplasty, right    Palpitations       Current Outpatient Medications   Medication Sig Dispense Refill    acetaminophen (TYLENOL) 325 MG tablet Take 2 tablets (650 mg) by mouth every 4 hours as needed for other (mild pain)      albuterol (PROAIR HFA/PROVENTIL HFA/VENTOLIN HFA) 108 (90 Base) MCG/ACT inhaler Inhale 2 puffs into the lungs every 6 hours 18 g 0    aspirin 81 MG EC tablet Take 1 tablet (81 mg) by mouth daily 90 tablet 3    brimonidine (ALPHAGAN) 0.2 % ophthalmic solution Place 1 drop into both eyes 2 times daily      dorzolamide-timolol (COSOPT) 2-0.5 % ophthalmic solution Place 1 drop into both eyes 2 times daily      estradiol (VIVELLE-DOT) 0.025 MG/24HR bi-weekly patch Place 1 patch onto the skin twice a week. 24 patch 1    latanoprost (XALATAN) 0.005 % ophthalmic solution Place 1 drop into both eyes At Bedtime      lisinopril (ZESTRIL) 10 MG tablet TAKE ONE TABLET BY MOUTH ONCE DAILY 90 tablet 0    lovastatin (MEVACOR) 20 MG tablet TAKE ONE TABLET BY MOUTH EVERY NIGHT AT BEDTIME 90 tablet 0    Multiple Vitamin (DAILY-EVANGELISTA) TABS Take 1 tablet by mouth daily  11    nitroGLYcerin (NITROSTAT) 0.4 MG sublingual tablet For chest pain place 1 tablet under the tongue every 5 minutes for 3 doses. If symptoms  persist 5 minutes after 1st dose call 911. 30 tablet 2       History   Smoking Status    Never   Smokeless Tobacco    Never          Allergies   Allergen Reactions    Heparin Rash     he lives in a assisted living program and is not sure what type of allergies he really has. He quit drinking       There are no preventive care reminders to display for this patient.      Problem, Medication and Allergy Lists were reviewed and are current..         Review of Systems:   Review of Systems           Labs:   Results from last visit:  Hospital Outpatient Visit on 04/03/2025   Component Date Value Ref Range Status    LVEF  04/03/2025 50-55%   Final         EXAM:  Blood pressure (!) 150/81, pulse 73, weight (!) 155.6 kg (343 lb).  Body mass index is 47.84 kg/m .    Vitals reviwed  Constitutional: healthy, alert, and no distress   Cardiovascular: negative, PMI normal. No lifts, heaves, or thrills. RRR.   Respiratory: negative, Percussion normal. Good diaphragmatic excursion. Lungs clear   Mood euthymic  Assessment and Plan   Hormone replacement s/p gender affirming surgery  2. CHF s/p aortic valve replacement   Clinically stable response to current gender affirming hormone regimen.   Continue with cardiology follow up      Follow-up 6 months      [facility]  Questions were elicited and answered.     Jluis Mane MD

## 2025-04-22 PROBLEM — I50.30 DIASTOLIC CONGESTIVE HEART FAILURE, UNSPECIFIED HF CHRONICITY (H): Status: ACTIVE | Noted: 2025-04-22

## 2025-05-15 PROBLEM — I89.0 LYMPHEDEMA: Status: RESOLVED | Noted: 2023-09-11 | Resolved: 2025-05-15

## 2025-06-07 NOTE — Clinical Note
Laila discharged from hospital to home on 10/6/22. She stated she was doing well and feeling good. She stated she was impressed that her PCP contacted her after the surgery. Chanel Laura RN [employer].  No assistance needed

## 2025-06-16 DIAGNOSIS — Z95.2 S/P TAVR (TRANSCATHETER AORTIC VALVE REPLACEMENT): ICD-10-CM

## 2025-06-16 DIAGNOSIS — I10 HTN, GOAL BELOW 140/90: ICD-10-CM

## 2025-06-16 DIAGNOSIS — E78.5 HYPERLIPIDEMIA LDL GOAL <100: ICD-10-CM

## 2025-06-16 RX ORDER — LOVASTATIN 20 MG/1
20 TABLET ORAL AT BEDTIME
Qty: 90 TABLET | Refills: 0 | Status: SHIPPED | OUTPATIENT
Start: 2025-06-16

## 2025-06-16 RX ORDER — ASPIRIN 81 MG/1
81 TABLET ORAL DAILY
Qty: 90 TABLET | Refills: 0 | Status: SHIPPED | OUTPATIENT
Start: 2025-06-16

## 2025-06-16 NOTE — TELEPHONE ENCOUNTER
Pt calling for refills. Also requesting Levothyroxine, do not see this one of current med list.    Lisseth Melo Patient

## 2025-06-16 NOTE — TELEPHONE ENCOUNTER
BP on 4/15/2025 was 150/81.  Can we please ask Laila who manages her thyroid condition?  I don't see that we have any record of levothyroxine.   Ayanna Aranda RN on 6/16/2025 at 1:52 PM

## 2025-06-16 NOTE — TELEPHONE ENCOUNTER
Spoke with patient who states she is not on any thyroid medication, she is requesting refills of her lisinopril 10 mg and lovastatin 20 mg be sent to our pharmacy.     Routing refill request to [facility] refill pool for protocols.   Julie Behrendt RN

## 2025-06-19 RX ORDER — LISINOPRIL 10 MG/1
10 TABLET ORAL DAILY
Qty: 90 TABLET | Refills: 3 | Status: SHIPPED | OUTPATIENT
Start: 2025-06-19

## 2025-06-19 NOTE — TELEPHONE ENCOUNTER
Patient called back with blood pressure 171/94 pulse 79, 132/69 pulse 81 and 128/70 pulse 81.    Patient takes medication at night.     Kayely JAMES  Station

## 2025-06-24 ENCOUNTER — PATIENT OUTREACH (OUTPATIENT)
Dept: GERIATRIC MEDICINE | Facility: CLINIC | Age: 75
End: 2025-06-24
Payer: COMMERCIAL

## 2025-06-24 NOTE — PROGRESS NOTES
[facility] Care Coordination Contact      [facility] Six-Month Telephone Assessment    6 month telephone assessment completed on 6/24/25.    ER visits: No  Hospitalizations: No  TCU stays: No  Significant health status changes: none  Falls/Injuries: No  ADL/IADL changes: No  Changes in services: No    Caregiver Assessment follow up:  none    Goals: See Support Plan for goal progress documentation.      Will see member in 6 months for an annual health risk assessment.   Encouraged member to call CC with any questions or concerns in the meantime.     Harriet Eddy RN  [facility]  [phone]

## 2025-07-15 NOTE — PROGRESS NOTES
CARDIOLOGY VISIT    REASON FOR VISIT: TAVR, dilated aorta     SUBJECTIVE:  75-year-old female seen for TAVR and dilated ascending aorta.  She has dyslipidemia, hypertension.    2022 she underwent TAVR with 34 mm Medtronic valve.    Echo April 2025 showed EF 50 to 55%, TAVR with mean 10 mmHg, DI 0.46, aorta 4.5 cm.    She has been doing well this summer.  She does some walking shorter distances and denies any shortness of breath or chest pain.  She has some cramping in her leg at night, but no claudication-like symptoms during the day.  Blood pressure is 130 or less at home.  She uses a [employer] monitor and rhythm has been sinus, no A-fib or other abnormal alerts.    MEDICATIONS:  Current Outpatient Medications   Medication Sig Dispense Refill    acetaminophen (TYLENOL) 325 MG tablet Take 2 tablets (650 mg) by mouth every 4 hours as needed for other (mild pain)      albuterol (PROAIR HFA/PROVENTIL HFA/VENTOLIN HFA) 108 (90 Base) MCG/ACT inhaler Inhale 2 puffs into the lungs every 6 hours 18 g 0    aspirin 81 MG EC tablet Take 1 tablet (81 mg) by mouth daily. 90 tablet 0    brimonidine (ALPHAGAN) 0.2 % ophthalmic solution Place 1 drop into both eyes 2 times daily      dorzolamide-timolol (COSOPT) 2-0.5 % ophthalmic solution Place 1 drop into both eyes 2 times daily      estradiol (VIVELLE-DOT) 0.025 MG/24HR bi-weekly patch Place 1 patch onto the skin twice a week. 24 patch 1    latanoprost (XALATAN) 0.005 % ophthalmic solution Place 1 drop into both eyes At Bedtime      lisinopril (ZESTRIL) 10 MG tablet Take 1 tablet (10 mg) by mouth daily. 90 tablet 3    lovastatin (MEVACOR) 20 MG tablet Take 1 tablet (20 mg) by mouth at bedtime. TAKE ONE TABLET BY MOUTH EVERY NIGHT AT BEDTIME 90 tablet 0    Multiple Vitamin (DAILY-EVANGELISTA) TABS Take 1 tablet by mouth daily  11    nitroGLYcerin (NITROSTAT) 0.4 MG sublingual tablet For chest pain place 1 tablet under the tongue every 5 minutes for 3 doses. If symptoms persist 5 minutes  "after 1st dose call 911. 30 tablet 2     No current facility-administered medications for this visit.       ALLERGIES:  Allergies   Allergen Reactions    Heparin Rash     he lives in a assisted living program and is not sure what type of allergies he really has. He quit drinking       REVIEW OF SYSTEMS:  Constitutional:  No weight loss, fever, chills  HEENT:  Eyes:  No visual loss, blurred vision, double vision or yellow sclerae. No hearing loss, sneezing, congestion, runny nose or sore throat.  Skin:  No rash or itching.  Cardiovascular: per HPI  Respiratory: per HPI  GI:  No anorexia, nausea, vomiting or diarrhea. No abdominal pain or blood.  :  No dysurea, hematuria  Neurologic:  No headache, paralysis, ataxia, numbness or tingling in the extremities. No change in bowel or bladder control.  Musculoskeletal:  No muscle pain  Hematologic:  No bleeding or bruising.  Lymphatics:  No enlarged nodes. No history of splenectomy.  Endocrine:  No reports of sweating, cold or heat intolerance. No polyuria or polydipsia.  Allergies:  No history of asthma, hives, eczema or rhinitis.    PHYSICAL EXAM:  /79 (BP Location: Right arm, Patient Position: Sitting, Cuff Size: Adult Large)   Pulse 78   Resp 24   Ht 1.791 m (5' 10.5\")   Wt (!) 158.1 kg (348 lb 8 oz)   SpO2 97%   BMI 49.30 kg/m    Constitutional: awake, alert, no distress  Eyes: PERRL, sclera nonicteric  ENT: trachea midline  Respiratory: lungs clear  Cardiovascular: RRR, no murmur, no edema  GI: nondistended, nontender, bowel sounds present  Lymph/Hematologic: no lymphadenopathy  Skin: dry, no rash  Musculoskeletal: good muscle tone, strength 5/5 in upper and lower extremities  Neurologic: no focal deficits  Neuropsychiatric: appropriate affact    DATA:  Lab:   Recent Labs   Lab Test 04/03/25  0857 03/07/24  0943   CHOL 130 127   HDL 64 64   LDL 43 43   TRIG 115 102     ASSESSMENT:  75-year-old female seen for TAVR and dilated aorta.  She is doing well " with no concerning cardiac symptoms.  Blood pressure is well-controlled.  She has no edema.  Aorta is stable at 4.5 cm.  Echo will be repeated on follow-up in 9 months.    RECOMMENDATIONS:  1.  Status post TAVR  - Continue current medications including antibiotic prophylaxis prior to dental work    2.  Dilated ascending aorta  - Blood pressure controlled, continue current medications  - Echo on follow-up    Follow-up in 9 months with [facility] with repeat echo.    Michael Gonsales MD  Cardiology - [facility]  Pager:  [phone]  Text Page  July 17, 2025

## 2025-07-17 ENCOUNTER — OFFICE VISIT (OUTPATIENT)
Dept: CARDIOLOGY | Facility: CLINIC | Age: 75
End: 2025-07-17
Payer: COMMERCIAL

## 2025-07-17 VITALS
RESPIRATION RATE: 24 BRPM | SYSTOLIC BLOOD PRESSURE: 130 MMHG | DIASTOLIC BLOOD PRESSURE: 79 MMHG | HEART RATE: 78 BPM | WEIGHT: 293 LBS | HEIGHT: 71 IN | BODY MASS INDEX: 41.02 KG/M2 | OXYGEN SATURATION: 97 %

## 2025-07-17 DIAGNOSIS — I10 BENIGN ESSENTIAL HYPERTENSION: ICD-10-CM

## 2025-07-17 DIAGNOSIS — Z95.2 S/P TAVR (TRANSCATHETER AORTIC VALVE REPLACEMENT): ICD-10-CM

## 2025-07-17 DIAGNOSIS — R00.2 PALPITATIONS: ICD-10-CM

## 2025-07-17 DIAGNOSIS — I71.21 ANEURYSM OF ASCENDING AORTA WITHOUT RUPTURE: ICD-10-CM

## 2025-07-17 ASSESSMENT — PAIN SCALES - GENERAL: PAINLEVEL_OUTOF10: MILD PAIN (1)

## 2025-07-17 NOTE — LETTER
7/17/2025    JERRICA Johns CNP  [facility] [address] [facility] [address]    RE: Laila Alexandra Perez       Dear Colleague,     I had the pleasure of seeing Laila Alexandra Perez in the [facility].  CARDIOLOGY VISIT    REASON FOR VISIT: TAVR, dilated aorta     SUBJECTIVE:  75-year-old female seen for TAVR and dilated ascending aorta.  She has dyslipidemia, hypertension.    2022 she underwent TAVR with 34 mm Medtronic valve.    Echo April 2025 showed EF 50 to 55%, TAVR with mean 10 mmHg, DI 0.46, aorta 4.5 cm.    She has been doing well this summer.  She does some walking shorter distances and denies any shortness of breath or chest pain.  She has some cramping in her leg at night, but no claudication-like symptoms during the day.  Blood pressure is 130 or less at home.  She uses a [employer] monitor and rhythm has been sinus, no A-fib or other abnormal alerts.    MEDICATIONS:  Current Outpatient Medications   Medication Sig Dispense Refill     acetaminophen (TYLENOL) 325 MG tablet Take 2 tablets (650 mg) by mouth every 4 hours as needed for other (mild pain)       albuterol (PROAIR HFA/PROVENTIL HFA/VENTOLIN HFA) 108 (90 Base) MCG/ACT inhaler Inhale 2 puffs into the lungs every 6 hours 18 g 0     aspirin 81 MG EC tablet Take 1 tablet (81 mg) by mouth daily. 90 tablet 0     brimonidine (ALPHAGAN) 0.2 % ophthalmic solution Place 1 drop into both eyes 2 times daily       dorzolamide-timolol (COSOPT) 2-0.5 % ophthalmic solution Place 1 drop into both eyes 2 times daily       estradiol (VIVELLE-DOT) 0.025 MG/24HR bi-weekly patch Place 1 patch onto the skin twice a week. 24 patch 1     latanoprost (XALATAN) 0.005 % ophthalmic solution Place 1 drop into both eyes At Bedtime       lisinopril (ZESTRIL) 10 MG tablet Take 1 tablet (10 mg) by mouth daily. 90 tablet 3     lovastatin (MEVACOR) 20 MG tablet Take 1 tablet (20 mg) by mouth at bedtime. TAKE ONE TABLET BY MOUTH EVERY NIGHT AT BEDTIME 90  "tablet 0     Multiple Vitamin (DAILY-EVANGELISTA) TABS Take 1 tablet by mouth daily  11     nitroGLYcerin (NITROSTAT) 0.4 MG sublingual tablet For chest pain place 1 tablet under the tongue every 5 minutes for 3 doses. If symptoms persist 5 minutes after 1st dose call 911. 30 tablet 2     No current facility-administered medications for this visit.       ALLERGIES:  Allergies   Allergen Reactions     Heparin Rash     he lives in a assisted living program and is not sure what type of allergies he really has. He quit drinking       REVIEW OF SYSTEMS:  Constitutional:  No weight loss, fever, chills  HEENT:  Eyes:  No visual loss, blurred vision, double vision or yellow sclerae. No hearing loss, sneezing, congestion, runny nose or sore throat.  Skin:  No rash or itching.  Cardiovascular: per HPI  Respiratory: per HPI  GI:  No anorexia, nausea, vomiting or diarrhea. No abdominal pain or blood.  :  No dysurea, hematuria  Neurologic:  No headache, paralysis, ataxia, numbness or tingling in the extremities. No change in bowel or bladder control.  Musculoskeletal:  No muscle pain  Hematologic:  No bleeding or bruising.  Lymphatics:  No enlarged nodes. No history of splenectomy.  Endocrine:  No reports of sweating, cold or heat intolerance. No polyuria or polydipsia.  Allergies:  No history of asthma, hives, eczema or rhinitis.    PHYSICAL EXAM:  /79 (BP Location: Right arm, Patient Position: Sitting, Cuff Size: Adult Large)   Pulse 78   Resp 24   Ht 1.791 m (5' 10.5\")   Wt (!) 158.1 kg (348 lb 8 oz)   SpO2 97%   BMI 49.30 kg/m    Constitutional: awake, alert, no distress  Eyes: PERRL, sclera nonicteric  ENT: trachea midline  Respiratory: lungs clear  Cardiovascular: RRR, no murmur, no edema  GI: nondistended, nontender, bowel sounds present  Lymph/Hematologic: no lymphadenopathy  Skin: dry, no rash  Musculoskeletal: good muscle tone, strength 5/5 in upper and lower extremities  Neurologic: no focal " deficits  Neuropsychiatric: appropriate affact    DATA:  Lab:   Recent Labs   Lab Test 04/03/25  0857 03/07/24  0943   CHOL 130 127   HDL 64 64   LDL 43 43   TRIG 115 102     ASSESSMENT:  75-year-old female seen for TAVR and dilated aorta.  She is doing well with no concerning cardiac symptoms.  Blood pressure is well-controlled.  She has no edema.  Aorta is stable at 4.5 cm.  Echo will be repeated on follow-up in 9 months.    RECOMMENDATIONS:  1.  Status post TAVR  - Continue current medications including antibiotic prophylaxis prior to dental work    2.  Dilated ascending aorta  - Blood pressure controlled, continue current medications  - Echo on follow-up    Follow-up in 9 months with [facility] with repeat echo.    Michael Gonsales MD  Cardiology - [facility]  Pager:  [phone]  Text Page  July 17, 2025        Thank you for allowing me to participate in the care of your patient.      Sincerely,     Michael Gonsales MD     [facility]  cc:   JERRICA Gallo CNP  [address] [place] [address]

## (undated) DEVICE — BONE CLEANING TIP INTERPULSE  0210-010-000

## (undated) DEVICE — SUTURE ABSORBABLE VICRYL CT-B1 L36 IN BRAID VIOLET JB947H

## (undated) DEVICE — GLOVE BIOGEL PI MICRO INDICATOR UNDERGLOVE SZ 8.0 48980

## (undated) DEVICE — BLADE SAW OSCIL/SAG STRK 11X90X1.19MM 4/2000 4111-119-090

## (undated) DEVICE — CATH DIAG 4FR JL 4.5 538417

## (undated) DEVICE — PACK MINOR CUSTOM ASC

## (undated) DEVICE — SHEATH INTRODUCER DRYSEAL FLEX W/HYDRO CT 22FRX33CM DSF2233

## (undated) DEVICE — SU MONOCRYL 4-0 PS-2 18" UND Y496G

## (undated) DEVICE — NDL SPINAL 18GA 3.5" 405184

## (undated) DEVICE — GLOVE PROTEXIS W/NEU-THERA 8.0  2D73TE80

## (undated) DEVICE — SOL WATER IRRIG 500ML BOTTLE 2F7113

## (undated) DEVICE — MANIFOLD KIT ANGIO AUTOMATED 014613

## (undated) DEVICE — SU STRATAFIX MONOCRYL 3-0 SPIRAL PS-2 30CM SXMP1B106

## (undated) DEVICE — GAUGE DEPTH LOCATOR MANTA 8FR 188F

## (undated) DEVICE — SOL WATER IRRIG 1000ML BOTTLE 07139-09

## (undated) DEVICE — PREP CHLORAPREP 26ML TINTED ORANGE  260815

## (undated) DEVICE — GLOVE BIOGEL PI MICRO SZ 6.5 48565

## (undated) DEVICE — SU CHROMIC 3-0 SH 27" G122H

## (undated) DEVICE — KIT HAND CONTROL ANGIOTOUCH ACIST 65CM AT-P65

## (undated) DEVICE — Device

## (undated) DEVICE — GUIDEWIRE VASC SAFARI2 0.035X275CM H74939406XS1

## (undated) DEVICE — INTRO SHEATH 4FRX10CM PINNACLE RSS402

## (undated) DEVICE — ESU GROUND PAD ADULT W/CORD E7507

## (undated) DEVICE — CATH ANGIO INFINITI 3DRC 4FRX100CM 538476

## (undated) DEVICE — INTRO TERUMO 6FRX25CM W/MARKER RSB603

## (undated) DEVICE — CATH ANGIO SUPERTORQUE AL1 6FRX100CM 532-645

## (undated) DEVICE — INTRODUCER CATH VASC 5FRX10CM  MPIS-501-NT-U-SST

## (undated) DEVICE — DRSG KERLIX FLUFFS X5

## (undated) DEVICE — WIRE GUIDE LUNDERQUIST 0.035"X260CM DBL CVD

## (undated) DEVICE — CABLE ADAPTER PACING 6FT REMINGTON ADAP 2000

## (undated) DEVICE — SU SILK 2-0 TIE 12X30" A305H

## (undated) DEVICE — CATH ANGIO INFINITI PIGTAIL 145 6 SH 6FRX110CM  534-652S

## (undated) DEVICE — SUCTION IRR SYSTEM W/TIP INTERPULSE

## (undated) DEVICE — GLOVE BIOGEL PI MICRO SZ 8.0 48580

## (undated) DEVICE — KIT ATTUNE EPAK PIN SYSTEM 2544-00-111

## (undated) DEVICE — SUCTION MANIFOLD NEPTUNE 2 SYS 4 PORT 0702-020-000

## (undated) DEVICE — CATH BALLOON Z-MED II  8FR 23MMX4X100CM 611765

## (undated) DEVICE — WIRE GUIDE 0.035"X260CM SAFE-T-J EXCHANGE G00517

## (undated) DEVICE — TOURNIQUET SGL  BLADDER 30" DL PORT BLUE 5921-030-235

## (undated) DEVICE — SU SILK 0 SH 30" K834H

## (undated) DEVICE — SOL NACL 0.9% IRRIG 500ML BOTTLE 2F7123

## (undated) DEVICE — RAD CLOSURE ANGIOSEAL 8FR  610131

## (undated) DEVICE — NDL PERC ENTRY THINWALL 18GA 7.0" G00166

## (undated) DEVICE — DEFIB PRO-PADZ LVP LQD GEL ADULT 8900-2105-01

## (undated) DEVICE — BLADE CLIPPER SGL USE 9680

## (undated) DEVICE — BNDG ELASTIC 6" DBL LENGTH UNSTERILE 6611-16

## (undated) DEVICE — SU MONOCRYL 2-0 CT-1 36" UND Y945H

## (undated) DEVICE — SOL NACL 0.9% IRRIG 3000ML BAG 07972-08

## (undated) DEVICE — INTRO TERUMO 7FRX25CM W/MARKER RSB703

## (undated) DEVICE — ESU PENCIL SMOKE EVAC W/ROCKER SWITCH 0703-047-000

## (undated) DEVICE — SU VICRYL 0 TIE 54" J608H

## (undated) DEVICE — LINEN TOWEL PACK X5 5464

## (undated) DEVICE — WIRE GUIDE 0.035"X150CM EMERALD STR 502542

## (undated) DEVICE — DRAPE LAP TRANSVERSE 29421

## (undated) DEVICE — SYSTEM PANNUS RETENTION 4 PAD 2 STRAP CZ-PRS-04

## (undated) DEVICE — GLOVE BIOGEL PI MICRO INDICATOR UNDERGLOVE SZ 6.5 48965

## (undated) DEVICE — GOWN LG DISP 9515

## (undated) DEVICE — BLADE SAW SAGITTAL STRK 13X90X1.19MM HD SYS 6 6113-119-090

## (undated) DEVICE — RAD INTRODUCER KIT MICRO 5FRX10CM .018 NITINOL G/W

## (undated) DEVICE — DRSG AQUACEL AG 3.5X9.75" HYDROFIBER 412011

## (undated) DEVICE — BONE CEMENT KIT BOWL AND SPATULA STRK 6201-3-410

## (undated) DEVICE — DECANTER VIAL 2006S

## (undated) DEVICE — BNDG COBAN 6"X5YDS STERILE

## (undated) DEVICE — TOTE ANGIO CORP PC15AT SAN32CC83O

## (undated) DEVICE — LINE MONITOR NASAL SMART CAPNOLINE ADULT LONG 12463

## (undated) DEVICE — SUPPORTER SCROTAL SUSPENSORY LG LATEX

## (undated) DEVICE — SYR 20ML LL W/O NDL

## (undated) DEVICE — CATH EP PACEL 5FRX110CM 1MM RIGHT HEART CURVE 401763

## (undated) DEVICE — GOWN IMPERVIOUS SPECIALTY XLG/XLONG 32474

## (undated) DEVICE — SU PDO 1 STRATAFIX 36X36CM CTX TAPERPOINT SXPD2B405

## (undated) DEVICE — SUCTION MANIFOLD NEPTUNE 2 SYS 1 PORT 702-025-000

## (undated) RX ORDER — MAGNESIUM SULFATE HEPTAHYDRATE 40 MG/ML
INJECTION, SOLUTION INTRAVENOUS
Status: DISPENSED
Start: 2023-10-26

## (undated) RX ORDER — DEXAMETHASONE SODIUM PHOSPHATE 4 MG/ML
INJECTION, SOLUTION INTRA-ARTICULAR; INTRALESIONAL; INTRAMUSCULAR; INTRAVENOUS; SOFT TISSUE
Status: DISPENSED
Start: 2023-10-26

## (undated) RX ORDER — HEPARIN SODIUM 200 [USP'U]/100ML
INJECTION, SOLUTION INTRAVENOUS
Status: DISPENSED
Start: 2022-09-08

## (undated) RX ORDER — BUPIVACAINE HYDROCHLORIDE 5 MG/ML
INJECTION, SOLUTION EPIDURAL; INTRACAUDAL
Status: DISPENSED
Start: 2019-05-14

## (undated) RX ORDER — ONDANSETRON 2 MG/ML
INJECTION INTRAMUSCULAR; INTRAVENOUS
Status: DISPENSED
Start: 2023-10-26

## (undated) RX ORDER — GABAPENTIN 100 MG/1
CAPSULE ORAL
Status: DISPENSED
Start: 2023-10-26

## (undated) RX ORDER — ACETAMINOPHEN 325 MG/1
TABLET ORAL
Status: DISPENSED
Start: 2019-05-14

## (undated) RX ORDER — BUPIVACAINE HYDROCHLORIDE 5 MG/ML
INJECTION, SOLUTION EPIDURAL; INTRACAUDAL
Status: DISPENSED
Start: 2023-10-26

## (undated) RX ORDER — HEPARIN SODIUM 200 [USP'U]/100ML
INJECTION, SOLUTION INTRAVENOUS
Status: DISPENSED
Start: 2022-10-04

## (undated) RX ORDER — HEPARIN SODIUM 1000 [USP'U]/ML
INJECTION, SOLUTION INTRAVENOUS; SUBCUTANEOUS
Status: DISPENSED
Start: 2022-09-08

## (undated) RX ORDER — KETOROLAC TROMETHAMINE 30 MG/ML
INJECTION, SOLUTION INTRAMUSCULAR; INTRAVENOUS
Status: DISPENSED
Start: 2019-05-14

## (undated) RX ORDER — PROTAMINE SULFATE 10 MG/ML
INJECTION, SOLUTION INTRAVENOUS
Status: DISPENSED
Start: 2022-10-04

## (undated) RX ORDER — HEPARIN SODIUM 1000 [USP'U]/ML
INJECTION, SOLUTION INTRAVENOUS; SUBCUTANEOUS
Status: DISPENSED
Start: 2022-10-04

## (undated) RX ORDER — LIDOCAINE HYDROCHLORIDE 10 MG/ML
INJECTION, SOLUTION EPIDURAL; INFILTRATION; INTRACAUDAL; PERINEURAL
Status: DISPENSED
Start: 2022-09-08

## (undated) RX ORDER — DEXAMETHASONE SODIUM PHOSPHATE 4 MG/ML
INJECTION, SOLUTION INTRA-ARTICULAR; INTRALESIONAL; INTRAMUSCULAR; INTRAVENOUS; SOFT TISSUE
Status: DISPENSED
Start: 2019-05-14

## (undated) RX ORDER — LIDOCAINE HYDROCHLORIDE 10 MG/ML
INJECTION, SOLUTION EPIDURAL; INFILTRATION; INTRACAUDAL; PERINEURAL
Status: DISPENSED
Start: 2022-10-04

## (undated) RX ORDER — FENTANYL CITRATE 50 UG/ML
INJECTION, SOLUTION INTRAMUSCULAR; INTRAVENOUS
Status: DISPENSED
Start: 2019-05-14

## (undated) RX ORDER — ACETAMINOPHEN 325 MG/1
TABLET ORAL
Status: DISPENSED
Start: 2023-10-26

## (undated) RX ORDER — PROPOFOL 10 MG/ML
INJECTION, EMULSION INTRAVENOUS
Status: DISPENSED
Start: 2023-10-26

## (undated) RX ORDER — KETOROLAC TROMETHAMINE 30 MG/ML
INJECTION, SOLUTION INTRAMUSCULAR; INTRAVENOUS
Status: DISPENSED
Start: 2023-10-26

## (undated) RX ORDER — CLINDAMYCIN PHOSPHATE 900 MG/50ML
INJECTION, SOLUTION INTRAVENOUS
Status: DISPENSED
Start: 2019-05-14

## (undated) RX ORDER — PROPOFOL 10 MG/ML
INJECTION, EMULSION INTRAVENOUS
Status: DISPENSED
Start: 2019-05-14

## (undated) RX ORDER — TRANEXAMIC ACID 650 MG/1
TABLET ORAL
Status: DISPENSED
Start: 2023-10-26

## (undated) RX ORDER — LIDOCAINE HYDROCHLORIDE 20 MG/ML
INJECTION, SOLUTION EPIDURAL; INFILTRATION; INTRACAUDAL; PERINEURAL
Status: DISPENSED
Start: 2019-05-14

## (undated) RX ORDER — EPHEDRINE SULFATE 50 MG/ML
INJECTION, SOLUTION INTRAMUSCULAR; INTRAVENOUS; SUBCUTANEOUS
Status: DISPENSED
Start: 2019-05-14

## (undated) RX ORDER — ASPIRIN 325 MG
TABLET ORAL
Status: DISPENSED
Start: 2022-09-08

## (undated) RX ORDER — CLINDAMYCIN PHOSPHATE 900 MG/50ML
INJECTION, SOLUTION INTRAVENOUS
Status: DISPENSED
Start: 2022-10-04

## (undated) RX ORDER — CEFAZOLIN SODIUM 1 G/3ML
INJECTION, POWDER, FOR SOLUTION INTRAMUSCULAR; INTRAVENOUS
Status: DISPENSED
Start: 2023-10-26

## (undated) RX ORDER — FENTANYL CITRATE 50 UG/ML
INJECTION, SOLUTION INTRAMUSCULAR; INTRAVENOUS
Status: DISPENSED
Start: 2023-10-26

## (undated) RX ORDER — FENTANYL CITRATE 50 UG/ML
INJECTION, SOLUTION INTRAMUSCULAR; INTRAVENOUS
Status: DISPENSED
Start: 2022-09-08

## (undated) RX ORDER — LIDOCAINE HYDROCHLORIDE 10 MG/ML
INJECTION, SOLUTION EPIDURAL; INFILTRATION; INTRACAUDAL; PERINEURAL
Status: DISPENSED
Start: 2019-05-14

## (undated) RX ORDER — DIPHENHYDRAMINE HYDROCHLORIDE 50 MG/ML
INJECTION INTRAMUSCULAR; INTRAVENOUS
Status: DISPENSED
Start: 2022-10-04

## (undated) RX ORDER — ONDANSETRON 2 MG/ML
INJECTION INTRAMUSCULAR; INTRAVENOUS
Status: DISPENSED
Start: 2019-05-14